# Patient Record
Sex: MALE | Race: BLACK OR AFRICAN AMERICAN | NOT HISPANIC OR LATINO | Employment: STUDENT | URBAN - METROPOLITAN AREA
[De-identification: names, ages, dates, MRNs, and addresses within clinical notes are randomized per-mention and may not be internally consistent; named-entity substitution may affect disease eponyms.]

---

## 2017-01-18 DIAGNOSIS — K64.9 HEMORRHOIDS, UNSPECIFIED HEMORRHOID TYPE: Primary | ICD-10-CM

## 2017-01-18 RX ORDER — TRIAMCINOLONE ACETONIDE 1 MG/G
CREAM TOPICAL
Qty: 30 G | Refills: 11 | Status: SHIPPED | OUTPATIENT
Start: 2017-01-18 | End: 2017-11-06

## 2017-01-26 ENCOUNTER — OFFICE VISIT (OUTPATIENT)
Dept: FAMILY MEDICINE | Facility: CLINIC | Age: 62
End: 2017-01-26

## 2017-01-26 ENCOUNTER — TELEPHONE (OUTPATIENT)
Dept: FAMILY MEDICINE | Facility: CLINIC | Age: 62
End: 2017-01-26

## 2017-01-26 VITALS
SYSTOLIC BLOOD PRESSURE: 116 MMHG | DIASTOLIC BLOOD PRESSURE: 73 MMHG | HEART RATE: 64 BPM | HEIGHT: 71 IN | WEIGHT: 174.2 LBS | BODY MASS INDEX: 24.39 KG/M2 | TEMPERATURE: 97.8 F

## 2017-01-26 DIAGNOSIS — K64.8 INTERNAL HEMORRHOIDS: ICD-10-CM

## 2017-01-26 DIAGNOSIS — D84.9 NEW ONSET OF HEADACHE IN IMMUNOCOMPROMISED PATIENT (H): Primary | ICD-10-CM

## 2017-01-26 DIAGNOSIS — D50.8 OTHER IRON DEFICIENCY ANEMIA: Primary | ICD-10-CM

## 2017-01-26 DIAGNOSIS — Z00.00 PREVENTATIVE HEALTH CARE: ICD-10-CM

## 2017-01-26 DIAGNOSIS — K64.4 EXTERNAL HEMORRHOIDS: ICD-10-CM

## 2017-01-26 DIAGNOSIS — D50.8 OTHER IRON DEFICIENCY ANEMIA: ICD-10-CM

## 2017-01-26 DIAGNOSIS — R21 RASH: ICD-10-CM

## 2017-01-26 DIAGNOSIS — K59.00 CONSTIPATION, UNSPECIFIED CONSTIPATION TYPE: ICD-10-CM

## 2017-01-26 DIAGNOSIS — R51.9 NEW ONSET OF HEADACHE IN IMMUNOCOMPROMISED PATIENT (H): Primary | ICD-10-CM

## 2017-01-26 RX ORDER — BENZOCAINE/MENTHOL 6 MG-10 MG
LOZENGE MUCOUS MEMBRANE 2 TIMES DAILY
Qty: 30 G | Refills: 1 | Status: SHIPPED | OUTPATIENT
Start: 2017-01-26 | End: 2018-09-17

## 2017-01-26 RX ORDER — CHOLECALCIFEROL (VITAMIN D3) 50 MCG
1 TABLET ORAL DAILY
Qty: 100 TABLET | Refills: 3 | Status: SHIPPED | OUTPATIENT
Start: 2017-01-26 | End: 2017-02-20

## 2017-01-26 RX ORDER — MULTIPLE VITAMINS W/ MINERALS TAB 9MG-400MCG
1 TAB ORAL DAILY
Qty: 90 EACH | Refills: 3 | Status: SHIPPED | OUTPATIENT
Start: 2017-01-26 | End: 2017-03-31

## 2017-01-26 RX ORDER — TRIAMCINOLONE ACETONIDE 1 MG/G
OINTMENT TOPICAL
Qty: 30 G | Refills: 0 | Status: SHIPPED | OUTPATIENT
Start: 2017-01-26 | End: 2018-09-17

## 2017-01-26 RX ORDER — ASPIRIN 81 MG
TABLET, DELAYED RELEASE (ENTERIC COATED) ORAL
Qty: 270 TABLET | Refills: 3 | Status: SHIPPED | OUTPATIENT
Start: 2017-01-26 | End: 2017-02-20

## 2017-01-26 RX ORDER — BISACODYL 5 MG/1
5 TABLET, DELAYED RELEASE ORAL DAILY PRN
Qty: 60 TABLET | Refills: 1 | Status: SHIPPED
Start: 2017-01-26 | End: 2017-09-06

## 2017-01-26 NOTE — PROGRESS NOTES
"       SUBJECTIVE       Antonio Mercado is a 61 year old  male with a PMH significant for:     Patient Active Problem List   Diagnosis     Health Care Home     Human immunodeficiency virus (HIV) disease (H)     Mechanical low back pain     Dysuria     Pain in finger of right hand     Heel pain     Microscopic hematuria     Hypertrophy of prostate with urinary obstruction     S/P colonoscopy     Anemia, iron deficiency     Right lower quadrant abdominal mass     Malignant neoplasm of hepatic flexure (H)     Hemorrhoids, unspecified hemorrhoid type     He presents with a few complaints. First, he is complaining of neck pain. Describes as \"migraine.\"  Started last month. Occurs over the L occiput and superior neck. Describes as \"maybe pounding\" pain, unsure how else to describe it. Rates pain a 5-6/10. Thinks he's had this 3 days out of the past month. States that pain typically lasts 1/2 to 3/4 of the day, then seems to resolve on its own. No diurnal pattern. Does not wake him from sleep. Saw his infectious disease doctor last month. He does not recall the timing or result of his last CD4 count. Otherwise feeling well, no fever, weight change, N/V or diarrhea. Denies change in vision, numbness, tingling.     Also notes some trouble with his chronic hemorrhoids. States that he notices bright red blood on the toilet paper at times, especially after eating a lot of protein like red meat. Hasn't been using hydrocortisone or stool softeners recently. Underwent colonoscopy on January 24 and was told at that time to ask his primary for treatment of internal hemorrhoids.    PMH, Medications and Allergies were reviewed and updated as needed.        REVIEW OF SYSTEMS     CONSTITUTIONAL: NEGATIVE for fever, chills, change in weight  INTEGUMENTARY/SKIN: NEGATIVE for worrisome rashes, moles or lesions  EYES: NEGATIVE for vision changes or irritation  ENT/MOUTH: NEGATIVE for ear, mouth and throat problems  RESP: NEGATIVE for " "significant cough or SOB  BREAST: NEGATIVE for masses, tenderness or discharge  CV: NEGATIVE for chest pain, palpitations or peripheral edema  GI: NEGATIVE for nausea, abdominal pain, heartburn, or change in bowel habits  : Urinary frequency, nocturia  MUSCULOSKELETAL: NEGATIVE for significant arthralgias or myalgia  NEURO: See HPI  ENDOCRINE: NEGATIVE for temperature intolerance, skin/hair changes  HEME/ALLERGY: NEGATIVE for bleeding problems  PSYCHIATRIC: NEGATIVE for changes in mood or affect        OBJECTIVE     Filed Vitals:    01/26/17 1041   BP: 116/73   Pulse: 64   Temp: 97.8  F (36.6  C)   TempSrc: Oral   Height: 5' 10.75\" (179.7 cm)   Weight: 174 lb 3.2 oz (79.017 kg)     Body mass index is 24.47 kg/(m^2).    Gen: Well-appearing, well-nourished adult male. Alert, oriented and appropriate. NAD  HEENT: Normocephalic. PERRL, EOMI, no conjunctival injection or scleral icterus. Nares patent. MMM. No posterior pharyngeal erythema or tonsillar exudate. 2-3 mm brown macule noted on L buccal surface  Neck: Supple  CV: RRR, no rubs, murmurs or extra heart sounds  Pulm: CTAB, no wheezes, rales or rhonchi  Rectal: Normal external sphincter. No external hemorrhoids noted.  Neuro: CN II-XII grossly intact. Strength 5/5 in grasp, upper and lower extremities. Biceps and patellar refleces 1+ and symmetric. Negative pronator drift. Normal finger-nose-finger. Normal gait.    No results found for this or any previous visit (from the past 24 hour(s)).        ASSESSMENT AND PLAN   1. New onset of headache in immunocompromised patient (H)  Asymptomatic at the time of our visit. No red flag features, but this is new onset HA in a elderly, immunocompromised patient so will obtain imaging to rule out infectious etiology or mass lesion. Discussed that he can use tylenol or ibuprofen for pain relief. Would like him to follow-up in 1 month's time, earlier if worsening symptoms. Discussed warning symptoms for which he should be " evaluated in the ED.  - CT HEAD W/O CONTRAST; Future    2. Internal hemorrhoids  Chronic. Seen on 1/24/2017 colonoscopy per patient. Not currently bothersome and no evidence of irritation or thrombosis on exam. Hydrocortisone and stool softener prescribed today. Discussed titrating to 2 soft stools daily.  - hydrocortisone (CORTAID) 1 % cream; Apply topically 2 times daily  Dispense: 30 g; Refill: 1  - docusate sodium (COLACE) 100 MG tablet; Take 1 to 3 tablets daily to keep stools soft.  Dispense: 270 tablet; Refill: 3    3. Rash  Refill  - triamcinolone (KENALOG) 0.1 % ointment; Apply sparingly to affected area three times daily for 14 days.  Dispense: 30 g; Refill: 0    4. Preventative health care  Refill  - multivitamin, therapeutic with minerals (MULTI-VITAMIN) TABS tablet; Take 1 tablet by mouth daily  Dispense: 90 each; Refill: 3  - Cholecalciferol (VITAMIN D) 2000 UNITS tablet; Take 1 tablet by mouth daily  Dispense: 100 tablet; Refill: 3          RTC in 1 month for follow up of HA and hemorrhoids or sooner if develops new or worsening symptoms.    Cristina Meyer    Preceptor attestation:  Patient seen and discussed with the resident. Assessment and plan reviewed with resident and agreed upon.  Supervising physician: Juan Guillory  [facility]

## 2017-01-26 NOTE — MR AVS SNAPSHOT
After Visit Summary   1/26/2017    Antonio Mercado    MRN: [id number]           Patient Information     Date Of Birth          1955        Visit Information        Provider Department      1/26/2017 10:40 AM Cristina Meyer MD [facility]        Today's Diagnoses     Hemorrhoids, unspecified hemorrhoid type    -  1     Rash         Preventative health care         Other iron deficiency anemia         External hemorrhoids         Internal hemorrhoids         New onset of headache in immunocompromised patient (H)           Care Instructions    For headaches:  - I ordered a CT scan today. Please stop at the [facility] desk on your way out to schedule  - You can use tylenol or ibuprofen as needed   - If HA worsens, fever occurs, you should be evaluated in clinic sooner    For hemorrhoids:   - I prescribed topical steroid (hydrocortisone) to use when inflammed  - I also sent stool softener to be used daily. Titrate to 2 soft stools daily    I would like to see you back in 1 month to follow-up        Follow-ups after your visit        Future tests that were ordered for you today     Open Future Orders        Priority Expected Expires Ordered    CT HEAD W/O CONTRAST Routine  1/26/2018 1/26/2017            Who to contact     Please call your clinic at [phone] to:    Ask questions about your health    Make or cancel appointments    Discuss your medicines    Learn about your test results    Speak to your doctor   If you have compliments or concerns about an experience at your clinic, or if you wish to file a complaint, please contact [facility] Patient Relations at [phone] or email us at [email or web address]         Additional Information About Your Visit        MyChart Information     [employer] gives you secure access to your electronic health record. If you see a primary care provider, you can also send messages to your care team and make appointments. If you  "have questions, please call your primary care clinic.  If you do not have a primary care provider, please call [phone] and they will assist you.      [employer] is an electronic gateway that provides easy, online access to your medical records. With [employer], you can request a clinic appointment, read your test results, renew a prescription or communicate with your care team.     To access your existing account, please contact your [facility] or call [phone] for assistance.        Care EveryWhere ID     This is your Care EveryWhere ID. This could be used by other organizations to access your [facility] medical records  [id number]        Your Vitals Were     Pulse Temperature Height BMI (Body Mass Index)          64 97.8  F (36.6  C) (Oral) 5' 10.75\" (179.7 cm) 24.47 kg/m2         Blood Pressure from Last 3 Encounters:   01/26/17 116/73   11/30/16 95/65   06/20/16 101/68    Weight from Last 3 Encounters:   01/26/17 174 lb 3.2 oz (79.017 kg)   06/20/16 160 lb 6.4 oz (72.757 kg)   06/06/16 161 lb 3.2 oz (73.12 kg)                 Today's Medication Changes          These changes are accurate as of: 1/26/17 11:16 AM.  If you have any questions, ask your nurse or doctor.               These medicines have changed or have updated prescriptions.        Dose/Directions    * triamcinolone 0.1 % cream   Commonly known as:  KENALOG   This may have changed:  Another medication with the same name was added. Make sure you understand how and when to take each.   Used for:  Hemorrhoids, unspecified hemorrhoid type   Changed by:  Salas Salgado, DO        Apply sparingly to affected area three times daily for 14 days.   Quantity:  30 g   Refills:  11       * triamcinolone 0.1 % ointment   Commonly known as:  KENALOG   This may have changed:  You were already taking a medication with the same name, and this prescription was added. Make sure you understand how and when to take each.   Used " for:  Rash   Changed by:  Cristina Meyer MD        Apply sparingly to affected area three times daily for 14 days.   Quantity:  30 g   Refills:  0       * Notice:  This list has 2 medication(s) that are the same as other medications prescribed for you. Read the directions carefully, and ask your doctor or other care provider to review them with you.         Where to get your medicines      These medications were sent to [id number] - [place], [place] - [address] [place] [address] AT [facility] [address]  [address], [place] [address]     Phone:  [phone]    - docusate sodium 100 MG tablet  - hydrocortisone 1 % cream  - multivitamin, therapeutic with minerals Tabs tablet  - triamcinolone 0.1 % ointment  - vitamin D 2000 UNITS tablet             Primary Care Provider Office Phone # Fax #    Salas Salgado [phone]       [facility] [address] [address]        Thank you!     Thank you for choosing [facility]  for your care. Our goal is always to provide you with excellent care. Hearing back from our patients is one way we can continue to improve our services. Please take a few minutes to complete the written survey that you may receive in the mail after your visit with us. Thank you!             Your Updated Medication List - Protect others around you: Learn how to safely use, store and throw away your medicines at www.disposemymeds.org.          This list is accurate as of: 1/26/17 11:16 AM.  Always use your most recent med list.                   Brand Name Dispense Instructions for use    abacavir-lamiVUDine-zidovudine 300-150-300 MG per tablet    TRIZIVIR     Take 1 tablet by mouth 2 times daily.       acetaminophen 500 MG tablet    TYLENOL    100 tablet    Take 1 tablet by mouth every 6 hours as needed for pain.       baclofen 10 MG tablet    LIORESAL    30 tablet    Take 1 tablet (10 mg) by mouth 3 times daily as needed for muscle spasms        docusate sodium 100 MG tablet    COLACE    270 tablet    Take 1 to 3 tablets daily to keep stools soft.       eucerin cream     1 g    Apply topically daily as needed for dry skin       ferrous sulfate 325 (65 FE) MG tablet    IRON    30 tablet    Take 1 tablet (325 mg) by mouth 2 times daily       hydrocortisone 1 % cream    CORTAID    30 g    Apply topically 2 times daily       INTELENCE 100 MG tablet   Generic drug:  etravirine      Take 200 mg by mouth 2 times daily (with meals).       multivitamin, therapeutic with minerals Tabs tablet     90 each    Take 1 tablet by mouth daily       simvastatin 20 MG tablet    ZOCOR    90 tablet    Take 1 tablet (20 mg) by mouth At Bedtime       tamsulosin 0.4 MG capsule    FLOMAX    30 capsule    Take 1 capsule (0.4 mg) by mouth daily       * triamcinolone 0.1 % cream    KENALOG    30 g    Apply sparingly to affected area three times daily for 14 days.       * triamcinolone 0.1 % ointment    KENALOG    30 g    Apply sparingly to affected area three times daily for 14 days.       vitamin D 2000 UNITS tablet     100 tablet    Take 1 tablet by mouth daily       * Notice:  This list has 2 medication(s) that are the same as other medications prescribed for you. Read the directions carefully, and ask your doctor or other care provider to review them with you.

## 2017-01-27 DIAGNOSIS — R51.9 NEW ONSET OF HEADACHE IN IMMUNOCOMPROMISED PATIENT (H): ICD-10-CM

## 2017-01-27 DIAGNOSIS — D84.9 NEW ONSET OF HEADACHE IN IMMUNOCOMPROMISED PATIENT (H): ICD-10-CM

## 2017-01-27 NOTE — Clinical Note
February 2, 2017      Antonio Mercado  [address]  [place] [address]        Dear Antonio,    Normal head CT without evidence of infection or tumor, i.e. Nothing seen that could be the source of headaches. If headache pain is ongoing, please return to clinic for a follow-up visit    If you have any questions, please call the clinic to make an appointment.    Sincerely,    Juan Guillory MD

## 2017-01-30 ENCOUNTER — TRANSFERRED RECORDS (OUTPATIENT)
Dept: HEALTH INFORMATION MANAGEMENT | Facility: CLINIC | Age: 62
End: 2017-01-30

## 2017-01-30 ENCOUNTER — AMBULATORY - HEALTHEAST (OUTPATIENT)
Dept: INFECTIOUS DISEASES | Facility: CLINIC | Age: 62
End: 2017-01-30

## 2017-01-30 DIAGNOSIS — Z21 HIV (HUMAN IMMUNODEFICIENCY VIRUS INFECTION) (H): ICD-10-CM

## 2017-02-02 NOTE — PROGRESS NOTES
Quick Note:    Normal head CT without evidence of infection or tumor, i.e. Nothing seen that could be the source of headaches. If headache pain is ongoing, please return to clinic for a follow-up visit  ______

## 2017-02-20 ENCOUNTER — OFFICE VISIT (OUTPATIENT)
Dept: FAMILY MEDICINE | Facility: CLINIC | Age: 62
End: 2017-02-20

## 2017-02-20 VITALS
DIASTOLIC BLOOD PRESSURE: 63 MMHG | WEIGHT: 170.2 LBS | SYSTOLIC BLOOD PRESSURE: 96 MMHG | TEMPERATURE: 98.2 F | HEIGHT: 71 IN | BODY MASS INDEX: 23.83 KG/M2 | HEART RATE: 72 BPM

## 2017-02-20 DIAGNOSIS — K59.00 CONSTIPATION, UNSPECIFIED CONSTIPATION TYPE: ICD-10-CM

## 2017-02-20 DIAGNOSIS — D50.8 OTHER IRON DEFICIENCY ANEMIA: ICD-10-CM

## 2017-02-20 DIAGNOSIS — J06.9 UPPER RESPIRATORY TRACT INFECTION, UNSPECIFIED TYPE: ICD-10-CM

## 2017-02-20 DIAGNOSIS — Z71.84 TRAVEL ADVICE ENCOUNTER: Primary | ICD-10-CM

## 2017-02-20 DIAGNOSIS — B20 HUMAN IMMUNODEFICIENCY VIRUS (HIV) DISEASE (H): ICD-10-CM

## 2017-02-20 DIAGNOSIS — Z00.00 PREVENTATIVE HEALTH CARE: ICD-10-CM

## 2017-02-20 PROBLEM — K59.01 SLOW TRANSIT CONSTIPATION: Status: ACTIVE | Noted: 2017-02-20

## 2017-02-20 RX ORDER — CHOLECALCIFEROL (VITAMIN D3) 50 MCG
1 TABLET ORAL DAILY
Qty: 100 TABLET | Refills: 3 | Status: SHIPPED | OUTPATIENT
Start: 2017-02-20 | End: 2017-03-31

## 2017-02-20 RX ORDER — ASPIRIN 81 MG
TABLET, DELAYED RELEASE (ENTERIC COATED) ORAL
Qty: 270 TABLET | Refills: 3 | Status: CANCELLED | OUTPATIENT
Start: 2017-02-20

## 2017-02-20 RX ORDER — ETRAVIRINE 200 MG/1
200 TABLET ORAL 2 TIMES DAILY WITH MEALS
Qty: 180 TABLET | Refills: 3 | Status: SHIPPED | OUTPATIENT
Start: 2017-02-20 | End: 2019-08-22

## 2017-02-20 RX ORDER — CODEINE PHOSPHATE AND GUAIFENESIN 10; 100 MG/5ML; MG/5ML
1-2 SOLUTION ORAL EVERY 4 HOURS PRN
Qty: 120 ML | Refills: 0 | Status: SHIPPED | OUTPATIENT
Start: 2017-02-20 | End: 2017-02-23

## 2017-02-20 RX ORDER — ASPIRIN 81 MG
TABLET, DELAYED RELEASE (ENTERIC COATED) ORAL
Qty: 270 TABLET | Refills: 3 | Status: SHIPPED | OUTPATIENT
Start: 2017-02-20 | End: 2018-05-24

## 2017-02-20 RX ORDER — ABACAVIR , LAMIVUDINE AND ZIDOVUDINE 150; 300; 300 MG/1; MG/1; MG/1
1 TABLET ORAL 2 TIMES DAILY
Qty: 180 TABLET | Refills: 3 | Status: SHIPPED | OUTPATIENT
Start: 2017-02-20 | End: 2019-08-22

## 2017-02-20 NOTE — PATIENT INSTRUCTIONS
Today, we have given 2nd shots for Hepatitis A and B.  We have given one pneumonia shot.  We have given typhoid fever vaccine and influenza shot.  We have checked for evidence of TB and your CD4 count.      Please return next week to follow up on your CD4 count and whether that effects Yellow Fever vaccination.

## 2017-02-20 NOTE — NURSING NOTE
Antonio Mercado      1.  Has the patient received the information for the influenza vaccine? YES    2.  Does the patient have any of the following contraindications?     Allergy to eggs? No     Allergic reaction to previous influenza vaccines? No     Any other problems to previous influenza vaccines? No     Paralyzed by Guillain-[place] syndrome? No     Currently pregnant? NO     Current moderate or severe illness? No    Vaccination given by Anita Ashby CMA.  Recorded by Anita Ashby

## 2017-02-20 NOTE — PROGRESS NOTES
[facility] Travel Visit         Antonio Mercado is a 61 year old male who presents for a pre-travel assessment visit.  He will be traveling to:    Destination(s):  Destination [place]  [place]  Date of arrival 4/8/17  Date of departure 4/11/17 and   Destination 2  [facility]  Date of arrival 4/11/17  Date of departure 5/7/17    Reason for travel: Visit Friends and Family    Antonio Mercado has completed the travel questionnaire and it is reviewed: YES  Are there special circumstances which place this traveler at higher risk (List if 'yes')?: YES - he is HIV positive.  He also had colon cancer resected in 2016.    Past Medical History   Diagnosis Date     Heel pain      History of blood transfusion      HIV (human immunodeficiency virus infection) (H)      LBP (low back pain)      Malignant neoplasm of hepatic flexure (H) 2/4/2016     Trigger finger        Current Outpatient Prescriptions on File Prior to Visit:  acetaminophen (TYLENOL) 500 MG tablet Take 1 tablet by mouth every 6 hours as needed for pain.   triamcinolone (KENALOG) 0.1 % ointment Apply sparingly to affected area three times daily for 14 days.   multivitamin, therapeutic with minerals (MULTI-VITAMIN) TABS tablet Take 1 tablet by mouth daily   hydrocortisone (CORTAID) 1 % cream Apply topically 2 times daily   bisacodyl (BISACODYL LAXATIVE) 5 MG EC tablet Take 1 tablet (5 mg) by mouth daily as needed for constipation   triamcinolone (KENALOG) 0.1 % cream Apply sparingly to affected area three times daily for 14 days.   tamsulosin (FLOMAX) 0.4 MG capsule Take 1 capsule (0.4 mg) by mouth daily   baclofen (LIORESAL) 10 MG tablet Take 1 tablet (10 mg) by mouth 3 times daily as needed for muscle spasms   simvastatin (ZOCOR) 20 MG tablet Take 1 tablet (20 mg) by mouth At Bedtime   ferrous sulfate 325 (65 FE) MG tablet Take 1 tablet (325 mg) by mouth 2 times daily   Skin Protectants, Misc. (EUCERIN) cream Apply topically daily as needed for dry skin    [DISCONTINUED] etravirine (INTELENCE) 100 MG tablet Take 200 mg by mouth 2 times daily (with meals).   [DISCONTINUED] abacavir-lamiVUDine-zidovudine (TRIZIVIR) 300-150-300 MG per tablet Take 1 tablet by mouth 2 times daily.     No current facility-administered medications on file prior to visit.         Allergies   Allergen Reactions     Stribild [Tpybijk-Hghvnpa-Mtavlwuu-Tenof] Itching     Ibuprofen Itching       Immunizations, travel specific:  -- Yellow fever: Required and not previously vaccinated  -- Hep A: Immunity status unknown  -- Hep B: Immunity status unknown  -- Typhoid (IM: booster every 2 years; PO: booster every 5 years): Known to be not immune, not immunized  -- Rabies  (Data on the need for and timing of additional booster doses are not available): Known to be not immune, not immunized  -- Meningococcal meningitis Known to be not immune, not immunized   -- [place] Islander encephalitis (Data on the need for and timing of additional booster doses are not available):Known to be not immune, not immunized    Immunizations, routine adult:  -- Influenza Known to be not immune, not immunized - thought he had been immunized at this clinic but no record since 2015  -- Polio: Immunity status unknown  -- Diphtheria, Tetanus & Pertussis (DTaP): Immunity status unknown - did have #2 shot on 3/8/16  -- Measles/ Mumps/ Rubella: Not needed for persons born before 1958  -- Varicella: Not needed for persons born in the [place] before 1978  -- Pneumococcal (PPSV23 (Pneumovax)): UTD with immunization   -- Pneumococcal (PCV13 (Prevnar)): Known to be not immune, not immunized    Malaria prophylaxis:  Recommended (refer to [facility] for destination-specific recommendation) YES       Review of Systems:     C: NEGATIVE for fever, chills, change in weight.  Overall feels well, eats well, active, good energy  E/M: NEGATIVE for ear, mouth and throat problems  RESP: POSITIVE for cough-non productive and nasal congestion x 1  "week  CV: NEGATIVE for chest pain, palpitations or peripheral edema          Objective:     BP 96/63  Pulse 72  Temp 98.2  F (36.8  C) (Oral)  Ht 5' 11\" (180.3 cm)  Wt 170 lb 3.2 oz (77.2 kg)  BMI 23.74 kg/m2  Body mass index is 23.74 kg/(m^2).    GENERAL: healthy, alert, well nourished, well hydrated, no distress  HENT: ear canals- normal; TMs- normal; Nose- normal; Mouth- no ulcers, no lesions  NECK: no tenderness, no adenopathy, no asymmetry, no masses, no stiffness; thyroid- normal to palpation  RESP: decreased AE throughout but lungs clear to auscultation - no rales, no rhonchi, no wheezes  CV: regular rates and rhythm, normal S1 S2, no S3 or S4 and no murmur, no click or rub -  MS: extremities- no gross deformities noted, no edema         Assessment and Plan     Patient is HIV+ and taking HAART.  He attends [facility] but we have NO medical records in chart.  I called ID office and one of his partners informs me that at last visit in Dec 2016, his CD4 count was 236 with negative viral load.  I reviewed records which are available via care everywhere, [facility].  He suggests repeating CD4 count and HIV viral load today.      I reviewed [facility] records.  Most recent exam on 2/7/17 concluded:  PLAN:   1. Every 3 to 4 months, CEA, liver function tests, hemogram.  2. In 6 months: CT chest, abdomen and pelvis CEA, liver function tests, hemogram, ferritin, and follow up with me 1 week after the above tests.   3. High-fiber, low-fat diet, continued antiretroviral therapy and iron supplementation.   4. January 2019: Surveillance Colonoscopy    On review of recommendations, immunization recommendations and anti-malaria prophylaxis are condition on degree of immunosuppresssion.  I will involve PharmD in developing recommendations.  Will ask patient to return to finalize recommendations once labs are back.     Based on patient's past history, review of immunization and immunity status, planned itinerary and " current recommendations, the following are recommended:    Yellow fever vaccine - determine at next visit based on labs  Hep A vaccine #2 today  Hep B vaccine #3 today  Typhoid vaccine today  Influenza vaccine today  Pneumococcal - PCV13 today, PPSV received March 2016    For next visit:  It is recommended to check TB status - will draw lab today.  Patient will be visiting [facility] - can give Menactra next visit if he wants coverage (lower risk area)  YF vaccine can be given to an asymptomatic HIV-infected person with no evidence of immunosuppression based on CD4 counts.  Malaria:  - determine treatment recommendations at next visit - all meds have some risk interaction with HAART - PharmD to advise  Traveler's diarrhea treatment at next visit - cipro 500 BID x 3 days; loperamide for diarrhea    2. Cough secondary to URI  Prescribe codeine containing cough suppressant, expectant full recovery - consider CXR if remains symptomatic    I spent 20 min in counselling and coordination of care on appropriate shots and medications; food and water precautions DEET and mosquito netting    Patient is given a copy of the [place] traveller report as well as destination-specific recommendations on sun protection, avoiding insect bites and travel safety.      Total of 40 minutes was spent in face to face contact with patient with > 50% in counseling and coordination of care.  Options for treatment and/or follow-up care were reviewed with the patient. Antonio Mercado was engaged and actively involved in the decision making process. He verbalized understanding of the options discussed and was satisfied with the final plan.      Harris Mary MD

## 2017-02-20 NOTE — MR AVS SNAPSHOT
After Visit Summary   2/20/2017    Antonio Mercado    MRN: [id number]           Patient Information     Date Of Birth          1955        Visit Information        Provider Department      2/20/2017 9:40 AM Harris Mary MD [facility]        Today's Diagnoses     Travel advice encounter    -  1    Human immunodeficiency virus (HIV) disease (H)        Other iron deficiency anemia        Preventative health care        Constipation, unspecified constipation type        Upper respiratory tract infection, unspecified type          Care Instructions    Today, we have given 2nd shots for Hepatitis A and B.  We have given one pneumonia shot.  We have given typhoid fever vaccine and influenza shot.  We have checked for evidence of TB and your CD4 count.      Please return next week to follow up on your CD4 count and whether that effects Yellow Fever vaccination.          Follow-ups after your visit        Follow-up notes from your care team     Return in about 1 week (around 2/27/2017).      Your next 10 appointments already scheduled     Feb 21, 2017  8:50 AM CST   LAB VISIT with [facility]   [facility] ([facility])    [address] [place] [address]   [phone]           If you are coming in for fasting labs, you will need to fast for 10-12 hours prior to your appt. Fasting labs include lipids, cholesterol, glucose, complete metabolic panel, basic metabolic panel, and triglycerides. Do not drink coffee or any other fluids. Water with medications are okay. Do not chew gum as well. If you have any further questions, please contact your health care team.              Feb 27, 2017 10:00 AM CST   Return Visit with Harris Mary MD   [facility] ([facility])    [address] [place] [address]   [phone]              Future tests that were ordered for you today     Open Future Orders        Priority Expected Expires Ordered    HIV-1 RNA Vincent PCR ([facility]) Routine  "2/21/2017 2/20/2018 2/20/2017            Who to contact     Please call your clinic at [phone] to:    Ask questions about your health    Make or cancel appointments    Discuss your medicines    Learn about your test results    Speak to your doctor   If you have compliments or concerns about an experience at your clinic, or if you wish to file a complaint, please contact [facility] Patient Relations at [phone] or email us at [email or web address]         Additional Information About Your Visit        [employer] Information     [employer] gives you secure access to your electronic health record. If you see a primary care provider, you can also send messages to your care team and make appointments. If you have questions, please call your primary care clinic.  If you do not have a primary care provider, please call [phone] and they will assist you.      [employer] is an electronic gateway that provides easy, online access to your medical records. With [employer], you can request a clinic appointment, read your test results, renew a prescription or communicate with your care team.     To access your existing account, please contact your [facility] or call [phone] for assistance.        Care EveryWhere ID     This is your Care EveryWhere ID. This could be used by other organizations to access your [place] medical records  [id number]        Your Vitals Were     Pulse Temperature Height BMI (Body Mass Index)          72 98.2  F (36.8  C) (Oral) 5' 11\" (180.3 cm) 23.74 kg/m2         Blood Pressure from Last 3 Encounters:   02/20/17 96/63   01/26/17 116/73   11/30/16 95/65    Weight from Last 3 Encounters:   02/20/17 170 lb 3.2 oz (77.2 kg)   01/26/17 174 lb 3.2 oz (79 kg)   06/20/16 160 lb 6.4 oz (72.8 kg)              We Performed the Following     ADMIN VACCINE, EACH ADDITIONAL     ADMIN VACCINE, INITIAL     Flu vaccine, quad, with preservative, " 0.5 ml     [place] Suppressor Panel ([facility])     HEPATITIS A VACCINE ADULT IM     HEPATITIS B VACCINE,ADULT,IM     Myc Tuberc Qtferon ([facility])     PNEUMOCOCCAL CONJ VACCINE 13 VALENT IM (PCV13)     TYPHOID VACCINE, IM          Today's Medication Changes          These changes are accurate as of: 2/20/17  4:13 PM.  If you have any questions, ask your nurse or doctor.               Start taking these medicines.        Dose/Directions    guaiFENesin-codeine 100-10 MG/5ML Soln solution   Commonly known as:  ROBITUSSIN AC   Used for:  Upper respiratory tract infection, unspecified type   Started by:  Harris Mary MD        Dose:  1-2 tsp.   Take 5-10 mLs by mouth every 4 hours as needed for cough   Quantity:  120 mL   Refills:  0         These medicines have changed or have updated prescriptions.        Dose/Directions    etravirine 200 MG tablet   Commonly known as:  INTELENCE   This may have changed:  medication strength   Used for:  Human immunodeficiency virus (HIV) disease (H)   Changed by:  Harris Mary MD        Dose:  200 mg   Take 1 tablet (200 mg) by mouth 2 times daily (with meals)   Quantity:  180 tablet   Refills:  3            Where to get your medicines      These medications were sent to [id number] - [place], [place] - [address] [place] [address] AT [facility]  [address], [place] [address]     Phone:  [phone]     abacavir-lamiVUDine-zidovudine 300-150-300 MG per tablet    docusate sodium 100 MG tablet    etravirine 200 MG tablet    vitamin D 2000 UNITS tablet         Some of these will need a paper prescription and others can be bought over the counter.  Ask your nurse if you have questions.     Bring a paper prescription for each of these medications     guaiFENesin-codeine 100-10 MG/5ML Soln solution                Primary Care Provider Office Phone # Fax #    Salas Salgado [phone]       [address]  [address]        Thank you!     Thank you for choosing [facility]  for your care. Our goal is always to provide you with excellent care. Hearing back from our patients is one way we can continue to improve our services. Please take a few minutes to complete the written survey that you may receive in the mail after your visit with us. Thank you!             Your Updated Medication List - Protect others around you: Learn how to safely use, store and throw away your medicines at www.disposemymeds.org.          This list is accurate as of: 2/20/17  4:13 PM.  Always use your most recent med list.                   Brand Name Dispense Instructions for use    abacavir-lamiVUDine-zidovudine 300-150-300 MG per tablet    TRIZIVIR    180 tablet    Take 1 tablet by mouth 2 times daily       acetaminophen 500 MG tablet    TYLENOL    100 tablet    Take 1 tablet by mouth every 6 hours as needed for pain.       baclofen 10 MG tablet    LIORESAL    30 tablet    Take 1 tablet (10 mg) by mouth 3 times daily as needed for muscle spasms       bisacodyl 5 MG EC tablet    BISACODYL LAXATIVE    60 tablet    Take 1 tablet (5 mg) by mouth daily as needed for constipation       docusate sodium 100 MG tablet    COLACE    270 tablet    Take 1 to 3 tablets daily to keep stools soft.       etravirine 200 MG tablet    INTELENCE    180 tablet    Take 1 tablet (200 mg) by mouth 2 times daily (with meals)       eucerin cream     1 g    Apply topically daily as needed for dry skin       ferrous sulfate 325 (65 FE) MG tablet    IRON    30 tablet    Take 1 tablet (325 mg) by mouth 2 times daily       guaiFENesin-codeine 100-10 MG/5ML Soln solution    ROBITUSSIN AC    120 mL    Take 5-10 mLs by mouth every 4 hours as needed for cough       hydrocortisone 1 % cream    CORTAID    30 g    Apply topically 2 times daily       multivitamin, therapeutic with minerals Tabs tablet     90 each    Take 1 tablet by mouth daily       simvastatin 20 MG tablet     ZOCOR    90 tablet    Take 1 tablet (20 mg) by mouth At Bedtime       tamsulosin 0.4 MG capsule    FLOMAX    30 capsule    Take 1 capsule (0.4 mg) by mouth daily       * triamcinolone 0.1 % cream    KENALOG    30 g    Apply sparingly to affected area three times daily for 14 days.       * triamcinolone 0.1 % ointment    KENALOG    30 g    Apply sparingly to affected area three times daily for 14 days.       vitamin D 2000 UNITS tablet     100 tablet    Take 1 tablet by mouth daily       * Notice:  This list has 2 medication(s) that are the same as other medications prescribed for you. Read the directions carefully, and ask your doctor or other care provider to review them with you.

## 2017-02-21 LAB
CD3 ABSOLUTE COUNT: 1059 /UL (ref 471–2787)
CD3: 73 % (ref 53–86)
CD4 ABSOLUTE COUNT: 239 /UL (ref 269–1625)
CD4-CD8 RATIO: 0.31 (ref 0.8–4.5)
CD4: 17 % (ref 33–58)
CD8 ABSOLUTE COUNT: 781 /UL (ref 117–923)
CD8: 54 % (ref 13–44)
HIV-1 RNA DETECT/QUANT, P: 73 COPIES/ML
LYMPHOCYTES # BLD AUTO: 1.4 THOU/UL (ref 0.8–4.4)

## 2017-02-22 LAB
QTF INTERPRETATION: NORMAL
QTF MITOGEN - NIL: >10 IU/ML
QTF NIL: 0.16 IU/ML
QTF RESULT: NEGATIVE
QTF TB ANTIGEN - NIL: 0.04 IU/ML

## 2017-02-23 ENCOUNTER — COMMUNICATION - HEALTHEAST (OUTPATIENT)
Dept: INFECTIOUS DISEASES | Facility: CLINIC | Age: 62
End: 2017-02-23

## 2017-02-23 ENCOUNTER — TELEPHONE (OUTPATIENT)
Dept: FAMILY MEDICINE | Facility: CLINIC | Age: 62
End: 2017-02-23

## 2017-02-23 DIAGNOSIS — J06.9 UPPER RESPIRATORY TRACT INFECTION, UNSPECIFIED TYPE: ICD-10-CM

## 2017-02-23 NOTE — TELEPHONE ENCOUNTER
I called for Antonio Gonzalez's ID doctor.  He is out of town this week.  His partner reviewed Antonio's lab results and based on CD4 count and viral load on balance recommends giving YF vaccine.  He also agrees with using doxy for malaria prophylaxis and OK to give meningitis vaccine if patient desires although he is not going to a high risk location.

## 2017-02-24 RX ORDER — CODEINE PHOSPHATE AND GUAIFENESIN 10; 100 MG/5ML; MG/5ML
1-2 SOLUTION ORAL EVERY 4 HOURS PRN
Qty: 120 ML | Refills: 0 | Status: SHIPPED | OUTPATIENT
Start: 2017-02-24 | End: 2017-02-27

## 2017-02-27 ENCOUNTER — OFFICE VISIT (OUTPATIENT)
Dept: FAMILY MEDICINE | Facility: CLINIC | Age: 62
End: 2017-02-27

## 2017-02-27 ENCOUNTER — RECORDS - HEALTHEAST (OUTPATIENT)
Dept: ADMINISTRATIVE | Facility: OTHER | Age: 62
End: 2017-02-27

## 2017-02-27 VITALS
OXYGEN SATURATION: 100 % | HEART RATE: 74 BPM | SYSTOLIC BLOOD PRESSURE: 115 MMHG | BODY MASS INDEX: 23.63 KG/M2 | WEIGHT: 169.4 LBS | DIASTOLIC BLOOD PRESSURE: 75 MMHG | TEMPERATURE: 98.1 F

## 2017-02-27 DIAGNOSIS — J06.9 UPPER RESPIRATORY TRACT INFECTION, UNSPECIFIED TYPE: ICD-10-CM

## 2017-02-27 DIAGNOSIS — Z23 NEED FOR VACCINATION: ICD-10-CM

## 2017-02-27 DIAGNOSIS — N32.0 BLADDER OUTLET OBSTRUCTION: Primary | ICD-10-CM

## 2017-02-27 DIAGNOSIS — Z71.84 TRAVEL ADVICE ENCOUNTER: ICD-10-CM

## 2017-02-27 LAB — PSA SERPL-MCNC: 8.7 NG/ML (ref 0–4.5)

## 2017-02-27 RX ORDER — AZITHROMYCIN 250 MG/1
TABLET, FILM COATED ORAL
Qty: 6 TABLET | Refills: 0 | Status: SHIPPED | OUTPATIENT
Start: 2017-02-27 | End: 2017-03-27

## 2017-02-27 RX ORDER — CODEINE PHOSPHATE AND GUAIFENESIN 10; 100 MG/5ML; MG/5ML
1-2 SOLUTION ORAL EVERY 4 HOURS PRN
Qty: 236 ML | Refills: 0 | Status: SHIPPED | OUTPATIENT
Start: 2017-02-27 | End: 2017-03-27

## 2017-02-27 RX ORDER — OXYBUTYNIN CHLORIDE 5 MG/1
TABLET, EXTENDED RELEASE ORAL
Qty: 31 TABLET | Refills: 3 | Status: SHIPPED | OUTPATIENT
Start: 2017-02-27 | End: 2018-09-17

## 2017-02-27 RX ORDER — CIPROFLOXACIN 500 MG/1
500 TABLET, FILM COATED ORAL 2 TIMES DAILY
Qty: 6 TABLET | Refills: 0 | Status: SHIPPED | OUTPATIENT
Start: 2017-02-27 | End: 2017-08-18

## 2017-02-27 RX ORDER — DOXYCYCLINE 100 MG/1
CAPSULE ORAL
Qty: 60 CAPSULE | Refills: 0 | Status: SHIPPED | OUTPATIENT
Start: 2017-02-27 | End: 2018-09-17

## 2017-02-27 RX ORDER — LOPERAMIDE HYDROCHLORIDE 2 MG/1
TABLET ORAL
Qty: 30 TABLET | Refills: 0 | Status: SHIPPED | OUTPATIENT
Start: 2017-02-27 | End: 2018-09-17

## 2017-02-27 NOTE — PROGRESS NOTES
SUBJECTIVE:  This is a 61-year-old male who follows up on a visit one week ago.  He is planning to visit [facility] where he is from.  He hasn't been there for 6 years.  The travel visit scheduled one week ago was complicated by the fact that he is HIV-positive and also had colon cancer, and none of the documentation from specialty visits was available at that time.  Since the visit, I've accessed Care Everywhere to review specialty notes with his permission, including [facility], [facility] and [facility].  I obtained a CD4 count and an HIV viral, load both of which place the patient in the intermediate immunosuppression range.  His TB status is negative.      I consulted with his usual ID doctor's partner, who advised that on balance yellow fever vaccine would be recommended.  In addition, they agreed that doxycycline would be the safest anti-malaria medication.  Today, the patient returns to follow up on his lab results and discuss recommendations.     In addition, he reports that he continues to have a cough.  This became productive of green sputum about 2 days ago and he had the cough for about 2 weeks.  He doesn't feel sick otherwise.  He doesn't have a fever.     He also complains of a headache from tamsulosin.  He self-discontinued this medication about 2 weeks ago and reports that the headache resolved.  He tried restarting the medication, and he therefore concluded that the medication gives him side effects.  He does have troubling bladder outlet symptoms, however.  He had a mildly elevated PSA in 2015 and was referred to Urology.  I can't find the notes from the Urology visit, but he suggests that he had a cystoscopy.  He is willing to follow up with Urology, given that he continues to have symptoms, but he requests   an alternative bladder outlet medication.     OBJECTIVE:   Vital signs are noted and are satisfactory.  He is in no acute distress.  His lungs were auscultated and no  added sounds are appreciated.  His pulse is regular.  He has no lower extremity edema.  We reviewed his labs and he had several questions which I answered.     ASSESSMENT:   1.  HIV-positive.   2.  Plan to travel to [facility].   3.  Bladder outlet obstruction symptoms, with previously elevated PSA.   4.  Complicated URI.     PLAN:  I spent several minutes discussing with him the risks and benefits of yellow fever vaccination.  Given that he is intermediately immunosuppressed, he might experience acute yellow fever symptoms with the live vaccine; however, he also doesn't want to contract yellow fever infection on his travels.  After discussion, he agreed to proceed with the yellow fever vaccination today.      In addition, I've refilled all his meds with sufficient quantity for his travels.  I also gave him loperamide and Cipro antibiotics in the case that he develops traveler's diarrhea.      We reviewed malaria prophylaxis and agreed to prescribe doxycycline as suggested by ID.  Concerning meningococcal risk we agreed not to immunize, given that he will be at low risk in the [place] and [place].  I refilled the codeine-containing cough suppressant he previously used, but also added in a prescription for azithromycin, given the duration of his symptoms.  I've ordered a PSA to evaluate his bladder outlet symptoms.  If it is elevated, we'll recommend that he return to see Urology.  In the interim, I've prescribed oxybutynin at its lowest dose.   Total visit time today was 25 minutes all of this was face-to-face MD time.  Over 50% was spent in counseling and coordination of care.

## 2017-02-27 NOTE — LETTER
2/27/2017      RE: Antonio Mercado  [address]   [place] [place] [facility] [address]     Dr Andersen, KARENI -     SUBJECTIVE:  This is a 61-year-old male who follows up on a visit one week ago.  He is planning to visit [facility] where he is from.  He hasn't been there for 6 years.  The travel visit scheduled one week ago was complicated by the fact that he is HIV-positive and also had colon cancer, and none of the documentation from specialty visits was available at that time.  Since the visit, I've accessed Care Everywhere to review specialty notes with his permission, including [facility] and [facility].  I obtained a CD4 count and an HIV viral, load both of which place the patient in the intermediate range.  His TB status is negative.  I consulted with his usual ID doctor's partner, who advised an unbalanced yellow fever vaccine would be recommended.  In addition, they agreed that doxycycline would be the safest anti-malaria medication.  Today, the patient returns to follow up on his lab results and discuss recommendations.   In addition, he reports that he continues to have a cough.  This became productive of green sputum about 2 days ago and he had the cough for about 2 weeks.  He doesn't feel sick otherwise.  He doesn't have a fever.   He also complains of a headache from tamsulosin.  He self-discontinued this medication about 2 weeks ago and reports that the headache resolved.  He tried restarting the medication, and he therefore concluded that the medication gives him side effects.  He does have troubling bladder outlet symptoms, however.  He had a mildly elevated PSA in 2015 and was referred to Urology.  I can't find the notes from the Urology visit, but he suggests that he had a cystoscopy.  He is willing to follow up with Urology, given that he continues to have symptoms, but he requests   an alternative bladder outlet medication.   OBJECTIVE:   Vital signs are noted and are  satisfactory.  He is in no acute distress.  His lungs were auscultated and no added sounds are appreciated.  His pulse is regular.  He has no lower extremity edema.  We reviewed his labs and he had several questions which I answered.   ASSESSMENT:   1.  HIV-positive.   2.  Plan to travel to [facility].   3.  Bladder outlet obstruction symptoms, with previously elevated PSA.   4.  Complicated URI.   PLAN:  I spent several minutes discussing with him the risks and benefits of yellow fever vaccination.  Given that he is intermediately immunosuppressed, he might experience acute yellow fever symptoms with the live vaccine; however, he also doesn't want to contract yellow fever infection on his travels.  After discussion, he agreed to proceed with the fever vaccination today.  In addition, I've refilled ?? for his travels.  I also gave him loperamide and Cipro antibiotics in the case that he develops traveler's diarrhea.  We reviewed malaria prophylaxis and agreed not to immunize, given that he will be at low risk in the [place].  I refilled the codeine-containing cough suppressant he previously used, but also added in a prescription for azithromycin, given the duration of his symptoms.  I've ordered a PSA to evaluate his bladder outlet symptoms.  If it is elevated, we'll recommend that he return to see Urology.  In the interim, I've prescribed oxybutynin at its lowest dose.   Total visit time today was 25 minutes all of this was face-to-face MD time.  Over 50% was spent in counseling and coordination of care.         Harris Mary MD

## 2017-02-27 NOTE — MR AVS SNAPSHOT
After Visit Summary   2/27/2017    Antonio Mercado    MRN: [id number]           Patient Information     Date Of Birth          1955        Visit Information        Provider Department      2/27/2017 10:00 AM Harris Mary MD [facility]        Today's Diagnoses     Bladder outlet obstruction    -  1    Travel advice encounter        Upper respiratory tract infection, unspecified type        Need for vaccination           Follow-ups after your visit        Who to contact     Please call your clinic at [phone] to:    Ask questions about your health    Make or cancel appointments    Discuss your medicines    Learn about your test results    Speak to your doctor   If you have compliments or concerns about an experience at your clinic, or if you wish to file a complaint, please contact [facility] Patient Relations at [phone] or email us at [email or web address]         Additional Information About Your Visit        MyChart Information     [employer] gives you secure access to your electronic health record. If you see a primary care provider, you can also send messages to your care team and make appointments. If you have questions, please call your primary care clinic.  If you do not have a primary care provider, please call [phone] and they will assist you.      [employer] is an electronic gateway that provides easy, online access to your medical records. With [employer], you can request a clinic appointment, read your test results, renew a prescription or communicate with your care team.     To access your existing account, please contact your [facility] or call [phone] for assistance.        Care EveryWhere ID     This is your Care EveryWhere ID. This could be used by other organizations to access your [place] medical records  [id number]        Your Vitals Were     Pulse Temperature Pulse Oximetry BMI (Body Mass  Index)          74 98.1  F (36.7  C) (Oral) 100% 23.63 kg/m2         Blood Pressure from Last 3 Encounters:   02/27/17 115/75   02/20/17 96/63   01/26/17 116/73    Weight from Last 3 Encounters:   02/27/17 169 lb 6.4 oz (76.8 kg)   02/20/17 170 lb 3.2 oz (77.2 kg)   01/26/17 174 lb 3.2 oz (79 kg)              We Performed the Following     ADMIN VACCINE, INITIAL     PSA Diagnostic ([facility])     YELLOW FEVER IMMUNIZATN,LIVE,SUBCUT          Today's Medication Changes          These changes are accurate as of: 2/27/17 11:28 AM.  If you have any questions, ask your nurse or doctor.               Start taking these medicines.        Dose/Directions    azithromycin 250 MG tablet   Commonly known as:  ZITHROMAX   Used for:  Upper respiratory tract infection, unspecified type   Started by:  Harris Mary MD        Two tablets first day, then one tablet daily for four days.   Quantity:  6 tablet   Refills:  0       ciprofloxacin 500 MG tablet   Commonly known as:  CIPRO   Used for:  Travel advice encounter   Started by:  Harris Mary MD        Dose:  500 mg   Take 1 tablet (500 mg) by mouth 2 times daily   Quantity:  6 tablet   Refills:  0       doxycycline Monohydrate 100 MG Caps   Used for:  Travel advice encounter   Started by:  Harris Mary MD        Start 2 days prior to travel to malaria area, daily while there and for 4 weeks after departure   Quantity:  60 capsule   Refills:  0       loperamide 2 MG tablet   Commonly known as:  IMODIUM A-D   Used for:  Travel advice encounter   Started by:  Harris Mary MD        Take 2 tabs (4 mg) after first loose stool, and then take one tab (2 mg) after each diarrheal stool.  Max of 8 tabs (16 mg) per day.   Quantity:  30 tablet   Refills:  0       oxybutynin 5 MG 24 hr tablet   Commonly known as:  DITROPAN-XL   Used for:  Bladder outlet obstruction   Started by:  Harris Mary MD        Take 1 tablet by mouth daily   Quantity:  31 tablet   Refills:  3         Stop taking  these medicines if you haven't already. Please contact your care team if you have questions.     ferrous sulfate 325 (65 FE) MG tablet   Commonly known as:  IRON   Stopped by:  Harris Mary MD           tamsulosin 0.4 MG capsule   Commonly known as:  FLOMAX   Stopped by:  Harris Mary MD                Where to get your medicines      These medications were sent to [id number] - [place], [place] - [address] [place] [address] AT [facility] [address]  [address], [place] [address]     Phone:  [phone]     azithromycin 250 MG tablet    ciprofloxacin 500 MG tablet    doxycycline Monohydrate 100 MG Caps    loperamide 2 MG tablet    oxybutynin 5 MG 24 hr tablet         Some of these will need a paper prescription and others can be bought over the counter.  Ask your nurse if you have questions.     Bring a paper prescription for each of these medications     guaiFENesin-codeine 100-10 MG/5ML Soln solution                Primary Care Provider Office Phone # Fax #    Salas SalgadoDO [phone]       [address] [address]        Thank you!     Thank you for choosing [facility]  for your care. Our goal is always to provide you with excellent care. Hearing back from our patients is one way we can continue to improve our services. Please take a few minutes to complete the written survey that you may receive in the mail after your visit with us. Thank you!             Your Updated Medication List - Protect others around you: Learn how to safely use, store and throw away your medicines at www.disposemymeds.org.          This list is accurate as of: 2/27/17 11:28 AM.  Always use your most recent med list.                   Brand Name Dispense Instructions for use    abacavir-lamiVUDine-zidovudine 300-150-300 MG per tablet    TRIZIVIR    180 tablet    Take 1 tablet by mouth 2 times daily       acetaminophen 500 MG tablet    TYLENOL    100 tablet     Take 1 tablet by mouth every 6 hours as needed for pain.       azithromycin 250 MG tablet    ZITHROMAX    6 tablet    Two tablets first day, then one tablet daily for four days.       baclofen 10 MG tablet    LIORESAL    30 tablet    Take 1 tablet (10 mg) by mouth 3 times daily as needed for muscle spasms       bisacodyl 5 MG EC tablet    BISACODYL LAXATIVE    60 tablet    Take 1 tablet (5 mg) by mouth daily as needed for constipation       ciprofloxacin 500 MG tablet    CIPRO    6 tablet    Take 1 tablet (500 mg) by mouth 2 times daily       docusate sodium 100 MG tablet    COLACE    270 tablet    Take 1 to 3 tablets daily to keep stools soft.       doxycycline Monohydrate 100 MG Caps     60 capsule    Start 2 days prior to travel to malaria area, daily while there and for 4 weeks after departure       etravirine 200 MG tablet    INTELENCE    180 tablet    Take 1 tablet (200 mg) by mouth 2 times daily (with meals)       eucerin cream     1 g    Apply topically daily as needed for dry skin       guaiFENesin-codeine 100-10 MG/5ML Soln solution    ROBITUSSIN AC    236 mL    Take 5-10 mLs by mouth every 4 hours as needed for cough       hydrocortisone 1 % cream    CORTAID    30 g    Apply topically 2 times daily       loperamide 2 MG tablet    IMODIUM A-D    30 tablet    Take 2 tabs (4 mg) after first loose stool, and then take one tab (2 mg) after each diarrheal stool.  Max of 8 tabs (16 mg) per day.       multivitamin, therapeutic with minerals Tabs tablet     90 each    Take 1 tablet by mouth daily       oxybutynin 5 MG 24 hr tablet    DITROPAN-XL    31 tablet    Take 1 tablet by mouth daily       simvastatin 20 MG tablet    ZOCOR    90 tablet    Take 1 tablet (20 mg) by mouth At Bedtime       * triamcinolone 0.1 % cream    KENALOG    30 g    Apply sparingly to affected area three times daily for 14 days.       * triamcinolone 0.1 % ointment    KENALOG    30 g    Apply sparingly to affected area three times daily  for 14 days.       vitamin D 2000 UNITS tablet     100 tablet    Take 1 tablet by mouth daily       * Notice:  This list has 2 medication(s) that are the same as other medications prescribed for you. Read the directions carefully, and ask your doctor or other care provider to review them with you.

## 2017-02-28 NOTE — PATIENT INSTRUCTIONS
Your referral has been scheduled:    Urology Clinic and [place] for Prostate and Urologic Cancers  [facility]  [address]  [address] [place] [address]  Appointments: [phone]  Date: Friday March 31 2017  Time: 10:45 AM Dr Pierre     If you have any questions or need to reschedule please call the number listed above.  Any other concerns please call our referral coordinator at [phone]    Le  Care Coordinator     MAILED TO PATIENT

## 2017-03-03 ENCOUNTER — PRE VISIT (OUTPATIENT)
Dept: UROLOGY | Facility: CLINIC | Age: 62
End: 2017-03-03

## 2017-03-22 ENCOUNTER — TELEPHONE (OUTPATIENT)
Dept: FAMILY MEDICINE | Facility: CLINIC | Age: 62
End: 2017-03-22

## 2017-03-22 DIAGNOSIS — J34.89 STUFFY AND RUNNY NOSE: Primary | ICD-10-CM

## 2017-03-27 ENCOUNTER — OFFICE VISIT (OUTPATIENT)
Dept: FAMILY MEDICINE | Facility: CLINIC | Age: 62
End: 2017-03-27

## 2017-03-27 VITALS
DIASTOLIC BLOOD PRESSURE: 87 MMHG | HEART RATE: 60 BPM | OXYGEN SATURATION: 99 % | TEMPERATURE: 98 F | HEIGHT: 71 IN | SYSTOLIC BLOOD PRESSURE: 145 MMHG | BODY MASS INDEX: 24.36 KG/M2 | WEIGHT: 174 LBS

## 2017-03-27 DIAGNOSIS — R30.0 DYSURIA: ICD-10-CM

## 2017-03-27 DIAGNOSIS — G62.9 NEUROPATHY: ICD-10-CM

## 2017-03-27 DIAGNOSIS — G44.209 TENSION HEADACHE: Primary | ICD-10-CM

## 2017-03-27 LAB
BACTERIA URINE: ABNORMAL HPF
BILIRUB UR QL STRIP: NEGATIVE
CLARITY, URINE: CLEAR
COLOR UR AUTO: ABNORMAL
GLUCOSE UR QL STRIP: NEGATIVE
HBA1C MFR BLD: 5.3 % (ref 4.1–5.7)
HGB UR QL STRIP: ABNORMAL
KETONES UR QL STRIP: NEGATIVE
LEUKOCYTE ESTERASE UR QL STRIP: NEGATIVE
MUCOUS THREADS: ABNORMAL LPF
NITRITE UR QL STRIP: NEGATIVE
PH UR STRIP: 6.5 [PH] (ref 4.5–8)
PROT UR QL STRIP: NEGATIVE MG/DL
RBC, UR MICRO: ABNORMAL HPF
SP GR UR STRIP: 1.02 (ref 1–1.03)
SQUAMOUS EPITHELIAL, UR.: ABNORMAL LPF
UROBILINOGEN UR QL STRIP: ABNORMAL
WBC, URINE MICROSCOPIC: ABNORMAL HPF

## 2017-03-27 NOTE — MR AVS SNAPSHOT
After Visit Summary   3/27/2017    Antonio Mercado    MRN: [id number]           Patient Information     Date Of Birth          1955        Visit Information        Provider Department      3/27/2017 4:30 PM Sylwia Reyes MD [facility]        Today's Diagnoses     Tension headache    -  1    Neuropathy (H)        Dysuria           Follow-ups after your visit        Follow-up notes from your care team     Return in about 1 week (around 4/3/2017) for lab results.      Your next 10 appointments already scheduled     May 24, 2017 10:15 AM CDT   (Arrive by 10:00 AM)   Return Visit with Yolette Pierre MD   [facility] Urologic C[facility] ([facility])    [address] [facility]  [address] [facility] [address] [phone]              Who to contact     Please call your clinic at [phone] to:    Ask questions about your health    Make or cancel appointments    Discuss your medicines    Learn about your test results    Speak to your doctor   If you have compliments or concerns about an experience at your clinic, or if you wish to file a complaint, please contact [facility] Patient Relations at [phone] or email us at [email or web address]         Additional Information About Your Visit        MyChart Information     [employer] gives you secure access to your electronic health record. If you see a primary care provider, you can also send messages to your care team and make appointments. If you have questions, please call your primary care clinic.  If you do not have a primary care provider, please call [phone] and they will assist you.      [employer] is an electronic gateway that provides easy, online access to your medical records. With [employer], you can request a clinic appointment, read your test results, renew a prescription or communicate with your care team.     To access your  "existing account, please contact your [facility] or call [phone] for assistance.        Care EveryWhere ID     This is your Care EveryWhere ID. This could be used by other organizations to access your [place] medical records  [id number]        Your Vitals Were     Pulse Temperature Height Pulse Oximetry BMI (Body Mass Index)       60 98  F (36.7  C) (Oral) 5' 11.26\" (181 cm) 99% 24.09 kg/m2        Blood Pressure from Last 3 Encounters:   03/31/17 137/85   03/27/17 145/87   02/27/17 115/75    Weight from Last 3 Encounters:   03/31/17 176 lb 4.8 oz (80 kg)   03/27/17 174 lb (78.9 kg)   02/27/17 169 lb 6.4 oz (76.8 kg)              We Performed the Following     Chlamydia/Gono Amplified ([facility])     Hemoglobin A1c ([facility])     Urinalysis ([facility])     Urine Culture ([facility])          Today's Medication Changes          These changes are accurate as of: 3/27/17 11:59 PM.  If you have any questions, ask your nurse or doctor.               Start taking these medicines.        Dose/Directions    naproxen 375 MG tablet   Commonly known as:  NAPROSYN   Used for:  Tension headache   Started by:  Sylwia Reyes MD        Dose:  375 mg   Take 1 tablet (375 mg) by mouth 2 times daily as needed for moderate pain (take with food)   Quantity:  30 tablet   Refills:  0            Where to get your medicines      These medications were sent to [id number] - [place], [place] - [address] [place] [address] AT [facility] [address]  [address], [place] [address]     Phone:  [phone]     naproxen 375 MG tablet                Primary Care Provider Office Phone # Fax #    Salas Jose Salgado [phone]       [facility] [address] [facility] [address]        Thank you!     Thank you for choosing [facility]  for your care. Our goal is always to provide you with excellent care. Hearing back from our patients is one way we can continue to " improve our services. Please take a few minutes to complete the written survey that you may receive in the mail after your visit with us. Thank you!             Your Updated Medication List - Protect others around you: Learn how to safely use, store and throw away your medicines at www.disposemymeds.org.          This list is accurate as of: 3/27/17 11:59 PM.  Always use your most recent med list.                   Brand Name Dispense Instructions for use    abacavir-lamiVUDine-zidovudine 300-150-300 MG per tablet    TRIZIVIR    180 tablet    Take 1 tablet by mouth 2 times daily       baclofen 10 MG tablet    LIORESAL    30 tablet    Take 1 tablet (10 mg) by mouth 3 times daily as needed for muscle spasms       bisacodyl 5 MG EC tablet    BISACODYL LAXATIVE    60 tablet    Take 1 tablet (5 mg) by mouth daily as needed for constipation       ciprofloxacin 500 MG tablet    CIPRO    6 tablet    Take 1 tablet (500 mg) by mouth 2 times daily       docusate sodium 100 MG tablet    COLACE    270 tablet    Take 1 to 3 tablets daily to keep stools soft.       doxycycline Monohydrate 100 MG Caps     60 capsule    Start 2 days prior to travel to malaria area, daily while there and for 4 weeks after departure       etravirine 200 MG tablet    INTELENCE    180 tablet    Take 1 tablet (200 mg) by mouth 2 times daily (with meals)       hydrocortisone 1 % cream    CORTAID    30 g    Apply topically 2 times daily       loperamide 2 MG tablet    IMODIUM A-D    30 tablet    Take 2 tabs (4 mg) after first loose stool, and then take one tab (2 mg) after each diarrheal stool.  Max of 8 tabs (16 mg) per day.       naproxen 375 MG tablet    NAPROSYN    30 tablet    Take 1 tablet (375 mg) by mouth 2 times daily as needed for moderate pain (take with food)       oxybutynin 5 MG 24 hr tablet    DITROPAN-XL    31 tablet    Take 1 tablet by mouth daily       sodium chloride 0.65 % nasal spray    OCEAN    1 Bottle    Spray 1 spray into both  nostrils daily as needed for congestion       * triamcinolone 0.1 % cream    KENALOG    30 g    Apply sparingly to affected area three times daily for 14 days.       * triamcinolone 0.1 % ointment    KENALOG    30 g    Apply sparingly to affected area three times daily for 14 days.       * Notice:  This list has 2 medication(s) that are the same as other medications prescribed for you. Read the directions carefully, and ask your doctor or other care provider to review them with you.

## 2017-03-27 NOTE — PROGRESS NOTES
"       SUBJECTIVE       Antonio Mercado is a 61 year old  male with a PMH significant for:     Patient Active Problem List   Diagnosis     Health Care Home     Human immunodeficiency virus (HIV) disease (H)     Mechanical low back pain     Microscopic hematuria     Hypertrophy of prostate with urinary obstruction     S/P colonoscopy     Anemia, iron deficiency     Right lower quadrant abdominal mass     Malignant neoplasm of hepatic flexure (H)     Hemorrhoids, unspecified hemorrhoid type     Slow transit constipation     He presents with:    Headache: Present since this morning. He took 2 Tylenol this morning which improved the pain initially. The headache started again around 2pm. It is located over the occipital area bilaterally. No vision trouble. No nausea or vomiting. No photophobia but notes phonophobia. He does not get headaches often. No weakness, numbness, tingling in the extremities. No head trauma. No fevers, coughing, rhinorrhea, or sore throat. No new stressors.  Ibuprofen is listed as an allergy due to itching, however he tells me he has tolerated Aleve in the past without itching or other symptoms.     Burning in feet: Has had burning over the bottoms of his feet for the past week. No numbness. No rashes. Not using any creams. No known history of DM. No polydipsia or polyphagia. He has noted a long history of urinary frequency and burning with urination, which he states has been going on \" a long time \" which he estimates to be months. No fevers.      PMH, Medications and Allergies were reviewed and updated as needed.        REVIEW OF SYSTEMS     Pertinent positives and negatives noted in HPI.         OBJECTIVE     Vitals:    03/27/17 1622 03/27/17 1626   BP: (!) 142/91 145/87   BP Location: Left arm    Patient Position: Chair    Cuff Size: Adult Regular    Pulse: 60    Temp: 98  F (36.7  C)    TempSrc: Oral    SpO2: 99%    Weight: 174 lb (78.9 kg)    Height: 5' 11.26\" (181 cm)      Body mass index " is 24.09 kg/(m^2).    General: Alert, NAD  HEENT: MMM, no tonsillomegaly or exudates  Neck: No cervical adenopathy, no spinous process tenderness, normal neck ROM, no nuchal rigidity  CV: RRR w/o m/r/g, normal DP pulses.   Pulm: CTAB without wheezes or crackles, no increased WOB  Extremities: No BRITTA. Normal appearing skin over feet. Sensation intact with Monofilament testing. No ulcers.   Neuro: Alert and oriented. CNs intact. Normal strength and sensation bilaterally. Normal finger to nose and heel to shin testing. Normal gait.       No results found for this or any previous visit (from the past 24 hour(s)).        ASSESSMENT AND PLAN       Tension headache: Occipital headache noted for the past day. Given the location, is likely a tension headache. BP mildly elevated, although is actually acceptable for age. No focal neurologic deficits on exam or hx of head trauma, so do not feel head imaging is indicated at this time.  Will try some naproxen for likely tension headache. Does have allergy listed to ibuprofen but he tells me he has tolerated Aleve in the past, so will try naproxen for the short term (but did discuss signs of allergy). Advised to take with food. Discussed that he should return if symptoms are not improving or if worsening.   -     naproxen (NAPROSYN) 375 MG tablet; Take 1 tablet (375 mg) by mouth 2 times daily as needed for moderate pain (take with food)    Neuropathy (H): Foot symptoms are suspicious for neuropathy. Will start by testing for DM and vitamin B12 deficiency. If persists, could consider some gabapentin or amitriptyline for neuropathic pain. Additionally, pt does have HIV history and is on retrovirals including etravirine, which can cause neuropathy in 2-4% of pts, and should be considered as a potential cause if symptoms are persisting.   -     Hemoglobin A1c ([facility])  -     Vitamin B12 ([facility])    Dysuria: Patient noting a long history of burning with urination and urinary  frequency that he mentions at the end of the visit. Afebrile.  Will add on a UA/UC and a GC/Chlamydia. Advised to follow-up in 1 week to discuss results and can discuss more at that time. Does have prostate issues listed on problem list and should explore this further next time.   -     Urine Culture ([facility])  -     Chlamydia/Gono Amplified ([facility])  -     Urinalysis ([facility])        RTC in 1 week to f/u on headache and urination or sooner if develops new or worsening symptoms.      Patient's plan of care was discussed with Dr. Salgado.    Sylwia Reyes MD PGY-3  Pager #: [phone]

## 2017-03-28 ENCOUNTER — TELEPHONE (OUTPATIENT)
Dept: LAB | Facility: CLINIC | Age: 62
End: 2017-03-28

## 2017-03-28 DIAGNOSIS — G62.9 NEUROPATHY: ICD-10-CM

## 2017-03-28 LAB
C TRACH RRNA SPEC QL NAA+PROBE: NEGATIVE
CULTURE: NORMAL
N GONORRHOEA RRNA SPEC QL NAA+PROBE: NEGATIVE

## 2017-03-28 NOTE — TELEPHONE ENCOUNTER
----- Message from Sylwia Reyes MD sent at 3/28/2017  4:01 PM CDT -----  Regarding: RE: Incompleted test  Yes please call patient and ask him to return for a redraw. ThanksSylwia       ----- Message -----     From: Zoey Khanna CMA     Sent: 3/28/2017  10:33 AM       To: Sylwia Reyes MD  Subject: Incompleted test                                 VitB12 is incompleted because blood was hemolyzed    Let me know if you want me to have pt return for redraw    ThanksZoey

## 2017-03-29 LAB — VIT B12 SERPL-MCNC: 566 PG/ML (ref 213–816)

## 2017-03-30 NOTE — PROGRESS NOTES
Called patient to discuss his lab results from 3/27/17. Normal A1C, normal vitamin B12, negative GC/Chlamydia, negative urine culture. Discussed how UA with micro showed 5-10 rbcs (which is greater than the normal range), and I recommended he see urology to rule out bladder or prostate problem or malignancy, especially given his chronic urinary symptoms and his recently elevated PSA level of 8.7. He actually has a urology appt at [facility] tomorrow 3/31/17 and he plans to attend which was encouraged. He has no further questions.     Sylwia Reyes MD

## 2017-03-31 ENCOUNTER — OFFICE VISIT (OUTPATIENT)
Dept: UROLOGY | Facility: CLINIC | Age: 62
End: 2017-03-31

## 2017-03-31 VITALS
BODY MASS INDEX: 24.68 KG/M2 | DIASTOLIC BLOOD PRESSURE: 85 MMHG | SYSTOLIC BLOOD PRESSURE: 137 MMHG | HEIGHT: 71 IN | WEIGHT: 176.3 LBS | HEART RATE: 65 BPM

## 2017-03-31 DIAGNOSIS — R35.0 URINARY FREQUENCY: ICD-10-CM

## 2017-03-31 DIAGNOSIS — R35.0 URINARY FREQUENCY: Primary | ICD-10-CM

## 2017-03-31 DIAGNOSIS — N32.0 BLADDER OUTLET OBSTRUCTION: ICD-10-CM

## 2017-03-31 LAB — PSA SERPL-MCNC: 8.8 UG/L (ref 0–4)

## 2017-03-31 RX ORDER — ALFUZOSIN HYDROCHLORIDE 10 MG/1
10 TABLET, EXTENDED RELEASE ORAL DAILY
Qty: 30 TABLET | Refills: 1 | Status: SHIPPED | OUTPATIENT
Start: 2017-03-31 | End: 2017-06-17

## 2017-03-31 ASSESSMENT — PAIN SCALES - GENERAL: PAINLEVEL: MILD PAIN (3)

## 2017-03-31 ASSESSMENT — ENCOUNTER SYMPTOMS
DYSURIA: 1
HEMATURIA: 0
DIFFICULTY URINATING: 0

## 2017-03-31 NOTE — PROGRESS NOTES
It was my pleasure to see Antonio Mercado a 61 year old year old male today. Patient was seen in consultation for dysuria, lower urinary tract symptoms, and microhematuria.    HPI:   Patient presents with complaints of dysuria at the start of urination and lower urinary tract symptoms.     Patient describes frequency, nocturia 3-4x  Denies urgency  Patient notes burning/dysuria at the start of urination   Noted to have microhematuria       He was given rx for alfuzosin but has not started taking it yet.   He will be traveling to the [place] for a month, leaving within the next week.     Past Medical History:   Diagnosis Date     Heel pain      History of blood transfusion      HIV (human immunodeficiency virus infection) (H)      LBP (low back pain)      Malignant neoplasm of hepatic flexure (H) 2/4/2016     Trigger finger        Past Surgical History:   Procedure Laterality Date     GI SURGERY         Family History   Problem Relation Age of Onset     C.A.D. No family hx of      DIABETES No family hx of      Coronary Artery Disease No family hx of      Hypertension No family hx of      Hyperlipidemia No family hx of      Breast Cancer No family hx of      Cancer - colorectal No family hx of      Ovarian Cancer No family hx of      Prostate Cancer No family hx of      Depression/Anxiety No family hx of      CEREBROVASCULAR DISEASE No family hx of      Anesthesia Reaction No family hx of      Thyroid Disease No family hx of      Asthma No family hx of      OSTEOPOROSIS No family hx of      Chemical Addiction No family hx of      Known Genetic Syndrome No family hx of        Current Outpatient Prescriptions   Medication Sig Dispense Refill     VITAMIN D, CHOLECALCIFEROL, PO Take by mouth daily       naproxen (NAPROSYN) 375 MG tablet Take 1 tablet (375 mg) by mouth 2 times daily as needed for moderate pain (take with food) 30 tablet 0     sodium chloride (OCEAN) 0.65 % nasal spray Spray 1 spray into both nostrils  daily as needed for congestion 1 Bottle 3     loperamide (IMODIUM A-D) 2 MG tablet Take 2 tabs (4 mg) after first loose stool, and then take one tab (2 mg) after each diarrheal stool.  Max of 8 tabs (16 mg) per day. 30 tablet 0     abacavir-lamiVUDine-zidovudine (TRIZIVIR) 300-150-300 MG per tablet Take 1 tablet by mouth 2 times daily 180 tablet 3     etravirine (INTELENCE) 200 MG tablet Take 1 tablet (200 mg) by mouth 2 times daily (with meals) 180 tablet 3     docusate sodium (COLACE) 100 MG tablet Take 1 to 3 tablets daily to keep stools soft. 270 tablet 3     triamcinolone (KENALOG) 0.1 % ointment Apply sparingly to affected area three times daily for 14 days. 30 g 0     hydrocortisone (CORTAID) 1 % cream Apply topically 2 times daily 30 g 1     bisacodyl (BISACODYL LAXATIVE) 5 MG EC tablet Take 1 tablet (5 mg) by mouth daily as needed for constipation 60 tablet 1     triamcinolone (KENALOG) 0.1 % cream Apply sparingly to affected area three times daily for 14 days. 30 g 11     baclofen (LIORESAL) 10 MG tablet Take 1 tablet (10 mg) by mouth 3 times daily as needed for muscle spasms 30 tablet 0     doxycycline Monohydrate 100 MG CAPS Start 2 days prior to travel to malaria area, daily while there and for 4 weeks after departure (Patient not taking: Reported on 3/31/2017) 60 capsule 0     ciprofloxacin (CIPRO) 500 MG tablet Take 1 tablet (500 mg) by mouth 2 times daily (Patient not taking: Reported on 3/31/2017) 6 tablet 0     oxybutynin (DITROPAN-XL) 5 MG 24 hr tablet Take 1 tablet by mouth daily (Patient not taking: Reported on 3/31/2017) 31 tablet 3       ALLERGIES: Stribild [[id number]-tenof]; Ibuprofen; Raltegravir; and Tamsulosin      REVIEW OF SYSTEMS:  Skin: negative  Eyes: negative  Ears/Nose/Throat: negative  Respiratory: No shortness of breath, dyspnea on exertion, cough, or hemoptysis  Cardiovascular: negative  Gastrointestinal: negative  Genitourinary: as above  Musculoskeletal:  "negative  Neurologic: negative  Psychiatric: negative  Hematologic/Lymphatic/Immunologic: negative  Endocrine: negative      GENERAL PHYSICAL EXAM:   Vitals: /85  Pulse 65  Ht 1.803 m (5' 11\")  Wt 80 kg (176 lb 4.8 oz)  BMI 24.59 kg/m2  Body mass index is 24.59 kg/(m^2).  Constitutional: healthy, alert and no distress  Head: Normocephalic  Neck: Neck supple  Cardiovascular: negative  Respiratory: negative  Gastrointestinal: Abdomen soft, non-tender  Musculoskeletal: extremities normal-   Skin: no suspicious lesions or rashes  Neurologic: Gait normal  Psychiatric: affect normal/bright and mentation appears normal.       EXAM:   Left Flank: negative  Right Flank: negative  Inguinal Area: normal  Phallus is meatus normal and no plaques palpated.   Left testis descended, size is normal , consistency is normal. Intra-testicular masses Absent.  Right testis descended, size is normal, consistency is normal. Intra-testicular masses Absent.      RECTAL EXAM:   Size: 1-2+  Sphincter tone: normal  Tenderness: Absent  Rectal Mass: Absent  Prostate Nodule: Absent      Uroflow  VV: 137ml  Qmax: 11.1 ml/s    PVR: 0ml        RADIOLOGY: The following tests were reviewed: Need to complete the following Radiology exams prior to the office appointment:  LABS: The last test results for Needs to complete the necessary tests prior to appointment: were reviewed.     ASSESSMENT: 60 y/o M with dysuria, lower urinary tract symptoms, and microhematuria     PLAN:    Start trial of alfuzosin   Will send u/a, urine culture, ureaplasma, mycoplasma  If confirmed microhematuria, will start work up for same   Will send PSA today as well   Follow up in 6-8 weeks to assess symptoms and to review above       I spent over 30 minutes with the patient.  Over half this time was spent on counseling for dysuria, frequency, nocturia.        Answers for HPI/ROS submitted by the patient on 3/31/2017   General Symptoms: No  Skin Symptoms: No  HENT " Symptoms: No  EYE SYMPTOMS: No  HEART SYMPTOMS: No  LUNG SYMPTOMS: No  INTESTINAL SYMPTOMS: No  URINARY SYMPTOMS: Yes  REPRODUCTIVE SYMPTOMS: No  SKELETAL SYMPTOMS: No  BLOOD SYMPTOMS: No  NERVOUS SYSTEM SYMPTOMS: No  MENTAL HEALTH SYMPTOMS: No  Trouble holding urine or incontinence: Yes  Pain or burning: Yes  Trouble starting or stopping: No  Increased frequency of urination: Yes  Blood in urine: No  Frequent nighttime urination: Yes  Difficulty emptying bladder: No

## 2017-03-31 NOTE — NURSING NOTE
"Chief Complaint   Patient presents with     Consult     Pain and frequent urination       Blood pressure 137/85, pulse 65, height 1.803 m (5' 11\"), weight 80 kg (176 lb 4.8 oz). Body mass index is 24.59 kg/(m^2).    Patient Active Problem List   Diagnosis     Health Care Home     Human immunodeficiency virus (HIV) disease (H)     Mechanical low back pain     Microscopic hematuria     Hypertrophy of prostate with urinary obstruction     S/P colonoscopy     Anemia, iron deficiency     Right lower quadrant abdominal mass     Malignant neoplasm of hepatic flexure (H)     Hemorrhoids, unspecified hemorrhoid type     Slow transit constipation       Allergies   Allergen Reactions     Stribild [Depvhgv-Wxlvnfi-Qudscuru-Tenof] Itching     Ibuprofen Itching     Raltegravir Unknown     Tamsulosin Other (See Comments)     Headache       Current Outpatient Prescriptions   Medication Sig Dispense Refill     VITAMIN D, CHOLECALCIFEROL, PO Take by mouth daily       naproxen (NAPROSYN) 375 MG tablet Take 1 tablet (375 mg) by mouth 2 times daily as needed for moderate pain (take with food) 30 tablet 0     sodium chloride (OCEAN) 0.65 % nasal spray Spray 1 spray into both nostrils daily as needed for congestion 1 Bottle 3     loperamide (IMODIUM A-D) 2 MG tablet Take 2 tabs (4 mg) after first loose stool, and then take one tab (2 mg) after each diarrheal stool.  Max of 8 tabs (16 mg) per day. 30 tablet 0     abacavir-lamiVUDine-zidovudine (TRIZIVIR) 300-150-300 MG per tablet Take 1 tablet by mouth 2 times daily 180 tablet 3     etravirine (INTELENCE) 200 MG tablet Take 1 tablet (200 mg) by mouth 2 times daily (with meals) 180 tablet 3     docusate sodium (COLACE) 100 MG tablet Take 1 to 3 tablets daily to keep stools soft. 270 tablet 3     triamcinolone (KENALOG) 0.1 % ointment Apply sparingly to affected area three times daily for 14 days. 30 g 0     hydrocortisone (CORTAID) 1 % cream Apply topically 2 times daily 30 g 1     " bisacodyl (BISACODYL LAXATIVE) 5 MG EC tablet Take 1 tablet (5 mg) by mouth daily as needed for constipation 60 tablet 1     triamcinolone (KENALOG) 0.1 % cream Apply sparingly to affected area three times daily for 14 days. 30 g 11     baclofen (LIORESAL) 10 MG tablet Take 1 tablet (10 mg) by mouth 3 times daily as needed for muscle spasms 30 tablet 0     doxycycline Monohydrate 100 MG CAPS Start 2 days prior to travel to malaria area, daily while there and for 4 weeks after departure (Patient not taking: Reported on 3/31/2017) 60 capsule 0     ciprofloxacin (CIPRO) 500 MG tablet Take 1 tablet (500 mg) by mouth 2 times daily (Patient not taking: Reported on 3/31/2017) 6 tablet 0     oxybutynin (DITROPAN-XL) 5 MG 24 hr tablet Take 1 tablet by mouth daily (Patient not taking: Reported on 3/31/2017) 31 tablet 3       Social History   Substance Use Topics     Smoking status: Never Smoker     Smokeless tobacco: Never Used     Alcohol use No       MANE Correa  3/31/2017  10:44 AM

## 2017-03-31 NOTE — MR AVS SNAPSHOT
After Visit Summary   3/31/2017    Antonio Mercado    MRN: [id number]           Patient Information     Date Of Birth          1955        Visit Information        Provider Department      3/31/2017 10:45 AM Yolette Pierre MD [facility] and [facility]        Today's Diagnoses     Urinary frequency    -  1    Bladder outlet obstruction          Care Instructions    PSA and labs today. Return in 6 - 8 weeks with a full bladder for follow up.    It was a pleasure meeting with you today.  Thank you for allowing me and my team the privilege of caring for you today.  YOU are the reason we are here, and I truly hope we provided you with the excellent service you deserve.  Please let us know if there is anything else we can do for you so that we can be sure you are leaving completely satisfied with your care experience.                Follow-ups after your visit        Your next 10 appointments already scheduled     Mar 31, 2017 12:15 PM CDT   LAB with [facility] ([facility])    [address] [place] [id number] [phone]           Patient must bring picture ID.  Patient should be prepared to give a urine specimen  Please do not eat 10-12 hours before your appointment if you are coming in fasting for labs on lipids, cholesterol, or glucose (sugar).  Pregnant women should follow their Care Team instructions. Water with medications is okay. Do not drink coffee or other fluids.   If you have concerns about taking  your medications, please ask at office or if scheduling via [email or web address], send a message by clicking on Secure Messaging, Message Your Care Team.            Apr 03, 2017 10:00 AM CDT   Return Visit with Harris Mary MD   [facility] ([facility])    [address] [place] [address]   [phone]            May 24, 2017 10:15 AM CDT   (Arrive by 10:00 AM)   Return Visit with Yolette  Alicia Pierre MD   [facility] and [facility] Urologic C[facility] ([facility])    [address] [facility] [address] [facility]  [address] [facility] [id number] [phone]              Future tests that were ordered for you today     Open Future Orders        Priority Expected Expires Ordered    Ureaplasma culture Routine 3/31/2017 3/31/2018 3/31/2017    Mycoplasma large colony culture Routine  3/31/2018 3/31/2017    PSA tumor marker Routine  3/31/2018 3/31/2017            Who to contact     Please call your clinic at [phone] to:    Ask questions about your health    Make or cancel appointments    Discuss your medicines    Learn about your test results    Speak to your doctor   If you have compliments or concerns about an experience at your clinic, or if you wish to file a complaint, please contact [facility] Patient Relations at [phone] or email us at [email or web address]         Additional Information About Your Visit        [employer] Information     Care Team Connect gives you secure access to your electronic health record. If you see a primary care provider, you can also send messages to your care team and make appointments. If you have questions, please call your primary care clinic.  If you do not have a primary care provider, please call [phone] and they will assist you.      Care Team Connect is an electronic gateway that provides easy, online access to your medical records. With Care Team Connect, you can request a clinic appointment, read your test results, renew a prescription or communicate with your care team.     To access your existing account, please contact your [facility] or call [phone] for assistance.        Care EveryWhere ID     This is your Care EveryWhere ID. This could be used by other organizations to access your [place] medical records  [id number]        Your Vitals Were     Pulse Height BMI (Body Mass  "Index)             65 1.803 m (5' 11\") 24.59 kg/m2          Blood Pressure from Last 3 Encounters:   03/31/17 137/85   03/27/17 145/87   02/27/17 115/75    Weight from Last 3 Encounters:   03/31/17 80 kg (176 lb 4.8 oz)   03/27/17 78.9 kg (174 lb)   02/27/17 76.8 kg (169 lb 6.4 oz)              We Performed the Following     COMPLEX UROFLOWMETRY     POST-VOID RESIDUAL BLADDER SCAN     UROLOGY ADULT REFERRAL          Today's Medication Changes          These changes are accurate as of: 3/31/17 11:51 AM.  If you have any questions, ask your nurse or doctor.               Start taking these medicines.        Dose/Directions    alfuzosin 10 MG 24 hr tablet   Commonly known as:  UROXATRAL   Used for:  Urinary frequency   Started by:  Yolette Pierre MD        Dose:  10 mg   Take 1 tablet (10 mg) by mouth daily   Quantity:  30 tablet   Refills:  1            Where to get your medicines      These medications were sent to [id number] - [place], [place] - [address] AT [facility] & [address]  [address], [place] [address]     Phone:  [phone]     alfuzosin 10 MG 24 hr tablet                Primary Care Provider Office Phone # Fax #    Salas Salgado  [phone]       [facility] [address] [facility] [address]        Thank you!     Thank you for choosing [facility] UROLOGY AND [facility] FOR PROSTATE AND UROLOGIC CANCERS  for your care. Our goal is always to provide you with excellent care. Hearing back from our patients is one way we can continue to improve our services. Please take a few minutes to complete the written survey that you may receive in the mail after your visit with us. Thank you!             Your Updated Medication List - Protect others around you: Learn how to safely use, store and throw away your medicines at www.disposemymeds.org.          This list is accurate as of: 3/31/17 11:51 AM.  Always use your most recent med list.             "       Brand Name Dispense Instructions for use    abacavir-lamiVUDine-zidovudine 300-150-300 MG per tablet    TRIZIVIR    180 tablet    Take 1 tablet by mouth 2 times daily       alfuzosin 10 MG 24 hr tablet    UROXATRAL    30 tablet    Take 1 tablet (10 mg) by mouth daily       baclofen 10 MG tablet    LIORESAL    30 tablet    Take 1 tablet (10 mg) by mouth 3 times daily as needed for muscle spasms       bisacodyl 5 MG EC tablet    BISACODYL LAXATIVE    60 tablet    Take 1 tablet (5 mg) by mouth daily as needed for constipation       ciprofloxacin 500 MG tablet    CIPRO    6 tablet    Take 1 tablet (500 mg) by mouth 2 times daily       docusate sodium 100 MG tablet    COLACE    270 tablet    Take 1 to 3 tablets daily to keep stools soft.       doxycycline Monohydrate 100 MG Caps     60 capsule    Start 2 days prior to travel to malaria area, daily while there and for 4 weeks after departure       etravirine 200 MG tablet    INTELENCE    180 tablet    Take 1 tablet (200 mg) by mouth 2 times daily (with meals)       hydrocortisone 1 % cream    CORTAID    30 g    Apply topically 2 times daily       loperamide 2 MG tablet    IMODIUM A-D    30 tablet    Take 2 tabs (4 mg) after first loose stool, and then take one tab (2 mg) after each diarrheal stool.  Max of 8 tabs (16 mg) per day.       naproxen 375 MG tablet    NAPROSYN    30 tablet    Take 1 tablet (375 mg) by mouth 2 times daily as needed for moderate pain (take with food)       oxybutynin 5 MG 24 hr tablet    DITROPAN-XL    31 tablet    Take 1 tablet by mouth daily       sodium chloride 0.65 % nasal spray    OCEAN    1 Bottle    Spray 1 spray into both nostrils daily as needed for congestion       * triamcinolone 0.1 % cream    KENALOG    30 g    Apply sparingly to affected area three times daily for 14 days.       * triamcinolone 0.1 % ointment    KENALOG    30 g    Apply sparingly to affected area three times daily for 14 days.       VITAMIN D (CHOLECALCIFEROL)  PO      Take by mouth daily       * Notice:  This list has 2 medication(s) that are the same as other medications prescribed for you. Read the directions carefully, and ask your doctor or other care provider to review them with you.

## 2017-03-31 NOTE — LETTER
3/31/2017       RE: Antonio Mercado  [address]   [place] [address]     Dear Colleague,    Thank you for referring your patient, Antonio Mercado, to the [facility] UROLOGY AND INST FOR PROSTATE AND UROLOGIC CANCERS at [facility]. Please see a copy of my visit note below.    It was my pleasure to see Antonio Mercado a 61 year old year old male today. Patient was seen in consultation for dysuria, lower urinary tract symptoms, and microhematuria.    HPI:   Patient presents with complaints of dysuria at the start of urination and lower urinary tract symptoms.     Patient describes frequency, nocturia 3-4x  Denies urgency  Patient notes burning/dysuria at the start of urination   Noted to have microhematuria       He was given rx for alfuzosin but has not started taking it yet.   He will be traveling to the [place] for a month, leaving within the next week.     Past Medical History:   Diagnosis Date     Heel pain      History of blood transfusion      HIV (human immunodeficiency virus infection) (H)      LBP (low back pain)      Malignant neoplasm of hepatic flexure (H) 2/4/2016     Trigger finger        Past Surgical History:   Procedure Laterality Date     GI SURGERY         Family History   Problem Relation Age of Onset     C.A.D. No family hx of      DIABETES No family hx of      Coronary Artery Disease No family hx of      Hypertension No family hx of      Hyperlipidemia No family hx of      Breast Cancer No family hx of      Cancer - colorectal No family hx of      Ovarian Cancer No family hx of      Prostate Cancer No family hx of      Depression/Anxiety No family hx of      CEREBROVASCULAR DISEASE No family hx of      Anesthesia Reaction No family hx of      Thyroid Disease No family hx of      Asthma No family hx of      OSTEOPOROSIS No family hx of      Chemical Addiction No family hx of      Known Genetic Syndrome No family hx of        Current Outpatient  Prescriptions   Medication Sig Dispense Refill     VITAMIN D, CHOLECALCIFEROL, PO Take by mouth daily       naproxen (NAPROSYN) 375 MG tablet Take 1 tablet (375 mg) by mouth 2 times daily as needed for moderate pain (take with food) 30 tablet 0     sodium chloride (OCEAN) 0.65 % nasal spray Spray 1 spray into both nostrils daily as needed for congestion 1 Bottle 3     loperamide (IMODIUM A-D) 2 MG tablet Take 2 tabs (4 mg) after first loose stool, and then take one tab (2 mg) after each diarrheal stool.  Max of 8 tabs (16 mg) per day. 30 tablet 0     abacavir-lamiVUDine-zidovudine (TRIZIVIR) 300-150-300 MG per tablet Take 1 tablet by mouth 2 times daily 180 tablet 3     etravirine (INTELENCE) 200 MG tablet Take 1 tablet (200 mg) by mouth 2 times daily (with meals) 180 tablet 3     docusate sodium (COLACE) 100 MG tablet Take 1 to 3 tablets daily to keep stools soft. 270 tablet 3     triamcinolone (KENALOG) 0.1 % ointment Apply sparingly to affected area three times daily for 14 days. 30 g 0     hydrocortisone (CORTAID) 1 % cream Apply topically 2 times daily 30 g 1     bisacodyl (BISACODYL LAXATIVE) 5 MG EC tablet Take 1 tablet (5 mg) by mouth daily as needed for constipation 60 tablet 1     triamcinolone (KENALOG) 0.1 % cream Apply sparingly to affected area three times daily for 14 days. 30 g 11     baclofen (LIORESAL) 10 MG tablet Take 1 tablet (10 mg) by mouth 3 times daily as needed for muscle spasms 30 tablet 0     doxycycline Monohydrate 100 MG CAPS Start 2 days prior to travel to malaria area, daily while there and for 4 weeks after departure (Patient not taking: Reported on 3/31/2017) 60 capsule 0     ciprofloxacin (CIPRO) 500 MG tablet Take 1 tablet (500 mg) by mouth 2 times daily (Patient not taking: Reported on 3/31/2017) 6 tablet 0     oxybutynin (DITROPAN-XL) 5 MG 24 hr tablet Take 1 tablet by mouth daily (Patient not taking: Reported on 3/31/2017) 31 tablet 3       ALLERGIES: Stribild  "[qsvjyjn-ahpkioa-xjkqgraz-tenof]; Ibuprofen; Raltegravir; and Tamsulosin      REVIEW OF SYSTEMS:  Skin: negative  Eyes: negative  Ears/Nose/Throat: negative  Respiratory: No shortness of breath, dyspnea on exertion, cough, or hemoptysis  Cardiovascular: negative  Gastrointestinal: negative  Genitourinary: as above  Musculoskeletal: negative  Neurologic: negative  Psychiatric: negative  Hematologic/Lymphatic/Immunologic: negative  Endocrine: negative      GENERAL PHYSICAL EXAM:   Vitals: /85  Pulse 65  Ht 1.803 m (5' 11\")  Wt 80 kg (176 lb 4.8 oz)  BMI 24.59 kg/m2  Body mass index is 24.59 kg/(m^2).  Constitutional: healthy, alert and no distress  Head: Normocephalic  Neck: Neck supple  Cardiovascular: negative  Respiratory: negative  Gastrointestinal: Abdomen soft, non-tender  Musculoskeletal: extremities normal-   Skin: no suspicious lesions or rashes  Neurologic: Gait normal  Psychiatric: affect normal/bright and mentation appears normal.       EXAM:   Left Flank: negative  Right Flank: negative  Inguinal Area: normal  Phallus is meatus normal and no plaques palpated.   Left testis descended, size is normal , consistency is normal. Intra-testicular masses Absent.  Right testis descended, size is normal, consistency is normal. Intra-testicular masses Absent.      RECTAL EXAM:   Size: 1-2+  Sphincter tone: normal  Tenderness: Absent  Rectal Mass: Absent  Prostate Nodule: Absent      Uroflow  VV: 137ml  Qmax: 11.1 ml/s    PVR: 0ml        RADIOLOGY: The following tests were reviewed: Need to complete the following Radiology exams prior to the office appointment:  LABS: The last test results for Needs to complete the necessary tests prior to appointment: were reviewed.     ASSESSMENT: 62 y/o M with dysuria, lower urinary tract symptoms, and microhematuria     PLAN:    Start trial of alfuzosin   Will send u/a, urine culture, ureaplasma, mycoplasma  If confirmed microhematuria, will start work up for same "   Will send PSA today as well   Follow up in 6-8 weeks to assess symptoms and to review above       I spent over 30 minutes with the patient.  Over half this time was spent on counseling for dysuria, frequency, nocturia.    Again, thank you for allowing me to participate in the care of your patient.      Sincerely,    Yolette Pierre MD

## 2017-04-03 NOTE — PROGRESS NOTES
Preceptor attestation:  Patient seen and discussed with the resident. Assessment and plan reviewed with resident and agreed upon.  Supervising physician: Benitez Salgado  [facility]

## 2017-04-07 LAB
BACTERIA SPEC CULT: NORMAL
BACTERIA SPEC CULT: NORMAL
MICRO REPORT STATUS: NORMAL
MICRO REPORT STATUS: NORMAL
SPECIMEN SOURCE: NORMAL
SPECIMEN SOURCE: NORMAL

## 2017-05-08 ENCOUNTER — TELEPHONE (OUTPATIENT)
Dept: FAMILY MEDICINE | Facility: CLINIC | Age: 62
End: 2017-05-08

## 2017-05-08 NOTE — TELEPHONE ENCOUNTER
Pt's pharmacy is requesting a refill of Artificial Tears Lubricant 15 mL, however it is no longer on the pt's med list.  Please advise.

## 2017-05-09 ENCOUNTER — OFFICE VISIT (OUTPATIENT)
Dept: FAMILY MEDICINE | Facility: CLINIC | Age: 62
End: 2017-05-09

## 2017-05-09 ENCOUNTER — AMBULATORY - HEALTHEAST (OUTPATIENT)
Dept: FAMILY MEDICINE | Facility: CLINIC | Age: 62
End: 2017-05-09

## 2017-05-09 ENCOUNTER — COMMUNICATION - HEALTHEAST (OUTPATIENT)
Dept: INFECTIOUS DISEASES | Facility: CLINIC | Age: 62
End: 2017-05-09

## 2017-05-09 ENCOUNTER — AMBULATORY - HEALTHEAST (OUTPATIENT)
Dept: LAB | Facility: CLINIC | Age: 62
End: 2017-05-09

## 2017-05-09 ENCOUNTER — TRANSFERRED RECORDS (OUTPATIENT)
Dept: HEALTH INFORMATION MANAGEMENT | Facility: CLINIC | Age: 62
End: 2017-05-09

## 2017-05-09 VITALS — SYSTOLIC BLOOD PRESSURE: 103 MMHG | TEMPERATURE: 98 F | DIASTOLIC BLOOD PRESSURE: 69 MMHG | HEART RATE: 91 BPM

## 2017-05-09 DIAGNOSIS — R50.9 FEVER, UNSPECIFIED: Primary | ICD-10-CM

## 2017-05-09 DIAGNOSIS — B54 MALARIA: ICD-10-CM

## 2017-05-09 DIAGNOSIS — B54 MALARIA: Primary | ICD-10-CM

## 2017-05-09 LAB
ALBUMIN SERPL-MCNC: 4.1 MG/DL (ref 3.3–4.9)
ALP SERPL-CCNC: 55.6 U/L (ref 40–150)
ALT SERPL-CCNC: 33.6 U/L (ref 0–45)
AST SERPL-CCNC: 36.1 U/L (ref 0–55)
BACTERIA: NORMAL
BILIRUB SERPL-MCNC: 2 MG/DL (ref 0.2–1.3)
BILIRUBIN DIRECT: 0.5 MG/DL (ref 0–0.2)
BILIRUBIN UR: ABNORMAL
BLOOD UR: ABNORMAL
BUN SERPL-MCNC: 29.4 MG/DL (ref 7–21)
CALCIUM SERPL-MCNC: 9 MG/DL (ref 8.5–10.1)
CASTS: NORMAL /LPF
CHLORIDE SERPLBLD-SCNC: 102.6 MMOL/L (ref 98–110)
CO2 SERPL-SCNC: 25.5 MMOL/L (ref 20–32)
CREAT SERPL-MCNC: 1.4 MG/DL (ref 0.7–1.3)
CRYSTAL URINE: NORMAL /LPF
EPITHELIAL CELLS UR: NORMAL /LPF (ref 0–2)
FLUAV AG UPPER RESP QL IA.RAPID: NEGATIVE
FLUBV AG UPPER RESP QL IA.RAPID: NEGATIVE
GFR SERPL CREATININE-BSD FRML MDRD: 54.4 ML/MIN/1.7 M2
GLUCOSE SERPL-MCNC: 126.9 MG'DL (ref 70–99)
GLUCOSE URINE: NEGATIVE
KETONES UR QL: NEGATIVE
LEUKOCYTE ESTERASE UR: NEGATIVE
MUCOUS URINE: NORMAL LPF
NITRITE UR QL STRIP: NEGATIVE
PH UR STRIP: 5.5 [PH] (ref 5–7)
POTASSIUM SERPL-SCNC: 3.7 MMOL/DL (ref 3.2–4.6)
PROT SERPL-MCNC: 7.2 G/DL (ref 6.8–8.8)
PROTEIN UR: ABNORMAL
RBC URINE: <2 /HPF
S PYO AG THROAT QL IA.RAPID: NEGATIVE
SODIUM SERPL-SCNC: 137.2 MMOL/L (ref 132–142)
SP GR UR STRIP: 1.02
UROBILINOGEN UR STRIP-ACNC: ABNORMAL
WBC URINE: NORMAL /HPF

## 2017-05-09 RX ORDER — ATOVAQUONE AND PROGUANIL HYDROCHLORIDE 250; 100 MG/1; MG/1
4 TABLET, FILM COATED ORAL DAILY
Qty: 12 TABLET | Refills: 0 | Status: SHIPPED | OUTPATIENT
Start: 2017-05-09 | End: 2017-05-12

## 2017-05-09 NOTE — MR AVS SNAPSHOT
After Visit Summary   5/9/2017    Antonio Mercado    MRN: [id number]           Patient Information     Date Of Birth          1955        Visit Information        Provider Department      5/9/2017 8:40 AM Vida Black MD [facility]        Today's Diagnoses     Fever, unspecified    -  1       Follow-ups after your visit        Your next 10 appointments already scheduled     May 24, 2017 10:15 AM CDT   (Arrive by 10:00 AM)   Return Visit with Yolette Pierre MD   [facility] and [facility]rolog[facility] ([facility])    [address] [place] [id number] [phone]              Who to contact     Please call your clinic at [phone] to:    Ask questions about your health    Make or cancel appointments    Discuss your medicines    Learn about your test results    Speak to your doctor   If you have compliments or concerns about an experience at your clinic, or if you wish to file a complaint, please contact [facility] Patient Relations at [phone] or email us at [email or web address]         Additional Information About Your Visit        MyChart Information     [email or web address] gives you secure access to your electronic health record. If you see a primary care provider, you can also send messages to your care team and make appointments. If you have questions, please call your primary care clinic.  If you do not have a primary care provider, please call [phone] and they will assist you.      [email or web address] is an electronic gateway that provides easy, online access to your medical records. With [employer], you can request a clinic appointment, read your test results, renew a prescription or communicate with your care team.     To access your existing account, please contact your [facility] or call [phone] for assistance.        Care EveryWhere ID     This  is your Care EveryWhere ID. This could be used by other organizations to access your [place] medical records  [id number]        Your Vitals Were     Pulse Temperature                91 98  F (36.7  C) (Oral)           Blood Pressure from Last 3 Encounters:   05/09/17 103/69   03/31/17 137/85   03/27/17 145/87    Weight from Last 3 Encounters:   03/31/17 176 lb 4.8 oz (80 kg)   03/27/17 174 lb (78.9 kg)   02/27/17 169 lb 6.4 oz (76.8 kg)              We Performed the Following     Basic Metabolic Panel ([facility])  - Results < 1 hr     CBC w/ Diff and Plt ([facility])     CBC with Diff Plt ([facility])     Group A Strep Throat ([facility])     Influenza A/B Antigen ([facility])     Morphology Periph. Blood ([facility]) (Must order CBC with all morphology labs)     Rapid Strep Screen (Group) ([facility])     Urinalysis, Micro If ([facility])     Urine Culture ([facility])        Primary Care Provider Office Phone # Fax #    Salas Garcia DO Damian [phone]       [place] [address]        Thank you!     Thank you for choosing [facility]  for your care. Our goal is always to provide you with excellent care. Hearing back from our patients is one way we can continue to improve our services. Please take a few minutes to complete the written survey that you may receive in the mail after your visit with us. Thank you!             Your Updated Medication List - Protect others around you: Learn how to safely use, store and throw away your medicines at www.disposemymeds.org.          This list is accurate as of: 5/9/17  9:37 AM.  Always use your most recent med list.                   Brand Name Dispense Instructions for use    abacavir-lamiVUDine-zidovudine 300-150-300 MG per tablet    TRIZIVIR    180 tablet    Take 1 tablet by mouth 2 times daily       alfuzosin 10 MG 24 hr tablet    UROXATRAL    30 tablet    Take 1 tablet (10 mg) by mouth daily       baclofen 10 MG tablet     LIORESAL    30 tablet    Take 1 tablet (10 mg) by mouth 3 times daily as needed for muscle spasms       bisacodyl 5 MG EC tablet    BISACODYL LAXATIVE    60 tablet    Take 1 tablet (5 mg) by mouth daily as needed for constipation       ciprofloxacin 500 MG tablet    CIPRO    6 tablet    Take 1 tablet (500 mg) by mouth 2 times daily       docusate sodium 100 MG tablet    COLACE    270 tablet    Take 1 to 3 tablets daily to keep stools soft.       doxycycline Monohydrate 100 MG Caps     60 capsule    Start 2 days prior to travel to malaria area, daily while there and for 4 weeks after departure       etravirine 200 MG tablet    INTELENCE    180 tablet    Take 1 tablet (200 mg) by mouth 2 times daily (with meals)       hydrocortisone 1 % cream    CORTAID    30 g    Apply topically 2 times daily       loperamide 2 MG tablet    IMODIUM A-D    30 tablet    Take 2 tabs (4 mg) after first loose stool, and then take one tab (2 mg) after each diarrheal stool.  Max of 8 tabs (16 mg) per day.       naproxen 375 MG tablet    NAPROSYN    30 tablet    Take 1 tablet (375 mg) by mouth 2 times daily as needed for moderate pain (take with food)       oxybutynin 5 MG 24 hr tablet    DITROPAN-XL    31 tablet    Take 1 tablet by mouth daily       sodium chloride 0.65 % nasal spray    OCEAN    1 Bottle    Spray 1 spray into both nostrils daily as needed for congestion       * triamcinolone 0.1 % cream    KENALOG    30 g    Apply sparingly to affected area three times daily for 14 days.       * triamcinolone 0.1 % ointment    KENALOG    30 g    Apply sparingly to affected area three times daily for 14 days.       VITAMIN D (CHOLECALCIFEROL) PO      Take by mouth daily       * Notice:  This list has 2 medication(s) that are the same as other medications prescribed for you. Read the directions carefully, and ask your doctor or other care provider to review them with you.

## 2017-05-09 NOTE — PROGRESS NOTES
SUBJECTIVE       Antonio Mercado is a 61 year old  male with a PMH significant for:     Patient Active Problem List   Diagnosis     Health Care Home     Human immunodeficiency virus (HIV) disease (H)     Mechanical low back pain     Microscopic hematuria     Hypertrophy of prostate with urinary obstruction     S/P colonoscopy     Anemia, iron deficiency     Right lower quadrant abdominal mass     Malignant neoplasm of hepatic flexure (H)     Hemorrhoids, unspecified hemorrhoid type     Slow transit constipation     He presents with concerns of fevers.  He just recently returned from the [facility] on Thursday. Since Friday he notes that he has had a fever and was in bed all weekend.  He also complains of a sore throat and a headache.  He has had poor appetite because his throat hurts. He has felt tired all over. He feels off balance and has not been able to walk straight.  He has been lightheaded. No rashes. He has peeling over his hands where he worked with these chemicals. No nausea, vomiting or diarrhea.      When he got back from [place], his bathroom was dirty and he used chemicals to clean his bathroom.  He was using [employer].  He was not coughing or wheezing but thinks this was associated.  No shortness of breath or chest pain.       Patient has a history of elevated PSA to 8.8 and some bladder outlet obstructive sx.  He has a history of HIV and is on HAART.  His most recent CD4 was 239 in 2/17.  He was given a yellow fever vaccine and they did not do malaria prophylaxis for his travels.     PMH, Medications and Allergies were reviewed and updated as needed.        REVIEW OF SYSTEMS     Pertinent review, per HPI.        OBJECTIVE     Vitals:    05/09/17 0853   BP: 103/69   BP Location: Left arm   Patient Position: Chair   Cuff Size: Adult Large   Pulse: 91   Temp: 98  F (36.7  C)   TempSrc: Oral     There is no height or weight on file to calculate BMI.    Constitutional: Well appearing, no acute  distress.  HEENT: No pharyngitis, no scleral icterus. No thrush.  Cervical lymph nodes present.   Cardio: Regular rate and rhythm, no murmurs  Respiratory: No respiratory distress, lungs clear to posterior auscultation bilaterally.   Abd: no tenderness to palpation.   MSK: No meningeal signs on exam  Neuro: Patient is slow moving and slightly off balance today with his gait.  CN 2-12 intact.     Results for orders placed or performed in visit on 05/09/17 (from the past 24 hour(s))   Rapid Strep Screen (Group) ([facility])   Result Value Ref Range    Rapid Strep A Screen NEGATIVE Negative   Influenza A/B Antigen ([facility])   Result Value Ref Range    Influenza A NEGATIVE Negative    Influenza B NEGATIVE Negative           ASSESSMENT AND PLAN     Fever, sore throat, headache:  Patient is immunocompromised with HIV + and low CD4 at 238.  He has new concerns for headache, sore throat, body aches, no meningeal signs on exam.  Will do rapid strep, influenza testing with sx.  High fevers and travel to area of malaria without prophylaxis, so will obtain a peripheral smear.  No fevers today but concern with fevers and unsteady gait that I will have him see his doc at [facility] with hx of immunosuppression. Hx of obstructive uropathy, so will send UA/UC. Spoke with Dr. Mcneil, the on call [facility] doc who recommends considering malaria and treating if positive - look online for malarial treatment if positive. Chikungunya and Dengue are also possibilities and should be considered if he has further objective fevers. Yellow fever vaccinated against - not consistent with Ebola. Please take temp at home and follow up if fevers.  Call Dr. Mcneil [phone].  - Strep, influenza negative  - CBC with diff, peripheral smear, UA/UC, BMP to assess for infection.  - Follow up with [facility] within 24-48 hours  - Dr. Andersen or one of his partners, ER for worsening symptoms.     Vida Black MD  PGY-3 [facility]

## 2017-05-09 NOTE — PROGRESS NOTES
Preceptor attestation:  Patient seen and discussed with the resident. Assessment and plan reviewed with resident and agreed upon.  Supervising physician: Moose Chau MD  [facility]

## 2017-05-10 ENCOUNTER — TELEPHONE (OUTPATIENT)
Dept: FAMILY MEDICINE | Facility: CLINIC | Age: 62
End: 2017-05-10

## 2017-05-10 DIAGNOSIS — H04.123 DRY EYES: Primary | ICD-10-CM

## 2017-05-10 LAB
BASOPHILS # BLD AUTO: 0 THOU/UL (ref 0–0.2)
BASOPHILS NFR BLD AUTO: 1 % (ref 0–2)
CULTURE: NORMAL
EOSINOPHIL # BLD AUTO: 0.1 THOU/UL (ref 0–0.4)
EOSINOPHIL NFR BLD AUTO: 2 % (ref 0–6)
ERYTHROCYTE [DISTWIDTH] IN BLOOD BY AUTOMATED COUNT: 12.2 % (ref 11–14.5)
GROUP A STREP,THROAT: NORMAL
HCT VFR BLD AUTO: 41.6 % (ref 40–54)
HGB BLD-MCNC: 14.5 G/DL (ref 14–18)
LYMPHOCYTES # BLD AUTO: 1.2 THOU/UL (ref 0.8–4.4)
LYMPHOCYTES NFR BLD AUTO: 28 % (ref 20–40)
MCH RBC QN AUTO: 37.2 PG (ref 27–34)
MCHC RBC AUTO-ENTMCNC: 34.9 G/DL (ref 32–36)
MCV RBC AUTO: 107 FL (ref 80–100)
MONOCYTES # BLD AUTO: 0.4 THOU/UL (ref 0–0.9)
MONOCYTES NFR BLD AUTO: 10 % (ref 2–10)
MORPHOLOGY, PERIPHERAL BLOOD: ABNORMAL
NEUTROPHILS # BLD AUTO: 2.6 THOU/UL (ref 2–7.7)
NEUTROPHILS NFR BLD AUTO: 60 % (ref 50–70)
PLATELET # BLD AUTO: 69 THOU/UL (ref 140–440)
PMV BLD AUTO: 10.7 FL (ref 8.5–12.5)
RBC # BLD AUTO: 3.9 MILL/UL (ref 4.4–6.2)
WBC # BLD AUTO: 4.4 THOU/UL (ref 4–11)

## 2017-05-10 NOTE — TELEPHONE ENCOUNTER
Pt's pharmacy is requesting a refill of Artificial Tears Lubricant 15mL, however it is no longer on the pt's med list.  Please advise.

## 2017-05-10 NOTE — PROGRESS NOTES
Received a call from [facility] with preliminary abnormal results.  Patient was seen in clinic this morning for fevers and headaches.  Morphology was ordered and incidentally  visualized plasmodium species.  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  Patient does have a history of HIV and is followed by infectious disease.      Dr. Black had spoken with Dr. Mcneil from infectious disease regarding his case.  CBC was not resulted and BMP showed a creatinine elevated to 1.4 with a decreased GFR.  Discussed outpatient treatment with atovaquone-proguanil versus inpatient treatment.  Dr. Ewa chan following up CBC and making decision based on results.    At that time, spoke with patient and he states that he is still feeling very weak but is not having any fevers.  He has not eaten any food and was planning on coming to the hospital.  Discussed the positive results and treatment and patient preferred to go to pharmacy.  I discussed with him that I would look up the rest of his labs and if they were abnormal he should come to the hospital.    Was able to speak with lab and a preliminary result for the CBC showed a white count of 4.4, hemoglobin 14.5 and platelets of 69.  The last CBC that was available was in December 2016 and he had platelets of 194.  Because of these changes, it was decided that he would be better served to start his treatment here in the hospital and get IV fluids to help with his MONTRELL.     Updated Dr. Mcneil with the results.  He was in agreement with inpatient treatment for at least the next 24 hours so patient can be monitored.  Called patient back and instructed him to come to [facility] which he will do by private car.  Arranged for direct admission and patient will be arriving on unit 3500 this evening.        Yenny Lassiter MD - PGY-3  [facility]

## 2017-05-10 NOTE — PROGRESS NOTES
Patient is admitted the the hospital with Malaria - labs reviewed by inpatient team.  Blood in urine is ongoing concern and patient is followed by urology.  No follow up needed.

## 2017-05-11 ENCOUNTER — TRANSFERRED RECORDS (OUTPATIENT)
Dept: HEALTH INFORMATION MANAGEMENT | Facility: CLINIC | Age: 62
End: 2017-05-11

## 2017-05-11 NOTE — TELEPHONE ENCOUNTER
Rx for articial tears refilled and sent to patient's listed [facility] pharmacy. Chart reviewed. Please call patient to let him know this and also if he has worsening discharge,vision, swelling, or pain, he should be seen in clinic. Thanks!    Proxy for Dr. Salgado week of 5/11/17

## 2017-05-12 ENCOUNTER — TELEPHONE (OUTPATIENT)
Dept: FAMILY MEDICINE | Facility: CLINIC | Age: 62
End: 2017-05-12

## 2017-05-12 LAB
ERYTHROCYTES.PLASMODIUM SP INFECTED/1000 ERYTHROCYTES IN BLOOD: 2.5 %
MALARIA SMEAR: ABNORMAL

## 2017-05-13 NOTE — TELEPHONE ENCOUNTER
Answering Service call @[phone] on 17  [place] [phone]  [id number]  1955  Patient is asking for other alternative as the Primaquine which he is to start on Monday 5/15/17 will cost him $54  I have asked the pharmacy to go ahead and order the Primaquine as it takes 2-3 days to receive to their site and explained the situation is complex as it was undeterminable if this is vivax vs ovale (despite the region [facility] being 85% falciparum) . CDC recommendations in this circumstance recommends a combination that includes Primaquine which I.D. specialist has recommended . We will explore other alternatives in the meantime and discuss with I.D. tomorrow in the daytime. Pharmacy has verified that patient did receive his doses of Malarone thru .     I asked Pharmacist to reemphasize the importance of patient following up in PCP office on Monday.  Will pass along information to Teaching service day team to update, but at this time, I see no other alternative than to recommend the Primaquine be ordered to the pharmacy.   Dave Valladares MD PGY2  [facility]  [phone]

## 2017-05-15 ENCOUNTER — PRE VISIT (OUTPATIENT)
Dept: UROLOGY | Facility: CLINIC | Age: 62
End: 2017-05-15

## 2017-05-15 ENCOUNTER — RECORDS - HEALTHEAST (OUTPATIENT)
Dept: ADMINISTRATIVE | Facility: OTHER | Age: 62
End: 2017-05-15

## 2017-05-15 ENCOUNTER — TELEPHONE (OUTPATIENT)
Dept: FAMILY MEDICINE | Facility: CLINIC | Age: 62
End: 2017-05-15

## 2017-05-15 LAB
LAB AP CASE REPORT: NORMAL
LAB AP CHARGES: NORMAL
LAB AP CLINICAL INFORMATION: NORMAL
LAB AP DIAGNOSIS COMMENT: NORMAL
LAB AP FINAL DIAGNOSIS: NORMAL
LAP AP PERIPHERAL SMEAR: NORMAL
Lab: NORMAL

## 2017-05-15 NOTE — TELEPHONE ENCOUNTER
Date of discharge: 05/12/2017  Facility of discharge: [facility]  Patient concerns about condition: No concerns at this time.  Patient concerns about medications: No concerns at this time.  Full med reconciliation will be completed at clinic visit.  Patient concerns about transitioning: No concerns at this time.  Clinic office visit appointment date: 05/17/2017  Patient reminded to bring all medications (prescription and over-the-counter) to clinic appointment: Yes    Using the date of discharge as day 1, the 30th day post discharge is 06/10/2017.

## 2017-05-19 ENCOUNTER — OFFICE VISIT (OUTPATIENT)
Dept: FAMILY MEDICINE | Facility: CLINIC | Age: 62
End: 2017-05-19

## 2017-05-19 VITALS
HEART RATE: 66 BPM | TEMPERATURE: 98.1 F | WEIGHT: 174.2 LBS | BODY MASS INDEX: 24.39 KG/M2 | HEIGHT: 71 IN | OXYGEN SATURATION: 99 % | DIASTOLIC BLOOD PRESSURE: 82 MMHG | SYSTOLIC BLOOD PRESSURE: 136 MMHG

## 2017-05-19 DIAGNOSIS — K59.00 CONSTIPATION, UNSPECIFIED CONSTIPATION TYPE: Primary | ICD-10-CM

## 2017-05-19 DIAGNOSIS — B50.9 MALARIA BY PLASMODIUM FALCIPARUM: ICD-10-CM

## 2017-05-19 LAB
% GRANULOCYTES: 42.4 %G (ref 40–75)
GRANULOCYTES #: 1.6 K/UL (ref 1.6–8.3)
HCT VFR BLD AUTO: 33.7 % (ref 40–53)
HEMOGLOBIN: 11.3 G/DL (ref 13.3–17.7)
LYMPHOCYTES # BLD AUTO: 1.7 K/UL (ref 0.8–5.3)
LYMPHOCYTES NFR BLD AUTO: 45.8 %L (ref 20–48)
MCH RBC QN AUTO: 37 PG (ref 26.5–35)
MCHC RBC AUTO-ENTMCNC: 33.5 G/DL (ref 32–36)
MCV RBC AUTO: 110.4 FL (ref 78–100)
MID #: 0.4 K/UL (ref 0–2.2)
MID %: 11.8 %M (ref 0–20)
PLATELET # BLD AUTO: 302 K/UL (ref 150–450)
RBC # BLD AUTO: 3.1 M/UL (ref 4.4–5.9)
WBC # BLD AUTO: 3.7 K/UL (ref 4–11)

## 2017-05-19 RX ORDER — POLYETHYLENE GLYCOL 3350 17 G/17G
1 POWDER, FOR SOLUTION ORAL 2 TIMES DAILY PRN
Qty: 510 G | Refills: 1 | Status: SHIPPED | OUTPATIENT
Start: 2017-05-19 | End: 2017-11-17

## 2017-05-19 NOTE — MR AVS SNAPSHOT
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          Care Instructions    Malaria: stop primaquine, check blood tests to make sure infection is cleared  Constipation: cont colace, can do one or two capfuls of miralax daily with water/sports drink, increase hydration, increase fiber (whole wheat bread/berries.nuts)        Follow-ups after your visit        Your next 10 appointments already scheduled     May 24, 2017 10:15 AM CDT   (Arrive by 10:00 AM)   Return Visit with Yolette Pierre MD   [facility] and [facility] Urologic C[facility] ([facility])    [address] [place] [id number] [phone]              Who to contact     Please call your clinic at [phone] to:    Ask questions about your health    Make or cancel appointments    Discuss your medicines    Learn about your test results    Speak to your doctor   If you have compliments or concerns about an experience at your clinic, or if you wish to file a complaint, please contact [facility] Patient Relations at [phone] or email us at [email or web address]         Additional Information About Your Visit        MyChart Information     [employer] gives you secure access to your electronic health record. If you see a primary care provider, you can also send messages to your care team and make appointments. If you have questions, please call your primary care clinic.  If you do not have a primary care provider, please call [phone] and they will assist you.      [employer] is an electronic gateway that provides  "easy, online access to your medical records. With Dive[employer], you can request a clinic appointment, read your test results, renew a prescription or communicate with your care team.     To access your existing account, please contact your [facility] or call [phone] for assistance.        Care EveryWhere ID     This is your Care EveryWhere ID. This could be used by other organizations to access your [place] medical records  [id number]        Your Vitals Were     Pulse Temperature Height Pulse Oximetry BMI (Body Mass Index)       66 98.1  F (36.7  C) (Oral) 5' 10.75\" (179.7 cm) 99% 24.47 kg/m2        Blood Pressure from Last 3 Encounters:   05/19/17 136/82   05/09/17 103/69   03/31/17 137/85    Weight from Last 3 Encounters:   05/19/17 174 lb 3.2 oz (79 kg)   03/31/17 176 lb 4.8 oz (80 kg)   03/27/17 174 lb (78.9 kg)              We Performed the Following     CBC with Diff Plt ([facility])     Malarial Smears Blood ([facility])          Today's Medication Changes          These changes are accurate as of: 5/19/17  9:47 AM.  If you have any questions, ask your nurse or doctor.               Start taking these medicines.        Dose/Directions    polyethylene glycol powder   Commonly known as:  MIRALAX   Used for:  Constipation, unspecified constipation type   Started by:  Salas Salgado DO        Dose:  1 capful   Take 17 g (1 capful) by mouth 2 times daily as needed for constipation   Quantity:  510 g   Refills:  1            Where to get your medicines      These medications were sent to [id number] - [place], [place] - [address] AT [facility] [address]  [address], [place] [address]     Phone:  [phone]     polyethylene glycol powder                Primary Care Provider Office Phone # Fax #    Salas Salgado [phone]       [facility] [address] [address]        Thank you!     Thank you for " choosing [facility]  for your care. Our goal is always to provide you with excellent care. Hearing back from our patients is one way we can continue to improve our services. Please take a few minutes to complete the written survey that you may receive in the mail after your visit with us. Thank you!             Your Updated Medication List - Protect others around you: Learn how to safely use, store and throw away your medicines at www.disposemymeds.org.          This list is accurate as of: 5/19/17  9:47 AM.  Always use your most recent med list.                   Brand Name Dispense Instructions for use    abacavir-lamiVUDine-zidovudine 300-150-300 MG per tablet    TRIZIVIR    180 tablet    Take 1 tablet by mouth 2 times daily       alfuzosin 10 MG 24 hr tablet    UROXATRAL    30 tablet    Take 1 tablet (10 mg) by mouth daily       baclofen 10 MG tablet    LIORESAL    30 tablet    Take 1 tablet (10 mg) by mouth 3 times daily as needed for muscle spasms       bisacodyl 5 MG EC tablet    BISACODYL LAXATIVE    60 tablet    Take 1 tablet (5 mg) by mouth daily as needed for constipation       ciprofloxacin 500 MG tablet    CIPRO    6 tablet    Take 1 tablet (500 mg) by mouth 2 times daily       docusate sodium 100 MG tablet    COLACE    270 tablet    Take 1 to 3 tablets daily to keep stools soft.       doxycycline Monohydrate 100 MG Caps     60 capsule    Start 2 days prior to travel to malaria area, daily while there and for 4 weeks after departure       etravirine 200 MG tablet    INTELENCE    180 tablet    Take 1 tablet (200 mg) by mouth 2 times daily (with meals)       hydrocortisone 1 % cream    CORTAID    30 g    Apply topically 2 times daily       loperamide 2 MG tablet    IMODIUM A-D    30 tablet    Take 2 tabs (4 mg) after first loose stool, and then take one tab (2 mg) after each diarrheal stool.  Max of 8 tabs (16 mg) per day.       naproxen 375 MG tablet    NAPROSYN    30 tablet    Take 1 tablet (375  mg) by mouth 2 times daily as needed for moderate pain (take with food)       oxybutynin 5 MG 24 hr tablet    DITROPAN-XL    31 tablet    Take 1 tablet by mouth daily       polyethylene glycol powder    MIRALAX    510 g    Take 17 g (1 capful) by mouth 2 times daily as needed for constipation       Propylene Glycol-Glycerin 1-0.3 % Soln    [employer] ARTIFICIAL TEARS    30 mL    Apply 1 drop to eye 2 times daily as needed       sodium chloride 0.65 % nasal spray    OCEAN    1 Bottle    Spray 1 spray into both nostrils daily as needed for congestion       * triamcinolone 0.1 % cream    KENALOG    30 g    Apply sparingly to affected area three times daily for 14 days.       * triamcinolone 0.1 % ointment    KENALOG    30 g    Apply sparingly to affected area three times daily for 14 days.       VITAMIN D (CHOLECALCIFEROL) PO      Take by mouth daily       * Notice:  This list has 2 medication(s) that are the same as other medications prescribed for you. Read the directions carefully, and ask your doctor or other care provider to review them with you.

## 2017-05-19 NOTE — NURSING NOTE
Don't review the medication today because patient has more than ten med and he doesn't recall the name.

## 2017-05-19 NOTE — PROGRESS NOTES
"Nursing Notes:   Arlene Ibarra CMA  5/19/2017  9:08 AM  Signed  Don't review the medication today because patient has more than ten med and he doesn't recall the name.   Chief Complaint   Patient presents with     RECHECK     come here today to recheck the malaris per pt.      Constipation     have constipation for one week now per pt.      Blood pressure 136/82, pulse 66, temperature 98.1  F (36.7  C), temperature source Oral, height 5' 10.75\" (179.7 cm), weight 174 lb 3.2 oz (79 kg), SpO2 99 %.    Assessment and Plan   Malaria infection: Given it was P. faciporum that was found on the smears the patient does not need to continue primaquine.  We will discontinue that today.  We will check a CBC to ensure that the platelets and hemoglobin are improving.  Additionally we will check a peripheral blood smear with a parasitemia level if necessary.      Constipation: Encouraged hydration, increased fiber intake and will add MiraLAX 1-2 capfuls a day.    Follow-up next week if constipation not improving    Options for treatment and follow-up care were reviewed with the patient and/or guardian. Antonio Mercado and/or guardian engaged in the decision making process and verbalized understanding of the options discussed and agreed with the final plan.  Patient discussed with Dr. Burnett.   DO JOSE Mane       Antonio Mercado is a 61 year old  Male Past medical history of HIV, colon cancer status post right hemicolectomy malaria presents today for follow-up to his hospitalization secondary to malaria infection.    Patient was admitted just over a week ago due to pancytopenia noted on the CBC secondary to active malaria infection.  He was managed in the hospital with Malarone and improved clinically.  His parasite level of the day of discharge was estimated to be 0.5% and trending down.  His platelets trended up from 48-58 on discharge.  At baseline he has a slight leukopenia white blood cell count was at " baseline at discharge.  Hemoglobin was slightly lower than it had been recently at 11 on discharge.    Since discharge the patient has been taking primaquine twice a day and was instructed to do so for 14 days because initially it was thought that he had the diagnosis of either P vivax or P ovale.  However formal reading of multiple blood smears shown to the patient has actually had p falciporum.    Since discharge the patient has not had any fevers chills or night sweats.  No fatigue body aches nausea vomiting diarrhea.    She actually reports constipation today.  He did have one bowel movement earlier this morning but it was hard.  He has been dealing with this for a long time. He is currently taking Colace a few times a day.  Of note he did have a colonoscopy in January of this year that was essentially unremarkable aside from a few internal hemorrhoids and it was recommended that he have a follow-up colonoscopy in 2 years secondary to his history of malignant neoplasm in January 2016         Patient Active Problem List   Diagnosis     Health Care Home     Human immunodeficiency virus (HIV) disease (H)     Mechanical low back pain     Microscopic hematuria     Hypertrophy of prostate with urinary obstruction     S/P colonoscopy     Anemia, iron deficiency     Right lower quadrant abdominal mass     Malignant neoplasm of hepatic flexure (H)     Hemorrhoids, unspecified hemorrhoid type     Slow transit constipation     Current Outpatient Prescriptions   Medication Sig Dispense Refill     polyethylene glycol (MIRALAX) powder Take 17 g (1 capful) by mouth 2 times daily as needed for constipation 510 g 1     Propylene Glycol-Glycerin ([employer] ARTIFICIAL TEARS) 1-0.3 % SOLN Apply 1 drop to eye 2 times daily as needed 30 mL 0     VITAMIN D, CHOLECALCIFEROL, PO Take by mouth daily       alfuzosin (UROXATRAL) 10 MG 24 hr tablet Take 1 tablet (10 mg) by mouth daily 30 tablet 1     naproxen (NAPROSYN) 375 MG tablet Take 1  tablet (375 mg) by mouth 2 times daily as needed for moderate pain (take with food) 30 tablet 0     sodium chloride (OCEAN) 0.65 % nasal spray Spray 1 spray into both nostrils daily as needed for congestion 1 Bottle 3     doxycycline Monohydrate 100 MG CAPS Start 2 days prior to travel to malaria area, daily while there and for 4 weeks after departure 60 capsule 0     ciprofloxacin (CIPRO) 500 MG tablet Take 1 tablet (500 mg) by mouth 2 times daily 6 tablet 0     loperamide (IMODIUM A-D) 2 MG tablet Take 2 tabs (4 mg) after first loose stool, and then take one tab (2 mg) after each diarrheal stool.  Max of 8 tabs (16 mg) per day. 30 tablet 0     oxybutynin (DITROPAN-XL) 5 MG 24 hr tablet Take 1 tablet by mouth daily 31 tablet 3     abacavir-lamiVUDine-zidovudine (TRIZIVIR) 300-150-300 MG per tablet Take 1 tablet by mouth 2 times daily 180 tablet 3     etravirine (INTELENCE) 200 MG tablet Take 1 tablet (200 mg) by mouth 2 times daily (with meals) 180 tablet 3     docusate sodium (COLACE) 100 MG tablet Take 1 to 3 tablets daily to keep stools soft. 270 tablet 3     triamcinolone (KENALOG) 0.1 % ointment Apply sparingly to affected area three times daily for 14 days. 30 g 0     hydrocortisone (CORTAID) 1 % cream Apply topically 2 times daily 30 g 1     bisacodyl (BISACODYL LAXATIVE) 5 MG EC tablet Take 1 tablet (5 mg) by mouth daily as needed for constipation 60 tablet 1     triamcinolone (KENALOG) 0.1 % cream Apply sparingly to affected area three times daily for 14 days. 30 g 11     baclofen (LIORESAL) 10 MG tablet Take 1 tablet (10 mg) by mouth 3 times daily as needed for muscle spasms 30 tablet 0     Allergies   Allergen Reactions     Stribild [[id number]] Itching     Ibuprofen Itching     Raltegravir Unknown     Tamsulosin Other (See Comments)     Headache     Family History   Problem Relation Age of Onset     C.A.D. No family hx of      DIABETES No family hx of      Coronary Artery Disease No  "family hx of      Hypertension No family hx of      Hyperlipidemia No family hx of      Breast Cancer No family hx of      Cancer - colorectal No family hx of      Ovarian Cancer No family hx of      Prostate Cancer No family hx of      Depression/Anxiety No family hx of      CEREBROVASCULAR DISEASE No family hx of      Anesthesia Reaction No family hx of      Thyroid Disease No family hx of      Asthma No family hx of      OSTEOPOROSIS No family hx of      Chemical Addiction No family hx of      Known Genetic Syndrome No family hx of      Results for orders placed or performed in visit on 05/19/17 (from the past 24 hour(s))   CBC with Diff Plt ([facility])   Result Value Ref Range    WBC 3.7 (L) 4.0 - 11.0 K/uL    Lymphocytes # 1.7 0.8 - 5.3 K/uL    % Lymphocytes 45.8 20.0 - 48.0 %L    Mid # 0.4 0.0 - 2.2 K/uL    Mid % 11.8 0.0 - 20.0 %M    GRANULOCYTES # 1.6 1.6 - 8.3 K/uL    % Granulocytes 42.4 40.0 - 75.0 %G    RBC 3.1 (L) 4.4 - 5.9 M/uL    Hemoglobin 11.3 (L) 13.3 - 17.7 g/dL    Hematocrit 33.7 (L) 40.0 - 53.0 %    .4 (H) 78.0 - 100.0 fL    MCH 37.0 (H) 26.5 - 35.0    MCHC 33.5 32.0 - 36.0 g/dL    Platelets 302.0 150.0 - 450.0 K/uL            Review of Systems:   As Above             Physical Exam:     Vitals:    05/19/17 0906   BP: 136/82   Pulse: 66   Temp: 98.1  F (36.7  C)   TempSrc: Oral   SpO2: 99%   Weight: 174 lb 3.2 oz (79 kg)   Height: 5' 10.75\" (179.7 cm)     Body mass index is 24.47 kg/(m^2).    General: Alert and oriented ×3  Heart: Regular rate and rhythm without murmurs  Lungs:  clear to auscultation throughout  Abdomen: Slightly distended, bowel sounds active, no tenderness to palpation throughout, no masses palpated    Orders Only on 05/09/2017   Component Date Value Ref Range Status     Malaria Smear 05/09/2017 Plasmodium falciparum* No Malarial parasites seen Final    Comment: This is a corrected result. Previous result was Prelim Parasite Seen,   confirmation pending on 5/9/2017 at 2004 " CDT       % infected RBC's 05/09/2017 2.5  % Final

## 2017-05-19 NOTE — LETTER
May 24, 2017      Antonio Mercado  [address]   [place] [address]      Please see below for your test results.    Resulted Orders   CBC with Diff Plt ([facility])   Result Value Ref Range    WBC 3.7 (L) 4.0 - 11.0 K/uL    Lymphocytes # 1.7 0.8 - 5.3 K/uL    % Lymphocytes 45.8 20.0 - 48.0 %L    Mid # 0.4 0.0 - 2.2 K/uL    Mid % 11.8 0.0 - 20.0 %M    GRANULOCYTES # 1.6 1.6 - 8.3 K/uL    % Granulocytes 42.4 40.0 - 75.0 %G    RBC 3.1 (L) 4.4 - 5.9 M/uL    Hemoglobin 11.3 (L) 13.3 - 17.7 g/dL    Hematocrit 33.7 (L) 40.0 - 53.0 %    .4 (H) 78.0 - 100.0 fL    MCH 37.0 (H) 26.5 - 35.0    MCHC 33.5 32.0 - 36.0 g/dL    Platelets 302.0 150.0 - 450.0 K/uL   Malarial Smears Blood ([facility])   Result Value Ref Range    Malaria Smear No Malarial parasites seen No Malarial parasites seen    Narrative    Test performed by:  [facility]  [address] [place] [address]       Hi Mr. Mercado,    Three is no evidence of malaria in your blood so that is good news and tells us the medication treated the infection. Your blood counts looked similar to how they have in the past. It may be worth repeating a hemoglobin (usually a measure of iron) in a month to make sure it completely returns to normal. Hope you are well.    Dr. Salgado

## 2017-05-19 NOTE — PROGRESS NOTES
Preceptor attestation:  Patient seen and discussed with the resident. Assessment and plan reviewed with resident and agreed upon.  Supervising physician: Salbador Burnett  [facility]

## 2017-05-19 NOTE — PATIENT INSTRUCTIONS
Malaria: stop primaquine, check blood tests to make sure infection is cleared  Constipation: cont colace, can do one or two capfuls of miralax daily with water/sports drink, increase hydration, increase fiber (whole wheat bread/berries.nuts)

## 2017-05-20 LAB — MALARIA SMEAR: NORMAL

## 2017-05-24 ENCOUNTER — RECORDS - HEALTHEAST (OUTPATIENT)
Dept: ADMINISTRATIVE | Facility: OTHER | Age: 62
End: 2017-05-24

## 2017-05-24 ENCOUNTER — OFFICE VISIT (OUTPATIENT)
Dept: UROLOGY | Facility: CLINIC | Age: 62
End: 2017-05-24

## 2017-05-24 VITALS
HEART RATE: 77 BPM | BODY MASS INDEX: 23.8 KG/M2 | WEIGHT: 170 LBS | SYSTOLIC BLOOD PRESSURE: 126 MMHG | DIASTOLIC BLOOD PRESSURE: 80 MMHG | HEIGHT: 71 IN

## 2017-05-24 DIAGNOSIS — N13.8 HYPERTROPHY OF PROSTATE WITH URINARY OBSTRUCTION: Primary | ICD-10-CM

## 2017-05-24 DIAGNOSIS — N40.1 HYPERTROPHY OF PROSTATE WITH URINARY OBSTRUCTION: Primary | ICD-10-CM

## 2017-05-24 ASSESSMENT — PAIN SCALES - GENERAL: PAINLEVEL: NO PAIN (0)

## 2017-05-24 NOTE — PATIENT INSTRUCTIONS
Schedule appointment for prostate MRI.    Schedule appointment with Dr. Pierre for cystoscopy.    It was a pleasure meeting with you today.  Thank you for allowing me and my team the privilege of caring for you today.  YOU are the reason we are here, and I truly hope we provided you with the excellent service you deserve.  Please let us know if there is anything else we can do for you so that we can be sure you are leaving completely satisfied with your care experience.      MANE Bass

## 2017-05-24 NOTE — LETTER
5/24/2017       RE: Antnoio Mercado  [address]   [place] [address]     Dear Colleague,    Thank you for referring your patient, Antonio Mercado, to the [facility] UROLOGY AND INST FOR PROSTATE AND UROLOGIC CANCERS at [facility]. Please see a copy of my visit note below.    UROLOGIC DIAGNOSES:     CURRENT INTERVENTIONS:     HISTORY:   Patient presents with complaints of dysuria at the start of urination and lower urinary tract symptoms.      Patient describes frequency, nocturia 3-4x  Denies urgency  Patient notes burning/dysuria at the start of urination   Noted to have microhematuria      He was given rx for alfuzosin and started taking it (though he had some questions about its use).   Patient notes nocturia x3 and continue frequency (though it has improved).   Patient's repeat PSA was 8.8.     We discussed medical use, improvement with rx, MRI prostate for elevated PSA, cystoscopy to evaluate prostate and and bladder.           PAST MEDICAL HISTORY:   Past Medical History:   Diagnosis Date     Heel pain      History of blood transfusion      HIV (human immunodeficiency virus infection) (H)      LBP (low back pain)      Malignant neoplasm of hepatic flexure (H) 2/4/2016     Trigger finger        PAST SURGICAL HISTORY:   Past Surgical History:   Procedure Laterality Date     GI SURGERY         FAMILY HISTORY:   Family History   Problem Relation Age of Onset     C.A.D. No family hx of      DIABETES No family hx of      Coronary Artery Disease No family hx of      Hypertension No family hx of      Hyperlipidemia No family hx of      Breast Cancer No family hx of      Cancer - colorectal No family hx of      Ovarian Cancer No family hx of      Prostate Cancer No family hx of      Depression/Anxiety No family hx of      CEREBROVASCULAR DISEASE No family hx of      Anesthesia Reaction No family hx of      Thyroid Disease No family hx of      Asthma No family hx of       "OSTEOPOROSIS No family hx of      Chemical Addiction No family hx of      Known Genetic Syndrome No family hx of        SOCIAL HISTORY:   Social History   Substance Use Topics     Smoking status: Never Smoker     Smokeless tobacco: Never Used     Alcohol use No       Current Outpatient Prescriptions   Medication     primaquine 26.3 MG tablet     polyethylene glycol (MIRALAX) powder     Propylene Glycol-Glycerin ([employer] ARTIFICIAL TEARS) 1-0.3 % SOLN     VITAMIN D, CHOLECALCIFEROL, PO     alfuzosin (UROXATRAL) 10 MG 24 hr tablet     naproxen (NAPROSYN) 375 MG tablet     sodium chloride (OCEAN) 0.65 % nasal spray     doxycycline Monohydrate 100 MG CAPS     ciprofloxacin (CIPRO) 500 MG tablet     loperamide (IMODIUM A-D) 2 MG tablet     oxybutynin (DITROPAN-XL) 5 MG 24 hr tablet     abacavir-lamiVUDine-zidovudine (TRIZIVIR) 300-150-300 MG per tablet     etravirine (INTELENCE) 200 MG tablet     docusate sodium (COLACE) 100 MG tablet     triamcinolone (KENALOG) 0.1 % ointment     hydrocortisone (CORTAID) 1 % cream     bisacodyl (BISACODYL LAXATIVE) 5 MG EC tablet     triamcinolone (KENALOG) 0.1 % cream     baclofen (LIORESAL) 10 MG tablet     No current facility-administered medications for this visit.      PHYSICAL EXAM:    /80  Pulse 77  Ht 1.797 m (5' 10.75\")  Wt 77.1 kg (170 lb)  BMI 23.88 kg/m2    HEENT: Normocephalic and atraumatic   Cardiac: Not done  Back/Flank: Not done  CNS/PNS: Not done  Respiratory: Normal non-labored breathing  Abdomen: Soft nontender and nondistended  Peripheral Vascular: Not done  Mental Status: Not done    Penis: Not done  Scrotal Skin: Not done  Testicles: Not done  Epididymis: Not done  Digital Rectal Exam:   Cystoscopy: Not done    Imaging: None    Urinalysis: UA RESULTS:  Recent Labs   Lab Test  05/09/17   1058  05/09/17   1032  03/27/17   1711   COLOR   --    --   Straw   URINEGLC   --   Negative  Negative   URINEKETONE   --   Negative   --    SG   --   1.025   --    URINEPH   " --   5.5  6.5   NITRITE   --   Negative   --    RBCU  <2   --    --    WBCU  2-5   --    --        PSA: 8.8    Post Void Residual:  40ml   Qmax: 17.6 ml/d  VV: 171ml   Other labs: None today    IMPRESSION:  60 y/o M with lower urinary tract symptoms improved somewhat with alfuzosin, elevated PSA     PLAN:  MRI prostate  Schedule for cystoscopy     Total Time: 15 minutes                                       Total in Consultation: greater than 50%     Again, thank you for allowing me to participate in the care of your patient.      Sincerely,  Yolette Pierre MD

## 2017-05-24 NOTE — PROGRESS NOTES
UROLOGIC DIAGNOSES:       CURRENT INTERVENTIONS:       HISTORY:   Patient presents with complaints of dysuria at the start of urination and lower urinary tract symptoms.      Patient describes frequency, nocturia 3-4x  Denies urgency  Patient notes burning/dysuria at the start of urination   Noted to have microhematuria         He was given rx for alfuzosin and started taking it (though he had some questions about its use).   Patient notes nocturia x3 and continue frequency (though it has improved).   Patient's repeat PSA was 8.8.     We discussed medical use, improvement with rx, MRI prostate for elevated PSA, cystoscopy to evaluate prostate and and bladder.           PAST MEDICAL HISTORY:   Past Medical History:   Diagnosis Date     Heel pain      History of blood transfusion      HIV (human immunodeficiency virus infection) (H)      LBP (low back pain)      Malignant neoplasm of hepatic flexure (H) 2/4/2016     Trigger finger        PAST SURGICAL HISTORY:   Past Surgical History:   Procedure Laterality Date     GI SURGERY         FAMILY HISTORY:   Family History   Problem Relation Age of Onset     C.A.D. No family hx of      DIABETES No family hx of      Coronary Artery Disease No family hx of      Hypertension No family hx of      Hyperlipidemia No family hx of      Breast Cancer No family hx of      Cancer - colorectal No family hx of      Ovarian Cancer No family hx of      Prostate Cancer No family hx of      Depression/Anxiety No family hx of      CEREBROVASCULAR DISEASE No family hx of      Anesthesia Reaction No family hx of      Thyroid Disease No family hx of      Asthma No family hx of      OSTEOPOROSIS No family hx of      Chemical Addiction No family hx of      Known Genetic Syndrome No family hx of        SOCIAL HISTORY:   Social History   Substance Use Topics     Smoking status: Never Smoker     Smokeless tobacco: Never Used     Alcohol use No       Current Outpatient Prescriptions   Medication      "primaquine 26.3 MG tablet     polyethylene glycol (MIRALAX) powder     Propylene Glycol-Glycerin ([employer] ARTIFICIAL TEARS) 1-0.3 % SOLN     VITAMIN D, CHOLECALCIFEROL, PO     alfuzosin (UROXATRAL) 10 MG 24 hr tablet     naproxen (NAPROSYN) 375 MG tablet     sodium chloride (OCEAN) 0.65 % nasal spray     doxycycline Monohydrate 100 MG CAPS     ciprofloxacin (CIPRO) 500 MG tablet     loperamide (IMODIUM A-D) 2 MG tablet     oxybutynin (DITROPAN-XL) 5 MG 24 hr tablet     abacavir-lamiVUDine-zidovudine (TRIZIVIR) 300-150-300 MG per tablet     etravirine (INTELENCE) 200 MG tablet     docusate sodium (COLACE) 100 MG tablet     triamcinolone (KENALOG) 0.1 % ointment     hydrocortisone (CORTAID) 1 % cream     bisacodyl (BISACODYL LAXATIVE) 5 MG EC tablet     triamcinolone (KENALOG) 0.1 % cream     baclofen (LIORESAL) 10 MG tablet     No current facility-administered medications for this visit.          PHYSICAL EXAM:    /80  Pulse 77  Ht 1.797 m (5' 10.75\")  Wt 77.1 kg (170 lb)  BMI 23.88 kg/m2    HEENT: Normocephalic and atraumatic   Cardiac: Not done  Back/Flank: Not done  CNS/PNS: Not done  Respiratory: Normal non-labored breathing  Abdomen: Soft nontender and nondistended  Peripheral Vascular: Not done  Mental Status: Not done    Penis: Not done  Scrotal Skin: Not done  Testicles: Not done  Epididymis: Not done  Digital Rectal Exam:     Cystoscopy: Not done    Imaging: None    Urinalysis: UA RESULTS:  Recent Labs   Lab Test  05/09/17   1058  05/09/17   1032  03/27/17   1711   COLOR   --    --   Straw   URINEGLC   --   Negative  Negative   URINEKETONE   --   Negative   --    SG   --   1.025   --    URINEPH   --   5.5  6.5   NITRITE   --   Negative   --    RBCU  <2   --    --    WBCU  2-5   --    --        PSA: 8.8    Post Void Residual:  40ml   Qmax: 17.6 ml/d  VV: 171ml   Other labs: None today      IMPRESSION:  62 y/o M with lower urinary tract symptoms improved somewhat with alfuzosin, elevated PSA "     PLAN:  MRI prostate  Schedule for cystoscopy     Total Time: 15 minutes                                       Total in Consultation: greater than 50%

## 2017-05-24 NOTE — NURSING NOTE
"Chief Complaint   Patient presents with     RECHECK     Follow up- urinary frequency       Initial Wt 77.1 kg (170 lb)  BMI 23.88 kg/m2 Estimated body mass index is 23.88 kg/(m^2) as calculated from the following:    Height as of 5/19/17: 1.797 m (5' 10.75\").    Weight as of this encounter: 77.1 kg (170 lb).  Medication Reconciliation: complete     MANE Bass    "

## 2017-05-24 NOTE — MR AVS SNAPSHOT
After Visit Summary   5/24/2017    Antonio Mercado    MRN: [id number]           Patient Information     Date Of Birth          1955        Visit Information        Provider Department      5/24/2017 10:15 AM Yolette Pierre MD [facility] Urology and [facility] for Prostate and Urologic Cancers        Today's Diagnoses     Hypertrophy of prostate with urinary obstruction    -  1      Care Instructions    Schedule appointment for prostate MRI.    Schedule appointment with Dr. Pierre for cystoscopy.    It was a pleasure meeting with you today.  Thank you for allowing me and my team the privilege of caring for you today.  YOU are the reason we are here, and I truly hope we provided you with the excellent service you deserve.  Please let us know if there is anything else we can do for you so that we can be sure you are leaving completely satisfied with your care experience.      Michelle Gonzalez BLAIR          Follow-ups after your visit        Your next 10 appointments already scheduled     May 31, 2017  7:45 AM CDT   (Arrive by 7:30 AM)   MR PROSTATE with [id number]   [facility] MRI ([facility])    [address] [place], [place] [id number] [phone]           Take your medicines as usual, unless your doctor tells you not to. Bring a list of your current medicines to your exam (including vitamins, minerals and over-the-counter drugs).  You will be given intravenous contrast for this exam. To prepare:   The day before your exam, drink extra fluids at least six 8-ounce glasses (unless your doctor tells you to restrict your fluids).   Have a blood test (creatinine test) within 30 days of your exam. Go to your clinic or Diagnostic Imaging Department for this test.  The MRI machine uses a strong magnet. Please wear clothes without metal (snaps, zippers). A sweatsuit works well, or we may give you a hospital gown.  Please remove any body piercings  and hair extensions before you arrive. You will also remove watches, jewelry, hairpins, wallets, dentures, partial dental plates and hearing aids. You may wear contact lenses, and you may be able to wear your rings. We have a safe place to keep your personal items, but it is safer to leave them at home.   **IMPORTANT** THE INSTRUCTIONS BELOW ARE ONLY FOR THOSE PATIENTS WHO HAVE BEEN TOLD THEY WILL RECEIVE SEDATION OR GENERAL ANESTHESIA DURING THEIR MRI PROCEDURE:  IF YOU WILL RECEIVE SEDATION (take medicine to help you relax during your exam):   You must get the medicine from your doctor before you arrive. Bring the medicine to the exam. Do not take it at home.   Arrive one hour early. Bring someone who can take you home after the test. Your medicine will make you sleepy. After the exam, you may not drive, take a bus or take a taxi by yourself.   No eating 8 hours before your exam. You may have clear liquids up until 4 hours before your exam. (Clear liquids include water, clear tea, black coffee and fruit juice without pulp.)  IF YOU WILL RECEIVE ANESTHESIA (be asleep for your exam):   Arrive 1 1/2 hours early. Bring someone who can take you home after the test. You may not drive, take a bus or take a taxi by yourself.   No eating 8 hours before your exam. You may have clear liquids up until 4 hours before your exam. (Clear liquids include water, clear tea, black coffee and fruit juice without pulp.)  Please call the Imaging Department at your exam site with any questions.            Jun 30, 2017 11:30 AM CDT   (Arrive by 11:15 AM)   Cystoscopy with Yolette Pierre MD   [facility] Urology and [facility] for Prostate and Urologic Cancers ([facility])    [address] [address] [phone]              Future tests that were ordered for you today     Open Future Orders        Priority Expected Expires Ordered    MRI Prostate Routine  5/24/2018 5/24/2017  "           Who to contact     Please call your clinic at [phone] to:    Ask questions about your health    Make or cancel appointments    Discuss your medicines    Learn about your test results    Speak to your doctor   If you have compliments or concerns about an experience at your clinic, or if you wish to file a complaint, please contact [facility] Patient Relations at [phone] or email us at [email or web address]         Additional Information About Your Visit        [facility] Information     [email or web address] gives you secure access to your electronic health record. If you see a primary care provider, you can also send messages to your care team and make appointments. If you have questions, please call your primary care clinic.  If you do not have a primary care provider, please call [phone] and they will assist you.      [employer] is an electronic gateway that provides easy, online access to your medical records. With [employer], you can request a clinic appointment, read your test results, renew a prescription or communicate with your care team.     To access your existing account, please contact your [facility] or call [phone] for assistance.        Care EveryWhere ID     This is your Care EveryWhere ID. This could be used by other organizations to access your [facility] medical records  [id number]        Your Vitals Were     Pulse Height BMI (Body Mass Index)             77 1.797 m (5' 10.75\") 23.88 kg/m2          Blood Pressure from Last 3 Encounters:   05/24/17 126/80   05/19/17 136/82   05/09/17 103/69    Weight from Last 3 Encounters:   05/24/17 77.1 kg (170 lb)   05/19/17 79 kg (174 lb 3.2 oz)   03/31/17 80 kg (176 lb 4.8 oz)              We Performed the Following     COMPLEX UROFLOWMETRY     POST-VOID RESIDUAL BLADDER SCAN        Primary Care Provider Office Phone # Fax #    Salas Salgado [phone]       " [address] [address]        Thank you!     Thank you for choosing [facility] UROLOGY AND [facility] FOR PROSTATE AND UROLOGIC CANCERS  for your care. Our goal is always to provide you with excellent care. Hearing back from our patients is one way we can continue to improve our services. Please take a few minutes to complete the written survey that you may receive in the mail after your visit with us. Thank you!             Your Updated Medication List - Protect others around you: Learn how to safely use, store and throw away your medicines at www.disposemymeds.org.          This list is accurate as of: 5/24/17 10:55 AM.  Always use your most recent med list.                   Brand Name Dispense Instructions for use    abacavir-lamiVUDine-zidovudine 300-150-300 MG per tablet    TRIZIVIR    180 tablet    Take 1 tablet by mouth 2 times daily       alfuzosin 10 MG 24 hr tablet    UROXATRAL    30 tablet    Take 1 tablet (10 mg) by mouth daily       baclofen 10 MG tablet    LIORESAL    30 tablet    Take 1 tablet (10 mg) by mouth 3 times daily as needed for muscle spasms       bisacodyl 5 MG EC tablet    BISACODYL LAXATIVE    60 tablet    Take 1 tablet (5 mg) by mouth daily as needed for constipation       ciprofloxacin 500 MG tablet    CIPRO    6 tablet    Take 1 tablet (500 mg) by mouth 2 times daily       docusate sodium 100 MG tablet    COLACE    270 tablet    Take 1 to 3 tablets daily to keep stools soft.       doxycycline Monohydrate 100 MG Caps     60 capsule    Start 2 days prior to travel to malaria area, daily while there and for 4 weeks after departure       etravirine 200 MG tablet    INTELENCE    180 tablet    Take 1 tablet (200 mg) by mouth 2 times daily (with meals)       hydrocortisone 1 % cream    CORTAID    30 g    Apply topically 2 times daily       loperamide 2 MG tablet    IMODIUM A-D    30 tablet    Take 2 tabs (4 mg) after first loose stool, and then take one tab (2 mg)  after each diarrheal stool.  Max of 8 tabs (16 mg) per day.       naproxen 375 MG tablet    NAPROSYN    30 tablet    Take 1 tablet (375 mg) by mouth 2 times daily as needed for moderate pain (take with food)       oxybutynin 5 MG 24 hr tablet    DITROPAN-XL    31 tablet    Take 1 tablet by mouth daily       polyethylene glycol powder    MIRALAX    510 g    Take 17 g (1 capful) by mouth 2 times daily as needed for constipation       primaquine 26.3 MG tablet      Take 30 mg Base (TWO TABLETS) daily for 14 days.DO NOT begin this medication until you have finished your Malarone. START this medication on 5/15/17.       Propylene Glycol-Glycerin 1-0.3 % Soln    [employer] ARTIFICIAL TEARS    30 mL    Apply 1 drop to eye 2 times daily as needed       sodium chloride 0.65 % nasal spray    OCEAN    1 Bottle    Spray 1 spray into both nostrils daily as needed for congestion       * triamcinolone 0.1 % cream    KENALOG    30 g    Apply sparingly to affected area three times daily for 14 days.       * triamcinolone 0.1 % ointment    KENALOG    30 g    Apply sparingly to affected area three times daily for 14 days.       VITAMIN D (CHOLECALCIFEROL) PO      Take by mouth daily       * Notice:  This list has 2 medication(s) that are the same as other medications prescribed for you. Read the directions carefully, and ask your doctor or other care provider to review them with you.

## 2017-06-13 ENCOUNTER — AMBULATORY - HEALTHEAST (OUTPATIENT)
Dept: LAB | Facility: HOSPITAL | Age: 62
End: 2017-06-13

## 2017-06-13 ENCOUNTER — PRE VISIT (OUTPATIENT)
Dept: UROLOGY | Facility: CLINIC | Age: 62
End: 2017-06-13

## 2017-06-13 ENCOUNTER — TRANSFERRED RECORDS (OUTPATIENT)
Dept: HEALTH INFORMATION MANAGEMENT | Facility: CLINIC | Age: 62
End: 2017-06-13

## 2017-06-13 ENCOUNTER — OFFICE VISIT - HEALTHEAST (OUTPATIENT)
Dept: INFECTIOUS DISEASES | Facility: CLINIC | Age: 62
End: 2017-06-13

## 2017-06-13 DIAGNOSIS — Z21 HIV INFECTION (H): ICD-10-CM

## 2017-06-13 DIAGNOSIS — Z21 HIV (HUMAN IMMUNODEFICIENCY VIRUS INFECTION) (H): ICD-10-CM

## 2017-06-13 DIAGNOSIS — B50.9 MALARIA BY PLASMODIUM FALCIPARUM: ICD-10-CM

## 2017-06-13 NOTE — TELEPHONE ENCOUNTER
Patient is coming in to see  for a cystoscopy LUST with a MRI before. MRI is done and in [facility] no need for a call.

## 2017-06-14 LAB
CD3 CELLS # BLD: 1061 /UL (ref 471–2787)
CD3 CELLS NFR BLD: 78 % (ref 53–86)
CD3+CD4+ CELLS # BLD: 265 /UL (ref 269–1625)
CD3+CD4+ CELLS NFR BLD: 19 % (ref 33–58)
CD3+CD4+ CELLS/CD3+CD8+ CLL BLD: 0.35 % (ref 0.8–4.5)
CD3+CD8+ CELLS # BLD: 755 /UL (ref 117–923)
CD3+CD8+ CELLS NFR BLD: 55 % (ref 13–44)
LYMPHOCYTES # BLD AUTO: 1.4 THOU/UL (ref 0.8–4.4)

## 2017-06-16 LAB
MISCELLANEOUS TEST DEPT. - HE HISTORICAL: NORMAL
PERFORMING LAB: NORMAL
SPECIMEN STATUS: NORMAL
TEST NAME: NORMAL

## 2017-06-17 DIAGNOSIS — R35.0 URINARY FREQUENCY: ICD-10-CM

## 2017-06-19 RX ORDER — ALFUZOSIN HYDROCHLORIDE 10 MG/1
TABLET, EXTENDED RELEASE ORAL
Qty: 30 TABLET | Refills: 0 | Status: SHIPPED | OUTPATIENT
Start: 2017-06-19 | End: 2017-08-01

## 2017-06-20 ENCOUNTER — COMMUNICATION - HEALTHEAST (OUTPATIENT)
Dept: INFECTIOUS DISEASES | Facility: CLINIC | Age: 62
End: 2017-06-20

## 2017-06-30 ENCOUNTER — RECORDS - HEALTHEAST (OUTPATIENT)
Dept: ADMINISTRATIVE | Facility: OTHER | Age: 62
End: 2017-06-30

## 2017-06-30 ENCOUNTER — OFFICE VISIT (OUTPATIENT)
Dept: UROLOGY | Facility: CLINIC | Age: 62
End: 2017-06-30

## 2017-06-30 VITALS
HEIGHT: 71 IN | SYSTOLIC BLOOD PRESSURE: 132 MMHG | DIASTOLIC BLOOD PRESSURE: 87 MMHG | WEIGHT: 170 LBS | BODY MASS INDEX: 23.8 KG/M2 | HEART RATE: 79 BPM

## 2017-06-30 DIAGNOSIS — R35.1 NOCTURIA: Primary | ICD-10-CM

## 2017-06-30 ASSESSMENT — PAIN SCALES - GENERAL
PAINLEVEL: NO PAIN (0)
PAINLEVEL: NO PAIN (0)

## 2017-06-30 NOTE — PROGRESS NOTES
PRE-PROCEDURE DIAGNOSIS: nocturia, frequency   POST-PROCEDURE DIAGNOSIS: same   PROCEDURE: Cystoscopy  HISTORY: Antonio Mercado is a 61 year old male with lower urinary tract symptoms   REVIEW OF OFFICE STUDIES (e.g., uroflow or PVR):   DESCRIPTION OF PROCEDURE: After informed consent was obtained, the patient was brought to the procedure room where he was placed in the supine position with all pressure points well padded.  The penis and scrotum were prepped and draped in a sterile fashion. A flexible cystoscope was introduced through a well-lubricated urethra. Anterior urethra strictures were absent.   The urinary sphincter was intact.  The prostate demonstrated mild bilobar enlargement but seen to have a median lobe component that was intravesical.  Bladder neck was open.   Bladder signififcant for presence of the following:      Diverticuli: absent      Cellules: absent      Trabeculation: absent      Tumors: absent      Stones: absent  The flexible cystoscope was removed and the findings were described to the patient.   ASSESSMENT AND PLAN: 61 year old male with lower urinary tract symptoms and elevated PSA. Found to have a PIRADS 4 lesion on MRI prostate. Will schedule for MRI guided biopsy next available. Would consider Rezum vs TURP after prostate biopsy.

## 2017-06-30 NOTE — LETTER
6/30/2017       RE: Antonio Mercado  [address]   [place] [address]     Dear Colleague,    Thank you for referring your patient, Antonio Mercado, to the [facility] UROLOGY AND INST FOR PROSTATE AND UROLOGIC CANCERS at [facility]. Please see a copy of my visit note below.      PRE-PROCEDURE DIAGNOSIS: nocturia, frequency   POST-PROCEDURE DIAGNOSIS: same   PROCEDURE: Cystoscopy  HISTORY: Antonio Mercado is a 61 year old male with lower urinary tract symptoms   REVIEW OF OFFICE STUDIES (e.g., uroflow or PVR):   DESCRIPTION OF PROCEDURE: After informed consent was obtained, the patient was brought to the procedure room where he was placed in the supine position with all pressure points well padded.  The penis and scrotum were prepped and draped in a sterile fashion. A flexible cystoscope was introduced through a well-lubricated urethra. Anterior urethra strictures were absent.   The urinary sphincter was intact.  The prostate demonstrated mild bilobar enlargement but seen to have a median lobe component that was intravesical.  Bladder neck was open.   Bladder signififcant for presence of the following:      Diverticuli: absent      Cellules: absent      Trabeculation: absent      Tumors: absent      Stones: absent  The flexible cystoscope was removed and the findings were described to the patient.   ASSESSMENT AND PLAN: 61 year old male with lower urinary tract symptoms and elevated PSA. Found to have a PIRADS 4 lesion on MRI prostate. Will schedule for MRI guided biopsy next available. Would consider Rezum vs TURP after prostate biopsy.     Again, thank you for allowing me to participate in the care of your patient.      Sincerely,    Yolette Pierre MD

## 2017-06-30 NOTE — MR AVS SNAPSHOT
After Visit Summary   6/30/2017    Antonio Mercado    MRN: [id number]           Patient Information     Date Of Birth          1955        Visit Information        Provider Department      6/30/2017 11:30 AM Yolette Pierre MD [facility] Urology and [facility] for Prostate and Urologic Cancers        Care Instructions      [place] for Prostate and Urologic Cancers  MRI Fusion Prostate Bx Patient Instructions  What is MRI Fusion Prostate Bx?  The MRI fusion biopsy is used to target a specific area for sampling. It requires a needle guide to be inserted into the rectum next to the prostate. Next, MR images are viewed, the abnormal area is identified, and a needle is inserted through the guide to the targeted area for tissue samples.  How do I prepare for the exam?    Take Cipro 500mg one tablet in the morning and one in the evening starting the day prior to procedure x 3 days    You will be receiving a Gentamicin intramuscular injection in the Urology clinic 30 min prior to your procedure. Please plan to arrive 30 min earlier.      Do Fleets Enema 2 hrs prior to procedure (eat a light meal)    Stop NSAIDS/Aspirin 7 days prior to procedure. If you are taking any other anticoagulation medications such as Coumadin, Heparin, or Lovenox, please consult with your physician prior to scheduling the procedure    How long will procedure take?  MRI fusion biopsy will take about 1 hr. Please allow 2 hrs for the whole procedure (including pre and post)  When will I know my results?  We will schedule a 2 week follow up appointment for you to review your pathology results with your physician.   *Please arrive 30 min prior to your scheduled procedure time*  Who do I contact if I have questions?  Please call Urology and [facility] at [phone] Opt # 3 to speak to a nurse.           Follow-ups after your visit        Your next 10 appointments already scheduled     Aug 04, 2017  8:30 AM CDT   (Arrive by 8:15 AM)  "  Sonography/Biopsy with Yolette Pierre MD   [facility] Urology and [facility] for Prostate and Urologic Cancers ([facility])    [address] [place] [address] [phone]              Who to contact     Please call your clinic at [phone] to:    Ask questions about your health    Make or cancel appointments    Discuss your medicines    Learn about your test results    Speak to your doctor   If you have compliments or concerns about an experience at your clinic, or if you wish to file a complaint, please contact [facility] Patient Relations at [phone] or email us at [email or web address]         Additional Information About Your Visit        [employer] Information     [employer] gives you secure access to your electronic health record. If you see a primary care provider, you can also send messages to your care team and make appointments. If you have questions, please call your primary care clinic.  If you do not have a primary care provider, please call [phone] and they will assist you.      [employer] is an electronic gateway that provides easy, online access to your medical records. With [employer], you can request a clinic appointment, read your test results, renew a prescription or communicate with your care team.     To access your existing account, please contact your [facility] or call [phone] for assistance.        Care EveryWhere ID     This is your Care EveryWhere ID. This could be used by other organizations to access your [place] medical records  [id number]        Your Vitals Were     Pulse Height BMI (Body Mass Index)             79 1.797 m (5' 10.75\") 23.88 kg/m2          Blood Pressure from Last 3 Encounters:   06/30/17 132/87   05/24/17 126/80   05/19/17 136/82    Weight from Last 3 Encounters:   06/30/17 77.1 kg (170 lb)   05/24/17 77.1 kg (170 lb)   05/19/17 79 kg " (174 lb 3.2 oz)              Today, you had the following     No orders found for display       Primary Care Provider Office Phone # Fax #    Salas Salgado [phone]       [address] [address]        Equal Access to Services     RYAN CASTRO : Hadii yonny ku hadasho Soomaali, waaxda luqadaha, qaybta kaalmada adeegyada, waxay brenda hayastonn annamaria sotogutierrezelvia field. So [facility] [phone].    ATENCIÓN: Si habla español, tiene a day disposición servicios gratuitos de asistencia lingüística. Llame al [phone].    We comply with applicable federal civil rights laws and [place] laws. We do not discriminate on the basis of race, color, national origin, age, disability sex, sexual orientation or gender identity.            Thank you!     Thank you for choosing [facility] UROLOGY AND [facility] FOR PROSTATE AND UROLOGIC CANCERS  for your care. Our goal is always to provide you with excellent care. Hearing back from our patients is one way we can continue to improve our services. Please take a few minutes to complete the written survey that you may receive in the mail after your visit with us. Thank you!             Your Updated Medication List - Protect others around you: Learn how to safely use, store and throw away your medicines at www.disposemymeds.org.          This list is accurate as of: 6/30/17 12:19 PM.  Always use your most recent med list.                   Brand Name Dispense Instructions for use Diagnosis    abacavir-lamiVUDine-zidovudine 300-150-300 MG per tablet    TRIZIVIR    180 tablet    Take 1 tablet by mouth 2 times daily    Human immunodeficiency virus (HIV) disease (H)       alfuzosin 10 MG 24 hr tablet    UROXATRAL    30 tablet    TAKE 1 TABLET BY MOUTH EVERY DAY    Urinary frequency       baclofen 10 MG tablet    LIORESAL    30 tablet    Take 1 tablet (10 mg) by mouth 3 times daily as needed for muscle spasms    Back pain, unspecified back  location, unspecified back pain laterality, unspecified chronicity       bisacodyl 5 MG EC tablet    BISACODYL LAXATIVE    60 tablet    Take 1 tablet (5 mg) by mouth daily as needed for constipation    Constipation, unspecified constipation type       ciprofloxacin 500 MG tablet    CIPRO    6 tablet    Take 1 tablet (500 mg) by mouth 2 times daily    Travel advice encounter       docusate sodium 100 MG tablet    COLACE    270 tablet    Take 1 to 3 tablets daily to keep stools soft.    Constipation, unspecified constipation type       doxycycline Monohydrate 100 MG Caps     60 capsule    Start 2 days prior to travel to malaria area, daily while there and for 4 weeks after departure    Travel advice encounter       etravirine 200 MG tablet    INTELENCE    180 tablet    Take 1 tablet (200 mg) by mouth 2 times daily (with meals)    Human immunodeficiency virus (HIV) disease (H)       hydrocortisone 1 % cream    CORTAID    30 g    Apply topically 2 times daily    Internal hemorrhoids       loperamide 2 MG tablet    IMODIUM A-D    30 tablet    Take 2 tabs (4 mg) after first loose stool, and then take one tab (2 mg) after each diarrheal stool.  Max of 8 tabs (16 mg) per day.    Travel advice encounter       naproxen 375 MG tablet    NAPROSYN    30 tablet    Take 1 tablet (375 mg) by mouth 2 times daily as needed for moderate pain (take with food)    Tension headache       oxybutynin 5 MG 24 hr tablet    DITROPAN-XL    31 tablet    Take 1 tablet by mouth daily    Bladder outlet obstruction       polyethylene glycol powder    MIRALAX    510 g    Take 17 g (1 capful) by mouth 2 times daily as needed for constipation    Constipation, unspecified constipation type       primaquine 26.3 MG tablet      Take 30 mg Base (TWO TABLETS) daily for 14 days.DO NOT begin this medication until you have finished your Malarone. START this medication on 5/15/17.        Propylene Glycol-Glycerin 1-0.3 % Soln    [employer]S ARTIFICIAL TEARS    30 mL     Apply 1 drop to eye 2 times daily as needed    Dry eyes       sodium chloride 0.65 % nasal spray    OCEAN    1 Bottle    Spray 1 spray into both nostrils daily as needed for congestion    Stuffy and runny nose       * triamcinolone 0.1 % cream    KENALOG    30 g    Apply sparingly to affected area three times daily for 14 days.    Hemorrhoids, unspecified hemorrhoid type       * triamcinolone 0.1 % ointment    KENALOG    30 g    Apply sparingly to affected area three times daily for 14 days.    Rash       VITAMIN D (CHOLECALCIFEROL) PO      Take by mouth daily        * Notice:  This list has 2 medication(s) that are the same as other medications prescribed for you. Read the directions carefully, and ask your doctor or other care provider to review them with you.

## 2017-06-30 NOTE — NURSING NOTE
"Chief Complaint   Patient presents with     Cystoscopy     LUTS       Blood pressure 132/87, pulse 79, height 1.797 m (5' 10.75\"), weight 77.1 kg (170 lb). Body mass index is 23.88 kg/(m^2).    Patient Active Problem List   Diagnosis     Health Care Home     Human immunodeficiency virus (HIV) disease (H)     Mechanical low back pain     Microscopic hematuria     Hypertrophy of prostate with urinary obstruction     S/P colonoscopy     Anemia, iron deficiency     Right lower quadrant abdominal mass     Malignant neoplasm of hepatic flexure (H)     Hemorrhoids, unspecified hemorrhoid type     Slow transit constipation       Allergies   Allergen Reactions     Stribild [Xzolcqt-Icnnvyi-Uklquqhy-Tenof] Itching     Ibuprofen Itching     Raltegravir Unknown     Tamsulosin Other (See Comments)     Headache       Current Outpatient Prescriptions   Medication Sig Dispense Refill     alfuzosin (UROXATRAL) 10 MG 24 hr tablet TAKE 1 TABLET BY MOUTH EVERY DAY 30 tablet 0     primaquine 26.3 MG tablet Take 30 mg Base (TWO TABLETS) daily for 14 days.DO NOT begin this medication until you have finished your Malarone. START this medication on 5/15/17.       polyethylene glycol (MIRALAX) powder Take 17 g (1 capful) by mouth 2 times daily as needed for constipation 510 g 1     Propylene Glycol-Glycerin ([employer] ARTIFICIAL TEARS) 1-0.3 % SOLN Apply 1 drop to eye 2 times daily as needed 30 mL 0     VITAMIN D, CHOLECALCIFEROL, PO Take by mouth daily       naproxen (NAPROSYN) 375 MG tablet Take 1 tablet (375 mg) by mouth 2 times daily as needed for moderate pain (take with food) 30 tablet 0     sodium chloride (OCEAN) 0.65 % nasal spray Spray 1 spray into both nostrils daily as needed for congestion 1 Bottle 3     doxycycline Monohydrate 100 MG CAPS Start 2 days prior to travel to malaria area, daily while there and for 4 weeks after departure 60 capsule 0     ciprofloxacin (CIPRO) 500 MG tablet Take 1 tablet (500 mg) by mouth 2 times daily 6 " tablet 0     loperamide (IMODIUM A-D) 2 MG tablet Take 2 tabs (4 mg) after first loose stool, and then take one tab (2 mg) after each diarrheal stool.  Max of 8 tabs (16 mg) per day. 30 tablet 0     oxybutynin (DITROPAN-XL) 5 MG 24 hr tablet Take 1 tablet by mouth daily 31 tablet 3     abacavir-lamiVUDine-zidovudine (TRIZIVIR) 300-150-300 MG per tablet Take 1 tablet by mouth 2 times daily 180 tablet 3     etravirine (INTELENCE) 200 MG tablet Take 1 tablet (200 mg) by mouth 2 times daily (with meals) 180 tablet 3     docusate sodium (COLACE) 100 MG tablet Take 1 to 3 tablets daily to keep stools soft. 270 tablet 3     triamcinolone (KENALOG) 0.1 % ointment Apply sparingly to affected area three times daily for 14 days. 30 g 0     hydrocortisone (CORTAID) 1 % cream Apply topically 2 times daily 30 g 1     bisacodyl (BISACODYL LAXATIVE) 5 MG EC tablet Take 1 tablet (5 mg) by mouth daily as needed for constipation 60 tablet 1     triamcinolone (KENALOG) 0.1 % cream Apply sparingly to affected area three times daily for 14 days. 30 g 11     baclofen (LIORESAL) 10 MG tablet Take 1 tablet (10 mg) by mouth 3 times daily as needed for muscle spasms 30 tablet 0       Social History   Substance Use Topics     Smoking status: Never Smoker     Smokeless tobacco: Never Used     Alcohol use No     Invasive Procedure Safety Checklist:    Procedure: cystoscopy    Action: Complete sections and checkboxes as appropriate.    Pre-procedure:  1. Patient ID Verified with 2 identifiers (Elisa and  or MRN) : YES    2. Procedure and site verified with patient/designee (when able) : YES    3. Accurate consent documentation in medical record : YES    4. H&P (or appropriate assessment) documented in medical record : NO  H&P must be up to 30 days prior to procedure an updated within 24 hours of                 Procedure as applicable.     5. Relevant diagnostic and radiology test results appropriately labeled and displayed as applicable :  NO    6. Blood products, implants, devices, and/or special equipment available for the procedure as applicable : NO    7. Procedure site(s) marked with provider initials [Exclusions: none] : NO    8. Marking not required. Reason : Yes  Procedure does not require site marking    Time Out:     Time-Out performed immediately prior to starting procedure, including verbal and active participation of all team members addressing: YES    1. Correct patient identity.  2. Confirmed that the correct side and site are marked.  3. An accurate procedure to be done.  4. Agreement on the procedure to be done.  5. Correct patient position.  6. Relevant images and results are properly labeled and appropriately displayed.  7. The need to administer antibiotics or fluids for irrigation purposes during the procedure as applicable.  8. Safety precautions based on patient history or medication use.    During Procedure: Verification of correct person, site, and procedure occurs any time the responsibility for care of the patient is transferred to another member of the care team.    Eloina Knowles LPN  6/30/2017  11:38 AM

## 2017-06-30 NOTE — PATIENT INSTRUCTIONS
[facility]  MRI Fusion Prostate Bx Patient Instructions  What is MRI Fusion Prostate Bx?  The MRI fusion biopsy is used to target a specific area for sampling. It requires a needle guide to be inserted into the rectum next to the prostate. Next, MR images are viewed, the abnormal area is identified, and a needle is inserted through the guide to the targeted area for tissue samples.  How do I prepare for the exam?    Take Cipro 500mg one tablet in the morning and one in the evening starting the day prior to procedure x 3 days    You will be receiving a Gentamicin intramuscular injection in the Urology clinic 30 min prior to your procedure. Please plan to arrive 30 min earlier.      Do Fleets Enema 2 hrs prior to procedure (eat a light meal)    Stop NSAIDS/Aspirin 7 days prior to procedure. If you are taking any other anticoagulation medications such as Coumadin, Heparin, or Lovenox, please consult with your physician prior to scheduling the procedure    How long will procedure take?  MRI fusion biopsy will take about 1 hr. Please allow 2 hrs for the whole procedure (including pre and post)  When will I know my results?  We will schedule a 2 week follow up appointment for you to review your pathology results with your physician.   *Please arrive 30 min prior to your scheduled procedure time*  Who do I contact if I have questions?  Please call [facility] at [phone] Opt # 3 to speak to a nurse.

## 2017-07-03 ENCOUNTER — AMBULATORY - HEALTHEAST (OUTPATIENT)
Dept: INFECTIOUS DISEASES | Facility: CLINIC | Age: 62
End: 2017-07-03

## 2017-07-24 ENCOUNTER — PRE VISIT (OUTPATIENT)
Dept: UROLOGY | Facility: CLINIC | Age: 62
End: 2017-07-24

## 2017-07-24 ENCOUNTER — AMBULATORY - HEALTHEAST (OUTPATIENT)
Dept: INFECTIOUS DISEASES | Facility: CLINIC | Age: 62
End: 2017-07-24

## 2017-07-24 NOTE — TELEPHONE ENCOUNTER
Patient coming in for prostate biopsy. Patient contacted regarding prep needed to be done prior to biopsy. This includes stopping all blood thinners for 1 week prior to biopsy, obtaining and administering fleets enema 2 hours prior to biopsy. Patient will also receive Cipro 500 mg antibiotic. Patient stated understanding and had no further questions. Also sent to [employer]

## 2017-07-26 DIAGNOSIS — C61 PROSTATE CANCER (H): Primary | ICD-10-CM

## 2017-07-26 RX ORDER — CIPROFLOXACIN 500 MG/1
500 TABLET, FILM COATED ORAL 2 TIMES DAILY
Qty: 6 TABLET | Refills: 0 | Status: SHIPPED | OUTPATIENT
Start: 2017-07-26 | End: 2017-08-18

## 2017-07-31 ENCOUNTER — TELEPHONE (OUTPATIENT)
Dept: UROLOGY | Facility: CLINIC | Age: 62
End: 2017-07-31

## 2017-07-31 DIAGNOSIS — N13.8 HYPERTROPHY OF PROSTATE WITH URINARY OBSTRUCTION: Primary | ICD-10-CM

## 2017-07-31 DIAGNOSIS — N40.1 HYPERTROPHY OF PROSTATE WITH URINARY OBSTRUCTION: Primary | ICD-10-CM

## 2017-07-31 RX ORDER — SULFAMETHOXAZOLE/TRIMETHOPRIM 800-160 MG
1 TABLET ORAL 2 TIMES DAILY
Qty: 6 TABLET | Refills: 0 | Status: SHIPPED | OUTPATIENT
Start: 2017-08-03 | End: 2017-08-06

## 2017-07-31 NOTE — TELEPHONE ENCOUNTER
Called patient, left VM stating new RX for bactrim sent to his pharmacy [facility] [address]

## 2017-08-01 DIAGNOSIS — R35.0 URINARY FREQUENCY: ICD-10-CM

## 2017-08-01 RX ORDER — ALFUZOSIN HYDROCHLORIDE 10 MG/1
TABLET, EXTENDED RELEASE ORAL
Qty: 30 TABLET | Refills: 3 | Status: SHIPPED | OUTPATIENT
Start: 2017-08-01 | End: 2018-09-17

## 2017-08-04 ENCOUNTER — OFFICE VISIT (OUTPATIENT)
Dept: UROLOGY | Facility: CLINIC | Age: 62
End: 2017-08-04

## 2017-08-04 ENCOUNTER — RECORDS - HEALTHEAST (OUTPATIENT)
Dept: ADMINISTRATIVE | Facility: OTHER | Age: 62
End: 2017-08-04

## 2017-08-04 ENCOUNTER — TRANSFERRED RECORDS (OUTPATIENT)
Dept: HEALTH INFORMATION MANAGEMENT | Facility: CLINIC | Age: 62
End: 2017-08-04

## 2017-08-04 VITALS
SYSTOLIC BLOOD PRESSURE: 124 MMHG | WEIGHT: 174 LBS | DIASTOLIC BLOOD PRESSURE: 80 MMHG | BODY MASS INDEX: 24.36 KG/M2 | HEART RATE: 65 BPM | HEIGHT: 71 IN

## 2017-08-04 DIAGNOSIS — R97.20 ELEVATED PROSTATE SPECIFIC ANTIGEN (PSA): Primary | ICD-10-CM

## 2017-08-04 ASSESSMENT — PAIN SCALES - GENERAL
PAINLEVEL: NO PAIN (0)
PAINLEVEL: NO PAIN (0)

## 2017-08-04 NOTE — PROGRESS NOTES
Indication:elevated PSa     We previously obtained an MRI of the prostate and identified all PIRADS 3-5 lesions for targeting    Target Lesion #1 left anterior apex   Target Lesion #2 left apex  Target Lesion #3 right base         PSA   Date Value Ref Range Status   03/31/2017 8.80 (H) 0 - 4 ug/L Final     Comment:     Assay Method:  Chemiluminescence using Siemens Vista analyzer   02/27/2017 8.7 (H) 0.0 - 4.5 ng/mL Final   11/16/2015 4.6 (H) 0.0 - 4.5 ng/mL Final       An enema was completed and 500 mg of Cipro was given prior to the biopsy.  After obtaining informed consent patient was placed in lateral decubitus position.  The ultrasound probe was placed in the rectum.  The prostate was numbed using ultrasound guidance with 1% lidocaine 5 mls along each nerve bundle.  The volume was measured and estimated to be 30 grams.  US images were obtained and then fused with the MRI images.  The fused images were then used to guide the biopsy of the targeted lesions with at least 2 cores taken of each lesion.  We then performed random biopsies.  12 cores were taken with 6 on each side, base, mid and apex.  The patient tolerated the procedure well.      Follow up in two weeks for prostate biopsy results

## 2017-08-04 NOTE — PATIENT INSTRUCTIONS
[facility]  Precautions Following a Prostate Biopsy    There are four conditions that you should watch for after a prostate biopsy:    1. Excessive pain  2. Bleeding irregularities (passing numerous  dime sized  clots or if your urine looks like cranberry juice)  3. Fever of 100 degrees or more  4. If you are unable to urinate        If any of these occur, call the Urology Clinic during normal business hours (M-F, 8:00-4:30) at [phone].  If you experience a problem after normal business hours, call our 24-hour phone number at [phone] and ask for the Urology Resident on call to be paged.      If you experience any discomfort following the biopsy, you may take Tylenol.  DO NOT TAKE ASPIRIN unless specified by your physician.   If the discomfort becomes severe or uncontrolled by medication, contact the Urology Clinic or Urology Resident (after normal business hours).      Do not be alarmed if you have some blood in your stool, in your urine, or ejaculate (semen).  This occurrence is normal and may last up to three (3) or four (4) days, usually intermittently.  Blood in the ejaculate (semen) may last several weeks, up to about a dozen ejaculations.  The blood in your ejaculate may appear as brown streaks, blood tinged, and immediately following a biopsy, it may appear bright red.      If you run a fever above 100 degrees, call the Urology Clinic or Urology Resident (after normal business hours) immediately.  If you are unable to reach your physician or the Resident on call, go to the nearest emergency room.  Explain that you have had a transrectal biopsy of your prostate and what problems you are experiencing.        You should attempt to urinate following your biopsy before you leave the clinic.  If you are unable to urinate four (4) to six (6) hours after you leave the clinic, you will need to contact the Urology Clinic or the Resident on call.  If you are unable to reach  your physician or the Resident on call, go to the nearest emergency room.            If you have any questions or concerns after your biopsy, feel free to contact the Urology Clinic at [phone] during M-F, 8:00-5pm business hours.  If you need to speak with someone after normal business hours, call [phone] and ask for the Resident on call to be paged.

## 2017-08-04 NOTE — NURSING NOTE
"Chief Complaint   Patient presents with     Prostate Biopsy     Elevated PSA and an abnormal MRI     Verified with pt that he has been off aspirin products and blood thinners, started Cipro 500 mg BID x 3 days 8/3/17, and did a fleets enema this morning.    The following medication was given:     MEDICATION: Gentamicin  ROUTE: IM  SITE: RUQ - Gluteus  DOSE: 80 mg  LOT #: [id number]  :  [employer]  EXPIRATION DATE:  08/18  NDC#: 63581-498-9 [id number]     Pt tolerated injection well.        Blood pressure 124/80, pulse 65, height 1.797 m (5' 10.75\"), weight 78.9 kg (174 lb). Body mass index is 24.44 kg/(m^2).    Patient Active Problem List   Diagnosis     Health Care Home     Human immunodeficiency virus (HIV) disease (H)     Mechanical low back pain     Microscopic hematuria     Hypertrophy of prostate with urinary obstruction     S/P colonoscopy     Anemia, iron deficiency     Right lower quadrant abdominal mass     Malignant neoplasm of hepatic flexure (H)     Hemorrhoids, unspecified hemorrhoid type     Slow transit constipation       Allergies   Allergen Reactions     Stribild [Xvpnjhr-Uhgmhoo-Ecytronb-Tenof] Itching     Ibuprofen Itching     Raltegravir Unknown     Tamsulosin Other (See Comments)     Headache       Current Outpatient Prescriptions   Medication Sig Dispense Refill     alfuzosin (UROXATRAL) 10 MG 24 hr tablet TAKE 1 TABLET BY MOUTH EVERY DAY 30 tablet 3     sulfamethoxazole-trimethoprim (BACTRIM DS/SEPTRA DS) 800-160 MG per tablet Take 1 tablet by mouth 2 times daily for 3 days To be taken the day before, the day of, and the day after your biopsy. 6 tablet 0     ciprofloxacin (CIPRO) 500 MG tablet Take 1 tablet (500 mg) by mouth 2 times daily 6 tablet 0     primaquine 26.3 MG tablet Take 30 mg Base (TWO TABLETS) daily for 14 days.DO NOT begin this medication until you have finished your Malarone. START this medication on 5/15/17.       polyethylene glycol (MIRALAX) powder Take 17 g (1 capful) " by mouth 2 times daily as needed for constipation 510 g 1     Propylene Glycol-Glycerin ([employer] ARTIFICIAL TEARS) 1-0.3 % SOLN Apply 1 drop to eye 2 times daily as needed 30 mL 0     VITAMIN D, CHOLECALCIFEROL, PO Take by mouth daily       naproxen (NAPROSYN) 375 MG tablet Take 1 tablet (375 mg) by mouth 2 times daily as needed for moderate pain (take with food) 30 tablet 0     sodium chloride (OCEAN) 0.65 % nasal spray Spray 1 spray into both nostrils daily as needed for congestion 1 Bottle 3     doxycycline Monohydrate 100 MG CAPS Start 2 days prior to travel to malaria area, daily while there and for 4 weeks after departure 60 capsule 0     ciprofloxacin (CIPRO) 500 MG tablet Take 1 tablet (500 mg) by mouth 2 times daily 6 tablet 0     loperamide (IMODIUM A-D) 2 MG tablet Take 2 tabs (4 mg) after first loose stool, and then take one tab (2 mg) after each diarrheal stool.  Max of 8 tabs (16 mg) per day. 30 tablet 0     oxybutynin (DITROPAN-XL) 5 MG 24 hr tablet Take 1 tablet by mouth daily 31 tablet 3     abacavir-lamiVUDine-zidovudine (TRIZIVIR) 300-150-300 MG per tablet Take 1 tablet by mouth 2 times daily 180 tablet 3     etravirine (INTELENCE) 200 MG tablet Take 1 tablet (200 mg) by mouth 2 times daily (with meals) 180 tablet 3     docusate sodium (COLACE) 100 MG tablet Take 1 to 3 tablets daily to keep stools soft. 270 tablet 3     triamcinolone (KENALOG) 0.1 % ointment Apply sparingly to affected area three times daily for 14 days. 30 g 0     hydrocortisone (CORTAID) 1 % cream Apply topically 2 times daily 30 g 1     bisacodyl (BISACODYL LAXATIVE) 5 MG EC tablet Take 1 tablet (5 mg) by mouth daily as needed for constipation 60 tablet 1     triamcinolone (KENALOG) 0.1 % cream Apply sparingly to affected area three times daily for 14 days. 30 g 11     baclofen (LIORESAL) 10 MG tablet Take 1 tablet (10 mg) by mouth 3 times daily as needed for muscle spasms 30 tablet 0       Social History   Substance Use Topics      Smoking status: Never Smoker     Smokeless tobacco: Never Used     Alcohol use No       MANE Correa  8/4/2017  8:44 AM

## 2017-08-04 NOTE — LETTER
8/4/2017       RE: Antonio Mercado  [address]   [place] [address]     Dear Colleague,    Thank you for referring your patient, Antonio Mercado, to the [facility] at [facility]. Please see a copy of my visit note below.    Indication:elevated PSa     We previously obtained an MRI of the prostate and identified all PIRADS 3-5 lesions for targeting    Target Lesion #1 left anterior apex   Target Lesion #2 left apex  Target Lesion #3 right base   PSA   Date Value Ref Range Status   03/31/2017 8.80 (H) 0 - 4 ug/L Final     Comment:     Assay Method:  Chemiluminescence using Siemens Vista analyzer   02/27/2017 8.7 (H) 0.0 - 4.5 ng/mL Final   11/16/2015 4.6 (H) 0.0 - 4.5 ng/mL Final     An enema was completed and 500 mg of Cipro was given prior to the biopsy.  After obtaining informed consent patient was placed in lateral decubitus position.  The ultrasound probe was placed in the rectum.  The prostate was numbed using ultrasound guidance with 1% lidocaine 5 mls along each nerve bundle.  The volume was measured and estimated to be 30 grams.  US images were obtained and then fused with the MRI images.  The fused images were then used to guide the biopsy of the targeted lesions with at least 2 cores taken of each lesion.  We then performed random biopsies.  12 cores were taken with 6 on each side, base, mid and apex.  The patient tolerated the procedure well.      Follow up in two weeks for prostate biopsy results   Again, thank you for allowing me to participate in the care of your patient.      Sincerely,  Yolette Pierre MD

## 2017-08-04 NOTE — NURSING NOTE
Lidocaine 1%  Drug amount given = 10 mL  Drug amount wasted = 10 mL  NDC: 7963-9570-97  MANE Correa  8/4/2017  3:56 PM

## 2017-08-04 NOTE — NURSING NOTE
Invasive Procedure Safety Checklist:    Procedure:     Action: Complete sections and checkboxes as appropriate.    Pre-procedure:  1. Patient ID Verified with 2 identifiers (Elisa and  or MRN) : YES    2. Procedure and site verified with patient/designee (when able) : YES    3. Accurate consent documentation in medical record : YES    4. H&P (or appropriate assessment) documented in medical record : YES  H&P must be up to 30 days prior to procedure an updated within 24 hours of                 Procedure as applicable.     5. Relevant diagnostic and radiology test results appropriately labeled and displayed as applicable : YES    6. Blood products, implants, devices, and/or special equipment available for the procedure as applicable : YES    7. Procedure site(s) marked with provider initials [Exclusions: None] : NO    8. Marking not required. Reason : Yes  Procedure does not require site marking    Time Out:     Time-Out performed immediately prior to starting procedure, including verbal and active participation of all team members addressing: YES    1. Correct patient identity.  2. Confirmed that the correct side and site are marked.  3. An accurate procedure to be done.  4. Agreement on the procedure to be done.  5. Correct patient position.  6. Relevant images and results are properly labeled and appropriately displayed.  7. The need to administer antibiotics or fluids for irrigation purposes during the procedure as applicable.  8. Safety precautions based on patient history or medication use.    During Procedure: Verification of correct person, site, and procedure occurs any time the responsibility for care of the patient is transferred to another member of the care team.

## 2017-08-04 NOTE — MR AVS SNAPSHOT
After Visit Summary   8/4/2017    Antonio Mercado    MRN: [id number]           Patient Information     Date Of Birth          1955        Visit Information        Provider Department      8/4/2017 8:30 AM Yolette Pierre MD [facility] and [facility] Prostate and Urologic C[facility]        Today's Diagnoses     Elevated prostate specific antigen (PSA)    -  1      Care Instructions        [facility] Urologic C[facility]  Precautions Following a Prostate Biopsy    There are four conditions that you should watch for after a prostate biopsy:    1. Excessive pain  2. Bleeding irregularities (passing numerous  dime sized  clots or if your urine looks like cranberry juice)  3. Fever of 100 degrees or more  4. If you are unable to urinate        If any of these occur, call the Urology Clinic during normal business hours (M-F, 8:00-4:30) at [phone].  If you experience a problem after normal business hours, call our 24-hour phone number at [phone] and ask for the Urology Resident on call to be paged.      If you experience any discomfort following the biopsy, you may take Tylenol.  DO NOT TAKE ASPIRIN unless specified by your physician.   If the discomfort becomes severe or uncontrolled by medication, contact the Urology Clinic or Urology Resident (after normal business hours).      Do not be alarmed if you have some blood in your stool, in your urine, or ejaculate (semen).  This occurrence is normal and may last up to three (3) or four (4) days, usually intermittently.  Blood in the ejaculate (semen) may last several weeks, up to about a dozen ejaculations.  The blood in your ejaculate may appear as brown streaks, blood tinged, and immediately following a biopsy, it may appear bright red.      If you run a fever above 100 degrees, call the Urology Clinic or Urology Resident (after normal business hours) immediately.  If you are unable to reach your physician or the Resident  on call, go to the nearest emergency room.  Explain that you have had a transrectal biopsy of your prostate and what problems you are experiencing.        You should attempt to urinate following your biopsy before you leave the clinic.  If you are unable to urinate four (4) to six (6) hours after you leave the clinic, you will need to contact the Urology Clinic or the Resident on call.  If you are unable to reach your physician or the Resident on call, go to the nearest emergency room.            If you have any questions or concerns after your biopsy, feel free to contact the Urology Clinic at [phone] during M-F, 8:00-5pm business hours.  If you need to speak with someone after normal business hours, call [phone] and ask for the Resident on call to be paged.            Follow-ups after your visit        Your next 10 appointments already scheduled     Aug 18, 2017 10:45 AM CDT   (Arrive by 10:30 AM)   Return Visit with Yolette Pierre MD   [facility] Urology and [facility] for Prostate and Urologic Cancers ([facility] [place])    [address] [place] [address] [phone]              Who to contact     Please call your clinic at [phone] to:    Ask questions about your health    Make or cancel appointments    Discuss your medicines    Learn about your test results    Speak to your doctor   If you have compliments or concerns about an experience at your clinic, or if you wish to file a complaint, please contact [facility] Patient Relations at [phone] or email us at [email or web address]         Additional Information About Your Visit        MyChart Information     [employer] gives you secure access to your electronic health record. If you see a primary care provider, you can also send messages to your care team and make appointments. If you have questions, please call your primary care clinic.  If you do not have a  "primary care provider, please call [phone] and they will assist you.      Amuso is an electronic gateway that provides easy, online access to your medical records. With [employer]uso, you can request a clinic appointment, read your test results, renew a prescription or communicate with your care team.     To access your existing account, please contact your [facility] or call [phone] for assistance.        Care EveryWhere ID     This is your Care EveryWhere ID. This could be used by other organizations to access your [facility] medical records  [id number]        Your Vitals Were     Pulse Height BMI (Body Mass Index)             65 1.797 m (5' 10.75\") 24.44 kg/m2          Blood Pressure from Last 3 Encounters:   08/04/17 124/80   06/30/17 132/87   05/24/17 126/80    Weight from Last 3 Encounters:   08/04/17 78.9 kg (174 lb)   06/30/17 77.1 kg (170 lb)   05/24/17 77.1 kg (170 lb)              We Performed the Following     Surgical pathology exam        Primary Care Provider Office Phone # Fax #    Salas Salgado  [phone]       [place] [address]        Equal Access to Services     RYAN CASTRO AH: Hadii yonny ku hadasho Soomaali, waaxda luqadaha, qaybta kaalmada adeegyada, aileen field. So [facility] [phone].    ATENCIÓN: Si habla español, tiene a day disposición servicios gratuitos de asistencia lingüística. Llame al [phone].    We comply with applicable federal civil rights laws and [place] laws. We do not discriminate on the basis of race, color, national origin, age, disability sex, sexual orientation or gender identity.            Thank you!     Thank you for choosing [facility] UROLOGY AND [facility] FOR PROSTATE AND UROLOGIC CANCERS  for your care. Our goal is always to provide you with excellent care. Hearing back from our patients is one way we can continue to improve our services. " Please take a few minutes to complete the written survey that you may receive in the mail after your visit with us. Thank you!             Your Updated Medication List - Protect others around you: Learn how to safely use, store and throw away your medicines at www.disposemymeds.org.          This list is accurate as of: 8/4/17 10:16 AM.  Always use your most recent med list.                   Brand Name Dispense Instructions for use Diagnosis    abacavir-lamiVUDine-zidovudine 300-150-300 MG per tablet    TRIZIVIR    180 tablet    Take 1 tablet by mouth 2 times daily    Human immunodeficiency virus (HIV) disease (H)       alfuzosin 10 MG 24 hr tablet    UROXATRAL    30 tablet    TAKE 1 TABLET BY MOUTH EVERY DAY    Urinary frequency       baclofen 10 MG tablet    LIORESAL    30 tablet    Take 1 tablet (10 mg) by mouth 3 times daily as needed for muscle spasms    Back pain, unspecified back location, unspecified back pain laterality, unspecified chronicity       bisacodyl 5 MG EC tablet    BISACODYL LAXATIVE    60 tablet    Take 1 tablet (5 mg) by mouth daily as needed for constipation    Constipation, unspecified constipation type       * ciprofloxacin 500 MG tablet    CIPRO    6 tablet    Take 1 tablet (500 mg) by mouth 2 times daily    Travel advice encounter       * ciprofloxacin 500 MG tablet    CIPRO    6 tablet    Take 1 tablet (500 mg) by mouth 2 times daily    Prostate cancer (H)       docusate sodium 100 MG tablet    COLACE    270 tablet    Take 1 to 3 tablets daily to keep stools soft.    Constipation, unspecified constipation type       doxycycline Monohydrate 100 MG Caps     60 capsule    Start 2 days prior to travel to malaria area, daily while there and for 4 weeks after departure    Travel advice encounter       etravirine 200 MG tablet    INTELENCE    180 tablet    Take 1 tablet (200 mg) by mouth 2 times daily (with meals)    Human immunodeficiency virus (HIV) disease (H)       hydrocortisone 1 % cream     CORTAID    30 g    Apply topically 2 times daily    Internal hemorrhoids       loperamide 2 MG tablet    IMODIUM A-D    30 tablet    Take 2 tabs (4 mg) after first loose stool, and then take one tab (2 mg) after each diarrheal stool.  Max of 8 tabs (16 mg) per day.    Travel advice encounter       naproxen 375 MG tablet    NAPROSYN    30 tablet    Take 1 tablet (375 mg) by mouth 2 times daily as needed for moderate pain (take with food)    Tension headache       oxybutynin 5 MG 24 hr tablet    DITROPAN-XL    31 tablet    Take 1 tablet by mouth daily    Bladder outlet obstruction       polyethylene glycol powder    MIRALAX    510 g    Take 17 g (1 capful) by mouth 2 times daily as needed for constipation    Constipation, unspecified constipation type       primaquine 26.3 MG tablet      Take 30 mg Base (TWO TABLETS) daily for 14 days.DO NOT begin this medication until you have finished your Malarone. START this medication on 5/15/17.        Propylene Glycol-Glycerin 1-0.3 % Soln    [employer] ARTIFICIAL TEARS    30 mL    Apply 1 drop to eye 2 times daily as needed    Dry eyes       sodium chloride 0.65 % nasal spray    OCEAN    1 Bottle    Spray 1 spray into both nostrils daily as needed for congestion    Stuffy and runny nose       sulfamethoxazole-trimethoprim 800-160 MG per tablet    BACTRIM DS/SEPTRA DS    6 tablet    Take 1 tablet by mouth 2 times daily for 3 days To be taken the day before, the day of, and the day after your biopsy.    Hypertrophy of prostate with urinary obstruction       * triamcinolone 0.1 % cream    KENALOG    30 g    Apply sparingly to affected area three times daily for 14 days.    Hemorrhoids, unspecified hemorrhoid type       * triamcinolone 0.1 % ointment    KENALOG    30 g    Apply sparingly to affected area three times daily for 14 days.    Rash       VITAMIN D (CHOLECALCIFEROL) PO      Take by mouth daily        * Notice:  This list has 4 medication(s) that are the same as other  medications prescribed for you. Read the directions carefully, and ask your doctor or other care provider to review them with you.

## 2017-08-07 ENCOUNTER — PRE VISIT (OUTPATIENT)
Dept: UROLOGY | Facility: CLINIC | Age: 62
End: 2017-08-07

## 2017-08-08 LAB — COPATH REPORT: NORMAL

## 2017-08-10 ENCOUNTER — OFFICE VISIT (OUTPATIENT)
Dept: FAMILY MEDICINE | Facility: CLINIC | Age: 62
End: 2017-08-10

## 2017-08-10 VITALS
TEMPERATURE: 98.2 F | SYSTOLIC BLOOD PRESSURE: 114 MMHG | BODY MASS INDEX: 24.6 KG/M2 | DIASTOLIC BLOOD PRESSURE: 74 MMHG | HEIGHT: 70 IN | HEART RATE: 70 BPM | WEIGHT: 171.8 LBS

## 2017-08-10 DIAGNOSIS — K13.79 ACUTE PAIN OF MOUTH: Primary | ICD-10-CM

## 2017-08-10 NOTE — MR AVS SNAPSHOT
After Visit Summary   8/10/2017    Antonio Mercado    MRN: [id number]           Patient Information     Date Of Birth          1955        Visit Information        Provider Department      8/10/2017 1:50 PM Cristina Meyer MD [facility]        Today's Diagnoses     Acute pain of mouth    -  1      Care Instructions    To do list:   - Use magic mouthwash as needed every 4 hours. Swish and spit  - If lesions or ulcers appear and/or if symptoms are not improved within 1 week, please return to clinic          Follow-ups after your visit        Your next 10 appointments already scheduled     Aug 18, 2017 10:45 AM CDT   (Arrive by 10:30 AM)   Return Visit with Yolette Pierre MD   [facility] Urology and [facility] for Prostate and Urologic Cancers ([facility])    [address] [place] [address] [phone]              Who to contact     Please call your clinic at [phone] to:    Ask questions about your health    Make or cancel appointments    Discuss your medicines    Learn about your test results    Speak to your doctor   If you have compliments or concerns about an experience at your clinic, or if you wish to file a complaint, please contact [facility] Patient Relations at [phone] or email us at [email or web address]         Additional Information About Your Visit        MyChart Information     [employer] gives you secure access to your electronic health record. If you see a primary care provider, you can also send messages to your care team and make appointments. If you have questions, please call your primary care clinic.  If you do not have a primary care provider, please call [phone] and they will assist you.      [employer] is an electronic gateway that provides easy, online access to your medical records. With [employer], you can request a clinic appointment, read your test results, renew  "a prescription or communicate with your care team.     To access your existing account, please contact your [facility] or call [phone] for assistance.        Care EveryWhere ID     This is your Care EveryWhere ID. This could be used by other organizations to access your [facility] medical records  [id number]        Your Vitals Were     Pulse Temperature Height BMI (Body Mass Index)          70 98.2  F (36.8  C) (Oral) 5' 10.47\" (179 cm) 24.32 kg/m2         Blood Pressure from Last 3 Encounters:   08/10/17 114/74   08/04/17 124/80   06/30/17 132/87    Weight from Last 3 Encounters:   08/10/17 171 lb 12.8 oz (77.9 kg)   08/04/17 174 lb (78.9 kg)   06/30/17 170 lb (77.1 kg)              Today, you had the following     No orders found for display         Today's Medication Changes          These changes are accurate as of: 8/10/17  1:52 PM.  If you have any questions, ask your nurse or doctor.               Start taking these medicines.        Dose/Directions    MAGIC MOUTHWASH (ENTER INGREDIENTS IN COMMENTS)   Used for:  Acute pain of mouth   Started by:  Cristina Meyer MD        Dose:  5 mL   Take 5 mLs by mouth every 4 hours as needed   Quantity:  300 mL   Refills:  1            Where to get your medicines      Some of these will need a paper prescription and others can be bought over the counter.  Ask your nurse if you have questions.     Bring a paper prescription for each of these medications     MAGIC MOUTHWASH (ENTER INGREDIENTS IN COMMENTS)                Primary Care Provider Office Phone # Fax #    Salas Jose Salgado [phone]       [address] [address]        Equal Access to Services     RYAN CASTRO : Angela Valladares, kinsey juan, qaybaileen baker. So [facility] [phone].    ATENCIÓN: Si habla español, tiene a day disposición servicios " kelby de asistencia lingüística. Marleen humphrey [phone].    We comply with applicable federal civil rights laws and [place] laws. We do not discriminate on the basis of race, color, national origin, age, disability sex, sexual orientation or gender identity.            Thank you!     Thank you for choosing [facility]  for your care. Our goal is always to provide you with excellent care. Hearing back from our patients is one way we can continue to improve our services. Please take a few minutes to complete the written survey that you may receive in the mail after your visit with us. Thank you!             Your Updated Medication List - Protect others around you: Learn how to safely use, store and throw away your medicines at www.disposemymeds.org.          This list is accurate as of: 8/10/17  1:52 PM.  Always use your most recent med list.                   Brand Name Dispense Instructions for use Diagnosis    abacavir-lamiVUDine-zidovudine 300-150-300 MG per tablet    TRIZIVIR    180 tablet    Take 1 tablet by mouth 2 times daily    Human immunodeficiency virus (HIV) disease (H)       alfuzosin 10 MG 24 hr tablet    UROXATRAL    30 tablet    TAKE 1 TABLET BY MOUTH EVERY DAY    Urinary frequency       baclofen 10 MG tablet    LIORESAL    30 tablet    Take 1 tablet (10 mg) by mouth 3 times daily as needed for muscle spasms    Back pain, unspecified back location, unspecified back pain laterality, unspecified chronicity       bisacodyl 5 MG EC tablet    BISACODYL LAXATIVE    60 tablet    Take 1 tablet (5 mg) by mouth daily as needed for constipation    Constipation, unspecified constipation type       * ciprofloxacin 500 MG tablet    CIPRO    6 tablet    Take 1 tablet (500 mg) by mouth 2 times daily    Travel advice encounter       * ciprofloxacin 500 MG tablet    CIPRO    6 tablet    Take 1 tablet (500 mg) by mouth 2 times daily    Prostate cancer (H)       docusate sodium 100 MG tablet    COLACE    270  tablet    Take 1 to 3 tablets daily to keep stools soft.    Constipation, unspecified constipation type       doxycycline Monohydrate 100 MG Caps     60 capsule    Start 2 days prior to travel to malaria area, daily while there and for 4 weeks after departure    Travel advice encounter       etravirine 200 MG tablet    INTELENCE    180 tablet    Take 1 tablet (200 mg) by mouth 2 times daily (with meals)    Human immunodeficiency virus (HIV) disease (H)       hydrocortisone 1 % cream    CORTAID    30 g    Apply topically 2 times daily    Internal hemorrhoids       loperamide 2 MG tablet    IMODIUM A-D    30 tablet    Take 2 tabs (4 mg) after first loose stool, and then take one tab (2 mg) after each diarrheal stool.  Max of 8 tabs (16 mg) per day.    Travel advice encounter       MAGIC MOUTHWASH (ENTER INGREDIENTS IN COMMENTS)     300 mL    Take 5 mLs by mouth every 4 hours as needed    Acute pain of mouth       naproxen 375 MG tablet    NAPROSYN    30 tablet    Take 1 tablet (375 mg) by mouth 2 times daily as needed for moderate pain (take with food)    Tension headache       oxybutynin 5 MG 24 hr tablet    DITROPAN-XL    31 tablet    Take 1 tablet by mouth daily    Bladder outlet obstruction       polyethylene glycol powder    MIRALAX    510 g    Take 17 g (1 capful) by mouth 2 times daily as needed for constipation    Constipation, unspecified constipation type       primaquine 26.3 MG tablet      Take 30 mg Base (TWO TABLETS) daily for 14 days.DO NOT begin this medication until you have finished your Malarone. START this medication on 5/15/17.        Propylene Glycol-Glycerin 1-0.3 % Soln    [employer] ARTIFICIAL TEARS    30 mL    Apply 1 drop to eye 2 times daily as needed    Dry eyes       sodium chloride 0.65 % nasal spray    OCEAN    1 Bottle    Spray 1 spray into both nostrils daily as needed for congestion    Stuffy and runny nose       * triamcinolone 0.1 % cream    KENALOG    30 g    Apply sparingly to affected  area three times daily for 14 days.    Hemorrhoids, unspecified hemorrhoid type       * triamcinolone 0.1 % ointment    KENALOG    30 g    Apply sparingly to affected area three times daily for 14 days.    Rash       VITAMIN D (CHOLECALCIFEROL) PO      Take by mouth daily        * Notice:  This list has 4 medication(s) that are the same as other medications prescribed for you. Read the directions carefully, and ask your doctor or other care provider to review them with you.

## 2017-08-10 NOTE — PROGRESS NOTES
Preceptor attestation:  Patient seen and discussed with the resident. Assessment and plan reviewed with resident and agreed upon.  Supervising physician: Roger Roman  [facility]

## 2017-08-10 NOTE — PROGRESS NOTES
"       HPI:       Antonio Mercado is a 61 year old  male with a significant past medical history of HIV on ARVT who presents for the new concern of mouth pain for 1 week. Patient has had a burning pain on his tongue and the inside of his lower lip . It is severe enough that he is having trouble sleeping. It is worse when he eats or drink and he has been unable to drink very cold or hot beverages. He recalls possibly biting the inside of his left cheek at some point recently, but does not recall any other injury to the area. Denies any fever, chills, headache, sore throat, trouble with swallowing, vision changes or pain, or a rash anywhere else.     Patients states he has never had anything like this before. He has had cold sores and this pain does not feel like them and is much worse. He has tried OTC orajel, which made the sensation worse. He has not used any tylenol or ibuprofen. He states he saw his HIV doctor about 1 month ago and all of his counts were \"normal\". He has been taking all of his medications as prescribed and has not noted any other side effects.          PMHX:     Patient Active Problem List   Diagnosis     Health Care Home     Human immunodeficiency virus (HIV) disease (H)     Mechanical low back pain     Microscopic hematuria     Hypertrophy of prostate with urinary obstruction     S/P colonoscopy     Anemia, iron deficiency     Right lower quadrant abdominal mass     Malignant neoplasm of hepatic flexure (H)     Hemorrhoids, unspecified hemorrhoid type     Slow transit constipation       Current Outpatient Prescriptions   Medication Sig Dispense Refill     MAGIC MOUTHWASH, ENTER INGREDIENTS IN COMMENTS, Take 5 mLs by mouth every 4 hours as needed 300 mL 1     alfuzosin (UROXATRAL) 10 MG 24 hr tablet TAKE 1 TABLET BY MOUTH EVERY DAY 30 tablet 3     ciprofloxacin (CIPRO) 500 MG tablet Take 1 tablet (500 mg) by mouth 2 times daily 6 tablet 0     primaquine 26.3 MG tablet Take 30 mg Base (TWO " TABLETS) daily for 14 days.DO NOT begin this medication until you have finished your Malarone. START this medication on 5/15/17.       polyethylene glycol (MIRALAX) powder Take 17 g (1 capful) by mouth 2 times daily as needed for constipation 510 g 1     Propylene Glycol-Glycerin (CVS ARTIFICIAL TEARS) 1-0.3 % SOLN Apply 1 drop to eye 2 times daily as needed 30 mL 0     VITAMIN D, CHOLECALCIFEROL, PO Take by mouth daily       naproxen (NAPROSYN) 375 MG tablet Take 1 tablet (375 mg) by mouth 2 times daily as needed for moderate pain (take with food) 30 tablet 0     sodium chloride (OCEAN) 0.65 % nasal spray Spray 1 spray into both nostrils daily as needed for congestion 1 Bottle 3     doxycycline Monohydrate 100 MG CAPS Start 2 days prior to travel to malaria area, daily while there and for 4 weeks after departure 60 capsule 0     ciprofloxacin (CIPRO) 500 MG tablet Take 1 tablet (500 mg) by mouth 2 times daily 6 tablet 0     loperamide (IMODIUM A-D) 2 MG tablet Take 2 tabs (4 mg) after first loose stool, and then take one tab (2 mg) after each diarrheal stool.  Max of 8 tabs (16 mg) per day. 30 tablet 0     oxybutynin (DITROPAN-XL) 5 MG 24 hr tablet Take 1 tablet by mouth daily 31 tablet 3     abacavir-lamiVUDine-zidovudine (TRIZIVIR) 300-150-300 MG per tablet Take 1 tablet by mouth 2 times daily 180 tablet 3     etravirine (INTELENCE) 200 MG tablet Take 1 tablet (200 mg) by mouth 2 times daily (with meals) 180 tablet 3     docusate sodium (COLACE) 100 MG tablet Take 1 to 3 tablets daily to keep stools soft. 270 tablet 3     triamcinolone (KENALOG) 0.1 % ointment Apply sparingly to affected area three times daily for 14 days. 30 g 0     hydrocortisone (CORTAID) 1 % cream Apply topically 2 times daily 30 g 1     bisacodyl (BISACODYL LAXATIVE) 5 MG EC tablet Take 1 tablet (5 mg) by mouth daily as needed for constipation 60 tablet 1     triamcinolone (KENALOG) 0.1 % cream Apply sparingly to affected area three times  "daily for 14 days. 30 g 11     baclofen (LIORESAL) 10 MG tablet Take 1 tablet (10 mg) by mouth 3 times daily as needed for muscle spasms 30 tablet 0       Allergies   Allergen Reactions     Stribild [Ohjqkcv-Poejhdg-Bqpcrmip-Tenof] Itching     Raltegravir Unknown     Tamsulosin Other (See Comments)     Headache       No results found for this or any previous visit (from the past 24 hour(s)).         Review of Systems:   C: negative for fever, chills, change in weight  INTEGUMENTARY/SKIN: negative for rashes or lesions anywhere on the body  EYE: negative for vision changes or irritation  ENT/MOUTH: negatvie for bleeding gums, nasal congestion, postnasal drainage, sinus pressure, sore throat, and tooth pain. Patient has intact sensation to tongue as well as taste and has been able to eat and drink sufficiently  R: negative for significant cough or SOB  CV: negative for chest pain and palpitations  GI: negative for nausea, abdominal pain, and heartburn          Physical Exam:     Vitals:    08/10/17 1313   BP: 114/74   BP Location: Left arm   Patient Position: Chair   Pulse: 70   Temp: 98.2  F (36.8  C)   TempSrc: Oral   Weight: 171 lb 12.8 oz (77.9 kg)   Height: 5' 10.47\" (179 cm)     Body mass index is 24.32 kg/(m^2).    GENERAL APPEARANCE: well-appearing, alert and showing some discomfort  HENT: nose and mouth without ulcers or lesions, oropharynx clear and oral mucous membranes moist. No erythema or white plaques present. No irritation of the gums or bleeding evident. No evidence of injury of the oral cavity or edema of the tissue.   NECK: no adenopathy, no asymmetry, masses, or scars  SKIN: no suspicious lesions or rashes    Assessment and Plan     (K13.79) Acute pain of mouth  (primary encounter diagnosis)  Comment: Patients mouth pain for one week could potentially be oral thrush, though this is not overtly evident on physical exam. Since his pain is all over on both the tongue and inner lower lip it is unlikely " to be aphthous stomatitis or herpes simplex cold sores. Patient states his HIV is controlled and that his counts are normal, do not suspect immunodeficiency-related illness. He also started two new medications within the last month, ciprofloxacin and alfuzosin. Neither has well-known side effects of mouth pain, so an adverse reactions to these is a possibility but less likely.     Plan: MAGIC MOUTHWASH, ENTER INGREDIENTS IN COMMENTS,        - Prescription for magic mouthwash was called in to patients pharmacy. Magic mouthwash formulation of 300 mL included Benadryl, Nystatin, and Lidocaine.        - Patient to return if symptoms worse or if he develops and visual lesions or bleeding. Otherwise patient should return in a week if symptoms have not resolved for further workup. Could consider discontinuing alfuzosin also because of patients adverse reaction to other alpha-1-blockers.    Options for treatment and follow-up care were reviewed with the patient. Antonio Rogelio engaged in the decision making process and verbalized understanding of the options discussed and agreed with the final plan.      Scribe Disclosure:   This not is scribed by Linda Parra, MS3 on behalf of Dr. Cristina Meyre    The medical student acted as a scribe and the encounter documented above was performed completely by me and the documentation accurately reflects the work I have performed today, Dr. Cristina Meyer MD

## 2017-08-10 NOTE — PATIENT INSTRUCTIONS
To do list:   - Use magic mouthwash as needed every 4 hours. Swish and spit  - If lesions or ulcers appear and/or if symptoms are not improved within 1 week, please return to clinic

## 2017-08-18 ENCOUNTER — OFFICE VISIT (OUTPATIENT)
Dept: UROLOGY | Facility: CLINIC | Age: 62
End: 2017-08-18

## 2017-08-18 ENCOUNTER — RECORDS - HEALTHEAST (OUTPATIENT)
Dept: ADMINISTRATIVE | Facility: OTHER | Age: 62
End: 2017-08-18

## 2017-08-18 VITALS
HEIGHT: 70 IN | WEIGHT: 173.1 LBS | DIASTOLIC BLOOD PRESSURE: 80 MMHG | HEART RATE: 73 BPM | BODY MASS INDEX: 24.78 KG/M2 | SYSTOLIC BLOOD PRESSURE: 117 MMHG

## 2017-08-18 DIAGNOSIS — C61 MALIGNANT NEOPLASM OF PROSTATE (H): Primary | ICD-10-CM

## 2017-08-18 ASSESSMENT — PAIN SCALES - GENERAL: PAINLEVEL: NO PAIN (0)

## 2017-08-18 NOTE — PROGRESS NOTES
UROLOGIC DIAGNOSES:       CURRENT INTERVENTIONS:       HISTORY:   Patient presents with complaints of dysuria at the start of urination and lower urinary tract symptoms.      Patient describes frequency, nocturia 3-4x  Denies urgency  Patient notes burning/dysuria at the start of urination   Noted to have microhematuria         He was given rx for alfuzosin and started taking it (though he had some questions about its use).   Patient notes nocturia x3 and continue frequency (though it has improved).   Patient's repeat PSA was 8.8.     He is currently s/p prostate biopsy, that showed six cores of small volume Sandra 3+3 (of 16 cores).   We discussed prostate cancer, in particular sandra 3+3. Discussed ASDI vs RALP vs EBRT. Also discussed genomic testing and repeat bx for ASDI.             PAST MEDICAL HISTORY:   Past Medical History:   Diagnosis Date     Heel pain      History of blood transfusion      HIV (human immunodeficiency virus infection) (H)      LBP (low back pain)      Malignant neoplasm of hepatic flexure (H) 2/4/2016     Trigger finger        PAST SURGICAL HISTORY:   Past Surgical History:   Procedure Laterality Date     GI SURGERY         FAMILY HISTORY:   Family History   Problem Relation Age of Onset     C.A.D. No family hx of      DIABETES No family hx of      Coronary Artery Disease No family hx of      Hypertension No family hx of      Hyperlipidemia No family hx of      Breast Cancer No family hx of      Cancer - colorectal No family hx of      Ovarian Cancer No family hx of      Prostate Cancer No family hx of      Depression/Anxiety No family hx of      CEREBROVASCULAR DISEASE No family hx of      Anesthesia Reaction No family hx of      Thyroid Disease No family hx of      Asthma No family hx of      OSTEOPOROSIS No family hx of      Chemical Addiction No family hx of      Known Genetic Syndrome No family hx of        SOCIAL HISTORY:   Social History   Substance Use Topics     Smoking status:  "Never Smoker     Smokeless tobacco: Never Used     Alcohol use No       Current Outpatient Prescriptions   Medication     MAGIC MOUTHWASH, ENTER INGREDIENTS IN COMMENTS,     alfuzosin (UROXATRAL) 10 MG 24 hr tablet     primaquine 26.3 MG tablet     polyethylene glycol (MIRALAX) powder     Propylene Glycol-Glycerin (CVS ARTIFICIAL TEARS) 1-0.3 % SOLN     VITAMIN D, CHOLECALCIFEROL, PO     naproxen (NAPROSYN) 375 MG tablet     sodium chloride (OCEAN) 0.65 % nasal spray     doxycycline Monohydrate 100 MG CAPS     loperamide (IMODIUM A-D) 2 MG tablet     oxybutynin (DITROPAN-XL) 5 MG 24 hr tablet     abacavir-lamiVUDine-zidovudine (TRIZIVIR) 300-150-300 MG per tablet     etravirine (INTELENCE) 200 MG tablet     docusate sodium (COLACE) 100 MG tablet     triamcinolone (KENALOG) 0.1 % ointment     hydrocortisone (CORTAID) 1 % cream     bisacodyl (BISACODYL LAXATIVE) 5 MG EC tablet     triamcinolone (KENALOG) 0.1 % cream     baclofen (LIORESAL) 10 MG tablet     No current facility-administered medications for this visit.          PHYSICAL EXAM:    /80  Pulse 73  Ht 1.79 m (5' 10.47\")  Wt 78.5 kg (173 lb 1.6 oz)  BMI 24.51 kg/m2    HEENT: Normocephalic and atraumatic   Cardiac: Not done  Back/Flank: Not done  CNS/PNS: Not done  Respiratory: Normal non-labored breathing  Abdomen: Soft nontender and nondistended  Peripheral Vascular: Not done  Mental Status: Not done    Penis: Not done  Scrotal Skin: Not done  Testicles: Not done  Epididymis: Not done  Digital Rectal Exam:     Cystoscopy: Not done    Imaging: None    Urinalysis: UA RESULTS:  Recent Labs   Lab Test  05/09/17   1058  05/09/17   1032  03/27/17   1711   COLOR   --    --   Straw   URINEGLC   --   Negative  Negative   URINEKETONE   --   Negative   --    SG   --   1.025   --    URINEPH   --   5.5  6.5   NITRITE   --   Negative   --    RBCU  <2   --    --    WBCU  2-5   --    --        PSA: 8.8    Post Void Residual:     Other labs: None " today      IMPRESSION:  60 y/o M with lower urinary tract symptoms improved somewhat with alfuzosin, elevated PSA     PLAN:  Will send pathology specimen for Decipher testing   Repeat bx in four months   Will consider ASDI pending above results as well as pending serial PSA (current PSA is 8.8)         Total Time: 15 minutes                                       Total in Consultation: greater than 50%

## 2017-08-18 NOTE — MR AVS SNAPSHOT
After Visit Summary   8/18/2017    Antonio Mercado    MRN: [id number]           Patient Information     Date Of Birth          1955        Visit Information        Provider Department      8/18/2017 10:45 AM Yolette Pierre MD [facility] Urologic C[facility]        Care Instructions    Please follow up with  in 4 months with Biopsy.        [facility]rologic C[facility]  Preparation for Prostate Ultrasound and Biopsy    You have been scheduled for a prostate ultrasound with biopsies. A lubricated probe will be inserted into your rectum by your doctor. This equipment uses sound waves to produce an image or picture of the inside of the prostate gland. They will be able to measure the size of your prostate, look for any abnormalities in anatomy, and target any areas that may look suspicious for cancer before taking the samples (biopsies).  During this procedure the prostate will be injected with a numbing medication. This medication will not make you sleepy nor affect your ability to drive.    How to Prepare for the Procedure      On the day of the procedure eat and drink normally. Please do not fast or skip meals on the day of your procedure.      Please bring a list of your current medications to your appointment and take all regular medications as normal.      Do not take any of the following medications 7 days before your procedure:  - Anacin  - Bufferin  - Excedrin  - Ibuprofen  - Ecotrin   - Any other aspirin based products.   - Motrin  - Naprosyn  - Feldene  - Plavix  - Any other anti-inflammatory   medications      If you are taking any anticoagulation medications such as Coumadin, Jantoven, Warfarin, special instructions will need to be discussed.      You will be given an antibiotic the day of the procedure in the clinic.  *If patient had ? 2 prostate biopsies in the last 2 years, Gentamicin 80mg IM and Cipro 500 mg will be  administered in the clinic prior to procedure*      You will need to purchase one Fleets enema (or equivalent).  This can be purchased at any pharmacy or grocery store and will be found in the laxative section. We ask that you give the enema to yourself approximately 2 hours before your appointment time.       The procedure takes about 30-45 minutes and will be done in the office. After the procedure you will be sent home with instructions.    Please call Urology/[place] [facility] with any questions or concerns at [phone], press option # 3 to speak with a nurse.      As recommended by the American U[employer] Association [employer] article of 2007.          Follow-ups after your visit        Your next 10 appointments already scheduled     Dec 13, 2017  8:30 AM CST   (Arrive by 8:15 AM)   Sonography/Biopsy with Yolette Pierre MD   [facility] and [facility] for Prostate and Urologic Cancers ([facility])    [address] [place] [id number] [phone]            Dec 22, 2017  9:00 AM CST   (Arrive by 8:45 AM)   Return Visit with Yolette Pierre MD   [facility] and [facility]state and Urologic C[facility] ([facility])    [address] [place] [id number] [phone]              Who to contact     Please call your clinic at [phone] to:    Ask questions about your health    Make or cancel appointments    Discuss your medicines    Learn about your test results    Speak to your doctor   If you have compliments or concerns about an experience at your clinic, or if you wish to file a complaint, please contact [facility] Patient Relations at [phone] or email us at [email or web address]         Additional Information About Your Visit        [facility] Information     [employer] gives you secure access to your electronic health  "record. If you see a primary care provider, you can also send messages to your care team and make appointments. If you have questions, please call your primary care clinic.  If you do not have a primary care provider, please call [phone] and they will assist you.      [employer] is an electronic gateway that provides easy, online access to your medical records. With [employer], you can request a clinic appointment, read your test results, renew a prescription or communicate with your care team.     To access your existing account, please contact your [facility] or call [phone] for assistance.        Care EveryWhere ID     This is your Care EveryWhere ID. This could be used by other organizations to access your [place] medical records  [id number]        Your Vitals Were     Pulse Height BMI (Body Mass Index)             73 1.79 m (5' 10.47\") 24.51 kg/m2          Blood Pressure from Last 3 Encounters:   08/18/17 117/80   08/10/17 114/74   08/04/17 124/80    Weight from Last 3 Encounters:   08/18/17 78.5 kg (173 lb 1.6 oz)   08/10/17 77.9 kg (171 lb 12.8 oz)   08/04/17 78.9 kg (174 lb)              Today, you had the following     No orders found for display       Primary Care Provider Office Phone # Fax #    Salas Salgado [phone]       [address] [facility] [address]        Equal Access to Services     RAYN CASTRO AH: Hadii aad ku hadasho Soboubacarali, waaxda luqadaha, qaybta kaalmada adeegyada, aileen field. So [facility] [phone].    ATENCIÓN: Si habla español, tiene a day disposición servicios gratuitos de asistencia lingüística. Llame al [phone].    We comply with applicable federal civil rights laws and [place] laws. We do not discriminate on the basis of race, color, national origin, age, disability sex, sexual orientation or gender identity.            Thank you!     Thank you for choosing [facility] UROLOGY AND " [facility] FOR PROST[facility] UROLOGIC CANCERS  for your care. Our goal is always to provide you with excellent care. Hearing back from our patients is one way we can continue to improve our services. Please take a few minutes to complete the written survey that you may receive in the mail after your visit with us. Thank you!             Your Updated Medication List - Protect others around you: Learn how to safely use, store and throw away your medicines at www.disposemymeds.org.          This list is accurate as of: 8/18/17 11:42 AM.  Always use your most recent med list.                   Brand Name Dispense Instructions for use Diagnosis    abacavir-lamiVUDine-zidovudine 300-150-300 MG per tablet    TRIZIVIR    180 tablet    Take 1 tablet by mouth 2 times daily    Human immunodeficiency virus (HIV) disease (H)       alfuzosin 10 MG 24 hr tablet    UROXATRAL    30 tablet    TAKE 1 TABLET BY MOUTH EVERY DAY    Urinary frequency       baclofen 10 MG tablet    LIORESAL    30 tablet    Take 1 tablet (10 mg) by mouth 3 times daily as needed for muscle spasms    Back pain, unspecified back location, unspecified back pain laterality, unspecified chronicity       bisacodyl 5 MG EC tablet    BISACODYL LAXATIVE    60 tablet    Take 1 tablet (5 mg) by mouth daily as needed for constipation    Constipation, unspecified constipation type       docusate sodium 100 MG tablet    COLACE    270 tablet    Take 1 to 3 tablets daily to keep stools soft.    Constipation, unspecified constipation type       doxycycline Monohydrate 100 MG Caps     60 capsule    Start 2 days prior to travel to malaria area, daily while there and for 4 weeks after departure    Travel advice encounter       etravirine 200 MG tablet    INTELENCE    180 tablet    Take 1 tablet (200 mg) by mouth 2 times daily (with meals)    Human immunodeficiency virus (HIV) disease (H)       hydrocortisone 1 % cream    CORTAID    30 g    Apply topically 2 times daily    Internal  hemorrhoids       loperamide 2 MG tablet    IMODIUM A-D    30 tablet    Take 2 tabs (4 mg) after first loose stool, and then take one tab (2 mg) after each diarrheal stool.  Max of 8 tabs (16 mg) per day.    Travel advice encounter       MAGIC MOUTHWASH (ENTER INGREDIENTS IN COMMENTS)     300 mL    Take 5 mLs by mouth every 4 hours as needed    Acute pain of mouth       naproxen 375 MG tablet    NAPROSYN    30 tablet    Take 1 tablet (375 mg) by mouth 2 times daily as needed for moderate pain (take with food)    Tension headache       oxybutynin 5 MG 24 hr tablet    DITROPAN-XL    31 tablet    Take 1 tablet by mouth daily    Bladder outlet obstruction       polyethylene glycol powder    MIRALAX    510 g    Take 17 g (1 capful) by mouth 2 times daily as needed for constipation    Constipation, unspecified constipation type       primaquine 26.3 MG tablet      Take 30 mg Base (TWO TABLETS) daily for 14 days.DO NOT begin this medication until you have finished your Malarone. START this medication on 5/15/17.        Propylene Glycol-Glycerin 1-0.3 % Soln    [employer] ARTIFICIAL TEARS    30 mL    Apply 1 drop to eye 2 times daily as needed    Dry eyes       sodium chloride 0.65 % nasal spray    OCEAN    1 Bottle    Spray 1 spray into both nostrils daily as needed for congestion    Stuffy and runny nose       * triamcinolone 0.1 % cream    KENALOG    30 g    Apply sparingly to affected area three times daily for 14 days.    Hemorrhoids, unspecified hemorrhoid type       * triamcinolone 0.1 % ointment    KENALOG    30 g    Apply sparingly to affected area three times daily for 14 days.    Rash       VITAMIN D (CHOLECALCIFEROL) PO      Take by mouth daily        * Notice:  This list has 2 medication(s) that are the same as other medications prescribed for you. Read the directions carefully, and ask your doctor or other care provider to review them with you.

## 2017-08-18 NOTE — PATIENT INSTRUCTIONS
Please follow up with  in 4 months with Biopsy.        [facility]rologic C[facility]  Preparation for Prostate Ultrasound and Biopsy    You have been scheduled for a prostate ultrasound with biopsies. A lubricated probe will be inserted into your rectum by your doctor. This equipment uses sound waves to produce an image or picture of the inside of the prostate gland. They will be able to measure the size of your prostate, look for any abnormalities in anatomy, and target any areas that may look suspicious for cancer before taking the samples (biopsies).  During this procedure the prostate will be injected with a numbing medication. This medication will not make you sleepy nor affect your ability to drive.    How to Prepare for the Procedure      On the day of the procedure eat and drink normally. Please do not fast or skip meals on the day of your procedure.      Please bring a list of your current medications to your appointment and take all regular medications as normal.      Do not take any of the following medications 7 days before your procedure:  - Anacin  - Bufferin  - Excedrin  - Ibuprofen  - Ecotrin   - Any other aspirin based products.   - Motrin  - Naprosyn  - Feldene  - Plavix  - Any other anti-inflammatory   medications      If you are taking any anticoagulation medications such as Coumadin, Jantoven, Warfarin, special instructions will need to be discussed.      You will be given an antibiotic the day of the procedure in the clinic.  *If patient had ? 2 prostate biopsies in the last 2 years, Gentamicin 80mg IM and Cipro 500 mg will be administered in the clinic prior to procedure*      You will need to purchase one Fleets enema (or equivalent).  This can be purchased at any pharmacy or grocery store and will be found in the laxative section. We ask that you give the enema to yourself approximately 2 hours before your appointment time.       The procedure takes about 30-45  minutes and will be done in the office. After the procedure you will be sent home with instructions.    Please call [facility] with any questions or concerns at [phone], press option # 3 to speak with a nurse.      As recommended by the American U[employer] Association [employer] article of 2007.

## 2017-08-18 NOTE — LETTER
8/18/2017       RE: Antonio Mercado  [address]   [place] [address]     Dear Colleague,    Thank you for referring your patient, Antonio Mercado, to the [facility] FOR PROSTATE AND UROLOGIC CANCERS at [facility]. Please see a copy of my visit note below.    UROLOGIC DIAGNOSES:       CURRENT INTERVENTIONS:     HISTORY:   Patient presents with complaints of dysuria at the start of urination and lower urinary tract symptoms.      Patient describes frequency, nocturia 3-4x  Denies urgency  Patient notes burning/dysuria at the start of urination   Noted to have microhematuria      He was given rx for alfuzosin and started taking it (though he had some questions about its use).   Patient notes nocturia x3 and continue frequency (though it has improved).   Patient's repeat PSA was 8.8.     He is currently s/p prostate biopsy, that showed six cores of small volume [place] 3+3 (of 16 cores).   We discussed prostate cancer, in particular sandra 3+3. Discussed ASDI vs RALP vs EBRT. Also discussed genomic testing and repeat bx for ASDI.     PAST MEDICAL HISTORY:   Past Medical History:   Diagnosis Date     Heel pain      History of blood transfusion      HIV (human immunodeficiency virus infection) (H)      LBP (low back pain)      Malignant neoplasm of hepatic flexure (H) 2/4/2016     Trigger finger        PAST SURGICAL HISTORY:   Past Surgical History:   Procedure Laterality Date     GI SURGERY         FAMILY HISTORY:   Family History   Problem Relation Age of Onset     C.A.D. No family hx of      DIABETES No family hx of      Coronary Artery Disease No family hx of      Hypertension No family hx of      Hyperlipidemia No family hx of      Breast Cancer No family hx of      Cancer - colorectal No family hx of      Ovarian Cancer No family hx of      Prostate Cancer No family hx of      Depression/Anxiety No family hx of      CEREBROVASCULAR DISEASE No family hx  "of      Anesthesia Reaction No family hx of      Thyroid Disease No family hx of      Asthma No family hx of      OSTEOPOROSIS No family hx of      Chemical Addiction No family hx of      Known Genetic Syndrome No family hx of        SOCIAL HISTORY:   Social History   Substance Use Topics     Smoking status: Never Smoker     Smokeless tobacco: Never Used     Alcohol use No       Current Outpatient Prescriptions   Medication     MAGIC MOUTHWASH, ENTER INGREDIENTS IN COMMENTS,     alfuzosin (UROXATRAL) 10 MG 24 hr tablet     primaquine 26.3 MG tablet     polyethylene glycol (MIRALAX) powder     Propylene Glycol-Glycerin (CVS ARTIFICIAL TEARS) 1-0.3 % SOLN     VITAMIN D, CHOLECALCIFEROL, PO     naproxen (NAPROSYN) 375 MG tablet     sodium chloride (OCEAN) 0.65 % nasal spray     doxycycline Monohydrate 100 MG CAPS     loperamide (IMODIUM A-D) 2 MG tablet     oxybutynin (DITROPAN-XL) 5 MG 24 hr tablet     abacavir-lamiVUDine-zidovudine (TRIZIVIR) 300-150-300 MG per tablet     etravirine (INTELENCE) 200 MG tablet     docusate sodium (COLACE) 100 MG tablet     triamcinolone (KENALOG) 0.1 % ointment     hydrocortisone (CORTAID) 1 % cream     bisacodyl (BISACODYL LAXATIVE) 5 MG EC tablet     triamcinolone (KENALOG) 0.1 % cream     baclofen (LIORESAL) 10 MG tablet     No current facility-administered medications for this visit.      PHYSICAL EXAM:  /80  Pulse 73  Ht 1.79 m (5' 10.47\")  Wt 78.5 kg (173 lb 1.6 oz)  BMI 24.51 kg/m2    HEENT: Normocephalic and atraumatic   Cardiac: Not done  Back/Flank: Not done  CNS/PNS: Not done  Respiratory: Normal non-labored breathing  Abdomen: Soft nontender and nondistended  Peripheral Vascular: Not done  Mental Status: Not done    Penis: Not done  Scrotal Skin: Not done  Testicles: Not done  Epididymis: Not done  Digital Rectal Exam:     Cystoscopy: Not done    Imaging: None    Urinalysis: UA RESULTS:  Recent Labs   Lab Test  05/09/17   1058  05/09/17   1032  03/27/17   1711 "   COLOR   --    --   Straw   URINEGLC   --   Negative  Negative   URINEKETONE   --   Negative   --    SG   --   1.025   --    URINEPH   --   5.5  6.5   NITRITE   --   Negative   --    RBCU  <2   --    --    WBCU  2-5   --    --        PSA: 8.8    Post Void Residual:     Other labs: None today      IMPRESSION:  60 y/o M with lower urinary tract symptoms improved somewhat with alfuzosin, elevated PSA     PLAN:  Will send pathology specimen for Decipher testing   Repeat bx in four months   Will consider ASDI pending above results as well as pending serial PSA (current PSA is 8.8)     Total Time: 15 minutes                                       Total in Consultation: greater than 50%     Again, thank you for allowing me to participate in the care of your patient.      Sincerely,  Yolette Pierre MD

## 2017-08-18 NOTE — NURSING NOTE
"Chief Complaint   Patient presents with     RECHECK     Prostate biopsy results       Blood pressure 117/80, pulse 73, height 1.79 m (5' 10.47\"), weight 78.5 kg (173 lb 1.6 oz). Body mass index is 24.51 kg/(m^2).    Patient Active Problem List   Diagnosis     Health Care Home     Human immunodeficiency virus (HIV) disease (H)     Mechanical low back pain     Microscopic hematuria     Hypertrophy of prostate with urinary obstruction     S/P colonoscopy     Anemia, iron deficiency     Right lower quadrant abdominal mass     Malignant neoplasm of hepatic flexure (H)     Hemorrhoids, unspecified hemorrhoid type     Slow transit constipation       Allergies   Allergen Reactions     Stribild [Yborqnf-Mmrrqmy-Vazjiyyn-Tenof] Itching     Raltegravir Unknown     Tamsulosin Other (See Comments)     Headache       Current Outpatient Prescriptions   Medication Sig Dispense Refill     MAGIC MOUTHWASH, ENTER INGREDIENTS IN COMMENTS, Take 5 mLs by mouth every 4 hours as needed 300 mL 1     alfuzosin (UROXATRAL) 10 MG 24 hr tablet TAKE 1 TABLET BY MOUTH EVERY DAY 30 tablet 3     primaquine 26.3 MG tablet Take 30 mg Base (TWO TABLETS) daily for 14 days.DO NOT begin this medication until you have finished your Malarone. START this medication on 5/15/17.       polyethylene glycol (MIRALAX) powder Take 17 g (1 capful) by mouth 2 times daily as needed for constipation 510 g 1     Propylene Glycol-Glycerin (CVS ARTIFICIAL TEARS) 1-0.3 % SOLN Apply 1 drop to eye 2 times daily as needed 30 mL 0     VITAMIN D, CHOLECALCIFEROL, PO Take by mouth daily       naproxen (NAPROSYN) 375 MG tablet Take 1 tablet (375 mg) by mouth 2 times daily as needed for moderate pain (take with food) 30 tablet 0     sodium chloride (OCEAN) 0.65 % nasal spray Spray 1 spray into both nostrils daily as needed for congestion 1 Bottle 3     doxycycline Monohydrate 100 MG CAPS Start 2 days prior to travel to malaria area, daily while there and for 4 weeks after " departure 60 capsule 0     loperamide (IMODIUM A-D) 2 MG tablet Take 2 tabs (4 mg) after first loose stool, and then take one tab (2 mg) after each diarrheal stool.  Max of 8 tabs (16 mg) per day. 30 tablet 0     oxybutynin (DITROPAN-XL) 5 MG 24 hr tablet Take 1 tablet by mouth daily 31 tablet 3     abacavir-lamiVUDine-zidovudine (TRIZIVIR) 300-150-300 MG per tablet Take 1 tablet by mouth 2 times daily 180 tablet 3     etravirine (INTELENCE) 200 MG tablet Take 1 tablet (200 mg) by mouth 2 times daily (with meals) 180 tablet 3     docusate sodium (COLACE) 100 MG tablet Take 1 to 3 tablets daily to keep stools soft. 270 tablet 3     triamcinolone (KENALOG) 0.1 % ointment Apply sparingly to affected area three times daily for 14 days. 30 g 0     hydrocortisone (CORTAID) 1 % cream Apply topically 2 times daily 30 g 1     bisacodyl (BISACODYL LAXATIVE) 5 MG EC tablet Take 1 tablet (5 mg) by mouth daily as needed for constipation 60 tablet 1     triamcinolone (KENALOG) 0.1 % cream Apply sparingly to affected area three times daily for 14 days. 30 g 11     baclofen (LIORESAL) 10 MG tablet Take 1 tablet (10 mg) by mouth 3 times daily as needed for muscle spasms 30 tablet 0       Social History   Substance Use Topics     Smoking status: Never Smoker     Smokeless tobacco: Never Used     Alcohol use No       MANE Correa  8/18/2017  10:40 AM

## 2017-09-05 ENCOUNTER — TELEPHONE (OUTPATIENT)
Dept: UROLOGY | Facility: CLINIC | Age: 62
End: 2017-09-05

## 2017-09-05 ENCOUNTER — DOCUMENTATION ONLY (OUTPATIENT)
Dept: UROLOGY | Facility: CLINIC | Age: 62
End: 2017-09-05

## 2017-09-06 DIAGNOSIS — K59.00 CONSTIPATION, UNSPECIFIED CONSTIPATION TYPE: ICD-10-CM

## 2017-09-06 RX ORDER — BISACODYL 5 MG/1
5 TABLET, DELAYED RELEASE ORAL DAILY PRN
Qty: 60 TABLET | Refills: 1 | Status: SHIPPED | OUTPATIENT
Start: 2017-09-06 | End: 2018-07-13

## 2017-09-06 NOTE — TELEPHONE ENCOUNTER
[facility] Prior Authorization Team   Phone: [phone]  Fax: [phone]    PA Initiation    Medication: alfuzosin 10 mg  Insurance Company: [employer] - Phone [phone] Fax [phone]  Pharmacy Filling the Rx: [id number] - [place], [place] - [address] [place] [address] AT [facility] OF [address]  Filling Pharmacy Phone: [phone]  Filling Pharmacy Fax: [phone]  Start Date: 9/6/2017

## 2017-09-11 ENCOUNTER — TELEPHONE (OUTPATIENT)
Dept: UROLOGY | Facility: CLINIC | Age: 62
End: 2017-09-11

## 2017-09-11 NOTE — TELEPHONE ENCOUNTER
PA NOT NEEDED.  PHARMACY PROCESSED THROUGH INSURANCE FOR  A ZERO DOLLAR COPAY.  PATIENT NOTIFIED.

## 2017-09-20 LAB — LAB SCANNED RESULT: NORMAL

## 2017-09-22 ENCOUNTER — RECORDS - HEALTHEAST (OUTPATIENT)
Dept: ADMINISTRATIVE | Facility: OTHER | Age: 62
End: 2017-09-22

## 2017-09-25 ENCOUNTER — COMMUNICATION - HEALTHEAST (OUTPATIENT)
Dept: INFECTIOUS DISEASES | Facility: CLINIC | Age: 62
End: 2017-09-25

## 2017-10-09 ENCOUNTER — TELEPHONE (OUTPATIENT)
Dept: UROLOGY | Facility: CLINIC | Age: 62
End: 2017-10-09

## 2017-11-06 DIAGNOSIS — K64.9 HEMORRHOIDS, UNSPECIFIED HEMORRHOID TYPE: ICD-10-CM

## 2017-11-06 RX ORDER — TRIAMCINOLONE ACETONIDE 1 MG/G
CREAM TOPICAL
Qty: 30 G | Refills: 11 | Status: SHIPPED | OUTPATIENT
Start: 2017-11-06 | End: 2018-09-17

## 2017-11-17 DIAGNOSIS — K59.00 CONSTIPATION, UNSPECIFIED CONSTIPATION TYPE: ICD-10-CM

## 2017-11-17 RX ORDER — POLYETHYLENE GLYCOL 3350 17 G/17G
1 POWDER, FOR SOLUTION ORAL 2 TIMES DAILY PRN
Qty: 510 G | Refills: 1 | Status: SHIPPED | OUTPATIENT
Start: 2017-11-17 | End: 2018-05-24

## 2017-11-28 ENCOUNTER — COMMUNICATION - HEALTHEAST (OUTPATIENT)
Dept: ADMINISTRATIVE | Facility: CLINIC | Age: 62
End: 2017-11-28

## 2017-11-29 ENCOUNTER — OFFICE VISIT (OUTPATIENT)
Dept: FAMILY MEDICINE | Facility: CLINIC | Age: 62
End: 2017-11-29

## 2017-11-29 ENCOUNTER — PRE VISIT (OUTPATIENT)
Dept: UROLOGY | Facility: CLINIC | Age: 62
End: 2017-11-29

## 2017-11-29 VITALS
BODY MASS INDEX: 23.42 KG/M2 | TEMPERATURE: 98.1 F | SYSTOLIC BLOOD PRESSURE: 91 MMHG | HEART RATE: 83 BPM | WEIGHT: 165.4 LBS | DIASTOLIC BLOOD PRESSURE: 66 MMHG

## 2017-11-29 DIAGNOSIS — R31.9 HEMATURIA, UNSPECIFIED TYPE: Primary | ICD-10-CM

## 2017-11-29 DIAGNOSIS — K64.4 EXTERNAL HEMORRHOIDS: Primary | ICD-10-CM

## 2017-11-29 NOTE — PROGRESS NOTES
S: Antonio Mercado is a 62 year old male who returns for follow up of fresh red blood while defecating and wiping. Same in the past when He was treated for hemorrhoids   No dizziness and bleeding is resolving   He sates that has have colonoscopy within 6 months for colon CA-surgery  Patients states that main concern today is blood per rectum    PMHX/PSHX/MEDS/ALLERGIES/SHX/FHX reviewed and updated in Epic.      ROS:  General: No fevers, chills  Head: No headache  Ears: No acute change in hearing.    CV: No chest pain or palpitations.  Resp: No shortness of breath.  No cough. No hemoptysis.  GI: No nausea, vomiting, constipation, diarrhea  : No urinary pains    O: BP 91/66  Pulse 83  Temp 98.1  F (36.7  C) (Oral)  Wt 165 lb 6.4 oz (75 kg)  BMI 23.42 kg/m2   Gen:  Well nourished and in NAD    CV:  RRR  - no murmurs, rubs, or gallups,   Pulm:  CTAB, no wheezes/rales/rhonchi, good air entry   ABD: soft, nontender, no masses, no rebound, BS intact throughout  Extrem: no cyanosis, edema or clubbing  Psych: Euthymic    Declines rectal exam  Rectal  bleeding/ probably  hemorrhoids  Anusol    FU with GI- monitoring fr colon CA          RTC in 1 to 2  weeks, for follow up of Rectal bleedor sooner if develops new or worsening symptoms.    Chu Caceres

## 2017-11-29 NOTE — PATIENT INSTRUCTIONS
you had bright rectal bleed/probably these are hemorrhoids   Put cream/steroid as instructed   If bleed continue Return to clinic right away   otherwise fu in one ti two weeks

## 2017-11-29 NOTE — MR AVS SNAPSHOT
After Visit Summary   11/29/2017    Antonio Mercado    MRN: [id number]           Patient Information     Date Of Birth          1955        Visit Information        Provider Department      11/29/2017 10:20 AM Chu Caceres MD [facility]        Today's Diagnoses     External hemorrhoids    -  1      Care Instructions    you had bright rectal bleed/probably these are hemorrhoids   Put cream/steroid as instructed   If bleed continue Return to clinic right away   otherwise fu in one ti two weeks          Follow-ups after your visit        Your next 10 appointments already scheduled     Dec 13, 2017  8:30 AM CST   (Arrive by 8:15 AM)   Sonography/Biopsy with Yolette Pierre MD   [facility] for Prostate and Urologic Cancers ([facility])    [address] [place] [id number] [phone]            Dec 22, 2017  9:00 AM CST   (Arrive by 8:45 AM)   Return Visit with Yolette Pierre MD   [facility] for Prostate and Urologic Cancers ([facility])    [address] [place] [address] [phone]              Who to contact     Please call your clinic at [phone] to:    Ask questions about your health    Make or cancel appointments    Discuss your medicines    Learn about your test results    Speak to your doctor   If you have compliments or concerns about an experience at your clinic, or if you wish to file a complaint, please contact [facility] Patient Relations at [phone] or email us at [email or web address]         Additional Information About Your Visit        MyChart Information     "[employer]t gives you secure access to your electronic health record. If you see a primary care provider, you can also send messages to your care team and make appointments. If you have questions, please call your primary care  clinic.  If you do not have a primary care provider, please call [phone] and they will assist you.      [employer] is an electronic gateway that provides easy, online access to your medical records. With [employer], you can request a clinic appointment, read your test results, renew a prescription or communicate with your care team.     To access your existing account, please contact your [facility] or call [phone] for assistance.        Care EveryWhere ID     This is your Care EveryWhere ID. This could be used by other organizations to access your [facility] medical records  [id number]        Your Vitals Were     Pulse Temperature BMI (Body Mass Index)             83 98.1  F (36.7  C) (Oral) 23.42 kg/m2          Blood Pressure from Last 3 Encounters:   11/29/17 91/66   08/18/17 117/80   08/10/17 114/74    Weight from Last 3 Encounters:   11/29/17 165 lb 6.4 oz (75 kg)   08/18/17 173 lb 1.6 oz (78.5 kg)   08/10/17 171 lb 12.8 oz (77.9 kg)              Today, you had the following     No orders found for display         Today's Medication Changes          These changes are accurate as of: 11/29/17 11:22 AM.  If you have any questions, ask your nurse or doctor.               Start taking these medicines.        Dose/Directions    hydrocortisone 2.5 % cream   Commonly known as:  ANUSOL-HC   Used for:  External hemorrhoids   Started by:  Chu Caceres MD        Place rectally 2 times daily   Quantity:  30 g   Refills:  1            Where to get your medicines      These medications were sent to [id number] - [place], [place] - [address] AT [facility] OF [address]  [address], [place] [address]     Phone:  [phone]     hydrocortisone 2.5 % cream                Primary Care Provider Office Phone # Fax #    [facility] [phone]       [address] [facility] [address]        Equal Access to Services     RYAN FORBES: Angela  yonny Valladares, wayonda luqadaha, qaybta kaalmarie clay, aileen narain hayaalavinia drewdarrell brittanymatthew lajersonlavinia rashida. So [facility] [phone].    ATENCIÓN: Si habla amadeo, tiene a day disposición servicios gratuitos de asistencia lingüística. Marleen al [phone].    We comply with applicable federal civil rights laws and [place] laws. We do not discriminate on the basis of race, color, national origin, age, disability, sex, sexual orientation, or gender identity.            Thank you!     Thank you for choosing [facility]  for your care. Our goal is always to provide you with excellent care. Hearing back from our patients is one way we can continue to improve our services. Please take a few minutes to complete the written survey that you may receive in the mail after your visit with us. Thank you!             Your Updated Medication List - Protect others around you: Learn how to safely use, store and throw away your medicines at www.disposemymeds.org.          This list is accurate as of: 11/29/17 11:22 AM.  Always use your most recent med list.                   Brand Name Dispense Instructions for use Diagnosis    abacavir-lamiVUDine-zidovudine 300-150-300 MG per tablet    TRIZIVIR    180 tablet    Take 1 tablet by mouth 2 times daily    Human immunodeficiency virus (HIV) disease       alfuzosin 10 MG 24 hr tablet    UROXATRAL    30 tablet    TAKE 1 TABLET BY MOUTH EVERY DAY    Urinary frequency       baclofen 10 MG tablet    LIORESAL    30 tablet    Take 1 tablet (10 mg) by mouth 3 times daily as needed for muscle spasms    Back pain, unspecified back location, unspecified back pain laterality, unspecified chronicity       bisacodyl 5 MG EC tablet    BISACODYL LAXATIVE    60 tablet    Take 1 tablet (5 mg) by mouth daily as needed for constipation    Constipation, unspecified constipation type       docusate sodium 100 MG tablet    COLACE    270 tablet    Take 1 to 3 tablets daily to keep stools soft.     Constipation, unspecified constipation type       doxycycline monohydrate 100 MG capsule     60 capsule    Start 2 days prior to travel to malaria area, daily while there and for 4 weeks after departure    Travel advice encounter       etravirine 200 MG tablet    INTELENCE    180 tablet    Take 1 tablet (200 mg) by mouth 2 times daily (with meals)    Human immunodeficiency virus (HIV) disease       hydrocortisone 1 % cream    CORTAID    30 g    Apply topically 2 times daily    Internal hemorrhoids       hydrocortisone 2.5 % cream    ANUSOL-HC    30 g    Place rectally 2 times daily    External hemorrhoids       loperamide 2 MG tablet    IMODIUM A-D    30 tablet    Take 2 tabs (4 mg) after first loose stool, and then take one tab (2 mg) after each diarrheal stool.  Max of 8 tabs (16 mg) per day.    Travel advice encounter       MAGIC MOUTHWASH (ENTER INGREDIENTS IN COMMENTS)     300 mL    Take 5 mLs by mouth every 4 hours as needed    Acute pain of mouth       naproxen 375 MG tablet    NAPROSYN    30 tablet    Take 1 tablet (375 mg) by mouth 2 times daily as needed for moderate pain (take with food)    Tension headache       oxybutynin 5 MG 24 hr tablet    DITROPAN-XL    31 tablet    Take 1 tablet by mouth daily    Bladder outlet obstruction       polyethylene glycol powder    MIRALAX    510 g    Take 17 g (1 capful) by mouth 2 times daily as needed for constipation    Constipation, unspecified constipation type       primaquine 26.3 MG tablet      Take 30 mg Base (TWO TABLETS) daily for 14 days.DO NOT begin this medication until you have finished your Malarone. START this medication on 5/15/17.        Propylene Glycol-Glycerin 1-0.3 % Soln    CVS ARTIFICIAL TEARS    30 mL    Apply 1 drop to eye 2 times daily as needed    Dry eyes       sodium chloride 0.65 % nasal spray    OCEAN    1 Bottle    Spray 1 spray into both nostrils daily as needed for congestion    Stuffy and runny nose       * triamcinolone 0.1 %  ointment    KENALOG    30 g    Apply sparingly to affected area three times daily for 14 days.    Rash       * triamcinolone 0.1 % cream    KENALOG    30 g    Apply sparingly to affected area three times daily for 14 days.    Hemorrhoids, unspecified hemorrhoid type       VITAMIN D (CHOLECALCIFEROL) PO      Take by mouth daily        * Notice:  This list has 2 medication(s) that are the same as other medications prescribed for you. Read the directions carefully, and ask your doctor or other care provider to review them with you.

## 2017-12-05 ENCOUNTER — AMBULATORY - HEALTHEAST (OUTPATIENT)
Dept: LAB | Facility: CLINIC | Age: 62
End: 2017-12-05

## 2017-12-05 ENCOUNTER — TRANSFERRED RECORDS (OUTPATIENT)
Dept: HEALTH INFORMATION MANAGEMENT | Facility: CLINIC | Age: 62
End: 2017-12-05

## 2017-12-05 ENCOUNTER — OFFICE VISIT - HEALTHEAST (OUTPATIENT)
Dept: INFECTIOUS DISEASES | Facility: CLINIC | Age: 62
End: 2017-12-05

## 2017-12-05 DIAGNOSIS — Z21 HIV (HUMAN IMMUNODEFICIENCY VIRUS INFECTION) (H): ICD-10-CM

## 2017-12-06 ENCOUNTER — PRE VISIT (OUTPATIENT)
Dept: UROLOGY | Facility: CLINIC | Age: 62
End: 2017-12-06

## 2017-12-07 LAB — HIV-1 RNA DETECT/QUANT, P: 78 COPIES/ML

## 2018-01-16 ENCOUNTER — OFFICE VISIT (OUTPATIENT)
Dept: FAMILY MEDICINE | Facility: CLINIC | Age: 63
End: 2018-01-16
Payer: COMMERCIAL

## 2018-01-16 VITALS
WEIGHT: 162.4 LBS | DIASTOLIC BLOOD PRESSURE: 70 MMHG | TEMPERATURE: 98.1 F | SYSTOLIC BLOOD PRESSURE: 100 MMHG | HEART RATE: 83 BPM | BODY MASS INDEX: 22.99 KG/M2

## 2018-01-16 DIAGNOSIS — Z21 HIV POSITIVE (H): Primary | ICD-10-CM

## 2018-01-16 DIAGNOSIS — D75.89 MACROCYTOSIS: ICD-10-CM

## 2018-01-16 DIAGNOSIS — C18.3 MALIGNANT NEOPLASM OF HEPATIC FLEXURE (H): ICD-10-CM

## 2018-01-16 DIAGNOSIS — R20.9 DISTURBANCE OF SKIN SENSATION: ICD-10-CM

## 2018-01-16 LAB
ALBUMIN SERPL-MCNC: 5 MG/DL (ref 3.3–4.9)
ALP SERPL-CCNC: 63.7 U/L (ref 40–150)
ALT SERPL-CCNC: 16.2 U/L (ref 0–45)
AST SERPL-CCNC: 15.5 U/L (ref 0–55)
BILIRUB SERPL-MCNC: 0.5 MG/DL (ref 0.2–1.3)
BUN SERPL-MCNC: 19 MG/DL (ref 7–21)
CALCIUM SERPL-MCNC: 9.8 MG/DL (ref 8.5–10.1)
CHLORIDE SERPLBLD-SCNC: 105.3 MMOL/L (ref 98–110)
CHOLEST SERPL-MCNC: 203.7 MG/DL (ref 0–200)
CHOLEST/HDLC SERPL: 3.6 {RATIO} (ref 0–5)
CO2 SERPL-SCNC: 25.9 MMOL/L (ref 20–32)
CREAT SERPL-MCNC: 1.3 MG/DL (ref 0.7–1.3)
FOLATE SERPL-MCNC: 8.5 NG/ML
GFR SERPL CREATININE-BSD FRML MDRD: 59.1 ML/MIN/1.7 M2
GLUCOSE SERPL-MCNC: 109.4 MG'DL (ref 70–99)
HDLC SERPL-MCNC: 57.2 MG/DL
LDLC SERPL CALC-MCNC: 128 MG/DL (ref 0–129)
POTASSIUM SERPL-SCNC: 4.8 MMOL/DL (ref 3.2–4.6)
PROT SERPL-MCNC: 7.8 G/DL (ref 6.8–8.8)
SODIUM SERPL-SCNC: 141.6 MMOL/L (ref 132–142)
TRIGL SERPL-MCNC: 90.3 MG/DL (ref 0–150)
TSH SERPL DL<=0.05 MIU/L-ACNC: 0.76 UIU/ML (ref 0.3–5)
VIT B12 SERPL-MCNC: 537 PG/ML (ref 213–816)
VLDL CHOLESTEROL: 18.1 MG/DL (ref 7–32)

## 2018-01-16 NOTE — MR AVS SNAPSHOT
After Visit Summary   1/16/2018    Antonio Mercado    MRN: [id number]           Patient Information     Date Of Birth          1955        Visit Information        Provider Department      1/16/2018 11:20 AM Salas Salagdo,  [facility]        Today's Diagnoses     HIV positive (H)    -  1    Disturbance of skin sensation        Macrocytosis          Care Instructions    - check cholesterol and hep C test for preventive health  -referral to new urologist  - check thyroid and vitamin B12 for foot burning          Follow-ups after your visit        Who to contact     Please call your clinic at [phone] to:    Ask questions about your health    Make or cancel appointments    Discuss your medicines    Learn about your test results    Speak to your doctor   If you have compliments or concerns about an experience at your clinic, or if you wish to file a complaint, please contact [facility] Patient Relations at [phone] or email us at [email or web address]         Additional Information About Your Visit        [employer] Information     [employer] gives you secure access to your electronic health record. If you see a primary care provider, you can also send messages to your care team and make appointments. If you have questions, please call your primary care clinic.  If you do not have a primary care provider, please call [phone] and they will assist you.      [employer] is an electronic gateway that provides easy, online access to your medical records. With [employer], you can request a clinic appointment, read your test results, renew a prescription or communicate with your care team.     To access your existing account, please contact your [facility] or call [phone] for assistance.        Care EveryWhere ID     This is your Care EveryWhere ID. This could be used by other organizations to access your [facility]  records  [id number]        Your Vitals Were     Pulse Temperature BMI (Body Mass Index)             83 98.1  F (36.7  C) (Oral) 22.99 kg/m2          Blood Pressure from Last 3 Encounters:   01/16/18 100/70   11/29/17 91/66   08/18/17 117/80    Weight from Last 3 Encounters:   01/16/18 162 lb 6.4 oz (73.7 kg)   11/29/17 165 lb 6.4 oz (75 kg)   08/18/17 173 lb 1.6 oz (78.5 kg)              We Performed the Following     Comprehensive Metabolic Panel ([facility])     Folate  Serum ([facility])     Hepatitis C Antibody ([facility])     Lipid Panel ([place])     TSH  Sensitive ([facility])     Vitamin B12 ([facility])        Primary Care Provider Office Phone # Fax #    Salas Salgado [phone]       [address] [facility] [address]        Equal Access to Services     RYAN CASTRO : Hadii yonny schaeffero [place], waaxda luqadaha, qaybta kaalmada adedarrellyafannie, aileen greenberg . So [facility] [phone].    ATENCIÓN: Si manishla español, tiene a day disposición servicios gratuitos de asistencia lingüística. Llame al [phone].    We comply with applicable federal civil rights laws and [place] laws. We do not discriminate on the basis of race, color, national origin, age, disability, sex, sexual orientation, or gender identity.            Thank you!     Thank you for choosing [facility]  for your care. Our goal is always to provide you with excellent care. Hearing back from our patients is one way we can continue to improve our services. Please take a few minutes to complete the written survey that you may receive in the mail after your visit with us. Thank you!             Your Updated Medication List - Protect others around you: Learn how to safely use, store and throw away your medicines at www.disposemymeds.org.          This list is accurate as of: 1/16/18 12:14 PM.  Always use your most recent med list.                   Brand Name Dispense Instructions for use  Diagnosis    abacavir-lamiVUDine-zidovudine 300-150-300 MG per tablet    TRIZIVIR    180 tablet    Take 1 tablet by mouth 2 times daily    Human immunodeficiency virus (HIV) disease       alfuzosin 10 MG 24 hr tablet    UROXATRAL    30 tablet    TAKE 1 TABLET BY MOUTH EVERY DAY    Urinary frequency       baclofen 10 MG tablet    LIORESAL    30 tablet    Take 1 tablet (10 mg) by mouth 3 times daily as needed for muscle spasms    Back pain, unspecified back location, unspecified back pain laterality, unspecified chronicity       bisacodyl 5 MG EC tablet    BISACODYL LAXATIVE    60 tablet    Take 1 tablet (5 mg) by mouth daily as needed for constipation    Constipation, unspecified constipation type       docusate sodium 100 MG tablet    COLACE    270 tablet    Take 1 to 3 tablets daily to keep stools soft.    Constipation, unspecified constipation type       doxycycline monohydrate 100 MG capsule     60 capsule    Start 2 days prior to travel to malaria area, daily while there and for 4 weeks after departure    Travel advice encounter       etravirine 200 MG tablet    INTELENCE    180 tablet    Take 1 tablet (200 mg) by mouth 2 times daily (with meals)    Human immunodeficiency virus (HIV) disease       hydrocortisone 1 % cream    CORTAID    30 g    Apply topically 2 times daily    Internal hemorrhoids       hydrocortisone 2.5 % cream    ANUSOL-HC    30 g    Place rectally 2 times daily    External hemorrhoids       loperamide 2 MG tablet    IMODIUM A-D    30 tablet    Take 2 tabs (4 mg) after first loose stool, and then take one tab (2 mg) after each diarrheal stool.  Max of 8 tabs (16 mg) per day.    Travel advice encounter       MAGIC MOUTHWASH (ENTER INGREDIENTS IN COMMENTS)     300 mL    Take 5 mLs by mouth every 4 hours as needed    Acute pain of mouth       naproxen 375 MG tablet    NAPROSYN    30 tablet    Take 1 tablet (375 mg) by mouth 2 times daily as needed for moderate pain (take with food)    Tension  headache       oxybutynin 5 MG 24 hr tablet    DITROPAN-XL    31 tablet    Take 1 tablet by mouth daily    Bladder outlet obstruction       polyethylene glycol powder    MIRALAX    510 g    Take 17 g (1 capful) by mouth 2 times daily as needed for constipation    Constipation, unspecified constipation type       primaquine 26.3 MG tablet      Take 30 mg Base (TWO TABLETS) daily for 14 days.DO NOT begin this medication until you have finished your Malarone. START this medication on 5/15/17.        Propylene Glycol-Glycerin 1-0.3 % Soln    [employer] ARTIFICIAL TEARS    30 mL    Apply 1 drop to eye 2 times daily as needed    Dry eyes       sodium chloride 0.65 % nasal spray    OCEAN    1 Bottle    Spray 1 spray into both nostrils daily as needed for congestion    Stuffy and runny nose       * triamcinolone 0.1 % ointment    KENALOG    30 g    Apply sparingly to affected area three times daily for 14 days.    Rash       * triamcinolone 0.1 % cream    KENALOG    30 g    Apply sparingly to affected area three times daily for 14 days.    Hemorrhoids, unspecified hemorrhoid type       VITAMIN D (CHOLECALCIFEROL) PO      Take by mouth daily        * Notice:  This list has 2 medication(s) that are the same as other medications prescribed for you. Read the directions carefully, and ask your doctor or other care provider to review them with you.

## 2018-01-16 NOTE — PROGRESS NOTES
Preceptor attestation:  Patient seen and discussed with the resident. Assessment and plan reviewed with resident and agreed upon.  Supervising physician: Juan Guillory  [facility]

## 2018-01-16 NOTE — PATIENT INSTRUCTIONS
- check cholesterol and hep C test for preventive health  -referral to new urologist  - check thyroid and vitamin B12 for foot burning    [facility]  [address]  [place] [address]  Clinic phone: [phone]  Clinic fax: [phone]    Appointment  Date:1/26/18  Time: 12:45pm arrival    Please contact the above clinic if you need to cancel or reschedule. Feel free to contact me with any questions. Thanks!    Vida  Care Coordinator  [phone]      Spoke with patient and he asked me to mail it home.

## 2018-01-16 NOTE — LETTER
January 24, 2018      Antonio Mercado  [address]   [facility] [address]      Please see below for your test results.    Resulted Orders   Hepatitis C Antibody ([facility])   Result Value Ref Range    Hepatitis C Antibody Screen Negative Negative    Narrative    Test performed by:  [facility]  [address] [place] [address]   Comprehensive Metabolic Panel ([facility])   Result Value Ref Range    Albumin 5.0 (H) 3.3 - 4.9 mg/dL    Alkaline Phosphatase 63.7 40.0 - 150.0 U/L    ALT 16.2 0.0 - 45.0 U/L    AST 15.5 0.0 - 55.0 U/L    Bilirubin Total 0.5 0.2 - 1.3 mg/dL    Urea Nitrogen 19.0 7.0 - 21.0 mg/dL    Calcium 9.8 8.5 - 10.1 mg/dL    Chloride 105.3 98.0 - 110.0 mmol/L    Carbon Dioxide 25.9 20.0 - 32.0 mmol/L    Creatinine 1.3 0.7 - 1.3 mg/dL    Glucose 109.4 (H) 70.0 - 99.0 mg'dL    Potassium 4.8 (H) 3.2 - 4.6 mmol/dL    Sodium 141.6 132.0 - 142.0 mmol/L    Protein Total 7.8 6.8 - 8.8 g/dL    GFR Estimate 59.1 (L) >60.0 mL/min/1.7 m2    GFR Estimate If Black 71.5 >60.0 mL/min/1.7 m2   Folate  Serum ([facility])   Result Value Ref Range    Folate 8.5 >=3.5 ng/mL    Narrative    Test performed by:  [facility]  [address] [place] [address]   Vitamin B12 ([facility])   Result Value Ref Range    Vitamin B12 537 213 - 816 pg/mL    Narrative    Test performed by:  [facility] [address] [place] [address]   Lipid Panel ([place])   Result Value Ref Range    Cholesterol 203.7 (H) 0.0 - 200.0 mg/dL    Cholesterol/HDL Ratio 3.6 0.0 - 5.0    HDL Cholesterol 57.2 >40.0 mg/dL    LDL Cholesterol Calculated 128 0 - 129 mg/dL    Triglycerides 90.3 0.0 - 150.0 mg/dL    VLDL Cholesterol 18.1 7.0 - 32.0 mg/dL   TSH  Sensitive ([facility])   Result Value Ref Range    TSH 0.76 0.30 - 5.00 uIU/mL    Narrative    Test performed by:  [facility] [address] [place] [address]   HIV-1 RNA Quantitation ([facility])   Result Value Ref Range    HIV-1  RNA QT HIV-1 RNA Not Detected HIVND [Copies]/mL      Comment:      The ENOC AmpliPrep/ENOC TaqMan HIV-1 test is an FDA-approved in vitro   nucleic  acid amplification test for the quantitation of HIV-1 RNA in human plasma   (EDTA  plasma) using the ENOC AmpliPrep instrument for automated viral nucleic acid  extraction and the ENOC TaqMan Analyzer or ENOC TaqMan for automated Real  Time PCR amplification and detection of the viral nucleic acid target.  Titer results are reported in copies/ml. This assay is intended for use in  conjunction with clinical presentation and other laboratory markers of disease  prognosis and for use as an aid in assessing viral response to antiretroviral  treatment as measured by changes in plasma HIV-1 RNA levels. This test should  not be used as a donor screening test to confirm the presence of HIV-1  infection.      Log of HIV RNA QT Not Calculated <1.3 [Log_copies]/mL      Comment:      PERFORMED AT  [facility]  [address] [place] [address]      Narrative    Test performed by:  [facility] [address], [place] [address] Hi Mr. Mercado,    Your bad cholesterol (LDL) was not significant elevated. Your hepatitis C antibody was negative so you do not have hepatitis C. Also your thyroid, folate and vitamin B12 levels are normal. Your HIV blood level was undetectable so that is good as well. Your did have a slightly elevated albumin (protein) so that is something we may want to recheck at a future visit.    Dr. Salgado

## 2018-01-17 LAB — HCV AB SER QL: NEGATIVE

## 2018-01-17 NOTE — PROGRESS NOTES
There are no exam notes on file for this visit.  Chief Complaint   Patient presents with     RECHECK     recheck blood work for the year, sometimes he feels like his feet are burning and are hot, been going on for 2 weeks      Blood pressure 100/70, pulse 83, temperature 98.1  F (36.7  C), temperature source Oral, weight 162 lb 6.4 oz (73.7 kg).    Assessment and Plan   Macrocytosis: Noted on prior CBC.  Obtain vitamin B12, folate, TSH.      Prostate cancer: Will refer to new urology group for further management    Disturbance skin sensation: Burning at the bottom of both feet.  Unclear etiology.  Obtain TSH, vitamin B12, liver panel.    Preventative health.  Obtain hepatitis C antibody.  Recommended flu shot patient declined      Options for treatment and follow-up care were reviewed with the patient and/or guardian. Antonio Mercado and/or guardian engaged in the decision making process and verbalized understanding of the options discussed and agreed with the final plan.  Patient discussed with Dr. Guillory.   Salas Salgado, DO         HPI       Antonio Mercado is a 62 year old  Male past medical history of HIV,  deficiency anemia    Presents today to discuss preventative health and his prostate.  He is up-to-date on his vaccines aside from the flu shot which he declines today.  He had a colonoscopy a few weeks ago showed pandiverticulosis and hemorrhoids as well as a 6 mm polyp and a patent end to end ileo-colonic anastomosis characterized by ulceration .  Does not recall the last time he had his cholesterol checked.    He was diagnosed with prostate cancer at [facility] urology.  There are last visit note states that they wanted to see him again in December 2017 and do repeat biopsies.  He did not attend that appointment would actually prefer to see another urology group.  Currently he says that dysuria he has had in the past has resolved.    He also endorses 2 weeks of burning on the bottom of his feet  bilaterally.  He denies any new socks or shoes.  He denies numbness or tingling elsewhere.  He has never had this before.  He has spoken with his ID physician and they feel that this is not related to a side effect of the HIV medications.    Patient Active Problem List   Diagnosis     Health Care Home     Human immunodeficiency virus (HIV) disease     Mechanical low back pain     Microscopic hematuria     Hypertrophy of prostate with urinary obstruction     S/P colonoscopy     Anemia, iron deficiency     Right lower quadrant abdominal mass     Malignant neoplasm of hepatic flexure (H)     Hemorrhoids, unspecified hemorrhoid type     Slow transit constipation     Current Outpatient Prescriptions   Medication Sig Dispense Refill     hydrocortisone (ANUSOL-HC) 2.5 % cream Place rectally 2 times daily 30 g 1     polyethylene glycol (MIRALAX) powder Take 17 g (1 capful) by mouth 2 times daily as needed for constipation 510 g 1     triamcinolone (KENALOG) 0.1 % cream Apply sparingly to affected area three times daily for 14 days. 30 g 11     bisacodyl (BISACODYL LAXATIVE) 5 MG EC tablet Take 1 tablet (5 mg) by mouth daily as needed for constipation 60 tablet 1     MAGIC MOUTHWASH, ENTER INGREDIENTS IN COMMENTS, Take 5 mLs by mouth every 4 hours as needed 300 mL 1     alfuzosin (UROXATRAL) 10 MG 24 hr tablet TAKE 1 TABLET BY MOUTH EVERY DAY 30 tablet 3     primaquine 26.3 MG tablet Take 30 mg Base (TWO TABLETS) daily for 14 days.DO NOT begin this medication until you have finished your Malarone. START this medication on 5/15/17.       Propylene Glycol-Glycerin ([employer]S ARTIFICIAL TEARS) 1-0.3 % SOLN Apply 1 drop to eye 2 times daily as needed 30 mL 0     VITAMIN D, CHOLECALCIFEROL, PO Take by mouth daily       naproxen (NAPROSYN) 375 MG tablet Take 1 tablet (375 mg) by mouth 2 times daily as needed for moderate pain (take with food) 30 tablet 0     sodium chloride (OCEAN) 0.65 % nasal spray Spray 1 spray into both nostrils  daily as needed for congestion 1 Bottle 3     doxycycline Monohydrate 100 MG CAPS Start 2 days prior to travel to malaria area, daily while there and for 4 weeks after departure 60 capsule 0     loperamide (IMODIUM A-D) 2 MG tablet Take 2 tabs (4 mg) after first loose stool, and then take one tab (2 mg) after each diarrheal stool.  Max of 8 tabs (16 mg) per day. 30 tablet 0     oxybutynin (DITROPAN-XL) 5 MG 24 hr tablet Take 1 tablet by mouth daily 31 tablet 3     abacavir-lamiVUDine-zidovudine (TRIZIVIR) 300-150-300 MG per tablet Take 1 tablet by mouth 2 times daily 180 tablet 3     etravirine (INTELENCE) 200 MG tablet Take 1 tablet (200 mg) by mouth 2 times daily (with meals) 180 tablet 3     docusate sodium (COLACE) 100 MG tablet Take 1 to 3 tablets daily to keep stools soft. 270 tablet 3     triamcinolone (KENALOG) 0.1 % ointment Apply sparingly to affected area three times daily for 14 days. 30 g 0     hydrocortisone (CORTAID) 1 % cream Apply topically 2 times daily 30 g 1     baclofen (LIORESAL) 10 MG tablet Take 1 tablet (10 mg) by mouth 3 times daily as needed for muscle spasms 30 tablet 0     Allergies   Allergen Reactions     Stribild [Lhvczsb-Rqvtapl-Hwbmcwct-Tenof] Itching     Raltegravir Unknown     Tamsulosin Other (See Comments)     Headache     Family History   Problem Relation Age of Onset     C.A.D. No family hx of      DIABETES No family hx of      Coronary Artery Disease No family hx of      Hypertension No family hx of      Hyperlipidemia No family hx of      Breast Cancer No family hx of      Cancer - colorectal No family hx of      Ovarian Cancer No family hx of      Prostate Cancer No family hx of      Depression/Anxiety No family hx of      CEREBROVASCULAR DISEASE No family hx of      Anesthesia Reaction No family hx of      Thyroid Disease No family hx of      Asthma No family hx of      OSTEOPOROSIS No family hx of      Chemical Addiction No family hx of      Known Genetic Syndrome No  family hx of      Results for orders placed or performed in visit on 01/16/18 (from the past 24 hour(s))   Folate  Serum ([facility])   Result Value Ref Range    Folate 8.5 >=3.5 ng/mL    Narrative    Test performed by:  [facility] [address] [place] [address]   Vitamin B12 ([facility])   Result Value Ref Range    Vitamin B12 537 213 - 816 pg/mL    Narrative    Test performed by:  [facility] [address] [place] [address]   TSH  Sensitive ([facility])   Result Value Ref Range    TSH 0.76 0.30 - 5.00 uIU/mL    Narrative    Test performed by:  [facility] [address] [place] [address]   Comprehensive Metabolic Panel ([facility])   Result Value Ref Range    Albumin 5.0 (H) 3.3 - 4.9 mg/dL    Alkaline Phosphatase 63.7 40.0 - 150.0 U/L    ALT 16.2 0.0 - 45.0 U/L    AST 15.5 0.0 - 55.0 U/L    Bilirubin Total 0.5 0.2 - 1.3 mg/dL    Urea Nitrogen 19.0 7.0 - 21.0 mg/dL    Calcium 9.8 8.5 - 10.1 mg/dL    Chloride 105.3 98.0 - 110.0 mmol/L    Carbon Dioxide 25.9 20.0 - 32.0 mmol/L    Creatinine 1.3 0.7 - 1.3 mg/dL    Glucose 109.4 (H) 70.0 - 99.0 mg'dL    Potassium 4.8 (H) 3.2 - 4.6 mmol/dL    Sodium 141.6 132.0 - 142.0 mmol/L    Protein Total 7.8 6.8 - 8.8 g/dL    GFR Estimate 59.1 (L) >60.0 mL/min/1.7 m2    GFR Estimate If Black 71.5 >60.0 mL/min/1.7 m2   Lipid Panel ([place])   Result Value Ref Range    Cholesterol 203.7 (H) 0.0 - 200.0 mg/dL    Cholesterol/HDL Ratio 3.6 0.0 - 5.0    HDL Cholesterol 57.2 >40.0 mg/dL    LDL Cholesterol Calculated 128 0 - 129 mg/dL    Triglycerides 90.3 0.0 - 150.0 mg/dL    VLDL Cholesterol 18.1 7.0 - 32.0 mg/dL            Review of Systems:   As Above             Physical Exam:     Vitals:    01/16/18 1106   BP: 100/70   Pulse: 83   Temp: 98.1  F (36.7  C)   TempSrc: Oral   Weight: 162 lb 6.4 oz (73.7 kg)     Body mass index is 22.99 kg/(m^2).    GENERAL: healthy, alert, well nourished, well hydrated, no distress  RESP: lungs  clear to auscultation - no rales, no rhonchi, no wheezes  CV: regular rates and rhythm, normal S1 S2, no S3 or S4 and no murmur, no click or rub -  ABDOMEN: vertical scar present above umbilicus, soft, no tenderness, no  hepatosplenomegaly, no masses, normal bowel sounds    Office Visit on 08/18/2017   Component Date Value Ref Range Status     Lab Scanned Result 08/04/2017 DECIPHER PROSTATE-Scanned   Final

## 2018-01-19 ENCOUNTER — TELEPHONE (OUTPATIENT)
Dept: FAMILY MEDICINE | Facility: CLINIC | Age: 63
End: 2018-01-19

## 2018-01-19 LAB
HIV1 RNA # SERPL NAA+PROBE: NORMAL {COPIES}/ML
Lab: NORMAL {LOG_COPIES}/ML

## 2018-01-19 NOTE — TELEPHONE ENCOUNTER
Pt would like all labs from 1/16 printed and mailed to his home address when they are all resulted. Thank you.    Routed to Dr. Salgado and Chey RODRIGUEZ. /CONCHITA Degroot

## 2018-01-19 NOTE — TELEPHONE ENCOUNTER
[facility] phone call message- patient requesting results:    Test: Lab    Date of test: ?    Additional Comments: He needs a call back with his results.    OK to leave a message on voice mail? Yes    Primary language: English      needed? No    Call taken on January 19, 2018 at 12:52 PM by Wanda Randhawa

## 2018-01-22 ENCOUNTER — PRE VISIT (OUTPATIENT)
Dept: UROLOGY | Facility: CLINIC | Age: 63
End: 2018-01-22

## 2018-01-22 DIAGNOSIS — C61 MALIGNANT NEOPLASM OF PROSTATE (H): Primary | ICD-10-CM

## 2018-01-22 NOTE — TELEPHONE ENCOUNTER
Pt with prostate cancer coming in for a consult. Pathology and Decipher results available. Pt called and asked to come in for a PSA check.

## 2018-01-26 ENCOUNTER — OFFICE VISIT (OUTPATIENT)
Dept: UROLOGY | Facility: CLINIC | Age: 63
End: 2018-01-26
Payer: COMMERCIAL

## 2018-01-26 VITALS
HEIGHT: 71 IN | SYSTOLIC BLOOD PRESSURE: 116 MMHG | DIASTOLIC BLOOD PRESSURE: 79 MMHG | BODY MASS INDEX: 22.4 KG/M2 | HEART RATE: 70 BPM | WEIGHT: 160 LBS

## 2018-01-26 DIAGNOSIS — C18.3 MALIGNANT NEOPLASM OF HEPATIC FLEXURE (H): ICD-10-CM

## 2018-01-26 DIAGNOSIS — C61 MALIGNANT NEOPLASM OF PROSTATE (H): ICD-10-CM

## 2018-01-26 LAB — PSA SERPL-MCNC: 10.5 UG/L (ref 0–4)

## 2018-01-26 RX ORDER — BENZONATATE 100 MG/1
100 CAPSULE ORAL
COMMUNITY
Start: 2017-12-05 | End: 2018-09-17

## 2018-01-26 ASSESSMENT — PAIN SCALES - GENERAL: PAINLEVEL: NO PAIN (0)

## 2018-01-26 NOTE — PATIENT INSTRUCTIONS
PSA today.    Follow up with Dr. Cho in 4 months with PSA prior to appointment.    It was a pleasure meeting with you today.  Thank you for allowing me and my team the privilege of caring for you today.  YOU are the reason we are here, and I truly hope we provided you with the excellent service you deserve.  Please let us know if there is anything else we can do for you so that we can be sure you are leaving completely satisfied with your care experience.      Michelle GILLIAM

## 2018-01-26 NOTE — MR AVS SNAPSHOT
After Visit Summary   1/26/2018    Antonio Mercado    MRN: [id number]           Patient Information     Date Of Birth          1955        Visit Information        Provider Department      1/26/2018 1:00 PM Rajani Cho MD [facility] Urology and [facility] for Prostate and Urologic Cancers        Today's Diagnoses     Malignant neoplasm of hepatic flexure (H)          Care Instructions    PSA today.    Follow up with Dr. Cho in 4 months with PSA prior to appointment.    It was a pleasure meeting with you today.  Thank you for allowing me and my team the privilege of caring for you today.  YOU are the reason we are here, and I truly hope we provided you with the excellent service you deserve.  Please let us know if there is anything else we can do for you so that we can be sure you are leaving completely satisfied with your care experience.      Michelle GILLIAM          Follow-ups after your visit        Follow-up notes from your care team     Return in about 4 months (around 5/26/2018).      Your next 10 appointments already scheduled     Jun 06, 2018 11:15 AM CDT   LAB with  LAB   [facility] Lab [facility])    [address] [place] [id number] [phone]           Please do not eat 10-12 hours before your appointment if you are coming in fasting for labs on lipids, cholesterol, or glucose (sugar). This does not apply to pregnant women. Water, hot tea and black coffee (with nothing added) are okay. Do not drink other fluids, diet soda or chew gum.            Jun 06, 2018 11:40 AM CDT   (Arrive by 11:25 AM)   Return Prostate Cancer with Rajani Cho MD   [facility] Urology and [facility] for Prostate and Urologic Cancers ([facility])    [address] [place] [address]  [facility] [address] [phone]              Future tests that were ordered for you today     Open Future Orders        Priority Expected  "Expires Ordered    PSA tumor marker Routine 5/26/2018 1/26/2019 1/26/2018            Who to contact     Please call your clinic at [phone] to:    Ask questions about your health    Make or cancel appointments    Discuss your medicines    Learn about your test results    Speak to your doctor   If you have compliments or concerns about an experience at your clinic, or if you wish to file a complaint, please contact [facility] Patient Relations at [phone] or email us at [email or web address]         Additional Information About Your Visit        [facility] [employer] Information     [email or web address] gives you secure access to your electronic health record. If you see a primary care provider, you can also send messages to your care team and make appointments. If you have questions, please call your primary care clinic.  If you do not have a primary care provider, please call [phone] and they will assist you.      [email or web address] is an electronic gateway that provides easy, online access to your medical records. With [email or web address], you can request a clinic appointment, read your test results, renew a prescription or communicate with your care team.     To access your existing account, please contact your [facility] Clinic or call [phone] for assistance.        Care EveryWhere ID     This is your Care EveryWhere ID. This could be used by other organizations to access your [facility] medical records  [id number]        Your Vitals Were     Pulse Height BMI (Body Mass Index)             70 1.803 m (5' 11\") 22.32 kg/m2          Blood Pressure from Last 3 Encounters:   01/26/18 116/79   01/16/18 100/70   11/29/17 91/66    Weight from Last 3 Encounters:   01/26/18 72.6 kg (160 lb)   01/16/18 73.7 kg (162 lb 6.4 oz)   11/29/17 75 kg (165 lb 6.4 oz)              We Performed the Following     UROLOGY ADULT REFERRAL        Primary Care Provider Office Phone # Fax #    Salas Garcia " DO Damian [phone]       [address] [facility] [address]        Equal Access to Services     RYAN CASTRO : Hadii aad ku hadananya Valladares, alexysda caitlinnikki, seventa karenada obed, aileen mosherlavinia field. So [facility] [phone].    ATENCIÓN: Si habla español, tiene a day disposición servicios gratuitos de asistencia lingüística. Llame al [phone].    We comply with applicable federal civil rights laws and [place] laws. We do not discriminate on the basis of race, color, national origin, age, disability, sex, sexual orientation, or gender identity.            Thank you!     Thank you for choosing [facility] UROLOGY AND [facility] FOR PROSTATE AND UROLOGIC CANCERS  for your care. Our goal is always to provide you with excellent care. Hearing back from our patients is one way we can continue to improve our services. Please take a few minutes to complete the written survey that you may receive in the mail after your visit with us. Thank you!             Your Updated Medication List - Protect others around you: Learn how to safely use, store and throw away your medicines at www.disposemymeds.org.          This list is accurate as of 1/26/18  2:29 PM.  Always use your most recent med list.                   Brand Name Dispense Instructions for use Diagnosis    abacavir-lamiVUDine-zidovudine 300-150-300 MG per tablet    TRIZIVIR    180 tablet    Take 1 tablet by mouth 2 times daily    Human immunodeficiency virus (HIV) disease       alfuzosin 10 MG 24 hr tablet    UROXATRAL    30 tablet    TAKE 1 TABLET BY MOUTH EVERY DAY    Urinary frequency       baclofen 10 MG tablet    LIORESAL    30 tablet    Take 1 tablet (10 mg) by mouth 3 times daily as needed for muscle spasms    Back pain, unspecified back location, unspecified back pain laterality, unspecified chronicity       benzonatate 100 MG capsule    TESSALON     Take 100 mg by mouth        bisacodyl 5 MG EC tablet    BISACODYL LAXATIVE     60 tablet    Take 1 tablet (5 mg) by mouth daily as needed for constipation    Constipation, unspecified constipation type       docusate sodium 100 MG tablet    COLACE    270 tablet    Take 1 to 3 tablets daily to keep stools soft.    Constipation, unspecified constipation type       doxycycline monohydrate 100 MG capsule     60 capsule    Start 2 days prior to travel to malaria area, daily while there and for 4 weeks after departure    Travel advice encounter       etravirine 200 MG tablet    INTELENCE    180 tablet    Take 1 tablet (200 mg) by mouth 2 times daily (with meals)    Human immunodeficiency virus (HIV) disease       hydrocortisone 1 % cream    CORTAID    30 g    Apply topically 2 times daily    Internal hemorrhoids       hydrocortisone 2.5 % cream    ANUSOL-HC    30 g    Place rectally 2 times daily    External hemorrhoids       loperamide 2 MG tablet    IMODIUM A-D    30 tablet    Take 2 tabs (4 mg) after first loose stool, and then take one tab (2 mg) after each diarrheal stool.  Max of 8 tabs (16 mg) per day.    Travel advice encounter       magic mouthwash suspension    ENTER INGREDIENTS IN COMMENTS    300 mL    Take 5 mLs by mouth every 4 hours as needed    Acute pain of mouth       naproxen 375 MG tablet    NAPROSYN    30 tablet    Take 1 tablet (375 mg) by mouth 2 times daily as needed for moderate pain (take with food)    Tension headache       oxybutynin 5 MG 24 hr tablet    DITROPAN-XL    31 tablet    Take 1 tablet by mouth daily    Bladder outlet obstruction       polyethylene glycol powder    MIRALAX    510 g    Take 17 g (1 capful) by mouth 2 times daily as needed for constipation    Constipation, unspecified constipation type       primaquine 26.3 MG tablet      Take 30 mg Base (TWO TABLETS) daily for 14 days.DO NOT begin this medication until you have finished your Malarone. START this medication on 5/15/17.        Propylene Glycol-Glycerin 1-0.3 % Soln    [employer] ARTIFICIAL TEARS    30  mL    Apply 1 drop to eye 2 times daily as needed    Dry eyes       sodium chloride 0.65 % nasal spray    OCEAN    1 Bottle    Spray 1 spray into both nostrils daily as needed for congestion    Stuffy and runny nose       * triamcinolone 0.1 % ointment    KENALOG    30 g    Apply sparingly to affected area three times daily for 14 days.    Rash       * triamcinolone 0.1 % cream    KENALOG    30 g    Apply sparingly to affected area three times daily for 14 days.    Hemorrhoids, unspecified hemorrhoid type       VITAMIN D (CHOLECALCIFEROL) PO      Take by mouth daily        * Notice:  This list has 2 medication(s) that are the same as other medications prescribed for you. Read the directions carefully, and ask your doctor or other care provider to review them with you.

## 2018-01-26 NOTE — LETTER
1/26/2018       RE: Antonio Mercado  [address]   [place] [address]     Dear Colleague,    Thank you for referring your patient, Antonio Mercado, to the [facility] UROLOGY AND INST FOR PROSTATE AND UROLOGIC CANCERS at [facility]. Please see a copy of my visit note below.    We are pleased to see Mr. Antonio Mercado in consultation at the request of Dr. Salgado for the evaluation of chief complaint listed below    Chief Complaint:    New diagnosis of CAP          History of Present Illness:   62M with PMH HIV (with no viral load on most recent check), and colorectal tumor s/p right hemicolectomy in 2016, urologic h/o microhematuria (s/p neg workup in 2013 by [facility]), mixed LUTS on alfuzosin and oxybutynin, and recently diagnosed CAP on fusion TRUSBx done in Aug 2017 for an elevated PSA of 8.8 (March 2017), showing Gl 3+3 disease in 4/15 cores, two of which were the target cores noted on MRI from May 2017:    H. Prostate, right lateral mid, core biopsy:   - Prostatic adenocarcinoma   - Liberty score 6 (3+3)   - Grade Group 1   - Extent: Involves 1 of 1 core (1 mm, 10%)     I. Prostate, right lateral apex, core biopsy:   - Prostatic adenocarcinoma   - Liberty score 6 (3+3)   - Grade Group 1   - Extent: Involves 1 of 1 core (2 mm, 25%)     M. Prostate, target 1 - left anterior apex, core biopsy:   - Prostatic adenocarcinoma   - Liberty score 6 (3+3)   - Grade Group 1   - Extent: Involves 2 of 2 cores (1 mm, 10%; 0.5 mm, 5%)     O. Prostate, target 3 - right base, core biopsy:   - Prostatic adenocarcinoma   - Shawn score 6 (3+3)   - Grade Group 1   - Extent: Involves 2 of 2 cores (1 mm, 5%; 4 mm, 30%)     MRI 5/31/2017 was read as PIRADS-4, with 3 suspicious lesions, 2 located in the left posterior lateral peripheral zone (at the apex and mid gland respectively) and one in the right anterior transitional zone.    Decipher score was 0.32, corresponding to low risk  "disease, with only 2.6% risk for 10-year CAP-specific mortality, 1.7% risk for 5-year mets, and 15.7% risk for HG disease.     He was following with Dr. Pierre prior and was referred to our clinic because he \"wants to start over\" with workup because he believes that the dietary and health changes that he had done over the past 6m should change his diagnosis. He also stopped his BPH medications and denies any LUTS - no dysuria, urgency, hematuria, weak stream or urinary retention          Past Medical History:   As above, along with history of colon tumor s/p right hemicolectomy 2015         Past Surgical History:   Right hemicolectomy with Dr. 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         Allergies:     Allergies   Allergen Reactions     Stribild [Fszpdcs-Jhcnchi-Ssigkpgq-Tenof] Itching     Raltegravir Unknown     Tamsulosin Other (See Comments)     Headache            Medications:   Noted for  HIV meds         REVIEW OF SYSTEMS:    See HPI for pertinent details.  Remainder of 10-point ROS negative.         PHYSICAL EXAM   /79  Pulse 70  Ht 1.803 m (5' 11\")  Wt 72.6 kg (160 lb)  BMI 22.32 kg/m2  GENERAL: No acute distress. Well nourished.   HEENT:  Sclerae anicteric.  Conjunctivae pink.  Moist mucous membranes.  NECK:  Supple.  No lymphadenopathy.  CARDIAC:  Regular rate and rhythm.  LUNGS:  Non-labored breathing  BACK:  No costovertebral tenderness.  ABDOMEN: Soft, non-tender, no surgical scars, no organomegaly, non-tender.  :  Phallus circumcised, meatus adequate, no plaques palpated. Testes descended bilaterally, no intratesticular masses.  Epididymes non-tender.  No varicoceles or inguinal hernias.  RECTAL:  Good tone.  Prostate smooth, symmetric, non-tender, no nodules.  moderate size.            LABS AND IMAGING:   As " above          ASSESSMENT:   Giovanni Disla is a 62 year old male rologic h/o microhematuria (s/p neg workup in 2013 by [facility]), mixed LUTS on alfuzosin and oxybutynin, and recently diagnosed CAP on fusion TRUSBx done in Aug 2017 for an elevated PSA of 8.8 (March 2017), showing Gl 3+3 disease in 4/15 cores, two of which were the target cores noted on MRI from May 2017. Decipher score was low risk.   He was seen by Dr. Pierre before and is referred to our clinic for further discussion of management options for his CAP             PLAN:   - PSA check pending today  - recheck in 4 months  - MRI in the fall and then decide on another biopsy    Shanthi Martinez MD MS  Urology Resident    Patient was seen and examined with Dr. Cho  Patient seen and examined with the resident.  Visit time 15 minutes and >50% spent in counseling.  I agree with the resident's note and plan of care.       Rajani Cho MD  Urology Staff        CC  Patient Care Team:  June Salgado DO as PCP - General (Student in organized health care education/training program)  Chance Andersen MD (Infectious Diseases)  Harris Mary MD as MD (Family Practice)  Yolette Pierre MD as MD (Urology)  Linda Pompa, RN as Registered Nurse  Ely Phelan as   JUNE SALGADO    Copy to patient  GIOVANNI DISLA  [address] [address]

## 2018-01-26 NOTE — NURSING NOTE
Chief Complaint   Patient presents with     RECHECK     prostate cancer follow up    Colten Srivastava LPN

## 2018-01-26 NOTE — PROGRESS NOTES
"We are pleased to see Mr. Antonio Mercado in consultation at the request of Dr. Salgado for the evaluation of chief complaint listed below    Chief Complaint:    New diagnosis of CAP          History of Present Illness:   62M with PMH HIV (with no viral load on most recent check), and colorectal tumor s/p right hemicolectomy in 2016, urologic h/o microhematuria (s/p neg workup in 2013 by [facility]), mixed LUTS on alfuzosin and oxybutynin, and recently diagnosed CAP on fusion TRUSBx done in Aug 2017 for an elevated PSA of 8.8 (March 2017), showing Gl 3+3 disease in 4/15 cores, two of which were the target cores noted on MRI from May 2017:    H. Prostate, right lateral mid, core biopsy:   - Prostatic adenocarcinoma   - Shawn score 6 (3+3)   - Grade Group 1   - Extent: Involves 1 of 1 core (1 mm, 10%)     I. Prostate, right lateral apex, core biopsy:   - Prostatic adenocarcinoma   - Hardy score 6 (3+3)   - Grade Group 1   - Extent: Involves 1 of 1 core (2 mm, 25%)     M. Prostate, target 1 - left anterior apex, core biopsy:   - Prostatic adenocarcinoma   - Shawn score 6 (3+3)   - Grade Group 1   - Extent: Involves 2 of 2 cores (1 mm, 10%; 0.5 mm, 5%)     O. Prostate, target 3 - right base, core biopsy:   - Prostatic adenocarcinoma   - Shawn score 6 (3+3)   - Grade Group 1   - Extent: Involves 2 of 2 cores (1 mm, 5%; 4 mm, 30%)     MRI 5/31/2017 was read as PIRADS-4, with 3 suspicious lesions, 2 located in the left posterior lateral peripheral zone (at the apex and mid gland respectively) and one in the right anterior transitional zone.    Decipher score was 0.32, corresponding to low risk disease, with only 2.6% risk for 10-year CAP-specific mortality, 1.7% risk for 5-year mets, and 15.7% risk for HG disease.     He was following with Dr. Pierre prior and was referred to our clinic because he \"wants to start over\" with workup because he believes that the dietary and health changes that he had done over the " "past 6m should change his diagnosis. He also stopped his BPH medications and denies any LUTS - no dysuria, urgency, hematuria, weak stream or urinary retention          Past Medical History:   As above, along with history of colon tumor s/p right hemicolectomy 2015         Past Surgical History:   Right hemicolectomy with Dr. 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         Allergies:     Allergies   Allergen Reactions     Stribild [Ycmssnr-Rctliqz-Bzhujqhc-Tenof] Itching     Raltegravir Unknown     Tamsulosin Other (See Comments)     Headache            Medications:   Noted for  HIV meds         REVIEW OF SYSTEMS:    See HPI for pertinent details.  Remainder of 10-point ROS negative.         PHYSICAL EXAM   /79  Pulse 70  Ht 1.803 m (5' 11\")  Wt 72.6 kg (160 lb)  BMI 22.32 kg/m2  GENERAL: No acute distress. Well nourished.   HEENT:  Sclerae anicteric.  Conjunctivae pink.  Moist mucous membranes.  NECK:  Supple.  No lymphadenopathy.  CARDIAC:  Regular rate and rhythm.  LUNGS:  Non-labored breathing  BACK:  No costovertebral tenderness.  ABDOMEN: Soft, non-tender, no surgical scars, no organomegaly, non-tender.  :  Phallus circumcised, meatus adequate, no plaques palpated. Testes descended bilaterally, no intratesticular masses.  Epididymes non-tender.  No varicoceles or inguinal hernias.  RECTAL:  Good tone.  Prostate smooth, symmetric, non-tender, no nodules.  moderate size.            LABS AND IMAGING:   As above          ASSESSMENT:   Antonio Mercado is a 62 year old male rologic h/o microhematuria (s/p neg workup in 2013 by [facility]), mixed LUTS on alfuzosin and oxybutynin, and recently diagnosed CAP on fusion TRUSBx done in Aug 2017 for an elevated PSA of 8.8 (March 2017), showing Gl 3+3 disease in 4/15 cores, two of " which were the target cores noted on MRI from May 2017. Decipher score was low risk.   He was seen by Dr. Pierre before and is referred to our clinic for further discussion of management options for his CAP             PLAN:   - PSA check pending today  - recheck in 4 months  - MRI in the fall and then decide on another biopsy    Shanthi Martinez MD MS  Urology Resident    Patient was seen and examined with Dr. Cho  Patient seen and examined with the resident.  Visit time 15 minutes and >50% spent in counseling.  I agree with the resident's note and plan of care.       Rajani Cho MD  Urology Staff        CC  Patient Care Team:  June Salgado DO as PCP - General (Student in organized health care education/training program)  Chance Andersen MD (Infectious Diseases)  Harris Mary MD as MD (Family Practice)  Yolette Pierre MD as MD (Urology)  Linda Pompa, RN as Registered Nurse  Ely Phelan as   JUNE SALGADO    Copy to patient  GIOVANNI DISLA  [address]  [place] [address]

## 2018-03-12 DIAGNOSIS — J06.9 UPPER RESPIRATORY TRACT INFECTION, UNSPECIFIED TYPE: ICD-10-CM

## 2018-03-14 ENCOUNTER — COMMUNICATION - HEALTHEAST (OUTPATIENT)
Dept: INFECTIOUS DISEASES | Facility: CLINIC | Age: 63
End: 2018-03-14

## 2018-03-14 ENCOUNTER — AMBULATORY - HEALTHEAST (OUTPATIENT)
Dept: INFECTIOUS DISEASES | Facility: CLINIC | Age: 63
End: 2018-03-14

## 2018-03-14 DIAGNOSIS — Z21 HIV (HUMAN IMMUNODEFICIENCY VIRUS INFECTION) (H): ICD-10-CM

## 2018-03-14 DIAGNOSIS — B20 AIDS (H): ICD-10-CM

## 2018-03-14 RX ORDER — ABACAVIR , LAMIVUDINE AND ZIDOVUDINE 150; 300; 300 MG/1; MG/1; MG/1
1 TABLET ORAL 2 TIMES DAILY
Qty: 180 TABLET | Refills: 3 | OUTPATIENT
Start: 2018-03-14

## 2018-03-14 RX ORDER — CODEINE PHOSPHATE AND GUAIFENESIN 10; 100 MG/5ML; MG/5ML
1-2 SOLUTION ORAL EVERY 4 HOURS PRN
Qty: 236 ML | Refills: 0 | OUTPATIENT
Start: 2018-03-14

## 2018-03-14 RX ORDER — GUAIFENESIN/DEXTROMETHORPHAN 100-10MG/5
5 SYRUP ORAL 3 TIMES DAILY PRN
Qty: 560 ML | Refills: 0 | Status: CANCELLED | OUTPATIENT
Start: 2018-03-14

## 2018-03-14 RX ORDER — ETRAVIRINE 200 MG/1
200 TABLET ORAL 2 TIMES DAILY WITH MEALS
Qty: 180 TABLET | Refills: 3 | OUTPATIENT
Start: 2018-03-14

## 2018-04-03 ENCOUNTER — OFFICE VISIT - HEALTHEAST (OUTPATIENT)
Dept: INFECTIOUS DISEASES | Facility: CLINIC | Age: 63
End: 2018-04-03

## 2018-04-03 DIAGNOSIS — Z29.89 NEED FOR MALARIA PROPHYLAXIS: ICD-10-CM

## 2018-04-03 DIAGNOSIS — B20 AIDS (H): ICD-10-CM

## 2018-04-03 DIAGNOSIS — Z21 HIV (HUMAN IMMUNODEFICIENCY VIRUS INFECTION) (H): ICD-10-CM

## 2018-04-03 LAB
BASOPHILS # BLD AUTO: 0 THOU/UL (ref 0–0.2)
BASOPHILS NFR BLD AUTO: 1 % (ref 0–2)
EOSINOPHIL # BLD AUTO: 0.1 THOU/UL (ref 0–0.4)
EOSINOPHIL NFR BLD AUTO: 2 % (ref 0–6)
ERYTHROCYTE [DISTWIDTH] IN BLOOD BY AUTOMATED COUNT: 15.5 % (ref 11–14.5)
HCT VFR BLD AUTO: 41.5 % (ref 40–54)
HGB BLD-MCNC: 14 G/DL (ref 14–18)
LYMPHOCYTES # BLD AUTO: 1.8 THOU/UL (ref 0.8–4.4)
LYMPHOCYTES NFR BLD AUTO: 50 % (ref 20–40)
MCH RBC QN AUTO: 34.4 PG (ref 27–34)
MCHC RBC AUTO-ENTMCNC: 33.7 G/DL (ref 32–36)
MCV RBC AUTO: 102 FL (ref 80–100)
MONOCYTES # BLD AUTO: 0.3 THOU/UL (ref 0–0.9)
MONOCYTES NFR BLD AUTO: 9 % (ref 2–10)
NEUTROPHILS # BLD AUTO: 1.4 THOU/UL (ref 2–7.7)
NEUTROPHILS NFR BLD AUTO: 39 % (ref 50–70)
PLATELET # BLD AUTO: 191 THOU/UL (ref 140–440)
PMV BLD AUTO: 9.1 FL (ref 8.5–12.5)
RBC # BLD AUTO: 4.07 MILL/UL (ref 4.4–6.2)
WBC: 3.6 THOU/UL (ref 4–11)

## 2018-04-03 ASSESSMENT — MIFFLIN-ST. JEOR: SCORE: 1559.17

## 2018-04-06 LAB
HIV1 RNA # PLAS NAA DL=20: NORMAL {COPIES}/ML
HIV1 RNA SERPL NAA+PROBE-LOG#: NORMAL {LOG_COPIES}/ML

## 2018-04-16 ENCOUNTER — COMMUNICATION - HEALTHEAST (OUTPATIENT)
Dept: INFECTIOUS DISEASES | Facility: CLINIC | Age: 63
End: 2018-04-16

## 2018-05-22 ENCOUNTER — COMMUNICATION - HEALTHEAST (OUTPATIENT)
Dept: FAMILY MEDICINE | Facility: CLINIC | Age: 63
End: 2018-05-22

## 2018-05-22 DIAGNOSIS — K59.00 CONSTIPATION: ICD-10-CM

## 2018-05-24 ENCOUNTER — OFFICE VISIT (OUTPATIENT)
Dept: FAMILY MEDICINE | Facility: CLINIC | Age: 63
End: 2018-05-24
Payer: COMMERCIAL

## 2018-05-24 VITALS
TEMPERATURE: 98.5 F | BODY MASS INDEX: 22.51 KG/M2 | SYSTOLIC BLOOD PRESSURE: 97 MMHG | DIASTOLIC BLOOD PRESSURE: 62 MMHG | RESPIRATION RATE: 16 BRPM | OXYGEN SATURATION: 98 % | HEART RATE: 93 BPM | WEIGHT: 161.4 LBS

## 2018-05-24 DIAGNOSIS — M79.642 PAIN OF LEFT HAND: ICD-10-CM

## 2018-05-24 DIAGNOSIS — K62.5 RECTAL BLEEDING: Primary | ICD-10-CM

## 2018-05-24 DIAGNOSIS — K59.00 CONSTIPATION, UNSPECIFIED CONSTIPATION TYPE: ICD-10-CM

## 2018-05-24 LAB
% GRANULOCYTES: 45.8 %G (ref 40–75)
GRANULOCYTES #: 1.3 K/UL (ref 1.6–8.3)
HCT VFR BLD AUTO: 37.2 % (ref 40–53)
HEMOGLOBIN: 11.5 G/DL (ref 13.3–17.7)
LYMPHOCYTES # BLD AUTO: 1.4 K/UL (ref 0.8–5.3)
LYMPHOCYTES NFR BLD AUTO: 47 %L (ref 20–48)
MCH RBC QN AUTO: 33.3 PG (ref 26.5–35)
MCHC RBC AUTO-ENTMCNC: 30.9 G/DL (ref 32–36)
MCV RBC AUTO: 107.9 FL (ref 78–100)
MID #: 0.2 K/UL (ref 0–2.2)
MID %: 7.2 %M (ref 0–20)
PLATELET # BLD AUTO: 205 K/UL (ref 150–450)
RBC # BLD AUTO: 3.5 M/UL (ref 4.4–5.9)
WBC # BLD AUTO: 2.9 K/UL (ref 4–11)

## 2018-05-24 RX ORDER — POLYETHYLENE GLYCOL 3350 17 G/17G
1 POWDER, FOR SOLUTION ORAL 2 TIMES DAILY PRN
Qty: 510 G | Refills: 1 | Status: SHIPPED | OUTPATIENT
Start: 2018-05-24 | End: 2018-09-17

## 2018-05-24 RX ORDER — ACETAMINOPHEN 500 MG
1000 TABLET ORAL 3 TIMES DAILY
Qty: 100 TABLET | Refills: 0 | Status: SHIPPED | OUTPATIENT
Start: 2018-05-24 | End: 2020-02-26

## 2018-05-24 RX ORDER — ASPIRIN 81 MG
TABLET, DELAYED RELEASE (ENTERIC COATED) ORAL
Qty: 270 TABLET | Refills: 3 | Status: SHIPPED | OUTPATIENT
Start: 2018-05-24 | End: 2019-07-16

## 2018-05-24 NOTE — LETTER
May 29, 2018      Antonio Darlingleonel  [address]   [place] [address]        Dear Antonio,    Please see below for your test results.  Your hemoglobin was 11.5 similar to what it was one year ago when it was 11.3. I know we spoke on the phone but we will send you this letter for your records.     Resulted Orders   CBC with Diff Plt ([facility])   Result Value Ref Range    WBC 2.9 (L) 4.0 - 11.0 K/uL    Lymphocytes # 1.4 0.8 - 5.3 K/uL    % Lymphocytes 47.0 20.0 - 48.0 %L    Mid # 0.2 0.0 - 2.2 K/uL    Mid % 7.2 0.0 - 20.0 %M    GRANULOCYTES # 1.3 (L) 1.6 - 8.3 K/uL    % Granulocytes 45.8 40.0 - 75.0 %G    RBC 3.5 (L) 4.4 - 5.9 M/uL    Hemoglobin 11.5 (L) 13.3 - 17.7 g/dL    Hematocrit 37.2 (L) 40.0 - 53.0 %    .9 (H) 78.0 - 100.0 fL    MCH 33.3 26.5 - 35.0    MCHC 30.9 (L) 32.0 - 36.0 g/dL    Platelets 205.0 150.0 - 450.0 K/uL       If you have any questions, please call the clinic to make an appointment.    Sincerely,    Salas Salgado, DO

## 2018-05-24 NOTE — PROGRESS NOTES
Preceptor Attestation:   Patient seen, evaluated and discussed with the resident. I have verified the content of the note, which accurately reflects my assessment of the patient and the plan of care.   Supervising Physician:  Felice Reagan MD

## 2018-05-24 NOTE — PROGRESS NOTES
Subjective: 63-year-old male with past medical history of HIV, colon cancer status post ileocolonic anastomosis presents today with rectal bleeding and wrist pain.    Patient notes rectal bleeding starting on Sunday.  He had a significant amount of blood in that bowel movement.  He thinks it was bright red blood in nature.  It stops very soon after he has a bowel movement.  He said roughly 1 bowel movement each day since then although has not had a bowel movement yet today and with each bowel movement there is bright red blood present.  The amount of blood present has decreased significantly since Sunday.  Says there is probably closer to 20 cc in the last few bowel movements.  Denies abdominal pain.  Denies fever.  He does note that he is still constipated on a daily basis.  Most recent colonoscopy in December 2017 showed diverticulosis the entire colon as well as internal hemorrhoids.  His end-to-end ileocolonic anastomosis was noted to be patent but also characterized by ulceration.    Patient also notes the onset of left wrist pain or more hand pain in the top of his hand after picking up something heavy 3 or 4 days ago.  The pain is improved since that time.  He heard no snaps tears or pops.  There is been no swelling or bruising that he is noted.  He has been applying heat intermittently.  No numbness or tingling in the hand.  No significant pain in the wrist.    Objective    BP 97/62  Pulse 93  Temp 98.5  F (36.9  C) (Oral)  Resp 16  Wt 161 lb 6.4 oz (73.2 kg)  SpO2 98%  BMI 22.51 kg/m2    Heart: Regular rate and rhythm without murmurs  Lungs: Clear to auscultation throughout  MSK: No significant swelling or ecchymosis noted over the dorsal surface of the left hand.  Full range of motion in flexion and extension of the wrist.  Full strength in flexion and extension of the wrist 5 out of 5.  Some discomfort noted with wrist extension.  Tenderness to palpation over the extensor tendons.  Abdomen: Bowel  sounds active, no tenderness to deep palpation throughout.    Assessment and plan    Rectal bleeding: Likely lower GI bleed.  Internal hemorrhoids could be contributing although he seems to have a fair amount of blood so there could be another contributor.  No significant pain in the abdomen so unlikely diverticulitis.  Will place urgent referral to colorectal surgery.  Will check hemoglobin.  Patient's bleeding increases or he develops lightheaded or dizziness he should present to the emergency room.  Otherwise he should keep taking fiber supplementation, MiraLAX twice daily and increase his Colace 2100 mg 3 times daily.  Continue a high-fiber diet and drinking lots of water.    Left hand pain: Likely sprain of the extensor tendons.  No noted trauma.  No swelling or ecchymosis.  Full range of motion and strength.  Treat conservatively with Tylenol thousand milligrams 3 times daily and ice.  If not continuing to improve in the next week would recommend he  come back to have a repeat evaluation.    Patient verbalized understanding of plan and all questions were answered.    Discussed with Dr. Reagan.    Jessee Salgado DO  PGY 3

## 2018-05-24 NOTE — PATIENT INSTRUCTIONS
- take tylenol 1,000 mg three times a day for a week and if not better return for the wrist & ice/heat  - bleeding: check hemoglobin (avoid aspirin, ibuprofen), refr to colorectal surgeon  Increase fiber (wheat bread, vegetables, berries) in diet and stay hydrated  - add an extra dose of colace (red pill) take 3 times per day and continue taking miralax  - refer to colorectal surgery urgently  - if still bleeding more than 20-30  CC in a few days or get lightheaded, dizzy go to [facility]  1st floor  [phone]  F:[phone]      Appointment   Date:5/25/18  Time: 2:00pm   Arrive about 15 minutes early.     Please contact the above clinic if you need to cancel or reschedule. Feel free to contact me with any questions. Thanks!    Vida  Care Coordinator  [phone]

## 2018-05-24 NOTE — MR AVS SNAPSHOT
After Visit Summary   5/24/2018    Antonio Mercado    MRN: [id number]           Patient Information     Date Of Birth          1955        Visit Information        Provider Department      5/24/2018 10:20 AM Salas Salgado DO [facility]        Today's Diagnoses     Rectal bleeding    -  1    Constipation, unspecified constipation type        Pain of left hand          Care Instructions    - take tylenol 1,000 mg three times a day for a week and if not better return for the wrist & ice/heat  - bleeding: check hemoglobin (avoid aspirin, ibuprofen), refr to colorectal surgeon  Increase fiber (wheat bread, vegetables, berries) in diet and stay hydrated  - add an extra dose of colace (red pill) take 3 times per day and continue taking miralax  - refer to colorectal surgery urgently  - if still bleeding more than 20-30  CC in a few days or get lightheaded, dizzy go to ER          Follow-ups after your visit        Additional Services     COLORECTAL SURGERY REFERRAL       Patient prefers to be called URGENT REFERRAL    Reason for Referral: rectal bleeding     needed: No  Language: English    May leave message on voicemail: No    ([place] Only) Referral should be tracked (Yes/No)?                  Your next 10 appointments already scheduled     Jun 06, 2018 11:15 AM CDT   LAB with  LAB   [facility] ([facility])    [address] [place] [address] [phone]           Please do not eat 10-12 hours before your appointment if you are coming in fasting for labs on lipids, cholesterol, or glucose (sugar). This does not apply to pregnant women. Water, hot tea and black coffee (with nothing added) are okay. Do not drink other fluids, diet soda or chew gum.            Jun 06, 2018 11:40 AM CDT   (Arrive by 11:25 AM)   Return Prostate Cancer with Rajani Cho MD   [facility] ([facility]  [facility])    [address]  [address]th [facility] [address] [phone]              Future tests that were ordered for you today     Open Future Orders        Priority Expected Expires Ordered    COLORECTAL SURGERY REFERRAL Routine  5/24/2019 5/24/2018            Who to contact     Please call your clinic at [phone] to:    Ask questions about your health    Make or cancel appointments    Discuss your medicines    Learn about your test results    Speak to your doctor            Additional Information About Your Visit        [employer] Information     [email or web address] gives you secure access to your electronic health record. If you see a primary care provider, you can also send messages to your care team and make appointments. If you have questions, please call your primary care clinic.  If you do not have a primary care provider, please call [phone] and they will assist you.      [employer] is an electronic gateway that provides easy, online access to your medical records. With [employer], you can request a clinic appointment, read your test results, renew a prescription or communicate with your care team.     To access your existing account, please contact your [facility] or call [phone] for assistance.        Care EveryWhere ID     This is your Care EveryWhere ID. This could be used by other organizations to access your [place] medical records  [id number]        Your Vitals Were     Pulse Temperature Respirations Pulse Oximetry BMI (Body Mass Index)       93 98.5  F (36.9  C) (Oral) 16 98% 22.51 kg/m2        Blood Pressure from Last 3 Encounters:   05/24/18 97/62   01/26/18 116/79   01/16/18 100/70    Weight from Last 3 Encounters:   05/24/18 161 lb 6.4 oz (73.2 kg)   01/26/18 160 lb (72.6 kg)   01/16/18 162 lb 6.4 oz (73.7 kg)                 Today's Medication Changes          These changes are accurate as of 5/24/18 11:10 AM.  If you have  any questions, ask your nurse or doctor.               Start taking these medicines.        Dose/Directions    acetaminophen 500 MG tablet   Commonly known as:  TYLENOL   Used for:  Pain of left hand   Started by:  Salas Salgado DO        Dose:  1000 mg   Take 2 tablets (1,000 mg) by mouth 3 times daily   Quantity:  100 tablet   Refills:  0            Where to get your medicines      These medications were sent to [id number] - [place], [place] - [address] [place] [address] AT [facility] [address]  [address], [place] [address]     Phone:  [phone]     acetaminophen 500 MG tablet    docusate sodium 100 MG tablet    polyethylene glycol powder                Primary Care Provider Office Phone # Fax #    Salas Jose Salgado [phone] [address] [facility] [address]        Equal Access to Services     RYAN CASTRO : Hadii aad ku hadasho [place], waaxda luqadaha, qaybta kaalmada adedarrellyafannie, aileen greenberg . So [place] [phone].    ATENCIÓN: Si habla español, tiene a day disposición servicios gratuitos de asistencia lingüística. [facility] [phone].    We comply with applicable federal civil rights laws and [place] laws. We do not discriminate on the basis of race, color, national origin, age, disability, sex, sexual orientation, or gender identity.            Thank you!     Thank you for choosing [facility]  for your care. Our goal is always to provide you with excellent care. Hearing back from our patients is one way we can continue to improve our services. Please take a few minutes to complete the written survey that you may receive in the mail after your visit with us. Thank you!             Your Updated Medication List - Protect others around you: Learn how to safely use, store and throw away your medicines at www.disposemymeds.org.          This list is accurate as of 5/24/18 11:10 AM.  Always use your most recent med list.                    Brand Name Dispense Instructions for use Diagnosis    abacavir-lamiVUDine-zidovudine 300-150-300 MG per tablet    TRIZIVIR    180 tablet    Take 1 tablet by mouth 2 times daily    Human immunodeficiency virus (HIV) disease       acetaminophen 500 MG tablet    TYLENOL    100 tablet    Take 2 tablets (1,000 mg) by mouth 3 times daily    Pain of left hand       alfuzosin 10 MG 24 hr tablet    UROXATRAL    30 tablet    TAKE 1 TABLET BY MOUTH EVERY DAY    Urinary frequency       baclofen 10 MG tablet    LIORESAL    30 tablet    Take 1 tablet (10 mg) by mouth 3 times daily as needed for muscle spasms    Back pain, unspecified back location, unspecified back pain laterality, unspecified chronicity       benzonatate 100 MG capsule    TESSALON     Take 100 mg by mouth        bisacodyl 5 MG EC tablet    BISACODYL LAXATIVE    60 tablet    Take 1 tablet (5 mg) by mouth daily as needed for constipation    Constipation, unspecified constipation type       docusate sodium 100 MG tablet    COLACE    270 tablet    Take 1 to 3 tablets daily to keep stools soft.    Constipation, unspecified constipation type       doxycycline monohydrate 100 MG capsule     60 capsule    Start 2 days prior to travel to malaria area, daily while there and for 4 weeks after departure    Travel advice encounter       etravirine 200 MG tablet    INTELENCE    180 tablet    Take 1 tablet (200 mg) by mouth 2 times daily (with meals)    Human immunodeficiency virus (HIV) disease       hydrocortisone 1 % cream    CORTAID    30 g    Apply topically 2 times daily    Internal hemorrhoids       hydrocortisone 2.5 % cream    ANUSOL-HC    30 g    Place rectally 2 times daily    External hemorrhoids       loperamide 2 MG tablet    IMODIUM A-D    30 tablet    Take 2 tabs (4 mg) after first loose stool, and then take one tab (2 mg) after each diarrheal stool.  Max of 8 tabs (16 mg) per day.    Travel advice encounter       magic mouthwash suspension     ENTER INGREDIENTS IN COMMENTS    300 mL    Take 5 mLs by mouth every 4 hours as needed    Acute pain of mouth       naproxen 375 MG tablet    NAPROSYN    30 tablet    Take 1 tablet (375 mg) by mouth 2 times daily as needed for moderate pain (take with food)    Tension headache       oxybutynin 5 MG 24 hr tablet    DITROPAN-XL    31 tablet    Take 1 tablet by mouth daily    Bladder outlet obstruction       polyethylene glycol powder    MIRALAX    510 g    Take 17 g (1 capful) by mouth 2 times daily as needed for constipation    Constipation, unspecified constipation type       primaquine 26.3 MG tablet      Take 30 mg Base (TWO TABLETS) daily for 14 days.DO NOT begin this medication until you have finished your Malarone. START this medication on 5/15/17.        Propylene Glycol-Glycerin 1-0.3 % Soln    [employer] ARTIFICIAL TEARS    30 mL    Apply 1 drop to eye 2 times daily as needed    Dry eyes       sodium chloride 0.65 % nasal spray    OCEAN    1 Bottle    Spray 1 spray into both nostrils daily as needed for congestion    Stuffy and runny nose       * triamcinolone 0.1 % ointment    KENALOG    30 g    Apply sparingly to affected area three times daily for 14 days.    Rash       * triamcinolone 0.1 % cream    KENALOG    30 g    Apply sparingly to affected area three times daily for 14 days.    Hemorrhoids, unspecified hemorrhoid type       VITAMIN D (CHOLECALCIFEROL) PO      Take by mouth daily        * Notice:  This list has 2 medication(s) that are the same as other medications prescribed for you. Read the directions carefully, and ask your doctor or other care provider to review them with you.

## 2018-05-31 ENCOUNTER — PRE VISIT (OUTPATIENT)
Dept: UROLOGY | Facility: CLINIC | Age: 63
End: 2018-05-31

## 2018-05-31 NOTE — TELEPHONE ENCOUNTER
Reason for visit: prostate cancer follow up     Relevant information: Plan is for patient to have an MRI in the fall and then decide on another biopsy at that pointm    Records/imaging/labs: PSA is scheduled    Pt called: No need for a call    Rooming: regular

## 2018-06-08 ENCOUNTER — OFFICE VISIT (OUTPATIENT)
Dept: FAMILY MEDICINE | Facility: CLINIC | Age: 63
End: 2018-06-08
Payer: COMMERCIAL

## 2018-06-08 VITALS
WEIGHT: 163 LBS | OXYGEN SATURATION: 100 % | DIASTOLIC BLOOD PRESSURE: 74 MMHG | HEART RATE: 66 BPM | RESPIRATION RATE: 16 BRPM | SYSTOLIC BLOOD PRESSURE: 112 MMHG | TEMPERATURE: 97.9 F | BODY MASS INDEX: 22.73 KG/M2

## 2018-06-08 DIAGNOSIS — M54.59 MECHANICAL LOW BACK PAIN: Primary | ICD-10-CM

## 2018-06-08 RX ORDER — CYCLOBENZAPRINE HCL 10 MG
10 TABLET ORAL
Qty: 14 TABLET | Refills: 1 | Status: SHIPPED | OUTPATIENT
Start: 2018-06-08 | End: 2018-06-29

## 2018-06-08 NOTE — PROGRESS NOTES
{  Family History   Problem Relation Age of Onset     C.A.D. No family hx of      DIABETES No family hx of      Coronary Artery Disease No family hx of      Hypertension No family hx of      Hyperlipidemia No family hx of      Breast Cancer No family hx of      Cancer - colorectal No family hx of      Ovarian Cancer No family hx of      Prostate Cancer No family hx of      Depression/Anxiety No family hx of      CEREBROVASCULAR DISEASE No family hx of      Anesthesia Reaction No family hx of      Thyroid Disease No family hx of      Asthma No family hx of      OSTEOPOROSIS No family hx of      Chemical Addiction No family hx of      Known Genetic Syndrome No family hx of      Social History     Social History     Marital status: Single     Spouse name: N/A     Number of children: N/A     Years of education: N/A     Social History Main Topics     Smoking status: Never Smoker     Smokeless tobacco: Never Used     Alcohol use No     Drug use: No     Sexual activity: Not Currently     Partners: Female      Comment: No intercourse for >1 year.     Other Topics Concern     None     Social History Narrative    From [facility]       There are no exam notes on file for this visit.  Chief Complaint   Patient presents with     Spasms     was dancing at [employer] on Sunday, has had pain in his muscles on the right side of his back and back of right thigh     Blood pressure 112/74, pulse 66, temperature 97.9  F (36.6  C), resp. rate 16, weight 163 lb (73.9 kg), SpO2 100 %.      S:  Patient is here because of back pain on right side. He reports that he was dancing in [employer] with lots of movement. Next morning, pain on R lower side with some tingling on L side. Pain is described as strech kind of feeling. Has tried tylenol without relief.  Pt denies trauma, fall at this time.. 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 He had similar symptoms at that time. No numbness or paralysis of lower leg. No  incontinence of bowel or bladder. No bleeding. Sensation is intact in groin area. Pain is more with moving forward    O:  /74  Pulse 66  Temp 97.9  F (36.6  C)  Resp 16  Wt 163 lb (73.9 kg)  SpO2 100%  BMI 22.73 kg/m2    General Appearance: healthy ,alert, active, no distress  Head/Neck: Normocephalic. No masses, lesions, tenderness or abnormalities  Eyes: conjunctiva clear, PERRL,EOM intact  Heart: regular rate and rhythm with normal S1, S2; no murmur, rub or gallops  Lungs: clear to auscultation, with normal respiratory effort  Extremities: no peripheral edema, peripheral pulses normal  Back:normal Gait, Full ROM. Pain with flexion, no point tenderness, negative straight leg, Anikta. Sensation intact intact, Strength 5/5.  Unable to obtain reflexes as patient was very tense and would not relax his legs  Mental Status: cooperative, normal affect, no gross thought process defects during casual conversation.    A/P:  1. Mechanical low back pain  Likely musculoskeletal pain with tight paraspinous especially iliopsoas muscles.. no worrisome symptoms. Discussed when to call us or go to ED including increased pain, numbness, tingling, rash. Unlikely to be meningitis, fracture, spondy although on differential. Rx NSaids (last creatinin borderline at 1.22) flexaril, PT in future. F/u in 2 weeks or sooner if worsening pain.  - naproxen (NAPROSYN) 375 MG tablet; Take 1 tablet (375 mg) by mouth 2 times daily as needed for moderate pain (take with food)  Dispense: 30 tablet; Refill: 0  - cyclobenzaprine (FLEXERIL) 10 MG tablet; Take 1 tablet (10 mg) by mouth nightly as needed for muscle spasms  Dispense: 14 tablet; Refill: 1      King Rees  Family Medicine Resident PGY3

## 2018-06-08 NOTE — PROGRESS NOTES
Preceptor Attestation:   Patient seen, evaluated and discussed with the resident. I have verified the content of the note, which accurately reflects my assessment of the patient and the plan of care.   Supervising Physician:  Roger Roman MD

## 2018-06-29 ENCOUNTER — OFFICE VISIT (OUTPATIENT)
Dept: FAMILY MEDICINE | Facility: CLINIC | Age: 63
End: 2018-06-29
Payer: COMMERCIAL

## 2018-06-29 VITALS
DIASTOLIC BLOOD PRESSURE: 73 MMHG | TEMPERATURE: 98.3 F | OXYGEN SATURATION: 98 % | HEIGHT: 70 IN | BODY MASS INDEX: 22.82 KG/M2 | SYSTOLIC BLOOD PRESSURE: 108 MMHG | WEIGHT: 159.4 LBS | HEART RATE: 72 BPM

## 2018-06-29 DIAGNOSIS — M54.59 MECHANICAL LOW BACK PAIN: ICD-10-CM

## 2018-06-29 DIAGNOSIS — M25.532 LEFT WRIST PAIN: Primary | ICD-10-CM

## 2018-06-29 NOTE — PROGRESS NOTES
Preceptor Attestation:   Patient seen, evaluated and discussed with the resident. I personally reviewed the imaging and agree with the interpretation documented by the resident. I have verified the content of the note, which accurately reflects my assessment of the patient and the plan of care.   Supervising Physician:  Jessica Morton MD

## 2018-06-29 NOTE — MR AVS SNAPSHOT
"              After Visit Summary   6/29/2018    Antonio Mercado    MRN: [id number]           Patient Information     Date Of Birth          1955        Visit Information        Provider Department      6/29/2018 10:20 AM Salas Salgado,  [facility]        Today's Diagnoses     Left wrist pain    -  1    Mechanical low back pain          Care Instructions    - if gets worse or has fever, needs to return for blood work  - can apply ice as needed  - see hand doctor for ligament injury in the radius/ulna  - take naproxen 375 mg twice daily with food for one week          Follow-ups after your visit        Who to contact     Please call your clinic at [phone] to:    Ask questions about your health    Make or cancel appointments    Discuss your medicines    Learn about your test results    Speak to your doctor            Additional Information About Your Visit        [employer] Information     "[employer] gives you secure access to your electronic health record. If you see a primary care provider, you can also send messages to your care team and make appointments. If you have questions, please call your primary care clinic.  If you do not have a primary care provider, please call [phone] and they will assist you.      "[employer] is an electronic gateway that provides easy, online access to your medical records. With "[employer] you can request a clinic appointment, read your test results, renew a prescription or communicate with your care team.     To access your existing account, please contact your [facility] or call [phone] for assistance.        Care EveryWhere ID     This is your Care EveryWhere ID. This could be used by other organizations to access your [place] medical records  [id number]        Your Vitals Were     Pulse Temperature Height Pulse Oximetry BMI (Body Mass Index)       72 98.3  F (36.8  C) (Oral) 5' 10.25\" (178.4 cm) 98% 22.71 kg/m2        Blood " Pressure from Last 3 Encounters:   06/29/18 108/73   06/08/18 112/74   05/24/18 97/62    Weight from Last 3 Encounters:   06/29/18 159 lb 6.4 oz (72.3 kg)   06/08/18 163 lb (73.9 kg)   05/24/18 161 lb 6.4 oz (73.2 kg)              We Performed the Following     XR Wrist Left G/E 3 Views          Today's Medication Changes          These changes are accurate as of 6/29/18 11:57 AM.  If you have any questions, ask your nurse or doctor.               Stop taking these medicines if you haven't already. Please contact your care team if you have questions.     baclofen 10 MG tablet   Commonly known as:  LIORESAL   Stopped by:  Salas Salgado,            cyclobenzaprine 10 MG tablet   Commonly known as:  FLEXERIL   Stopped by:  Salas Salgado,                 Where to get your medicines      These medications were sent to [id number] - [place], [place] - [address] AT [facility] [address]  [address], [place] [address]     Phone:  [phone]     naproxen 375 MG tablet                Primary Care Provider Office Phone #    Lanette Ruiz [phone]       [facility] [address] [place] [address]        Equal Access to Services     RYAN CASTRO AH: Angela cherry hadasho [place], waaxda luqadaha, qaybta kaalmada adeegyada, aileen field. So [facility] [phone].    ATENCIÓN: Si habla español, tiene a day disposición servicios gratuitos de asistencia lingüística. Llame al [phone].    We comply with applicable federal civil rights laws and [place] laws. We do not discriminate on the basis of race, color, national origin, age, disability, sex, sexual orientation, or gender identity.            Thank you!     Thank you for choosing [facility]  for your care. Our goal is always to provide you with excellent care. Hearing back from our patients is one way we can continue to improve our services. Please take a few minutes to  complete the written survey that you may receive in the mail after your visit with us. Thank you!             Your Updated Medication List - Protect others around you: Learn how to safely use, store and throw away your medicines at www.disposemymeds.org.          This list is accurate as of 6/29/18 11:57 AM.  Always use your most recent med list.                   Brand Name Dispense Instructions for use Diagnosis    abacavir-lamiVUDine-zidovudine 300-150-300 MG per tablet    TRIZIVIR    180 tablet    Take 1 tablet by mouth 2 times daily    Human immunodeficiency virus (HIV) disease       acetaminophen 500 MG tablet    TYLENOL    100 tablet    Take 2 tablets (1,000 mg) by mouth 3 times daily    Pain of left hand       alfuzosin 10 MG 24 hr tablet    UROXATRAL    30 tablet    TAKE 1 TABLET BY MOUTH EVERY DAY    Urinary frequency       benzonatate 100 MG capsule    TESSALON     Take 100 mg by mouth        bisacodyl 5 MG EC tablet    BISACODYL LAXATIVE    60 tablet    Take 1 tablet (5 mg) by mouth daily as needed for constipation    Constipation, unspecified constipation type       docusate sodium 100 MG tablet    COLACE    270 tablet    Take 1 to 3 tablets daily to keep stools soft.    Constipation, unspecified constipation type       doxycycline monohydrate 100 MG capsule     60 capsule    Start 2 days prior to travel to malaria area, daily while there and for 4 weeks after departure    Travel advice encounter       etravirine 200 MG tablet    INTELENCE    180 tablet    Take 1 tablet (200 mg) by mouth 2 times daily (with meals)    Human immunodeficiency virus (HIV) disease       hydrocortisone 1 % cream    CORTAID    30 g    Apply topically 2 times daily    Internal hemorrhoids       hydrocortisone 2.5 % cream    ANUSOL-HC    30 g    Place rectally 2 times daily    External hemorrhoids       loperamide 2 MG tablet    IMODIUM A-D    30 tablet    Take 2 tabs (4 mg) after first loose stool, and then take one tab (2 mg)  after each diarrheal stool.  Max of 8 tabs (16 mg) per day.    Travel advice encounter       magic mouthwash suspension    ENTER INGREDIENTS IN COMMENTS    300 mL    Take 5 mLs by mouth every 4 hours as needed    Acute pain of mouth       naproxen 375 MG tablet    NAPROSYN    30 tablet    Take 1 tablet (375 mg) by mouth 2 times daily as needed for moderate pain (take with food)    Mechanical low back pain       oxybutynin 5 MG 24 hr tablet    DITROPAN-XL    31 tablet    Take 1 tablet by mouth daily    Bladder outlet obstruction       polyethylene glycol powder    MIRALAX    510 g    Take 17 g (1 capful) by mouth 2 times daily as needed for constipation    Constipation, unspecified constipation type       primaquine 26.3 MG tablet      Take 30 mg Base (TWO TABLETS) daily for 14 days.DO NOT begin this medication until you have finished your Malarone. START this medication on 5/15/17.        Propylene Glycol-Glycerin 1-0.3 % Soln    [employer] ARTIFICIAL TEARS    30 mL    Apply 1 drop to eye 2 times daily as needed    Dry eyes       sodium chloride 0.65 % nasal spray    OCEAN    1 Bottle    Spray 1 spray into both nostrils daily as needed for congestion    Stuffy and runny nose       * triamcinolone 0.1 % ointment    KENALOG    30 g    Apply sparingly to affected area three times daily for 14 days.    Rash       * triamcinolone 0.1 % cream    KENALOG    30 g    Apply sparingly to affected area three times daily for 14 days.    Hemorrhoids, unspecified hemorrhoid type       VITAMIN D (CHOLECALCIFEROL) PO      Take by mouth daily        * Notice:  This list has 2 medication(s) that are the same as other medications prescribed for you. Read the directions carefully, and ask your doctor or other care provider to review them with you.

## 2018-06-29 NOTE — LETTER
July 2, 2018      Antonio Mercado  [address]  [place] [address]        Dear Antonio,    You are scheduled for the following at:    ORTHOPEDICS APPOINTMENT:  [facility]  Doctor s Professional [address], [facility] [address]  [place] [address]  PH:   [phone]       Date:  Monday July 16, 2018  Time:  10:00 AM with Dr. Morales    Please contact [facility] at the number above if you need to cancel or reschedule.  Any other questions please contact me at the number below.      Sincerely,  Anita Barry  [phone]

## 2018-06-29 NOTE — PROGRESS NOTES
"Subjective: 62-year-old male with a past medical history of HIV who presents today with left hand pain.  Patient states he has had progressively worsening pain over the last 2 days on the top of his left wrist more specifically.  He denies any trauma.  He feels this is similar to what took place about a month ago and that pain improved after a few days with treatment (wrapped it with an Ace bandage and took Tylenol).  The patient has not tried anything thus far for treatment.  Patient does not have a history of gout.  He does not have joint pain elsewhere.  He denies fever, night sweats, chills.     Objective    /73  Pulse 72  Temp 98.3  F (36.8  C) (Oral)  Ht 5' 10.25\" (178.4 cm)  Wt 159 lb 6.4 oz (72.3 kg)  SpO2 98%  BMI 22.71 kg/m2    MSK:There is a  chronic deformity of the CMC joint. Mild swelling and warmth noted over the dorsum of the wrist bones.  Mild to moderate tenderness to palpation over this area.  No significant tenderness over the rest of the wrist or hand.  Patient is able to extend his wrist about 80% of what is normal flex his wrist about 40% of what is normal.  He is able to move all his fingers without difficulty.  Sensation to light touch is intact over all 5 digits.    Left wrist x-ray: No bone shift is noted by the scaphoid bone although per radiology this may be from a bone abdomen is scaphoid.  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  Furthermore the radius is displaced dorsally suggesting injury to the radial ulnar ligaments.    Assessment and plan    Left wrist pain: Fever that this is likely secondary to a possible radial ulnar ligament injury or  tendinitis in the extensor tendons ago into the hand.  Will treat patient with naproxen 375 mg twice daily with food and also provide him with wrist splints for comfort.  It is unclear if the radial ulnar ligament injury is new the patient does endorse a chronic CMC dislocation as this could be contributing.  " Will refer to hand surgeon to determine if patient needs further imaging and possible treatment for this ligament injury.  Swelling is very unlikely to be related to gout as he does not have a history of this surgery and I joint for first presentation.  Initially low risk for septic arthritis given the patient does have reasonable range of motion and does not have fever chills or night sweats.  If the swelling gets worse or he is to develop fever, night sweats that he should return immediately for lab evaluation.    Discussed with Dr. Morton.    Jessee Salgado DO  PGY 3

## 2018-06-29 NOTE — PATIENT INSTRUCTIONS
- if gets worse or has fever, needs to return for blood work  - can apply ice as needed  - see hand doctor for ligament injury in the radius/ulna  - take naproxen 375 mg twice daily with food for one week    ORTHOPEDICS APPOINTMENT:  [facility]  [address] [place] [address], [place] [address]  PH:   [phone]     Date:  Monday July 16, 2018  Time:  10:00 AM with Dr. Morales  Appointment information mailed to patient.  Anita Barry  7/2/18

## 2018-07-13 DIAGNOSIS — K59.00 CONSTIPATION, UNSPECIFIED CONSTIPATION TYPE: ICD-10-CM

## 2018-07-13 RX ORDER — BISACODYL 5 MG/1
5 TABLET, DELAYED RELEASE ORAL DAILY PRN
Qty: 60 TABLET | Refills: 1 | Status: SHIPPED | OUTPATIENT
Start: 2018-07-13 | End: 2019-08-22

## 2018-07-13 RX ORDER — BISACODYL 5 MG/1
5 TABLET, DELAYED RELEASE ORAL DAILY PRN
Qty: 60 TABLET | Refills: 1 | Status: SHIPPED | OUTPATIENT
Start: 2018-07-13 | End: 2020-03-13

## 2018-08-07 ENCOUNTER — OFFICE VISIT - HEALTHEAST (OUTPATIENT)
Dept: INFECTIOUS DISEASES | Facility: CLINIC | Age: 63
End: 2018-08-07

## 2018-08-07 DIAGNOSIS — B20 HUMAN IMMUNODEFICIENCY VIRUS (HIV) DISEASE (H): ICD-10-CM

## 2018-08-07 LAB
ALBUMIN SERPL-MCNC: 3.9 G/DL (ref 3.5–5)
ALP SERPL-CCNC: 72 U/L (ref 45–120)
ALT SERPL W P-5'-P-CCNC: 29 U/L (ref 0–45)
AST SERPL W P-5'-P-CCNC: 14 U/L (ref 0–40)
BASOPHILS # BLD AUTO: 0 THOU/UL (ref 0–0.2)
BASOPHILS NFR BLD AUTO: 0 % (ref 0–2)
BILIRUB DIRECT SERPL-MCNC: 0.1 MG/DL
BILIRUB SERPL-MCNC: 0.3 MG/DL (ref 0–1)
CREAT SERPL-MCNC: 1.32 MG/DL (ref 0.7–1.3)
EOSINOPHIL # BLD AUTO: 0.1 THOU/UL (ref 0–0.4)
EOSINOPHIL NFR BLD AUTO: 2 % (ref 0–6)
ERYTHROCYTE [DISTWIDTH] IN BLOOD BY AUTOMATED COUNT: 12.5 % (ref 11–14.5)
GFR SERPL CREATININE-BSD FRML MDRD: 55 ML/MIN/1.73M2
HCT VFR BLD AUTO: 32.5 % (ref 40–54)
HGB BLD-MCNC: 10.9 G/DL (ref 14–18)
LYMPHOCYTES # BLD AUTO: 1.1 THOU/UL (ref 0.8–4.4)
LYMPHOCYTES NFR BLD AUTO: 42 % (ref 20–40)
MCH RBC QN AUTO: 35.7 PG (ref 27–34)
MCHC RBC AUTO-ENTMCNC: 33.6 G/DL (ref 32–36)
MCV RBC AUTO: 106 FL (ref 80–100)
MONOCYTES # BLD AUTO: 0.2 THOU/UL (ref 0–0.9)
MONOCYTES NFR BLD AUTO: 7 % (ref 2–10)
NEUTROPHILS # BLD AUTO: 1.2 THOU/UL (ref 2–7.7)
NEUTROPHILS NFR BLD AUTO: 49 % (ref 50–70)
PLATELET # BLD AUTO: 223 THOU/UL (ref 140–440)
PMV BLD AUTO: 6.7 FL (ref 7–10)
PROT SERPL-MCNC: 7 G/DL (ref 6–8)
RBC # BLD AUTO: 3.06 MILL/UL (ref 4.4–6.2)
WBC: 2.5 THOU/UL (ref 4–11)

## 2018-08-10 LAB
HIV1 RNA # PLAS NAA DL=20: <20 {COPIES}/ML
HIV1 RNA SERPL NAA+PROBE-LOG#: <1.3 {LOG_COPIES}/ML

## 2018-08-16 ENCOUNTER — TELEPHONE (OUTPATIENT)
Dept: FAMILY MEDICINE | Facility: CLINIC | Age: 63
End: 2018-08-16

## 2018-08-16 DIAGNOSIS — M54.59 MECHANICAL LOW BACK PAIN: ICD-10-CM

## 2018-08-16 NOTE — TELEPHONE ENCOUNTER
[facility] phone call message- patient requesting a refill:    Full Medication Name: Naproxen     Dose: 375 mg tab     Pharmacy confirmed as    [id number] - [place], [place] - [address] [place] [address] AT [facility] [address]  [address] [place] [address]  Phone: [phone] Fax: [phone]  : Yes    Additional Comments: .     OK to leave a message on voice mail? Yes    Primary language: English      needed? No    Call taken on August 16, 2018 at 2:03 PM by Danii Milligan

## 2018-09-10 ENCOUNTER — OFFICE VISIT (OUTPATIENT)
Dept: FAMILY MEDICINE | Facility: CLINIC | Age: 63
End: 2018-09-10
Payer: COMMERCIAL

## 2018-09-10 VITALS
BODY MASS INDEX: 22.88 KG/M2 | WEIGHT: 160.6 LBS | HEART RATE: 53 BPM | OXYGEN SATURATION: 100 % | RESPIRATION RATE: 24 BRPM | DIASTOLIC BLOOD PRESSURE: 71 MMHG | SYSTOLIC BLOOD PRESSURE: 113 MMHG | TEMPERATURE: 97.8 F

## 2018-09-10 DIAGNOSIS — R30.0 DYSURIA: Primary | ICD-10-CM

## 2018-09-10 DIAGNOSIS — N34.2 URETHRITIS: ICD-10-CM

## 2018-09-10 LAB
BACTERIA: NORMAL
BILIRUBIN UR: NEGATIVE
BLOOD UR: ABNORMAL
CASTS: NORMAL /LPF
CRYSTAL URINE: NORMAL /LPF
EPITHELIAL CELLS UR: NORMAL /LPF (ref 0–2)
GLUCOSE URINE: NEGATIVE
KETONES UR QL: NEGATIVE
LEUKOCYTE ESTERASE UR: NEGATIVE
MUCOUS URINE: NORMAL LPF
NITRITE UR QL STRIP: NEGATIVE
PH UR STRIP: 5.5 [PH] (ref 5–7)
PROTEIN UR: NEGATIVE
RBC URINE: NORMAL /HPF
SP GR UR STRIP: 1.01
UROBILINOGEN UR STRIP-ACNC: ABNORMAL
WBC URINE: NORMAL /HPF

## 2018-09-10 RX ORDER — DOXYCYCLINE 100 MG/1
100 CAPSULE ORAL 2 TIMES DAILY
Qty: 14 CAPSULE | Refills: 0 | Status: SHIPPED | OUTPATIENT
Start: 2018-09-10 | End: 2018-09-17

## 2018-09-10 RX ORDER — SULFAMETHOXAZOLE/TRIMETHOPRIM 800-160 MG
1 TABLET ORAL 2 TIMES DAILY
Qty: 6 TABLET | Refills: 0 | Status: CANCELLED | OUTPATIENT
Start: 2018-09-10 | End: 2018-09-13

## 2018-09-10 NOTE — PROGRESS NOTES
[facility]         SUBJECTIVE       Antonio Mercado is a 63 year old male with a PMH of hypertrophy of prostate presenting to clinic today with a chief complaint of dysuria.  Patient reports that he urination over the last week.  He has had some difficulty initiating urination over the last 24 hours.  He denies any blood in his urine.  He has no lower abdominal pain or back pain.  Patient has not been sexually active over the last 6 months.      PMH, Medications and Allergies were reviewed and updated as needed.    ROS:  General: No fevers, chills  Head: No headache  Ears: No acute change in hearing.    Resp: No shortness of breath.   GI: No nausea, vomiting.   : Positive for dysuria. NO     Patient Active Problem List   Diagnosis     Health Care Home     Human immunodeficiency virus (HIV) disease     Mechanical low back pain     Microscopic hematuria     Hypertrophy of prostate with urinary obstruction     S/P colonoscopy     Anemia, iron deficiency     Right lower quadrant abdominal mass     Malignant neoplasm of hepatic flexure (H)     Hemorrhoids, unspecified hemorrhoid type     Slow transit constipation       Current Outpatient Prescriptions   Medication Sig Dispense Refill     abacavir-lamiVUDine-zidovudine (TRIZIVIR) 300-150-300 MG per tablet Take 1 tablet by mouth 2 times daily 180 tablet 3     etravirine (INTELENCE) 200 MG tablet Take 1 tablet (200 mg) by mouth 2 times daily (with meals) 180 tablet 3     acetaminophen (TYLENOL) 500 MG tablet Take 2 tablets (1,000 mg) by mouth 3 times daily (Patient not taking: Reported on 6/29/2018) 100 tablet 0     alfuzosin (UROXATRAL) 10 MG 24 hr tablet TAKE 1 TABLET BY MOUTH EVERY DAY (Patient not taking: Reported on 6/29/2018) 30 tablet 3     benzonatate (TESSALON) 100 MG capsule Take 100 mg by mouth       bisacodyl (BISACODYL LAXATIVE) 5 MG EC tablet Take 1 tablet (5 mg) by mouth daily as needed for constipation 60 tablet 1     bisacodyl  (BISACODYL LAXATIVE) 5 MG EC tablet Take 1 tablet (5 mg) by mouth daily as needed for constipation 60 tablet 1     docusate sodium (COLACE) 100 MG tablet Take 1 to 3 tablets daily to keep stools soft. (Patient not taking: Reported on 6/29/2018) 270 tablet 3     doxycycline Monohydrate 100 MG CAPS Start 2 days prior to travel to malaria area, daily while there and for 4 weeks after departure (Patient not taking: Reported on 6/29/2018) 60 capsule 0     hydrocortisone (ANUSOL-HC) 2.5 % cream Place rectally 2 times daily (Patient not taking: Reported on 6/29/2018) 30 g 1     hydrocortisone (CORTAID) 1 % cream Apply topically 2 times daily (Patient not taking: Reported on 6/29/2018) 30 g 1     loperamide (IMODIUM A-D) 2 MG tablet Take 2 tabs (4 mg) after first loose stool, and then take one tab (2 mg) after each diarrheal stool.  Max of 8 tabs (16 mg) per day. (Patient not taking: Reported on 6/29/2018) 30 tablet 0     MAGIC MOUTHWASH, ENTER INGREDIENTS IN COMMENTS, Take 5 mLs by mouth every 4 hours as needed (Patient not taking: Reported on 6/29/2018) 300 mL 1     naproxen (NAPROSYN) 375 MG tablet Take 1 tablet (375 mg) by mouth 2 times daily as needed for moderate pain (take with food) 30 tablet 3     oxybutynin (DITROPAN-XL) 5 MG 24 hr tablet Take 1 tablet by mouth daily (Patient not taking: Reported on 6/29/2018) 31 tablet 3     polyethylene glycol (MIRALAX) powder Take 17 g (1 capful) by mouth 2 times daily as needed for constipation (Patient not taking: Reported on 6/29/2018) 510 g 1     primaquine 26.3 MG tablet Take 30 mg Base (TWO TABLETS) daily for 14 days.DO NOT begin this medication until you have finished your Malarone. START this medication on 5/15/17.       Propylene Glycol-Glycerin ([employer] ARTIFICIAL TEARS) 1-0.3 % SOLN Apply 1 drop to eye 2 times daily as needed (Patient not taking: Reported on 6/29/2018) 30 mL 0     sodium chloride (OCEAN) 0.65 % nasal spray Spray 1 spray into both nostrils daily as needed  for congestion (Patient not taking: Reported on 6/29/2018) 1 Bottle 3     triamcinolone (KENALOG) 0.1 % cream Apply sparingly to affected area three times daily for 14 days. (Patient not taking: Reported on 6/29/2018) 30 g 11     triamcinolone (KENALOG) 0.1 % ointment Apply sparingly to affected area three times daily for 14 days. (Patient not taking: Reported on 6/29/2018) 30 g 0     VITAMIN D, CHOLECALCIFEROL, PO Take by mouth daily              OBJECTIVE:       Vitals:   Vitals:    09/10/18 0927   BP: 113/71   Pulse: 53   Resp: 24   Temp: 97.8  F (36.6  C)   TempSrc: Oral   SpO2: 100%   Weight: 160 lb 9.6 oz (72.8 kg)     BMI: Body mass index is 22.88 kg/(m^2).    Gen:  Well nourished and in no acute distress  HEENT: PERRL; TMs normal color and landmarks; nasopharynx pink and moist; oropharynx pink and moist  Neck: supple without lymphadenopathy  CV:  RRR  - no murmurs noted   Pulm:  CTAB, no wheezes or crackles noted, good air entry   Back: No CVA tenderness.  ABD: No suprapubic tenderness. Soft, nontender, no masses, no rebound, BS intact throughout, no hepatosplenomegaly  Extrem: no cyanosis, edema or clubbing  Psych: Euthymic           ASSESSMENT and PLAN:     1. Dysuria  - Urinalysis, Micro If ([facility])  - Urine Microscopic ([facility])  - Urine Culture ([facility])    2. Urethritis: Patient's urinalysis did not look like urine infection at this time.  We will send off a urine culture.  Patient has not been sexually active for the last 6 months.  Patient's symptoms are consistent with urethritis.  We will start the patient on doxycycline twice daily for 7 days to cover for the appropriate bacteria that cause urethritis.  The patient should develop any new or worsening symptoms he should return to clinic sooner.  Patient expressed understanding.   - doxycycline (VIBRAMYCIN) 100 MG capsule; Take 1 capsule (100 mg) by mouth 2 times daily  Dispense: 14 capsule; Refill: 0      Return to clinic in 1 week for follow up  of symptoms if they do not improve. Return sooner if develops new or worsening symptoms.    Options for treatment and/or follow-up care were reviewed with the patient was actively involved in the decision making process. Patient verbalized understanding and was in agreement with the plan.    The patient was seen by and discussed with MD Cadence Renae,  PGY 2  [facility]  [phone]

## 2018-09-10 NOTE — MR AVS SNAPSHOT
After Visit Summary   9/10/2018    Antonio Mercado    MRN: [id number]           Patient Information     Date Of Birth          1955        Visit Information        Provider Department      9/10/2018 9:20 AM Cadence Tidwell, DO [facility]        Today's Diagnoses     Dysuria    -  1    Urethritis          Care Instructions    Antonio,     Please return in 1 week if symptoms are not improving.     Thank you,     Dr. Cadence Tidwell  Urethritis in Men     An inflamed urethra can cause pain during urination.   The urethra is the tube that runs from the bladder through the penis. When the urethra is inflamed, it is called urethritis. The urethra becomes swollen and causes burning pain when you urinate. Other symptoms of urethritis may include itching or tingling of the penis or pus discharge from the penis. You may also have pain with sex and masturbation. Some men may not have symptoms.  What causes urethritis?  Urethritis can be caused by a bacterial or viral infection. Such an infection can lead to conditions such as a urinary tract infection (UTI) or sexually transmitted diseases (STD). Urethritis can also be caused by injury or sensitivity or allergy to chemicals in lotions and other products.  How is urethritis diagnosed?  Your healthcare provider will examine you and ask about your symptoms and health history. You may also have one or more of the following tests:    Urine test to take samples of urine and have them checked for problems.    Blood test to take a sample of blood and have it checked for problems.    Urethral discharge to take a sample of fluid from inside the urethra. A cotton swab is inserted into the opening of the penis and into the urethra.    Cystoscopy to allow the healthcare provider to look for problems in the urinary tract. The test uses a thin, flexible telescope called a cystoscope with a light and camera attached. The scope is inserted into the urethra.  How is  urethritis treated?  Treatment depends on the cause of urethritis. If it s due to a bacterial infection, antibiotics (medicines that fight infection) will be given. Your healthcare provider can tell you more about your treatment options. In the meantime, your symptoms can be treated. To relieve pain and swelling, anti-inflammatory medications, such as ibuprofen, may be given. Untreated, symptoms may get worse. Also, scar tissue can form in the urethra, causing it to narrow.  When to call your healthcare provider   Call your healthcare provider right away if you have any of the following:    Fever of 100.4 F (38.0 C) or higher     Blood in the urine or semen    Burning pain with urination    Increased urge to urinate    Discharge from the penis    Itching, tenderness, or swelling in the penis or groin    Pain during sex or masturbation   Preventing STDs  When it comes to sex, it s important to take care and be safe. Any sexual contact with the penis, vagina, anus, or mouth can spread an STD. The only sure way to prevent STDs is not having sex. But there are ways to make sex safer. Use a latex condom each time you have sex. And talk to your partner about STDs before you have sex.  Date Last Reviewed: 1/1/2017 2000-2017 [employer]. [address] [place] [address], [place] [address]. All rights reserved. This information is not intended as a substitute for professional medical care. Always follow your healthcare professional's instructions.                Follow-ups after your visit        Who to contact     Please call your clinic at [phone] to:    Ask questions about your health    Make or cancel appointments    Discuss your medicines    Learn about your test results    Speak to your doctor            Additional Information About Your Visit        [employer] Information     [employer] gives you secure access to your electronic health record. If you see a primary care provider, you can also send messages to  your care team and make appointments. If you have questions, please call your primary care clinic.  If you do not have a primary care provider, please call [phone] and they will assist you.      [employer] is an electronic gateway that provides easy, online access to your medical records. With [employer], you can request a clinic appointment, read your test results, renew a prescription or communicate with your care team.     To access your existing account, please contact your [facility] or call [phone] for assistance.        Care EveryWhere ID     This is your Care EveryWhere ID. This could be used by other organizations to access your [place] medical records  [id number]        Your Vitals Were     Pulse Temperature Respirations Pulse Oximetry BMI (Body Mass Index)       53 97.8  F (36.6  C) (Oral) 24 100% 22.88 kg/m2        Blood Pressure from Last 3 Encounters:   09/10/18 113/71   06/29/18 108/73   06/08/18 112/74    Weight from Last 3 Encounters:   09/10/18 160 lb 9.6 oz (72.8 kg)   06/29/18 159 lb 6.4 oz (72.3 kg)   06/08/18 163 lb (73.9 kg)              We Performed the Following     Urinalysis, Micro If ([facility])     Urine Culture ([facility])     Urine Microscopic ([facility])          Today's Medication Changes          These changes are accurate as of 9/10/18 10:04 AM.  If you have any questions, ask your nurse or doctor.               Start taking these medicines.        Dose/Directions    doxycycline 100 MG capsule   Commonly known as:  VIBRAMYCIN   Used for:  Urethritis   Started by:  Cadence Tidwell, DO        Dose:  100 mg   Take 1 capsule (100 mg) by mouth 2 times daily   Quantity:  14 capsule   Refills:  0            Where to get your medicines      These medications were sent to [id number] - [place], [place] - [address] AT [facility] [address]  [address], [place] [address]     Phone:  [phone]     doxycycline 100 MG capsule                 Primary Care Provider Office Phone #    Lanette Ruiz [phone]       [facility] [address] [place] [address]        Equal Access to Services     RYAN CASTRO : Angela Valladares, kinsey juan, kauraida persaudmaaileen newmanin hayvanessa drewdarrell brittanygutierrezelvia field. So [facility] [phone].    ATENCIÓN: Si habla español, tiene a day disposición servicios gratuitos de asistencia lingüística. Llame al [phone].    We comply with applicable federal civil rights laws and [place] laws. We do not discriminate on the basis of race, color, national origin, age, disability, sex, sexual orientation, or gender identity.            Thank you!     Thank you for choosing [facility]  for your care. Our goal is always to provide you with excellent care. Hearing back from our patients is one way we can continue to improve our services. Please take a few minutes to complete the written survey that you may receive in the mail after your visit with us. Thank you!             Your Updated Medication List - Protect others around you: Learn how to safely use, store and throw away your medicines at www.disposemymeds.org.          This list is accurate as of 9/10/18 10:04 AM.  Always use your most recent med list.                   Brand Name Dispense Instructions for use Diagnosis    abacavir-lamiVUDine-zidovudine 300-150-300 MG per tablet    TRIZIVIR    180 tablet    Take 1 tablet by mouth 2 times daily    Human immunodeficiency virus (HIV) disease       acetaminophen 500 MG tablet    TYLENOL    100 tablet    Take 2 tablets (1,000 mg) by mouth 3 times daily    Pain of left hand       alfuzosin 10 MG 24 hr tablet    UROXATRAL    30 tablet    TAKE 1 TABLET BY MOUTH EVERY DAY    Urinary frequency       benzonatate 100 MG capsule    TESSALON     Take 100 mg by mouth        * bisacodyl 5 MG EC tablet    BISACODYL LAXATIVE    60 tablet    Take 1 tablet (5 mg) by mouth daily as needed  for constipation    Constipation, unspecified constipation type       * bisacodyl 5 MG EC tablet    BISACODYL LAXATIVE    60 tablet    Take 1 tablet (5 mg) by mouth daily as needed for constipation    Constipation, unspecified constipation type       docusate sodium 100 MG tablet    COLACE    270 tablet    Take 1 to 3 tablets daily to keep stools soft.    Constipation, unspecified constipation type       doxycycline 100 MG capsule    VIBRAMYCIN    14 capsule    Take 1 capsule (100 mg) by mouth 2 times daily    Urethritis       doxycycline monohydrate 100 MG capsule     60 capsule    Start 2 days prior to travel to malaria area, daily while there and for 4 weeks after departure    Travel advice encounter       etravirine 200 MG tablet    INTELENCE    180 tablet    Take 1 tablet (200 mg) by mouth 2 times daily (with meals)    Human immunodeficiency virus (HIV) disease       hydrocortisone 1 % cream    CORTAID    30 g    Apply topically 2 times daily    Internal hemorrhoids       hydrocortisone 2.5 % cream    ANUSOL-HC    30 g    Place rectally 2 times daily    External hemorrhoids       loperamide 2 MG tablet    IMODIUM A-D    30 tablet    Take 2 tabs (4 mg) after first loose stool, and then take one tab (2 mg) after each diarrheal stool.  Max of 8 tabs (16 mg) per day.    Travel advice encounter       magic mouthwash suspension    ENTER INGREDIENTS IN COMMENTS    300 mL    Take 5 mLs by mouth every 4 hours as needed    Acute pain of mouth       naproxen 375 MG tablet    NAPROSYN    30 tablet    Take 1 tablet (375 mg) by mouth 2 times daily as needed for moderate pain (take with food)    Mechanical low back pain       oxybutynin 5 MG 24 hr tablet    DITROPAN-XL    31 tablet    Take 1 tablet by mouth daily    Bladder outlet obstruction       polyethylene glycol powder    MIRALAX    510 g    Take 17 g (1 capful) by mouth 2 times daily as needed for constipation    Constipation, unspecified constipation type        primaquine 26.3 MG tablet      Take 30 mg Base (TWO TABLETS) daily for 14 days.DO NOT begin this medication until you have finished your Malarone. START this medication on 5/15/17.        Propylene Glycol-Glycerin 1-0.3 % Soln    [employer] ARTIFICIAL TEARS    30 mL    Apply 1 drop to eye 2 times daily as needed    Dry eyes       sodium chloride 0.65 % nasal spray    OCEAN    1 Bottle    Spray 1 spray into both nostrils daily as needed for congestion    Stuffy and runny nose       * triamcinolone 0.1 % ointment    KENALOG    30 g    Apply sparingly to affected area three times daily for 14 days.    Rash       * triamcinolone 0.1 % cream    KENALOG    30 g    Apply sparingly to affected area three times daily for 14 days.    Hemorrhoids, unspecified hemorrhoid type       VITAMIN D (CHOLECALCIFEROL) PO      Take by mouth daily        * Notice:  This list has 4 medication(s) that are the same as other medications prescribed for you. Read the directions carefully, and ask your doctor or other care provider to review them with you.

## 2018-09-10 NOTE — PATIENT INSTRUCTIONS
Antonio,     Please return in 1 week if symptoms are not improving.     Thank you,     Dr. Cadence iTdwell  Urethritis in Men     An inflamed urethra can cause pain during urination.   The urethra is the tube that runs from the bladder through the penis. When the urethra is inflamed, it is called urethritis. The urethra becomes swollen and causes burning pain when you urinate. Other symptoms of urethritis may include itching or tingling of the penis or pus discharge from the penis. You may also have pain with sex and masturbation. Some men may not have symptoms.  What causes urethritis?  Urethritis can be caused by a bacterial or viral infection. Such an infection can lead to conditions such as a urinary tract infection (UTI) or sexually transmitted diseases (STD). Urethritis can also be caused by injury or sensitivity or allergy to chemicals in lotions and other products.  How is urethritis diagnosed?  Your healthcare provider will examine you and ask about your symptoms and health history. You may also have one or more of the following tests:    Urine test to take samples of urine and have them checked for problems.    Blood test to take a sample of blood and have it checked for problems.    Urethral discharge to take a sample of fluid from inside the urethra. A cotton swab is inserted into the opening of the penis and into the urethra.    Cystoscopy to allow the healthcare provider to look for problems in the urinary tract. The test uses a thin, flexible telescope called a cystoscope with a light and camera attached. The scope is inserted into the urethra.  How is urethritis treated?  Treatment depends on the cause of urethritis. If it s due to a bacterial infection, antibiotics (medicines that fight infection) will be given. Your healthcare provider can tell you more about your treatment options. In the meantime, your symptoms can be treated. To relieve pain and swelling, anti-inflammatory medications, such as  ibuprofen, may be given. Untreated, symptoms may get worse. Also, scar tissue can form in the urethra, causing it to narrow.  When to call your healthcare provider   Call your healthcare provider right away if you have any of the following:    Fever of 100.4 F (38.0 C) or higher     Blood in the urine or semen    Burning pain with urination    Increased urge to urinate    Discharge from the penis    Itching, tenderness, or swelling in the penis or groin    Pain during sex or masturbation   Preventing STDs  When it comes to sex, it s important to take care and be safe. Any sexual contact with the penis, vagina, anus, or mouth can spread an STD. The only sure way to prevent STDs is not having sex. But there are ways to make sex safer. Use a latex condom each time you have sex. And talk to your partner about STDs before you have sex.  Date Last Reviewed: 1/1/2017 2000-2017 [employer]. [address] [place] [address], [place] [address]. All rights reserved. This information is not intended as a substitute for professional medical care. Always follow your healthcare professional's instructions.

## 2018-09-10 NOTE — PROGRESS NOTES
Preceptor Attestation:   Patient seen, evaluated and discussed with the resident. I have verified the content of the note, which accurately reflects my assessment of the patient and the plan of care.   Supervising Physician:  Juan Guillory MD     Results for orders placed or performed in visit on 09/10/18   Urinalysis, Micro If ([facility])   Result Value Ref Range    Specific Gravity Urine 1.015 1.005 - 1.030    pH Urine 5.5 4.5 - 8.0    Leukocyte Esterase UR Negative NEGATIVE    Nitrite Urine Negative NEGATIVE    Protein UR Negative NEGATIVE    Glucose Urine Negative NEGATIVE    Ketones Urine Negative NEGATIVE    Urobilinogen mg/dL 0.2 E.U./dL 0.2 E.U./dL    Bilirubin UR Negative NEGATIVE    Blood UR 1+ (A) NEGATIVE   Urine Microscopic ([facility])   Result Value Ref Range    WBC Urine 2-5 <5 /hpf    RBC Urine None <5 /hpf    Epithelial Cells UR None 0 - 2 /lpf    Mucous Urine None NONE lpf    Casts Urine None NONE /lpf    Crystal Urine None NONE /lpf    Bacteria Wet Prep Few None

## 2018-09-11 LAB — CULTURE: NORMAL

## 2018-09-17 ENCOUNTER — OFFICE VISIT (OUTPATIENT)
Dept: FAMILY MEDICINE | Facility: CLINIC | Age: 63
End: 2018-09-17
Payer: COMMERCIAL

## 2018-09-17 VITALS
BODY MASS INDEX: 22.65 KG/M2 | SYSTOLIC BLOOD PRESSURE: 106 MMHG | RESPIRATION RATE: 16 BRPM | WEIGHT: 159 LBS | DIASTOLIC BLOOD PRESSURE: 70 MMHG | OXYGEN SATURATION: 100 % | TEMPERATURE: 97.5 F | HEART RATE: 65 BPM

## 2018-09-17 DIAGNOSIS — R20.9 DISTURBANCE OF SKIN SENSATION: Primary | ICD-10-CM

## 2018-09-17 DIAGNOSIS — T50.905A ADVERSE EFFECT OF DRUG, INITIAL ENCOUNTER: ICD-10-CM

## 2018-09-17 DIAGNOSIS — L85.3 DRY SKIN: ICD-10-CM

## 2018-09-17 RX ORDER — AMMONIUM LACTATE 12 G/100G
LOTION TOPICAL 2 TIMES DAILY
Qty: 225 G | Refills: 11 | Status: SHIPPED | OUTPATIENT
Start: 2018-09-17 | End: 2021-01-01

## 2018-09-17 NOTE — PROGRESS NOTES
This is a 63-year-old male who attends today concerned that he may have experienced side effects from doxycycline.  He reports that he started taking the medication for a presumptive prostatitis.  He had been complaining of dysuria and urinary frequency.  He is not sexually active since his wife lives in [facility].  He was started on doxycycline and noticed that from about below his left nipple along the left side of his body down to above the left knee was an altered sensation.  He demonstrates that he can rub his hand over that portion of his body and it feels different to the equivalent area on the right side.  He has not seen any change in the skin he denies any cough shortness of breath or pleuritic chest pain.  He reports that the urinary frequency and dysuria are resolved.  He has no change in bowel habits.  He does use daily stool softeners to avoid constipation.    In addition he may spend up to 10 hours seated in a car driving each day as an otherwise he attends [facility] .  He notes that when he gets out of the car seat he may have some pain in the inguinal area on the left side.  He has a history of bilateral hernia repairs several years ago and wants to double check that this is okay.    Otherwise, he attends [facility] oncology and is currently considered free of cancer having had a hemicolectomy for colon cancer 2 years ago.  Review of the records suggest that he is under close surveillance with rechecks every few months.    Objective:  /70 (BP Location: Left arm, Patient Position: Sitting, Cuff Size: Adult Regular)  Pulse 65  Temp 97.5  F (36.4  C) (Oral)  Resp 16  Wt 159 lb (72.1 kg)  SpO2 100%  BMI 22.65 kg/m2  His vitals are excellent, he is in no acute distress.  Exam of the left side of his chest abdomen and thigh reveals no dermatitis nor eruption.  Rubbing my hand over this area he reports is mildly uncomfortable and feels different to the  equivalent area on the right side.  Genital exam reveals no hernia in the left inguinal region.  He is describing tenderness apparently at the insertion of the rectus femoris into the pubis.    Antonio was seen today for allergies and medication reconciliation.    Diagnoses and all orders for this visit:    Disturbance of skin sensation  -     diclofenac (VOLTAREN) 1 % GEL topical gel; Apply 2 grams to area of discomfort up to four times daily using enclosed dosing card.    Dry skin  -     ammonium lactate (LAC-HYDRIN) 12 % lotion; Apply topically 2 times daily    Adverse effect of drug, initial encounter      He has already stopped taking doxycycline.  He has no urinary symptoms.  Review of labs show that the urine culture did not grow anything last week.  Therefore it seems appropriate not to restart a different antibiotic at today's visit.  We discussed this and he is agreeable.    I reviewed the adverse effects profile for TRIZIVIR and Intelence and indeed these may be the culprit for his current symptoms more so than doxycycline.  In in particular the latter has a 4% risk of peripheral neuropathy.  We did not discuss this possibility in detail today.  Instead we agreed to wait and see if his symptoms resolved and if not for him to return.    He is particularly interested in something that he can rub on his skin given that the sensation is mildly unpleasant.  It is uncertain what will help but I did give a prescription for diclofenac and he is agreeable to trying this.  He also has some dry skin in his feet and I prescribed Lac-Hydrin.    Total visit time was 25 mins, all of which was face to face MD time, and over 50% of this time was spent in counseling and coordination of care.

## 2018-09-17 NOTE — MR AVS SNAPSHOT
After Visit Summary   9/17/2018    Antonio Mercado    MRN: [id number]           Patient Information     Date Of Birth          1955        Visit Information        Provider Department      9/17/2018 10:40 AM Harris Mary MD [facility]        Today's Diagnoses     Disturbance of skin sensation    -  1    Dry skin          Care Instructions    Please follow up in 2 weeks if not doing better          Follow-ups after your visit        Follow-up notes from your care team     Return in about 2 weeks (around 10/1/2018), or if symptoms worsen or fail to improve.      Who to contact     Please call your clinic at [phone] to:    Ask questions about your health    Make or cancel appointments    Discuss your medicines    Learn about your test results    Speak to your doctor            Additional Information About Your Visit        [employer] Gu[employer] Information     [employer] gives you secure access to your electronic health record. If you see a primary care provider, you can also send messages to your care team and make appointments. If you have questions, please call your primary care clinic.  If you do not have a primary care provider, please call [phone] and they will assist you.      [employer] is an electronic gateway that provides easy, online access to your medical records. With [employer], you can request a clinic appointment, read your test results, renew a prescription or communicate with your care team.     To access your existing account, please contact your [facility] or call [phone] for assistance.        Care EveryWhere ID     This is your Care EveryWhere ID. This could be used by other organizations to access your [place] medical records  [id number]        Your Vitals Were     Pulse Temperature Respirations Pulse Oximetry BMI (Body Mass Index)       65 97.5  F (36.4  C) (Oral) 16 100% 22.65 kg/m2        Blood Pressure from Last 3 Encounters:   09/17/18  106/70   09/10/18 113/71   06/29/18 108/73    Weight from Last 3 Encounters:   09/17/18 159 lb (72.1 kg)   09/10/18 160 lb 9.6 oz (72.8 kg)   06/29/18 159 lb 6.4 oz (72.3 kg)              Today, you had the following     No orders found for display         Today's Medication Changes          These changes are accurate as of 9/17/18 11:39 AM.  If you have any questions, ask your nurse or doctor.               Start taking these medicines.        Dose/Directions    ammonium lactate 12 % lotion   Commonly known as:  LAC-HYDRIN   Used for:  Dry skin   Started by:  Harris Mary MD        Apply topically 2 times daily   Quantity:  225 g   Refills:  11       diclofenac 1 % Gel topical gel   Commonly known as:  VOLTAREN   Used for:  Disturbance of skin sensation   Started by:  Harris Mary MD        Apply 2 grams to area of discomfort up to four times daily using enclosed dosing card.   Quantity:  100 g   Refills:  1         Stop taking these medicines if you haven't already. Please contact your care team if you have questions.     alfuzosin 10 MG 24 hr tablet   Commonly known as:  UROXATRAL   Stopped by:  Harris Mary MD           benzonatate 100 MG capsule   Commonly known as:  TESSALON   Stopped by:  Harris Mary MD           doxycycline 100 MG capsule   Commonly known as:  VIBRAMYCIN   Stopped by:  Harris Mary MD           doxycycline monohydrate 100 MG capsule   Stopped by:  Harris Mary MD           hydrocortisone 1 % cream   Commonly known as:  CORTAID   Stopped by:  Harris Mary MD           hydrocortisone 2.5 % cream   Commonly known as:  ANUSOL-HC   Stopped by:  Harris Mary MD           loperamide 2 MG tablet   Commonly known as:  IMODIUM A-D   Stopped by:  Harris Mary MD           magic mouthwash suspension   Commonly known as:  ENTER INGREDIENTS IN COMMENTS   Stopped by:  Harris Mary MD           oxybutynin 5 MG 24 hr tablet   Commonly known as:  DITROPAN-XL   Stopped by:  Harris Mary MD            polyethylene glycol powder   Commonly known as:  MIRALAX   Stopped by:  Harris Mary MD           primaquine 26.3 MG tablet   Stopped by:  Harris Mary MD           Propylene Glycol-Glycerin 1-0.3 % Soln   Commonly known as:  [employer] ARTIFICIAL TEARS   Stopped by:  Harris Mary MD           sodium chloride 0.65 % nasal spray   Commonly known as:  OCEAN   Stopped by:  Harris Mary MD           triamcinolone 0.1 % cream   Commonly known as:  KENALOG   Stopped by:  Harris Mary MD           triamcinolone 0.1 % ointment   Commonly known as:  KENALOG   Stopped by:  Harris Mary MD           VITAMIN D (CHOLECALCIFEROL) PO   Stopped by:  Harris Mary MD                Where to get your medicines      These medications were sent to [id number] - [place], [place] - [address] AT [facility] & [address]  [address], [place] [address]     Phone:  [phone]     ammonium lactate 12 % lotion    diclofenac 1 % Gel topical gel                Primary Care Provider Office Phone #    Lanette Ruiz [phone]       [facility] [address] [place] [address]        Equal Access to Services     [facility] : Hadii aad ku hadasho Soomaali, waaxda luqadaha, qaybta kaalmada adedarrellyafannie, aileen greenberg . So [facility] [phone].    ATENCIÓN: Si habla español, tiene a day disposición servicios gratuitos de asistencia lingüística. Marleen al [phone].    We comply with applicable federal civil rights laws and [place] laws. We do not discriminate on the basis of race, color, national origin, age, disability, sex, sexual orientation, or gender identity.            Thank you!     Thank you for choosing [facility]  for your care. Our goal is always to provide you with excellent care. Hearing back from our patients is one way we can continue to improve our services. Please take a few minutes to complete the written survey that you may receive in the mail after  your visit with us. Thank you!             Your Updated Medication List - Protect others around you: Learn how to safely use, store and throw away your medicines at www.disposemymeds.org.          This list is accurate as of 9/17/18 11:39 AM.  Always use your most recent med list.                   Brand Name Dispense Instructions for use Diagnosis    abacavir-lamiVUDine-zidovudine 300-150-300 MG per tablet    TRIZIVIR    180 tablet    Take 1 tablet by mouth 2 times daily    Human immunodeficiency virus (HIV) disease       acetaminophen 500 MG tablet    TYLENOL    100 tablet    Take 2 tablets (1,000 mg) by mouth 3 times daily    Pain of left hand       ammonium lactate 12 % lotion    LAC-HYDRIN    225 g    Apply topically 2 times daily    Dry skin       * bisacodyl 5 MG EC tablet    BISACODYL LAXATIVE    60 tablet    Take 1 tablet (5 mg) by mouth daily as needed for constipation    Constipation, unspecified constipation type       * bisacodyl 5 MG EC tablet    BISACODYL LAXATIVE    60 tablet    Take 1 tablet (5 mg) by mouth daily as needed for constipation    Constipation, unspecified constipation type       diclofenac 1 % Gel topical gel    VOLTAREN    100 g    Apply 2 grams to area of discomfort up to four times daily using enclosed dosing card.    Disturbance of skin sensation       docusate sodium 100 MG tablet    COLACE    270 tablet    Take 1 to 3 tablets daily to keep stools soft.    Constipation, unspecified constipation type       etravirine 200 MG tablet    INTELENCE    180 tablet    Take 1 tablet (200 mg) by mouth 2 times daily (with meals)    Human immunodeficiency virus (HIV) disease       naproxen 375 MG tablet    NAPROSYN    30 tablet    Take 1 tablet (375 mg) by mouth 2 times daily as needed for moderate pain (take with food)    Mechanical low back pain       * Notice:  This list has 2 medication(s) that are the same as other medications prescribed for you. Read the directions carefully, and ask your  doctor or other care provider to review them with you.

## 2018-11-12 DIAGNOSIS — M54.59 MECHANICAL LOW BACK PAIN: ICD-10-CM

## 2018-12-19 ENCOUNTER — OFFICE VISIT (OUTPATIENT)
Dept: FAMILY MEDICINE | Facility: CLINIC | Age: 63
End: 2018-12-19
Payer: COMMERCIAL

## 2018-12-19 VITALS
DIASTOLIC BLOOD PRESSURE: 66 MMHG | TEMPERATURE: 97.7 F | HEART RATE: 80 BPM | WEIGHT: 164.8 LBS | BODY MASS INDEX: 23.48 KG/M2 | SYSTOLIC BLOOD PRESSURE: 99 MMHG | RESPIRATION RATE: 16 BRPM | OXYGEN SATURATION: 97 %

## 2018-12-19 DIAGNOSIS — Z13.220 LIPID SCREENING: ICD-10-CM

## 2018-12-19 DIAGNOSIS — N13.8 HYPERTROPHY OF PROSTATE WITH URINARY OBSTRUCTION: Primary | ICD-10-CM

## 2018-12-19 DIAGNOSIS — M54.59 MECHANICAL LOW BACK PAIN: ICD-10-CM

## 2018-12-19 DIAGNOSIS — R23.8 PAPULE: ICD-10-CM

## 2018-12-19 DIAGNOSIS — D50.8 OTHER IRON DEFICIENCY ANEMIA: ICD-10-CM

## 2018-12-19 DIAGNOSIS — N40.1 HYPERTROPHY OF PROSTATE WITH URINARY OBSTRUCTION: Primary | ICD-10-CM

## 2018-12-19 DIAGNOSIS — Z13.6 SCREENING FOR AAA (ABDOMINAL AORTIC ANEURYSM): ICD-10-CM

## 2018-12-19 DIAGNOSIS — K59.00 CONSTIPATION, UNSPECIFIED CONSTIPATION TYPE: ICD-10-CM

## 2018-12-19 LAB
CHOLEST SERPL-MCNC: 157 MG/DL
FASTING?: ABNORMAL
HCT VFR BLD AUTO: 42.7 % (ref 40–53)
HDLC SERPL-MCNC: 38 MG/DL
HEMOGLOBIN: 13.7 G/DL (ref 13.3–17.7)
LDLC SERPL CALC-MCNC: 103 MG/DL
MCH RBC QN AUTO: 34.2 PG (ref 26.5–35)
MCHC RBC AUTO-ENTMCNC: 32.1 G/DL (ref 32–36)
MCV RBC AUTO: 106.6 FL (ref 78–100)
PLATELET # BLD AUTO: 157 K/UL (ref 150–450)
PSA SERPL-MCNC: 9.4 NG/ML (ref 0–4.5)
RBC # BLD AUTO: 4 M/UL (ref 4.4–5.9)
TRIGL SERPL-MCNC: 78 MG/DL
WBC # BLD AUTO: 2.8 K/UL (ref 4–11)

## 2018-12-19 RX ORDER — POLYETHYLENE GLYCOL 3350 17 G/17G
1 POWDER, FOR SOLUTION ORAL DAILY PRN
Qty: 255 G | Refills: 3 | Status: SHIPPED | OUTPATIENT
Start: 2018-12-19 | End: 2019-12-19

## 2018-12-19 RX ORDER — NAPROXEN 500 MG/1
500 TABLET ORAL 2 TIMES DAILY PRN
Qty: 60 TABLET | Refills: 11 | Status: SHIPPED | OUTPATIENT
Start: 2018-12-19 | End: 2019-12-30

## 2018-12-19 NOTE — LETTER
December 21, 2018      Antonio Darlingleonel  [address]   [place] [address]        Dear Antonio,    Your complete blood count is stable and your bad cholesterol LDL number is good.  Based on that and your other healthy behaviors, you do not need to take a medication to lower your cholesterol.     Your prostate test PSA is still elevated - a little lower than the 10.5 last year.  At these numbers, we would usually want you to see the urologist to decide if any further treatments are needed.  Take care.    The 10-year ASCVD risk score (Sanibel RAEANN Jr., et al., 2013) is: 6.4%     Values used to calculate the score:       Age: 63 years       Sex: Male       Is Non- : Yes       Diabetic: No       Tobacco smoker: No       Systolic Blood Pressure: 99 mmHg       Is BP treated: No       HDL Cholesterol: 38 mg/dL       Total Cholesterol: 157 mg/dL   Please see below for your test results.    Resulted Orders   CBC with Plt ([facility])   Result Value Ref Range    WBC 2.8 (L) 4.0 - 11.0 K/uL    RBC 4.0 (L) 4.4 - 5.9 M/uL    Hemoglobin 13.7 13.3 - 17.7 g/dL    Hematocrit 42.7 40.0 - 53.0 %    .6 (H) 78.0 - 100.0 fL    MCH 34.2 26.5 - 35.0    MCHC 32.1 32.0 - 36.0 g/dL    Platelets 157.0 150.0 - 450.0 K/uL   PSA Diagnostic ([facility])   Result Value Ref Range    PSA 9.4 (H) 0.0 - 4.5 ng/mL    Narrative    Test performed by[facility]  [facility]  [address] [place] [address]  Method is Abbott Prostate-Specific Antigen (PSA)  Standard-WHO 1st International (90:10)   Lipid Mulga ([facility]) - Results > 1 hr   Result Value Ref Range    Cholesterol 157 <=199 mg/dL    Triglycerides 78 <=149 mg/dL    HDL Cholesterol 38 (L) >=40 mg/dL    LDL Cholesterol Calculated 103 <=129 mg/dL    Fasting? Unknown     Narrative    Test performed by:  [facility]  [address] [place] [address]       If you have any questions, please call the clinic to make an  appointment.    Sincerely,    Harris Mary MD

## 2018-12-19 NOTE — PATIENT INSTRUCTIONS
Your repeat colonoscopy with extended bowel prep is scheduled on 1/11/19 at 11:00 a.m    You can schedule for an EXCISION OF SEBACEOUS CYST on your back (40 mins appt) with me whenever you want.

## 2018-12-19 NOTE — LETTER
12/19/2018      RE: Antonio Mercado  [address]  [place] [address]       Male Physical Note      Concerns today: This is a 63-year-old male who attends today for a number of reasons.  He primarily is interested in seeing if his HIV levels and CD4 count are improved since he has been engaging in a deliberately healthy diet.  He tells me that he fasts for much of the day and that when he does eat he is using vegetable smoothies and other healthy organic products.  He has successfully lost weight and feels very well.  He is otherwise interested in reviewing preventive services.  He has been receiving colonoscopies through the [employer] system and reports that he has one scheduled for a few weeks time as surveillance in follow-up to previously treated colon cancer.  We have not been getting routine communications about him from GI.    An  was NOT used for  this visit and a portion of the history was obtained through Bengali with this provider.     ROS:                      CONSTITUTIONAL: no fatigue, no unexpected change in weight  SKIN: He does have a small spot on his anterior abdomen that bothers him and needed that he like examined.  He is also got a small bump on his back he would like reviewed.  EYES: no acute vision problems or changes  ENT: no ear problems, no mouth problems, no throat problems  RESP: no significant cough, no shortness of breath  CV: no chest pain, no palpitations, no new or worsening peripheral edema  GI: no nausea, no vomiting, no constipation, no diarrhea   male : He does have some nocturia.  He has been seen by urology in the past and is somewhat dismissive of those visits and reluctant to return to see them.  He is aware that he has had elevated PSAs presumably caused by BPH.  MS: no claudication, no myalgias, no joint aches  NEURO: no weakness, no dizziness, no syncope, no headaches  ENDOCRINE: no temperature intolerance, no skin/hair changes  HEME: no  easy bruising, no bleeding problems  PSYCHIATRIC: NEGATIVE for changes in mood or affect  See PHQ-2  PHQ-2 Score:     PHQ-2 ( 1999 Pfizer) 9/17/2018 9/10/2018   Q1: Little interest or pleasure in doing things 0 0   Q2: Feeling down, depressed or hopeless 0 0   PHQ-2 Score 0 0         Past Medical History:   Diagnosis Date     Heel pain      History of blood transfusion      HIV (human immunodeficiency virus infection) (H)      LBP (low back pain)      Malignant neoplasm of hepatic flexure (H) 2/4/2016     Trigger finger         Family History   Problem Relation Age of Onset     C.A.D. No family hx of      Diabetes No family hx of      Coronary Artery Disease No family hx of      Hypertension No family hx of      Hyperlipidemia No family hx of      Breast Cancer No family hx of      Cancer - colorectal No family hx of      Ovarian Cancer No family hx of      Prostate Cancer No family hx of      Depression/Anxiety No family hx of      Cerebrovascular Disease No family hx of      Anesthesia Reaction No family hx of      Thyroid Disease No family hx of      Asthma No family hx of      Osteoporosis No family hx of      Chemical Addiction No family hx of      Known Genetic Syndrome No family hx of    Reviewed no other significant FH           Family History and past Medical History reviewed and unchanged/updated.    Social History     Tobacco Use     Smoking status: Never Smoker     Smokeless tobacco: Never Used   Substance Use Topics     Alcohol use: No     Single, he works as a   Children ?  Not discussed    Has anyone hurt you physically, for example by pushing, hitting, slapping or kicking you or forcing you to have sex? Denies  Do you feel threatened or controlled by a partner, ex-partner or anyone in your life? Denies    RISK BEHAVIORS AND HEALTHY HABITS:  Tobacco Use/Smoking: None  Illicit Drug Use: None  ETOH: None  Do you use alcohol? No  Sexually Active: No  Diet (5-7 servings of fruits/veg daily): Yes    Exercise (30 min accumulated most days):Yes  Dental Care: Yes   Calcium 1500 mg/d:  No  Seat Belt Use: Yes     CV Risk based on Pooled Cohort Risk:   The 10-year ASCVD risk score (Jen CARY Jr., et al., 2013) is: 6.4%    Values used to calculate the score:      Age: 63 years      Sex: Male      Is Non- : Yes      Diabetic: No      Tobacco smoker: No      Systolic Blood Pressure: 99 mmHg      Is BP treated: No      HDL Cholesterol: 38 mg/dL      Total Cholesterol: 157 mg/dL      Immunization History   Administered Date(s) Administered     HEPA 03/08/2016     HepA-Adult 02/20/2017     HepB 03/08/2016, 06/06/2016, 02/20/2017     Influenza (IIV3) PF 09/25/2012, 12/23/2013     Influenza Vaccine, 3 YRS +, IM (QUADRIVALENT W/PRESERVATIVES) 11/24/2014, 11/16/2015, 02/20/2017     Mantoux Tuberculin Skin Test 03/08/2016     Pneumo Conj 13-V (2010&after) 11/24/2014, 02/20/2017     Pneumococcal 23 valent 03/08/2016     TD (ADULT, 7+) 01/28/2009     TDAP Vaccine (Boostrix) 03/08/2016     Typhoid IM 02/20/2017     Yellow Fever 02/27/2017    *** Reviewed Immunization Record Today    EXAMINATION:  BP 99/66 (BP Location: Left arm, Patient Position: Sitting, Cuff Size: Adult Regular)   Pulse 80   Temp 97.7  F (36.5  C) (Oral)   Resp 16   Wt 74.8 kg (164 lb 12.8 oz)   SpO2 97%   BMI 23.48 kg/m      BP is low and BMI is excellent  GENERAL: healthy, alert and no distress  EYES: Eyes grossly normal to inspection, extraocular movements - intact, and PERRL  HENT: ear canals- normal; TMs- normal; Nose- normal; Mouth- no ulcers, no lesions  NECK: no tenderness, no adenopathy, no asymmetry, no masses, no stiffness; thyroid- normal to palpation  RESP: lungs clear to auscultation - no rales, no rhonchi, no wheezes  BREAST: no masses, no tenderness, no nipple discharge, no palpable axillary masses or adenopathy  CV: regular rates and rhythm, normal S1 S2, no S3 or S4 and no murmur, no click or rub -  ABDOMEN: soft,  no tenderness, no  hepatosplenomegaly, no masses, normal bowel sounds  MS: extremities- no gross deformities noted, no edema  SKIN: no suspicious lesions, no rashes, he does have a nonspecific papule on his abdomen which irritates him and may be a burn to pustule.  I offered him cryotherapy and he accepted.  The spot on his back is a small sebaceous cyst which he is interested in having excised.  NEURO: strength and tone- normal, sensory exam- grossly normal, mentation- intact, speech- normal, reflexes- symmetric  BACK: no CVA tenderness, no paralumbar tenderness  - male: testicles- normal, no atrophy, no masses;  no inguinal hernias  RECTAL- male: no masses, no hemorhoids, Prostate- symmetric, no  nodularity, no masses, no hypertrophy  PSYCH: Alert and oriented times 3; speech- coherent , normal rate and volume; able to articulate logical thoughts, able to abstract reason, no tangential thoughts, no hallucinations or delusions, affect- normal  LYMPHATICS: ant. cervical- normal, post. cervical- normal, axillary- normal, supraclavicular- normal, inguinal- normal    Procedure:  With his consent I applied cryotherapy to the benign papule on his abdomen x3.    Results for orders placed or performed in visit on 12/19/18   CBC with Plt ([facility])   Result Value Ref Range    WBC 2.8 (L) 4.0 - 11.0 K/uL    RBC 4.0 (L) 4.4 - 5.9 M/uL    Hemoglobin 13.7 13.3 - 17.7 g/dL    Hematocrit 42.7 40.0 - 53.0 %    .6 (H) 78.0 - 100.0 fL    MCH 34.2 26.5 - 35.0    MCHC 32.1 32.0 - 36.0 g/dL    Platelets 157.0 150.0 - 450.0 K/uL   PSA Diagnostic ([facility])   Result Value Ref Range    PSA 9.4 (H) 0.0 - 4.5 ng/mL    Narrative    Test performed by:  [facility]  [address] [place] [address]  Method is Abbott Prostate-Specific Antigen (PSA)  Standard-WHO 1st International (90:10)   Lipid [place] ([facility]) - Results > 1 hr   Result Value Ref Range    Cholesterol 157 <=199 mg/dL    Triglycerides 78 <=149 mg/dL     HDL Cholesterol 38 (L) >=40 mg/dL    LDL Cholesterol Calculated 103 <=129 mg/dL    Fasting? Unknown     Narrative    Test performed by:  [facility]  [address] [place] [address]         Antonio was seen today for physical, labs only and medication reconciliation.    Diagnoses and all orders for this visit:    Hypertrophy of prostate with urinary obstruction  -     PSA Diagnostic ([facility])    Constipation, unspecified constipation type  -     psyllium (METAMUCIL/KONSYL) 58.6 % powder; Add one teaspoonful to liquid daily  -     polyethylene glycol (MIRALAX/GLYCOLAX) powder; Take 17 g (1 capful) by mouth daily as needed for constipation    Mechanical low back pain  -     naproxen (NAPROSYN) 500 MG tablet; Take 1 tablet (500 mg) by mouth 2 times daily as needed for moderate pain (take with food)    Other iron deficiency anemia  -     CBC with Plt ([facility])    Lipid screening  -     Cancel: Lipid Panel ([facility])  -     Lipid [place] ([facility]) - Results > 1 hr    Screening for AAA (abdominal aortic aneurysm)  -     Abdominal Aortic Aneurysm Screening/Tracking    Papule  -     DESTRUCT BENIGN LESION, UP TO 14    Lipid panel ordered.   Colonoscopy ordered.  Immunizations Ordered: None    The patient attends ID for management of his HIV disease and has an appointment in 2 months.  I therefore indicated to him that it is a more cost effective plan for him to wait to check his HIV and CD4 count at that time.  He accepts this.  He has never smoked and therefore does not need to be screened for AAA.  I interrogated his [facility] chart and advised him of the date of his future repeat colonoscopy for which he will need additional prep.    I am happy to remove his sebaceous cyst at a future time.    If his PSA is elevated I will recommend return referral to urology for their advice.        Harris Mary MD

## 2018-12-20 NOTE — RESULT ENCOUNTER NOTE
Alyssia Alvarez, your complete blood count is stable and your bad cholesterol LDL number is good.  Based on that and your other healthy behaviors, you do not need to take a medication to lower your cholesterol.     Your prostate test PSA is still elevated - a little lower than the 10.5 last year.  At these numbers, we would usually want you to see the urologist to decide if any further treatments are needed.  Take care, Dr Harris Mary    The 10-year ASCVD risk score (Jen CARY Jr., et al., 2013) is: 6.4%    Values used to calculate the score:      Age: 63 years      Sex: Male      Is Non- : Yes      Diabetic: No      Tobacco smoker: No      Systolic Blood Pressure: 99 mmHg      Is BP treated: No      HDL Cholesterol: 38 mg/dL      Total Cholesterol: 157 mg/dL

## 2018-12-22 NOTE — PROGRESS NOTES
Male Physical Note      Concerns today: This is a 63-year-old male who attends today for a number of reasons.  He primarily is interested in seeing if his HIV levels and CD4 count are improved since he has been engaging in a deliberately healthy diet.  He tells me that he fasts for much of the day and that when he does eat he is using vegetable smoothies and other healthy organic products.  He has successfully lost weight and feels very well.  He is otherwise interested in reviewing preventive services.  He has been receiving colonoscopies through the [employer] system and reports that he has one scheduled for a few weeks time as surveillance in follow-up to previously treated colon cancer.  We have not been getting routine communications about him from GI.    An  was NOT used for  this visit and a portion of the history was obtained through [place] with this provider.     ROS:                      CONSTITUTIONAL: no fatigue, no unexpected change in weight  SKIN: He does have a small spot on his anterior abdomen that bothers him and needed that he like examined.  He is also got a small bump on his back he would like reviewed.  EYES: no acute vision problems or changes  ENT: no ear problems, no mouth problems, no throat problems  RESP: no significant cough, no shortness of breath  CV: no chest pain, no palpitations, no new or worsening peripheral edema  GI: no nausea, no vomiting, no constipation, no diarrhea   male : He does have some nocturia.  He has been seen by urology in the past and is somewhat dismissive of those visits and reluctant to return to see them.  He is aware that he has had elevated PSAs presumably caused by BPH.  MS: no claudication, no myalgias, no joint aches  NEURO: no weakness, no dizziness, no syncope, no headaches  ENDOCRINE: no temperature intolerance, no skin/hair changes  HEME: no easy bruising, no bleeding problems  PSYCHIATRIC: NEGATIVE for changes in mood or  affect  See PHQ-2  PHQ-2 Score:     PHQ-2 ( 1999 Pfizer) 9/17/2018 9/10/2018   Q1: Little interest or pleasure in doing things 0 0   Q2: Feeling down, depressed or hopeless 0 0   PHQ-2 Score 0 0         Past Medical History:   Diagnosis Date     Heel pain      History of blood transfusion      HIV (human immunodeficiency virus infection) (H)      LBP (low back pain)      Malignant neoplasm of hepatic flexure (H) 2/4/2016     Trigger finger         Family History   Problem Relation Age of Onset     C.A.D. No family hx of      Diabetes No family hx of      Coronary Artery Disease No family hx of      Hypertension No family hx of      Hyperlipidemia No family hx of      Breast Cancer No family hx of      Cancer - colorectal No family hx of      Ovarian Cancer No family hx of      Prostate Cancer No family hx of      Depression/Anxiety No family hx of      Cerebrovascular Disease No family hx of      Anesthesia Reaction No family hx of      Thyroid Disease No family hx of      Asthma No family hx of      Osteoporosis No family hx of      Chemical Addiction No family hx of      Known Genetic Syndrome No family hx of    Reviewed no other significant FH           Family History and past Medical History reviewed and unchanged/updated.    Social History     Tobacco Use     Smoking status: Never Smoker     Smokeless tobacco: Never Used   Substance Use Topics     Alcohol use: No     Single, he works as a   Children ?  Not discussed    Has anyone hurt you physically, for example by pushing, hitting, slapping or kicking you or forcing you to have sex? Denies  Do you feel threatened or controlled by a partner, ex-partner or anyone in your life? Denies    RISK BEHAVIORS AND HEALTHY HABITS:  Tobacco Use/Smoking: None  Illicit Drug Use: None  ETOH: None  Do you use alcohol? No  Sexually Active: No  Diet (5-7 servings of fruits/veg daily): Yes   Exercise (30 min accumulated most days):Yes  Dental Care: Yes   Calcium 1500 mg/d:   No  Seat Belt Use: Yes     CV Risk based on Pooled Cohort Risk:   The 10-year ASCVD risk score (Jen CARY Jr., et al., 2013) is: 6.4%    Values used to calculate the score:      Age: 63 years      Sex: Male      Is Non- : Yes      Diabetic: No      Tobacco smoker: No      Systolic Blood Pressure: 99 mmHg      Is BP treated: No      HDL Cholesterol: 38 mg/dL      Total Cholesterol: 157 mg/dL      Immunization History   Administered Date(s) Administered     HEPA 03/08/2016     HepA-Adult 02/20/2017     HepB 03/08/2016, 06/06/2016, 02/20/2017     Influenza (IIV3) PF 09/25/2012, 12/23/2013     Influenza Vaccine, 3 YRS +, IM (QUADRIVALENT W/PRESERVATIVES) 11/24/2014, 11/16/2015, 02/20/2017     Mantoux Tuberculin Skin Test 03/08/2016     Pneumo Conj 13-V (2010&after) 11/24/2014, 02/20/2017     Pneumococcal 23 valent 03/08/2016     TD (ADULT, 7+) 01/28/2009     TDAP Vaccine (Boostrix) 03/08/2016     Typhoid IM 02/20/2017     Yellow Fever 02/27/2017    Reviewed Immunization Record Today    EXAMINATION:  BP 99/66 (BP Location: Left arm, Patient Position: Sitting, Cuff Size: Adult Regular)   Pulse 80   Temp 97.7  F (36.5  C) (Oral)   Resp 16   Wt 74.8 kg (164 lb 12.8 oz)   SpO2 97%   BMI 23.48 kg/m     BP is low and BMI is excellent  GENERAL: healthy, alert and no distress  EYES: Eyes grossly normal to inspection, extraocular movements - intact, and PERRL  HENT: ear canals- normal; TMs- normal; Nose- normal; Mouth- no ulcers, no lesions  NECK: no tenderness, no adenopathy, no asymmetry, no masses, no stiffness; thyroid- normal to palpation  RESP: lungs clear to auscultation - no rales, no rhonchi, no wheezes  BREAST: no masses, no tenderness, no nipple discharge, no palpable axillary masses or adenopathy  CV: regular rates and rhythm, normal S1 S2, no S3 or S4 and no murmur, no click or rub -  ABDOMEN: soft, no tenderness, no  hepatosplenomegaly, no masses, normal bowel sounds  MS: extremities- no  gross deformities noted, no edema  SKIN: no suspicious lesions, no rashes, he does have a nonspecific papule on his abdomen which irritates him and may be a burn to pustule.  I offered him cryotherapy and he accepted.  The spot on his back is a small sebaceous cyst which he is interested in having excised.  NEURO: strength and tone- normal, sensory exam- grossly normal, mentation- intact, speech- normal, reflexes- symmetric  BACK: no CVA tenderness, no paralumbar tenderness  - male: testicles- normal, no atrophy, no masses;  no inguinal hernias  RECTAL- male: no masses, no hemorhoids, Prostate- symmetric, no  nodularity, no masses, no hypertrophy  PSYCH: Alert and oriented times 3; speech- coherent , normal rate and volume; able to articulate logical thoughts, able to abstract reason, no tangential thoughts, no hallucinations or delusions, affect- normal  LYMPHATICS: ant. cervical- normal, post. cervical- normal, axillary- normal, supraclavicular- normal, inguinal- normal    Procedure:  With his consent I applied cryotherapy to the benign papule on his abdomen x3.    Results for orders placed or performed in visit on 12/19/18   CBC with Plt ([facility])   Result Value Ref Range    WBC 2.8 (L) 4.0 - 11.0 K/uL    RBC 4.0 (L) 4.4 - 5.9 M/uL    Hemoglobin 13.7 13.3 - 17.7 g/dL    Hematocrit 42.7 40.0 - 53.0 %    .6 (H) 78.0 - 100.0 fL    MCH 34.2 26.5 - 35.0    MCHC 32.1 32.0 - 36.0 g/dL    Platelets 157.0 150.0 - 450.0 K/uL   PSA Diagnostic ([facility])   Result Value Ref Range    PSA 9.4 (H) 0.0 - 4.5 ng/mL    Narrative    Test performed by:  [facility] [address] [place] [address]  Method is Abbott Prostate-Specific Antigen (PSA)  Standard-WHO 1st International (90:10)   Lipid [place] ([facility]) - Results > 1 hr   Result Value Ref Range    Cholesterol 157 <=199 mg/dL    Triglycerides 78 <=149 mg/dL    HDL Cholesterol 38 (L) >=40 mg/dL    LDL Cholesterol Calculated 103 <=129 mg/dL     Fasting? Unknown     Narrative    Test performed by[facility]  [facility]  [address] [place] [address]         Antonio was seen today for physical, labs only and medication reconciliation.    Diagnoses and all orders for this visit:    Hypertrophy of prostate with urinary obstruction  -     PSA Diagnostic ([facility])    Constipation, unspecified constipation type  -     psyllium (METAMUCIL/KONSYL) 58.6 % powder; Add one teaspoonful to liquid daily  -     polyethylene glycol (MIRALAX/GLYCOLAX) powder; Take 17 g (1 capful) by mouth daily as needed for constipation    Mechanical low back pain  -     naproxen (NAPROSYN) 500 MG tablet; Take 1 tablet (500 mg) by mouth 2 times daily as needed for moderate pain (take with food)    Other iron deficiency anemia  -     CBC with Plt ([facility])    Lipid screening  -     Cancel: Lipid Panel ([facility])  -     Lipid [place] ([facility]) - Results > 1 hr    Screening for AAA (abdominal aortic aneurysm)  -     Abdominal Aortic Aneurysm Screening/Tracking    Papule  -     DESTRUCT BENIGN LESION, UP TO 14    Lipid panel ordered.   Colonoscopy ordered.  Immunizations Ordered: None    The patient attends ID for management of his HIV disease and has an appointment in 2 months.  I therefore indicated to him that it is a more cost effective plan for him to wait to check his HIV and CD4 count at that time.  He accepts this.  He has never smoked and therefore does not need to be screened for AAA.  I interrogated his [facility] chart and advised him of the date of his future repeat colonoscopy for which he will need additional prep.    I am happy to remove his sebaceous cyst at a future time.    If his PSA is elevated I will recommend return referral to urology for their advice.    Total visit time: 50 minutes, 15 minutes addressing chronic disease management beyond healthcare maintenance recommendations.

## 2018-12-28 ENCOUNTER — OFFICE VISIT (OUTPATIENT)
Dept: FAMILY MEDICINE | Facility: CLINIC | Age: 63
End: 2018-12-28
Payer: COMMERCIAL

## 2018-12-28 VITALS
BODY MASS INDEX: 22.68 KG/M2 | HEART RATE: 78 BPM | OXYGEN SATURATION: 98 % | RESPIRATION RATE: 12 BRPM | WEIGHT: 162 LBS | SYSTOLIC BLOOD PRESSURE: 100 MMHG | TEMPERATURE: 98.6 F | HEIGHT: 71 IN | DIASTOLIC BLOOD PRESSURE: 66 MMHG

## 2018-12-28 DIAGNOSIS — L72.3 SEBACEOUS CYST: Primary | ICD-10-CM

## 2018-12-28 ASSESSMENT — MIFFLIN-ST. JEOR: SCORE: 1537.99

## 2018-12-28 NOTE — PATIENT INSTRUCTIONS
Leave alone and don't shower x 2 days, then OK to remove dressing and shower.  Have someone put a  bandaid over it to protect your clothes.  Return for removal of sutures in about10 days.

## 2018-12-28 NOTE — LETTER
"January 2, 2019      Antonio Mercado  [address]   [place] [address]        Dear Antonio,    As expected the pathology has confirmed this was a benign cyst that we removed - see you in a few days to get the stitches out - take care.  Please see below for your test results.    Resulted Orders   Pathology  ([facility])   Result Value Ref Range    Lab AP Case Report SEE RESULTS BELOW       Comment:      [facility] Surgical Pathology                            Case: [id number]                                      Authorizing Provider:  Harris Mary MD         Collected:             12/28/2018 0925              Pathologist:           Benitez Casiano MD    Received:              12/28/2018 1254              Specimen:    Back                                                                                         Lab AP Final Diagnosis SEE RESULTS BELOW       Comment:      SKIN BIOPSY, BACK:     -  EPIDERMAL INCLUSION CYST  Electronically signed by Benitez Casiano MD on 12/31/2018 at  5:03 PM      Lab AP Microscopic Description       Microscopic examination performed, substantiating the above diagnosis.    Lab AP Clinical Information SEE RESULTS BELOW       Comment:      Clinical history: Irritating sebaceous cyst on back, uncomfortable desires   removal  Reason for procedure: Irritation, slight discomfort      Lab AP Gross Description       The specimen is received in formalin, labeled with the patient's name and designated   \"back,\" and consists of an ovoid, fluctuant, apparently cystic nodule 0.8 cm in diameter,   attached to the undersurface of an ellipse of brown skin measuring 1 x 0.1 cm. On   sectioning, the subcutaneous lesion is found to be a cystic space with a paper-thin wall,   filled with dark brown material. A representative cross-section is submitted. JPL:[email or web address]      Lab AP Charges SEE RESULTS BELOW       Comment:      CPT:  90836    ICD-10:  L72.0      Lab AP Malignant? See Note. Normal      " Comment:         SPECIMEN PROCESSING:     All histology slide preparation and stains; and cytology slide preparation,   staining, and cytotechnologist screening done at [facility] are performed at   [facility], [address] [place] [address], [facility], with final   interpretation, frozen section analysis, and cytology adequacy assessment at   indicated laboratory.         Narrative    Test performed by:  [facility] [address] [place] [address]       If you have any questions, please call the clinic to make an appointment.    Sincerely,    Harris Mary MD

## 2018-12-28 NOTE — PROGRESS NOTES
Procedure note:  Patient has a small sebaceous cyst on his back which bothers him due to becoming irritated at times, gradual increase in size and occasional discomfort.  He desires that it be removed.  Informed consent is obtained.  Local anesthesia is applied.  Under sterile technique a 1 x 1 cm sebaceous cyst is excised without complication and sent for pathology.  2 Ethicon 4-0 sutures are inserted.  Hemostasis is obtained.    Return in about 10 days for removal of sutures and to confirm benign pathology.    Addendum: In addition we discussed his recent elevated PSA and I recommended he follow-up with urology to discuss this.  And he agrees.    Antonio was seen today for derm problem and medication reconciliation.    Diagnoses and all orders for this visit:    Sebaceous cyst  -     Pathology  ([facility])  -     EXC BENIGN SKIN LESION TRUNK/ARM/LEG 0.6-1.0 CM

## 2018-12-31 ENCOUNTER — RECORDS - HEALTHEAST (OUTPATIENT)
Dept: ADMINISTRATIVE | Facility: OTHER | Age: 63
End: 2018-12-31

## 2018-12-31 LAB
LAB AP CASE REPORT: NORMAL
LAB AP CHARGES: NORMAL
LAB AP CLINICAL INFORMATION: NORMAL
LAB AP FINAL DIAGNOSIS: NORMAL
LAB AP GROSS DESCRIPTION: NORMAL
LAB AP MALIGNANT?: NORMAL
LAB AP MICROSCOPIC DESCRIPTION: NORMAL

## 2019-01-02 NOTE — RESULT ENCOUNTER NOTE
Alyssia Alvarez, as expected the pathology has confirmed this was a benign cyst that we removed - see you in a few days to get the stitches out - take care, Dr Harris Mary

## 2019-01-07 ENCOUNTER — OFFICE VISIT (OUTPATIENT)
Dept: FAMILY MEDICINE | Facility: CLINIC | Age: 64
End: 2019-01-07
Payer: MEDICAID

## 2019-01-07 VITALS
SYSTOLIC BLOOD PRESSURE: 97 MMHG | HEIGHT: 71 IN | TEMPERATURE: 98.1 F | RESPIRATION RATE: 20 BRPM | WEIGHT: 159.6 LBS | HEART RATE: 88 BPM | DIASTOLIC BLOOD PRESSURE: 65 MMHG | BODY MASS INDEX: 22.34 KG/M2 | OXYGEN SATURATION: 96 %

## 2019-01-07 DIAGNOSIS — K12.30 STOMATITIS AND MUCOSITIS: Primary | ICD-10-CM

## 2019-01-07 DIAGNOSIS — L72.3 SEBACEOUS CYST: ICD-10-CM

## 2019-01-07 DIAGNOSIS — Z53.1: ICD-10-CM

## 2019-01-07 DIAGNOSIS — C18.3 MALIGNANT NEOPLASM OF HEPATIC FLEXURE (H): ICD-10-CM

## 2019-01-07 DIAGNOSIS — K12.1 STOMATITIS AND MUCOSITIS: Primary | ICD-10-CM

## 2019-01-07 RX ORDER — NYSTATIN 100000/ML
500000 SUSPENSION, ORAL (FINAL DOSE FORM) ORAL 4 TIMES DAILY
Qty: 100 ML | Refills: 1 | Status: SHIPPED | OUTPATIENT
Start: 2019-01-07 | End: 2019-01-12

## 2019-01-07 ASSESSMENT — MIFFLIN-ST. JEOR: SCORE: 1523.13

## 2019-01-07 NOTE — PROGRESS NOTES
"Antonio is a 63-year-old who attends today primarily for removal of sutures from a sebaceous cyst excision 10 days ago.  He had not received my communication about the pathology results and we briefly discussed this today.    He does describe a one-week history of sore tongue with soreness at the base of his mouth.  He has been using Bonjela teething ointment without success.  The pharmacist recommended that he use Magic mouthwash and he is asking me to prescribe this for him.    Otherwise in follow-up to his elevated PSA and my recommendation that he follow with urology he tells me that he does not want to do this.  He says that they will push chemotherapy which he disagrees with philosophically.  He argues that how can his body he will if his immune system is destroyed by chemo.  Instead he prefers to follow a particularly fruit forward diet with frequent fasting and spiritual therapy.    Objective:  BP 97/65 (BP Location: Left arm, Patient Position: Sitting, Cuff Size: Adult Regular)   Pulse 88   Temp 98.1  F (36.7  C) (Oral)   Resp 20   Ht 1.791 m (5' 10.5\")   Wt 72.4 kg (159 lb 9.6 oz)   SpO2 96%   BMI 22.58 kg/m    His vitals are excellent.  He is in no acute distress.  On exam of his mouth he points to the tip of his tongue and the base of his mouth as areas of discomfort although I cannot appreciate any visible abnormalities.  He does not have any ulceration nor an obvious coating nor dental disease.    The 2 sutures on his back are easily removed without complication.  The wound is healing well without infection.  Steri-Strips were applied.    Antonio was seen today for suture removal and mouth problem.    Diagnoses and all orders for this visit:    Stomatitis and mucositis  -     nystatin (MYCOSTATIN) 790695 UNIT/ML suspension; Take 5 mLs (500,000 Units) by mouth 4 times daily for 5 days  -     magic mouthwash suspension (diphenhydrAMINE, lidocaine, aluminum-magnesium & simethicone); Swish and swallow " 10 mLs in mouth every 6 hours as needed for mouth sores or mild pain    Malignant neoplasm of hepatic flexure (H)    Refusal of treatment for reasons of conscience    Sebaceous cyst  -     REMOVAL OF SUTURES      It is uncertain what is causing his tongue and mouth pain but certainly given that he is HIV positive, candidiasis is a possibility.  I therefore agreed to refill the Magic mouthwash but also gave him nystatin suspension.  I have asked him to follow-up if his symptoms are not improving.    I listened to his conversation about his desire to avoid chemotherapy and will work with him as much as possible within his parameters.  I personally recommended he follow with urology and entertain the option of a prostatectomy for his prostate cancer which he does not want to consider at this time.    We will follow-up otherwise as needed.    Total visit time was 25 mins, all of which was face to face MD time, and over 50% of this time was spent in counseling and coordination of care.

## 2019-01-07 NOTE — LETTER
"  1/7/2019      RE: Antonio Mercado  [address]  [place] [address]       Antonio is a 63-year-old who attends today primarily for removal of sutures from a sebaceous cyst excision 10 days ago.  He had not received my communication about the pathology results and we briefly discussed this today.    He does describe a one-week history of sore tongue with soreness at the base of his mouth.  He has been using Bonjela teething ointment without success.  The pharmacist recommended that he use Magic mouthwash and he is asking me to prescribe this for him.    Otherwise in follow-up to his elevated PSA and my recommendation that he follow with urology he tells me that he does not want to do this.  He says that they will push chemotherapy which he disagrees with philosophically.  He argues that how can his body he will if his immune system is destroyed by chemo.  Instead he prefers to follow a particularly fruit forward diet with frequent fasting and spiritual therapy.    Objective:  BP 97/65 (BP Location: Left arm, Patient Position: Sitting, Cuff Size: Adult Regular)   Pulse 88   Temp 98.1  F (36.7  C) (Oral)   Resp 20   Ht 1.791 m (5' 10.5\")   Wt 72.4 kg (159 lb 9.6 oz)   SpO2 96%   BMI 22.58 kg/m     His vitals are excellent.  He is in no acute distress.  On exam of his mouth he points to the tip of his tongue and the base of his mouth as areas of discomfort although I cannot appreciate any visible abnormalities.  He does not have any ulceration nor an obvious coating nor dental disease.    The 2 sutures on his back are easily removed without complication.  The wound is healing well without infection.  Steri-Strips were applied.    Antonio was seen today for suture removal and mouth problem.    Diagnoses and all orders for this visit:    Stomatitis and mucositis  -     nystatin (MYCOSTATIN) 917678 UNIT/ML suspension; Take 5 mLs (500,000 Units) by mouth 4 times daily for 5 days  -     magic mouthwash " suspension (diphenhydrAMINE, lidocaine, aluminum-magnesium & simethicone); Swish and swallow 10 mLs in mouth every 6 hours as needed for mouth sores or mild pain    Malignant neoplasm of hepatic flexure (H)    Refusal of treatment for reasons of conscience    Sebaceous cyst  -     REMOVAL OF SUTURES      It is uncertain what is causing his tongue and mouth pain but certainly given that he is HIV positive, candidiasis is a possibility.  I therefore agreed to refill the Magic mouthwash but also gave him nystatin suspension.  I have asked him to follow-up if his symptoms are not improving.    I listened to his conversation about his desire to avoid chemotherapy and will work with him as much as possible within his parameters.  I personally recommended he follow with urology and entertain the option of a prostatectomy for his prostate cancer which he does not want to consider at this time.    We will follow-up otherwise as needed.    Harris Mary MD

## 2019-01-09 DIAGNOSIS — K12.1 STOMATITIS AND MUCOSITIS: ICD-10-CM

## 2019-01-09 DIAGNOSIS — K12.30 STOMATITIS AND MUCOSITIS: ICD-10-CM

## 2019-01-24 ENCOUNTER — OFFICE VISIT (OUTPATIENT)
Dept: FAMILY MEDICINE | Facility: CLINIC | Age: 64
End: 2019-01-24
Payer: MEDICAID

## 2019-01-24 ENCOUNTER — AMBULATORY - HEALTHEAST (OUTPATIENT)
Dept: SCHEDULING | Facility: CLINIC | Age: 64
End: 2019-01-24

## 2019-01-24 VITALS
HEIGHT: 72 IN | WEIGHT: 155.6 LBS | OXYGEN SATURATION: 97 % | DIASTOLIC BLOOD PRESSURE: 67 MMHG | HEART RATE: 106 BPM | RESPIRATION RATE: 12 BRPM | TEMPERATURE: 98.7 F | SYSTOLIC BLOOD PRESSURE: 104 MMHG | BODY MASS INDEX: 21.08 KG/M2

## 2019-01-24 DIAGNOSIS — C18.3 MALIGNANT NEOPLASM OF HEPATIC FLEXURE (H): ICD-10-CM

## 2019-01-24 DIAGNOSIS — E43 SEVERE PROTEIN-CALORIE MALNUTRITION (H): Primary | ICD-10-CM

## 2019-01-24 DIAGNOSIS — G62.9 PERIPHERAL POLYNEUROPATHY: ICD-10-CM

## 2019-01-24 DIAGNOSIS — B20 HIV DISEASE (H): ICD-10-CM

## 2019-01-24 DIAGNOSIS — B20 HUMAN IMMUNODEFICIENCY VIRUS (HIV) DISEASE (H): ICD-10-CM

## 2019-01-24 ASSESSMENT — MIFFLIN-ST. JEOR: SCORE: 1520.86

## 2019-01-24 NOTE — PROGRESS NOTES
"S: Antonio Mercado is a 63 year old male with a 2 week history of body aches.     Mr. Mercado began walking 20-45 minutes every morning starting 2 weeks ago. He has experienced body aches since that time. He describes the body aches, especially in his legs, as \"sore, tired, and burning\". He feels unbalanced when walking. The aches seem to get better with rest, but return quickly when walking. He started iron supplements because he thought iron deficiency could be causing the soreness.     Mr. Mrecado has been diagnosed with colon cancer, prostate cancer, and HIV. He chooses to maintain a strict diet in combination with prayer to prevent disease worsening - rather than pursue recommended chemo.  He does take his HIV medications however. His diet consists of green smoothies which are made from a bagged mixed vegetables that he gets from [employer]. It sounds like he is only consuming one cup in the morning and one in the evening and eats NO other foods.  He does not want to eat red meat but is open to eating white meats and fish - although he is not currently eating either.  He started his more severe dietary regimen about 6 months ago.     PMHX/PSHX/MEDS/ALLERGIES/SHX/FHX reviewed and updated in Epic.    ROS:  General: No fevers, chills  Head: No headache.  HEENT: no runny nose, no ST.  CV: No chest pain or palpitations.  Resp: No shortness of breath.  No cough.   GI: No nausea, vomiting, constipation, diarrhea  : No urinary pains    O: /67 (BP Location: Left arm, Patient Position: Sitting, Cuff Size: Adult Regular)   Pulse 106   Temp 98.7  F (37.1  C) (Oral)   Resp 12   Ht 1.816 m (5' 11.5\")   Wt 70.6 kg (155 lb 9.6 oz)   SpO2 97%   BMI 21.40 kg/m     Gen:  Thin, no acute distress - but BMI still in normal range.  Documented 5# weight loss since last visit.  HEENT: oropharynx pink and moist  Neck: supple without lymphadenopathy  CV:  RRR  - no murmurs, rubs, or gallups,   Pulm:  CTAB, no " "wheezes/rales/rhonchi, good air entry   Extrem: no cyanosis, edema or clubbing  Neuro: 5/5 strength in upper and lower extremities. Lower extremity proprioception and light touch sensation intact. Monofilament test normal bilaterally.   Psych: Euthymic      Antonio was seen today for generalized body aches.    Diagnoses and all orders for this visit:    Severe protein-calorie malnutrition (H)  -     Cancel: Vitamin B12 ([facility])  -     Cancel: Vitamin B1 (Thiamine), Whole Blood ([facility])  -     Cancel: Vitamin B6 ([facility])  -     Vitamin B12 ([facility]); Future  -     Vitamin B1 (Thiamine), Whole Blood ([facility]); Future  -     Vitamin B6 ([facility]); Future    Human immunodeficiency virus (HIV) disease (H)  -     NUTRITION REFERRAL; Future    Malignant neoplasm of hepatic flexure (H)  -     NUTRITION REFERRAL; Future  -     Cancel: Comprehensive Metabolic Panel ([place])  -     Comprehensive Metabolic Panel ([place]); Future    Peripheral polyneuropathy  -     Cancel: CBC with Diff Plt ([facility])  -     Cancel: Comprehensive Metabolic Panel ([place])  -     CBC with Diff Plt ([facility]); Future  -     Comprehensive Metabolic Panel ([place]); Future        (E43) Severe protein-calorie malnutrition (H)  (primary encounter diagnosis)  Comment: Mr. Mercado is not currently consuming adequate protein, or calories in general and effectively is malnourished. His diet is very limited. His increased activity levels in addition to the strict diet could explain the tiredness and body aches. He may have micro-deficiencies that are causing the neuropathic \"burning\" and ataxia that he describes (vitamins B1, B6, B12).  Discussed the importance of eating protein and provided education on what foods contain protein. Referred patient to a dietitian for a more thorough nutritional consult --he thought that it would be very helpful to talk to someone about his diet. He agreed to work on adding variety and calories to his " "diet. If no improvement of symptoms, should return for reevaluation.  Given information about healthy eating - especially about non-meat sources of protein.   Plan: Vitamin B12 ([facility]), Vitamin B1         (Thiamine), Whole Blood ([facility]), Vitamin         B6 ([facility])        Dietitian referral    (B20) Human immunodeficiency virus (HIV) disease (H)  Comment: Has an appointment with his HIV doctor within the next month to check viral levels.  Plan: NUTRITION REFERRAL            (C18.3) Malignant neoplasm of hepatic flexure (H)  Comment: Symptoms could certainly be due to malignancy. Patient reported no changes to stools, including no dark stools. Colonoscopy is scheduled in March to evaluate for disease progression.   Plan: NUTRITION REFERRAL, Comprehensive Metabolic         Panel ([place])            (G62.9) Peripheral polyneuropathy  Comment: See \"severe protein-calorie malnutrition\" above.   Plan: CBC with Diff Plt ([facility]), Comprehensive         Metabolic Panel ([place])            Total of 25 minutes was spent in face to face contact with patient with > 50% in counseling and coordination of care.  Options for treatment and/or follow-up care were reviewed with the patient. Antonio Princetalita was engaged and actively involved in the decision making process. He verbalized understanding of the options discussed and was satisfied with the final plan.    RTC in 2-4 weeks or sooner if develops new or worsening symptoms.    Natalie Desai, MS4    Preceptor Attestation:  I was present with the medical student who participated in the service and in the documentation of this note. I have verified the history and personally performed the physical exam and medical decision making. I have verified the content of the note, which accurately reflects my assessment of the patient and the plan of care.   Supervising Physician:  Harris Mary MD    Addendum: one of labs needs to be fasting - patient will return for all " labs in AM.

## 2019-01-24 NOTE — LETTER
"  1/24/2019      RE: Antonio Mercado  [address]  [place] [address]       S: Antonio Mercado is a 63 year old male with a 2 week history of body aches.     Mr. Mercado began walking 20-45 minutes every morning starting 2 weeks ago. He has experienced body aches since that time. He describes the body aches, especially in his legs, as \"sore, tired, and burning\". He feels unbalanced when walking. The aches seem to get better with rest, but return quickly when walking. He started iron supplements because he thought iron deficiency could be causing the soreness.     Mr. Mercado has been diagnosed with colon cancer, prostate cancer, and HIV. He chooses to maintain a strict diet in combination with prayer to prevent disease worsening - rather than pursue recommended chemo.  He does take his HIV medications however. His diet consists of green smoothies which are made from a bagged mixed vegetables that he gets from [employer]. It sounds like he is only consuming one cup in the morning and one in the evening and eats NO other foods.  He does not want to eat red meat but is open to eating white meats and fish - although he is not currently eating either.  He started his more severe dietary regimen about 6 months ago.     PMHX/PSHX/MEDS/ALLERGIES/SHX/FHX reviewed and updated in Epic.    ROS:  General: No fevers, chills  Head: No headache.  HEENT: no runny nose, no ST.  CV: No chest pain or palpitations.  Resp: No shortness of breath.  No cough.   GI: No nausea, vomiting, constipation, diarrhea  : No urinary pains    O: /67 (BP Location: Left arm, Patient Position: Sitting, Cuff Size: Adult Regular)   Pulse 106   Temp 98.7  F (37.1  C) (Oral)   Resp 12   Ht 1.816 m (5' 11.5\")   Wt 70.6 kg (155 lb 9.6 oz)   SpO2 97%   BMI 21.40 kg/m      Gen:  Thin, no acute distress - but BMI still in normal range.  Documented 5# weight loss since last visit.  HEENT: oropharynx pink and moist  Neck: supple without " "lymphadenopathy  CV:  RRR  - no murmurs, rubs, or gallups,   Pulm:  CTAB, no wheezes/rales/rhonchi, good air entry   Extrem: no cyanosis, edema or clubbing  Neuro: 5/5 strength in upper and lower extremities. Lower extremity proprioception and light touch sensation intact. Monofilament test normal bilaterally.   Psych: Euthymic      Antonio was seen today for generalized body aches.    Diagnoses and all orders for this visit:    Severe protein-calorie malnutrition (H)  -     Cancel: Vitamin B12 ([facility])  -     Cancel: Vitamin B1 (Thiamine), Whole Blood ([facility])  -     Cancel: Vitamin B6 ([facility])  -     Vitamin B12 ([facility]); Future  -     Vitamin B1 (Thiamine), Whole Blood ([facility]); Future  -     Vitamin B6 ([facility]); Future    Human immunodeficiency virus (HIV) disease (H)  -     NUTRITION REFERRAL; Future    Malignant neoplasm of hepatic flexure (H)  -     NUTRITION REFERRAL; Future  -     Cancel: Comprehensive Metabolic Panel ([place])  -     Comprehensive Metabolic Panel ([place]); Future    Peripheral polyneuropathy  -     Cancel: CBC with Diff Plt ([facility])  -     Cancel: Comprehensive Metabolic Panel ([place])  -     CBC with Diff Plt ([facility]); Future  -     Comprehensive Metabolic Panel ([place]); Future        (E43) Severe protein-calorie malnutrition (H)  (primary encounter diagnosis)  Comment: Mr. Mercado is not currently consuming adequate protein, or calories in general and effectively is malnourished. His diet is very limited. His increased activity levels in addition to the strict diet could explain the tiredness and body aches. He may have micro-deficiencies that are causing the neuropathic \"burning\" and ataxia that he describes (vitamins B1, B6, B12).  Discussed the importance of eating protein and provided education on what foods contain protein. Referred patient to a dietitian for a more thorough nutritional consult --he thought that it would be very helpful to talk to " "someone about his diet. He agreed to work on adding variety and calories to his diet. If no improvement of symptoms, should return for reevaluation.  Given information about healthy eating - especially about non-meat sources of protein.   Plan: Vitamin B12 ([facility]), Vitamin B1         ([facility]), Whole Blood ([facility]), Vitamin         B6 ([facility])        Dietitian referral    (B20) Human immunodeficiency virus (HIV) disease (H)  Comment: Has an appointment with his HIV doctor within the next month to check viral levels.  Plan: NUTRITION REFERRAL            (C18.3) Malignant neoplasm of hepatic flexure (H)  Comment: Symptoms could certainly be due to malignancy. Patient reported no changes to stools, including no dark stools. Colonoscopy is scheduled in March to evaluate for disease progression.   Plan: NUTRITION REFERRAL, Comprehensive Metabolic         Panel ([facility])            (G62.9) Peripheral polyneuropathy  Comment: See \"severe protein-calorie malnutrition\" above.   Plan: CBC with Diff Plt ([facility]), Comprehensive         Metabolic Panel ([facility])            Total of 25 minutes was spent in face to face contact with patient with > 50% in counseling and coordination of care.  Options for treatment and/or follow-up care were reviewed with the patient. Antonio Mercado was engaged and actively involved in the decision making process. He verbalized understanding of the options discussed and was satisfied with the final plan.    RTC in 2-4 weeks or sooner if develops new or worsening symptoms.    Natalie Desai, MS4    Preceptor Attestation:  I was present with the medical student who participated in the service and in the documentation of this note. I have verified the history and personally performed the physical exam and medical decision making. I have verified the content of the note, which accurately reflects my assessment of the patient and the plan of care.   Supervising Physician:  Harris " Usman HOLMAN    Addendum: one of labs needs to be fasting - patient will return for all labs in AM.                 Harris Mary MD

## 2019-01-24 NOTE — PATIENT INSTRUCTIONS
Patient Education     1. Schedule an appointment with the dietitian.   2. Get labs done. We will call if any labs are abnormal. If iron is normal, you should stop taking iron supplements.   3. Add protein to your diet! See below for more information.       Eating a Vegetarian Diet  A vegetarian diet is based on plant foods. It includes fruits, vegetables, beans, grains, seeds, and nuts. Some vegetarians also eat dairy foods and eggs. There are 3 common vegetarian diets:    Lacto-ovo vegetarians eat eggs, yogurt, cheese, and other milk products, as well as plant foods.    Lacto vegetarians eat dairy and plant foods but not eggs.    Vegans eat only plant foods.  Why eat vegetarian?  People choose to be vegetarians for health, cultural, social, and Holiness reasons. A vegetarian diet is a healthy way to eat. You just have to plan your meals carefully so that you get all the nutrients you need. Most vegetarian diets are high in fiber and low in fat and cholesterol. That means eating vegetarian can:    Lower your risk of heart disease    Lower your blood pressure and cholesterol levels    Help you stay at a healthy weight    Lower your risk of diabetes    Lower your risk of cancer    Decrease digestive problems including:  ? Bowel diseases  ? Gallstones  ? Colon cancer  Vegetarian basics  A vegetarian diet can be a healthy way to eat for people of all ages. But meals and snacks must be planned to include non-meat sources of protein, vitamins, and other nutrients. (See the chart below.) Here are some guidelines for healthy meal planning:    Eat a wide range of foods. This will help you get all the nutrients you need.    Eat a number of plant proteins throughout the day.    Plan for enough calories each day. Also make sure that your calories come from foods that are rich in protein, vitamins, and minerals.    If you eat dairy foods, choose low-fat or fat-free milk, yogurt, or cheese.  Do you need supplements?  A  vegetarian diet can easily supply all the calories, protein, vitamins, and minerals that a person needs. But some people have special needs. They may include children and teens, pregnant and lactating women, women past midlife, the elderly, and vegans. If you are in one of these groups, you may need extra calories, protein, calcium, iron, vitamin B12, or zinc. The lists below can help you choose foods that are good sources of these nutrients. And be sure to ask your healthcare provider about taking vitamin supplements.  Protein *    Dried beans, soybeans, and lentils    Tofu (bean curd) and tempeh (cultured soybeans)    Rice, barley, and other whole grains    Nuts and nut butter    Milk, yogurt, and cheese    Eggs    Casein    Seitan (also called wheat gluten) Vitamin B12 *    Milk, yogurt, and cheese    Eggs    Fortified soy burgers    Fortified soy milk or other nondairy milk    Fortified cereals    Nutritional yeast   Zinc    Milk, yogurt, and cheese    Eggs    Canned or dried beans    Lentils and split peas    Wheat germ    Whole-grain breads and cereals    Nuts and nut butters    Pumpkin and sunflower seeds Calcium    Milk, yogurt, and cheese    Fortified soy milk or other nondairy milk    Tofu processed with calcium sulfate    Leafy, dark-green vegetables    Dried figs    Fortified orange juice and fortified cereals    Sesame seeds    Beans   Iron    Wheat germ    Dried fruits    Nuts and seeds    Whole grain and fortified breads and cereals    Dried beans, lentils, and split peas    Leafy, dark-green vegetables    Eggs     Getting started  Change to a vegetarian diet slowly. Start by eating more grains, beans, vegetables, and fruits. Make fish, poultry, or meat a side dish. Then slowly cut them out of your diet. Here are some other tips:    Eat 3 or more servings of vegetables a day. Eat them raw or lightly steamed.    Eat 2 or more servings of fruit a day. Choose whole fruits with the skin on.    Choose a  wide range of grains and whole-grain breads and cereals. Eat 6 or more servings of these foods each day.    Begin to replace meat by working up to 2 to 3 servings a day of beans, lentils, split peas, tofu, or tempeh.    If you eat dairy foods, have 2 to 3 servings a day. Make low-fat or fat-free choices.  For vegans: Add sources of calcium and vitamin B12, such as fortified nondairy milks and breakfast cereals. Talk with your healthcare provider about vitamin supplements.  To learn more  A registered dietitian (RD) can help you plan a healthy vegetarian diet. For more information and to find an RD who knows about vegetarian diets, search for one through the Vegetarian Nutrition Dietetic Practice Group of the Academy of Nutrition and Dietetics (AND) at their website, www.vegetariannutrition.net. You can also search AND's website, www.eatright.org. Other groups that can help include:    Vegetarian Resource Group (www.vrg.org)    American Cancer Society (www.cancer.org)    American Heart Association (www.heart.org)  Date Last Reviewed: 8/1/2017 2000-2018 [employer]. [address] [place] [address], [place] [address]. All rights reserved. This information is not intended as a substitute for professional medical care. Always follow your healthcare professional's instructions.         NUTRITION REFERRAL  January 24, 2019 at 10:18 am Nutrition Referral faxed to [employer] [facility] at [phone] who will contact patient to schedule. Roxanne Astudillo CMA, Referral Coordinator

## 2019-01-25 DIAGNOSIS — E43 SEVERE PROTEIN-CALORIE MALNUTRITION (H): ICD-10-CM

## 2019-01-25 DIAGNOSIS — G62.9 PERIPHERAL NERVE DISORDER: ICD-10-CM

## 2019-01-25 DIAGNOSIS — C18.3 MALIGNANT NEOPLASM OF HEPATIC FLEXURE (H): ICD-10-CM

## 2019-01-25 DIAGNOSIS — C18.3 MALIGNANT NEOPLASM OF HEPATIC FLEXURE (H): Primary | ICD-10-CM

## 2019-01-25 LAB
ALBUMIN SERPL BCP-MCNC: 3.9 G/DL (ref 3.5–5)
ALP SERPL-CCNC: 64 U/L (ref 45–120)
ALT SERPL W/O P-5'-P-CCNC: 21 U/L (ref 0–45)
ANION GAP SERPL CALCULATED.3IONS-SCNC: 12 MMOL/L (ref 5–18)
AST SERPL-CCNC: 23 U/L (ref 0–40)
BILIRUB SERPL-MCNC: 0.7 MG/DL (ref 0–1)
BUN SERPL-MCNC: 17 MG/DL (ref 8–22)
CALCIUM SERPL-MCNC: 9.4 MG/DL (ref 8.5–10.5)
CHLORIDE SERPL-SCNC: 107 MMOL/L (ref 98–107)
CO2 SERPL-SCNC: 22 MMOL/L (ref 22–31)
CREAT SERPL-MCNC: 1.38 MG/DL (ref 0.7–1.3)
GLUCOSE SERPL-MCNC: 88 MG/DL (ref 70–125)
POTASSIUM SERPL-SCNC: 4.7 MMOL/L (ref 3.5–5)
PROT SERPL-MCNC: 7.6 G/DL (ref 6–8)
SODIUM SERPL-SCNC: 141 MMOL/L (ref 136–145)
VIT B12 SERPL-MCNC: 603 PG/ML (ref 213–816)

## 2019-01-25 NOTE — LETTER
February 4, 2019      Antonio Mercado  [address]  [place] [address]        Dear Antonio,    One of the labs took nearly a week to perform since it was sent to a lab in [place], [place].  It shows that your vitamin B1 level is low.  Vitamin B1 is called Thiamine.  Thiamine is primarily found in foods such as yeast, legumes, pork, brown rice, and cereals made from whole grains. It is also common in alcoholics but I am pretty sure that you do not drink alcohol - so this is not the cause with you.     Antonio, you likely get enough thiamine (vit B1) in a multivitamin however to be sure that your deficiency is corrected I have sent a prescription for a supplement - take one daily for at least 2 weeks - OK?  I will ask the nurse to call to check you got this message and that you are doing OK.  Please come back in if you are STILL feeling weak and having tingling in your fingers - take care.   Please see below for your test results.    Resulted Orders   Vitamin B12 ([employer])   Result Value Ref Range    Vitamin B12 603 213 - 816 pg/mL    Narrative    Test performed by:  [facility]  [address] [place] [address]   Vitamin B1 (Thiamine), Whole Blood ([employer])   Result Value Ref Range    Vitamin B1, Whole Blood 44 (L) 70 - 180 nmol/L      Comment:      INTERPRETIVE INFORMATION: Vitamin B1, Whole Blood     This assay measures the concentration of thiamine diphosphate   (TDP), the primary active form of vitamin B1. Approximately 90   percent of vitamin B1 present in whole blood is TDP. Thiamine and   thiamine monophosphate, which comprise the remaining 10 percent,   are not measured.     Test developed and characteristics determined by [employer]. See Compliance Statement B: [employer]/CS  Performed by [employer],  [address] [place] [address] [phone]  [email or web address], Cristóbal Edwards MD, Lab. Director      Narrative    Test performed by:  [facility]  [address]  [place] [id number]   Vitamin B6 ([facility])   Result Value Ref Range    Vitamin B6 (Pyridoxal 5-Phosphate) 58.5 20.0 - 125.0 nmol/L      Comment:      INTERPRETIVE INFORMATION: Vitamin B6 (Pyridoxal 5-Phosphate)     Pyridoxal 5'-phosphate measured in a specimen collected following   an 8-hour or overnight fast accurately indicates vitamin B6   nutritional status. Non-fasting specimen concentration reflects   recent vitamin intake.     Test developed and characteristics determined by [employer]. See Compliance Statement B: [email or web address]/  Performed by [employer],  [address] [place] [address] [id number] [phone]  [email or web address], Cristóbal Edwards MD, Lab. Director      Narrative    Test performed by:  [facility] [facility]  [address] [place] [phone]   Comprehensive Metabolic ([facility]) - Results > 1 hr   Result Value Ref Range    Sodium 141 136 - 145 mmol/L    Potassium 4.7 3.5 - 5.0 mmol/L    Chloride 107 98 - 107 mmol/L    CO2, Total 22 22 - 31 mmol/L    Anion Gap 12 5 - 18 mmol/L    Glucose 88 70 - 125 mg/dL    Urea Nitrogen 17 8 - 22 mg/dL    Creatinine 1.38 (H) 0.70 - 1.30 mg/dL    GFR Estimate If Black >60 >60 mL/min/1.73m2    GFR Estimate 52 (L) >60 mL/min/1.73m2    Bilirubin Total 0.7 0.0 - 1.0 mg/dL    Calcium 9.4 8.5 - 10.5 mg/dL    Protein Total 7.6 6.0 - 8.0 g/dL    Albumin 3.9 3.5 - 5.0 g/dL    Alkaline Phosphatase 64 45 - 120 U/L    AST (SGOT) 23 0 - 40 U/L    ALT (SGPT) 21 0 - 45 U/L    Narrative    Test performed by:  [facility] [address] [place] [address]  Fasting Glucose reference range is 70-99 mg/dL per  American Diabetes Association (ADA) guidelines.       If you have any questions, please call the clinic to make an appointment.    Sincerely,    [facility]

## 2019-01-29 DIAGNOSIS — G62.9 PERIPHERAL POLYNEUROPATHY: ICD-10-CM

## 2019-01-29 LAB
% GRANULOCYTES: 55 %G (ref 40–75)
GRANULOCYTES #: 1.8 K/UL (ref 1.6–8.3)
HCT VFR BLD AUTO: 40.8 % (ref 40–53)
HEMOGLOBIN: 12.4 G/DL (ref 13.3–17.7)
LYMPHOCYTES # BLD AUTO: 1.1 K/UL (ref 0.8–5.3)
LYMPHOCYTES NFR BLD AUTO: 35 %L (ref 20–48)
MCH RBC QN AUTO: 31.6 PG (ref 26.5–35)
MCHC RBC AUTO-ENTMCNC: 30.4 G/DL (ref 32–36)
MCV RBC AUTO: 104.1 FL (ref 78–100)
MID #: 0.3 K/UL (ref 0–2.2)
MID %: 10 %M (ref 0–20)
PLATELET # BLD AUTO: 203 K/UL (ref 150–450)
RBC # BLD AUTO: 3.9 M/UL (ref 4.4–5.9)
WBC # BLD AUTO: 3.2 K/UL (ref 4–11)

## 2019-01-29 NOTE — RESULT ENCOUNTER NOTE
Alyssia Alvarez, I am sorry that you had to come back for a second time - this was a lab error that this test was not performed initially.    Antonio, your hemoglobin is slightly reduced - so, YES, you could continue to take one Iron pill daily until your diet becomes more balanced - OK?  You can discuss this further when you see the dietician - once you are eating enough iron containing foods, you should be able to stop the supplement.  Your HIV doctor will be checking this lab again when you see him so you can see then if it is back in the normal range.    I am still waiting for the results of the vitamin B levels and will write you once I get them - take care, Dr Harris Mary

## 2019-01-29 NOTE — LETTER
February 4, 2019      Antonio Mercado  [address]   [place] [address]        Dear Antonio,    I am sorry that you had to come back for a second time - this was a lab error that this test was not performed initially.     Antonio, your hemoglobin is slightly reduced - so, YES, you could continue to take one Iron pill daily until your diet becomes more balanced - OK?  You can discuss this further when you see the dietician - once you are eating enough iron containing foods, you should be able to stop the supplement.  Your HIV doctor will be checking this lab again when you see him so you can see then if it is back in the normal range.     I am still waiting for the results of the vitamin B levels and will write you once I get them - take care  Please see below for your test results.    Resulted Orders   CBC with Diff Plt ([facility])   Result Value Ref Range    WBC 3.2 (L) 4.0 - 11.0 K/uL    Lymphocytes # 1.1 0.8 - 5.3 K/uL    % Lymphocytes 35.0 20.0 - 48.0 %L    Mid # 0.3 0.0 - 2.2 K/uL    Mid % 10.0 0.0 - 20.0 %M    GRANULOCYTES # 1.8 1.6 - 8.3 K/uL    % Granulocytes 55.0 40.0 - 75.0 %G    RBC 3.9 (L) 4.4 - 5.9 M/uL    Hemoglobin 12.4 (L) 13.3 - 17.7 g/dL    Hematocrit 40.8 40.0 - 53.0 %    .1 (H) 78.0 - 100.0 fL    MCH 31.6 26.5 - 35.0    MCHC 30.4 (L) 32.0 - 36.0 g/dL    Platelets 203.0 150.0 - 450.0 K/uL       If you have any questions, please call the clinic to make an appointment.    Sincerely,    [facility]

## 2019-01-30 LAB — Lab: 58.5 NMOL/L (ref 20–125)

## 2019-02-02 LAB — VITAMIN B1, WHOLE BLOOD: 44 NMOL/L (ref 70–180)

## 2019-02-04 RX ORDER — METHION/INOS/CHOL BT/B COM/LIV 110MG-86MG
100 CAPSULE ORAL DAILY
Qty: 20 TABLET | Refills: 0 | Status: SHIPPED | OUTPATIENT
Start: 2019-02-04 | End: 2020-03-13

## 2019-02-04 NOTE — RESULT ENCOUNTER NOTE
Alyssia Alvarez - one of the labs took nearly a week to perform since it was sent to a lab in [place], [place].  It shows that your vitamin B1 level is low.  Vitamin B1 is called Thiamine.  Thiamine is primarily found in foods such as yeast, legumes, pork, brown rice, and cereals made from whole grains. It is also common in alcoholics but I am pretty sure that you do not drink alcohol - so this is not the cause with you.    Antonio, you likely get enough thiamine (vit B1) in a multivitamin however to be sure that your deficiency is corrected I have sent a prescription for a supplement - take one daily for at least 2 weeks - OK?  I will ask the nurse to call to check you got this message and that you are doing OK.  Please come back in if you are STILL feeling weak and having tingling in your fingers - take care, Dr Harris Mary   .

## 2019-02-12 ENCOUNTER — TRANSFERRED RECORDS (OUTPATIENT)
Dept: HEALTH INFORMATION MANAGEMENT | Facility: CLINIC | Age: 64
End: 2019-02-12

## 2019-02-12 ENCOUNTER — AMBULATORY - HEALTHEAST (OUTPATIENT)
Dept: LAB | Facility: CLINIC | Age: 64
End: 2019-02-12

## 2019-02-12 ENCOUNTER — OFFICE VISIT - HEALTHEAST (OUTPATIENT)
Dept: INFECTIOUS DISEASES | Facility: CLINIC | Age: 64
End: 2019-02-12

## 2019-02-12 DIAGNOSIS — B20 AIDS (ACQUIRED IMMUNE DEFICIENCY SYNDROME) (H): ICD-10-CM

## 2019-02-12 DIAGNOSIS — R30.0 DYSURIA: ICD-10-CM

## 2019-02-12 LAB
ALBUMIN UR-MCNC: NEGATIVE MG/DL
APPEARANCE UR: CLEAR
BACTERIA #/AREA URNS HPF: ABNORMAL HPF
BILIRUB UR QL STRIP: NEGATIVE
COLOR UR AUTO: YELLOW
GLUCOSE UR STRIP-MCNC: NEGATIVE MG/DL
HGB UR QL STRIP: ABNORMAL
KETONES UR STRIP-MCNC: NEGATIVE MG/DL
LEUKOCYTE ESTERASE UR QL STRIP: NEGATIVE
NITRATE UR QL: NEGATIVE
PH UR STRIP: 7 [PH] (ref 5–8)
RBC #/AREA URNS AUTO: ABNORMAL HPF
SP GR UR STRIP: 1.02 (ref 1–1.03)
SQUAMOUS #/AREA URNS AUTO: ABNORMAL LPF
UROBILINOGEN UR STRIP-ACNC: ABNORMAL
WBC #/AREA URNS AUTO: ABNORMAL HPF

## 2019-02-12 ASSESSMENT — MIFFLIN-ST. JEOR: SCORE: 1500.21

## 2019-02-13 ENCOUNTER — COMMUNICATION - HEALTHEAST (OUTPATIENT)
Dept: INFECTIOUS DISEASES | Facility: CLINIC | Age: 64
End: 2019-02-13

## 2019-02-15 LAB
HIV1 RNA # PLAS NAA DL=20: ABNORMAL {COPIES}/ML
HIV1 RNA SERPL NAA+PROBE-LOG#: 4.8 {LOG_COPIES}/ML

## 2019-02-18 ENCOUNTER — HOSPITAL ENCOUNTER (OUTPATIENT)
Dept: NUTRITION | Facility: CLINIC | Age: 64
Discharge: HOME | End: 2019-02-18
Attending: FAMILY MEDICINE

## 2019-02-18 DIAGNOSIS — Z21 HIV (HUMAN IMMUNODEFICIENCY VIRUS INFECTION) (H): ICD-10-CM

## 2019-02-26 ENCOUNTER — COMMUNICATION - HEALTHEAST (OUTPATIENT)
Dept: LAB | Facility: CLINIC | Age: 64
End: 2019-02-26

## 2019-03-01 ENCOUNTER — AMBULATORY - HEALTHEAST (OUTPATIENT)
Dept: LAB | Facility: CLINIC | Age: 64
End: 2019-03-01

## 2019-03-01 ENCOUNTER — AMBULATORY - HEALTHEAST (OUTPATIENT)
Dept: INFECTIOUS DISEASES | Facility: CLINIC | Age: 64
End: 2019-03-01

## 2019-03-01 DIAGNOSIS — B20 AIDS (ACQUIRED IMMUNE DEFICIENCY SYNDROME) (H): ICD-10-CM

## 2019-03-06 LAB
ARUP MISCELLANEOUS TEST: NORMAL
MISCELLANEOUS TEST DEPT. - HE HISTORICAL: NORMAL
PERFORMING LAB: NORMAL
SPECIMEN STATUS: NORMAL
TEST NAME: NORMAL

## 2019-03-14 ENCOUNTER — OFFICE VISIT (OUTPATIENT)
Dept: FAMILY MEDICINE | Facility: CLINIC | Age: 64
End: 2019-03-14
Payer: COMMERCIAL

## 2019-03-14 ENCOUNTER — RECORDS - HEALTHEAST (OUTPATIENT)
Dept: ADMINISTRATIVE | Facility: OTHER | Age: 64
End: 2019-03-14

## 2019-03-14 ENCOUNTER — COMMUNICATION - HEALTHEAST (OUTPATIENT)
Dept: INFECTIOUS DISEASES | Facility: CLINIC | Age: 64
End: 2019-03-14

## 2019-03-14 VITALS
OXYGEN SATURATION: 95 % | RESPIRATION RATE: 20 BRPM | HEIGHT: 70 IN | SYSTOLIC BLOOD PRESSURE: 96 MMHG | HEART RATE: 88 BPM | TEMPERATURE: 98.6 F | DIASTOLIC BLOOD PRESSURE: 64 MMHG | WEIGHT: 152.2 LBS | BODY MASS INDEX: 21.79 KG/M2

## 2019-03-14 DIAGNOSIS — B20 HUMAN IMMUNODEFICIENCY VIRUS (HIV) DISEASE (H): ICD-10-CM

## 2019-03-14 DIAGNOSIS — E51.9 THIAMINE DEFICIENCY: ICD-10-CM

## 2019-03-14 DIAGNOSIS — N18.31 CKD STAGE G3A/A1, GFR 45-59 AND ALBUMIN CREATININE RATIO <30 MG/G (H): ICD-10-CM

## 2019-03-14 DIAGNOSIS — J02.9 SORE THROAT: ICD-10-CM

## 2019-03-14 DIAGNOSIS — M54.50 ACUTE MIDLINE LOW BACK PAIN WITHOUT SCIATICA: Primary | ICD-10-CM

## 2019-03-14 LAB
% GRANULOCYTES: 57.2 %G (ref 40–75)
BUN SERPL-MCNC: 25.3 MG/DL (ref 7–21)
CALCIUM SERPL-MCNC: 9 MG/DL (ref 8.5–10.1)
CHLORIDE SERPLBLD-SCNC: 103 MMOL/L (ref 98–110)
CO2 SERPL-SCNC: 24.5 MMOL/L (ref 20–32)
CREAT SERPL-MCNC: 1.4 MG/DL (ref 0.7–1.3)
GFR SERPL CREATININE-BSD FRML MDRD: 54.1 ML/MIN/1.7 M2
GLUCOSE SERPL-MCNC: 102.3 MG'DL (ref 70–99)
GRANULOCYTES #: 1.5 K/UL (ref 1.6–8.3)
HCT VFR BLD AUTO: 43.1 % (ref 40–53)
HEMOGLOBIN: 13.8 G/DL (ref 13.3–17.7)
LYMPHOCYTES # BLD AUTO: 0.9 K/UL (ref 0.8–5.3)
LYMPHOCYTES NFR BLD AUTO: 35 %L (ref 20–48)
MCH RBC QN AUTO: 31.5 PG (ref 26.5–35)
MCHC RBC AUTO-ENTMCNC: 32 G/DL (ref 32–36)
MCV RBC AUTO: 98.4 FL (ref 78–100)
MID #: 0.2 K/UL (ref 0–2.2)
MID %: 7.8 %M (ref 0–20)
PLATELET # BLD AUTO: 154 K/UL (ref 150–450)
POTASSIUM SERPL-SCNC: 4.7 MMOL/DL (ref 3.2–4.6)
RBC # BLD AUTO: 4.4 M/UL (ref 4.4–5.9)
S PYO AG THROAT QL IA.RAPID: NEGATIVE
SODIUM SERPL-SCNC: 137.3 MMOL/L (ref 132–142)
WBC # BLD AUTO: 2.7 K/UL (ref 4–11)

## 2019-03-14 ASSESSMENT — MIFFLIN-ST. JEOR: SCORE: 1485.59

## 2019-03-14 NOTE — LETTER
March 19, 2019      Antonio Rogelio  [address]   [place] [address]        Dear Antonio,    As expected with a more balanced diet, your vitamin B1 level (thiamine) has returned to normal - good!  Your kidney function is borderline so we will need to keep monitoring that every 6-12 months.  Your hemoglobin is good and your white count is stable.     I did talk with Dr Andersen and he wants you to come back and see him and recheck labs within a few weeks - if you have not heard from his office, you should go ahead and make an appointment to see him - OK?  Take care.  Please see below for your test results.    Resulted Orders   Thiamin RBC (TDP) ([employer])   Result Value Ref Range    Vitamin B1, Whole Blood 85 70 - 180 nmol/L      Comment:      INTERPRETIVE INFORMATION: Vitamin B1, Whole Blood     This assay measures the concentration of thiamine diphosphate   (TDP), the primary active form of vitamin B1. Approximately 90   percent of vitamin B1 present in whole blood is TDP. Thiamine and   thiamine monophosphate, which comprise the remaining 10 percent,   are not measured.     Test developed and characteristics determined by [employer]. See Compliance Statement B: [employer]/CS  Performed by [employer],  [address] [place] [id number] [phone]  [email or web address], Cristóbal Edwards MD, Lab. Director      Narrative    Test performed by:  [facility]  [address] [place] [phone]   Basic Metabolic Panel ([place])   Result Value Ref Range    Urea Nitrogen 25.3 (H) 7.0 - 21.0 mg/dL    Calcium 9.0 8.5 - 10.1 mg/dL    Chloride 103.0 98.0 - 110.0 mmol/L    Carbon Dioxide 24.5 20.0 - 32.0 mmol/L    Creatinine 1.4 (H) 0.7 - 1.3 mg/dL    Glucose 102.3 (H) 70.0 - 99.0 mg'dL    Potassium 4.7 (H) 3.2 - 4.6 mmol/dL    Sodium 137.3 132.0 - 142.0 mmol/L    GFR Estimate 54.1 (L) >60.0 mL/min/1.7 m2    GFR Estimate If Black 65.4 >60.0 mL/min/1.7 m2   CBC with Diff Plt ([facility])    Result Value Ref Range    WBC 2.7 (L) 4.0 - 11.0 K/uL    Lymphocytes # 0.9 0.8 - 5.3 K/uL    % Lymphocytes 35.0 20.0 - 48.0 %L    Mid # 0.2 0.0 - 2.2 K/uL    Mid % 7.8 0.0 - 20.0 %M    GRANULOCYTES # 1.5 (L) 1.6 - 8.3 K/uL    % Granulocytes 57.2 40.0 - 75.0 %G    RBC 4.4 4.4 - 5.9 M/uL    Hemoglobin 13.8 13.3 - 17.7 g/dL    Hematocrit 43.1 40.0 - 53.0 %    MCV 98.4 78.0 - 100.0 fL    MCH 31.5 26.5 - 35.0    MCHC 32.0 32.0 - 36.0 g/dL    Platelets 154.0 150.0 - 450.0 K/uL   Rapid Strep Screen (Group) ([facility])   Result Value Ref Range    Rapid Strep A Screen NEGATIVE Negative   Group A Strep Throat ([facility])   Result Value Ref Range    Group A Strep,Throat No Group A Strep rRNA detected No Group A Strep rRNA detected    Narrative    Test performed by:  [facility] [address] [place] [address]  Intended Use:  The GEN-PROBE Group A Streptococcus direct test is a DNA probe assay which   uses nucleic acid hybridization for the qualitative detection of Group A   Streptococcal RNA to aid in the diagnosis of Group A Streptococcal pharyngitis   from throat swabs.  Methodology:  The GEN-PROBE DNA probe assay uses a single-stranded DNA probe with a   chemiluminescent label, which is complementary to the ribosomal RNA of the   target organism.  The labeled DNA probe combines with the ribosomal RNA to   form a stable DNA:RNA hybrid.  The labeled DNA:RNA hybrids are measured in   GEN-PROBE luminometer.  A positive result is a luminometer reading greater   than or equal to the cut-off.  A value below this cut-off is a negative   result.       If you have any questions, please call the clinic to make an appointment.    Sincerely,    Harris Mary MD

## 2019-03-14 NOTE — PATIENT INSTRUCTIONS
If the mouth pain continues, consider seeing a dentist    PHYSICAL THERAPY REFERRAL   March 14, 2019 at 12:31 pm Demographics and referral for Physical Therapy faxed to [facility] at [phone] who will contact patient to schedule - patient aware of process.  [facility]  Phone: [phone]  Fax: [phone]  [facility]

## 2019-03-14 NOTE — LETTER
"  3/14/2019      RE: Antonio Mercado  [address]  [place] [address]       This is a 63-year-old male well known to me.  He has a complicated past medical history that includes HIV infection, Colon cancer for which he declined chemotherapy and untreated prostate cancer.  Since last visit he reports that he has changed his diet and is now eating more chicken and fish in addition to other sources of protein such as peanut butter.  The neuropathic symptoms in his upper extremities have resolved but he would like to check that the vitamin B1 level is back to normal.    Today he complains of 2 weeks of Right-sided lower back pain.  He says it feels better when he exercises but is worse when he is sedentary or sits in the  seat for too long.  He has no radiation down his legs.  It is uncomfortable until he falls asleep at nights.    He also generally feels less energetic.  He is putting this down to having slept for too long yesterday.    I have received communications from his ID doctor.  It seems as if he was without insurance for at least a few weeks and during that time his viral load was considerably elevated compared to a previous result.  He had some subsequent labs and is interested to know the results.  Review of his chart does not let me entirely see those results apart from an elevated HIV quant of 58,931 on February 12, 2019.    In addition he is complaining of some pain on the left side of his mouth.  This hurts when he eats anything or when he swallows.  He gets some radiation to his left ear.    Objective:  BP 96/64 (BP Location: Left arm, Patient Position: Sitting, Cuff Size: Adult Regular)   Pulse 88   Temp 98.6  F (37  C) (Oral)   Resp 20   Ht 1.784 m (5' 10.25\")   Wt 69 kg (152 lb 3.2 oz)   SpO2 95%   BMI 21.68 kg/m     His vitals are satisfactory.  Exam of his mouth appears generally unremarkable.  He seems to describe pain in the left gum under his one remaining molar on the " left side.  He may have some mild pharyngitis and will check a strep.  There is no adenopathy palpable in his neck.  There is no candidiasis visible in his mouth.  His left tympanic membrane appears normal.    Exam of his lower back reveals some tenderness at the lumbosacral junction palpation of which reproduces his discomfort.  He has some mild tenderness on palpation of the lumbar spinous processes.  Given his medical history and age I recommended we obtain x-rays.  Personal review of these by me reveals no bony metastases and is generally unremarkable apart from some mild scoliosis and degenerative changes.    Results for orders placed or performed in visit on 03/14/19   CBC with Diff Plt ([facility])   Result Value Ref Range    WBC 2.7 (L) 4.0 - 11.0 K/uL    Lymphocytes # 0.9 0.8 - 5.3 K/uL    % Lymphocytes 35.0 20.0 - 48.0 %L    Mid # 0.2 0.0 - 2.2 K/uL    Mid % 7.8 0.0 - 20.0 %M    GRANULOCYTES # 1.5 (L) 1.6 - 8.3 K/uL    % Granulocytes 57.2 40.0 - 75.0 %G    RBC 4.4 4.4 - 5.9 M/uL    Hemoglobin 13.8 13.3 - 17.7 g/dL    Hematocrit 43.1 40.0 - 53.0 %    MCV 98.4 78.0 - 100.0 fL    MCH 31.5 26.5 - 35.0    MCHC 32.0 32.0 - 36.0 g/dL    Platelets 154.0 150.0 - 450.0 K/uL   Rapid Strep Screen (Group) ([facility])   Result Value Ref Range    Rapid Strep A Screen NEGATIVE Negative     Antonio was seen today for back pain, other and refill request.    Diagnoses and all orders for this visit:    Acute midline low back pain without sciatica  -     XR Lumbar Spine 2-3 Views*  -     PHYSICAL THERAPY REFERRAL; Future    CKD stage G3a/A1, GFR 45-59 and albumin creatinine ratio <30 mg/g (H)  -     Basic Metabolic Panel ([facility])    Thiamine deficiency  -     Thiamin RBC (TDP) ([facility])    Human immunodeficiency virus (HIV) disease (H)  -     CBC with Diff Plt ([facility])    Sore throat  -     Rapid Strep Screen (Group) ([facility])  -     Group A Strep Throat ([facility])  -     magic mouthwash suspension (diphenhydrAMINE, lidocaine,  aluminum-magnesium & simethicone); Swish and swallow 10 mLs in mouth every 6 hours as needed for mild pain or sore throat      It is not clear what is causing his left-sided mouth pain.  Given his immunosuppression opportunistic infections are a possibility but there is no evidence for these.  He also may have a dental cause for the pain.  I gave him some Magic mouthwash for as needed symptomatic use and advised him that if the pain persists he should see a dentist.    I called his infectious disease clinic and left a message for them to contact me to provide an update as to where he is at with his disease and what current management is.  He is anxious to know his test results.  I will follow-up with patient if necessary.    I advised him that there is no signs of concerning abnormalities on his lumbar x-rays.  He does not want to take any pain medications but is interested in physical therapy and I will therefore refer him.  I have encouraged him to follow-up if the symptoms do not resolve.    We will recheck a number of labs and I will notify him of the results by mail.    He does appear both tired and mildly short tempered today and I would have a high index of suspicion given his untreated cancers if these symptoms continue.    Total visit time was 25 mins, all of which was face to face MD time, and over 50% of this time was spent in counseling and coordination of care.      Harris Mary MD

## 2019-03-14 NOTE — PROGRESS NOTES
"This is a 63-year-old male well known to me.  He has a complicated past medical history that includes HIV infection, Colon cancer for which he declined chemotherapy and untreated prostate cancer.  Since last visit he reports that he has changed his diet and is now eating more chicken and fish in addition to other sources of protein such as peanut butter.  The neuropathic symptoms in his upper extremities have resolved but he would like to check that the vitamin B1 level is back to normal.    Today he complains of 2 weeks of Right-sided lower back pain.  He says it feels better when he exercises but is worse when he is sedentary or sits in the  seat for too long.  He has no radiation down his legs.  It is uncomfortable until he falls asleep at nights.    He also generally feels less energetic.  He is putting this down to having slept for too long yesterday.    I have received communications from his ID doctor.  It seems as if he was without insurance for at least a few weeks and during that time his viral load was considerably elevated compared to a previous result.  He had some subsequent labs and is interested to know the results.  Review of his chart does not let me entirely see those results apart from an elevated HIV quant of 58,[id number] on February 12, 2019.    In addition he is complaining of some pain on the left side of his mouth.  This hurts when he eats anything or when he swallows.  He gets some radiation to his left ear.    Objective:  BP 96/64 (BP Location: Left arm, Patient Position: Sitting, Cuff Size: Adult Regular)   Pulse 88   Temp 98.6  F (37  C) (Oral)   Resp 20   Ht 1.784 m (5' 10.25\")   Wt 69 kg (152 lb 3.2 oz)   SpO2 95%   BMI 21.68 kg/m    His vitals are satisfactory.  Exam of his mouth appears generally unremarkable.  He seems to describe pain in the left gum under his one remaining molar on the left side.  He may have some mild pharyngitis and will check a strep.  There is no " adenopathy palpable in his neck.  There is no candidiasis visible in his mouth.  His left tympanic membrane appears normal.    Exam of his lower back reveals some tenderness at the lumbosacral junction palpation of which reproduces his discomfort.  He has some mild tenderness on palpation of the lumbar spinous processes.  Given his medical history and age I recommended we obtain x-rays.  Personal review of these by me reveals no bony metastases and is generally unremarkable apart from some mild scoliosis and degenerative changes.    Results for orders placed or performed in visit on 03/14/19   CBC with Diff Plt ([facility])   Result Value Ref Range    WBC 2.7 (L) 4.0 - 11.0 K/uL    Lymphocytes # 0.9 0.8 - 5.3 K/uL    % Lymphocytes 35.0 20.0 - 48.0 %L    Mid # 0.2 0.0 - 2.2 K/uL    Mid % 7.8 0.0 - 20.0 %M    GRANULOCYTES # 1.5 (L) 1.6 - 8.3 K/uL    % Granulocytes 57.2 40.0 - 75.0 %G    RBC 4.4 4.4 - 5.9 M/uL    Hemoglobin 13.8 13.3 - 17.7 g/dL    Hematocrit 43.1 40.0 - 53.0 %    MCV 98.4 78.0 - 100.0 fL    MCH 31.5 26.5 - 35.0    MCHC 32.0 32.0 - 36.0 g/dL    Platelets 154.0 150.0 - 450.0 K/uL   Rapid Strep Screen (Group) ([facility])   Result Value Ref Range    Rapid Strep A Screen NEGATIVE Negative     Antonio was seen today for back pain, other and refill request.    Diagnoses and all orders for this visit:    Acute midline low back pain without sciatica  -     XR Lumbar Spine 2-3 Views*  -     PHYSICAL THERAPY REFERRAL; Future    CKD stage G3a/A1, GFR 45-59 and albumin creatinine ratio <30 mg/g (H)  -     Basic Metabolic Panel ([place])    Thiamine deficiency  -     Thiamin RBC (TDP) ([facility])    Human immunodeficiency virus (HIV) disease (H)  -     CBC with Diff Plt ([facility])    Sore throat  -     Rapid Strep Screen (Group) ([facility])  -     Group A Strep Throat ([facility])  -     magic mouthwash suspension (diphenhydrAMINE, lidocaine, aluminum-magnesium & simethicone); Swish and swallow 10 mLs in mouth every 6 hours  as needed for mild pain or sore throat      It is not clear what is causing his left-sided mouth pain.  Given his immunosuppression opportunistic infections are a possibility but there is no evidence for these.  He also may have a dental cause for the pain.  I gave him some Magic mouthwash for as needed symptomatic use and advised him that if the pain persists he should see a dentist.    I called his infectious disease clinic and left a message for them to contact me to provide an update as to where he is at with his disease and what current management is.  He is anxious to know his test results.  I will follow-up with patient if necessary.    I advised him that there is no signs of concerning abnormalities on his lumbar x-rays.  He does not want to take any pain medications but is interested in physical therapy and I will therefore refer him.  I have encouraged him to follow-up if the symptoms do not resolve.    We will recheck a number of labs and I will notify him of the results by mail.    He does appear both tired and mildly short tempered today and I would have a high index of suspicion given his untreated cancers if these symptoms continue.    Total visit time was 25 mins, all of which was face to face MD time, and over 50% of this time was spent in counseling and coordination of care.

## 2019-03-14 NOTE — LETTER
March 19, 2019      Antonio Mercado  [address]  [place] [address]        Dear Antonio,    As we discussed there were no concerning findings on the X ray of your lower back. There are some degenerative changes - hopefully physical therapy and time will resolve your pain.    If you have any questions, please call the clinic to make an appointment.    Sincerely,    Harris aMry MD

## 2019-03-15 ENCOUNTER — AMBULATORY - HEALTHEAST (OUTPATIENT)
Dept: ADMINISTRATIVE | Facility: REHABILITATION | Age: 64
End: 2019-03-15

## 2019-03-15 DIAGNOSIS — M54.50 LOW BACK PAIN: ICD-10-CM

## 2019-03-15 LAB — GROUP A STREP,THROAT: NORMAL

## 2019-03-18 LAB — VITAMIN B1, WHOLE BLOOD: 85 NMOL/L (ref 70–180)

## 2019-03-19 ENCOUNTER — OFFICE VISIT (OUTPATIENT)
Dept: FAMILY MEDICINE | Facility: CLINIC | Age: 64
End: 2019-03-19
Payer: COMMERCIAL

## 2019-03-19 VITALS
WEIGHT: 155.2 LBS | BODY MASS INDEX: 21.73 KG/M2 | OXYGEN SATURATION: 99 % | HEART RATE: 60 BPM | SYSTOLIC BLOOD PRESSURE: 115 MMHG | RESPIRATION RATE: 20 BRPM | HEIGHT: 71 IN | TEMPERATURE: 97.9 F | DIASTOLIC BLOOD PRESSURE: 68 MMHG

## 2019-03-19 DIAGNOSIS — K04.7 DENTAL ABSCESS: Primary | ICD-10-CM

## 2019-03-19 PROBLEM — B54 MALARIA: Status: ACTIVE | Noted: 2017-05-09

## 2019-03-19 PROBLEM — D12.6 ADENOMATOUS POLYP OF COLON: Status: ACTIVE | Noted: 2017-12-15

## 2019-03-19 ASSESSMENT — ACTIVITIES OF DAILY LIVING (ADL)
AMBULATION: 0-->INDEPENDENT
TOILETING: 0-->INDEPENDENT
COGNITION: 0 - NO COGNITION ISSUES REPORTED
DRESS: 0-->INDEPENDENT
RETIRED_EATING: 0-->INDEPENDENT
RETIRED_COMMUNICATION: 0-->UNDERSTANDS/COMMUNICATES WITHOUT DIFFICULTY
FALL_HISTORY_WITHIN_LAST_SIX_MONTHS: NO
BATHING: 0-->INDEPENDENT
TRANSFERRING: 0-->INDEPENDENT
SWALLOWING: 0-->SWALLOWS FOODS/LIQUIDS WITHOUT DIFFICULTY

## 2019-03-19 ASSESSMENT — PAIN SCALES - GENERAL: PAINLEVEL: EXTREME PAIN (9)

## 2019-03-19 ASSESSMENT — MIFFLIN-ST. JEOR: SCORE: 1508.98

## 2019-03-19 NOTE — PATIENT INSTRUCTIONS
Here are your results from your last visit:    Results for orders placed or performed in visit on 03/14/19   Thiamin RBC (TDP) ([facility])   Result Value Ref Range    Vitamin B1, Whole Blood 85 70 - 180 nmol/L    Narrative    Test performed by:  [facility]  [address] [place] [id number]   Basic Metabolic Panel ([place])   Result Value Ref Range    Urea Nitrogen 25.3 (H) 7.0 - 21.0 mg/dL    Calcium 9.0 8.5 - 10.1 mg/dL    Chloride 103.0 98.0 - 110.0 mmol/L    Carbon Dioxide 24.5 20.0 - 32.0 mmol/L    Creatinine 1.4 (H) 0.7 - 1.3 mg/dL    Glucose 102.3 (H) 70.0 - 99.0 mg'dL    Potassium 4.7 (H) 3.2 - 4.6 mmol/dL    Sodium 137.3 132.0 - 142.0 mmol/L    GFR Estimate 54.1 (L) >60.0 mL/min/1.7 m2    GFR Estimate If Black 65.4 >60.0 mL/min/1.7 m2   CBC with Diff Plt ([facility])   Result Value Ref Range    WBC 2.7 (L) 4.0 - 11.0 K/uL    Lymphocytes # 0.9 0.8 - 5.3 K/uL    % Lymphocytes 35.0 20.0 - 48.0 %L    Mid # 0.2 0.0 - 2.2 K/uL    Mid % 7.8 0.0 - 20.0 %M    GRANULOCYTES # 1.5 (L) 1.6 - 8.3 K/uL    % Granulocytes 57.2 40.0 - 75.0 %G    RBC 4.4 4.4 - 5.9 M/uL    Hemoglobin 13.8 13.3 - 17.7 g/dL    Hematocrit 43.1 40.0 - 53.0 %    MCV 98.4 78.0 - 100.0 fL    MCH 31.5 26.5 - 35.0    MCHC 32.0 32.0 - 36.0 g/dL    Platelets 154.0 150.0 - 450.0 K/uL   Rapid Strep Screen (Group) ([facility])   Result Value Ref Range    Rapid Strep A Screen NEGATIVE Negative   Group A Strep Throat ([facility])   Result Value Ref Range    Group A Strep,Throat No Group A Strep rRNA detected No Group A Strep rRNA detected    Narrative    Test performed by:  [facility] [address] [place] [address]  Intended Use:  The GEN-PROBE Group A Streptococcus direct test is a DNA probe assay which   uses nucleic acid hybridization for the qualitative detection of Group A   Streptococcal RNA to aid in the diagnosis of Group A Streptococcal pharyngitis   from throat swabs.  Methodology:  The GEN-PROBE DNA probe assay  uses a single-stranded DNA probe with a   chemiluminescent label, which is complementary to the ribosomal RNA of the   target organism.  The labeled DNA probe combines with the ribosomal RNA to   form a stable DNA:RNA hybrid.  The labeled DNA:RNA hybrids are measured in   GEN-PROBE luminometer.  A positive result is a luminometer reading greater   than or equal to the cut-off.  A value below this cut-off is a negative   result.

## 2019-03-19 NOTE — RESULT ENCOUNTER NOTE
Alyssia Alvarez - as expected with a more balanced diet, your vitamin B1 level (thiamine) has returned to normal - good!  Your kidney function is borderline so we will need to keep monitoring that every 6-12 months.  Your hemoglobin is good and your white count is stable.    I did talk with Dr Andersen and he wants you to come back and see him and recheck labs within a few weeks - if you have not heard from his office, you should go ahead and make an appointment to see him - OK?  Take care, Dr Harris Mary

## 2019-03-19 NOTE — RESULT ENCOUNTER NOTE
Alyssia Alvarez, as we discussed there were no concerning findings on the X ray of your lower back.  There are some degenerative changes - hopefully physical therapy and time will resolve your pain - take care, Dr Harris Mary

## 2019-03-19 NOTE — NURSING NOTE
Chief Complaint   Patient presents with     RECHECK     Tongue bite a week ago/ Having pain in tongue (Left sided), throat and left ear.      Cornelius Grayson, CMA

## 2019-03-19 NOTE — PROGRESS NOTES
"Subjective: Antonio Mercado is a 63 year old who presents today for pain in his mouth and.  He is HIV positive and is on appropriate medications for that which he takes as directed.  He does not recall any injuries to the mouth.  He has been trying Magic mouthwash for a few days and this is not been helping.  PMHX/PSHX/MEDS/ALLERGIES/SHX/FHX reviewed and updated in Epic.   ROS:   General: No fevers, chills   Head: No headache   Resp: No shortness of breath. No cough.   Objective: /68   Pulse 60   Temp 97.9  F (36.6  C) (Oral)   Resp 20   Ht 1.8 m (5' 10.87\")   Wt 70.4 kg (155 lb 3.2 oz)   SpO2 99%   BMI 21.73 kg/m     Gen: Well nourished and in NAD   HEENT: TMs normal color and landmarks, nasopharynx is pink and moist: The oropharynx is unremarkable but with bimanual palpation the patient has marked tenderness and swelling on the medial aspect of the gumline left maxillary first molar.    Neck: Supple with a tender solitary lymph node noted in the left anterior axillary chain.  CV: RRR - no murmurs, rubs, or gallups,   Pulm: CTAB, no wheezes/rales/rhonchi, good air entry   ABD: soft, nontender, BS intact  Extrem: no cyanosis, edema or clubbing   Psych: Euthymic    Assessment/ Plan:  1. Dental abscess  It appears this patient has a dental abscess.  We will treat with Augmentin and Anbesol for pain and asked him to be seen by dentist immediately.  He said he will schedule an appointment for today or tomorrow.  Follow-up if not improving.  - amoxicillin-clavulanate (AUGMENTIN) 875-125 MG tablet; Take 1 tablet by mouth 2 times daily for 7 days  Dispense: 14 tablet; Refill: 0  - benzocaine (ORAJEL/ANBESOL) 10 % gel; Take by mouth 4 times daily as needed for moderate pain  Dispense: 9 g; Refill: 1    Total of 15 minutes was spent in face to face contact with patient with > 50% in counseling and coordination of care.  Options for treatment and/or follow-up care were reviewed with the patient. Antonio Mercado " was engaged and actively involved in the decision making process. He verbalized understanding of the options discussed and was satisfied with the final plan.      Salbador Burnett

## 2019-04-23 ENCOUNTER — TRANSFERRED RECORDS (OUTPATIENT)
Dept: HEALTH INFORMATION MANAGEMENT | Facility: CLINIC | Age: 64
End: 2019-04-23

## 2019-04-23 ENCOUNTER — OFFICE VISIT - HEALTHEAST (OUTPATIENT)
Dept: INFECTIOUS DISEASES | Facility: CLINIC | Age: 64
End: 2019-04-23

## 2019-04-23 ENCOUNTER — AMBULATORY - HEALTHEAST (OUTPATIENT)
Dept: LAB | Facility: CLINIC | Age: 64
End: 2019-04-23

## 2019-04-23 DIAGNOSIS — B20 HUMAN IMMUNODEFICIENCY VIRUS (HIV) DISEASE (H): ICD-10-CM

## 2019-04-23 LAB
BASOPHILS # BLD AUTO: 0 THOU/UL (ref 0–0.2)
BASOPHILS NFR BLD AUTO: 0 % (ref 0–2)
CREAT SERPL-MCNC: 1.63 MG/DL (ref 0.7–1.3)
EOSINOPHIL # BLD AUTO: 0.1 THOU/UL (ref 0–0.4)
EOSINOPHIL NFR BLD AUTO: 3 % (ref 0–6)
ERYTHROCYTE [DISTWIDTH] IN BLOOD BY AUTOMATED COUNT: 14 % (ref 11–14.5)
GFR SERPL CREATININE-BSD FRML MDRD: 43 ML/MIN/1.73M2
HCT VFR BLD AUTO: 40.7 % (ref 40–54)
HGB BLD-MCNC: 13.9 G/DL (ref 14–18)
LYMPHOCYTES # BLD AUTO: 1.4 THOU/UL (ref 0.8–4.4)
LYMPHOCYTES NFR BLD AUTO: 52 % (ref 20–40)
MCH RBC QN AUTO: 31.4 PG (ref 27–34)
MCHC RBC AUTO-ENTMCNC: 34.1 G/DL (ref 32–36)
MCV RBC AUTO: 92 FL (ref 80–100)
MONOCYTES # BLD AUTO: 0.2 THOU/UL (ref 0–0.9)
MONOCYTES NFR BLD AUTO: 8 % (ref 2–10)
NEUTROPHILS # BLD AUTO: 1 THOU/UL (ref 2–7.7)
NEUTROPHILS NFR BLD AUTO: 36 % (ref 50–70)
PLATELET # BLD AUTO: 170 THOU/UL (ref 140–440)
PMV BLD AUTO: 7.3 FL (ref 7–10)
RBC # BLD AUTO: 4.42 MILL/UL (ref 4.4–6.2)
WBC: 2.7 THOU/UL (ref 4–11)

## 2019-04-26 LAB
HIV1 RNA # PLAS NAA DL=20: 123 {COPIES}/ML
HIV1 RNA SERPL NAA+PROBE-LOG#: 2.1 {LOG_COPIES}/ML

## 2019-06-03 DIAGNOSIS — K04.7 DENTAL ABSCESS: ICD-10-CM

## 2019-06-05 ENCOUNTER — OFFICE VISIT (OUTPATIENT)
Dept: FAMILY MEDICINE | Facility: CLINIC | Age: 64
End: 2019-06-05
Payer: COMMERCIAL

## 2019-06-05 VITALS
WEIGHT: 149 LBS | TEMPERATURE: 98.2 F | OXYGEN SATURATION: 98 % | SYSTOLIC BLOOD PRESSURE: 101 MMHG | RESPIRATION RATE: 18 BRPM | HEART RATE: 69 BPM | BODY MASS INDEX: 20.86 KG/M2 | DIASTOLIC BLOOD PRESSURE: 67 MMHG

## 2019-06-05 DIAGNOSIS — K14.6 TONGUE SORE: Primary | ICD-10-CM

## 2019-06-05 RX ORDER — LIDOCAINE HYDROCHLORIDE 20 MG/ML
15 SOLUTION OROPHARYNGEAL EVERY 4 HOURS PRN
Qty: 100 ML | Refills: 1 | Status: SHIPPED | OUTPATIENT
Start: 2019-06-05 | End: 2019-08-22

## 2019-06-05 NOTE — PROGRESS NOTES
Preceptor Attestation:   Patient seen, evaluated and discussed with the resident. I have verified the content of the note, which accurately reflects my assessment of the patient and the plan of care.   Supervising Physician:  Larisa Cartwright MD

## 2019-06-05 NOTE — PATIENT INSTRUCTIONS
Plan:  1. It was a pleasure seeing you in clinic today.  2. You can  the prescription of viscous lidocaine from the pharmacy at your convenience.  3. Swish and spit the medication every 4 hours as needed for pain.  4. You can also do saltwater rinses several times daily to help with pain. Mix salt with tap water, swish and spit.  5. If you notice continued bleeding, white-yellow discharge, or worsening symptoms please call the clinic to schedule an appointment.  6. Keep you dental appointment for next month.    Please schedule a clinic appointment if symptoms worsen.     [facility]  Phone: [phone]  Address: [address] [place] [address]

## 2019-06-05 NOTE — PROGRESS NOTES
[facility] Medicine Clinic Note    Patient: Antonio Mercado  : 1955  MRN: [id number]         SUBJECTIVE       Antonio Mercado is a 63 year old male with a PMH significant for:  Patient Active Problem List   Diagnosis     Health Care Home     Human immunodeficiency virus (HIV) disease (H)     Mechanical low back pain     Microscopic hematuria     Anemia, iron deficiency     Right lower quadrant abdominal mass     Malignant neoplasm of hepatic flexure (H)     Hemorrhoids, unspecified hemorrhoid type     Slow transit constipation     Prostate cancer (H)     Refusal of treatment for reasons of conscience     Malaria     Post-nasal drip     Helicobacter pylori infection     Adenomatous polyp of colon     He presents to clinic today with chief complaint of tongue pain.    Patient reports that he bit his tongue approximately 1 week ago.  He was eating peanuts and bit down on the proximal as well as the distal aspect of the tongue.  He continues to experience pain on the left side of the tongue.  The pain is located both proximally and distally on the left side.  He reports that the pain is worsening.    The patient noted scant bleeding this morning from 1 of the areas of pain.  He has not noticed any purulent discharge from either site.  He finds it painful to eat and drink, however, he is able to tolerate regular diet without any difficulty other than pain.  No fevers, chills, nausea, or vomiting.    Patient has tried Listerine mouthwash for the pain, however, this is not been helpful.  He does not have any jaw pain or dental pain.  No headache, neck pain, or jaw pain.  No sore throat, nasal congestion, or sore throat.  No abdominal pain, nausea, or vomiting.    Of note, the patient was treated with Augmentin for a dental abscess noted in 2019.  He has an appointment with a dentist scheduled for next month.    Past Medical History, Past Surgical History, Medications, Allergies, and Family History were  reviewed and updated as needed.        REVIEW OF SYSTEMS     CONSTITUTIONAL: See above.   HEENT: See above.   RESPIRATORY: No cough, wheezes, or shortness of breath.  CARDIOVASCULAR: No chest pain.  GASTROINTESTINAL: See above.   HEMATOLOGIC: No easy bruising or bleeding.        OBJECTIVE     Vitals:    06/05/19 0936   BP: 101/67   BP Location: Left arm   Patient Position: Sitting   Cuff Size: Adult Regular   Pulse: 69   Resp: 18   Temp: 98.2  F (36.8  C)   TempSrc: Oral   SpO2: 98%   Weight: 67.6 kg (149 lb)     Body mass index is 20.86 kg/m .    Physical Exam:  GENERAL: No acute distress. Appears comfortable.  HEENT: Head: Normocephalic and atraumatic; no dysmorphic features. Eyes: Eye lids and lashes normal; pupils equal, round and reactive to light; extra ocular eye movements intact in all directions; no scleral icterus or conjunctival injection. Ears: Pinnae appear normal. Nose: nares patent and without discharge; frontal and maxillary sinuses non-tender to palpation. Oropharynx: mucus membranes pink and moist; tonsils without enlargement, erythema or exudates. Dentition is poor, no evidence of dental abscess observed. The left aspect of the distal portion of the tongue has a 2 mm diameter circular lesion that appears white and smooth, no surrounding erythema and no bleeding nor discharge; this lesion is moderately painful to palpation. The remainder of the tongue appears normal without additional lesion, erythema, laceration or thrush.  NECK: Supple and symmetric. Trachea midline. No anterior or posterior cervical lymphadenopathy, no supraclavicular lymphadenopathy. Thyroid symmetric and without enlargement or tenderness. Skin normal.  NEUROLOGIC: Speech is normal, no dysarthria. Tongue protrudes midline.    LABORATORY:  No results found for this or any previous visit (from the past 24 hour(s)).      ASSESSMENT AND PLAN     1. Tongue sore  Presents with chief complaint of tongue pain after biting his tongue  approximately 1 week ago.  Having pain both on the distal and proximal aspect of the left side of the tongue.  Exam is significant for a small 2 mm diameter circular distal aspect stone on the left side.  There is no associated bleeding, discharge, or erythema.  No other tongue lesion or laceration is observed.  Oral exam also significant for poor dentition, petechiae, exudates, or other concerning lesion.  Suspect the patient's pain is secondary to biting his tongue several days ago and that this is part of the normal healing process.  He is able to eat and drink adequately.  He does have follow-up with a dentist in 1 month's time.  We will try viscous lidocaine and salt water oral rinses to treat his pain.  Signs and symptoms of infection, such as increased redness, purulent discharge, and increased pain were reviewed in detail with the patient who was instructed to seek medical attention should he develop any of these findings.  - lidocaine HCl (XYLOCAINE) 2 % solution; Swish and spit 15 mLs in mouth every 4 hours as needed for moderate pain ; Max 8 doses/24 hour period.  Dispense: 100 mL; Refill: 1      Return to clinic as needed if develops new or worsening symptoms.    Patient was discussed with attending physician, Dr. Cartwright, who agrees with the assessment and plan.    Porfirio Gamboa, PGY-1  [facility]  6/5/2019

## 2019-07-16 DIAGNOSIS — K59.00 CONSTIPATION, UNSPECIFIED CONSTIPATION TYPE: ICD-10-CM

## 2019-07-16 RX ORDER — ASPIRIN 81 MG
TABLET, DELAYED RELEASE (ENTERIC COATED) ORAL
Qty: 270 TABLET | Refills: 1 | Status: SHIPPED | OUTPATIENT
Start: 2019-07-16 | End: 2020-05-06

## 2019-08-07 ENCOUNTER — COMMUNICATION - HEALTHEAST (OUTPATIENT)
Dept: INFECTIOUS DISEASES | Facility: CLINIC | Age: 64
End: 2019-08-07

## 2019-08-22 ENCOUNTER — OFFICE VISIT (OUTPATIENT)
Dept: FAMILY MEDICINE | Facility: CLINIC | Age: 64
End: 2019-08-22
Payer: COMMERCIAL

## 2019-08-22 VITALS
HEART RATE: 72 BPM | DIASTOLIC BLOOD PRESSURE: 72 MMHG | BODY MASS INDEX: 21.9 KG/M2 | TEMPERATURE: 98.5 F | SYSTOLIC BLOOD PRESSURE: 107 MMHG | RESPIRATION RATE: 12 BRPM | WEIGHT: 156.4 LBS | HEIGHT: 71 IN | OXYGEN SATURATION: 97 %

## 2019-08-22 DIAGNOSIS — C18.3 MALIGNANT NEOPLASM OF HEPATIC FLEXURE (H): ICD-10-CM

## 2019-08-22 DIAGNOSIS — N28.1 BILATERAL RENAL CYSTS: ICD-10-CM

## 2019-08-22 DIAGNOSIS — B20 HUMAN IMMUNODEFICIENCY VIRUS (HIV) DISEASE (H): ICD-10-CM

## 2019-08-22 DIAGNOSIS — D50.8 OTHER IRON DEFICIENCY ANEMIA: Primary | ICD-10-CM

## 2019-08-22 DIAGNOSIS — N18.30 CKD (CHRONIC KIDNEY DISEASE) STAGE 3, GFR 30-59 ML/MIN (H): ICD-10-CM

## 2019-08-22 DIAGNOSIS — Z53.1: ICD-10-CM

## 2019-08-22 DIAGNOSIS — C61 PROSTATE CANCER (H): ICD-10-CM

## 2019-08-22 LAB
ALBUMIN SERPL-MCNC: 4.4 MG/DL (ref 3.3–4.9)
ALP SERPL-CCNC: 68 U/L (ref 40–150)
ALT SERPL-CCNC: <15 U/L (ref 0–45)
AST SERPL-CCNC: 16.4 U/L (ref 0–55)
BILIRUB SERPL-MCNC: 0.5 MG/DL (ref 0.2–1.3)
BILIRUBIN DIRECT: 0.2 MG/DL (ref 0–0.2)
BUN SERPL-MCNC: 39.1 MG/DL (ref 7–21)
CALCIUM SERPL-MCNC: 9.3 MG/DL (ref 8.5–10.1)
CHLORIDE SERPLBLD-SCNC: 107.9 MMOL/L (ref 98–110)
CO2 SERPL-SCNC: 26.9 MMOL/L (ref 20–32)
CREAT SERPL-MCNC: 1.4 MG/DL (ref 0.7–1.3)
CREAT UR-MCNC: 75.6 MG/DL
GFR SERPL CREATININE-BSD FRML MDRD: 54.1 ML/MIN/1.7 M2
GLUCOSE SERPL-MCNC: 99.2 MG'DL (ref 70–99)
HBA1C MFR BLD: 5.3 % (ref 4.1–5.7)
HCT VFR BLD AUTO: 43.9 % (ref 40–53)
HEMOGLOBIN: 14.1 G/DL (ref 13.3–17.7)
MCH RBC QN AUTO: 32 PG (ref 26.5–35)
MCHC RBC AUTO-ENTMCNC: 32.1 G/DL (ref 32–36)
MCV RBC AUTO: 99.5 FL (ref 78–100)
MICROALBUMIN UR-MCNC: <0.5 MG/DL (ref 0–1.99)
MICROALBUMIN/CREAT UR: NORMAL MG/G{CREAT}
PLATELET # BLD AUTO: 169 K/UL (ref 150–450)
POTASSIUM SERPL-SCNC: 4.3 MMOL/DL (ref 3.2–4.6)
PROT SERPL-MCNC: 7.7 G/DL (ref 6.8–8.8)
RBC # BLD AUTO: 4.4 M/UL (ref 4.4–5.9)
SODIUM SERPL-SCNC: 141.2 MMOL/L (ref 132–142)
WBC # BLD AUTO: 3.2 K/UL (ref 4–11)

## 2019-08-22 ASSESSMENT — MIFFLIN-ST. JEOR: SCORE: 1512.59

## 2019-08-22 NOTE — LETTER
"August 27, 2019      Antonio Mercado  [address]   [place] [address]        Dear Antonio,    Good results are that you are not diabetic, your liver is working normally and you are not losing any protein into your urine - these are all good things.       Your kidney function remains mildly abnormal.  Part of this may be due to being dehydrated - I recommend you drink 1-2 pints of water every day - OK?  It may be a good idea to check an ultrasound of your kidneys - OK?  I see that you had some small cysts on your kidneys on a CT scan performed in 2018.  Ultrasound often shows better pictures of the kidneys - so I will go ahead and place an order for one and someone will call you to schedule it.  I hope you are in agreement.  Please see below for your test results.    Resulted Orders   CBC with Plt ([facility])   Result Value Ref Range    WBC 3.2 (L) 4.0 - 11.0 K/uL    RBC 4.4 4.4 - 5.9 M/uL    Hemoglobin 14.1 13.3 - 17.7 g/dL    Hematocrit 43.9 40.0 - 53.0 %    MCV 99.5 78.0 - 100.0 fL    MCH 32.0 26.5 - 35.0    MCHC 32.1 32.0 - 36.0 g/dL    Platelets 169.0 150.0 - 450.0 K/uL   Basic Metabolic Panel ([place])   Result Value Ref Range    Urea Nitrogen 39.1 (H) 7.0 - 21.0 mg/dL    Calcium 9.3 8.5 - 10.1 mg/dL    Chloride 107.9 98.0 - 110.0 mmol/L    Carbon Dioxide 26.9 20.0 - 32.0 mmol/L    Creatinine 1.4 (H) 0.7 - 1.3 mg/dL    Glucose 99.2 (H) 70.0 - 99.0 mg'dL    Potassium 4.3 3.2 - 4.6 mmol/dL    Sodium 141.2 132.0 - 142.0 mmol/L    GFR Estimate 54.1 (L) >60.0 mL/min/1.7 m2    GFR Estimate If Black 65.4 >60.0 mL/min/1.7 m2   Microalbumin Creatinine Ratio Random Ur ([facility])   Result Value Ref Range    Microalbumin, Urine <0.50 0.00 - 1.99 mg/dL    Creatinine, Urine 75.6 mg/dL    Albumin Urine mg/g Cr See Note.       Comment:      \"Unable to calculate: Creatinine and/or Microalbumin value below detectable   level\"      Narrative    Test performed by:  [facility]  [address] [place] " [address]  Microalbumin, Random Urine  <2.0 mg/dL . . . . . . . . Normal  3.0-30.0 mg/dL . . . . . . Microalbuminuria  >30.0 mg/dL . . . . . .  . Clinical Proteinuria  Microalbumin/Creatinine Ratio, Random Urine  <20 mg/g . . . . .. . . . Normal   mg/g . . . . . . . Microalbuminuria  >300 mg/g . . . . . . . . Clinical Proteinuria   Hemoglobin A1c ([facility])   Result Value Ref Range    Hemoglobin A1C 5.3 4.1 - 5.7 %   Hepatic Panel ([place])   Result Value Ref Range    Albumin 4.4 3.3 - 4.9 mg/dL    Alkaline Phosphatase 68.0 40.0 - 150.0 U/L    ALT <15 0.0 - 45.0 U/L    AST 16.4 0.0 - 55.0 U/L    Bilirubin Direct 0.2 0.0 - 0.2 mg/dL    Bilirubin Total 0.5 0.2 - 1.3 mg/dL    Protein Total 7.7 6.8 - 8.8 g/dL       If you have any questions, please call the clinic to make an appointment.    Sincerely,    Harris Mary MD

## 2019-08-22 NOTE — LETTER
"  8/22/2019      RE: Antonio Mercado  [address]  [place] [address]       This is a 63-year-old male who is previously known to me.  He attends today specifically because I had a recent dental visit the dentist commented that his blood pressure was low and recommended he follow-up with his PCP.  Antonio does not have a history of hypertension nor does he take any antihypertensive medications.  He feels great!  He says that he has no problems.  This continues into a discussion of his absolute chirag in the healing power of Georges Blair.  He says that he does not need any chemotherapy and he will not follow-up with his oncologist.  He denies any abdominal symptoms such as abdominal pain, diarrhea, constipation.  In addition he has probable prostate cancer which has not been treated and also says that he is symptom-free.    Previously he had been following an extremely limited diet which likely resulted in thiamine deficiency.  We had supplemented with thiamine for a few months and he visited with the dietitian and says he is now eating a much more balanced diet including some meats.  His sensory symptoms have fully resolved.    Objective:  /72 (BP Location: Left arm, Patient Position: Sitting, Cuff Size: Adult Regular)   Pulse 72   Temp 98.5  F (36.9  C) (Oral)   Resp 12   Ht 1.797 m (5' 10.75\")   Wt 70.9 kg (156 lb 6.4 oz)   SpO2 97%   BMI 21.97 kg/m     His vitals indeed are excellent.  He looks well, is in no acute distress and articularly expounds about his beliefs.  Heart sounds are normal, pulse is in sinus rhythm.    I reviewed and note from BUCKY Snyder who had asked patient to follow with me to address an abnormal renal function.  Patient is agreeable to recheck labs today.    Results for orders placed or performed in visit on 08/22/19   CBC with Plt ([facility])   Result Value Ref Range    WBC 3.2 (L) 4.0 - 11.0 K/uL    RBC 4.4 4.4 - 5.9 M/uL    Hemoglobin 14.1 13.3 - 17.7 g/dL    " Hematocrit 43.9 40.0 - 53.0 %    MCV 99.5 78.0 - 100.0 fL    MCH 32.0 26.5 - 35.0    MCHC 32.1 32.0 - 36.0 g/dL    Platelets 169.0 150.0 - 450.0 K/uL   Basic Metabolic Panel ([facility])   Result Value Ref Range    Urea Nitrogen 39.1 (H) 7.0 - 21.0 mg/dL    Calcium 9.3 8.5 - 10.1 mg/dL    Chloride 107.9 98.0 - 110.0 mmol/L    Carbon Dioxide 26.9 20.0 - 32.0 mmol/L    Creatinine 1.4 (H) 0.7 - 1.3 mg/dL    Glucose 99.2 (H) 70.0 - 99.0 mg'dL    Potassium 4.3 3.2 - 4.6 mmol/dL    Sodium 141.2 132.0 - 142.0 mmol/L    GFR Estimate 54.1 (L) >60.0 mL/min/1.7 m2    GFR Estimate If Black 65.4 >60.0 mL/min/1.7 m2   Microalbumin Creatinine Ratio Random Ur ([facility])   Result Value Ref Range    Microalbumin, Urine <0.50 0.00 - 1.99 mg/dL    Creatinine, Urine 75.6 mg/dL    Albumin Urine mg/g Cr See Note.     Narrative    Test performed by:  [facility] [address] [place] [address]  Microalbumin, Random Urine  <2.0 mg/dL . . . . . . . . Normal  3.0-30.0 mg/dL . . . . . . Microalbuminuria  >30.0 mg/dL . . . . . .  . Clinical Proteinuria  Microalbumin/Creatinine Ratio, Random Urine  <20 mg/g . . . . .. . . . Normal   mg/g . . . . . . . Microalbuminuria  >300 mg/g . . . . . . . . Clinical Proteinuria   Hemoglobin A1c ([facility])   Result Value Ref Range    Hemoglobin A1C 5.3 4.1 - 5.7 %   Hepatic Panel ([facility])   Result Value Ref Range    Albumin 4.4 3.3 - 4.9 mg/dL    Alkaline Phosphatase 68.0 40.0 - 150.0 U/L    ALT <15 0.0 - 45.0 U/L    AST 16.4 0.0 - 55.0 U/L    Bilirubin Direct 0.2 0.0 - 0.2 mg/dL    Bilirubin Total 0.5 0.2 - 1.3 mg/dL    Protein Total 7.7 6.8 - 8.8 g/dL     Antonio was seen today for hypertension and refill request.    Diagnoses and all orders for this visit:    Other iron deficiency anemia  -     CBC with Plt ([facility])    CKD (chronic kidney disease) stage 3, GFR 30-59 ml/min (H)  -     Basic Metabolic Panel ([facility])  -     Microalbumin Creatinine Ratio Random Ur ([facility])  -      Hemoglobin A1c ([facility])    Malignant neoplasm of hepatic flexure (H)  -     Hepatic Panel ([place])    Prostate cancer (H)    Human immunodeficiency virus (HIV) disease (H)    Refusal of treatment for reasons of conscience      I advised patient that I cannot agree with his avoidance of chemotherapy and any follow-up to his previously diagnosed colon cancer and probable prostate cancer.  I will notify him of lab results.  I did encourage him to follow-up both with GI and urology which she is not planning to do.  He is only taking a single antiretroviral medication and says he does not need any of his other medications apart from PRN laxatives for occasional constipation.    I reassured him that his blood pressure is very healthy and that in the absence of any symptoms this does not need to be investigated or treated.  He is happy with this advice.  I suggested he follow-up with me about every 6 months or sooner as needed.    Total visit time was 25 mins, all of which was face to face MD time, and over 50% of this time was spent in counseling and coordination of care.          Harris Mary MD

## 2019-08-23 PROBLEM — N28.1 BILATERAL RENAL CYSTS: Status: ACTIVE | Noted: 2019-08-23

## 2019-08-23 PROBLEM — K59.01 SLOW TRANSIT CONSTIPATION: Status: RESOLVED | Noted: 2017-02-20 | Resolved: 2019-08-23

## 2019-08-23 PROBLEM — D12.6 ADENOMATOUS POLYP OF COLON: Status: RESOLVED | Noted: 2017-12-15 | Resolved: 2019-08-23

## 2019-08-23 PROBLEM — B54 MALARIA: Status: RESOLVED | Noted: 2017-05-09 | Resolved: 2019-08-23

## 2019-08-23 NOTE — PROGRESS NOTES
"This is a 63-year-old male who is previously known to me.  He attends today specifically because I had a recent dental visit the dentist commented that his blood pressure was low and recommended he follow-up with his PCP.  Antonio does not have a history of hypertension nor does he take any antihypertensive medications.  He feels great!  He says that he has no problems.  This continues into a discussion of his absolute chirag in the healing power of [employer].  He says that he does not need any chemotherapy and he will not follow-up with his oncologist.  He denies any abdominal symptoms such as abdominal pain, diarrhea, constipation.  In addition he has probable prostate cancer which has not been treated and also says that he is symptom-free.    Previously he had been following an extremely limited diet which likely resulted in thiamine deficiency.  We had supplemented with thiamine for a few months and he visited with the dietitian and says he is now eating a much more balanced diet including some meats.  His sensory symptoms have fully resolved.    Objective:  /72 (BP Location: Left arm, Patient Position: Sitting, Cuff Size: Adult Regular)   Pulse 72   Temp 98.5  F (36.9  C) (Oral)   Resp 12   Ht 1.797 m (5' 10.75\")   Wt 70.9 kg (156 lb 6.4 oz)   SpO2 97%   BMI 21.97 kg/m    His vitals indeed are excellent.  He looks well, is in no acute distress and articularly expounds about his beliefs.  Heart sounds are normal, pulse is in sinus rhythm.    I reviewed and note from BUCKY Snyder who had asked patient to follow with me to address an abnormal renal function.  Patient is agreeable to recheck labs today.    Results for orders placed or performed in visit on 08/22/19   CBC with Plt ([facility] )   Result Value Ref Range    WBC 3.2 (L) 4.0 - 11.0 K/uL    RBC 4.4 4.4 - 5.9 M/uL    Hemoglobin 14.1 13.3 - 17.7 g/dL    Hematocrit 43.9 40.0 - 53.0 %    MCV 99.5 78.0 - 100.0 fL    MCH 32.0 26.5 - 35.0    MCHC " 32.1 32.0 - 36.0 g/dL    Platelets 169.0 150.0 - 450.0 K/uL   Basic Metabolic Panel ([place])   Result Value Ref Range    Urea Nitrogen 39.1 (H) 7.0 - 21.0 mg/dL    Calcium 9.3 8.5 - 10.1 mg/dL    Chloride 107.9 98.0 - 110.0 mmol/L    Carbon Dioxide 26.9 20.0 - 32.0 mmol/L    Creatinine 1.4 (H) 0.7 - 1.3 mg/dL    Glucose 99.2 (H) 70.0 - 99.0 mg'dL    Potassium 4.3 3.2 - 4.6 mmol/dL    Sodium 141.2 132.0 - 142.0 mmol/L    GFR Estimate 54.1 (L) >60.0 mL/min/1.7 m2    GFR Estimate If Black 65.4 >60.0 mL/min/1.7 m2   Microalbumin Creatinine Ratio Random Ur ([facility])   Result Value Ref Range    Microalbumin, Urine <0.50 0.00 - 1.99 mg/dL    Creatinine, Urine 75.6 mg/dL    Albumin Urine mg/g Cr See Note.     Narrative    Test performed by:  [facility]  [address] [place] [address]  Microalbumin, Random Urine  <2.0 mg/dL . . . . . . . . Normal  3.0-30.0 mg/dL . . . . . . Microalbuminuria  >30.0 mg/dL . . . . . .  . Clinical Proteinuria  Microalbumin/Creatinine Ratio, Random Urine  <20 mg/g . . . . .. . . . Normal   mg/g . . . . . . . Microalbuminuria  >300 mg/g . . . . . . . . Clinical Proteinuria   Hemoglobin A1c ([facility])   Result Value Ref Range    Hemoglobin A1C 5.3 4.1 - 5.7 %   Hepatic Panel ([place])   Result Value Ref Range    Albumin 4.4 3.3 - 4.9 mg/dL    Alkaline Phosphatase 68.0 40.0 - 150.0 U/L    ALT <15 0.0 - 45.0 U/L    AST 16.4 0.0 - 55.0 U/L    Bilirubin Direct 0.2 0.0 - 0.2 mg/dL    Bilirubin Total 0.5 0.2 - 1.3 mg/dL    Protein Total 7.7 6.8 - 8.8 g/dL     Antonio was seen today for hypertension and refill request.    Diagnoses and all orders for this visit:    Other iron deficiency anemia  -     CBC with Plt ([facility])    CKD (chronic kidney disease) stage 3, GFR 30-59 ml/min (H)  -     Basic Metabolic Panel ([place])  -     Microalbumin Creatinine Ratio Random Ur ([facility])  -     Hemoglobin A1c ([facility])    Malignant neoplasm of hepatic flexure (H)  -     Hepatic Panel  ([place])    Prostate cancer (H)    Human immunodeficiency virus (HIV) disease (H)    Refusal of treatment for reasons of conscience      I advised patient that I cannot agree with his avoidance of chemotherapy and any follow-up to his previously diagnosed colon cancer and probable prostate cancer.  I will notify him of lab results.  I did encourage him to follow-up both with GI and urology which she is not planning to do.  He is only taking a single antiretroviral medication and says he does not need any of his other medications apart from PRN laxatives for occasional constipation.    I reassured him that his blood pressure is very healthy and that in the absence of any symptoms this does not need to be investigated or treated.  He is happy with this advice.  I suggested he follow-up with me about every 6 months or sooner as needed.    Total visit time was 25 mins, all of which was face to face MD time, and over 50% of this time was spent in counseling and coordination of care.

## 2019-08-26 NOTE — PATIENT INSTRUCTIONS
08/26/19  Ultrasound referral    [facility]  Phone: [phone]  Fax: [phone]    Referral and demographics faxed to [phone], They will contact patient to schedule.     Nohemy Carpenter

## 2019-08-27 ENCOUNTER — OFFICE VISIT - HEALTHEAST (OUTPATIENT)
Dept: INFECTIOUS DISEASES | Facility: CLINIC | Age: 64
End: 2019-08-27

## 2019-08-27 ENCOUNTER — AMBULATORY - HEALTHEAST (OUTPATIENT)
Dept: LAB | Facility: CLINIC | Age: 64
End: 2019-08-27

## 2019-08-27 ENCOUNTER — TRANSFERRED RECORDS (OUTPATIENT)
Dept: HEALTH INFORMATION MANAGEMENT | Facility: CLINIC | Age: 64
End: 2019-08-27

## 2019-08-27 DIAGNOSIS — Z21 ASYMPTOMATIC HUMAN IMMUNODEFICIENCY VIRUS (HIV) INFECTION STATUS (H): ICD-10-CM

## 2019-09-03 ENCOUNTER — COMMUNICATION - HEALTHEAST (OUTPATIENT)
Dept: INFECTIOUS DISEASES | Facility: CLINIC | Age: 64
End: 2019-09-03

## 2019-10-07 DIAGNOSIS — N18.30 CKD (CHRONIC KIDNEY DISEASE) STAGE 3, GFR 30-59 ML/MIN (H): ICD-10-CM

## 2019-10-07 DIAGNOSIS — N28.1 BILATERAL RENAL CYSTS: ICD-10-CM

## 2019-10-07 NOTE — LETTER
October 10, 2019      Antonio Mercado  [address]  [place] [address]        Dear Ruddy Alvarez - the ultrasound of your kidneys show that you do not have any kidney stones - good.  You have some simple cysts on the left kidney - these are of no significance.  Kidneys otherwise look normal.  Your prostate is enlarged - as you would expect - and presses on your bladder.  Do you have any difficulty emptying your bladder?  Let me know if so - take care, Dr Harris Mary     If you have any questions, please call the clinic to make an appointment.    Sincerely,    Harris Mary MD

## 2019-10-08 NOTE — RESULT ENCOUNTER NOTE
Ruddy Alvarez - the ultrasound of your kidneys show that you do not have any kidney stones - good.  You have some simple cysts on the left kidney - these are of no significance.  Kidneys otherwise look normal.  Your prostate is enlarged - as you would expect - and presses on your bladder.  Do you have any difficulty emptying your bladder?  Let me know if so - take care, Dr Harris Mary

## 2019-10-24 ENCOUNTER — TELEPHONE (OUTPATIENT)
Dept: FAMILY MEDICINE | Facility: CLINIC | Age: 64
End: 2019-10-24

## 2019-10-24 ENCOUNTER — RECORDS - HEALTHEAST (OUTPATIENT)
Dept: ADMINISTRATIVE | Facility: OTHER | Age: 64
End: 2019-10-24

## 2019-10-24 ENCOUNTER — OFFICE VISIT (OUTPATIENT)
Dept: FAMILY MEDICINE | Facility: CLINIC | Age: 64
End: 2019-10-24
Payer: COMMERCIAL

## 2019-10-24 VITALS
TEMPERATURE: 98.3 F | WEIGHT: 163.2 LBS | HEART RATE: 77 BPM | HEIGHT: 71 IN | SYSTOLIC BLOOD PRESSURE: 83 MMHG | RESPIRATION RATE: 18 BRPM | DIASTOLIC BLOOD PRESSURE: 52 MMHG | BODY MASS INDEX: 22.85 KG/M2

## 2019-10-24 DIAGNOSIS — C18.3 MALIGNANT NEOPLASM OF HEPATIC FLEXURE (H): ICD-10-CM

## 2019-10-24 DIAGNOSIS — K62.5 BRIGHT RED RECTAL BLEEDING: Primary | ICD-10-CM

## 2019-10-24 DIAGNOSIS — N18.30 CKD (CHRONIC KIDNEY DISEASE) STAGE 3, GFR 30-59 ML/MIN (H): ICD-10-CM

## 2019-10-24 PROBLEM — C61 PROSTATE CANCER (H): Status: ACTIVE | Noted: 2018-12-28

## 2019-10-24 LAB
BUN SERPL-MCNC: 27.4 MG/DL (ref 7–21)
CALCIUM SERPL-MCNC: 8.5 MG/DL (ref 8.5–10.1)
CHLORIDE SERPLBLD-SCNC: 107.2 MMOL/L (ref 98–110)
CO2 SERPL-SCNC: 26.2 MMOL/L (ref 20–32)
CREAT SERPL-MCNC: 1.4 MG/DL (ref 0.7–1.3)
GFR SERPL CREATININE-BSD FRML MDRD: 55.5 ML/MIN/1.7 M2
GLUCOSE SERPL-MCNC: 112.7 MG'DL (ref 70–99)
HCT VFR BLD AUTO: 32.2 % (ref 40–53)
HEMOGLOBIN: 9.8 G/DL (ref 13.3–17.7)
INR PPP: 1.02 (ref 0.9–1.1)
MCH RBC QN AUTO: 30.2 PG (ref 26.5–35)
MCHC RBC AUTO-ENTMCNC: 30.4 G/DL (ref 32–36)
MCV RBC AUTO: 99.2 FL (ref 78–100)
PLATELET # BLD AUTO: 181 K/UL (ref 150–450)
POTASSIUM SERPL-SCNC: 4.4 MMOL/DL (ref 3.2–4.6)
RBC # BLD AUTO: 3.3 M/UL (ref 4.4–5.9)
SODIUM SERPL-SCNC: 138.8 MMOL/L (ref 132–142)
WBC # BLD AUTO: 3.1 K/UL (ref 4–11)

## 2019-10-24 RX ORDER — FERROUS SULFATE 325(65) MG
325 TABLET ORAL
Qty: 30 TABLET | Refills: 1 | Status: SHIPPED | OUTPATIENT
Start: 2019-10-24 | End: 2019-10-24

## 2019-10-24 RX ORDER — FERROUS SULFATE 325(65) MG
325 TABLET ORAL
Qty: 90 TABLET | Refills: 1 | Status: SHIPPED | OUTPATIENT
Start: 2019-10-24 | End: 2020-03-16

## 2019-10-24 ASSESSMENT — MIFFLIN-ST. JEOR: SCORE: 1534.46

## 2019-10-24 NOTE — LETTER
October 28, 2019      Antonio Mercado  [address]   [place] [address]        Dear Antonio,    Our nurse already let you know that you are more anemic than when you where in the ER so we do need to see where you are bleeding from and see if it can be treated.  Okay?  I also recommended taking iron 3 times daily. I would want you to get the colonoscopy within the next 10 days.  If you are experiencing any delays with [facility], there are other places where we can get a colonoscopy too -please let me know.  It would be best to stick with [facility] since they know you.       Otherwise your kidney function is stable and a little better than the last time we checked it.  If you do get a lot more bleeding, please call our clinic   to let us know, thank you.    Please see below for your test results.    Resulted Orders   CBC with Plt ([facility])   Result Value Ref Range    WBC 3.1 (L) 4.0 - 11.0 K/uL    RBC 3.3 (L) 4.4 - 5.9 M/uL    Hemoglobin 9.8 (L) 13.3 - 17.7 g/dL    Hematocrit 32.2 (L) 40.0 - 53.0 %    MCV 99.2 78.0 - 100.0 fL    MCH 30.2 26.5 - 35.0    MCHC 30.4 (L) 32.0 - 36.0 g/dL    Platelets 181.0 150.0 - 450.0 K/uL   Basic Metabolic Panel ([place])   Result Value Ref Range    Urea Nitrogen 27.4 (H) 7.0 - 21.0 mg/dL    Calcium 8.5 (L) 8.5 - 10.1 mg/dL    Chloride 107.2 98.0 - 110.0 mmol/L    Carbon Dioxide 26.2 20.0 - 32.0 mmol/L    Creatinine 1.4 (H) 0.7 - 1.3 mg/dL    Glucose 112.7 (H) 70.0 - 99.0 mg'dL    Potassium 4.4 3.2 - 4.6 mmol/dL    Sodium 138.8 132.0 - 142.0 mmol/L    GFR Estimate 55.5 (L) >60.0 mL/min/1.7 m2    GFR Estimate If Black 67.1 >60.0 mL/min/1.7 m2   INR ([facility])   Result Value Ref Range    INR 1.02 0.90 - 1.10    Narrative    Test performed by:  [facility]  [address] [place] [address]  INR Therapeutic Ranges:  Mech. Valve 2.5-3.5  Post Surg.  2.0-3.0  DVT/PE      2.0-3.0       If you have any questions, please call the clinic to make an  appointment.    Sincerely,    Harris Mary MD

## 2019-10-24 NOTE — PROGRESS NOTES
"This is a 64-year-old male who is attempting to manage prostate cancer and colon cancer with dietary and spiritual means alone.  He attends today in follow-up to an ER visit.  He tells me that he had been experiencing bright red rectal bleeding for about 2 weeks before he went to the ER.  At that visit he was given various laxatives and is been taking these with good effect.  He reports that the rectal bleeding is improved but is not fully resolved and that he had a small amount this morning but did not have any yesterday, for example.  He has no other abdominal symptoms such as pain, nausea, vomiting, diarrhea.  He is not using aspirin nor NSAID's.    He is initially ambivalent about the idea of having a colonoscopy which was already raised with him by the ER physician.  He says that every time he goes his prep is incomplete and he has to re-prep himself and return the next day which is difficult since he has no family members nearby.  He would like to get a more aggressive prep so that he is properly cleared out and just needs one definitive colonoscopy.    He believes that he has hemorrhoids given that the rectal bleeding is bright red in color.  He is interested in knowing what he can do when he has a future recurrence of bleeding.    Otherwise we followed up upon his recent renal ultrasound which did demonstrate prostatic enlargement without significant kidney nor ureteric anomalies.    Objective:  BP (!) 83/52   Pulse 77   Temp 98.3  F (36.8  C)   Resp 18   Ht 1.791 m (5' 10.5\")   Wt 74 kg (163 lb 3.2 oz)   BMI 23.09 kg/m    His blood pressure is low although he is asymptomatic.  I recommended we check an anal exam and he agrees.  There is no evidence of external hemorrhoids no other anal abnormality which could explain bright red rectal bleeding.  We discussed the ultrasound renal system results.    Results for orders placed or performed in visit on 10/24/19   CBC with Plt ([facility])   Result Value Ref " Range    WBC 3.1 (L) 4.0 - 11.0 K/uL    RBC 3.3 (L) 4.4 - 5.9 M/uL    Hemoglobin 9.8 (L) 13.3 - 17.7 g/dL    Hematocrit 32.2 (L) 40.0 - 53.0 %    MCV 99.2 78.0 - 100.0 fL    MCH 30.2 26.5 - 35.0    MCHC 30.4 (L) 32.0 - 36.0 g/dL    Platelets 181.0 150.0 - 450.0 K/uL   Basic Metabolic Panel ([place])   Result Value Ref Range    Urea Nitrogen 27.4 (H) 7.0 - 21.0 mg/dL    Calcium 8.5 (L) 8.5 - 10.1 mg/dL    Chloride 107.2 98.0 - 110.0 mmol/L    Carbon Dioxide 26.2 20.0 - 32.0 mmol/L    Creatinine 1.4 (H) 0.7 - 1.3 mg/dL    Glucose 112.7 (H) 70.0 - 99.0 mg'dL    Potassium 4.4 3.2 - 4.6 mmol/dL    Sodium 138.8 132.0 - 142.0 mmol/L    GFR Estimate 55.5 (L) >60.0 mL/min/1.7 m2    GFR Estimate If Black 67.1 >60.0 mL/min/1.7 m2     Antonio was seen today for rectal problem.    Diagnoses and all orders for this visit:    Bright red rectal bleeding  -     CBC with Plt ([facility])  -     Cancel: INR ([facility])  -     Discontinue: ferrous sulfate (FEROSUL) 325 (65 Fe) MG tablet; Take 1 tablet (325 mg) by mouth daily (with breakfast)  -     Cancel: GASTROENTEROLOGY ADULT REF PROCEDURE ONLY Iron Supplement  -     INR ([facility])  -     ferrous sulfate (FEROSUL) 325 (65 Fe) MG tablet; Take 1 tablet (325 mg) by mouth 3 times daily (with meals)  -     GASTROENTEROLOGY ADULT REF PROCEDURE ONLY Iron Supplement (Needs extended prep based on prior incomplete evacuations)    CKD (chronic kidney disease) stage 3, GFR 30-59 ml/min (H)  -     Basic Metabolic Panel ([place])    Malignant neoplasm of hepatic flexure (H)  -     Cancel: GASTROENTEROLOGY ADULT REF PROCEDURE ONLY Iron Supplement  -     GASTROENTEROLOGY ADULT REF PROCEDURE ONLY Iron Supplement (Needs extended prep based on prior incomplete evacuations)      He has had a drop in his hemoglobin.  I called him after the visit and advised him of this fact and recommended increasing his iron supplementation to 325 mg 3 times daily.  I also emphasized the importance of clarifying the  etiology and source of his bleeding.  He is in agreement.    I will therefore recommend an urgent colonoscopy.  He has been attending [facility] and will attempt to have it performed there.  I personally talked with our triage nurse and with our referral coordinator in an effort to expedite this referral and also to ensure that he is prescribed an extended prep so that a single colonoscopy may definitively diagnose the source of his bleeding.  If he experiences more acute GI bleeding he should attend the ER and may need to be hospitalized.    Total visit time was 25 mins, all of which was face to face MD time, and over 50% of this time was spent in counseling and coordination of care.    I explained that I cannot advise him how to prevent or manage the episodes of rectal bleeding until I know the cause of the bleeding.  He is in agreement with this plan.

## 2019-10-24 NOTE — TELEPHONE ENCOUNTER
"Spoke with patient per Dr. Mary, informed him of hemoglobin drop. Recommended expedited colonoscopy. Per referrals coordinator, [facility] is able to schedule pt as an \"overbook\" in the next week or two, or else not scheduling until January. Pt may have to wait at the clinic for 1-2 hours for this spot.     Pt aware to take iron 3x daily as prescribed.    Instructed pt to immediately call clinic with increase in dizziness, lightheadedness, shortness of breath, or other concerning symptoms.     Pt agreeable to plan, awaiting a call from [facility] for scheduling. ./LR      "

## 2019-10-24 NOTE — PATIENT INSTRUCTIONS
[facility]  Phone: [phone]  Fax: [phone]     Referral, demographics, office visit and medication list faxed to [phone].    Nohemy Carpenter

## 2019-10-24 NOTE — LETTER
"  10/24/2019      RE: Antonio Mercado  [address]  [place] [address]       This is a 64-year-old male who is attempting to manage prostate cancer and colon cancer with dietary and spiritual means alone.  He attends today in follow-up to an ER visit.  He tells me that he had been experiencing bright red rectal bleeding for about 2 weeks before he went to the ER.  At that visit he was given various laxatives and is been taking these with good effect.  He reports that the rectal bleeding is improved but is not fully resolved and that he had a small amount this morning but did not have any yesterday, for example.  He has no other abdominal symptoms such as pain, nausea, vomiting, diarrhea.  He is not using aspirin nor NSAID's.    He is initially ambivalent about the idea of having a colonoscopy which was already raised with him by the ER physician.  He says that every time he goes his prep is incomplete and he has to re-prep himself and return the next day which is difficult since he has no family members nearby.  He would like to get a more aggressive prep so that he is properly cleared out and just needs one definitive colonoscopy.    He believes that he has hemorrhoids given that the rectal bleeding is bright red in color.  He is interested in knowing what he can do when he has a future recurrence of bleeding.    Otherwise we followed up upon his recent renal ultrasound which did demonstrate prostatic enlargement without significant kidney nor ureteric anomalies.    Objective:  BP (!) 83/52   Pulse 77   Temp 98.3  F (36.8  C)   Resp 18   Ht 1.791 m (5' 10.5\")   Wt 74 kg (163 lb 3.2 oz)   BMI 23.09 kg/m     His blood pressure is low although he is asymptomatic.  I recommended we check an anal exam and he agrees.  There is no evidence of external hemorrhoids no other anal abnormality which could explain bright red rectal bleeding.  We discussed the ultrasound renal system results.    Results for " orders placed or performed in visit on 10/24/19   CBC with Plt ([facility])   Result Value Ref Range    WBC 3.1 (L) 4.0 - 11.0 K/uL    RBC 3.3 (L) 4.4 - 5.9 M/uL    Hemoglobin 9.8 (L) 13.3 - 17.7 g/dL    Hematocrit 32.2 (L) 40.0 - 53.0 %    MCV 99.2 78.0 - 100.0 fL    MCH 30.2 26.5 - 35.0    MCHC 30.4 (L) 32.0 - 36.0 g/dL    Platelets 181.0 150.0 - 450.0 K/uL   Basic Metabolic Panel ([place])   Result Value Ref Range    Urea Nitrogen 27.4 (H) 7.0 - 21.0 mg/dL    Calcium 8.5 (L) 8.5 - 10.1 mg/dL    Chloride 107.2 98.0 - 110.0 mmol/L    Carbon Dioxide 26.2 20.0 - 32.0 mmol/L    Creatinine 1.4 (H) 0.7 - 1.3 mg/dL    Glucose 112.7 (H) 70.0 - 99.0 mg'dL    Potassium 4.4 3.2 - 4.6 mmol/dL    Sodium 138.8 132.0 - 142.0 mmol/L    GFR Estimate 55.5 (L) >60.0 mL/min/1.7 m2    GFR Estimate If Black 67.1 >60.0 mL/min/1.7 m2     Antonio was seen today for rectal problem.    Diagnoses and all orders for this visit:    Bright red rectal bleeding  -     CBC with Plt ([facility])  -     Cancel: INR ([facility])  -     Discontinue: ferrous sulfate (FEROSUL) 325 (65 Fe) MG tablet; Take 1 tablet (325 mg) by mouth daily (with breakfast)  -     Cancel: GASTROENTEROLOGY ADULT REF PROCEDURE ONLY Iron Supplement  -     INR ([facility])  -     ferrous sulfate (FEROSUL) 325 (65 Fe) MG tablet; Take 1 tablet (325 mg) by mouth 3 times daily (with meals)  -     GASTROENTEROLOGY ADULT REF PROCEDURE ONLY Iron Supplement (Needs extended prep based on prior incomplete evacuations)    CKD (chronic kidney disease) stage 3, GFR 30-59 ml/min (H)  -     Basic Metabolic Panel ([place])    Malignant neoplasm of hepatic flexure (H)  -     Cancel: GASTROENTEROLOGY ADULT REF PROCEDURE ONLY Iron Supplement  -     GASTROENTEROLOGY ADULT REF PROCEDURE ONLY Iron Supplement (Needs extended prep based on prior incomplete evacuations)      He has had a drop in his hemoglobin.  I called him after the visit and advised him of this fact and recommended increasing his iron  supplementation to 325 mg 3 times daily.  I also emphasized the importance of clarifying the etiology and source of his bleeding.  He is in agreement.    I will therefore recommend an urgent colonoscopy.  He has been attending [facility] and will attempt to have it performed there.  I personally talked with our triage nurse and with our referral coordinator in an effort to expedite this referral and also to ensure that he is prescribed an extended prep so that a single colonoscopy may definitively diagnose the source of his bleeding.  If he experiences more acute GI bleeding he should attend the ER and may need to be hospitalized.    Total visit time was 25 mins, all of which was face to face MD time, and over 50% of this time was spent in counseling and coordination of care.    I explained that I cannot advise him how to prevent or manage the episodes of rectal bleeding until I know the cause of the bleeding.  He is in agreement with this plan.    Harris Mary MD

## 2019-10-25 NOTE — RESULT ENCOUNTER NOTE
Hello, our nurse already let you know that you are more anemic than when you where in the ER so we do need to see where you are bleeding from and see if it can be treated.  Okay?  I also recommended taking iron 3 times daily. I would want you to get the colonoscopy within the next 10 days.  If you are experiencing any delays with [facility], there are other places where we can get a colonoscopy too -please let me know.  It would be best to stick with [facility] since they know you.      Otherwise your kidney function is stable and a little better than the last time we checked it.  If you do get a lot more bleeding, please call our clinic 24/7 to let us know, thank you, Dr. Harris benavidez

## 2019-11-07 ENCOUNTER — HEALTH MAINTENANCE LETTER (OUTPATIENT)
Age: 64
End: 2019-11-07

## 2019-11-09 ENCOUNTER — COMMUNICATION - HEALTHEAST (OUTPATIENT)
Dept: INFECTIOUS DISEASES | Facility: CLINIC | Age: 64
End: 2019-11-09

## 2019-11-09 DIAGNOSIS — Z21 HIV (HUMAN IMMUNODEFICIENCY VIRUS INFECTION) (H): ICD-10-CM

## 2019-11-09 DIAGNOSIS — B20 AIDS (H): ICD-10-CM

## 2019-11-11 ENCOUNTER — COMMUNICATION - HEALTHEAST (OUTPATIENT)
Dept: NURSING | Facility: CLINIC | Age: 64
End: 2019-11-11

## 2019-12-28 DIAGNOSIS — M54.59 MECHANICAL LOW BACK PAIN: ICD-10-CM

## 2019-12-30 RX ORDER — NAPROXEN 500 MG/1
500 TABLET ORAL 2 TIMES DAILY PRN
Qty: 60 TABLET | Refills: 1 | Status: SHIPPED | OUTPATIENT
Start: 2019-12-30 | End: 2020-02-26

## 2020-01-01 DIAGNOSIS — M54.59 MECHANICAL LOW BACK PAIN: ICD-10-CM

## 2020-01-01 RX ORDER — NAPROXEN 500 MG/1
500 TABLET ORAL 2 TIMES DAILY PRN
Qty: 60 TABLET | Refills: 1 | Status: SHIPPED | OUTPATIENT
Start: 2020-01-01 | End: 2021-01-01

## 2020-01-10 ENCOUNTER — TELEPHONE (OUTPATIENT)
Dept: FAMILY MEDICINE | Facility: CLINIC | Age: 65
End: 2020-01-10

## 2020-01-10 NOTE — TELEPHONE ENCOUNTER
Patient called said that he needs to reschedule his appointment from November. Called  digestive clinic thy said they never had him schedule. Will wait to see if Nohemy knows any information on this. MANE Carrizales

## 2020-02-14 ENCOUNTER — COMMUNICATION - HEALTHEAST (OUTPATIENT)
Dept: INFECTIOUS DISEASES | Facility: CLINIC | Age: 65
End: 2020-02-14

## 2020-02-14 DIAGNOSIS — B20 AIDS (ACQUIRED IMMUNE DEFICIENCY SYNDROME) (H): ICD-10-CM

## 2020-02-17 ENCOUNTER — HEALTH MAINTENANCE LETTER (OUTPATIENT)
Age: 65
End: 2020-02-17

## 2020-02-26 ENCOUNTER — OFFICE VISIT (OUTPATIENT)
Dept: FAMILY MEDICINE | Facility: CLINIC | Age: 65
End: 2020-02-26
Payer: COMMERCIAL

## 2020-02-26 VITALS
RESPIRATION RATE: 18 BRPM | WEIGHT: 170 LBS | DIASTOLIC BLOOD PRESSURE: 82 MMHG | BODY MASS INDEX: 24.05 KG/M2 | HEART RATE: 75 BPM | OXYGEN SATURATION: 97 % | SYSTOLIC BLOOD PRESSURE: 121 MMHG | TEMPERATURE: 98.4 F

## 2020-02-26 DIAGNOSIS — M25.532 LEFT WRIST PAIN: Primary | ICD-10-CM

## 2020-02-26 DIAGNOSIS — J34.89 DRY NOSE: ICD-10-CM

## 2020-02-26 RX ORDER — ECHINACEA PURPUREA EXTRACT 125 MG
TABLET ORAL
Qty: 44 ML | Refills: 3 | Status: SHIPPED | OUTPATIENT
Start: 2020-02-26 | End: 2021-01-01

## 2020-02-26 RX ORDER — IBUPROFEN 600 MG/1
600 TABLET, FILM COATED ORAL EVERY 6 HOURS PRN
Qty: 60 TABLET | Refills: 0 | Status: SHIPPED | OUTPATIENT
Start: 2020-02-26 | End: 2020-03-13

## 2020-02-26 RX ORDER — CAPSAICIN 0.025 %
CREAM (GRAM) TOPICAL
Qty: 60 G | Refills: 1 | Status: CANCELLED | OUTPATIENT
Start: 2020-02-26

## 2020-02-26 ASSESSMENT — PAIN SCALES - GENERAL: PAINLEVEL: WORST PAIN (10)

## 2020-02-26 NOTE — PROGRESS NOTES
Preceptor Attestation:   Patient seen, evaluated and discussed with the resident. I have verified the content of the note, which accurately reflects my assessment of the patient and the plan of care.   Supervising Physician:  Beny Garsia MD
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
## 2020-02-28 NOTE — PATIENT INSTRUCTIONS
02/28/20   ORTHOPEDICS ADULT REFERRAL  [facility]  Phone: [phone]  Fax: [phone]    Online referral placed. They will contact patient to schedule.     Nohemy Carpenter

## 2020-03-02 ENCOUNTER — OFFICE VISIT (OUTPATIENT)
Dept: FAMILY MEDICINE | Facility: CLINIC | Age: 65
End: 2020-03-02
Payer: COMMERCIAL

## 2020-03-02 ENCOUNTER — TELEPHONE (OUTPATIENT)
Dept: FAMILY MEDICINE | Facility: CLINIC | Age: 65
End: 2020-03-02

## 2020-03-02 VITALS
TEMPERATURE: 98.3 F | SYSTOLIC BLOOD PRESSURE: 132 MMHG | DIASTOLIC BLOOD PRESSURE: 83 MMHG | HEART RATE: 88 BPM | RESPIRATION RATE: 20 BRPM | BODY MASS INDEX: 23.6 KG/M2 | WEIGHT: 166.8 LBS

## 2020-03-02 DIAGNOSIS — M62.81 GENERALIZED MUSCLE WEAKNESS: Primary | ICD-10-CM

## 2020-03-02 DIAGNOSIS — M25.532 LEFT WRIST PAIN: Primary | ICD-10-CM

## 2020-03-02 LAB
% GRANULOCYTES: 84.8 %G (ref 40–75)
ALBUMIN SERPL-MCNC: 4.7 MG/DL (ref 3.3–4.9)
ALP SERPL-CCNC: 57.5 U/L (ref 40–150)
ALT SERPL-CCNC: 15.4 U/L (ref 0–45)
AST SERPL-CCNC: 14.1 U/L (ref 0–55)
BILIRUB SERPL-MCNC: 0.7 MG/DL (ref 0.2–1.3)
BUN SERPL-MCNC: 24.6 MG/DL (ref 7–21)
CALCIUM SERPL-MCNC: 9.6 MG/DL (ref 8.5–10.1)
CHLORIDE SERPLBLD-SCNC: 101.5 MMOL/L (ref 98–110)
CO2 SERPL-SCNC: 28.8 MMOL/L (ref 20–32)
CREAT SERPL-MCNC: 1.1 MG/DL (ref 0.7–1.3)
GFR SERPL CREATININE-BSD FRML MDRD: 70.3 ML/MIN/1.7 M2
GLUCOSE SERPL-MCNC: 123.8 MG'DL (ref 70–99)
GRANULOCYTES #: 4 K/UL (ref 1.6–8.3)
HCT VFR BLD AUTO: 42.6 % (ref 40–53)
HEMOGLOBIN: 13 G/DL (ref 13.3–17.7)
INR PPP: 1.06 (ref 0.9–1.1)
LYMPHOCYTES # BLD AUTO: 0.6 K/UL (ref 0.8–5.3)
LYMPHOCYTES NFR BLD AUTO: 11.8 %L (ref 20–48)
MCH RBC QN AUTO: 29.1 PG (ref 26.5–35)
MCHC RBC AUTO-ENTMCNC: 30.5 G/DL (ref 32–36)
MCV RBC AUTO: 95.6 FL (ref 78–100)
MID #: 0.2 K/UL (ref 0–2.2)
MID %: 3.4 %M (ref 0–20)
PLATELET # BLD AUTO: 229 K/UL (ref 150–450)
POTASSIUM SERPL-SCNC: 3.6 MMOL/L (ref 3.2–4.6)
PROT SERPL-MCNC: 8.1 G/DL (ref 6.8–8.8)
RBC # BLD AUTO: 4.5 M/UL (ref 4.4–5.9)
SODIUM SERPL-SCNC: 136.3 MMOL/L (ref 132–142)
WBC # BLD AUTO: 4.7 K/UL (ref 4–11)

## 2020-03-02 NOTE — PROGRESS NOTES
SUBJECTIVE       Antonio Mercado is a 64 year old  male with a PMH significant for:     Patient Active Problem List   Diagnosis     Human immunodeficiency virus (HIV) disease (H)     Mechanical low back pain     Anemia, iron deficiency     Malignant neoplasm of hepatic flexure (H)     Hemorrhoids, unspecified hemorrhoid type     Prostate cancer (H)     Refusal of treatment for reasons of conscience     Bilateral renal cysts       He presents with generalized weakness.    The patient states that he had acute onset of severe generalized weakness today around noon. He works as an [employer]  and was getting ready to sign up for his first route of the day, however he had acute onset of weakness. He states that he is experiencing this as a generalized sensation throughout his body. Reports some mild associated nausea as well. He also has had one episode of nonbloody diarrhea today. States that he has not had any food to eat today, only fluids. No associated fever/chills, chest pain, SOB, abdominal pain.    In addition, hd has persistence of some pain in his left wrist that was previously diagnosed as gout 5 days ago. He was prescribed oxycodone and prednisone for this, but self-discontinued them due to both medications making him feel unwell.    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 He has seen oncology for this and had laparoscopic hemicolectomy in 2016. Since this time, he has refused chemotherapy. He is relying on extreme diet changes and prayer to halt disease progression. He does have a colonoscopy scheduled for 1-2 weeks from now.    PMH, Medications and Allergies were reviewed and updated as needed.        REVIEW OF SYSTEMS     CONSTITUTIONAL: Weakness. NEGATIVE for fever, chills  INTEGUMENTARY/SKIN: NEGATIVE for worrisome rashes, moles or lesions  ENT/MOUTH: NEGATIVE for ear, mouth and throat problems  RESP: NEGATIVE for  significant cough or SOB  CV: NEGATIVE for chest pain, palpitations or peripheral edema  GI: Mild nausea. NEGATIVE for abdominal pain, heartburn  NEURO: NEGATIVE for weakness, dizziness or paresthesias  ENDOCRINE: NEGATIVE for temperature intolerance, skin/hair changes  HEME/ALLERGY: NEGATIVE for bleeding problems  PSYCHIATRIC: NEGATIVE for changes in mood or affect        OBJECTIVE     Vitals:    03/02/20 1447   BP: 132/83   BP Location: Left arm   Patient Position: Sitting   Cuff Size: Adult Regular   Pulse: 88   Resp: 20   Temp: 98.3  F (36.8  C)   TempSrc: Oral   Weight: 75.7 kg (166 lb 12.8 oz)     Body mass index is 23.6 kg/m .    Constitutional: Very fatigued appearing. Awake, alert, cooperative, and appears stated age.  Eyes: Lids and lashes normal, pupils equal, round and reactive to light, extra ocular muscles intact, sclera clear, conjunctiva normal.  ENT: Normocephalic, without obvious abnormality, atramatic, sinuses nontender on palpation, external ears without lesions, oral pharynx with moist mucus membranes, tonsils without erythema or exudates, gums normal and good dentition.  Hematologic / Lymphatic: No cervical lymphadenopathy and no supraclavicular lymphadenopathy.  Lungs: No increased work of breathing, good air exchange, clear to auscultation bilaterally, no crackles or wheezing.  Cardiovascular: Regular rate and rhythm, normal S1 and S2, no S3 or S4, and no murmur noted.  Abdomen: soft, non-distended, non-tender  Musculoskeletal: Full range of motion noted.  Motor strength is 4/5 all extremities bilaterally, poor effort noted  Neurologic: Awake, alert, oriented to name, place and time.  Cranial nerves II-XII are intact.   Neuropsychiatric: Normal affect, mood, orientation, memory and insight.  Skin: No rashes, erythema, pallor, petechia or purpura.    Results for orders placed or performed in visit on 03/02/20 (from the past 24 hour(s))   Comprehensive Metabolic Panel ([facility])   Result Value  Ref Range    Albumin 4.7 3.3 - 4.9 mg/dL    Alkaline Phosphatase 57.5 40.0 - 150.0 U/L    ALT 15.4 0.0 - 45.0 U/L    AST 14.1 0.0 - 55.0 U/L    Bilirubin Total 0.7 0.2 - 1.3 mg/dL    Urea Nitrogen 24.6 (H) 7.0 - 21.0 mg/dL    Calcium 9.6 8.5 - 10.1 mg/dL    Chloride 101.5 98.0 - 110.0 mmol/L    Carbon Dioxide 28.8 20.0 - 32.0 mmol/L    Creatinine 1.1 0.7 - 1.3 mg/dL    Glucose 123.8 (H) 70.0 - 99.0 mg'dL    Potassium 3.6 3.2 - 4.6 mmol/L    Sodium 136.3 132.0 - 142.0 mmol/L    Protein Total 8.1 6.8 - 8.8 g/dL    GFR Estimate 70.3 >60.0 mL/min/1.7 m2    GFR Estimate If Black 85.1 >60.0 mL/min/1.7 m2   CBC with Diff Plt ([facility])   Result Value Ref Range    WBC 4.7 4.0 - 11.0 K/uL    Lymphocytes # 0.6 (L) 0.8 - 5.3 K/uL    % Lymphocytes 11.8 (L) 20.0 - 48.0 %L    Mid # 0.2 0.0 - 2.2 K/uL    Mid % 3.4 0.0 - 20.0 %M    GRANULOCYTES # 4.0 1.6 - 8.3 K/uL    % Granulocytes 84.8 (H) 40.0 - 75.0 %G    RBC 4.5 4.4 - 5.9 M/uL    Hemoglobin 13.0 (L) 13.3 - 17.7 g/dL    Hematocrit 42.6 40.0 - 53.0 %    MCV 95.6 78.0 - 100.0 fL    MCH 29.1 26.5 - 35.0    MCHC 30.5 (L) 32.0 - 36.0 g/dL    Platelets 229.0 150.0 - 450.0 K/uL       ASSESSMENT AND PLAN     1. Generalized muscle weakness  Patient reports abrupt onset of generalized weakness associated with mild nausea. No other symptoms at this time. Concern for potential acute anemia given the patient's history of undertreated GI malignancy vs acute viral illness. We ordered basic bloodwork which showed hemoglobin of 13.0. The patient used the restroom and experienced an episode of nonbloody diarrhea during his visit today. Following this, he reported significant improvement in his symptoms. He declined rapid flu testing and stated he felt well enough to go home. He does have a colonoscopy scheduled for next week.  - Comprehensive Metabolic Panel ([place])  - CBC with Diff Plt ([facility])  - Influenza A/B Antigen ([facility])      RTC in 4 weeks for follow up of weakness or sooner if  develops new or worsening symptoms.    Patient staffed with Dr. Nicki Solis MD

## 2020-03-02 NOTE — TELEPHONE ENCOUNTER
Prior Authorization needed on:  diclofenac (VOLTAREN) 1 % topical gel      Pharmacy confirmed as   [id number] - [place], [place] - [address] & [address] [place] [address]  Phone: [phone] Fax: [phone]    [id number] - [place], [place] - [address] AT [facility] [address]  [address] [place] [address]  Phone: [phone] Fax: [phone]  : Yes      Alternatives Suggested:  N/a    Please advise if you would like to send an alternative or start a pa.     Pantera Camacho CMA March 2, 2020 at 3:07 PM

## 2020-03-03 ENCOUNTER — TELEPHONE (OUTPATIENT)
Dept: FAMILY MEDICINE | Facility: CLINIC | Age: 65
End: 2020-03-03

## 2020-03-03 ENCOUNTER — TRANSFERRED RECORDS (OUTPATIENT)
Dept: HEALTH INFORMATION MANAGEMENT | Facility: CLINIC | Age: 65
End: 2020-03-03

## 2020-03-03 RX ORDER — CAPSAICIN 0.025 %
CREAM (GRAM) TOPICAL
Qty: 60 G | Refills: 1 | Status: SHIPPED | OUTPATIENT
Start: 2020-03-03 | End: 2021-01-01

## 2020-03-03 NOTE — TELEPHONE ENCOUNTER
"Patient states he has been having a large amount diarrhea since being seen yesterday in clinic, \"all night and all day.\" This morning he noticed there was bright red blood in his stool. He is now endorsing diffuse abdominal pain as well.    He is wondering if he can get is colonoscopy emergently today instead of two weeks, as his stomach is now \"empty\" due to the amount of diarrhea. Discussed with patient that due to his hx of adenocarcinoma in colon and bright red blood in stool for multiple hours, he should proceed to the ER for immediate assessment.     Patient agreeable to plan. He will call us after to discuss rescheduling the colonoscopy if needed. Routed to Dr. Mary. ./LR  "

## 2020-03-03 NOTE — TELEPHONE ENCOUNTER
[facility] phone call message-patient reporting a symptom:     Symptom:     Pt states he is having stomach pain since his yesterday's visit. He is wanting to talk to Dr. Mary or nurse.     Same Day Visit Offered: No    Additional comments:     None    OK to leave message on voice mail? Yes    Primary language: English      needed? No    Call taken on March 3, 2020 at 10:47 AM by Isabella Hendricks CMA

## 2020-03-03 NOTE — PROGRESS NOTES
Preceptor Attestation:   Patient seen, evaluated and discussed with the resident. I have verified the content of the note, which accurately reflects my assessment of the patient and the plan of care.   Supervising Physician:  Chu Caceres MD.

## 2020-03-10 ENCOUNTER — RECORDS - HEALTHEAST (OUTPATIENT)
Dept: ADMINISTRATIVE | Facility: OTHER | Age: 65
End: 2020-03-10

## 2020-03-10 DIAGNOSIS — D50.0 IRON DEFICIENCY ANEMIA DUE TO CHRONIC BLOOD LOSS: ICD-10-CM

## 2020-03-10 DIAGNOSIS — C18.3 MALIGNANT NEOPLASM OF HEPATIC FLEXURE (H): Primary | ICD-10-CM

## 2020-03-10 DIAGNOSIS — C61 PROSTATE CANCER (H): ICD-10-CM

## 2020-03-12 ENCOUNTER — TELEPHONE (OUTPATIENT)
Dept: FAMILY MEDICINE | Facility: CLINIC | Age: 65
End: 2020-03-12

## 2020-03-12 NOTE — TELEPHONE ENCOUNTER
Prior Authorization Retail Medication Request    Medication/Dose: diclofenac (VOLTAREN) 1 % topical gel  ICD code (if different than what is on RX):  Same  Previously Tried and Failed: naproxen (NAPROSYN) 500 MG tablet and capsaicin (ZOSTRIX) 0.025 % external cream   Rationale:  NA    Side note: Patient have GI bleed and unable to tolerated oral     Insurance Name:  [facility]  Insurance ID:  [id number]       Pharmacy Information (if different than what is on RX)  Name:  Same   Phone:  same

## 2020-03-13 ENCOUNTER — RECORDS - HEALTHEAST (OUTPATIENT)
Dept: ADMINISTRATIVE | Facility: OTHER | Age: 65
End: 2020-03-13

## 2020-03-13 ENCOUNTER — OFFICE VISIT (OUTPATIENT)
Dept: FAMILY MEDICINE | Facility: CLINIC | Age: 65
End: 2020-03-13
Payer: COMMERCIAL

## 2020-03-13 VITALS
RESPIRATION RATE: 12 BRPM | TEMPERATURE: 98 F | SYSTOLIC BLOOD PRESSURE: 110 MMHG | BODY MASS INDEX: 23.1 KG/M2 | HEIGHT: 71 IN | DIASTOLIC BLOOD PRESSURE: 76 MMHG | OXYGEN SATURATION: 100 % | WEIGHT: 165 LBS | HEART RATE: 61 BPM

## 2020-03-13 DIAGNOSIS — C18.3 MALIGNANT NEOPLASM OF HEPATIC FLEXURE (H): ICD-10-CM

## 2020-03-13 DIAGNOSIS — B20 HUMAN IMMUNODEFICIENCY VIRUS (HIV) DISEASE (H): ICD-10-CM

## 2020-03-13 DIAGNOSIS — R19.5 LOOSE STOOLS: ICD-10-CM

## 2020-03-13 DIAGNOSIS — N18.30 CKD (CHRONIC KIDNEY DISEASE) STAGE 3, GFR 30-59 ML/MIN (H): ICD-10-CM

## 2020-03-13 DIAGNOSIS — C61 PROSTATE CANCER (H): ICD-10-CM

## 2020-03-13 DIAGNOSIS — D50.0 IRON DEFICIENCY ANEMIA DUE TO CHRONIC BLOOD LOSS: Primary | ICD-10-CM

## 2020-03-13 DIAGNOSIS — K62.5 BRIGHT RED RECTAL BLEEDING: ICD-10-CM

## 2020-03-13 LAB
ALBUMIN SERPL-MCNC: 4.2 MG/DL (ref 3.3–4.9)
ALP SERPL-CCNC: 51.8 U/L (ref 40–150)
ALT SERPL-CCNC: 22 U/L (ref 0–45)
AST SERPL-CCNC: 15 U/L (ref 0–55)
BILIRUB SERPL-MCNC: 0.3 MG/DL (ref 0.2–1.3)
BUN SERPL-MCNC: 16.4 MG/DL (ref 7–21)
CALCIUM SERPL-MCNC: 9 MG/DL (ref 8.5–10.1)
CHLORIDE SERPLBLD-SCNC: 106.6 MMOL/L (ref 98–110)
CO2 SERPL-SCNC: 31.9 MMOL/L (ref 20–32)
CREAT SERPL-MCNC: 1.1 MG/DL (ref 0.7–1.3)
GFR SERPL CREATININE-BSD FRML MDRD: 71.2 ML/MIN/1.7 M2
GLUCOSE SERPL-MCNC: 100 MG'DL (ref 70–99)
HCT VFR BLD AUTO: 36.5 % (ref 40–53)
HEMOGLOBIN: 11.4 G/DL (ref 13.3–17.7)
MCH RBC QN AUTO: 29.6 PG (ref 26.5–35)
MCHC RBC AUTO-ENTMCNC: 31.2 G/DL (ref 32–36)
MCV RBC AUTO: 94.8 FL (ref 78–100)
PLATELET # BLD AUTO: 225 K/UL (ref 150–450)
POTASSIUM SERPL-SCNC: 3.9 MMOL/L (ref 3.2–4.6)
PROT SERPL-MCNC: 7.1 G/DL (ref 6.8–8.8)
PSA SERPL-MCNC: 14.3 NG/ML (ref 0–4.5)
RBC # BLD AUTO: 3.9 M/UL (ref 4.4–5.9)
SODIUM SERPL-SCNC: 142.4 MMOL/L (ref 132–142)
WBC # BLD AUTO: 3.2 K/UL (ref 4–11)

## 2020-03-13 RX ORDER — CRANBERRY FRUIT EXTRACT 200 MG
30 CAPSULE ORAL DAILY
Qty: 30 CAPSULE | Refills: 3 | Status: SHIPPED | OUTPATIENT
Start: 2020-03-13 | End: 2020-07-09

## 2020-03-13 ASSESSMENT — MIFFLIN-ST. JEOR: SCORE: 1542.63

## 2020-03-13 NOTE — LETTER
March 16, 2020       Antonio Mercado  [address]   [place] [address]        Dear ,    We are writing to inform you of your test results.    Your labs show:   1. Your PSA (prostate test) has increased since 1 year ago - it was 9.4 then, it is now 14.3.  Do let me know if you want to see the urology doctor to address this.  Are you having any urinary symptoms?  Please let me know if you are.   2. You are still a little anemic since your bleeding - take foods rich in Iron, I will send a prescription for one Iron supplement per day to your pharmacy too.   3.  Your other labs are OK and your kidneys work well - take care.    Resulted Orders   PSA Diagnostic ([facility])   Result Value Ref Range    PSA 14.3 (H) 0.0 - 4.5 ng/mL    Narrative    Test performed by:  [facility]  [address] [place] [address]  Method is Abbott Prostate-Specific Antigen (PSA)  Standard-WHO 1st International (90:10)   CBC with Plt ([facility])   Result Value Ref Range    WBC 3.2 (L) 4.0 - 11.0 K/uL    RBC 3.9 (L) 4.4 - 5.9 M/uL    Hemoglobin 11.4 (L) 13.3 - 17.7 g/dL    Hematocrit 36.5 (L) 40.0 - 53.0 %    MCV 94.8 78.0 - 100.0 fL    MCH 29.6 26.5 - 35.0    MCHC 31.2 (L) 32.0 - 36.0 g/dL    Platelets 225.0 150.0 - 450.0 K/uL   Comprehensive Metabolic Panel ([place])   Result Value Ref Range    Albumin 4.2 3.3 - 4.9 mg/dL    Alkaline Phosphatase 51.8 40.0 - 150.0 U/L    ALT 22.0 0.0 - 45.0 U/L    AST 15.0 0.0 - 55.0 U/L    Bilirubin Total 0.3 0.2 - 1.3 mg/dL    Urea Nitrogen 16.4 7.0 - 21.0 mg/dL    Calcium 9.0 8.5 - 10.1 mg/dL    Chloride 106.6 98.0 - 110.0 mmol/L    Carbon Dioxide 31.9 20.0 - 32.0 mmol/L    Creatinine 1.1 0.7 - 1.3 mg/dL    Glucose 100.0 (H) 70.0 - 99.0 mg'dL    Potassium 3.9 3.2 - 4.6 mmol/L    Sodium 142.4 (H) 132.0 - 142.0 mmol/L    Protein Total 7.1 6.8 - 8.8 g/dL    GFR Estimate 71.2 >60.0 mL/min/1.7 m2    GFR Estimate If Black 86.1 >60.0 mL/min/1.7 m2       If you have any questions or  concerns, please call the clinic at the number listed above.       Sincerely,        Harris Mary MD

## 2020-03-13 NOTE — LETTER
3/13/2020      RE: Antonio Mercado  [address]  [place] [address]       This is a 64-year-old who is well-known to me who has HIV disease, a history of colon cancer for which he did not receive chemotherapy and untreated prostate cancer.  He had attended the ER over 1 week ago requesting that he be hospitalized for a colonoscopy.  An outpatient colonoscopy had been scheduled for a few weeks time however due to profuse loose stools with some bleeding he wanted this performed sooner.  Indeed he was admitted and this was performed.    He feels well now and says that his bowel habit has returned to normal.  He does supplement with daily fiber and deliberately eats a high fruit and vegetables diet.  He does not see any need to follow-up with GI since the colonoscopy revealed no evidence of cancer.    He does have an appointment with his infectious diseases doctor in 3 days time.  Hospitalization Follow-up Visit         HPI       Hospital Follow-up Visit:    Hospital:  [facility]   Date of Admission: 3/3/20  Date of Discharge: 3/4/20  Reason(s) for Admission: Diarrhea and Rectal bleeding            Problems taking medications regularly:  None       Post Discharge Medication Reconciliation: discharge medications reconciled, continue medications without change.       Problems adhering to non-medication therapy:  None       Medications reviewed by: by myself. Findings include his pharmacy has been repeatedly asking for refills of ibuprofen and naproxen.  Patient indicates that he is not looking for these refills and is told his pharmacy to stop requesting them.  Apart from the risk with his recent rectal bleeding, he reports that ibuprofen gives him a headache.  He does support applying for a prior authorization for topical ketorolac.    Summary of hospitalization:  CareEverywhere information obtained and reviewed  Diagnostic Tests/Treatments reviewed.  Follow up needed: none  Other Healthcare Providers Involved  "in Patient s Care:         Specialist appointment - Dr Andersen.  He does not want to see any GI or urology MDs  Update since discharge: improved.   Plan of care communicated with patient                   Review of Systems:   CONSTITUTIONAL: no fatigue, no unexpected change in weight  SKIN: no worrisome rashes, no worrisome moles, no worrisome lesions  RESP: no significant cough, no shortness of breath  CV: no chest pain, no palpitations, no new or worsening peripheral edema  GI: no nausea, no vomiting, no constipation, no diarrhea            Physical Exam:     Vitals:    03/13/20 1012   BP: 110/76   BP Location: Left arm   Patient Position: Sitting   Cuff Size: Adult Regular   Pulse: 61   Resp: 12   Temp: 98  F (36.7  C)   TempSrc: Oral   SpO2: 100%   Weight: 74.8 kg (165 lb)   Height: 1.791 m (5' 10.5\")     Body mass index is 23.34 kg/m .  VS are excellent  GENERAL: healthy, alert and no distress  NECK: no tenderness, no adenopathy, no asymmetry, no masses, no stiffness; thyroid- normal to palpation  RESP: lungs clear to auscultation - no rales, no rhonchi, no wheezes  CV: regular rates and rhythm, normal S1 S2, no S3 or S4 and no murmur, no click or rub -  ABDOMEN: soft, no tenderness, no  hepatosplenomegaly, no masses, normal bowel sounds  MS: He does have a disfigured left thumb with a prominent swelling over the first MCP joint.  He reports that he had surgery to this joint which he believes needs revision and will ask for a referral back to orthopedics in the future.         Results:     Results for orders placed or performed in visit on 03/13/20   CBC with Plt ([facility])     Status: Abnormal   Result Value Ref Range    WBC 3.2 (L) 4.0 - 11.0 K/uL    RBC 3.9 (L) 4.4 - 5.9 M/uL    Hemoglobin 11.4 (L) 13.3 - 17.7 g/dL    Hematocrit 36.5 (L) 40.0 - 53.0 %    MCV 94.8 78.0 - 100.0 fL    MCH 29.6 26.5 - 35.0    MCHC 31.2 (L) 32.0 - 36.0 g/dL    Platelets 225.0 150.0 - 450.0 K/uL   Comprehensive Metabolic Panel " ([facility])     Status: Abnormal   Result Value Ref Range    Albumin 4.2 3.3 - 4.9 mg/dL    Alkaline Phosphatase 51.8 40.0 - 150.0 U/L    ALT 22.0 0.0 - 45.0 U/L    AST 15.0 0.0 - 55.0 U/L    Bilirubin Total 0.3 0.2 - 1.3 mg/dL    Urea Nitrogen 16.4 7.0 - 21.0 mg/dL    Calcium 9.0 8.5 - 10.1 mg/dL    Chloride 106.6 98.0 - 110.0 mmol/L    Carbon Dioxide 31.9 20.0 - 32.0 mmol/L    Creatinine 1.1 0.7 - 1.3 mg/dL    Glucose 100.0 (H) 70.0 - 99.0 mg'dL    Potassium 3.9 3.2 - 4.6 mmol/L    Sodium 142.4 (H) 132.0 - 142.0 mmol/L    Protein Total 7.1 6.8 - 8.8 g/dL    GFR Estimate 71.2 >60.0 mL/min/1.7 m2    GFR Estimate If Black 86.1 >60.0 mL/min/1.7 m2         Assessment and Plan      Antonio was seen today for follow up.    Diagnoses and all orders for this visit:    Iron deficiency anemia due to chronic blood loss  -     CBC with Plt ([facility])    Malignant neoplasm of hepatic flexure (H)  -     Comprehensive Metabolic Panel ([facility])    Prostate cancer (H)  -     PSA Diagnostic ([facility])    Loose stools  -     Probiotic Product (ACIDOPHILUS PROBIOTIC BLEND) CAPS; Take 30 capsules by mouth daily    Human immunodeficiency virus (HIV) disease (H)    CKD (chronic kidney disease) stage 3, GFR 30-59 ml/min (H)      Linden is very committed to avoiding medical care as much as possible and instead relying on spiritual cares.  He is currently taking a probiotic and asks if I can see if his insurance will cover this.  I recommended we check labs I will notify him of the results.  I plan to also send these to the attention of Dr. Andersen.  I have asked him to follow-up with me about every 3 to 6 months or sooner if needed.    E&M code to be billed if TCM cannot be: 79115    Type of decision making: High complexity (41948)    Options for treatment and follow-up care were reviewed with the patient  Antonio Mercado   engaged in the decision making process and verbalized understanding of the options discussed and agreed with the final  plan.      MD Harris Butler MD

## 2020-03-13 NOTE — PROGRESS NOTES
This is a 64-year-old who is well-known to me who has HIV disease, a history of colon cancer for which he did not receive chemotherapy and untreated prostate cancer.  He had attended the ER over 1 week ago requesting that he be hospitalized for a colonoscopy.  An outpatient colonoscopy had been scheduled for a few weeks time however due to profuse loose stools with some bleeding he wanted this performed sooner.  Indeed he was admitted and this was performed.    He feels well now and says that his bowel habit has returned to normal.  He does supplement with daily fiber and deliberately eats a high fruit and vegetables diet.  He does not see any need to follow-up with GI since the colonoscopy revealed no evidence of cancer.    He does have an appointment with his infectious diseases doctor in 3 days time.  Hospitalization Follow-up Visit         HPI       Hospital Follow-up Visit:    Hospital:  [facility]   Date of Admission: 3/3/20  Date of Discharge: 3/4/20  Reason(s) for Admission: Diarrhea and Rectal bleeding            Problems taking medications regularly:  None       Post Discharge Medication Reconciliation: discharge medications reconciled, continue medications without change.       Problems adhering to non-medication therapy:  None       Medications reviewed by: by myself. Findings include his pharmacy has been repeatedly asking for refills of ibuprofen and naproxen.  Patient indicates that he is not looking for these refills and is told his pharmacy to stop requesting them.  Apart from the risk with his recent rectal bleeding, he reports that ibuprofen gives him a headache.  He does support applying for a prior authorization for topical ketorolac.    Summary of hospitalization:  CareEverywhere information obtained and reviewed  Diagnostic Tests/Treatments reviewed.  Follow up needed: none  Other Healthcare Providers Involved in Patient s Care:         Specialist appointment - Dr Andersen.  He does not want to  "see any GI or urology MDs  Update since discharge: improved.   Plan of care communicated with patient                   Review of Systems:   CONSTITUTIONAL: no fatigue, no unexpected change in weight  SKIN: no worrisome rashes, no worrisome moles, no worrisome lesions  RESP: no significant cough, no shortness of breath  CV: no chest pain, no palpitations, no new or worsening peripheral edema  GI: no nausea, no vomiting, no constipation, no diarrhea            Physical Exam:     Vitals:    03/13/20 1012   BP: 110/76   BP Location: Left arm   Patient Position: Sitting   Cuff Size: Adult Regular   Pulse: 61   Resp: 12   Temp: 98  F (36.7  C)   TempSrc: Oral   SpO2: 100%   Weight: 74.8 kg (165 lb)   Height: 1.791 m (5' 10.5\")     Body mass index is 23.34 kg/m .  VS are excellent  GENERAL: healthy, alert and no distress  NECK: no tenderness, no adenopathy, no asymmetry, no masses, no stiffness; thyroid- normal to palpation  RESP: lungs clear to auscultation - no rales, no rhonchi, no wheezes  CV: regular rates and rhythm, normal S1 S2, no S3 or S4 and no murmur, no click or rub -  ABDOMEN: soft, no tenderness, no  hepatosplenomegaly, no masses, normal bowel sounds  MS: He does have a disfigured left thumb with a prominent swelling over the first MCP joint.  He reports that he had surgery to this joint which he believes needs revision and will ask for a referral back to orthopedics in the future.         Results:     Results for orders placed or performed in visit on 03/13/20   PSA Diagnostic ([facility])     Status: Abnormal   Result Value Ref Range    PSA 14.3 (H) 0.0 - 4.5 ng/mL    Narrative    Test performed by:  [facility] [address] [place] [address]  Method is Abbott Prostate-Specific Antigen (PSA)  Standard-WHO 1st International (90:10)   CBC with Plt (P )     Status: Abnormal   Result Value Ref Range    WBC 3.2 (L) 4.0 - 11.0 K/uL    RBC 3.9 (L) 4.4 - 5.9 M/uL    Hemoglobin 11.4 (L) 13.3 - " 17.7 g/dL    Hematocrit 36.5 (L) 40.0 - 53.0 %    MCV 94.8 78.0 - 100.0 fL    MCH 29.6 26.5 - 35.0    MCHC 31.2 (L) 32.0 - 36.0 g/dL    Platelets 225.0 150.0 - 450.0 K/uL   Comprehensive Metabolic Panel ([facility])     Status: Abnormal   Result Value Ref Range    Albumin 4.2 3.3 - 4.9 mg/dL    Alkaline Phosphatase 51.8 40.0 - 150.0 U/L    ALT 22.0 0.0 - 45.0 U/L    AST 15.0 0.0 - 55.0 U/L    Bilirubin Total 0.3 0.2 - 1.3 mg/dL    Urea Nitrogen 16.4 7.0 - 21.0 mg/dL    Calcium 9.0 8.5 - 10.1 mg/dL    Chloride 106.6 98.0 - 110.0 mmol/L    Carbon Dioxide 31.9 20.0 - 32.0 mmol/L    Creatinine 1.1 0.7 - 1.3 mg/dL    Glucose 100.0 (H) 70.0 - 99.0 mg'dL    Potassium 3.9 3.2 - 4.6 mmol/L    Sodium 142.4 (H) 132.0 - 142.0 mmol/L    Protein Total 7.1 6.8 - 8.8 g/dL    GFR Estimate 71.2 >60.0 mL/min/1.7 m2    GFR Estimate If Black 86.1 >60.0 mL/min/1.7 m2         Assessment and Kenia Alvarez was seen today for follow up.    Diagnoses and all orders for this visit:    Iron deficiency anemia due to chronic blood loss  -     CBC with Plt ([facility])  -     ferrous sulfate (FEROSUL) 325 (65 Fe) MG tablet; Take 1 tablet (325 mg) by mouth daily (with breakfast)    Malignant neoplasm of hepatic flexure (H)  -     Comprehensive Metabolic Panel ([facility])    Prostate cancer (H)  -     PSA Diagnostic ([facility])    Loose stools  -     Probiotic Product (ACIDOPHILUS PROBIOTIC BLEND) CAPS; Take 30 capsules by mouth daily    Human immunodeficiency virus (HIV) disease (H)    CKD (chronic kidney disease) stage 3, GFR 30-59 ml/min (H)    Bright red rectal bleeding      Linden is very committed to avoiding medical care as much as possible and instead relying on spiritual cares.  He is currently taking a probiotic and asks if I can see if his insurance will cover this.  I recommended we check labs I will notify him of the results.  I plan to also send these to the attention of Dr. Andersen.  I have asked him to follow-up with me about every 3 to  6 months or sooner if needed.    E&M code to be billed if TCM cannot be: 32717    Type of decision making: High complexity (65683)    Options for treatment and follow-up care were reviewed with the patient  Antonio Mercado   engaged in the decision making process and verbalized understanding of the options discussed and agreed with the final plan.      Harris Mary MD

## 2020-03-13 NOTE — TELEPHONE ENCOUNTER
PA Initiation    Medication: diclofenac (VOLTAREN) 1 % gel -   Insurance Company: [employer] - Phone [phone] Fax [phone]  Pharmacy Filling the Rx: [id number] - [place], [place] - [address] AT [facility] [address]  Filling Pharmacy Phone: [phone]  Filling Pharmacy Fax: [phone]  Start Date: 3/13/2020

## 2020-03-13 NOTE — TELEPHONE ENCOUNTER
"PRIOR AUTHORIZATION DENIED    Medication: diclofenac (VOLTAREN) 1 % gel - DENIED    Denial Date: 3/13/2020    Denial Rational:     Patient must have a history of trial & failure to or have a contraindication and/or intolerance 2 of the formulary alternative(s).  Formulary Alternative(s):  celecoxib capsule, meloxicam tablet, naproxen tablet, flurbiprofen, indomethacin capsule, diclofenac tablet, sulindac tablet, nabumetone tablet, ketoprofen tablet, and ketorolac tablet          Appeal Information:     **Please advise if appeal is necessary and place a letter of medical necessity with clinical rationale under the \"letters\" tab in patient's chart and route back to [facility] [[id number]]          "

## 2020-03-16 ENCOUNTER — COMMUNICATION - HEALTHEAST (OUTPATIENT)
Dept: INFECTIOUS DISEASES | Facility: CLINIC | Age: 65
End: 2020-03-16

## 2020-03-16 ENCOUNTER — DOCUMENTATION ONLY (OUTPATIENT)
Dept: FAMILY MEDICINE | Facility: CLINIC | Age: 65
End: 2020-03-16

## 2020-03-16 DIAGNOSIS — B20 AIDS (ACQUIRED IMMUNE DEFICIENCY SYNDROME) (H): ICD-10-CM

## 2020-03-16 RX ORDER — FERROUS SULFATE 325(65) MG
325 TABLET ORAL
Qty: 30 TABLET | Refills: 2 | Status: SHIPPED | OUTPATIENT
Start: 2020-03-16 | End: 2020-07-09

## 2020-03-16 NOTE — PROGRESS NOTES
From:  [address]  [address]  [phone]  Fax [phone]      To: Chance Andersen MD       Attn: Chance Andersen    Fax Number: [phone]    Date: 3/16/2020    RE: Antonoi Mercado 1955 MRN [id number]      RESULTS FOR YOUR REVIEW:    Office Visit on 03/13/2020   Component Date Value Ref Range Status     PSA 03/13/2020 14.3* 0.0 - 4.5 ng/mL Final     WBC 03/13/2020 3.2* 4.0 - 11.0 K/uL Final     RBC 03/13/2020 3.9* 4.4 - 5.9 M/uL Final     Hemoglobin 03/13/2020 11.4* 13.3 - 17.7 g/dL Final     Hematocrit 03/13/2020 36.5* 40.0 - 53.0 % Final     MCV 03/13/2020 94.8  78.0 - 100.0 fL Final     MCH 03/13/2020 29.6  26.5 - 35.0 Final     MCHC 03/13/2020 31.2* 32.0 - 36.0 g/dL Final     Platelets 03/13/2020 225.0  150.0 - 450.0 K/uL Final     Albumin 03/13/2020 4.2  3.3 - 4.9 mg/dL Final     Alkaline Phosphatase 03/13/2020 51.8  40.0 - 150.0 U/L Final     ALT 03/13/2020 22.0  0.0 - 45.0 U/L Final     AST 03/13/2020 15.0  0.0 - 55.0 U/L Final     Bilirubin Total 03/13/2020 0.3  0.2 - 1.3 mg/dL Final     Urea Nitrogen 03/13/2020 16.4  7.0 - 21.0 mg/dL Final     Calcium 03/13/2020 9.0  8.5 - 10.1 mg/dL Final     Chloride 03/13/2020 106.6  98.0 - 110.0 mmol/L Final     Carbon Dioxide 03/13/2020 31.9  20.0 - 32.0 mmol/L Final     Creatinine 03/13/2020 1.1  0.7 - 1.3 mg/dL Final     Glucose 03/13/2020 100.0* 70.0 - 99.0 mg'dL Final     Potassium 03/13/2020 3.9  3.2 - 4.6 mmol/L Final     Sodium 03/13/2020 142.4* 132.0 - 142.0 mmol/L Final     Protein Total 03/13/2020 7.1  6.8 - 8.8 g/dL Final     GFR Estimate 03/13/2020 71.2  >60.0 mL/min/1.7 m2 Final     GFR Estimate If Black 03/13/2020 86.1  >60.0 mL/min/1.7 m2 Final       COMMENTS:         Thanks,  Power, Harris    Results faxed by Reshma Hendricks

## 2020-03-16 NOTE — RESULT ENCOUNTER NOTE
Alyssia Alvarez - your labs show:  1. Your PSA (prostate test) has increased since 1 year ago - it was 9.4 then, it is now 14.3.  Do let me know if you want to see the urology doctor to address this.  Are you having any urinary symptoms?  Please let me know if you are.  2. You are still a little anemic since your bleeding - take foods rich in Iron, I will send a prescription for one Iron supplement per day to your pharmacy too.  3.  Your other labs are OK and your kidneys work well - take care, Dr Harris Mary

## 2020-03-24 ENCOUNTER — OFFICE VISIT - HEALTHEAST (OUTPATIENT)
Dept: INFECTIOUS DISEASES | Facility: CLINIC | Age: 65
End: 2020-03-24

## 2020-03-24 ENCOUNTER — OFFICE VISIT - HEALTHEAST (OUTPATIENT)
Dept: FAMILY MEDICINE | Facility: CLINIC | Age: 65
End: 2020-03-24

## 2020-03-24 ENCOUNTER — TRANSFERRED RECORDS (OUTPATIENT)
Dept: HEALTH INFORMATION MANAGEMENT | Facility: CLINIC | Age: 65
End: 2020-03-24

## 2020-03-24 DIAGNOSIS — D64.9 ANEMIA, UNSPECIFIED TYPE: ICD-10-CM

## 2020-03-24 DIAGNOSIS — Z21 ASYMPTOMATIC HUMAN IMMUNODEFICIENCY VIRUS (HIV) INFECTION STATUS (H): ICD-10-CM

## 2020-03-24 LAB
IRON SATN MFR SERPL: 18 % (ref 20–50)
IRON SERPL-MCNC: 57 UG/DL (ref 42–175)
TIBC SERPL-MCNC: 318 UG/DL (ref 313–563)
TRANSFERRIN SERPL-MCNC: 255 MG/DL (ref 212–360)

## 2020-03-30 ENCOUNTER — COMMUNICATION - HEALTHEAST (OUTPATIENT)
Dept: INFECTIOUS DISEASES | Facility: CLINIC | Age: 65
End: 2020-03-30

## 2020-03-31 ENCOUNTER — COMMUNICATION - HEALTHEAST (OUTPATIENT)
Dept: ADMINISTRATIVE | Facility: CLINIC | Age: 65
End: 2020-03-31

## 2020-04-23 ENCOUNTER — COMMUNICATION - HEALTHEAST (OUTPATIENT)
Dept: INFECTIOUS DISEASES | Facility: CLINIC | Age: 65
End: 2020-04-23

## 2020-04-23 DIAGNOSIS — B20 AIDS (ACQUIRED IMMUNE DEFICIENCY SYNDROME) (H): ICD-10-CM

## 2020-05-06 DIAGNOSIS — K59.00 CONSTIPATION, UNSPECIFIED CONSTIPATION TYPE: ICD-10-CM

## 2020-05-06 RX ORDER — DOCUSATE SODIUM 100 MG/1
CAPSULE, LIQUID FILLED ORAL
Qty: 90 CAPSULE | Refills: 3 | Status: SHIPPED | OUTPATIENT
Start: 2020-05-06 | End: 2021-01-01

## 2020-05-07 ENCOUNTER — TELEPHONE (OUTPATIENT)
Dept: FAMILY MEDICINE | Facility: CLINIC | Age: 65
End: 2020-05-07

## 2020-05-07 DIAGNOSIS — R21 RASH: Primary | ICD-10-CM

## 2020-05-07 RX ORDER — TRIAMCINOLONE ACETONIDE 1 MG/G
CREAM TOPICAL 2 TIMES DAILY PRN
Qty: 30 G | Refills: 0 | Status: SHIPPED | OUTPATIENT
Start: 2020-05-07 | End: 2021-01-01

## 2020-05-07 NOTE — TELEPHONE ENCOUNTER
Pharmacy requesting refill for Triamicinolone Acetonide 1% cream. Please advise, it is not listed on pt's med list.     Nova Holley, CMA

## 2020-06-05 ENCOUNTER — RECORDS - HEALTHEAST (OUTPATIENT)
Dept: ADMINISTRATIVE | Facility: OTHER | Age: 65
End: 2020-06-05

## 2020-06-05 ENCOUNTER — VIRTUAL VISIT (OUTPATIENT)
Dept: FAMILY MEDICINE | Facility: CLINIC | Age: 65
End: 2020-06-05
Payer: COMMERCIAL

## 2020-06-05 VITALS — WEIGHT: 160 LBS | BODY MASS INDEX: 22.63 KG/M2

## 2020-06-05 DIAGNOSIS — D50.0 IRON DEFICIENCY ANEMIA DUE TO CHRONIC BLOOD LOSS: ICD-10-CM

## 2020-06-05 DIAGNOSIS — R07.9 ACUTE CHEST PAIN: Primary | ICD-10-CM

## 2020-06-05 DIAGNOSIS — C61 PROSTATE CANCER (H): ICD-10-CM

## 2020-06-05 DIAGNOSIS — C18.3 MALIGNANT NEOPLASM OF HEPATIC FLEXURE (H): ICD-10-CM

## 2020-06-05 NOTE — PROGRESS NOTES
"Family Medicine Telephone Visit Note         Telephone Visit Consent   Patient was verbally read the following and verbal consent was obtained.    \"Telephone visits are billed at different rates depending on your insurance coverage. During this emergency period, for some insurers they may be billed the same as an in-person visit.  Please reach out to your insurance provider with any questions.  If during the course of the call the physician/provider feels a telephone visit is not appropriate, you will not be charged for this service.\"    Name person giving consent:  Patient   Date verbal consent given:  6/5/2020  Time verbal consent given:  9:53 AM    Chief Complaint   Patient presents with     Recheck Medication            HPI   Patients name: Antonio  Appointment start time:  10.01 AM    This is a 64-year-old male who is well-known to me.  He has scheduled this appointment mainly to check if he needs lab work.  He recalls that when he visited with his infectious disease specialist, his hemoglobin was reduced and wonders if he needs to follow-up on this.  He says that he feels well.  He has not been sick with any COVID-19 suspicious symptoms.    He however does report that 1 day last week when he was exercising at home he developed chest pain.  He says that he was mainly jumping from squats to a standard position repeatedly and that it was physically demanding.  He describes the pain as left-sided and dull like a pressure.  It lasted about 30 minutes and then went away spontaneously.  He has not had this before.  He has no known diagnosis of coronary artery disease.      Current Outpatient Medications   Medication Sig Dispense Refill     ammonium lactate (LAC-HYDRIN) 12 % lotion Apply topically 2 times daily (Patient not taking: Reported on 6/5/2019) 225 g 11     capsaicin (ZOSTRIX) 0.025 % external cream Apply small amount to affected area twice daily as needed. 60 g 1     diclofenac (VOLTAREN) 1 % topical gel " "Place 2 g onto the skin 4 times daily 100 g 0     docusate sodium (DOK) 100 MG capsule TAKE 1-2 CAPSULE BY MOUTH DAILY as needed TO KEEP STOOLS SOFT 90 capsule 3     emtricitabine-rilpivirine-tenofovir (ODEFSEY) 200-25-25 MG TABS per tablet Take 1 tablet by mouth daily (with breakfast)       ferrous sulfate (FEROSUL) 325 (65 Fe) MG tablet Take 1 tablet (325 mg) by mouth daily (with breakfast) 30 tablet 2     Probiotic Product (ACIDOPHILUS PROBIOTIC BLEND) CAPS Take 30 capsules by mouth daily 30 capsule 3     psyllium (METAMUCIL/KONSYL) 58.6 % powder Add one teaspoonful to liquid daily 283 g 11     sodium chloride (OCEAN) 0.65 % nasal spray Two sprays per nostril two times daily as needed. 44 mL 3     triamcinolone (KENALOG) 0.1 % external cream Apply topically 2 times daily as needed for irritation (or rash) 30 g 0     Allergies   Allergen Reactions     Naproxen      Stribild [Tuypwes-Ihhfwrn-Drgbxozz-Tenof] Itching     Raltegravir Unknown     Tamsulosin Other (See Comments)     Headache     Tylenol [Acetaminophen] Rash     Gets an itchy rash on back.               Review of Systems:     ROS:  GI: He reports no further rectal bleeding.  HIV: He is taking his antiretroviral medications.         Physical Exam:     Wt 72.6 kg (160 lb)   BMI 22.63 kg/m    Estimated body mass index is 22.63 kg/m  as calculated from the following:    Height as of 3/13/20: 1.791 m (5' 10.5\").    Weight as of this encounter: 72.6 kg (160 lb).    Exam:  Constitutional: alert and no distress  Psychiatric: mentation appears normal and affect normal/bright        Assessment and Plan   Antonio was seen today for recheck medication.    Diagnoses and all orders for this visit:    Acute chest pain  -     Troponin I ([facility]); Future  -     Lipid Panel ([facility]); Future  -     Echo Stress Echocardiogram; Future    Iron deficiency anemia due to chronic blood loss  -     CBC with Plt ([facility]); Future  -     Vitamin B12 ([facility]); Future  -     " Ferritin ([facility]); Future    Prostate cancer (H)  -     PSA Diagnostic ([facility]); Future  -     Comprehensive Metabolic Panel ([facility]); Future    Malignant neoplasm of hepatic flexure (H)  -     Comprehensive Metabolic Panel ([facility]); Future      He is somewhat of a difficult historian in being limited in response to questions.  He also is moderately heavily accented and is difficult to understand all his words.  Additionally, for example he referred to exercise as sports.    Based on his description, it is possible that there was a coronary ischemic cause for his chest pain last week.  He has never had a stress test.  He is physically able to walk fast and jog and we therefore agreed to proceed with scheduling a stress echo test for within the next week.  I did advise him that if he experiences chest pain again before the stress test occurs he should seek immediate medical attention.    Additionally we agreed to recheck his hemoglobin to confirm that it has improved.  Since he is not treating his prostate cancer and did not undergo complete recommendations for treatment of his colon cancer I agreed to recheck a CMP and other labs.  We will additionally check a troponin and lipid panel because of his chest pain symptoms.  I will notify him of results.    Refilled medications that would be required in the next 3 months.     After Visit Information:  Will print and mail AVS     No follow-ups on file.    Appointment end time: 10.23  This is a telephone visit that took 22 minutes.      Clinician location:  [facility]      Harris Mary MD

## 2020-06-05 NOTE — PATIENT INSTRUCTIONS
06/05/20   [facility]  [address] [place] [address]  Phone: [phone]  Fax: [phone]    Referral, demographics, office note and med list faxed to [phone]. They will contact patient to schedule.    Nohemy Carpenter

## 2020-06-05 NOTE — LETTER
"  6/5/2020      RE: Antonio Mercado  [address]  [place] [address]       Family Medicine Telephone Visit Note         Telephone Visit Consent   Patient was verbally read the following and verbal consent was obtained.    \"Telephone visits are billed at different rates depending on your insurance coverage. During this emergency period, for some insurers they may be billed the same as an in-person visit.  Please reach out to your insurance provider with any questions.  If during the course of the call the physician/provider feels a telephone visit is not appropriate, you will not be charged for this service.\"    Name person giving consent:  Patient   Date verbal consent given:  6/5/2020  Time verbal consent given:  9:53 AM    Chief Complaint   Patient presents with     Recheck Medication            HPI   Patients name: Antonio  Appointment start time:  10.01 AM    This is a 64-year-old male who is well-known to me.  He has scheduled this appointment mainly to check if he needs lab work.  He recalls that when he visited with his infectious disease specialist, his hemoglobin was reduced and wonders if he needs to follow-up on this.  He says that he feels well.  He has not been sick with any COVID-19 suspicious symptoms.    He however does report that 1 day last week when he was exercising at home he developed chest pain.  He says that he was mainly jumping from squats to a standard position repeatedly and that it was physically demanding.  He describes the pain as left-sided and dull like a pressure.  It lasted about 30 minutes and then went away spontaneously.  He has not had this before.  He has no known diagnosis of coronary artery disease.      Current Outpatient Medications   Medication Sig Dispense Refill     ammonium lactate (LAC-HYDRIN) 12 % lotion Apply topically 2 times daily (Patient not taking: Reported on 6/5/2019) 225 g 11     capsaicin (ZOSTRIX) 0.025 % external cream Apply small amount to " "affected area twice daily as needed. 60 g 1     diclofenac (VOLTAREN) 1 % topical gel Place 2 g onto the skin 4 times daily 100 g 0     docusate sodium (DOK) 100 MG capsule TAKE 1-2 CAPSULE BY MOUTH DAILY as needed TO KEEP STOOLS SOFT 90 capsule 3     emtricitabine-rilpivirine-tenofovir (ODEFSEY) 200-25-25 MG TABS per tablet Take 1 tablet by mouth daily (with breakfast)       ferrous sulfate (FEROSUL) 325 (65 Fe) MG tablet Take 1 tablet (325 mg) by mouth daily (with breakfast) 30 tablet 2     Probiotic Product (ACIDOPHILUS PROBIOTIC BLEND) CAPS Take 30 capsules by mouth daily 30 capsule 3     psyllium (METAMUCIL/KONSYL) 58.6 % powder Add one teaspoonful to liquid daily 283 g 11     sodium chloride (OCEAN) 0.65 % nasal spray Two sprays per nostril two times daily as needed. 44 mL 3     triamcinolone (KENALOG) 0.1 % external cream Apply topically 2 times daily as needed for irritation (or rash) 30 g 0     Allergies   Allergen Reactions     Naproxen      Stribild [Dszbodj-Fwreked-Hyixvhix-Tenof] Itching     Raltegravir Unknown     Tamsulosin Other (See Comments)     Headache     Tylenol [Acetaminophen] Rash     Gets an itchy rash on back.               Review of Systems:     ROS:  GI: He reports no further rectal bleeding.  HIV: He is taking his antiretroviral medications.         Physical Exam:     Wt 72.6 kg (160 lb)   BMI 22.63 kg/m    Estimated body mass index is 22.63 kg/m  as calculated from the following:    Height as of 3/13/20: 1.791 m (5' 10.5\").    Weight as of this encounter: 72.6 kg (160 lb).    Exam:  Constitutional: alert and no distress  Psychiatric: mentation appears normal and affect normal/bright        Assessment and Plan   Antonio was seen today for recheck medication.    Diagnoses and all orders for this visit:    Acute chest pain  -     Troponin I ([facility]); Future  -     Lipid Panel ([facility]); Future  -     Echo Stress Echocardiogram; Future    Iron deficiency anemia due to chronic blood " loss  -     CBC with Plt ([facility]); Future  -     Vitamin B12 ([facility]); Future  -     Ferritin ([facility]); Future    Prostate cancer (H)  -     PSA Diagnostic ([facility]); Future  -     Comprehensive Metabolic Panel ([place]); Future    Malignant neoplasm of hepatic flexure (H)  -     Comprehensive Metabolic Panel ([place]); Future      He is somewhat of a difficult historian in being limited in response to questions.  He also is moderately heavily accented and is difficult to understand all his words.  Additionally, for example he referred to exercise as sports.    Based on his description, it is possible that there was a coronary ischemic cause for his chest pain last week.  He has never had a stress test.  He is physically able to walk fast and jog and we therefore agreed to proceed with scheduling a stress echo test for within the next week.  I did advise him that if he experiences chest pain again before the stress test occurs he should seek immediate medical attention.    Additionally we agreed to recheck his hemoglobin to confirm that it has improved.  Since he is not treating his prostate cancer and did not undergo complete recommendations for treatment of his colon cancer I agreed to recheck a CMP and other labs.  We will additionally check a troponin and lipid panel because of his chest pain symptoms.  I will notify him of results.    Refilled medications that would be required in the next 3 months.     After Visit Information:  Will print and mail AVS     No follow-ups on file.    Appointment end time: 10.23  This is a telephone visit that took 22 minutes.      Clinician location:  [facility]      MD Harris Butler MD

## 2020-06-08 DIAGNOSIS — D50.0 IRON DEFICIENCY ANEMIA DUE TO CHRONIC BLOOD LOSS: ICD-10-CM

## 2020-06-08 DIAGNOSIS — R07.9 ACUTE CHEST PAIN: ICD-10-CM

## 2020-06-08 DIAGNOSIS — C61 PROSTATE CANCER (H): ICD-10-CM

## 2020-06-08 DIAGNOSIS — C18.3 MALIGNANT NEOPLASM OF HEPATIC FLEXURE (H): ICD-10-CM

## 2020-06-08 LAB
ALBUMIN SERPL-MCNC: 4.8 MG/DL (ref 3.3–4.9)
ALP SERPL-CCNC: 61.7 U/L (ref 40–150)
ALT SERPL-CCNC: 15.8 U/L (ref 0–45)
AST SERPL-CCNC: 17.3 U/L (ref 0–55)
BILIRUB SERPL-MCNC: 0.5 MG/DL (ref 0.2–1.3)
BUN SERPL-MCNC: 22.5 MG/DL (ref 7–21)
CALCIUM SERPL-MCNC: 9.9 MG/DL (ref 8.5–10.1)
CHLORIDE SERPLBLD-SCNC: 102.7 MMOL/L (ref 98–110)
CHOLEST SERPL-MCNC: 192.8 MG/DL (ref 0–200)
CHOLEST/HDLC SERPL: 3 {RATIO} (ref 0–5)
CO2 SERPL-SCNC: 27.2 MMOL/L (ref 20–32)
CREAT SERPL-MCNC: 1.4 MG/DL (ref 0.7–1.3)
FERRITIN SERPL-MCNC: 34 NG/ML (ref 27–300)
GFR SERPL CREATININE-BSD FRML MDRD: 51.8 ML/MIN/1.7 M2
GLUCOSE SERPL-MCNC: 124.8 MG'DL (ref 70–99)
HCT VFR BLD AUTO: 45.9 % (ref 40–53)
HDLC SERPL-MCNC: 65.3 MG/DL
HEMOGLOBIN: 14.3 G/DL (ref 13.3–17.7)
LDLC SERPL CALC-MCNC: 109 MG/DL (ref 0–129)
MCH RBC QN AUTO: 31 PG (ref 26.5–35)
MCHC RBC AUTO-ENTMCNC: 31.2 G/DL (ref 32–36)
MCV RBC AUTO: 99.3 FL (ref 78–100)
PLATELET # BLD AUTO: 177 K/UL (ref 150–450)
POTASSIUM SERPL-SCNC: 4.4 MMOL/L (ref 3.2–4.6)
PROT SERPL-MCNC: 8 G/DL (ref 6.8–8.8)
PSA SERPL-MCNC: 14.9 NG/ML (ref 0–4.5)
RBC # BLD AUTO: 4.6 M/UL (ref 4.4–5.9)
SODIUM SERPL-SCNC: 135.3 MMOL/L (ref 132–142)
TRIGL SERPL-MCNC: 94.7 MG/DL (ref 0–150)
TROPONIN I ADV: <0.01 NG/ML (ref 0–0.29)
VIT B12 SERPL-MCNC: 903 PG/ML (ref 213–816)
VLDL CHOLESTEROL: 18.9 MG/DL (ref 7–32)
WBC # BLD AUTO: 3 K/UL (ref 4–11)

## 2020-06-08 NOTE — LETTER
June 12, 2020      Antonio Mercado  [address]  [place] [address]        Dear ,    We are writing to inform you of your test results.    As you can see your hemoglobin is back well within the normal range - so you are no longer anemic - good!  Your iron and vitamin B12 levels are normal.  The troponin tests for any sign of a heart attack - and this is also negative.  Your liver is working normally but your kidney function is mildly reduced - be sure to drink 1-2 pints of water daily.  We will check this again in the future.       Your cholesterol levels are good - and because of your overall healthy lifestyle a cholesterol medication is not recommended at this time (calculation is below).     I did recheck a PSA and it has not changed significantly from 2 months ago - it is still elevated and a urologist would want you to see them about it.     I will be watching for the results of the stress test.      The 10-year ASCVD risk score (Jen CARY Jr., et al., 2013) is: 7.2%     Values used to calculate the score:       Age: 64 years       Sex: Male       Is Non- : Yes       Diabetic: No       Tobacco smoker: No       Systolic Blood Pressure: 110 mmHg       Is BP treated: No       HDL Cholesterol: 65.3 mg/dL       Total Cholesterol: 192.8 mg/dL     Resulted Orders   Comprehensive Metabolic Panel ([place])   Result Value Ref Range    Albumin 4.8 3.3 - 4.9 mg/dL    Alkaline Phosphatase 61.7 40.0 - 150.0 U/L    ALT 15.8 0.0 - 45.0 U/L    AST 17.3 0.0 - 55.0 U/L    Bilirubin Total 0.5 0.2 - 1.3 mg/dL    Urea Nitrogen 22.5 (H) 7.0 - 21.0 mg/dL    Calcium 9.9 8.5 - 10.1 mg/dL    Chloride 102.7 98.0 - 110.0 mmol/L    Carbon Dioxide 27.2 20.0 - 32.0 mmol/L    Creatinine 1.4 (H) 0.7 - 1.3 mg/dL    Glucose 124.8 (H) 70.0 - 99.0 mg'dL    Potassium 4.4 3.2 - 4.6 mmol/L    Sodium 135.3 132.0 - 142.0 mmol/L    Protein Total 8.0 6.8 - 8.8 g/dL    GFR Estimate 51.8 (L) >60.0 mL/min/1.7 m2     GFR Estimate If Black 62.7 >60.0 mL/min/1.7 m2   PSA Diagnostic ([facility])   Result Value Ref Range    PSA 14.9 (H) 0.0 - 4.5 ng/mL    Narrative    Test performed by:  [facility]  [address] [place] [address]  Method is Abbott Prostate-Specific Antigen (PSA)  Standard-WHO 1st International (90:10)   Ferritin ([facility])   Result Value Ref Range    Ferritin 34 27 - 300 ng/mL    Narrative    Test performed by:  [facility]  [address] [place] [address]   Vitamin B12 ([facility])   Result Value Ref Range    Vitamin B12 903 (H) 213 - 816 pg/mL    Narrative    Test performed by:  [facility]  [address] [place] [address]   CBC with Plt ([facility])   Result Value Ref Range    WBC 3.0 (L) 4.0 - 11.0 K/uL    RBC 4.6 4.4 - 5.9 M/uL    Hemoglobin 14.3 13.3 - 17.7 g/dL    Hematocrit 45.9 40.0 - 53.0 %    MCV 99.3 78.0 - 100.0 fL    MCH 31.0 26.5 - 35.0    MCHC 31.2 (L) 32.0 - 36.0 g/dL    Platelets 177.0 150.0 - 450.0 K/uL   Lipid Panel ([facility])   Result Value Ref Range    Cholesterol 192.8 0.0 - 200.0 mg/dL    Cholesterol/HDL Ratio 3.0 0.0 - 5.0    HDL Cholesterol 65.3 >40.0 mg/dL    LDL Cholesterol Calculated 109 0 - 129 mg/dL    Triglycerides 94.7 0.0 - 150.0 mg/dL    VLDL Cholesterol 18.9 7.0 - 32.0 mg/dL   Troponin I ([facility])   Result Value Ref Range    Troponin I ADV <0.01 0.00 - 0.29 ng/mL    Narrative    Test performed by:  [facility]  [address] [place] [address]       If you have any questions or concerns, please call the clinic at the number listed above.       Sincerely,        Harris Mary MD

## 2020-06-23 ENCOUNTER — RECORDS - HEALTHEAST (OUTPATIENT)
Dept: ADMINISTRATIVE | Facility: OTHER | Age: 65
End: 2020-06-23

## 2020-07-07 ENCOUNTER — TELEPHONE (OUTPATIENT)
Dept: FAMILY MEDICINE | Facility: CLINIC | Age: 65
End: 2020-07-07

## 2020-07-07 NOTE — TELEPHONE ENCOUNTER
[facility] phone call message- general phone call:    Reason for call: He would like to talk to  re traveling.    Action desired: call back.    Return call needed: Yes    OK to leave a message on voice mail? Yes    Advised patient to response may take up to 2 business days: Yes    Primary language: English      needed? No    Call taken on July 7, 2020 at 11:48 AM by Wanda Randhawa

## 2020-07-07 NOTE — TELEPHONE ENCOUNTER
Called patient to get more information on his concerns regarding travel. He states he is going back to his home, the [facility], in a few weeks. He will be staying in his own home.    He declined to speak with me further about the details of his travel, and asked for Dr. Mary to call him instead. Routed to Dr. Mary. ./MELY

## 2020-07-09 ENCOUNTER — ALLIED HEALTH/NURSE VISIT (OUTPATIENT)
Dept: FAMILY MEDICINE | Facility: CLINIC | Age: 65
End: 2020-07-09
Payer: COMMERCIAL

## 2020-07-09 ENCOUNTER — VIRTUAL VISIT (OUTPATIENT)
Dept: FAMILY MEDICINE | Facility: CLINIC | Age: 65
End: 2020-07-09
Payer: COMMERCIAL

## 2020-07-09 VITALS — BODY MASS INDEX: 23.24 KG/M2 | HEIGHT: 71 IN | WEIGHT: 166 LBS

## 2020-07-09 VITALS — BODY MASS INDEX: 22.58 KG/M2 | WEIGHT: 159.6 LBS

## 2020-07-09 DIAGNOSIS — Z71.84 TRAVEL ADVICE ENCOUNTER: Primary | ICD-10-CM

## 2020-07-09 DIAGNOSIS — D50.0 IRON DEFICIENCY ANEMIA DUE TO CHRONIC BLOOD LOSS: ICD-10-CM

## 2020-07-09 DIAGNOSIS — Z23 NEED FOR VACCINATION: Primary | ICD-10-CM

## 2020-07-09 RX ORDER — FERROUS SULFATE 325(65) MG
TABLET ORAL
Qty: 0.1 TABLET | Refills: 0 | Status: SHIPPED | OUTPATIENT
Start: 2020-07-09 | End: 2020-07-09

## 2020-07-09 RX ORDER — AZITHROMYCIN 250 MG/1
500 TABLET, FILM COATED ORAL DAILY
Qty: 6 TABLET | Refills: 0 | Status: SHIPPED | OUTPATIENT
Start: 2020-07-09 | End: 2020-07-12

## 2020-07-09 RX ORDER — DOXYCYCLINE HYCLATE 100 MG
TABLET ORAL
Qty: 71 TABLET | Refills: 0 | Status: SHIPPED | OUTPATIENT
Start: 2020-07-09 | End: 2021-01-01

## 2020-07-09 RX ORDER — LOPERAMIDE HYDROCHLORIDE 2 MG/1
2 TABLET ORAL 4 TIMES DAILY PRN
Qty: 20 TABLET | Refills: 0 | Status: SHIPPED | OUTPATIENT
Start: 2020-07-09 | End: 2021-01-01

## 2020-07-09 ASSESSMENT — MIFFLIN-ST. JEOR: SCORE: 1547.16

## 2020-07-09 NOTE — LETTER
"  2020      RE: Antonio Mercado  [address]  [place] [address]       Family Medicine Telephone Visit Note         Telephone Visit Consent   Patient was verbally read the following and verbal consent was obtained.    \"Telephone visits are billed at different rates depending on your insurance coverage. During this emergency period, for some insurers they may be billed the same as an in-person visit.  Please reach out to your insurance provider with any questions.  If during the course of the call the physician/provider feels a telephone visit is not appropriate, you will not be charged for this service.\"    Name person giving consent:  Patient   Date verbal consent given:  2020  Time verbal consent given:  8:33 AM    Chief Complaint   Patient presents with     Travel Clinic     Traveling next month till October, update shots     Imm/Inj            HPI   Patients name: Antonio  Appointment start time:  8:35 AM         [facility] Travel Visit     Antonio Mercado is a 64 year old male who presents for a pre-travel assessment visit.  He will be traveling to:    Destination(s):  Destination 1  [facility]  Date of arrival 20  Date of departure 10/1/20    Reason for travel: Visit family and friends - his mom  and he will concelebrate her     Antonio Mercado has completed the travel questionnaire and it is reviewed: NO   Are there special circumstances which place this traveler at higher risk (List if 'yes')?: YES - he is HIV positive on treatment, he has probable untreated prostate cancer and incompletely treated colon cancer (with recent negative colonoscopy)    Past Medical History:   Diagnosis Date     Heel pain      History of blood transfusion      HIV (human immunodeficiency virus infection) (H)      LBP (low back pain)      Malignant neoplasm of hepatic flexure (H) 2016     Trigger finger        ammonium lactate (LAC-HYDRIN) 12 % lotion, Apply topically 2 times daily " (Patient not taking: Reported on 6/5/2019)  capsaicin (ZOSTRIX) 0.025 % external cream, Apply small amount to affected area twice daily as needed.  diclofenac (VOLTAREN) 1 % topical gel, Place 2 g onto the skin 4 times daily  docusate sodium (DOK) 100 MG capsule, TAKE 1-2 CAPSULE BY MOUTH DAILY as needed TO KEEP STOOLS SOFT  emtricitabine-rilpivirine-tenofovir (ODEFSEY) 200-25-25 MG TABS per tablet, Take 1 tablet by mouth daily (with breakfast)  psyllium (METAMUCIL/KONSYL) 58.6 % powder, Add one teaspoonful to liquid daily  sodium chloride (OCEAN) 0.65 % nasal spray, Two sprays per nostril two times daily as needed.  triamcinolone (KENALOG) 0.1 % external cream, Apply topically 2 times daily as needed for irritation (or rash)    No current facility-administered medications on file prior to visit.           Allergies   Allergen Reactions     Naproxen      Stribild [Twznmfd-Byzpvvv-Okwamnmu-Tenof] Itching     Raltegravir Unknown     Tamsulosin Other (See Comments)     Headache     Tylenol [Acetaminophen] Rash     Gets an itchy rash on back.        Immunizations, travel specific:  -- Yellow fever: Recommended and previously vaccinated  -- Hep A: UTD with immunization   -- Hep B: UTD with immunization   -- Typhoid (IM: booster every 2 years; PO: booster every 5 years): Known to be not immune, not immunized  -- Rabies  (Data on the need for and timing of additional booster doses are not available): Immunity status unknown  -- Meningococcal meningitis Immunity status unknown   -- German encephalitis (Data on the need for and timing of additional booster doses are not available):Immunity status unknown    Immunizations, routine adult:  -- Influenza Known to be not immune, not immunized  -- Polio: Immunity status unknown  -- Diphtheria, Tetanus & Pertussis (DTaP): UTD with immunization   -- Measles/ Mumps/ Rubella: UTD with immunization   -- Varicella: Immunity status unknown  -- Pneumococcal (PPSV23 (Pneumovax)): UTD  "with immunization   -- Pneumococcal (PCV13 (Prevnar)): UTD with immunization     Malaria prophylaxis:  Recommended (refer to [facility] for destination-specific recommendation) YES       Review of Systems:     CONSTITUTIONAL: NEGATIVE for fever, chills, change in weight  ENT/MOUTH: NEGATIVE for ear, mouth and throat problems  RESP: NEGATIVE for significant cough or SOB  CV: NEGATIVE for chest pain, palpitations or peripheral edema Did have previous episode of chest pain, has not recurred - but never got to stress test, agrees to go if rescheduled          Objective:     Ht 1.791 m (5' 10.5\")   Wt 75.3 kg (166 lb)   BMI 23.48 kg/m    Body mass index is 23.48 kg/m .    In no acute distress, emotionally well         Assessment and Plan   Antonio was seen today for travel clinic and imm/inj.    Diagnoses and all orders for this visit:    Travel advice encounter  -     doxycycline hyclate (VIBRA-TABS) 100 MG tablet; Take 1 daily starting 2 days before travel and continuing for 4 weeks after return.  Total days = 71  -     loperamide (IMODIUM A-D) 2 MG tablet; Take 1 tablet (2 mg) by mouth 4 times daily as needed for diarrhea  -     azithromycin (ZITHROMAX) 250 MG tablet; Take 2 tablets (500 mg) by mouth daily for 3 days in case of diarrhea    Iron deficiency anemia due to chronic blood loss  -     Discontinue: ferrous sulfate (FEROSUL) 325 (65 Fe) MG tablet; Discontinue due to interaction with doxycycline      Based on patient's past history, review of immunization and immunity status, planned itinerary and current recommendations, the following are recommended:    Typhoid vaccine   Polio vaccine - due to requirement if >4 weeks in country  Could have Zostrix vaccine - not required pre-travel  Malaria: Doxycycline: Begin 1-2 days before travel. Take daily while in the malarious area and for 30 days after returning.  I briefly discussed with ID provider, AWA Andersen who agrees doxy is best choice with no interaction with HIV " medication.  Traveler's diarrhea treatment prescribed.  I spent 10 min in counselling and coordination of care on food and water precautions  Follow up in 2-3 months.    Will re-schedule previously ordered stress test.    Patient is given a copy of the [employer] traveller report as well as destination-specific recommendations on sun protection, avoiding insect bites and travel safety.      Total of 25 minutes was spent in face to face contact with patient with > 50% in counseling and coordination of care.  Options for treatment and/or follow-up care were reviewed with the patient. Antonio Mercado was engaged and actively involved in the decision making process. He verbalized understanding of the options discussed and was satisfied with the final plan.      Harris Mary MD      Current Outpatient Medications   Medication Sig Dispense Refill     azithromycin (ZITHROMAX) 250 MG tablet Take 2 tablets (500 mg) by mouth daily for 3 days in case of diarrhea 6 tablet 0     doxycycline hyclate (VIBRA-TABS) 100 MG tablet Take 1 daily starting 2 days before travel and continuing for 4 weeks after return.  Total days = 71 71 tablet 0     loperamide (IMODIUM A-D) 2 MG tablet Take 1 tablet (2 mg) by mouth 4 times daily as needed for diarrhea 20 tablet 0     ammonium lactate (LAC-HYDRIN) 12 % lotion Apply topically 2 times daily (Patient not taking: Reported on 6/5/2019) 225 g 11     capsaicin (ZOSTRIX) 0.025 % external cream Apply small amount to affected area twice daily as needed. 60 g 1     diclofenac (VOLTAREN) 1 % topical gel Place 2 g onto the skin 4 times daily 100 g 0     docusate sodium (DOK) 100 MG capsule TAKE 1-2 CAPSULE BY MOUTH DAILY as needed TO KEEP STOOLS SOFT 90 capsule 3     emtricitabine-rilpivirine-tenofovir (ODEFSEY) 200-25-25 MG TABS per tablet Take 1 tablet by mouth daily (with breakfast)       psyllium (METAMUCIL/KONSYL) 58.6 % powder Add one teaspoonful to liquid daily 283 g 11     sodium chloride (OCEAN)  0.65 % nasal spray Two sprays per nostril two times daily as needed. 44 mL 3     triamcinolone (KENALOG) 0.1 % external cream Apply topically 2 times daily as needed for irritation (or rash) 30 g 0     Allergies   Allergen Reactions     Naproxen      Stribild [Jtybmwi-Nbjjlmp-Zhfzljnf-Tenof] Itching     Raltegravir Unknown     Tamsulosin Other (See Comments)     Headache     Tylenol [Acetaminophen] Rash     Gets an itchy rash on back.      After Visit Information:  Will print and mail AVS     Return in 3 months (on 10/9/2020), or if symptoms worsen or fail to improve.    Appointment end time: 9:00 AM  This is a telephone visit that took 25 minutes.      Clinician location:  [facility]     MD Harris Butler MD

## 2020-07-09 NOTE — PROGRESS NOTES
"Family Medicine Telephone Visit Note         Telephone Visit Consent   Patient was verbally read the following and verbal consent was obtained.    \"Telephone visits are billed at different rates depending on your insurance coverage. During this emergency period, for some insurers they may be billed the same as an in-person visit.  Please reach out to your insurance provider with any questions.  If during the course of the call the physician/provider feels a telephone visit is not appropriate, you will not be charged for this service.\"    Name person giving consent:  Patient   Date verbal consent given:  2020  Time verbal consent given:  8:33 AM    Chief Complaint   Patient presents with     Travel Clinic     Traveling next month till October, update shots     Imm/Inj            HPI   Patients name: Antonio  Appointment start time:  8:35 AM         [facility] Travel Visit     Antonio Mercado is a 64 year old male who presents for a pre-travel assessment visit.  He will be traveling to:    Destination(s):  Destination 1  [facility]  Date of arrival 20  Date of departure 10/1/20    Reason for travel: Visit family and friends - his mom  and he will concelebrate her     Antonio Mercado has completed the travel questionnaire and it is reviewed: NO   Are there special circumstances which place this traveler at higher risk (List if 'yes')?: YES - he is HIV positive on treatment, he has probable untreated prostate cancer and incompletely treated colon cancer (with recent negative colonoscopy)    Past Medical History:   Diagnosis Date     Heel pain      History of blood transfusion      HIV (human immunodeficiency virus infection) (H)      LBP (low back pain)      Malignant neoplasm of hepatic flexure (H) 2016     Trigger finger        ammonium lactate (LAC-HYDRIN) 12 % lotion, Apply topically 2 times daily (Patient not taking: Reported on 2019)  capsaicin (ZOSTRIX) 0.025 % external cream, " Apply small amount to affected area twice daily as needed.  diclofenac (VOLTAREN) 1 % topical gel, Place 2 g onto the skin 4 times daily  docusate sodium (DOK) 100 MG capsule, TAKE 1-2 CAPSULE BY MOUTH DAILY as needed TO KEEP STOOLS SOFT  emtricitabine-rilpivirine-tenofovir (ODEFSEY) 200-25-25 MG TABS per tablet, Take 1 tablet by mouth daily (with breakfast)  psyllium (METAMUCIL/KONSYL) 58.6 % powder, Add one teaspoonful to liquid daily  sodium chloride (OCEAN) 0.65 % nasal spray, Two sprays per nostril two times daily as needed.  triamcinolone (KENALOG) 0.1 % external cream, Apply topically 2 times daily as needed for irritation (or rash)    No current facility-administered medications on file prior to visit.           Allergies   Allergen Reactions     Naproxen      Stribild [Yevnyqz-Svmbdro-Myrvgspd-Tenof] Itching     Raltegravir Unknown     Tamsulosin Other (See Comments)     Headache     Tylenol [Acetaminophen] Rash     Gets an itchy rash on back.        Immunizations, travel specific:  -- Yellow fever: Recommended and previously vaccinated  -- Hep A: UTD with immunization   -- Hep B: UTD with immunization   -- Typhoid (IM: booster every 2 years; PO: booster every 5 years): Known to be not immune, not immunized  -- Rabies  (Data on the need for and timing of additional booster doses are not available): Immunity status unknown  -- Meningococcal meningitis Immunity status unknown   -- Lao encephalitis (Data on the need for and timing of additional booster doses are not available):Immunity status unknown    Immunizations, routine adult:  -- Influenza Known to be not immune, not immunized  -- Polio: Immunity status unknown  -- Diphtheria, Tetanus & Pertussis (DTaP): UTD with immunization   -- Measles/ Mumps/ Rubella: UTD with immunization   -- Varicella: Immunity status unknown  -- Pneumococcal (PPSV23 (Pneumovax)): UTD with immunization   -- Pneumococcal (PCV13 (Prevnar)): UTD with immunization     Malaria  "prophylaxis:  Recommended (refer to [facility] for destination-specific recommendation) YES       Review of Systems:     CONSTITUTIONAL: NEGATIVE for fever, chills, change in weight  ENT/MOUTH: NEGATIVE for ear, mouth and throat problems  RESP: NEGATIVE for significant cough or SOB  CV: NEGATIVE for chest pain, palpitations or peripheral edema Did have previous episode of chest pain, has not recurred - but never got to stress test, agrees to go if rescheduled          Objective:     Ht 1.791 m (5' 10.5\")   Wt 75.3 kg (166 lb)   BMI 23.48 kg/m    Body mass index is 23.48 kg/m .    In no acute distress, emotionally well         Assessment and Plan   Antonio was seen today for travel clinic and imm/inj.    Diagnoses and all orders for this visit:    Travel advice encounter  -     doxycycline hyclate (VIBRA-TABS) 100 MG tablet; Take 1 daily starting 2 days before travel and continuing for 4 weeks after return.  Total days = 71  -     loperamide (IMODIUM A-D) 2 MG tablet; Take 1 tablet (2 mg) by mouth 4 times daily as needed for diarrhea  -     azithromycin (ZITHROMAX) 250 MG tablet; Take 2 tablets (500 mg) by mouth daily for 3 days in case of diarrhea    Iron deficiency anemia due to chronic blood loss  -     Discontinue: ferrous sulfate (FEROSUL) 325 (65 Fe) MG tablet; Discontinue due to interaction with doxycycline      Based on patient's past history, review of immunization and immunity status, planned itinerary and current recommendations, the following are recommended:    Typhoid vaccine   Polio vaccine - due to requirement if >4 weeks in country  Could have Zostrix vaccine - not required pre-travel  Malaria: Doxycycline: Begin 1-2 days before travel. Take daily while in the malarious area and for 30 days after returning.  I briefly discussed with ID provider, AWA Andersen who agrees doxy is best choice with no interaction with HIV medication.  Traveler's diarrhea treatment prescribed.  I spent 10 min in counselling and " coordination of care on food and water precautions  Follow up in 2-3 months.    Will re-schedule previously ordered stress test.    Patient is given a copy of the [place] traveller report as well as destination-specific recommendations on sun protection, avoiding insect bites and travel safety.      Total of 25 minutes was spent in face to face contact with patient with > 50% in counseling and coordination of care.  Options for treatment and/or follow-up care were reviewed with the patient. Antonio Mercado was engaged and actively involved in the decision making process. He verbalized understanding of the options discussed and was satisfied with the final plan.      Harris Mary MD      Current Outpatient Medications   Medication Sig Dispense Refill     azithromycin (ZITHROMAX) 250 MG tablet Take 2 tablets (500 mg) by mouth daily for 3 days in case of diarrhea 6 tablet 0     doxycycline hyclate (VIBRA-TABS) 100 MG tablet Take 1 daily starting 2 days before travel and continuing for 4 weeks after return.  Total days = 71 71 tablet 0     loperamide (IMODIUM A-D) 2 MG tablet Take 1 tablet (2 mg) by mouth 4 times daily as needed for diarrhea 20 tablet 0     ammonium lactate (LAC-HYDRIN) 12 % lotion Apply topically 2 times daily (Patient not taking: Reported on 6/5/2019) 225 g 11     capsaicin (ZOSTRIX) 0.025 % external cream Apply small amount to affected area twice daily as needed. 60 g 1     diclofenac (VOLTAREN) 1 % topical gel Place 2 g onto the skin 4 times daily 100 g 0     docusate sodium (DOK) 100 MG capsule TAKE 1-2 CAPSULE BY MOUTH DAILY as needed TO KEEP STOOLS SOFT 90 capsule 3     emtricitabine-rilpivirine-tenofovir (ODEFSEY) 200-25-25 MG TABS per tablet Take 1 tablet by mouth daily (with breakfast)       psyllium (METAMUCIL/KONSYL) 58.6 % powder Add one teaspoonful to liquid daily 283 g 11     sodium chloride (OCEAN) 0.65 % nasal spray Two sprays per nostril two times daily as needed. 44 mL 3      triamcinolone (KENALOG) 0.1 % external cream Apply topically 2 times daily as needed for irritation (or rash) 30 g 0     Allergies   Allergen Reactions     Naproxen      Stribild [Kyxelzf-Cymzcqw-Rgufqxhx-Tenof] Itching     Raltegravir Unknown     Tamsulosin Other (See Comments)     Headache     Tylenol [Acetaminophen] Rash     Gets an itchy rash on back.      After Visit Information:  Will print and mail AVS     Return in 3 months (on 10/9/2020), or if symptoms worsen or fail to improve.    Appointment end time: 9:00 AM  This is a telephone visit that took 25 minutes.      Clinician location:  [facility]     Harris Mary MD

## 2020-07-09 NOTE — PATIENT INSTRUCTIONS
Please make sure to take a DOXYCYCLINE EVERY DAY starting 2 days before you leave, continuing every day you are in [place] and continuing for 4 weeks after you return.  71 pills should cover this amount of time.    Don't take IRON pills at the same time - they interact.    You can also get a SHINGRIX vaccine against SHINGLES at your pharmacy - we do not give that vaccine at clinic.    I checked with Dr Andersen who agrees with this plan.    We are giving you a TYPHOID vaccine today which is good for 2 years.  ALSO you will be required to show evidence of POLIO immunization in past year - which you have not had recently - so we will give you one today too.    Safe travels!

## 2020-08-17 ENCOUNTER — TELEPHONE (OUTPATIENT)
Dept: FAMILY MEDICINE | Facility: CLINIC | Age: 65
End: 2020-08-17

## 2020-08-17 VITALS
OXYGEN SATURATION: 99 % | TEMPERATURE: 98.9 F | SYSTOLIC BLOOD PRESSURE: 148 MMHG | RESPIRATION RATE: 12 BRPM | DIASTOLIC BLOOD PRESSURE: 88 MMHG | HEART RATE: 77 BPM

## 2020-08-17 DIAGNOSIS — Z11.59 SCREENING FOR VIRAL DISEASE: Primary | ICD-10-CM

## 2020-08-17 DIAGNOSIS — Z11.59 SCREENING FOR VIRAL DISEASE: ICD-10-CM

## 2020-08-17 LAB
COVID-19 VIRUS PCR TO U OF MN - SOURCE: NORMAL
SARS-COV-2 RNA SPEC QL NAA+PROBE: NOT DETECTED

## 2020-08-17 NOTE — TELEPHONE ENCOUNTER
"Patient is requesting COVID-19 PCR testing. They are currently asymptomatic, and meet the following criteria for testing per the July 14, 2020 [facility] testing guidelines: 7/14 Asymptomatic criteria: is traveling to locations that require a negative COVID-19 PCR for entry    If patient has had close contact (within 6 ft for >/= 15 min), recommend 14 day quarantine per [facility] ([email or web address]):  \"All close contacts should follow a 14-day quarantine period. CDC recommends that close contacts be PCR-tested for COVID-19. Even if the result is negative, these contacts should continue to quarantine for a full 14 days after last exposure and monitor for symptoms; infection could develop at any time during the quarantine period. Repeat testing at the end of the quarantine period may be recommended for staff or residents of congregate settings; refer to setting-specific testing guidance for additional information. Specific guidance is available for health care workers who have a health care exposure and these individuals should follow that guidance; it would apply if they have a community/household contact.\"    Patient transferred to  to schedule 20 min test only (no charge) visit with [facility] provider. Order placed under [facility] preceptor. RN copied on results. Route chart to [facility] provider for FYI, reminder to do no charge visit.         "

## 2020-08-17 NOTE — PROGRESS NOTES
Asymptomatic COVID-19 swab obtained. Patient planning on traveling to [place] on 8/20/20. Will notify with results. Also, discussed that he follow up with Dr. Mary regarding blood pressure. He did note he was a bit nervous for this test.   Vitals:   BP (!) 148/88   Pulse 77   Temp 98.9  F (37.2  C) (Oral)   Resp 12   SpO2 99%     Larisa Varma MD PGY3  [facility]

## 2020-08-17 NOTE — LETTER
August 20, 2020      Antonio Mercado  [address]   [place] [address]        Dear ,    We are writing to inform you of your test results.    COVID-19 swab test is negative - Take care!    Resulted Orders   COVID-19 Virus PCR [facility] ([facility])   Result Value Ref Range    COVID-19 Virus PCR to [facility] - Source Nasopharyngeal     COVID-19 Virus PCR to [facility] - Result Not Detected       Comment:      Collection of multiple specimens from the same patient may be necessary to  detect the virus. The possibility of a false negative should be considered if  the patient's recent exposure or clinical presentation suggests 2019 nCOV  infection and diagnostic tests for other causes of illness are negative.   Repeat  testing may be considered in this setting.  Viral RNA was extracted via a validated method and subsequently underwent  single step reverse transcriptase-real time polymerase chain reaction using  primers to the CDC specified N1,N2 gene targets of CoV2 and human RNP as an  internal control.  A negative result does not rule out the presence of real-time PCR inhibitors   in  the specimen or COVID-19 RNA in concentrations below the limit of detection of  the assay. The possibility of a false negative should be considered if the  patients recent exposure or clinical presentation suggests COVID-19.   Additional  testing or repeat testing requires consultation with the laboratory.  [facility]  opharyngeal specimen is the preferred choice for swab-based SARS CoV2  testing. When collection of a nasopharyngeal swab is not possible the   following  are acceptable alternatives:  an oropharyngeal (OP) specimen collected by a healthcare professional, or a  nasal mid-turbinate (NMT) swab collected by a healthcare professional or by  onsite self-collection (using a flocked tapered swab), or an anterior nares  specimen collected by a healthcare professional or by onsite self-collection  (using a round foam swab).  (Centers for Disease Control)  Testing performed by [facility], Room 1-210, [address]  [address] [place] [address], [place] [address]. This test was developed and its  performance characteristics determined by the [facility]. It has not been cleared or approved by the FDA.  The laboratory is regulated under the Clinical Laboratory Improvement  Amendments of 1988 (CLIA-88) as qualified to perform high-complexity testing.  This test i  s used for clinical purposes. It should not be regarded as  investigational or for research.  Performed and/or entered by:  [place] [id number]       Narrative    Test performed by:  [facility]  [address], [place] [address]       If you have any questions or concerns, please call the clinic at the number listed above.       Sincerely,        Harris Mary MD

## 2020-08-17 NOTE — TELEPHONE ENCOUNTER
[facility] phone call message- general phone call:    Reason for call: Pt is A symptomatic. He needs testing for traveling to [place] on Thursday, August 20th.    Return call needed: Yes    OK to leave a message on voice mail? Yes    Primary language: English      needed? No    Call taken on August 17, 2020 at 9:51 AM by Agnes Allen

## 2020-08-18 ENCOUNTER — TELEPHONE (OUTPATIENT)
Dept: FAMILY MEDICINE | Facility: CLINIC | Age: 65
End: 2020-08-18

## 2020-08-18 NOTE — TELEPHONE ENCOUNTER
[facility] phone call message- patient requesting results:    Test: Lab    Date of test: 08/17/2020    Additional Comments: pt is leaving out of the country on 08[place]20.  He needs his covid results.    OK to leave a message on voice mail? Yes    Primary language: English      needed? No    Call taken on August 18, 2020 at 3:04 PM by Agnes Allen

## 2020-08-18 NOTE — TELEPHONE ENCOUNTER
Patient informed result are not available at this time and to call back tomorrow patient agreeable.    btrn

## 2020-08-19 NOTE — TELEPHONE ENCOUNTER
Left message for patient stating his results were ready, request to call back to discuss how he will pick them up. Let him know they are on mychart as well. ./LR

## 2020-10-28 DIAGNOSIS — M54.59 MECHANICAL LOW BACK PAIN: ICD-10-CM

## 2020-10-28 RX ORDER — NAPROXEN 500 MG/1
TABLET ORAL
Qty: 60 TABLET | Refills: 1 | OUTPATIENT
Start: 2020-10-28

## 2020-10-29 ENCOUNTER — TELEPHONE (OUTPATIENT)
Dept: FAMILY MEDICINE | Facility: CLINIC | Age: 65
End: 2020-10-29

## 2020-10-29 DIAGNOSIS — M54.59 MECHANICAL LOW BACK PAIN: Primary | ICD-10-CM

## 2020-10-29 RX ORDER — NAPROXEN 500 MG/1
500 TABLET ORAL 2 TIMES DAILY PRN
Qty: 60 TABLET | Refills: 1 | Status: SHIPPED | OUTPATIENT
Start: 2020-10-29 | End: 2020-01-01

## 2020-10-29 NOTE — TELEPHONE ENCOUNTER
[facility] phone call message- general phone call:    Reason for call: He would like a call back from .    Action desired: Call back    Return call needed: Yes    OK to leave a message on voice mail? Yes    Advised patient to response may take up to 2 business days: Yes    Primary language: English      needed? No    Call taken on October 29, 2020 at 12:45 PM by Wanda Randhawa

## 2020-10-29 NOTE — TELEPHONE ENCOUNTER
Sure - based on that, will remove Naproxen from allergy list and OK to refill it and for him to cancel encounter - thanks!

## 2020-10-29 NOTE — TELEPHONE ENCOUNTER
"Patient reports his appointment tomorrow is just to get a renewal for 500mg Naproxen. He is hoping Dr. Mary will renew this without the appointment tomorrow as he has no other concerns. Discussed that his chart noes that he has an allergy to this medication (2/26/20 note states \"it caused a racing heart.\"). Patient reports he has been taking Naproxen since this time without any problems. Routed to Dr. Mary. ./LR        "

## 2020-10-30 ENCOUNTER — OFFICE VISIT (OUTPATIENT)
Dept: FAMILY MEDICINE | Facility: CLINIC | Age: 65
End: 2020-10-30
Payer: COMMERCIAL

## 2020-10-30 VITALS
DIASTOLIC BLOOD PRESSURE: 81 MMHG | HEART RATE: 60 BPM | TEMPERATURE: 97.8 F | SYSTOLIC BLOOD PRESSURE: 123 MMHG | RESPIRATION RATE: 16 BRPM | OXYGEN SATURATION: 98 %

## 2020-10-30 DIAGNOSIS — M25.532 LEFT WRIST PAIN: Primary | ICD-10-CM

## 2020-10-30 PROCEDURE — 99214 OFFICE O/P EST MOD 30 MIN: CPT | Mod: GC | Performed by: STUDENT IN AN ORGANIZED HEALTH CARE EDUCATION/TRAINING PROGRAM

## 2020-10-30 RX ORDER — ACETAMINOPHEN 500 MG
500-1000 TABLET ORAL EVERY 6 HOURS PRN
Qty: 60 TABLET | Refills: 0 | Status: SHIPPED | OUTPATIENT
Start: 2020-10-30 | End: 2021-01-01

## 2020-10-30 NOTE — PROGRESS NOTES
SUBJECTIVE       Antonio Mercado is a 65 year old  male with a PMH significant for:     Patient Active Problem List   Diagnosis     Human immunodeficiency virus (HIV) disease (H)     Mechanical low back pain     Anemia, iron deficiency     Malignant neoplasm of hepatic flexure (H)     Hemorrhoids, unspecified hemorrhoid type     Prostate cancer (H)     Refusal of treatment for reasons of conscience     Bilateral renal cysts     CKD (chronic kidney disease) stage 3, GFR 30-59 ml/min     He presents for follow up of left wrist pain and right shoulder pain.    Antonio was prescribed naproxen and he is concerned about the side effects and wished to discuss before taking the medication. It was prescribed for her his left wrist pain.     He was given a splint by his PCP, Dr. Mary. If he doesn't wear the splint he can't drive. He has weakness in flexion and extension of his left wrist. Patient does not specify the type of pain he has or the distribution. He is asking for a cream to help with the pain. As per chart review, patient was prescribed and picked up voltaren gel and capsaicin cream. He denies ever having received either medication and states the pharmacy did not give him any. He states he has never used a cream and did not clarify which cream he was seeking at this time for pain relief.     Patient was referred to orthopedics regarding his left wrist pain with prior CVA and subsequent left wrist surgery. He did not go because of COVID. He does not know the number to call.     When asked if he would like a flu shot, patient declined. He then stated that he believes the media has blown COVID 19 out of proportion and that people dying in the hospital were really dying of underlying causes not related at all to COVID19.     PMH, Medications and Allergies were reviewed and updated as needed.        REVIEW OF SYSTEMS     Pertinent systems discussed in HPI.        OBJECTIVE     Vitals:    10/30/20 0955   BP:  123/81   BP Location: Left arm   Patient Position: Sitting   Cuff Size: Adult Small   Pulse: 60   Resp: 16   Temp: 97.8  F (36.6  C)   TempSrc: Oral   SpO2: 98%     There is no height or weight on file to calculate BMI.    Gen: Alert, oriented, well appearing, no distress  CV: RRR, no rubs/murmurs/gallops, 2+ radial pulses  Lungs: CTAB, normal work of breathing  Abd: NBS, soft, non-tender, non-distended  Ext: Weakness with left hand , flexion, extension. Limited range of motion. Low patient effort when assessing strength and ROM and patient winces with every movement of both right and leftr arms.    No results found for this or any previous visit (from the past 24 hour(s)).      ASSESSMENT AND PLAN     1. Left wrist pain  Patient counseled on risks and benefits of naproxen and patient elected to continue with therapy.  He was counseled to take naproxen with food and avoid other NSAIDS. He was also told to take Tylenol as needed with naproxen. Refilled Voltaren gel.  Patient advised that he needs source control for pain, which will be either surgery or physical therapy. Patient given number to [facility] and encouraged to contact for appointment. Patient is hesitant to go to therapy or surgery due to ongoing pandemic.  - acetaminophen (TYLENOL) 500 MG tablet; Take 1-2 tablets (500-1,000 mg) by mouth every 6 hours as needed for mild pain Max dose of 3000mg (6 tabs) in one day.  Dispense: 60 tablet; Refill: 0  - diclofenac (VOLTAREN) 1 % topical gel; Place 2 g onto the skin 4 times daily  Dispense: 100 g; Refill: 0      RTC in 1-2 months for follow up of arm pain or sooner if develops new or worsening symptoms.    This patient was discussed with Suma Salas MD, who agrees with the above assessment and plan.    Ronni Bonilla, PGY2  [facility]

## 2020-10-30 NOTE — PROGRESS NOTES
Preceptor Attestation:   Patient seen, evaluated and discussed with the resident. I have verified the content of the note, which accurately reflects my assessment of the patient and the plan of care.   Supervising Physician:  Suma Salas MD

## 2020-10-30 NOTE — PATIENT INSTRUCTIONS
Thank you for discussing your health with us today!    We discussed the following during your visit:    1. Pain Control: Please take the Naproxen as prescribed. Take it with food, so with breakfast and dinner. You can take this with Tylenol (Acetaminophen). Do not take the Naproxen with ibuprofen, Advil, Motrin, or Aleve.   2. Please call [facility]: [phone] to make an appointment to discuss surgery.     Please make an appointment in 1-2 months to follow up on pain as needed.    As always, please call the clinic if you have any questions or concerns.

## 2020-11-29 ENCOUNTER — HEALTH MAINTENANCE LETTER (OUTPATIENT)
Age: 65
End: 2020-11-29

## 2020-12-17 ENCOUNTER — OFFICE VISIT (OUTPATIENT)
Dept: FAMILY MEDICINE | Facility: CLINIC | Age: 65
End: 2020-12-17
Payer: COMMERCIAL

## 2020-12-17 VITALS
DIASTOLIC BLOOD PRESSURE: 81 MMHG | WEIGHT: 169.8 LBS | SYSTOLIC BLOOD PRESSURE: 131 MMHG | TEMPERATURE: 98.8 F | BODY MASS INDEX: 24.02 KG/M2 | HEART RATE: 85 BPM | RESPIRATION RATE: 12 BRPM | OXYGEN SATURATION: 94 %

## 2020-12-17 DIAGNOSIS — C61 PROSTATE CANCER (H): ICD-10-CM

## 2020-12-17 DIAGNOSIS — M71.9 DISORDER OF BURSAE AND TENDONS IN SHOULDER REGION: Primary | ICD-10-CM

## 2020-12-17 DIAGNOSIS — M67.919 DISORDER OF BURSAE AND TENDONS IN SHOULDER REGION: Primary | ICD-10-CM

## 2020-12-17 PROCEDURE — 99214 OFFICE O/P EST MOD 30 MIN: CPT | Mod: 25 | Performed by: FAMILY MEDICINE

## 2020-12-17 PROCEDURE — 20610 DRAIN/INJ JOINT/BURSA W/O US: CPT | Mod: RT | Performed by: FAMILY MEDICINE

## 2020-12-17 RX ORDER — TRIAMCINOLONE ACETONIDE 40 MG/ML
40 INJECTION, SUSPENSION INTRA-ARTICULAR; INTRAMUSCULAR ONCE
Status: COMPLETED | OUTPATIENT
Start: 2020-12-17 | End: 2020-12-17

## 2020-12-17 RX ORDER — BUPIVACAINE HYDROCHLORIDE 5 MG/ML
4 INJECTION, SOLUTION PERINEURAL ONCE
Status: COMPLETED | OUTPATIENT
Start: 2020-12-17 | End: 2020-12-17

## 2020-12-17 RX ADMIN — TRIAMCINOLONE ACETONIDE 40 MG: 40 INJECTION, SUSPENSION INTRA-ARTICULAR; INTRAMUSCULAR at 13:31

## 2020-12-17 RX ADMIN — BUPIVACAINE HYDROCHLORIDE 20 MG: 5 INJECTION, SOLUTION PERINEURAL at 13:30

## 2020-12-17 NOTE — PROGRESS NOTES
This is a 65-year-old male who is well-known to me.  He attends today reporting that he has continued to have right shoulder pain now for about 2 months.  He cannot identify any injury that triggered it.  He had spent 2 months in [place] and about 1 week after he returned in October 2020 noticed that he could not lay on his right shoulder due to pain.  He is able to demonstrate to me that various movements are particularly acutely painful.  He has been taking naproxen daily without sufficient relief.  He thinks some imaging is necessary.    I took the opportunity to also discuss with him his progressively increasing PSA.  He tells me that when in [place] he did have urinary frequency symptoms but was given some traditional cocktail containing banana tree liquid which completely resolved his symptoms.  In general he is averse to taking chemotherapy and other Western medications and has previously told me he does not want to take chemo for cancer and instead believes in spiritual healing.    Objective:  /81 (BP Location: Right arm, Patient Position: Sitting, Cuff Size: Adult Regular)   Pulse 85   Temp 98.8  F (37.1  C) (Oral)   Resp 12   Wt 77 kg (169 lb 12.8 oz)   SpO2 94%   BMI 24.02 kg/m    His vitals are satisfactory  Exam of his right shoulder does reveal some tenderness on palpating over the subacromial bursa  Abduction is not painful however internal rotation and the Mckeon test are both quite positive causing him to drop his hand due to the severity of the pain.  This suggests subscapularis tendinopathy with possible supraspinatus involvement.  I recommended a cortisone shot and after discussion and answering his questions, he consented to proceed.    PROCEDURE NOTE:  Informed consent obtained  Antiseptic applied  Local anesthetic spray administered and 1cc kenalog with 4cc bupivicaine injected into right subacromial bursa and surrounding tissues without complication  Hemostasis  achieved  Dressing applied    PSA   Date Value Ref Range Status   06/08/2020 14.9 (H) 0.0 - 4.5 ng/mL Final     Antonio was seen today for follow up.    Diagnoses and all orders for this visit:    Disorder of bursae and tendons in shoulder region  -     Large Joint/Bursa injection and/or drainage (Shoulder, Knee)  -     triamcinolone (KENALOG-40) injection 40 mg  -     Bupivacaine 0.5 % Injection    Prostate cancer (H)      Concerning the shoulder pain I have advised him that he should continue to experience relief over the next 3 weeks.  If he continues to have pain at the 3-week akanksha, I have asked him to notify me in which case I will obtain an MRI of the shoulder.  He is satisfied with this plan.    I reviewed his PSA results since 2015 and there has been a gradual but steady increase over time with known biopsy positive for cancer.  He prefers not to address this at this time but tells me he will return spring 2021 and would like the lab rechecked at that point.    Total visit time was 25 mins, all of which was face to face MD time, and over 50% of this time was spent in counseling and coordination of care.

## 2020-12-17 NOTE — PATIENT INSTRUCTIONS
Please send me a message if you are still having pain in your shoulder in 3 weeks.  I hope this will be fully better by then!    PSA   Date Value Ref Range Status   06/08/2020 14.9 (H) 0.0 - 4.5 ng/mL Final

## 2021-01-01 ENCOUNTER — VIRTUAL VISIT (OUTPATIENT)
Dept: UROLOGY | Facility: CLINIC | Age: 66
End: 2021-01-01
Payer: COMMERCIAL

## 2021-01-01 ENCOUNTER — TELEPHONE (OUTPATIENT)
Dept: RADIATION ONCOLOGY | Facility: HOSPITAL | Age: 66
End: 2021-01-01
Payer: COMMERCIAL

## 2021-01-01 ENCOUNTER — OFFICE VISIT (OUTPATIENT)
Dept: FAMILY MEDICINE | Facility: CLINIC | Age: 66
End: 2021-01-01
Payer: COMMERCIAL

## 2021-01-01 ENCOUNTER — OFFICE VISIT (OUTPATIENT)
Dept: RADIATION ONCOLOGY | Facility: HOSPITAL | Age: 66
End: 2021-01-01
Attending: RADIOLOGY
Payer: COMMERCIAL

## 2021-01-01 ENCOUNTER — INFUSION - HEALTHEAST (OUTPATIENT)
Dept: INFUSION THERAPY | Facility: HOSPITAL | Age: 66
End: 2021-01-01

## 2021-01-01 ENCOUNTER — ANESTHESIA (OUTPATIENT)
Dept: SURGERY | Facility: HOSPITAL | Age: 66
DRG: 640 | End: 2021-01-01
Payer: COMMERCIAL

## 2021-01-01 ENCOUNTER — APPOINTMENT (OUTPATIENT)
Dept: RADIOLOGY | Facility: HOSPITAL | Age: 66
DRG: 180 | End: 2021-01-01
Attending: INTERNAL MEDICINE
Payer: COMMERCIAL

## 2021-01-01 ENCOUNTER — COMMUNICATION - HEALTHEAST (OUTPATIENT)
Dept: SCHEDULING | Facility: CLINIC | Age: 66
End: 2021-01-01

## 2021-01-01 ENCOUNTER — HOSPITAL ENCOUNTER (OUTPATIENT)
Dept: ULTRASOUND IMAGING | Facility: HOSPITAL | Age: 66
Setting detail: RADIATION/ONCOLOGY SERIES
Discharge: STILL ADMITTED OR ADMINISTRATIVE | End: 2021-02-26
Attending: INTERNAL MEDICINE

## 2021-01-01 ENCOUNTER — APPOINTMENT (OUTPATIENT)
Dept: RADIOLOGY | Facility: HOSPITAL | Age: 66
DRG: 180 | End: 2021-01-01
Attending: EMERGENCY MEDICINE
Payer: COMMERCIAL

## 2021-01-01 ENCOUNTER — HOSPITAL ENCOUNTER (OUTPATIENT)
Dept: ULTRASOUND IMAGING | Facility: HOSPITAL | Age: 66
Setting detail: RADIATION/ONCOLOGY SERIES
Discharge: STILL ADMITTED OR ADMINISTRATIVE | End: 2021-02-19
Attending: INTERNAL MEDICINE

## 2021-01-01 ENCOUNTER — TELEPHONE (OUTPATIENT)
Dept: FAMILY MEDICINE | Facility: CLINIC | Age: 66
End: 2021-01-01

## 2021-01-01 ENCOUNTER — AMBULATORY - HEALTHEAST (OUTPATIENT)
Dept: INFUSION THERAPY | Facility: HOSPITAL | Age: 66
End: 2021-01-01

## 2021-01-01 ENCOUNTER — APPOINTMENT (OUTPATIENT)
Dept: PHYSICAL THERAPY | Facility: HOSPITAL | Age: 66
DRG: 180 | End: 2021-01-01
Payer: COMMERCIAL

## 2021-01-01 ENCOUNTER — APPOINTMENT (OUTPATIENT)
Dept: ULTRASOUND IMAGING | Facility: HOSPITAL | Age: 66
DRG: 180 | End: 2021-01-01
Payer: COMMERCIAL

## 2021-01-01 ENCOUNTER — TRANSFERRED RECORDS (OUTPATIENT)
Dept: HEALTH INFORMATION MANAGEMENT | Facility: CLINIC | Age: 66
End: 2021-01-01

## 2021-01-01 ENCOUNTER — ONCOLOGY VISIT (OUTPATIENT)
Dept: ONCOLOGY | Facility: HOSPITAL | Age: 66
End: 2021-01-01
Attending: INTERNAL MEDICINE
Payer: COMMERCIAL

## 2021-01-01 ENCOUNTER — COMMUNICATION - HEALTHEAST (OUTPATIENT)
Dept: ONCOLOGY | Facility: HOSPITAL | Age: 66
End: 2021-01-01

## 2021-01-01 ENCOUNTER — COMMUNICATION - HEALTHEAST (OUTPATIENT)
Dept: INFECTIOUS DISEASES | Facility: CLINIC | Age: 66
End: 2021-01-01

## 2021-01-01 ENCOUNTER — HOSPITAL ENCOUNTER (OUTPATIENT)
Dept: ULTRASOUND IMAGING | Facility: HOSPITAL | Age: 66
Discharge: HOME | End: 2021-01-26
Attending: FAMILY MEDICINE | Admitting: RADIOLOGY

## 2021-01-01 ENCOUNTER — AMBULATORY - HEALTHEAST (OUTPATIENT)
Dept: LAB | Facility: CLINIC | Age: 66
End: 2021-01-01

## 2021-01-01 ENCOUNTER — HOSPITAL ENCOUNTER (OUTPATIENT)
Dept: ULTRASOUND IMAGING | Facility: HOSPITAL | Age: 66
Setting detail: RADIATION/ONCOLOGY SERIES
Discharge: STILL ADMITTED OR ADMINISTRATIVE | End: 2021-01-29
Attending: INTERNAL MEDICINE

## 2021-01-01 ENCOUNTER — APPOINTMENT (OUTPATIENT)
Dept: SPEECH THERAPY | Facility: HOSPITAL | Age: 66
DRG: 180 | End: 2021-01-01
Attending: INTERNAL MEDICINE
Payer: COMMERCIAL

## 2021-01-01 ENCOUNTER — APPOINTMENT (OUTPATIENT)
Dept: PHYSICAL THERAPY | Facility: HOSPITAL | Age: 66
DRG: 640 | End: 2021-01-01
Payer: COMMERCIAL

## 2021-01-01 ENCOUNTER — HOSPITAL ENCOUNTER (OUTPATIENT)
Dept: ULTRASOUND IMAGING | Facility: HOSPITAL | Age: 66
Setting detail: RADIATION/ONCOLOGY SERIES
Discharge: STILL ADMITTED OR ADMINISTRATIVE | End: 2021-02-05
Attending: INTERNAL MEDICINE

## 2021-01-01 ENCOUNTER — DOCUMENTATION ONLY (OUTPATIENT)
Dept: ONCOLOGY | Facility: HOSPITAL | Age: 66
End: 2021-01-01

## 2021-01-01 ENCOUNTER — HOSPITAL ENCOUNTER (OUTPATIENT)
Dept: PET IMAGING | Facility: HOSPITAL | Age: 66
Discharge: HOME | End: 2021-01-27
Attending: INTERNAL MEDICINE

## 2021-01-01 ENCOUNTER — PATIENT OUTREACH (OUTPATIENT)
Dept: CARE COORDINATION | Facility: CLINIC | Age: 66
End: 2021-01-01
Payer: COMMERCIAL

## 2021-01-01 ENCOUNTER — DOCUMENTATION ONLY (OUTPATIENT)
Dept: FAMILY MEDICINE | Facility: CLINIC | Age: 66
End: 2021-01-01
Payer: COMMERCIAL

## 2021-01-01 ENCOUNTER — LAB (OUTPATIENT)
Dept: INFUSION THERAPY | Facility: HOSPITAL | Age: 66
End: 2021-01-01
Attending: INTERNAL MEDICINE
Payer: COMMERCIAL

## 2021-01-01 ENCOUNTER — HOSPITAL ENCOUNTER (OUTPATIENT)
Dept: CARDIOLOGY | Facility: HOSPITAL | Age: 66
Discharge: HOME | End: 2021-05-17
Attending: INTERNAL MEDICINE
Payer: COMMERCIAL

## 2021-01-01 ENCOUNTER — TELEPHONE (OUTPATIENT)
Dept: ONCOLOGY | Facility: HOSPITAL | Age: 66
End: 2021-01-01
Payer: COMMERCIAL

## 2021-01-01 ENCOUNTER — HOSPITAL ENCOUNTER (OUTPATIENT)
Dept: CARDIOLOGY | Facility: HOSPITAL | Age: 66
Discharge: HOME | End: 2021-06-17
Attending: INTERNAL MEDICINE
Payer: COMMERCIAL

## 2021-01-01 ENCOUNTER — HOSPITAL ENCOUNTER (OUTPATIENT)
Dept: ULTRASOUND IMAGING | Facility: HOSPITAL | Age: 66
Discharge: HOME | End: 2021-01-05

## 2021-01-01 ENCOUNTER — AMBULATORY - HEALTHEAST (OUTPATIENT)
Dept: ONCOLOGY | Facility: HOSPITAL | Age: 66
End: 2021-01-01

## 2021-01-01 ENCOUNTER — OFFICE VISIT (OUTPATIENT)
Dept: PHARMACY | Facility: CLINIC | Age: 66
End: 2021-01-01
Payer: COMMERCIAL

## 2021-01-01 ENCOUNTER — HOME CARE/HOSPICE - HEALTHEAST (OUTPATIENT)
Dept: HOME HEALTH SERVICES | Facility: HOME HEALTH | Age: 66
End: 2021-01-01

## 2021-01-01 ENCOUNTER — TELEPHONE (OUTPATIENT)
Dept: FAMILY MEDICINE | Facility: CLINIC | Age: 66
End: 2021-01-01
Payer: COMMERCIAL

## 2021-01-01 ENCOUNTER — HOSPITAL ENCOUNTER (OUTPATIENT)
Dept: PET IMAGING | Facility: HOSPITAL | Age: 66
Setting detail: RADIATION/ONCOLOGY SERIES
Discharge: STILL ADMITTED OR ADMINISTRATIVE | End: 2021-05-12
Attending: INTERNAL MEDICINE
Payer: COMMERCIAL

## 2021-01-01 ENCOUNTER — LAB (OUTPATIENT)
Dept: INFUSION THERAPY | Facility: HOSPITAL | Age: 66
DRG: 640 | End: 2021-01-01
Attending: INTERNAL MEDICINE
Payer: COMMERCIAL

## 2021-01-01 ENCOUNTER — RECORDS - HEALTHEAST (OUTPATIENT)
Dept: ADMINISTRATIVE | Facility: OTHER | Age: 66
End: 2021-01-01

## 2021-01-01 ENCOUNTER — OFFICE VISIT - HEALTHEAST (OUTPATIENT)
Dept: ONCOLOGY | Facility: HOSPITAL | Age: 66
End: 2021-01-01

## 2021-01-01 ENCOUNTER — HEALTH MAINTENANCE LETTER (OUTPATIENT)
Age: 66
End: 2021-01-01

## 2021-01-01 ENCOUNTER — VIRTUAL VISIT (OUTPATIENT)
Dept: FAMILY MEDICINE | Facility: CLINIC | Age: 66
End: 2021-01-01
Payer: COMMERCIAL

## 2021-01-01 ENCOUNTER — HOSPITAL ENCOUNTER (OUTPATIENT)
Dept: NUCLEAR MEDICINE | Facility: HOSPITAL | Age: 66
Setting detail: RADIATION/ONCOLOGY SERIES
Discharge: STILL ADMITTED OR ADMINISTRATIVE | End: 2021-06-18
Attending: INTERNAL MEDICINE
Payer: COMMERCIAL

## 2021-01-01 ENCOUNTER — HOSPITAL ENCOUNTER (OUTPATIENT)
Dept: CARDIOLOGY | Facility: HOSPITAL | Age: 66
End: 2021-08-10
Attending: INTERNAL MEDICINE
Payer: COMMERCIAL

## 2021-01-01 ENCOUNTER — TELEPHONE (OUTPATIENT)
Dept: ONCOLOGY | Facility: HOSPITAL | Age: 66
End: 2021-01-01

## 2021-01-01 ENCOUNTER — OFFICE VISIT - HEALTHEAST (OUTPATIENT)
Dept: INFECTIOUS DISEASES | Facility: CLINIC | Age: 66
End: 2021-01-01

## 2021-01-01 ENCOUNTER — APPOINTMENT (OUTPATIENT)
Dept: CT IMAGING | Facility: HOSPITAL | Age: 66
DRG: 180 | End: 2021-01-01
Attending: INTERNAL MEDICINE
Payer: COMMERCIAL

## 2021-01-01 ENCOUNTER — HOSPITAL ENCOUNTER (OUTPATIENT)
Dept: ULTRASOUND IMAGING | Facility: HOSPITAL | Age: 66
Discharge: HOME | End: 2021-08-11
Attending: NURSE PRACTITIONER | Admitting: NURSE PRACTITIONER
Payer: COMMERCIAL

## 2021-01-01 ENCOUNTER — HOSPITAL ENCOUNTER (OUTPATIENT)
Dept: CARDIOLOGY | Facility: HOSPITAL | Age: 66
End: 2021-07-16
Attending: INTERNAL MEDICINE
Payer: COMMERCIAL

## 2021-01-01 ENCOUNTER — AMBULATORY - HEALTHEAST (OUTPATIENT)
Dept: FAMILY MEDICINE | Facility: CLINIC | Age: 66
End: 2021-01-01

## 2021-01-01 ENCOUNTER — PATIENT OUTREACH (OUTPATIENT)
Dept: CARE COORDINATION | Facility: CLINIC | Age: 66
End: 2021-01-01

## 2021-01-01 ENCOUNTER — HOSPITAL ENCOUNTER (OUTPATIENT)
Dept: CARDIOLOGY | Facility: HOSPITAL | Age: 66
Discharge: HOME | End: 2021-04-16
Attending: INTERNAL MEDICINE

## 2021-01-01 ENCOUNTER — APPOINTMENT (OUTPATIENT)
Dept: CT IMAGING | Facility: HOSPITAL | Age: 66
DRG: 180 | End: 2021-01-01
Attending: STUDENT IN AN ORGANIZED HEALTH CARE EDUCATION/TRAINING PROGRAM
Payer: COMMERCIAL

## 2021-01-01 ENCOUNTER — ALLIED HEALTH/NURSE VISIT (OUTPATIENT)
Dept: RADIATION ONCOLOGY | Facility: HOSPITAL | Age: 66
End: 2021-01-01
Attending: STUDENT IN AN ORGANIZED HEALTH CARE EDUCATION/TRAINING PROGRAM
Payer: COMMERCIAL

## 2021-01-01 ENCOUNTER — HOSPITAL ENCOUNTER (OUTPATIENT)
Dept: CT IMAGING | Facility: HOSPITAL | Age: 66
Discharge: HOME | End: 2021-01-04

## 2021-01-01 ENCOUNTER — HOSPITAL ENCOUNTER (OUTPATIENT)
Dept: CT IMAGING | Facility: HOSPITAL | Age: 66
Discharge: HOME | End: 2021-09-27
Attending: INTERNAL MEDICINE | Admitting: INTERNAL MEDICINE
Payer: COMMERCIAL

## 2021-01-01 ENCOUNTER — TELEPHONE (OUTPATIENT)
Dept: NUTRITION | Facility: HOSPITAL | Age: 66
End: 2021-01-01

## 2021-01-01 ENCOUNTER — HOSPITAL ENCOUNTER (OUTPATIENT)
Dept: ULTRASOUND IMAGING | Facility: HOSPITAL | Age: 66
Discharge: HOME | End: 2021-03-02
Attending: INTERNAL MEDICINE | Admitting: RADIOLOGY

## 2021-01-01 ENCOUNTER — RECORDS - HEALTHEAST (OUTPATIENT)
Dept: ADMINISTRATIVE | Facility: CLINIC | Age: 66
End: 2021-01-01

## 2021-01-01 ENCOUNTER — HOSPITAL ENCOUNTER (OUTPATIENT)
Dept: CT IMAGING | Facility: HOSPITAL | Age: 66
End: 2021-12-07
Attending: NURSE PRACTITIONER
Payer: COMMERCIAL

## 2021-01-01 ENCOUNTER — HOSPITAL ENCOUNTER (OUTPATIENT)
Dept: PET IMAGING | Facility: HOSPITAL | Age: 66
Setting detail: RADIATION/ONCOLOGY SERIES
Discharge: STILL ADMITTED OR ADMINISTRATIVE | End: 2021-03-16
Attending: INTERNAL MEDICINE

## 2021-01-01 ENCOUNTER — ONCOLOGY VISIT (OUTPATIENT)
Dept: ONCOLOGY | Facility: HOSPITAL | Age: 66
DRG: 640 | End: 2021-01-01
Attending: INTERNAL MEDICINE
Payer: COMMERCIAL

## 2021-01-01 ENCOUNTER — HOSPITAL ENCOUNTER (OUTPATIENT)
Dept: ULTRASOUND IMAGING | Facility: HOSPITAL | Age: 66
Setting detail: RADIATION/ONCOLOGY SERIES
Discharge: STILL ADMITTED OR ADMINISTRATIVE | End: 2021-02-02
Attending: INTERNAL MEDICINE

## 2021-01-01 ENCOUNTER — PRE VISIT (OUTPATIENT)
Dept: UROLOGY | Facility: CLINIC | Age: 66
End: 2021-01-01

## 2021-01-01 ENCOUNTER — APPOINTMENT (OUTPATIENT)
Dept: SPEECH THERAPY | Facility: HOSPITAL | Age: 66
DRG: 180 | End: 2021-01-01
Payer: COMMERCIAL

## 2021-01-01 ENCOUNTER — HOSPITAL ENCOUNTER (OUTPATIENT)
Dept: MRI IMAGING | Facility: HOSPITAL | Age: 66
End: 2021-07-16
Attending: FAMILY MEDICINE
Payer: COMMERCIAL

## 2021-01-01 ENCOUNTER — COMMUNICATION - HEALTHEAST (OUTPATIENT)
Dept: CARDIOLOGY | Facility: CLINIC | Age: 66
End: 2021-01-01

## 2021-01-01 ENCOUNTER — SURGERY - HEALTHEAST (OUTPATIENT)
Dept: GASTROENTEROLOGY | Facility: HOSPITAL | Age: 66
End: 2021-01-01

## 2021-01-01 ENCOUNTER — HOSPITAL ENCOUNTER (INPATIENT)
Facility: HOSPITAL | Age: 66
LOS: 11 days | Discharge: HOME HEALTH | DRG: 180 | End: 2021-10-14
Attending: EMERGENCY MEDICINE | Admitting: FAMILY MEDICINE
Payer: COMMERCIAL

## 2021-01-01 ENCOUNTER — APPOINTMENT (OUTPATIENT)
Dept: OCCUPATIONAL THERAPY | Facility: HOSPITAL | Age: 66
DRG: 180 | End: 2021-01-01
Payer: COMMERCIAL

## 2021-01-01 ENCOUNTER — HOSPITAL ENCOUNTER (OUTPATIENT)
Dept: ULTRASOUND IMAGING | Facility: HOSPITAL | Age: 66
Setting detail: RADIATION/ONCOLOGY SERIES
Discharge: STILL ADMITTED OR ADMINISTRATIVE | End: 2021-02-23
Attending: INTERNAL MEDICINE

## 2021-01-01 ENCOUNTER — TRANSCRIBE ORDERS (OUTPATIENT)
Dept: ONCOLOGY | Facility: HOSPITAL | Age: 66
End: 2021-01-01

## 2021-01-01 ENCOUNTER — AMBULATORY - HEALTHEAST (OUTPATIENT)
Dept: ULTRASOUND IMAGING | Facility: HOSPITAL | Age: 66
End: 2021-01-01

## 2021-01-01 ENCOUNTER — APPOINTMENT (OUTPATIENT)
Dept: OCCUPATIONAL THERAPY | Facility: HOSPITAL | Age: 66
DRG: 640 | End: 2021-01-01
Attending: INTERNAL MEDICINE
Payer: COMMERCIAL

## 2021-01-01 ENCOUNTER — HOSPITAL ENCOUNTER (OUTPATIENT)
Dept: CT IMAGING | Facility: HOSPITAL | Age: 66
Discharge: HOME | End: 2021-08-09
Attending: INTERNAL MEDICINE | Admitting: INTERNAL MEDICINE
Payer: COMMERCIAL

## 2021-01-01 ENCOUNTER — HOSPITAL ENCOUNTER (OUTPATIENT)
Dept: ULTRASOUND IMAGING | Facility: HOSPITAL | Age: 66
Setting detail: RADIATION/ONCOLOGY SERIES
Discharge: STILL ADMITTED OR ADMINISTRATIVE | End: 2021-02-12
Attending: INTERNAL MEDICINE

## 2021-01-01 ENCOUNTER — HOSPITAL ENCOUNTER (OUTPATIENT)
Dept: MRI IMAGING | Facility: HOSPITAL | Age: 66
End: 2021-12-07
Attending: INTERNAL MEDICINE
Payer: COMMERCIAL

## 2021-01-01 ENCOUNTER — HOSPITAL ENCOUNTER (OUTPATIENT)
Dept: ULTRASOUND IMAGING | Facility: HOSPITAL | Age: 66
Setting detail: RADIATION/ONCOLOGY SERIES
Discharge: STILL ADMITTED OR ADMINISTRATIVE | End: 2021-02-09
Attending: INTERNAL MEDICINE

## 2021-01-01 ENCOUNTER — HOSPITAL ENCOUNTER (OUTPATIENT)
Dept: PET IMAGING | Facility: HOSPITAL | Age: 66
Discharge: HOME | End: 2021-03-16
Attending: INTERNAL MEDICINE

## 2021-01-01 ENCOUNTER — HOSPITAL ENCOUNTER (OUTPATIENT)
Dept: ULTRASOUND IMAGING | Facility: HOSPITAL | Age: 66
Discharge: HOME | End: 2021-03-09
Attending: INTERNAL MEDICINE | Admitting: RADIOLOGY

## 2021-01-01 ENCOUNTER — APPOINTMENT (OUTPATIENT)
Dept: PHYSICAL THERAPY | Facility: HOSPITAL | Age: 66
DRG: 640 | End: 2021-01-01
Attending: INTERNAL MEDICINE
Payer: COMMERCIAL

## 2021-01-01 ENCOUNTER — DOCUMENTATION ONLY (OUTPATIENT)
Dept: FAMILY MEDICINE | Facility: CLINIC | Age: 66
End: 2021-01-01

## 2021-01-01 ENCOUNTER — APPOINTMENT (OUTPATIENT)
Dept: CARDIOLOGY | Facility: HOSPITAL | Age: 66
DRG: 640 | End: 2021-01-01
Payer: COMMERCIAL

## 2021-01-01 ENCOUNTER — HOSPITAL ENCOUNTER (OUTPATIENT)
Dept: ULTRASOUND IMAGING | Facility: HOSPITAL | Age: 66
Setting detail: RADIATION/ONCOLOGY SERIES
Discharge: STILL ADMITTED OR ADMINISTRATIVE | End: 2021-02-15
Attending: INTERNAL MEDICINE

## 2021-01-01 ENCOUNTER — HOSPITAL ENCOUNTER (INPATIENT)
Facility: HOSPITAL | Age: 66
LOS: 3 days | Discharge: HOME HEALTH | DRG: 640 | End: 2021-10-01
Attending: EMERGENCY MEDICINE | Admitting: FAMILY MEDICINE
Payer: COMMERCIAL

## 2021-01-01 ENCOUNTER — ANESTHESIA EVENT (OUTPATIENT)
Dept: SURGERY | Facility: HOSPITAL | Age: 66
DRG: 640 | End: 2021-01-01
Payer: COMMERCIAL

## 2021-01-01 ENCOUNTER — HOSPITAL ENCOUNTER (OUTPATIENT)
Dept: CARDIOLOGY | Facility: HOSPITAL | Age: 66
End: 2021-08-10
Attending: NURSE PRACTITIONER
Payer: COMMERCIAL

## 2021-01-01 ENCOUNTER — PRE VISIT (OUTPATIENT)
Dept: RADIATION ONCOLOGY | Facility: HOSPITAL | Age: 66
End: 2021-01-01

## 2021-01-01 VITALS
OXYGEN SATURATION: 99 % | SYSTOLIC BLOOD PRESSURE: 108 MMHG | HEART RATE: 77 BPM | RESPIRATION RATE: 16 BRPM | DIASTOLIC BLOOD PRESSURE: 60 MMHG | TEMPERATURE: 98.9 F

## 2021-01-01 VITALS
WEIGHT: 129 LBS | RESPIRATION RATE: 12 BRPM | SYSTOLIC BLOOD PRESSURE: 106 MMHG | OXYGEN SATURATION: 98 % | BODY MASS INDEX: 17.99 KG/M2 | HEART RATE: 118 BPM | TEMPERATURE: 98.5 F | DIASTOLIC BLOOD PRESSURE: 61 MMHG

## 2021-01-01 VITALS
SYSTOLIC BLOOD PRESSURE: 108 MMHG | TEMPERATURE: 98.7 F | OXYGEN SATURATION: 98 % | BODY MASS INDEX: 21.77 KG/M2 | DIASTOLIC BLOOD PRESSURE: 68 MMHG | HEART RATE: 88 BPM | WEIGHT: 153.9 LBS

## 2021-01-01 VITALS
DIASTOLIC BLOOD PRESSURE: 74 MMHG | RESPIRATION RATE: 16 BRPM | SYSTOLIC BLOOD PRESSURE: 115 MMHG | BODY MASS INDEX: 20.92 KG/M2 | WEIGHT: 150 LBS | TEMPERATURE: 98.2 F | OXYGEN SATURATION: 97 % | HEART RATE: 106 BPM

## 2021-01-01 VITALS
OXYGEN SATURATION: 95 % | TEMPERATURE: 98.7 F | HEIGHT: 71 IN | HEART RATE: 87 BPM | BODY MASS INDEX: 22.31 KG/M2 | DIASTOLIC BLOOD PRESSURE: 73 MMHG | WEIGHT: 159.4 LBS | SYSTOLIC BLOOD PRESSURE: 117 MMHG

## 2021-01-01 VITALS
WEIGHT: 123.4 LBS | RESPIRATION RATE: 16 BRPM | BODY MASS INDEX: 17.21 KG/M2 | DIASTOLIC BLOOD PRESSURE: 69 MMHG | TEMPERATURE: 98.2 F | SYSTOLIC BLOOD PRESSURE: 114 MMHG | OXYGEN SATURATION: 99 % | HEART RATE: 97 BPM

## 2021-01-01 VITALS
BODY MASS INDEX: 21.86 KG/M2 | HEART RATE: 68 BPM | SYSTOLIC BLOOD PRESSURE: 106 MMHG | DIASTOLIC BLOOD PRESSURE: 67 MMHG | WEIGHT: 152.7 LBS | OXYGEN SATURATION: 98 % | TEMPERATURE: 98.5 F | RESPIRATION RATE: 18 BRPM | HEIGHT: 70 IN

## 2021-01-01 VITALS
RESPIRATION RATE: 18 BRPM | DIASTOLIC BLOOD PRESSURE: 65 MMHG | SYSTOLIC BLOOD PRESSURE: 110 MMHG | HEART RATE: 110 BPM | TEMPERATURE: 99.2 F | OXYGEN SATURATION: 100 %

## 2021-01-01 VITALS
RESPIRATION RATE: 20 BRPM | OXYGEN SATURATION: 99 % | DIASTOLIC BLOOD PRESSURE: 57 MMHG | HEART RATE: 84 BPM | SYSTOLIC BLOOD PRESSURE: 88 MMHG

## 2021-01-01 VITALS
OXYGEN SATURATION: 97 % | HEART RATE: 67 BPM | RESPIRATION RATE: 20 BRPM | HEIGHT: 71 IN | TEMPERATURE: 97.8 F | SYSTOLIC BLOOD PRESSURE: 127 MMHG | BODY MASS INDEX: 19.59 KG/M2 | WEIGHT: 139.9 LBS | DIASTOLIC BLOOD PRESSURE: 65 MMHG

## 2021-01-01 VITALS
DIASTOLIC BLOOD PRESSURE: 74 MMHG | RESPIRATION RATE: 18 BRPM | OXYGEN SATURATION: 100 % | HEART RATE: 104 BPM | SYSTOLIC BLOOD PRESSURE: 107 MMHG | WEIGHT: 166 LBS | BODY MASS INDEX: 23.48 KG/M2 | TEMPERATURE: 98.1 F

## 2021-01-01 VITALS
WEIGHT: 159.4 LBS | TEMPERATURE: 98 F | BODY MASS INDEX: 17.85 KG/M2 | RESPIRATION RATE: 20 BRPM | HEART RATE: 109 BPM | SYSTOLIC BLOOD PRESSURE: 110 MMHG | DIASTOLIC BLOOD PRESSURE: 66 MMHG | HEART RATE: 92 BPM | TEMPERATURE: 98.2 F | HEIGHT: 71 IN | WEIGHT: 128 LBS | OXYGEN SATURATION: 98 % | OXYGEN SATURATION: 99 % | SYSTOLIC BLOOD PRESSURE: 85 MMHG | DIASTOLIC BLOOD PRESSURE: 60 MMHG | BODY MASS INDEX: 22.31 KG/M2

## 2021-01-01 VITALS
WEIGHT: 160 LBS | RESPIRATION RATE: 16 BRPM | OXYGEN SATURATION: 98 % | DIASTOLIC BLOOD PRESSURE: 69 MMHG | TEMPERATURE: 98.5 F | SYSTOLIC BLOOD PRESSURE: 100 MMHG | BODY MASS INDEX: 22.63 KG/M2 | HEART RATE: 94 BPM

## 2021-01-01 VITALS
DIASTOLIC BLOOD PRESSURE: 63 MMHG | SYSTOLIC BLOOD PRESSURE: 120 MMHG | TEMPERATURE: 98.5 F | HEIGHT: 71 IN | OXYGEN SATURATION: 98 % | HEART RATE: 88 BPM | BODY MASS INDEX: 19.67 KG/M2 | RESPIRATION RATE: 20 BRPM | WEIGHT: 140.5 LBS

## 2021-01-01 VITALS
HEART RATE: 94 BPM | OXYGEN SATURATION: 94 % | DIASTOLIC BLOOD PRESSURE: 60 MMHG | SYSTOLIC BLOOD PRESSURE: 108 MMHG | TEMPERATURE: 98.6 F | RESPIRATION RATE: 16 BRPM

## 2021-01-01 VITALS
DIASTOLIC BLOOD PRESSURE: 77 MMHG | RESPIRATION RATE: 18 BRPM | HEART RATE: 106 BPM | OXYGEN SATURATION: 96 % | HEIGHT: 71 IN | WEIGHT: 140.5 LBS | BODY MASS INDEX: 19.67 KG/M2 | SYSTOLIC BLOOD PRESSURE: 127 MMHG | TEMPERATURE: 98.5 F

## 2021-01-01 VITALS
DIASTOLIC BLOOD PRESSURE: 62 MMHG | HEART RATE: 114 BPM | BODY MASS INDEX: 21.98 KG/M2 | TEMPERATURE: 98.4 F | SYSTOLIC BLOOD PRESSURE: 121 MMHG | WEIGHT: 153.2 LBS | OXYGEN SATURATION: 98 %

## 2021-01-01 VITALS
DIASTOLIC BLOOD PRESSURE: 67 MMHG | SYSTOLIC BLOOD PRESSURE: 119 MMHG | WEIGHT: 148 LBS | HEART RATE: 109 BPM | OXYGEN SATURATION: 96 % | TEMPERATURE: 98.5 F | BODY MASS INDEX: 20.64 KG/M2

## 2021-01-01 VITALS
RESPIRATION RATE: 18 BRPM | DIASTOLIC BLOOD PRESSURE: 76 MMHG | HEART RATE: 74 BPM | BODY MASS INDEX: 21.78 KG/M2 | SYSTOLIC BLOOD PRESSURE: 114 MMHG | TEMPERATURE: 97.8 F | OXYGEN SATURATION: 97 % | WEIGHT: 151.8 LBS

## 2021-01-01 VITALS
TEMPERATURE: 98.2 F | DIASTOLIC BLOOD PRESSURE: 66 MMHG | BODY MASS INDEX: 22.77 KG/M2 | HEART RATE: 72 BPM | SYSTOLIC BLOOD PRESSURE: 110 MMHG | WEIGHT: 161 LBS

## 2021-01-01 VITALS
DIASTOLIC BLOOD PRESSURE: 61 MMHG | HEART RATE: 116 BPM | OXYGEN SATURATION: 97 % | TEMPERATURE: 97.9 F | WEIGHT: 150.7 LBS | BODY MASS INDEX: 21.1 KG/M2 | SYSTOLIC BLOOD PRESSURE: 115 MMHG | HEIGHT: 71 IN

## 2021-01-01 VITALS
SYSTOLIC BLOOD PRESSURE: 105 MMHG | TEMPERATURE: 98.3 F | HEART RATE: 76 BPM | RESPIRATION RATE: 16 BRPM | OXYGEN SATURATION: 95 % | DIASTOLIC BLOOD PRESSURE: 68 MMHG

## 2021-01-01 VITALS
RESPIRATION RATE: 12 BRPM | HEART RATE: 118 BPM | WEIGHT: 129 LBS | OXYGEN SATURATION: 98 % | BODY MASS INDEX: 17.99 KG/M2 | DIASTOLIC BLOOD PRESSURE: 61 MMHG | TEMPERATURE: 98.5 F | SYSTOLIC BLOOD PRESSURE: 106 MMHG

## 2021-01-01 VITALS
HEART RATE: 74 BPM | BODY MASS INDEX: 22.1 KG/M2 | SYSTOLIC BLOOD PRESSURE: 102 MMHG | WEIGHT: 154 LBS | DIASTOLIC BLOOD PRESSURE: 61 MMHG | OXYGEN SATURATION: 100 % | TEMPERATURE: 98.2 F

## 2021-01-01 VITALS
TEMPERATURE: 97.9 F | OXYGEN SATURATION: 98 % | DIASTOLIC BLOOD PRESSURE: 70 MMHG | HEART RATE: 61 BPM | HEIGHT: 71 IN | RESPIRATION RATE: 18 BRPM | WEIGHT: 150 LBS | BODY MASS INDEX: 21 KG/M2 | SYSTOLIC BLOOD PRESSURE: 104 MMHG

## 2021-01-01 VITALS — WEIGHT: 168 LBS | BODY MASS INDEX: 23.52 KG/M2 | HEIGHT: 71 IN

## 2021-01-01 VITALS
DIASTOLIC BLOOD PRESSURE: 75 MMHG | SYSTOLIC BLOOD PRESSURE: 108 MMHG | TEMPERATURE: 98.1 F | OXYGEN SATURATION: 97 % | HEART RATE: 68 BPM | RESPIRATION RATE: 18 BRPM | WEIGHT: 162.6 LBS | BODY MASS INDEX: 23 KG/M2

## 2021-01-01 VITALS — BODY MASS INDEX: 21.33 KG/M2 | WEIGHT: 149 LBS | HEIGHT: 70 IN

## 2021-01-01 VITALS — BODY MASS INDEX: 22.62 KG/M2 | WEIGHT: 158 LBS | HEIGHT: 70 IN

## 2021-01-01 VITALS — WEIGHT: 150 LBS | BODY MASS INDEX: 21.22 KG/M2

## 2021-01-01 VITALS — WEIGHT: 177.7 LBS | BODY MASS INDEX: 24.96 KG/M2

## 2021-01-01 VITALS
WEIGHT: 149.9 LBS | BODY MASS INDEX: 20.98 KG/M2 | HEART RATE: 83 BPM | SYSTOLIC BLOOD PRESSURE: 121 MMHG | HEIGHT: 71 IN | RESPIRATION RATE: 16 BRPM | OXYGEN SATURATION: 98 % | DIASTOLIC BLOOD PRESSURE: 69 MMHG | TEMPERATURE: 98.8 F

## 2021-01-01 VITALS
HEART RATE: 91 BPM | BODY MASS INDEX: 18.61 KG/M2 | WEIGHT: 133.4 LBS | RESPIRATION RATE: 16 BRPM | SYSTOLIC BLOOD PRESSURE: 102 MMHG | DIASTOLIC BLOOD PRESSURE: 69 MMHG | TEMPERATURE: 98.1 F

## 2021-01-01 VITALS — HEIGHT: 71 IN | BODY MASS INDEX: 21.7 KG/M2 | WEIGHT: 155 LBS

## 2021-01-01 VITALS
HEART RATE: 93 BPM | BODY MASS INDEX: 21.95 KG/M2 | SYSTOLIC BLOOD PRESSURE: 108 MMHG | WEIGHT: 155.2 LBS | OXYGEN SATURATION: 98 % | TEMPERATURE: 97.9 F | DIASTOLIC BLOOD PRESSURE: 72 MMHG | RESPIRATION RATE: 16 BRPM

## 2021-01-01 VITALS — WEIGHT: 168 LBS | BODY MASS INDEX: 23.79 KG/M2

## 2021-01-01 VITALS
BODY MASS INDEX: 17.59 KG/M2 | OXYGEN SATURATION: 97 % | DIASTOLIC BLOOD PRESSURE: 61 MMHG | SYSTOLIC BLOOD PRESSURE: 91 MMHG | TEMPERATURE: 98.4 F | RESPIRATION RATE: 24 BRPM | HEART RATE: 120 BPM | WEIGHT: 126.1 LBS

## 2021-01-01 VITALS
HEART RATE: 92 BPM | SYSTOLIC BLOOD PRESSURE: 100 MMHG | DIASTOLIC BLOOD PRESSURE: 64 MMHG | WEIGHT: 147 LBS | BODY MASS INDEX: 20.5 KG/M2 | TEMPERATURE: 98.5 F | OXYGEN SATURATION: 98 %

## 2021-01-01 VITALS
RESPIRATION RATE: 22 BRPM | WEIGHT: 139 LBS | DIASTOLIC BLOOD PRESSURE: 64 MMHG | OXYGEN SATURATION: 93 % | TEMPERATURE: 98.6 F | HEART RATE: 133 BPM | SYSTOLIC BLOOD PRESSURE: 101 MMHG | BODY MASS INDEX: 19.39 KG/M2

## 2021-01-01 VITALS — BODY MASS INDEX: 22.51 KG/M2 | WEIGHT: 158 LBS

## 2021-01-01 VITALS — BODY MASS INDEX: 20.72 KG/M2 | WEIGHT: 148 LBS

## 2021-01-01 VITALS — WEIGHT: 152 LBS | BODY MASS INDEX: 21.35 KG/M2

## 2021-01-01 DIAGNOSIS — C34.91 PRIMARY MALIGNANT NEOPLASM OF RIGHT LUNG METASTATIC TO OTHER SITE (H): ICD-10-CM

## 2021-01-01 DIAGNOSIS — T45.1X5A CHEMOTHERAPY INDUCED NAUSEA AND VOMITING: ICD-10-CM

## 2021-01-01 DIAGNOSIS — J90 PLEURAL EFFUSION: ICD-10-CM

## 2021-01-01 DIAGNOSIS — C34.91 ADENOCARCINOMA OF RIGHT LUNG (H): Primary | ICD-10-CM

## 2021-01-01 DIAGNOSIS — J91.0 MALIGNANT PLEURAL EFFUSION (H): ICD-10-CM

## 2021-01-01 DIAGNOSIS — R11.2 CHEMOTHERAPY INDUCED NAUSEA AND VOMITING: ICD-10-CM

## 2021-01-01 DIAGNOSIS — C34.91 PRIMARY MALIGNANT NEOPLASM OF RIGHT LUNG METASTATIC TO OTHER SITE (H): Primary | ICD-10-CM

## 2021-01-01 DIAGNOSIS — I26.99 OTHER PULMONARY EMBOLISM WITHOUT ACUTE COR PULMONALE, UNSPECIFIED CHRONICITY (H): ICD-10-CM

## 2021-01-01 DIAGNOSIS — M75.101 ROTATOR CUFF SYNDROME, RIGHT: ICD-10-CM

## 2021-01-01 DIAGNOSIS — C34.91 MALIGNANT NEOPLASM OF RIGHT LUNG, UNSPECIFIED PART OF LUNG (H): Primary | ICD-10-CM

## 2021-01-01 DIAGNOSIS — Z11.59 ENCOUNTER FOR SCREENING FOR OTHER VIRAL DISEASES: ICD-10-CM

## 2021-01-01 DIAGNOSIS — L85.3 DRY SKIN: ICD-10-CM

## 2021-01-01 DIAGNOSIS — Z71.89 OTHER SPECIFIED COUNSELING: ICD-10-CM

## 2021-01-01 DIAGNOSIS — C61 PROSTATE CANCER (H): Primary | ICD-10-CM

## 2021-01-01 DIAGNOSIS — R11.0 NAUSEA: Primary | ICD-10-CM

## 2021-01-01 DIAGNOSIS — L08.9 INFECTED SEBACEOUS CYST: Primary | ICD-10-CM

## 2021-01-01 DIAGNOSIS — M54.59 MECHANICAL LOW BACK PAIN: ICD-10-CM

## 2021-01-01 DIAGNOSIS — M25.532 LEFT WRIST PAIN: ICD-10-CM

## 2021-01-01 DIAGNOSIS — M67.919 DISORDER OF BURSAE AND TENDONS IN SHOULDER REGION: ICD-10-CM

## 2021-01-01 DIAGNOSIS — M62.81 GENERALIZED MUSCLE WEAKNESS: ICD-10-CM

## 2021-01-01 DIAGNOSIS — R21 RASH AND NONSPECIFIC SKIN ERUPTION: ICD-10-CM

## 2021-01-01 DIAGNOSIS — I26.93 SINGLE SUBSEGMENTAL PULMONARY EMBOLISM WITHOUT ACUTE COR PULMONALE (H): ICD-10-CM

## 2021-01-01 DIAGNOSIS — C61 PROSTATE CANCER (H): ICD-10-CM

## 2021-01-01 DIAGNOSIS — C18.3 MALIGNANT NEOPLASM OF HEPATIC FLEXURE (H): Primary | ICD-10-CM

## 2021-01-01 DIAGNOSIS — C34.91 ADENOCARCINOMA OF RIGHT LUNG (H): ICD-10-CM

## 2021-01-01 DIAGNOSIS — C34.91 MALIGNANT NEOPLASM OF RIGHT LUNG, UNSPECIFIED PART OF LUNG (H): ICD-10-CM

## 2021-01-01 DIAGNOSIS — Z21 ASYMPTOMATIC HIV INFECTION (H): ICD-10-CM

## 2021-01-01 DIAGNOSIS — Z20.822 ENCOUNTER FOR LABORATORY TESTING FOR COVID-19 VIRUS: ICD-10-CM

## 2021-01-01 DIAGNOSIS — I26.93 SINGLE SUBSEGMENTAL PULMONARY EMBOLISM WITHOUT ACUTE COR PULMONALE (H): Primary | ICD-10-CM

## 2021-01-01 DIAGNOSIS — B20 HUMAN IMMUNODEFICIENCY VIRUS (HIV) DISEASE (H): Primary | ICD-10-CM

## 2021-01-01 DIAGNOSIS — Z71.84 TRAVEL ADVICE ENCOUNTER: Primary | ICD-10-CM

## 2021-01-01 DIAGNOSIS — C34.90 MALIGNANT NEOPLASM OF LUNG, UNSPECIFIED LATERALITY, UNSPECIFIED PART OF LUNG (H): Primary | ICD-10-CM

## 2021-01-01 DIAGNOSIS — K59.00 CONSTIPATION, UNSPECIFIED CONSTIPATION TYPE: ICD-10-CM

## 2021-01-01 DIAGNOSIS — B20 AIDS (H): ICD-10-CM

## 2021-01-01 DIAGNOSIS — T50.905A MEDICATION SIDE EFFECTS, INITIAL ENCOUNTER: ICD-10-CM

## 2021-01-01 DIAGNOSIS — B20 AIDS (ACQUIRED IMMUNE DEFICIENCY SYNDROME) (H): Primary | ICD-10-CM

## 2021-01-01 DIAGNOSIS — L72.3 INFECTED SEBACEOUS CYST: Primary | ICD-10-CM

## 2021-01-01 DIAGNOSIS — I48.0 PAROXYSMAL ATRIAL FIBRILLATION (H): ICD-10-CM

## 2021-01-01 DIAGNOSIS — C79.31 BRAIN METASTASIS: ICD-10-CM

## 2021-01-01 DIAGNOSIS — J96.01 ACUTE HYPOXEMIC RESPIRATORY FAILURE (H): ICD-10-CM

## 2021-01-01 DIAGNOSIS — R63.0 ANOREXIA: ICD-10-CM

## 2021-01-01 DIAGNOSIS — C34.91 ADENOCARCINOMA, LUNG, RIGHT (H): Primary | ICD-10-CM

## 2021-01-01 DIAGNOSIS — M75.101 ROTATOR CUFF SYNDROME, RIGHT: Primary | ICD-10-CM

## 2021-01-01 DIAGNOSIS — N18.31 STAGE 3A CHRONIC KIDNEY DISEASE (H): ICD-10-CM

## 2021-01-01 DIAGNOSIS — B00.9 HSV (HERPES SIMPLEX VIRUS) INFECTION: ICD-10-CM

## 2021-01-01 DIAGNOSIS — C34.90 MALIGNANT NEOPLASM OF LUNG, UNSPECIFIED LATERALITY, UNSPECIFIED PART OF LUNG (H): ICD-10-CM

## 2021-01-01 DIAGNOSIS — J90 PLEURAL EFFUSION, RIGHT: Primary | ICD-10-CM

## 2021-01-01 DIAGNOSIS — R53.83 OTHER FATIGUE: ICD-10-CM

## 2021-01-01 DIAGNOSIS — R05.9 COUGH: ICD-10-CM

## 2021-01-01 DIAGNOSIS — B20 HUMAN IMMUNODEFICIENCY VIRUS (HIV) DISEASE (H): ICD-10-CM

## 2021-01-01 DIAGNOSIS — E86.0 DEHYDRATION: ICD-10-CM

## 2021-01-01 DIAGNOSIS — C78.2 PLEURAL METASTASIS: ICD-10-CM

## 2021-01-01 DIAGNOSIS — M71.9 DISORDER OF BURSAE AND TENDONS IN SHOULDER REGION: ICD-10-CM

## 2021-01-01 DIAGNOSIS — D50.0 IRON DEFICIENCY ANEMIA DUE TO CHRONIC BLOOD LOSS: ICD-10-CM

## 2021-01-01 DIAGNOSIS — C34.91 ADENOCARCINOMA OF LUNG, STAGE 2, RIGHT (H): ICD-10-CM

## 2021-01-01 DIAGNOSIS — M75.121 NONTRAUMATIC COMPLETE TEAR OF RIGHT ROTATOR CUFF: ICD-10-CM

## 2021-01-01 DIAGNOSIS — I48.0 PAROXYSMAL ATRIAL FIBRILLATION (H): Primary | ICD-10-CM

## 2021-01-01 DIAGNOSIS — J90 PLEURAL EFFUSION: Primary | ICD-10-CM

## 2021-01-01 DIAGNOSIS — C79.31 BRAIN METASTASIS: Primary | ICD-10-CM

## 2021-01-01 DIAGNOSIS — R13.19 ESOPHAGEAL DYSPHAGIA: Primary | ICD-10-CM

## 2021-01-01 DIAGNOSIS — J96.01 ACUTE RESPIRATORY FAILURE WITH HYPOXIA (H): ICD-10-CM

## 2021-01-01 DIAGNOSIS — A41.9 ACUTE SEPSIS (H): ICD-10-CM

## 2021-01-01 DIAGNOSIS — D50.9 IRON DEFICIENCY ANEMIA, UNSPECIFIED IRON DEFICIENCY ANEMIA TYPE: ICD-10-CM

## 2021-01-01 DIAGNOSIS — B20 AIDS (ACQUIRED IMMUNE DEFICIENCY SYNDROME) (H): ICD-10-CM

## 2021-01-01 DIAGNOSIS — C18.3 MALIGNANT NEOPLASM OF HEPATIC FLEXURE (H): ICD-10-CM

## 2021-01-01 LAB
ABO/RH(D): NORMAL
ALBUMIN SERPL-MCNC: 2.1 G/DL (ref 3.5–5)
ALBUMIN SERPL-MCNC: 2.2 G/DL (ref 3.5–5)
ALBUMIN SERPL-MCNC: 2.2 G/DL (ref 3.5–5)
ALBUMIN SERPL-MCNC: 2.5 G/DL (ref 3.5–5)
ALBUMIN SERPL-MCNC: 2.6 G/DL (ref 3.5–5)
ALBUMIN SERPL-MCNC: 2.6 G/DL (ref 3.5–5)
ALBUMIN SERPL-MCNC: 2.7 G/DL (ref 3.5–5)
ALBUMIN SERPL-MCNC: 2.7 G/DL (ref 3.5–5)
ALBUMIN SERPL-MCNC: 2.8 G/DL (ref 3.5–5)
ALBUMIN SERPL-MCNC: 2.8 G/DL (ref 3.5–5)
ALBUMIN SERPL-MCNC: 2.9 G/DL (ref 3.5–5)
ALBUMIN SERPL-MCNC: 2.9 G/DL (ref 3.5–5)
ALBUMIN SERPL-MCNC: 3 G/DL (ref 3.5–5)
ALBUMIN SERPL-MCNC: 3.2 G/DL (ref 3.5–5)
ALBUMIN SERPL-MCNC: 3.3 G/DL (ref 3.5–5)
ALBUMIN SERPL-MCNC: 3.3 G/DL (ref 3.5–5)
ALBUMIN SERPL-MCNC: 3.5 G/DL (ref 3.5–5)
ALBUMIN SERPL-MCNC: 3.6 G/DL (ref 3.5–5)
ALBUMIN UR-MCNC: NEGATIVE MG/DL
ALP SERPL-CCNC: 102 U/L (ref 45–120)
ALP SERPL-CCNC: 112 U/L (ref 45–120)
ALP SERPL-CCNC: 55 U/L (ref 45–120)
ALP SERPL-CCNC: 55 U/L (ref 45–120)
ALP SERPL-CCNC: 63 U/L (ref 45–120)
ALP SERPL-CCNC: 64 U/L (ref 45–120)
ALP SERPL-CCNC: 65 U/L (ref 45–120)
ALP SERPL-CCNC: 68 U/L (ref 45–120)
ALP SERPL-CCNC: 68 U/L (ref 45–120)
ALP SERPL-CCNC: 69 U/L (ref 45–120)
ALP SERPL-CCNC: 70 U/L (ref 45–120)
ALP SERPL-CCNC: 71 U/L (ref 45–120)
ALP SERPL-CCNC: 72 U/L (ref 45–120)
ALP SERPL-CCNC: 73 U/L (ref 45–120)
ALP SERPL-CCNC: 79 U/L (ref 45–120)
ALP SERPL-CCNC: 80 U/L (ref 45–120)
ALP SERPL-CCNC: 81 U/L (ref 45–120)
ALP SERPL-CCNC: 83 U/L (ref 45–120)
ALP SERPL-CCNC: 87 U/L (ref 45–120)
ALP SERPL-CCNC: 90 U/L (ref 45–120)
ALP SERPL-CCNC: 91 U/L (ref 45–120)
ALT SERPL W P-5'-P-CCNC: 10 U/L (ref 0–45)
ALT SERPL W P-5'-P-CCNC: 10 U/L (ref 0–45)
ALT SERPL W P-5'-P-CCNC: 11 U/L (ref 0–45)
ALT SERPL W P-5'-P-CCNC: 13 U/L (ref 0–45)
ALT SERPL W P-5'-P-CCNC: 15 U/L (ref 0–45)
ALT SERPL W P-5'-P-CCNC: 22 U/L (ref 0–45)
ALT SERPL W P-5'-P-CCNC: 23 U/L (ref 0–45)
ALT SERPL W P-5'-P-CCNC: 23 U/L (ref 0–45)
ALT SERPL W P-5'-P-CCNC: 26 U/L (ref 0–45)
ALT SERPL W P-5'-P-CCNC: 28 U/L (ref 0–45)
ALT SERPL W P-5'-P-CCNC: 31 U/L (ref 0–45)
ALT SERPL W P-5'-P-CCNC: 31 U/L (ref 0–45)
ALT SERPL W P-5'-P-CCNC: 49 U/L (ref 0–45)
ALT SERPL W P-5'-P-CCNC: 9 U/L (ref 0–45)
ALT SERPL W P-5'-P-CCNC: <9 U/L (ref 0–45)
AMYLASE FLD-CCNC: 1343 U/L
ANION GAP SERPL CALCULATED.3IONS-SCNC: 10 MMOL/L (ref 5–18)
ANION GAP SERPL CALCULATED.3IONS-SCNC: 11 MMOL/L (ref 5–18)
ANION GAP SERPL CALCULATED.3IONS-SCNC: 12 MMOL/L (ref 5–18)
ANION GAP SERPL CALCULATED.3IONS-SCNC: 13 MMOL/L (ref 5–18)
ANION GAP SERPL CALCULATED.3IONS-SCNC: 5 MMOL/L (ref 5–18)
ANION GAP SERPL CALCULATED.3IONS-SCNC: 5 MMOL/L (ref 5–18)
ANION GAP SERPL CALCULATED.3IONS-SCNC: 6 MMOL/L (ref 5–18)
ANION GAP SERPL CALCULATED.3IONS-SCNC: 7 MMOL/L (ref 5–18)
ANION GAP SERPL CALCULATED.3IONS-SCNC: 8 MMOL/L (ref 5–18)
ANION GAP SERPL CALCULATED.3IONS-SCNC: 9 MMOL/L (ref 5–18)
ANTIBODY SCREEN: NEGATIVE
APPEARANCE FLD: ABNORMAL
ARUP MISCELLANEOUS TEST: NORMAL
AST SERPL W P-5'-P-CCNC: 10 U/L (ref 0–40)
AST SERPL W P-5'-P-CCNC: 12 U/L (ref 0–40)
AST SERPL W P-5'-P-CCNC: 12 U/L (ref 0–40)
AST SERPL W P-5'-P-CCNC: 13 U/L (ref 0–40)
AST SERPL W P-5'-P-CCNC: 14 U/L (ref 0–40)
AST SERPL W P-5'-P-CCNC: 14 U/L (ref 0–40)
AST SERPL W P-5'-P-CCNC: 15 U/L (ref 0–40)
AST SERPL W P-5'-P-CCNC: 16 U/L (ref 0–40)
AST SERPL W P-5'-P-CCNC: 17 U/L (ref 0–40)
AST SERPL W P-5'-P-CCNC: 17 U/L (ref 0–40)
AST SERPL W P-5'-P-CCNC: 18 U/L (ref 0–40)
AST SERPL W P-5'-P-CCNC: 21 U/L (ref 0–40)
AST SERPL W P-5'-P-CCNC: 21 U/L (ref 0–40)
AST SERPL W P-5'-P-CCNC: 22 U/L (ref 0–40)
AST SERPL W P-5'-P-CCNC: 22 U/L (ref 0–40)
AST SERPL W P-5'-P-CCNC: 23 U/L (ref 0–40)
AST SERPL W P-5'-P-CCNC: 30 U/L (ref 0–40)
AST SERPL W P-5'-P-CCNC: 33 U/L (ref 0–40)
ATRIAL RATE - MUSE: 330 BPM
ATRIAL RATE - MUSE: 59 BPM
ATRIAL RATE - MUSE: 67 BPM
ATRIAL RATE - MUSE: 68 BPM
ATRIAL RATE - MUSE: 83 BPM
ATRIAL RATE - MUSE: 97 BPM
ATRIAL RATE - MUSE: 98 BPM
BACTERIA BLD CULT: NO GROWTH
BACTERIA BLD CULT: NO GROWTH
BACTERIA SPEC CULT: NO GROWTH
BASE EXCESS BLDA CALC-SCNC: -0.2 MMOL/L
BASE EXCESS BLDA CALC-SCNC: -1.6 MMOL/L
BASE EXCESS BLDV CALC-SCNC: 0.8 MMOL/L
BASOPHILS # BLD AUTO: 0 10E3/UL (ref 0–0.2)
BASOPHILS # BLD AUTO: 0 THOU/UL (ref 0–0.2)
BASOPHILS NFR BLD AUTO: 0 %
BASOPHILS NFR BLD AUTO: 0 % (ref 0–2)
BASOPHILS NFR BLD AUTO: 1 %
BASOPHILS NFR BLD AUTO: 1 % (ref 0–2)
BASOPHILS NFR BLD AUTO: 1 % (ref 0–2)
BILIRUB SERPL-MCNC: 0.2 MG/DL (ref 0–1)
BILIRUB SERPL-MCNC: 0.2 MG/DL (ref 0–1)
BILIRUB SERPL-MCNC: 0.3 MG/DL (ref 0–1)
BILIRUB SERPL-MCNC: 0.4 MG/DL (ref 0–1)
BILIRUB SERPL-MCNC: 0.5 MG/DL (ref 0–1)
BILIRUB SERPL-MCNC: 0.6 MG/DL (ref 0–1)
BILIRUB SERPL-MCNC: 0.7 MG/DL (ref 0–1)
BLD PROD TYP BPU: NORMAL
BLD PROD TYP BPU: NORMAL
BLOOD COMPONENT TYPE: NORMAL
BLOOD COMPONENT TYPE: NORMAL
BNP SERPL-MCNC: 453 PG/ML (ref 0–64)
BUN SERPL-MCNC: 12 MG/DL (ref 8–22)
BUN SERPL-MCNC: 12 MG/DL (ref 8–22)
BUN SERPL-MCNC: 13 MG/DL (ref 8–22)
BUN SERPL-MCNC: 14 MG/DL (ref 8–22)
BUN SERPL-MCNC: 15 MG/DL (ref 8–22)
BUN SERPL-MCNC: 16 MG/DL (ref 8–22)
BUN SERPL-MCNC: 16 MG/DL (ref 8–22)
BUN SERPL-MCNC: 17 MG/DL (ref 8–22)
BUN SERPL-MCNC: 18 MG/DL (ref 8–22)
BUN SERPL-MCNC: 19 MG/DL (ref 8–22)
BUN SERPL-MCNC: 20 MG/DL (ref 8–22)
BUN SERPL-MCNC: 21 MG/DL (ref 8–22)
BUN SERPL-MCNC: 22 MG/DL (ref 8–22)
BUN SERPL-MCNC: 23 MG/DL (ref 8–22)
BUN SERPL-MCNC: 25 MG/DL (ref 8–22)
BUN SERPL-MCNC: 26 MG/DL (ref 8–22)
BUN SERPL-MCNC: 27 MG/DL (ref 8–22)
BUN SERPL-MCNC: 29 MG/DL (ref 8–22)
BUN SERPL-MCNC: 29 MG/DL (ref 8–22)
BUN SERPL-MCNC: 32 MG/DL (ref 8–22)
BUN SERPL-MCNC: 33 MG/DL (ref 8–22)
BUN SERPL-MCNC: 36 MG/DL (ref 8–22)
BUN SERPL-MCNC: 6 MG/DL (ref 8–22)
BUN SERPL-MCNC: 7 MG/DL (ref 8–22)
CALCIUM SERPL-MCNC: 10 MG/DL (ref 8.5–10.5)
CALCIUM SERPL-MCNC: 10.1 MG/DL (ref 8.5–10.5)
CALCIUM SERPL-MCNC: 10.3 MG/DL (ref 8.5–10.5)
CALCIUM SERPL-MCNC: 7.6 MG/DL (ref 8.5–10.5)
CALCIUM SERPL-MCNC: 7.8 MG/DL (ref 8.5–10.5)
CALCIUM SERPL-MCNC: 8.3 MG/DL (ref 8.5–10.5)
CALCIUM SERPL-MCNC: 8.4 MG/DL (ref 8.5–10.5)
CALCIUM SERPL-MCNC: 8.5 MG/DL (ref 8.5–10.5)
CALCIUM SERPL-MCNC: 8.7 MG/DL (ref 8.5–10.5)
CALCIUM SERPL-MCNC: 8.7 MG/DL (ref 8.5–10.5)
CALCIUM SERPL-MCNC: 8.8 MG/DL (ref 8.5–10.5)
CALCIUM SERPL-MCNC: 8.9 MG/DL (ref 8.5–10.5)
CALCIUM SERPL-MCNC: 9 MG/DL (ref 8.5–10.5)
CALCIUM SERPL-MCNC: 9.1 MG/DL (ref 8.5–10.5)
CALCIUM SERPL-MCNC: 9.1 MG/DL (ref 8.5–10.5)
CALCIUM SERPL-MCNC: 9.2 MG/DL (ref 8.5–10.5)
CALCIUM SERPL-MCNC: 9.3 MG/DL (ref 8.5–10.5)
CALCIUM SERPL-MCNC: 9.5 MG/DL (ref 8.5–10.5)
CALCIUM SERPL-MCNC: 9.6 MG/DL (ref 8.5–10.5)
CALCIUM SERPL-MCNC: 9.7 MG/DL (ref 8.5–10.5)
CALCIUM SERPL-MCNC: 9.8 MG/DL (ref 8.5–10.5)
CALCIUM SERPL-MCNC: 9.8 MG/DL (ref 8.5–10.5)
CAP COMMENT: ABNORMAL
CD3 CELLS # BLD: 165 CELLS/UL (ref 603–2990)
CD3 CELLS # BLD: 248 CELLS/UL (ref 603–2990)
CD3 CELLS NFR BLD: 78 % (ref 49–84)
CD3 CELLS NFR BLD: 82 % (ref 49–84)
CD3+CD4+ CELLS # BLD: 40 CELLS/UL (ref 441–2156)
CD3+CD4+ CELLS # BLD: 77 CELLS/UL (ref 441–2156)
CD3+CD4+ CELLS NFR BLD: 20 % (ref 28–63)
CD3+CD4+ CELLS NFR BLD: 24 % (ref 28–63)
CD3+CD4+ CELLS/CD3+CD8+ CLL BLD: 0.33 % (ref 1.4–2.6)
CD3+CD4+ CELLS/CD3+CD8+ CLL BLD: 0.47 % (ref 1.4–2.6)
CD3+CD8+ CELLS # BLD: 123 CELLS/UL (ref 125–1312)
CD3+CD8+ CELLS # BLD: 165 CELLS/UL (ref 125–1312)
CD3+CD8+ CELLS NFR BLD: 52 % (ref 10–40)
CD3+CD8+ CELLS NFR BLD: 61 % (ref 10–40)
CHLORIDE BLD-SCNC: 100 MMOL/L (ref 98–107)
CHLORIDE BLD-SCNC: 102 MMOL/L (ref 98–107)
CHLORIDE BLD-SCNC: 103 MMOL/L (ref 98–107)
CHLORIDE BLD-SCNC: 104 MMOL/L (ref 98–107)
CHLORIDE BLD-SCNC: 105 MMOL/L (ref 98–107)
CHLORIDE BLD-SCNC: 106 MMOL/L (ref 98–107)
CHLORIDE BLD-SCNC: 107 MMOL/L (ref 98–107)
CHLORIDE BLD-SCNC: 107 MMOL/L (ref 98–107)
CHLORIDE BLD-SCNC: 108 MMOL/L (ref 98–107)
CHLORIDE BLD-SCNC: 108 MMOL/L (ref 98–107)
CHLORIDE BLD-SCNC: 109 MMOL/L (ref 98–107)
CHLORIDE BLD-SCNC: 109 MMOL/L (ref 98–107)
CHLORIDE BLD-SCNC: 110 MMOL/L (ref 98–107)
CHLORIDE BLD-SCNC: 111 MMOL/L (ref 98–107)
CHLORIDE BLD-SCNC: 112 MMOL/L (ref 98–107)
CHLORIDE BLD-SCNC: 113 MMOL/L (ref 98–107)
CHLORIDE BLD-SCNC: 115 MMOL/L (ref 98–107)
CHLORIDE BLD-SCNC: 115 MMOL/L (ref 98–107)
CHLORIDE BLD-SCNC: 116 MMOL/L (ref 98–107)
CO2 SERPL-SCNC: 17 MMOL/L (ref 22–31)
CO2 SERPL-SCNC: 19 MMOL/L (ref 22–31)
CO2 SERPL-SCNC: 19 MMOL/L (ref 22–31)
CO2 SERPL-SCNC: 20 MMOL/L (ref 22–31)
CO2 SERPL-SCNC: 21 MMOL/L (ref 22–31)
CO2 SERPL-SCNC: 21 MMOL/L (ref 22–31)
CO2 SERPL-SCNC: 22 MMOL/L (ref 22–31)
CO2 SERPL-SCNC: 23 MMOL/L (ref 22–31)
CO2 SERPL-SCNC: 24 MMOL/L (ref 22–31)
CO2 SERPL-SCNC: 25 MMOL/L (ref 22–31)
CO2 SERPL-SCNC: 26 MMOL/L (ref 22–31)
CO2 SERPL-SCNC: 27 MMOL/L (ref 22–31)
CO2 SERPL-SCNC: 27 MMOL/L (ref 22–31)
CODING SYSTEM: NORMAL
CODING SYSTEM: NORMAL
COHGB MFR BLD: 54.9 % (ref 95–96)
COHGB MFR BLD: 78.2 % (ref 95–96)
COLOR FLD: ABNORMAL
CREAT BLD-MCNC: 1.4 MG/DL (ref 0.7–1.3)
CREAT BLD-MCNC: 1.7 MG/DL (ref 0.7–1.3)
CREAT SERPL-MCNC: 0.76 MG/DL (ref 0.7–1.3)
CREAT SERPL-MCNC: 0.83 MG/DL (ref 0.7–1.3)
CREAT SERPL-MCNC: 0.83 MG/DL (ref 0.7–1.3)
CREAT SERPL-MCNC: 0.84 MG/DL (ref 0.7–1.3)
CREAT SERPL-MCNC: 0.86 MG/DL (ref 0.7–1.3)
CREAT SERPL-MCNC: 0.87 MG/DL (ref 0.7–1.3)
CREAT SERPL-MCNC: 0.92 MG/DL (ref 0.7–1.3)
CREAT SERPL-MCNC: 0.98 MG/DL (ref 0.7–1.3)
CREAT SERPL-MCNC: 0.99 MG/DL (ref 0.7–1.3)
CREAT SERPL-MCNC: 1.02 MG/DL (ref 0.7–1.3)
CREAT SERPL-MCNC: 1.04 MG/DL (ref 0.7–1.3)
CREAT SERPL-MCNC: 1.05 MG/DL (ref 0.7–1.3)
CREAT SERPL-MCNC: 1.09 MG/DL (ref 0.7–1.3)
CREAT SERPL-MCNC: 1.15 MG/DL (ref 0.7–1.3)
CREAT SERPL-MCNC: 1.19 MG/DL (ref 0.7–1.3)
CREAT SERPL-MCNC: 1.2 MG/DL (ref 0.7–1.3)
CREAT SERPL-MCNC: 1.2 MG/DL (ref 0.7–1.3)
CREAT SERPL-MCNC: 1.27 MG/DL (ref 0.7–1.3)
CREAT SERPL-MCNC: 1.29 MG/DL (ref 0.7–1.3)
CREAT SERPL-MCNC: 1.31 MG/DL (ref 0.7–1.3)
CREAT SERPL-MCNC: 1.32 MG/DL (ref 0.7–1.3)
CREAT SERPL-MCNC: 1.36 MG/DL (ref 0.7–1.3)
CREAT SERPL-MCNC: 1.39 MG/DL (ref 0.7–1.3)
CREAT SERPL-MCNC: 1.44 MG/DL (ref 0.7–1.3)
CREAT SERPL-MCNC: 1.44 MG/DL (ref 0.7–1.3)
CREAT SERPL-MCNC: 1.46 MG/DL (ref 0.7–1.3)
CREAT SERPL-MCNC: 1.48 MG/DL (ref 0.7–1.3)
CREAT SERPL-MCNC: 1.54 MG/DL (ref 0.7–1.3)
CREAT SERPL-MCNC: 1.54 MG/DL (ref 0.7–1.3)
CREAT SERPL-MCNC: 1.56 MG/DL (ref 0.7–1.3)
CROSSMATCH: NORMAL
CROSSMATCH: NORMAL
DIASTOLIC BLOOD PRESSURE - MUSE: NORMAL
DIASTOLIC BLOOD PRESSURE - MUSE: NORMAL MMHG
ELLIPTOCYTES BLD QL SMEAR: SLIGHT
EOSINOPHIL # BLD AUTO: 0 10E3/UL (ref 0–0.7)
EOSINOPHIL # BLD AUTO: 0 THOU/UL (ref 0–0.4)
EOSINOPHIL # BLD AUTO: 0.1 10E3/UL (ref 0–0.7)
EOSINOPHIL # BLD AUTO: 0.1 THOU/UL (ref 0–0.4)
EOSINOPHIL # BLD AUTO: 0.1 THOU/UL (ref 0–0.4)
EOSINOPHIL NFR BLD AUTO: 0 %
EOSINOPHIL NFR BLD AUTO: 0 % (ref 0–6)
EOSINOPHIL NFR BLD AUTO: 1 %
EOSINOPHIL NFR BLD AUTO: 1 % (ref 0–6)
EOSINOPHIL NFR BLD AUTO: 2 %
EOSINOPHIL NFR BLD AUTO: 2 % (ref 0–6)
EOSINOPHIL NFR BLD AUTO: 3 %
EOSINOPHIL NFR BLD AUTO: 3 %
EOSINOPHIL NFR BLD AUTO: 3 % (ref 0–6)
EOSINOPHIL NFR FLD MANUAL: 1 %
ERYTHROCYTE [DISTWIDTH] IN BLOOD BY AUTOMATED COUNT: 13.4 % (ref 11–14.5)
ERYTHROCYTE [DISTWIDTH] IN BLOOD BY AUTOMATED COUNT: 13.8 % (ref 11–14.5)
ERYTHROCYTE [DISTWIDTH] IN BLOOD BY AUTOMATED COUNT: 14.6 % (ref 10–15)
ERYTHROCYTE [DISTWIDTH] IN BLOOD BY AUTOMATED COUNT: 14.7 % (ref 10–15)
ERYTHROCYTE [DISTWIDTH] IN BLOOD BY AUTOMATED COUNT: 14.8 % (ref 10–15)
ERYTHROCYTE [DISTWIDTH] IN BLOOD BY AUTOMATED COUNT: 14.9 % (ref 10–15)
ERYTHROCYTE [DISTWIDTH] IN BLOOD BY AUTOMATED COUNT: 14.9 % (ref 10–15)
ERYTHROCYTE [DISTWIDTH] IN BLOOD BY AUTOMATED COUNT: 15.1 % (ref 10–15)
ERYTHROCYTE [DISTWIDTH] IN BLOOD BY AUTOMATED COUNT: 15.2 % (ref 10–15)
ERYTHROCYTE [DISTWIDTH] IN BLOOD BY AUTOMATED COUNT: 15.2 % (ref 10–15)
ERYTHROCYTE [DISTWIDTH] IN BLOOD BY AUTOMATED COUNT: 15.2 % (ref 11–14.5)
ERYTHROCYTE [DISTWIDTH] IN BLOOD BY AUTOMATED COUNT: 15.4 % (ref 10–15)
ERYTHROCYTE [DISTWIDTH] IN BLOOD BY AUTOMATED COUNT: 15.5 % (ref 10–15)
ERYTHROCYTE [DISTWIDTH] IN BLOOD BY AUTOMATED COUNT: 15.6 % (ref 10–15)
ERYTHROCYTE [DISTWIDTH] IN BLOOD BY AUTOMATED COUNT: 15.6 % (ref 11–14.5)
ERYTHROCYTE [DISTWIDTH] IN BLOOD BY AUTOMATED COUNT: 15.7 % (ref 10–15)
ERYTHROCYTE [DISTWIDTH] IN BLOOD BY AUTOMATED COUNT: 15.8 % (ref 10–15)
ERYTHROCYTE [DISTWIDTH] IN BLOOD BY AUTOMATED COUNT: 15.8 % (ref 10–15)
ERYTHROCYTE [DISTWIDTH] IN BLOOD BY AUTOMATED COUNT: 15.9 % (ref 11–14.5)
ERYTHROCYTE [DISTWIDTH] IN BLOOD BY AUTOMATED COUNT: 15.9 % (ref 11–14.5)
ERYTHROCYTE [DISTWIDTH] IN BLOOD BY AUTOMATED COUNT: 16.2 % (ref 10–15)
ERYTHROCYTE [DISTWIDTH] IN BLOOD BY AUTOMATED COUNT: 17.4 % (ref 10–15)
ERYTHROCYTE [DISTWIDTH] IN BLOOD BY AUTOMATED COUNT: 18.2 % (ref 10–15)
ERYTHROCYTE [DISTWIDTH] IN BLOOD BY AUTOMATED COUNT: 18.4 % (ref 10–15)
ERYTHROCYTE [DISTWIDTH] IN BLOOD BY AUTOMATED COUNT: 18.5 % (ref 11–14.5)
ERYTHROCYTE [DISTWIDTH] IN BLOOD BY AUTOMATED COUNT: 19.4 % (ref 11–14.5)
ERYTHROCYTE [DISTWIDTH] IN BLOOD BY AUTOMATED COUNT: 19.7 % (ref 10–15)
ERYTHROCYTE [DISTWIDTH] IN BLOOD BY AUTOMATED COUNT: 20.7 % (ref 10–15)
ERYTHROCYTE [DISTWIDTH] IN BLOOD BY AUTOMATED COUNT: 21.2 % (ref 10–15)
ERYTHROCYTE [DISTWIDTH] IN BLOOD BY AUTOMATED COUNT: 21.8 % (ref 10–15)
ERYTHROCYTE [DISTWIDTH] IN BLOOD BY AUTOMATED COUNT: 22.5 % (ref 10–15)
FLOW: 6 LPM
FRAGMENTS BLD QL SMEAR: SLIGHT
GFR SERPL CREATININE-BSD FRML MDRD: 41 ML/MIN/1.73M2
GFR SERPL CREATININE-BSD FRML MDRD: 45 ML/MIN/1.73M2
GFR SERPL CREATININE-BSD FRML MDRD: 46 ML/MIN/1.73M2
GFR SERPL CREATININE-BSD FRML MDRD: 46 ML/MIN/1.73M2
GFR SERPL CREATININE-BSD FRML MDRD: 48 ML/MIN/1.73M2
GFR SERPL CREATININE-BSD FRML MDRD: 49 ML/MIN/1.73M2
GFR SERPL CREATININE-BSD FRML MDRD: 51 ML/MIN/1.73M2
GFR SERPL CREATININE-BSD FRML MDRD: 52 ML/MIN/1.73M2
GFR SERPL CREATININE-BSD FRML MDRD: 52 ML/MIN/1.73M2
GFR SERPL CREATININE-BSD FRML MDRD: 55 ML/MIN/1.73M2
GFR SERPL CREATININE-BSD FRML MDRD: 55 ML/MIN/1.73M2
GFR SERPL CREATININE-BSD FRML MDRD: 57 ML/MIN/1.73M2
GFR SERPL CREATININE-BSD FRML MDRD: 57 ML/MIN/1.73M2
GFR SERPL CREATININE-BSD FRML MDRD: 62 ML/MIN/1.73M2
GFR SERPL CREATININE-BSD FRML MDRD: 62 ML/MIN/1.73M2
GFR SERPL CREATININE-BSD FRML MDRD: 69 ML/MIN/1.73M2
GFR SERPL CREATININE-BSD FRML MDRD: 73 ML/MIN/1.73M2
GFR SERPL CREATININE-BSD FRML MDRD: 73 ML/MIN/1.73M2
GFR SERPL CREATININE-BSD FRML MDRD: 75 ML/MIN/1.73M2
GFR SERPL CREATININE-BSD FRML MDRD: 79 ML/MIN/1.73M2
GFR SERPL CREATININE-BSD FRML MDRD: 85 ML/MIN/1.73M2
GFR SERPL CREATININE-BSD FRML MDRD: 89 ML/MIN/1.73M2
GFR SERPL CREATININE-BSD FRML MDRD: 89 ML/MIN/1.73M2
GFR SERPL CREATININE-BSD FRML MDRD: 90 ML/MIN/1.73M2
GFR SERPL CREATININE-BSD FRML MDRD: >60 ML/MIN/1.73M2
GFR SERPL CREATININE-BSD FRML MDRD: >90 ML/MIN/1.73M2
GLUCOSE BLD-MCNC: 102 MG/DL (ref 70–125)
GLUCOSE BLD-MCNC: 106 MG/DL (ref 70–125)
GLUCOSE BLD-MCNC: 107 MG/DL (ref 70–125)
GLUCOSE BLD-MCNC: 108 MG/DL (ref 70–125)
GLUCOSE BLD-MCNC: 111 MG/DL (ref 70–125)
GLUCOSE BLD-MCNC: 116 MG/DL (ref 70–125)
GLUCOSE BLD-MCNC: 116 MG/DL (ref 70–125)
GLUCOSE BLD-MCNC: 117 MG/DL (ref 70–125)
GLUCOSE BLD-MCNC: 118 MG/DL (ref 70–125)
GLUCOSE BLD-MCNC: 121 MG/DL (ref 70–125)
GLUCOSE BLD-MCNC: 126 MG/DL (ref 70–125)
GLUCOSE BLD-MCNC: 129 MG/DL (ref 70–125)
GLUCOSE BLD-MCNC: 132 MG/DL (ref 70–125)
GLUCOSE BLD-MCNC: 135 MG/DL (ref 70–125)
GLUCOSE BLD-MCNC: 136 MG/DL (ref 70–125)
GLUCOSE BLD-MCNC: 138 MG/DL (ref 70–125)
GLUCOSE BLD-MCNC: 147 MG/DL (ref 70–125)
GLUCOSE BLD-MCNC: 147 MG/DL (ref 70–125)
GLUCOSE BLD-MCNC: 167 MG/DL (ref 70–125)
GLUCOSE BLD-MCNC: 171 MG/DL (ref 70–125)
GLUCOSE BLD-MCNC: 181 MG/DL (ref 70–125)
GLUCOSE BLD-MCNC: 80 MG/DL (ref 70–125)
GLUCOSE BLD-MCNC: 88 MG/DL (ref 70–125)
GLUCOSE BLD-MCNC: 89 MG/DL (ref 70–125)
GLUCOSE BLD-MCNC: 90 MG/DL (ref 70–125)
GLUCOSE BLD-MCNC: 93 MG/DL (ref 70–125)
GLUCOSE BLD-MCNC: 98 MG/DL (ref 70–125)
GLUCOSE BLD-MCNC: 99 MG/DL (ref 70–125)
GLUCOSE BLDC GLUCOMTR-MCNC: 79 MG/DL (ref 70–139)
GLUCOSE BLDC GLUCOMTR-MCNC: 84 MG/DL (ref 70–139)
GLUCOSE BLDC GLUCOMTR-MCNC: 86 MG/DL (ref 70–139)
GLUCOSE FLD-MCNC: 71 MG/DL
GRAM STAIN RESULT: NORMAL
GRAM STAIN RESULT: NORMAL
HCO3 BLD-SCNC: 23 MMOL/L (ref 23–29)
HCO3 BLD-SCNC: 24 MMOL/L (ref 23–29)
HCO3 BLDV-SCNC: 24 MMOL/L (ref 24–30)
HCT VFR BLD AUTO: 23 % (ref 40–53)
HCT VFR BLD AUTO: 24 % (ref 40–53)
HCT VFR BLD AUTO: 24.8 % (ref 40–53)
HCT VFR BLD AUTO: 25.1 % (ref 40–53)
HCT VFR BLD AUTO: 25.2 % (ref 40–53)
HCT VFR BLD AUTO: 26.1 % (ref 40–53)
HCT VFR BLD AUTO: 27.7 % (ref 40–53)
HCT VFR BLD AUTO: 29.1 % (ref 40–53)
HCT VFR BLD AUTO: 29.2 % (ref 40–54)
HCT VFR BLD AUTO: 30.5 % (ref 40–53)
HCT VFR BLD AUTO: 30.6 % (ref 40–54)
HCT VFR BLD AUTO: 31 % (ref 40–53)
HCT VFR BLD AUTO: 31 % (ref 40–54)
HCT VFR BLD AUTO: 31.2 % (ref 40–53)
HCT VFR BLD AUTO: 31.5 % (ref 40–53)
HCT VFR BLD AUTO: 31.8 % (ref 40–53)
HCT VFR BLD AUTO: 31.8 % (ref 40–54)
HCT VFR BLD AUTO: 32.1 % (ref 40–54)
HCT VFR BLD AUTO: 32.6 % (ref 40–53)
HCT VFR BLD AUTO: 32.8 % (ref 40–53)
HCT VFR BLD AUTO: 33.2 % (ref 40–53)
HCT VFR BLD AUTO: 33.2 % (ref 40–54)
HCT VFR BLD AUTO: 33.9 % (ref 40–54)
HCT VFR BLD AUTO: 34.4 % (ref 40–53)
HCT VFR BLD AUTO: 35 % (ref 40–53)
HCT VFR BLD AUTO: 35.5 % (ref 40–53)
HCT VFR BLD AUTO: 36.6 % (ref 40–53)
HCT VFR BLD AUTO: 36.6 % (ref 40–53)
HCT VFR BLD AUTO: 37.2 % (ref 40–54)
HCT VFR BLD AUTO: 42.8 % (ref 40–53)
HCT VFR BLD AUTO: 43.1 % (ref 40–53)
HGB BLD-MCNC: 10.1 G/DL (ref 14–18)
HGB BLD-MCNC: 10.1 G/DL (ref 14–18)
HGB BLD-MCNC: 10.2 G/DL (ref 13.3–17.7)
HGB BLD-MCNC: 10.2 G/DL (ref 14–18)
HGB BLD-MCNC: 10.4 G/DL (ref 13.3–17.7)
HGB BLD-MCNC: 10.4 G/DL (ref 14–18)
HGB BLD-MCNC: 10.5 G/DL (ref 13.3–17.7)
HGB BLD-MCNC: 10.7 G/DL (ref 13.3–17.7)
HGB BLD-MCNC: 10.7 G/DL (ref 13.3–17.7)
HGB BLD-MCNC: 10.8 G/DL (ref 13.3–17.7)
HGB BLD-MCNC: 11 G/DL (ref 14–18)
HGB BLD-MCNC: 11.1 G/DL (ref 13.3–17.7)
HGB BLD-MCNC: 11.1 G/DL (ref 13.3–17.7)
HGB BLD-MCNC: 11.3 G/DL (ref 13.3–17.7)
HGB BLD-MCNC: 11.5 G/DL (ref 13.3–17.7)
HGB BLD-MCNC: 11.5 G/DL (ref 14–18)
HGB BLD-MCNC: 13.2 G/DL (ref 13.3–17.7)
HGB BLD-MCNC: 13.7 G/DL (ref 13.3–17.7)
HGB BLD-MCNC: 6.9 G/DL (ref 13.3–17.7)
HGB BLD-MCNC: 7.4 G/DL (ref 13.3–17.7)
HGB BLD-MCNC: 7.5 G/DL (ref 13.3–17.7)
HGB BLD-MCNC: 7.7 G/DL (ref 13.3–17.7)
HGB BLD-MCNC: 7.8 G/DL (ref 13.3–17.7)
HGB BLD-MCNC: 7.9 G/DL (ref 13.3–17.7)
HGB BLD-MCNC: 8.2 G/DL (ref 13.3–17.7)
HGB BLD-MCNC: 8.5 G/DL (ref 13.3–17.7)
HGB BLD-MCNC: 8.7 G/DL (ref 13.3–17.7)
HGB BLD-MCNC: 9 G/DL (ref 13.3–17.7)
HGB BLD-MCNC: 9 G/DL (ref 13.3–17.7)
HGB BLD-MCNC: 9.3 G/DL (ref 14–18)
HGB BLD-MCNC: 9.4 G/DL (ref 14–18)
HGB BLD-MCNC: 9.5 G/DL (ref 13.3–17.7)
HGB BLD-MCNC: 9.6 G/DL (ref 13.3–17.7)
HGB BLD-MCNC: 9.7 G/DL (ref 13.3–17.7)
HGB BLD-MCNC: 9.7 G/DL (ref 13.3–17.7)
HIV1 RNA # PLAS NAA DL=20: 41 COPIES/ML
HIV1 RNA SERPL NAA+PROBE-LOG#: 1.6 {LOG_COPIES}/ML
HOLD SPECIMEN: NORMAL
HSV1 DNA SPEC QL NAA+PROBE: NOT DETECTED
HSV2 DNA SPEC QL NAA+PROBE: DETECTED
IMM GRANULOCYTES # BLD: 0 10E3/UL
IMM GRANULOCYTES # BLD: 0 THOU/UL
IMM GRANULOCYTES # BLD: 0.1 10E3/UL
IMM GRANULOCYTES # BLD: 0.1 THOU/UL
IMM GRANULOCYTES # BLD: 0.5 THOU/UL
IMM GRANULOCYTES NFR BLD: 0 %
IMM GRANULOCYTES NFR BLD: 1 %
IMM GRANULOCYTES NFR BLD: 2 %
IMM GRANULOCYTES NFR BLD: 3 %
IMM GRANULOCYTES NFR BLD: 4 %
INR PPP: 1.11 (ref 0.9–1.1)
INR PPP: 1.24 (ref 0.85–1.15)
INR PPP: 1.25 (ref 0.9–1.1)
INTERPRETATION ECG - MUSE: NORMAL
ISSUE DATE AND TIME: NORMAL
ISSUE DATE AND TIME: NORMAL
LAB AP CHARGES (HE HISTORICAL CONVERSION): ABNORMAL
LAB MED GENERAL PATH INTERP (HE HISTORICAL CONVERSION): ABNORMAL
LACTATE SERPL-SCNC: 0.8 MMOL/L (ref 0.7–2)
LACTATE SERPL-SCNC: 0.9 MMOL/L (ref 0.7–2)
LACTATE SERPL-SCNC: 1.1 MMOL/L (ref 0.7–2)
LACTATE SERPL-SCNC: 2.6 MMOL/L (ref 0.7–2)
LDH FLD L TO P-CCNC: 530 U/L
LVEF ECHO: NORMAL
LYMPHOCYTES # BLD AUTO: 0.2 10E3/UL (ref 0.8–5.3)
LYMPHOCYTES # BLD AUTO: 0.2 10E3/UL (ref 0.8–5.3)
LYMPHOCYTES # BLD AUTO: 0.3 10E3/UL (ref 0.8–5.3)
LYMPHOCYTES # BLD AUTO: 0.4 10E3/UL (ref 0.8–5.3)
LYMPHOCYTES # BLD AUTO: 0.5 10E3/UL (ref 0.8–5.3)
LYMPHOCYTES # BLD AUTO: 0.5 10E3/UL (ref 0.8–5.3)
LYMPHOCYTES # BLD AUTO: 0.5 THOU/UL (ref 0.8–4.4)
LYMPHOCYTES # BLD AUTO: 0.6 10E3/UL (ref 0.8–5.3)
LYMPHOCYTES # BLD AUTO: 0.8 10E3/UL (ref 0.8–5.3)
LYMPHOCYTES # BLD AUTO: 1 10E3/UL (ref 0.8–5.3)
LYMPHOCYTES # BLD AUTO: 1.1 10E3/UL (ref 0.8–5.3)
LYMPHOCYTES # BLD AUTO: 1.1 10E3/UL (ref 0.8–5.3)
LYMPHOCYTES # BLD AUTO: 1.1 THOU/UL (ref 0.8–4.4)
LYMPHOCYTES # BLD AUTO: 1.2 10E3/UL (ref 0.8–5.3)
LYMPHOCYTES # BLD AUTO: 1.2 10E3/UL (ref 0.8–5.3)
LYMPHOCYTES # BLD AUTO: 1.2 THOU/UL (ref 0.8–4.4)
LYMPHOCYTES # BLD AUTO: 1.2 THOU/UL (ref 0.8–4.4)
LYMPHOCYTES # BLD AUTO: 1.3 10E3/UL (ref 0.8–5.3)
LYMPHOCYTES # BLD AUTO: 1.3 THOU/UL (ref 0.8–4.4)
LYMPHOCYTES # BLD AUTO: 1.3 THOU/UL (ref 0.8–4.4)
LYMPHOCYTES # BLD AUTO: 1.4 10E3/UL (ref 0.8–5.3)
LYMPHOCYTES NFR BLD AUTO: 12 % (ref 20–40)
LYMPHOCYTES NFR BLD AUTO: 13 %
LYMPHOCYTES NFR BLD AUTO: 19 % (ref 20–40)
LYMPHOCYTES NFR BLD AUTO: 25 %
LYMPHOCYTES NFR BLD AUTO: 25 % (ref 20–40)
LYMPHOCYTES NFR BLD AUTO: 3 %
LYMPHOCYTES NFR BLD AUTO: 3 %
LYMPHOCYTES NFR BLD AUTO: 30 %
LYMPHOCYTES NFR BLD AUTO: 30 % (ref 20–40)
LYMPHOCYTES NFR BLD AUTO: 30 % (ref 20–40)
LYMPHOCYTES NFR BLD AUTO: 31 %
LYMPHOCYTES NFR BLD AUTO: 31 %
LYMPHOCYTES NFR BLD AUTO: 32 %
LYMPHOCYTES NFR BLD AUTO: 32 % (ref 20–40)
LYMPHOCYTES NFR BLD AUTO: 33 %
LYMPHOCYTES NFR BLD AUTO: 33 % (ref 20–40)
LYMPHOCYTES NFR BLD AUTO: 35 %
LYMPHOCYTES NFR BLD AUTO: 42 %
LYMPHOCYTES NFR BLD AUTO: 46 %
LYMPHOCYTES NFR BLD AUTO: 49 % (ref 20–40)
LYMPHOCYTES NFR BLD AUTO: 5 %
LYMPHOCYTES NFR BLD AUTO: 8 %
LYMPHOCYTES NFR BLD AUTO: 9 %
LYMPHOCYTES NFR FLD MANUAL: 10 %
MACROPHAGE % - HISTORICAL: 23 %
MAGNESIUM SERPL-MCNC: 1.5 MG/DL (ref 1.8–2.6)
MAGNESIUM SERPL-MCNC: 1.7 MG/DL (ref 1.8–2.6)
MAGNESIUM SERPL-MCNC: 1.8 MG/DL (ref 1.8–2.6)
MAGNESIUM SERPL-MCNC: 1.9 MG/DL (ref 1.8–2.6)
MAGNESIUM SERPL-MCNC: 1.9 MG/DL (ref 1.8–2.6)
MAGNESIUM SERPL-MCNC: 2 MG/DL (ref 1.8–2.6)
MAGNESIUM SERPL-MCNC: 2 MG/DL (ref 1.8–2.6)
MAGNESIUM SERPL-MCNC: 2.1 MG/DL (ref 1.8–2.6)
MCH RBC QN AUTO: 26.4 PG (ref 26.5–33)
MCH RBC QN AUTO: 26.5 PG (ref 26.5–33)
MCH RBC QN AUTO: 26.6 PG (ref 26.5–33)
MCH RBC QN AUTO: 26.6 PG (ref 26.5–33)
MCH RBC QN AUTO: 26.7 PG (ref 26.5–33)
MCH RBC QN AUTO: 26.9 PG (ref 26.5–33)
MCH RBC QN AUTO: 27 PG (ref 26.5–33)
MCH RBC QN AUTO: 27 PG (ref 26.5–33)
MCH RBC QN AUTO: 27.2 PG (ref 26.5–33)
MCH RBC QN AUTO: 27.4 PG (ref 26.5–33)
MCH RBC QN AUTO: 27.5 PG (ref 26.5–33)
MCH RBC QN AUTO: 27.5 PG (ref 26.5–33)
MCH RBC QN AUTO: 27.6 PG (ref 26.5–33)
MCH RBC QN AUTO: 27.7 PG (ref 27–34)
MCH RBC QN AUTO: 27.9 PG (ref 26.5–33)
MCH RBC QN AUTO: 27.9 PG (ref 26.5–33)
MCH RBC QN AUTO: 28.1 PG (ref 27–34)
MCH RBC QN AUTO: 28.2 PG (ref 27–34)
MCH RBC QN AUTO: 28.4 PG (ref 26.5–33)
MCH RBC QN AUTO: 28.5 PG (ref 27–34)
MCH RBC QN AUTO: 28.6 PG (ref 26.5–33)
MCH RBC QN AUTO: 28.7 PG (ref 27–34)
MCH RBC QN AUTO: 28.9 PG (ref 27–34)
MCH RBC QN AUTO: 29.1 PG (ref 26.5–33)
MCH RBC QN AUTO: 29.1 PG (ref 26.5–33)
MCH RBC QN AUTO: 29.8 PG (ref 27–34)
MCH RBC QN AUTO: 30.8 PG (ref 27–34)
MCHC RBC AUTO-ENTMCNC: 29.8 G/DL (ref 31.5–36.5)
MCHC RBC AUTO-ENTMCNC: 30 G/DL (ref 31.5–36.5)
MCHC RBC AUTO-ENTMCNC: 30.2 G/DL (ref 31.5–36.5)
MCHC RBC AUTO-ENTMCNC: 30.2 G/DL (ref 31.5–36.5)
MCHC RBC AUTO-ENTMCNC: 30.3 G/DL (ref 31.5–36.5)
MCHC RBC AUTO-ENTMCNC: 30.6 G/DL (ref 31.5–36.5)
MCHC RBC AUTO-ENTMCNC: 30.6 G/DL (ref 31.5–36.5)
MCHC RBC AUTO-ENTMCNC: 30.7 G/DL (ref 31.5–36.5)
MCHC RBC AUTO-ENTMCNC: 30.7 G/DL (ref 31.5–36.5)
MCHC RBC AUTO-ENTMCNC: 30.7 G/DL (ref 32–36)
MCHC RBC AUTO-ENTMCNC: 30.8 G/DL (ref 31.5–36.5)
MCHC RBC AUTO-ENTMCNC: 30.9 G/DL (ref 31.5–36.5)
MCHC RBC AUTO-ENTMCNC: 30.9 G/DL (ref 32–36)
MCHC RBC AUTO-ENTMCNC: 31 G/DL (ref 31.5–36.5)
MCHC RBC AUTO-ENTMCNC: 31.1 G/DL (ref 31.5–36.5)
MCHC RBC AUTO-ENTMCNC: 31.3 G/DL (ref 31.5–36.5)
MCHC RBC AUTO-ENTMCNC: 31.3 G/DL (ref 32–36)
MCHC RBC AUTO-ENTMCNC: 31.4 G/DL (ref 31.5–36.5)
MCHC RBC AUTO-ENTMCNC: 31.7 G/DL (ref 31.5–36.5)
MCHC RBC AUTO-ENTMCNC: 31.8 G/DL (ref 31.5–36.5)
MCHC RBC AUTO-ENTMCNC: 31.8 G/DL (ref 31.5–36.5)
MCHC RBC AUTO-ENTMCNC: 31.8 G/DL (ref 32–36)
MCHC RBC AUTO-ENTMCNC: 31.9 G/DL (ref 31.5–36.5)
MCHC RBC AUTO-ENTMCNC: 32 G/DL (ref 31.5–36.5)
MCHC RBC AUTO-ENTMCNC: 32 G/DL (ref 31.5–36.5)
MCHC RBC AUTO-ENTMCNC: 32.2 G/DL (ref 31.5–36.5)
MCHC RBC AUTO-ENTMCNC: 32.4 G/DL (ref 31.5–36.5)
MCHC RBC AUTO-ENTMCNC: 32.4 G/DL (ref 32–36)
MCHC RBC AUTO-ENTMCNC: 32.6 G/DL (ref 32–36)
MCV RBC AUTO: 84 FL (ref 78–100)
MCV RBC AUTO: 85 FL (ref 78–100)
MCV RBC AUTO: 85 FL (ref 78–100)
MCV RBC AUTO: 86 FL (ref 78–100)
MCV RBC AUTO: 87 FL (ref 78–100)
MCV RBC AUTO: 87 FL (ref 80–100)
MCV RBC AUTO: 88 FL (ref 78–100)
MCV RBC AUTO: 88 FL (ref 80–100)
MCV RBC AUTO: 89 FL (ref 78–100)
MCV RBC AUTO: 89 FL (ref 78–100)
MCV RBC AUTO: 89 FL (ref 80–100)
MCV RBC AUTO: 90 FL (ref 78–100)
MCV RBC AUTO: 90 FL (ref 80–100)
MCV RBC AUTO: 91 FL (ref 78–100)
MCV RBC AUTO: 97 FL (ref 80–100)
MCV RBC AUTO: 98 FL (ref 80–100)
MESOTHELIALS, FLUID: 13 %
MISCELLANEOUS TEST DEPT. - HE HISTORICAL: NORMAL
MONOCYTE % - HISTORICAL: ABNORMAL
MONOCYTES # BLD AUTO: 0.1 10E3/UL (ref 0–1.3)
MONOCYTES # BLD AUTO: 0.2 10E3/UL (ref 0–1.3)
MONOCYTES # BLD AUTO: 0.2 THOU/UL (ref 0–0.9)
MONOCYTES # BLD AUTO: 0.3 10E3/UL (ref 0–1.3)
MONOCYTES # BLD AUTO: 0.3 THOU/UL (ref 0–0.9)
MONOCYTES # BLD AUTO: 0.4 10E3/UL (ref 0–1.3)
MONOCYTES # BLD AUTO: 0.4 THOU/UL (ref 0–0.9)
MONOCYTES # BLD AUTO: 0.5 10E3/UL (ref 0–1.3)
MONOCYTES # BLD AUTO: 0.5 10E3/UL (ref 0–1.3)
MONOCYTES # BLD AUTO: 0.6 THOU/UL (ref 0–0.9)
MONOCYTES # BLD AUTO: 0.7 THOU/UL (ref 0–0.9)
MONOCYTES NFR BLD AUTO: 10 %
MONOCYTES NFR BLD AUTO: 10 % (ref 2–10)
MONOCYTES NFR BLD AUTO: 11 %
MONOCYTES NFR BLD AUTO: 12 %
MONOCYTES NFR BLD AUTO: 12 % (ref 2–10)
MONOCYTES NFR BLD AUTO: 14 %
MONOCYTES NFR BLD AUTO: 16 % (ref 2–10)
MONOCYTES NFR BLD AUTO: 2 %
MONOCYTES NFR BLD AUTO: 20 %
MONOCYTES NFR BLD AUTO: 4 %
MONOCYTES NFR BLD AUTO: 5 % (ref 2–10)
MONOCYTES NFR BLD AUTO: 6 %
MONOCYTES NFR BLD AUTO: 7 % (ref 2–10)
MONOCYTES NFR BLD AUTO: 7 % (ref 2–10)
MONOCYTES NFR BLD AUTO: 8 %
MONOCYTES NFR BLD AUTO: 8 % (ref 2–10)
MONOCYTES NFR BLD AUTO: 8 % (ref 2–10)
MUGA LV EJECTION FRACTION: 63.4 %
NEUTROPHILS # BLD AUTO: 0.3 THOU/UL (ref 2–7.7)
NEUTROPHILS # BLD AUTO: 0.8 10E3/UL (ref 1.6–8.3)
NEUTROPHILS # BLD AUTO: 0.9 10E3/UL (ref 1.6–8.3)
NEUTROPHILS # BLD AUTO: 1.2 10E3/UL (ref 1.6–8.3)
NEUTROPHILS # BLD AUTO: 1.7 10E3/UL (ref 1.6–8.3)
NEUTROPHILS # BLD AUTO: 1.9 10E3/UL (ref 1.6–8.3)
NEUTROPHILS # BLD AUTO: 1.9 THOU/UL (ref 2–7.7)
NEUTROPHILS # BLD AUTO: 2 10E3/UL (ref 1.6–8.3)
NEUTROPHILS # BLD AUTO: 2.1 10E3/UL (ref 1.6–8.3)
NEUTROPHILS # BLD AUTO: 2.1 THOU/UL (ref 2–7.7)
NEUTROPHILS # BLD AUTO: 2.2 THOU/UL (ref 2–7.7)
NEUTROPHILS # BLD AUTO: 2.4 10E3/UL (ref 1.6–8.3)
NEUTROPHILS # BLD AUTO: 2.4 THOU/UL (ref 2–7.7)
NEUTROPHILS # BLD AUTO: 2.8 10E3/UL (ref 1.6–8.3)
NEUTROPHILS # BLD AUTO: 3 THOU/UL (ref 2–7.7)
NEUTROPHILS # BLD AUTO: 3.3 10E3/UL (ref 1.6–8.3)
NEUTROPHILS # BLD AUTO: 4.4 10E3/UL (ref 1.6–8.3)
NEUTROPHILS # BLD AUTO: 4.5 10E3/UL (ref 1.6–8.3)
NEUTROPHILS # BLD AUTO: 4.5 10E3/UL (ref 1.6–8.3)
NEUTROPHILS # BLD AUTO: 4.5 THOU/UL (ref 2–7.7)
NEUTROPHILS # BLD AUTO: 5.8 10E3/UL (ref 1.6–8.3)
NEUTROPHILS # BLD AUTO: 6.4 10E3/UL (ref 1.6–8.3)
NEUTROPHILS # BLD AUTO: 8.6 THOU/UL (ref 2–7.7)
NEUTROPHILS NFR BLD AUTO: 31 % (ref 50–70)
NEUTROPHILS NFR BLD AUTO: 33 %
NEUTROPHILS NFR BLD AUTO: 41 %
NEUTROPHILS NFR BLD AUTO: 51 %
NEUTROPHILS NFR BLD AUTO: 54 %
NEUTROPHILS NFR BLD AUTO: 54 %
NEUTROPHILS NFR BLD AUTO: 55 % (ref 50–70)
NEUTROPHILS NFR BLD AUTO: 56 % (ref 50–70)
NEUTROPHILS NFR BLD AUTO: 57 %
NEUTROPHILS NFR BLD AUTO: 59 %
NEUTROPHILS NFR BLD AUTO: 59 %
NEUTROPHILS NFR BLD AUTO: 59 % (ref 50–70)
NEUTROPHILS NFR BLD AUTO: 61 % (ref 50–70)
NEUTROPHILS NFR BLD AUTO: 62 %
NEUTROPHILS NFR BLD AUTO: 62 % (ref 50–70)
NEUTROPHILS NFR BLD AUTO: 75 % (ref 50–70)
NEUTROPHILS NFR BLD AUTO: 77 % (ref 50–70)
NEUTROPHILS NFR BLD AUTO: 79 %
NEUTROPHILS NFR BLD AUTO: 80 %
NEUTROPHILS NFR BLD AUTO: 87 %
NEUTROPHILS NFR BLD AUTO: 90 %
NEUTROPHILS NFR BLD AUTO: 92 %
NEUTROPHILS NFR BLD AUTO: 94 %
NEUTS BAND NFR FLD MANUAL: 53 %
NRBC # BLD AUTO: 0 10E3/UL
NRBC BLD AUTO-RTO: 0 /100
NRBC BLD AUTO-RTO: 1 /100
O2/TOTAL GAS SETTING VFR VENT: 50 %
OTHER CELLS FLD MANUAL: 1 %
OXYHGB MFR BLD: 53.9 % (ref 95–96)
OXYHGB MFR BLD: 76.9 % (ref 95–96)
OXYHGB MFR BLDV: 31.7 % (ref 70–75)
P AXIS - MUSE: -1 DEGREES
P AXIS - MUSE: 38 DEGREES
P AXIS - MUSE: 52 DEGREES
P AXIS - MUSE: 55 DEGREES
P AXIS - MUSE: 57 DEGREES
P AXIS - MUSE: 58 DEGREES
P AXIS - MUSE: NORMAL DEGREES
PATH REPORT.ADDENDUM SPEC: ABNORMAL
PATH REPORT.COMMENTS IMP SPEC: ABNORMAL
PATH REPORT.COMMENTS IMP SPEC: YES
PATH REPORT.FINAL DX SPEC: ABNORMAL
PATH REPORT.FINAL DX SPEC: ABNORMAL
PATH REPORT.GROSS SPEC: ABNORMAL
PATH REPORT.MICROSCOPIC SPEC OTHER STN: ABNORMAL
PATH REPORT.MICROSCOPIC SPEC OTHER STN: ABNORMAL
PATH REPORT.RELEVANT HX SPEC: ABNORMAL
PATH REPORT.RELEVANT HX SPEC: ABNORMAL
PCO2 BLD: 37 MM HG (ref 35–45)
PCO2 BLD: 40 MM HG (ref 35–45)
PCO2 BLDV: 47 MM HG (ref 35–50)
PEEP: 8 CM H2O
PERFORMING LAB: NORMAL
PH BLD: 7.4 [PH] (ref 7.37–7.44)
PH BLD: 7.4 [PH] (ref 7.37–7.44)
PH BLDV: 7.36 [PH] (ref 7.35–7.45)
PH FLD: 7.5 [PH] (ref 0–14)
PLAT MORPH BLD: ABNORMAL
PLATELET # BLD AUTO: 133 10E3/UL (ref 150–450)
PLATELET # BLD AUTO: 170 THOU/UL (ref 140–440)
PLATELET # BLD AUTO: 180 10E3/UL (ref 150–450)
PLATELET # BLD AUTO: 189 10E3/UL (ref 150–450)
PLATELET # BLD AUTO: 191 10E3/UL (ref 150–450)
PLATELET # BLD AUTO: 199 THOU/UL (ref 140–440)
PLATELET # BLD AUTO: 218 THOU/UL (ref 140–440)
PLATELET # BLD AUTO: 223 10E3/UL (ref 150–450)
PLATELET # BLD AUTO: 230 THOU/UL (ref 140–440)
PLATELET # BLD AUTO: 235 THOU/UL (ref 140–440)
PLATELET # BLD AUTO: 237 10E3/UL (ref 150–450)
PLATELET # BLD AUTO: 240 THOU/UL (ref 140–440)
PLATELET # BLD AUTO: 252 10E3/UL (ref 150–450)
PLATELET # BLD AUTO: 260 10E3/UL (ref 150–450)
PLATELET # BLD AUTO: 262 10E3/UL (ref 150–450)
PLATELET # BLD AUTO: 269 10E3/UL (ref 150–450)
PLATELET # BLD AUTO: 272 10E3/UL (ref 150–450)
PLATELET # BLD AUTO: 274 THOU/UL (ref 140–440)
PLATELET # BLD AUTO: 280 10E3/UL (ref 150–450)
PLATELET # BLD AUTO: 287 10E3/UL (ref 150–450)
PLATELET # BLD AUTO: 294 10E3/UL (ref 150–450)
PLATELET # BLD AUTO: 297 10E3/UL (ref 150–450)
PLATELET # BLD AUTO: 301 10E3/UL (ref 150–450)
PLATELET # BLD AUTO: 303 10E3/UL (ref 150–450)
PLATELET # BLD AUTO: 307 10E3/UL (ref 150–450)
PLATELET # BLD AUTO: 308 10E3/UL (ref 150–450)
PLATELET # BLD AUTO: 332 10E3/UL (ref 150–450)
PLATELET # BLD AUTO: 332 THOU/UL (ref 140–440)
PLATELET # BLD AUTO: 334 10E3/UL (ref 150–450)
PLATELET # BLD AUTO: 336 10E3/UL (ref 150–450)
PLATELET # BLD AUTO: 341 10E3/UL (ref 150–450)
PLATELET # BLD AUTO: 356 10E3/UL (ref 150–450)
PLATELET # BLD AUTO: 357 10E3/UL (ref 150–450)
PLATELET # BLD AUTO: 560 THOU/UL (ref 140–440)
PMV BLD AUTO: 10.1 FL (ref 8.5–12.5)
PMV BLD AUTO: 8.3 FL (ref 8.5–12.5)
PMV BLD AUTO: 8.5 FL (ref 7–10)
PMV BLD AUTO: 8.6 FL (ref 8.5–12.5)
PMV BLD AUTO: 8.7 FL (ref 8.5–12.5)
PMV BLD AUTO: 8.8 FL (ref 8.5–12.5)
PMV BLD AUTO: 8.9 FL (ref 8.5–12.5)
PMV BLD AUTO: 9.2 FL (ref 8.5–12.5)
PO2 BLD: 31 MM HG (ref 75–85)
PO2 BLD: 43 MM HG (ref 75–85)
PO2 BLDV: 23 MM HG (ref 25–47)
POTASSIUM BLD-SCNC: 3.8 MMOL/L (ref 3.5–5)
POTASSIUM BLD-SCNC: 4 MMOL/L (ref 3.5–5)
POTASSIUM BLD-SCNC: 4.2 MMOL/L (ref 3.5–5)
POTASSIUM BLD-SCNC: 4.2 MMOL/L (ref 3.5–5)
POTASSIUM BLD-SCNC: 4.3 MMOL/L (ref 3.5–5)
POTASSIUM BLD-SCNC: 4.4 MMOL/L (ref 3.5–5)
POTASSIUM BLD-SCNC: 4.5 MMOL/L (ref 3.5–5)
POTASSIUM BLD-SCNC: 4.6 MMOL/L (ref 3.5–5)
POTASSIUM BLD-SCNC: 4.7 MMOL/L (ref 3.5–5)
POTASSIUM BLD-SCNC: 4.8 MMOL/L (ref 3.5–5)
POTASSIUM BLD-SCNC: 4.9 MMOL/L (ref 3.5–5)
POTASSIUM BLD-SCNC: 5 MMOL/L (ref 3.5–5)
POTASSIUM BLD-SCNC: 5 MMOL/L (ref 3.5–5)
POTASSIUM BLD-SCNC: 5.1 MMOL/L (ref 3.5–5)
POTASSIUM BLD-SCNC: 5.1 MMOL/L (ref 3.5–5)
PR INTERVAL - MUSE: 170 MS
PR INTERVAL - MUSE: 182 MS
PR INTERVAL - MUSE: 184 MS
PR INTERVAL - MUSE: 186 MS
PR INTERVAL - MUSE: 186 MS
PR INTERVAL - MUSE: 206 MS
PR INTERVAL - MUSE: NORMAL MS
PROCALCITONIN SERPL-MCNC: 0.18 NG/ML (ref 0–0.49)
PROT FLD-MCNC: 4.8 G/DL
PROT SERPL-MCNC: 5.4 G/DL (ref 6–8)
PROT SERPL-MCNC: 5.4 G/DL (ref 6–8)
PROT SERPL-MCNC: 5.7 G/DL (ref 6–8)
PROT SERPL-MCNC: 5.9 G/DL (ref 6–8)
PROT SERPL-MCNC: 6 G/DL (ref 6–8)
PROT SERPL-MCNC: 6 G/DL (ref 6–8)
PROT SERPL-MCNC: 6.4 G/DL (ref 6–8)
PROT SERPL-MCNC: 6.4 G/DL (ref 6–8)
PROT SERPL-MCNC: 6.5 G/DL (ref 6–8)
PROT SERPL-MCNC: 6.6 G/DL (ref 6–8)
PROT SERPL-MCNC: 6.6 G/DL (ref 6–8)
PROT SERPL-MCNC: 6.7 G/DL (ref 6–8)
PROT SERPL-MCNC: 6.9 G/DL (ref 6–8)
PROT SERPL-MCNC: 6.9 G/DL (ref 6–8)
PROT SERPL-MCNC: 7 G/DL (ref 6–8)
PROT SERPL-MCNC: 7 G/DL (ref 6–8)
PROT SERPL-MCNC: 7.1 G/DL (ref 6–8)
PROT SERPL-MCNC: 7.3 G/DL (ref 6–8)
PROT SERPL-MCNC: 7.3 G/DL (ref 6–8)
PROT SERPL-MCNC: 7.7 G/DL (ref 6–8)
PROT SERPL-MCNC: 7.8 G/DL (ref 6–8)
PSV (LAB BLOOD GAS): 15 CM H2O
QRS DURATION - MUSE: 86 MS
QRS DURATION - MUSE: 88 MS
QRS DURATION - MUSE: 90 MS
QRS DURATION - MUSE: 90 MS
QRS DURATION - MUSE: 92 MS
QT - MUSE: 286 MS
QT - MUSE: 330 MS
QT - MUSE: 340 MS
QT - MUSE: 348 MS
QT - MUSE: 362 MS
QT - MUSE: 376 MS
QT - MUSE: 394 MS
QTC - MUSE: 384 MS
QTC - MUSE: 390 MS
QTC - MUSE: 397 MS
QTC - MUSE: 399 MS
QTC - MUSE: 419 MS
QTC - MUSE: 433 MS
QTC - MUSE: 444 MS
R AXIS - MUSE: 20 DEGREES
R AXIS - MUSE: 26 DEGREES
R AXIS - MUSE: 32 DEGREES
R AXIS - MUSE: 34 DEGREES
R AXIS - MUSE: 36 DEGREES
R AXIS - MUSE: 38 DEGREES
R AXIS - MUSE: 44 DEGREES
RADIOLOGIST FLAGS: ABNORMAL
RATE: 14 RR/MIN
RBC # BLD AUTO: 2.61 10E6/UL (ref 4.4–5.9)
RBC # BLD AUTO: 2.65 10E6/UL (ref 4.4–5.9)
RBC # BLD AUTO: 2.78 10E6/UL (ref 4.4–5.9)
RBC # BLD AUTO: 2.82 10E6/UL (ref 4.4–5.9)
RBC # BLD AUTO: 2.85 10E6/UL (ref 4.4–5.9)
RBC # BLD AUTO: 2.9 10E6/UL (ref 4.4–5.9)
RBC # BLD AUTO: 2.94 10E6/UL (ref 4.4–5.9)
RBC # BLD AUTO: 2.98 10E6/UL (ref 4.4–5.9)
RBC # BLD AUTO: 3.13 10E6/UL (ref 4.4–5.9)
RBC # BLD AUTO: 3.15 MILL/UL (ref 4.4–6.2)
RBC # BLD AUTO: 3.24 MILL/UL (ref 4.4–6.2)
RBC # BLD AUTO: 3.31 10E6/UL (ref 4.4–5.9)
RBC # BLD AUTO: 3.38 MILL/UL (ref 4.4–6.2)
RBC # BLD AUTO: 3.39 10E6/UL (ref 4.4–5.9)
RBC # BLD AUTO: 3.41 10E6/UL (ref 4.4–5.9)
RBC # BLD AUTO: 3.49 MILL/UL (ref 4.4–6.2)
RBC # BLD AUTO: 3.5 10E6/UL (ref 4.4–5.9)
RBC # BLD AUTO: 3.51 10E6/UL (ref 4.4–5.9)
RBC # BLD AUTO: 3.54 MILL/UL (ref 4.4–6.2)
RBC # BLD AUTO: 3.61 10E6/UL (ref 4.4–5.9)
RBC # BLD AUTO: 3.63 MILL/UL (ref 4.4–6.2)
RBC # BLD AUTO: 3.64 10E6/UL (ref 4.4–5.9)
RBC # BLD AUTO: 3.66 10E6/UL (ref 4.4–5.9)
RBC # BLD AUTO: 3.9 MILL/UL (ref 4.4–6.2)
RBC # BLD AUTO: 3.94 10E6/UL (ref 4.4–5.9)
RBC # BLD AUTO: 3.94 10E6/UL (ref 4.4–5.9)
RBC # BLD AUTO: 4.11 10E6/UL (ref 4.4–5.9)
RBC # BLD AUTO: 4.12 10E6/UL (ref 4.4–5.9)
RBC # BLD AUTO: 4.15 10E6/UL (ref 4.4–5.9)
RBC # BLD AUTO: 4.15 MILL/UL (ref 4.4–6.2)
RBC # BLD AUTO: 4.19 10E6/UL (ref 4.4–5.9)
RBC # BLD AUTO: 4.79 10E6/UL (ref 4.4–5.9)
RBC # BLD AUTO: 5.03 10E6/UL (ref 4.4–5.9)
RBC FLUID - HISTORICAL: ABNORMAL /UL
RBC MORPH BLD: ABNORMAL
RESULT FLAG (HE HISTORICAL CONVERSION): ABNORMAL
SAO2 % BLDV: 32.4 % (ref 70–75)
SARS-COV-2 PCR COMMENT: NORMAL
SARS-COV-2 RNA RESP QL NAA+PROBE: NEGATIVE
SARS-COV-2 RNA SPEC QL NAA+PROBE: NEGATIVE
SARS-COV-2 VIRUS SPECIMEN SOURCE: NORMAL
SODIUM SERPL-SCNC: 136 MMOL/L (ref 136–145)
SODIUM SERPL-SCNC: 136 MMOL/L (ref 136–145)
SODIUM SERPL-SCNC: 137 MMOL/L (ref 136–145)
SODIUM SERPL-SCNC: 138 MMOL/L (ref 136–145)
SODIUM SERPL-SCNC: 139 MMOL/L (ref 136–145)
SODIUM SERPL-SCNC: 140 MMOL/L (ref 136–145)
SODIUM SERPL-SCNC: 141 MMOL/L (ref 136–145)
SODIUM SERPL-SCNC: 142 MMOL/L (ref 136–145)
SODIUM SERPL-SCNC: 144 MMOL/L (ref 136–145)
SPECIMEN DESCRIPTION: ABNORMAL
SPECIMEN EXPIRATION DATE: NORMAL
SPECIMEN STATUS: NORMAL
SYSTOLIC BLOOD PRESSURE - MUSE: NORMAL
SYSTOLIC BLOOD PRESSURE - MUSE: NORMAL MMHG
T AXIS - MUSE: -1 DEGREES
T AXIS - MUSE: 19 DEGREES
T AXIS - MUSE: 24 DEGREES
T AXIS - MUSE: 25 DEGREES
T AXIS - MUSE: 36 DEGREES
T AXIS - MUSE: 40 DEGREES
T AXIS - MUSE: 47 DEGREES
T CELL COMMENT: ABNORMAL
T CELL COMMENT: ABNORMAL
TEMPERATURE: 37 DEGREES C
TEMPERATURE: 37 DEGREES C
TEST NAME: NORMAL
TROPONIN I SERPL-MCNC: 0.11 NG/ML (ref 0–0.29)
TROPONIN I SERPL-MCNC: 0.12 NG/ML (ref 0–0.29)
TROPONIN I SERPL-MCNC: 0.12 NG/ML (ref 0–0.29)
TSH SERPL DL<=0.005 MIU/L-ACNC: 0.86 UIU/ML (ref 0.3–5)
TSH SERPL DL<=0.005 MIU/L-ACNC: 0.97 UIU/ML (ref 0.3–5)
TSH SERPL DL<=0.005 MIU/L-ACNC: 1.03 UIU/ML (ref 0.3–5)
TSH SERPL DL<=0.005 MIU/L-ACNC: 1.17 UIU/ML (ref 0.3–5)
TSH SERPL DL<=0.005 MIU/L-ACNC: 1.27 UIU/ML (ref 0.3–5)
TSH SERPL DL<=0.005 MIU/L-ACNC: 1.33 UIU/ML (ref 0.3–5)
TSH SERPL DL<=0.005 MIU/L-ACNC: 1.33 UIU/ML (ref 0.3–5)
TSH SERPL DL<=0.005 MIU/L-ACNC: 1.63 UIU/ML (ref 0.3–5)
TSH SERPL DL<=0.005 MIU/L-ACNC: 1.84 UIU/ML (ref 0.3–5)
TSH SERPL DL<=0.005 MIU/L-ACNC: 1.91 UIU/ML (ref 0.3–5)
TSH SERPL DL<=0.005 MIU/L-ACNC: 2.94 UIU/ML (ref 0.3–5)
UFH PPP CHRO-ACNC: 0.37 IU/ML
UFH PPP CHRO-ACNC: 0.38 IU/ML
UFH PPP CHRO-ACNC: 0.79 IU/ML
UFH PPP CHRO-ACNC: <0.1 IU/ML
UFH PPP CHRO-ACNC: >1.1 IU/ML
UNIT ABO/RH: NORMAL
UNIT ABO/RH: NORMAL
UNIT NUMBER: NORMAL
UNIT NUMBER: NORMAL
UNIT STATUS: NORMAL
UNIT STATUS: NORMAL
UNIT TYPE ISBT: 5100
UNIT TYPE ISBT: 5100
UPPER GI ENDOSCOPY: NORMAL
VENTILATION MODE: ABNORMAL
VENTRICULAR RATE- MUSE: 138 BPM
VENTRICULAR RATE- MUSE: 59 BPM
VENTRICULAR RATE- MUSE: 67 BPM
VENTRICULAR RATE- MUSE: 68 BPM
VENTRICULAR RATE- MUSE: 83 BPM
VENTRICULAR RATE- MUSE: 97 BPM
VENTRICULAR RATE- MUSE: 98 BPM
VZV DNA SPEC QL NAA+PROBE: NEGATIVE
WBC # BLD AUTO: 1.9 10E3/UL (ref 4–11)
WBC # BLD AUTO: 2 10E3/UL (ref 4–11)
WBC # BLD AUTO: 2.7 10E3/UL (ref 4–11)
WBC # BLD AUTO: 3.2 10E3/UL (ref 4–11)
WBC # BLD AUTO: 3.5 10E3/UL (ref 4–11)
WBC # BLD AUTO: 3.6 10E3/UL (ref 4–11)
WBC # BLD AUTO: 3.7 10E3/UL (ref 4–11)
WBC # BLD AUTO: 3.9 10E3/UL (ref 4–11)
WBC # BLD AUTO: 4 10E3/UL (ref 4–11)
WBC # BLD AUTO: 4 10E3/UL (ref 4–11)
WBC # BLD AUTO: 4.1 10E3/UL (ref 4–11)
WBC # BLD AUTO: 4.3 10E3/UL (ref 4–11)
WBC # BLD AUTO: 4.5 10E3/UL (ref 4–11)
WBC # BLD AUTO: 4.8 10E3/UL (ref 4–11)
WBC # BLD AUTO: 5 10E3/UL (ref 4–11)
WBC # BLD AUTO: 5 10E3/UL (ref 4–11)
WBC # BLD AUTO: 5.4 10E3/UL (ref 4–11)
WBC # BLD AUTO: 5.7 10E3/UL (ref 4–11)
WBC # BLD AUTO: 5.9 10E3/UL (ref 4–11)
WBC # BLD AUTO: 6 10E3/UL (ref 4–11)
WBC # BLD AUTO: 6.1 10E3/UL (ref 4–11)
WBC # BLD AUTO: 6.9 10E3/UL (ref 4–11)
WBC # BLD AUTO: 8 10E3/UL (ref 4–11)
WBC # BLD AUTO: 8.4 10E3/UL (ref 4–11)
WBC # BLD AUTO: 8.4 10E3/UL (ref 4–11)
WBC # FLD AUTO: 3332 /UL (ref 0–99)
WBC: 1 THOU/UL (ref 4–11)
WBC: 11.1 THOU/UL (ref 4–11)
WBC: 3.5 THOU/UL (ref 4–11)
WBC: 3.5 THOU/UL (ref 4–11)
WBC: 3.6 THOU/UL (ref 4–11)
WBC: 4.3 THOU/UL (ref 4–11)
WBC: 4.8 THOU/UL (ref 4–11)
WBC: 6 THOU/UL (ref 4–11)

## 2021-01-01 PROCEDURE — 82040 ASSAY OF SERUM ALBUMIN: CPT | Performed by: INTERNAL MEDICINE

## 2021-01-01 PROCEDURE — 97116 GAIT TRAINING THERAPY: CPT | Mod: GP

## 2021-01-01 PROCEDURE — 80053 COMPREHEN METABOLIC PANEL: CPT | Performed by: STUDENT IN AN ORGANIZED HEALTH CARE EDUCATION/TRAINING PROGRAM

## 2021-01-01 PROCEDURE — 96413 CHEMO IV INFUSION 1 HR: CPT

## 2021-01-01 PROCEDURE — 85025 COMPLETE CBC W/AUTO DIFF WBC: CPT

## 2021-01-01 PROCEDURE — 83880 ASSAY OF NATRIURETIC PEPTIDE: CPT | Performed by: STUDENT IN AN ORGANIZED HEALTH CARE EDUCATION/TRAINING PROGRAM

## 2021-01-01 PROCEDURE — 71045 X-RAY EXAM CHEST 1 VIEW: CPT

## 2021-01-01 PROCEDURE — 99223 1ST HOSP IP/OBS HIGH 75: CPT | Performed by: INTERNAL MEDICINE

## 2021-01-01 PROCEDURE — 250N000011 HC RX IP 250 OP 636: Performed by: INTERNAL MEDICINE

## 2021-01-01 PROCEDURE — 120N000001 HC R&B MED SURG/OB

## 2021-01-01 PROCEDURE — 96375 TX/PRO/DX INJ NEW DRUG ADDON: CPT

## 2021-01-01 PROCEDURE — 36415 COLL VENOUS BLD VENIPUNCTURE: CPT | Performed by: STUDENT IN AN ORGANIZED HEALTH CARE EDUCATION/TRAINING PROGRAM

## 2021-01-01 PROCEDURE — 36415 COLL VENOUS BLD VENIPUNCTURE: CPT | Performed by: FAMILY MEDICINE

## 2021-01-01 PROCEDURE — 250N000011 HC RX IP 250 OP 636: Performed by: NURSE ANESTHETIST, CERTIFIED REGISTERED

## 2021-01-01 PROCEDURE — 85018 HEMOGLOBIN: CPT | Performed by: STUDENT IN AN ORGANIZED HEALTH CARE EDUCATION/TRAINING PROGRAM

## 2021-01-01 PROCEDURE — G0463 HOSPITAL OUTPT CLINIC VISIT: HCPCS | Mod: 25

## 2021-01-01 PROCEDURE — 85025 COMPLETE CBC W/AUTO DIFF WBC: CPT | Performed by: STUDENT IN AN ORGANIZED HEALTH CARE EDUCATION/TRAINING PROGRAM

## 2021-01-01 PROCEDURE — G0463 HOSPITAL OUTPT CLINIC VISIT: HCPCS

## 2021-01-01 PROCEDURE — 36600 WITHDRAWAL OF ARTERIAL BLOOD: CPT

## 2021-01-01 PROCEDURE — 83735 ASSAY OF MAGNESIUM: CPT | Performed by: STUDENT IN AN ORGANIZED HEALTH CARE EDUCATION/TRAINING PROGRAM

## 2021-01-01 PROCEDURE — 97110 THERAPEUTIC EXERCISES: CPT | Mod: GO

## 2021-01-01 PROCEDURE — 86923 COMPATIBILITY TEST ELECTRIC: CPT | Performed by: STUDENT IN AN ORGANIZED HEALTH CARE EDUCATION/TRAINING PROGRAM

## 2021-01-01 PROCEDURE — 999N000157 HC STATISTIC RCP TIME EA 10 MIN

## 2021-01-01 PROCEDURE — 97110 THERAPEUTIC EXERCISES: CPT | Mod: GP

## 2021-01-01 PROCEDURE — 77412 RADIATION TX DELIVERY LVL 3: CPT | Performed by: STUDENT IN AN ORGANIZED HEALTH CARE EDUCATION/TRAINING PROGRAM

## 2021-01-01 PROCEDURE — 99233 SBSQ HOSP IP/OBS HIGH 50: CPT | Performed by: NURSE PRACTITIONER

## 2021-01-01 PROCEDURE — 96376 TX/PRO/DX INJ SAME DRUG ADON: CPT

## 2021-01-01 PROCEDURE — 77387 GUIDANCE FOR RADJ TX DLVR: CPT | Performed by: RADIOLOGY

## 2021-01-01 PROCEDURE — 84132 ASSAY OF SERUM POTASSIUM: CPT | Performed by: STUDENT IN AN ORGANIZED HEALTH CARE EDUCATION/TRAINING PROGRAM

## 2021-01-01 PROCEDURE — 36415 COLL VENOUS BLD VENIPUNCTURE: CPT | Performed by: EMERGENCY MEDICINE

## 2021-01-01 PROCEDURE — 85520 HEPARIN ASSAY: CPT | Performed by: INTERNAL MEDICINE

## 2021-01-01 PROCEDURE — 77412 RADIATION TX DELIVERY LVL 3: CPT | Performed by: RADIOLOGY

## 2021-01-01 PROCEDURE — 36415 COLL VENOUS BLD VENIPUNCTURE: CPT | Performed by: INTERNAL MEDICINE

## 2021-01-01 PROCEDURE — 36415 COLL VENOUS BLD VENIPUNCTURE: CPT | Performed by: NURSE PRACTITIONER

## 2021-01-01 PROCEDURE — 85025 COMPLETE CBC W/AUTO DIFF WBC: CPT | Performed by: NURSE PRACTITIONER

## 2021-01-01 PROCEDURE — 83735 ASSAY OF MAGNESIUM: CPT | Performed by: FAMILY MEDICINE

## 2021-01-01 PROCEDURE — 258N000003 HC RX IP 258 OP 636: Performed by: EMERGENCY MEDICINE

## 2021-01-01 PROCEDURE — 93005 ELECTROCARDIOGRAM TRACING: CPT | Performed by: EMERGENCY MEDICINE

## 2021-01-01 PROCEDURE — 250N000013 HC RX MED GY IP 250 OP 250 PS 637: Performed by: INTERNAL MEDICINE

## 2021-01-01 PROCEDURE — 250N000009 HC RX 250: Performed by: INTERNAL MEDICINE

## 2021-01-01 PROCEDURE — 85027 COMPLETE CBC AUTOMATED: CPT | Performed by: INTERNAL MEDICINE

## 2021-01-01 PROCEDURE — 85027 COMPLETE CBC AUTOMATED: CPT | Performed by: STUDENT IN AN ORGANIZED HEALTH CARE EDUCATION/TRAINING PROGRAM

## 2021-01-01 PROCEDURE — 85025 COMPLETE CBC W/AUTO DIFF WBC: CPT | Performed by: INTERNAL MEDICINE

## 2021-01-01 PROCEDURE — 97530 THERAPEUTIC ACTIVITIES: CPT | Mod: GP

## 2021-01-01 PROCEDURE — 99232 SBSQ HOSP IP/OBS MODERATE 35: CPT | Performed by: INTERNAL MEDICINE

## 2021-01-01 PROCEDURE — 96367 TX/PROPH/DG ADDL SEQ IV INF: CPT

## 2021-01-01 PROCEDURE — 99233 SBSQ HOSP IP/OBS HIGH 50: CPT | Performed by: INTERNAL MEDICINE

## 2021-01-01 PROCEDURE — 99215 OFFICE O/P EST HI 40 MIN: CPT | Mod: 25 | Performed by: RADIOLOGY

## 2021-01-01 PROCEDURE — 250N000013 HC RX MED GY IP 250 OP 250 PS 637: Performed by: EMERGENCY MEDICINE

## 2021-01-01 PROCEDURE — 80048 BASIC METABOLIC PNL TOTAL CA: CPT

## 2021-01-01 PROCEDURE — 99238 HOSP IP/OBS DSCHRG MGMT 30/<: CPT | Mod: GC | Performed by: STUDENT IN AN ORGANIZED HEALTH CARE EDUCATION/TRAINING PROGRAM

## 2021-01-01 PROCEDURE — 250N000013 HC RX MED GY IP 250 OP 250 PS 637: Performed by: NURSE PRACTITIONER

## 2021-01-01 PROCEDURE — 80053 COMPREHEN METABOLIC PANEL: CPT

## 2021-01-01 PROCEDURE — 77300 RADIATION THERAPY DOSE PLAN: CPT | Performed by: RADIOLOGY

## 2021-01-01 PROCEDURE — 99232 SBSQ HOSP IP/OBS MODERATE 35: CPT | Mod: GC | Performed by: STUDENT IN AN ORGANIZED HEALTH CARE EDUCATION/TRAINING PROGRAM

## 2021-01-01 PROCEDURE — 250N000013 HC RX MED GY IP 250 OP 250 PS 637: Performed by: STUDENT IN AN ORGANIZED HEALTH CARE EDUCATION/TRAINING PROGRAM

## 2021-01-01 PROCEDURE — 99215 OFFICE O/P EST HI 40 MIN: CPT | Mod: TEL | Performed by: FAMILY MEDICINE

## 2021-01-01 PROCEDURE — 77334 RADIATION TREATMENT AID(S): CPT | Mod: 26 | Performed by: RADIOLOGY

## 2021-01-01 PROCEDURE — P9016 RBC LEUKOCYTES REDUCED: HCPCS | Performed by: STUDENT IN AN ORGANIZED HEALTH CARE EDUCATION/TRAINING PROGRAM

## 2021-01-01 PROCEDURE — 250N000011 HC RX IP 250 OP 636: Performed by: EMERGENCY MEDICINE

## 2021-01-01 PROCEDURE — 81003 URINALYSIS AUTO W/O SCOPE: CPT | Performed by: INTERNAL MEDICINE

## 2021-01-01 PROCEDURE — 83605 ASSAY OF LACTIC ACID: CPT | Performed by: FAMILY MEDICINE

## 2021-01-01 PROCEDURE — A9585 GADOBUTROL INJECTION: HCPCS | Performed by: INTERNAL MEDICINE

## 2021-01-01 PROCEDURE — 0W993ZZ DRAINAGE OF RIGHT PLEURAL CAVITY, PERCUTANEOUS APPROACH: ICD-10-PCS | Performed by: INTERNAL MEDICINE

## 2021-01-01 PROCEDURE — 99233 SBSQ HOSP IP/OBS HIGH 50: CPT | Mod: GC | Performed by: STUDENT IN AN ORGANIZED HEALTH CARE EDUCATION/TRAINING PROGRAM

## 2021-01-01 PROCEDURE — 99214 OFFICE O/P EST MOD 30 MIN: CPT | Mod: 25 | Performed by: FAMILY MEDICINE

## 2021-01-01 PROCEDURE — 258N000003 HC RX IP 258 OP 636: Performed by: INTERNAL MEDICINE

## 2021-01-01 PROCEDURE — 82040 ASSAY OF SERUM ALBUMIN: CPT | Performed by: NURSE PRACTITIONER

## 2021-01-01 PROCEDURE — 250N000009 HC RX 250: Performed by: NURSE PRACTITIONER

## 2021-01-01 PROCEDURE — 77263 THER RADIOLOGY TX PLNG CPLX: CPT | Performed by: RADIOLOGY

## 2021-01-01 PROCEDURE — 97535 SELF CARE MNGMENT TRAINING: CPT | Mod: GO

## 2021-01-01 PROCEDURE — 258N000003 HC RX IP 258 OP 636: Performed by: NURSE PRACTITIONER

## 2021-01-01 PROCEDURE — 82947 ASSAY GLUCOSE BLOOD QUANT: CPT

## 2021-01-01 PROCEDURE — 99215 OFFICE O/P EST HI 40 MIN: CPT | Performed by: INTERNAL MEDICINE

## 2021-01-01 PROCEDURE — 255N000002 HC RX 255 OP 636: Performed by: INTERNAL MEDICINE

## 2021-01-01 PROCEDURE — 93010 ELECTROCARDIOGRAM REPORT: CPT | Performed by: INTERNAL MEDICINE

## 2021-01-01 PROCEDURE — 84132 ASSAY OF SERUM POTASSIUM: CPT | Performed by: FAMILY MEDICINE

## 2021-01-01 PROCEDURE — 999N000141 HC STATISTIC PRE-PROCEDURE NURSING ASSESSMENT: Performed by: INTERNAL MEDICINE

## 2021-01-01 PROCEDURE — 250N000011 HC RX IP 250 OP 636: Performed by: STUDENT IN AN ORGANIZED HEALTH CARE EDUCATION/TRAINING PROGRAM

## 2021-01-01 PROCEDURE — 80048 BASIC METABOLIC PNL TOTAL CA: CPT | Performed by: STUDENT IN AN ORGANIZED HEALTH CARE EDUCATION/TRAINING PROGRAM

## 2021-01-01 PROCEDURE — 96417 CHEMO IV INFUS EACH ADDL SEQ: CPT

## 2021-01-01 PROCEDURE — 82565 ASSAY OF CREATININE: CPT

## 2021-01-01 PROCEDURE — 85027 COMPLETE CBC AUTOMATED: CPT | Performed by: EMERGENCY MEDICINE

## 2021-01-01 PROCEDURE — 999N000127 HC STATISTIC PERIPHERAL IV START W US GUIDANCE

## 2021-01-01 PROCEDURE — 97165 OT EVAL LOW COMPLEX 30 MIN: CPT | Mod: GO

## 2021-01-01 PROCEDURE — 92526 ORAL FUNCTION THERAPY: CPT | Mod: GN

## 2021-01-01 PROCEDURE — 96372 THER/PROPH/DIAG INJ SC/IM: CPT | Performed by: FAMILY MEDICINE

## 2021-01-01 PROCEDURE — 83605 ASSAY OF LACTIC ACID: CPT | Performed by: STUDENT IN AN ORGANIZED HEALTH CARE EDUCATION/TRAINING PROGRAM

## 2021-01-01 PROCEDURE — 99222 1ST HOSP IP/OBS MODERATE 55: CPT | Performed by: INTERNAL MEDICINE

## 2021-01-01 PROCEDURE — 258N000003 HC RX IP 258 OP 636

## 2021-01-01 PROCEDURE — 77295 3-D RADIOTHERAPY PLAN: CPT | Performed by: RADIOLOGY

## 2021-01-01 PROCEDURE — 84443 ASSAY THYROID STIM HORMONE: CPT | Performed by: FAMILY MEDICINE

## 2021-01-01 PROCEDURE — 210N000001 HC R&B IMCU HEART CARE

## 2021-01-01 PROCEDURE — 71260 CT THORAX DX C+: CPT

## 2021-01-01 PROCEDURE — 99285 EMERGENCY DEPT VISIT HI MDM: CPT | Mod: 25

## 2021-01-01 PROCEDURE — 97162 PT EVAL MOD COMPLEX 30 MIN: CPT | Mod: GP

## 2021-01-01 PROCEDURE — 84443 ASSAY THYROID STIM HORMONE: CPT | Performed by: NURSE PRACTITIONER

## 2021-01-01 PROCEDURE — 93005 ELECTROCARDIOGRAM TRACING: CPT

## 2021-01-01 PROCEDURE — 84443 ASSAY THYROID STIM HORMONE: CPT | Performed by: INTERNAL MEDICINE

## 2021-01-01 PROCEDURE — 32555 ASPIRATE PLEURA W/ IMAGING: CPT

## 2021-01-01 PROCEDURE — 85520 HEPARIN ASSAY: CPT | Performed by: FAMILY MEDICINE

## 2021-01-01 PROCEDURE — 99214 OFFICE O/P EST MOD 30 MIN: CPT | Performed by: NURSE PRACTITIONER

## 2021-01-01 PROCEDURE — 87635 SARS-COV-2 COVID-19 AMP PRB: CPT | Performed by: EMERGENCY MEDICINE

## 2021-01-01 PROCEDURE — 82805 BLOOD GASES W/O2 SATURATION: CPT | Performed by: EMERGENCY MEDICINE

## 2021-01-01 PROCEDURE — 360N000082 HC SURGERY LEVEL 2 W/ FLUORO, PER MIN: Performed by: INTERNAL MEDICINE

## 2021-01-01 PROCEDURE — 250N000013 HC RX MED GY IP 250 OP 250 PS 637

## 2021-01-01 PROCEDURE — 96365 THER/PROPH/DIAG IV INF INIT: CPT | Mod: 59

## 2021-01-01 PROCEDURE — 84132 ASSAY OF SERUM POTASSIUM: CPT | Performed by: INTERNAL MEDICINE

## 2021-01-01 PROCEDURE — 97166 OT EVAL MOD COMPLEX 45 MIN: CPT | Mod: GO

## 2021-01-01 PROCEDURE — 99215 OFFICE O/P EST HI 40 MIN: CPT | Mod: 25 | Performed by: FAMILY MEDICINE

## 2021-01-01 PROCEDURE — 96360 HYDRATION IV INFUSION INIT: CPT

## 2021-01-01 PROCEDURE — 77387 GUIDANCE FOR RADJ TX DLVR: CPT | Performed by: STUDENT IN AN ORGANIZED HEALTH CARE EDUCATION/TRAINING PROGRAM

## 2021-01-01 PROCEDURE — 94660 CPAP INITIATION&MGMT: CPT

## 2021-01-01 PROCEDURE — 88342 IMHCHEM/IMCYTCHM 1ST ANTB: CPT | Mod: 26 | Performed by: PATHOLOGY

## 2021-01-01 PROCEDURE — 87529 HSV DNA AMP PROBE: CPT | Mod: 59

## 2021-01-01 PROCEDURE — 82374 ASSAY BLOOD CARBON DIOXIDE: CPT

## 2021-01-01 PROCEDURE — 73221 MRI JOINT UPR EXTREM W/O DYE: CPT | Mod: RT

## 2021-01-01 PROCEDURE — 99214 OFFICE O/P EST MOD 30 MIN: CPT | Performed by: INTERNAL MEDICINE

## 2021-01-01 PROCEDURE — 99223 1ST HOSP IP/OBS HIGH 75: CPT | Mod: AI

## 2021-01-01 PROCEDURE — 3E03305 INTRODUCTION OF OTHER ANTINEOPLASTIC INTO PERIPHERAL VEIN, PERCUTANEOUS APPROACH: ICD-10-PCS | Performed by: FAMILY MEDICINE

## 2021-01-01 PROCEDURE — 88112 CYTOPATH CELL ENHANCE TECH: CPT | Mod: 26 | Performed by: PATHOLOGY

## 2021-01-01 PROCEDURE — 87635 SARS-COV-2 COVID-19 AMP PRB: CPT | Performed by: NURSE PRACTITIONER

## 2021-01-01 PROCEDURE — 87040 BLOOD CULTURE FOR BACTERIA: CPT | Performed by: EMERGENCY MEDICINE

## 2021-01-01 PROCEDURE — 87798 DETECT AGENT NOS DNA AMP: CPT

## 2021-01-01 PROCEDURE — 84145 PROCALCITONIN (PCT): CPT | Performed by: EMERGENCY MEDICINE

## 2021-01-01 PROCEDURE — 83735 ASSAY OF MAGNESIUM: CPT | Performed by: EMERGENCY MEDICINE

## 2021-01-01 PROCEDURE — 84484 ASSAY OF TROPONIN QUANT: CPT | Performed by: EMERGENCY MEDICINE

## 2021-01-01 PROCEDURE — 250N000011 HC RX IP 250 OP 636: Performed by: NURSE PRACTITIONER

## 2021-01-01 PROCEDURE — 85025 COMPLETE CBC W/AUTO DIFF WBC: CPT | Performed by: FAMILY MEDICINE

## 2021-01-01 PROCEDURE — 82374 ASSAY BLOOD CARBON DIOXIDE: CPT | Performed by: EMERGENCY MEDICINE

## 2021-01-01 PROCEDURE — 86360 T CELL ABSOLUTE COUNT/RATIO: CPT | Performed by: FAMILY MEDICINE

## 2021-01-01 PROCEDURE — 77336 RADIATION PHYSICS CONSULT: CPT | Performed by: RADIOLOGY

## 2021-01-01 PROCEDURE — 86359 T CELLS TOTAL COUNT: CPT | Performed by: INTERNAL MEDICINE

## 2021-01-01 PROCEDURE — 80048 BASIC METABOLIC PNL TOTAL CA: CPT | Performed by: FAMILY MEDICINE

## 2021-01-01 PROCEDURE — 93306 TTE W/DOPPLER COMPLETE: CPT

## 2021-01-01 PROCEDURE — 71045 X-RAY EXAM CHEST 1 VIEW: CPT | Mod: 76

## 2021-01-01 PROCEDURE — 0WJP8ZZ INSPECTION OF GASTROINTESTINAL TRACT, VIA NATURAL OR ARTIFICIAL OPENING ENDOSCOPIC APPROACH: ICD-10-PCS | Performed by: INTERNAL MEDICINE

## 2021-01-01 PROCEDURE — 99213 OFFICE O/P EST LOW 20 MIN: CPT | Mod: 25 | Performed by: RADIOLOGY

## 2021-01-01 PROCEDURE — 99214 OFFICE O/P EST MOD 30 MIN: CPT | Performed by: FAMILY MEDICINE

## 2021-01-01 PROCEDURE — 84484 ASSAY OF TROPONIN QUANT: CPT | Performed by: STUDENT IN AN ORGANIZED HEALTH CARE EDUCATION/TRAINING PROGRAM

## 2021-01-01 PROCEDURE — 99214 OFFICE O/P EST MOD 30 MIN: CPT | Mod: CS | Performed by: FAMILY MEDICINE

## 2021-01-01 PROCEDURE — 83605 ASSAY OF LACTIC ACID: CPT | Performed by: EMERGENCY MEDICINE

## 2021-01-01 PROCEDURE — 99215 OFFICE O/P EST HI 40 MIN: CPT | Performed by: NURSE PRACTITIONER

## 2021-01-01 PROCEDURE — 250N000011 HC RX IP 250 OP 636: Performed by: FAMILY MEDICINE

## 2021-01-01 PROCEDURE — 88305 TISSUE EXAM BY PATHOLOGIST: CPT | Mod: TC | Performed by: NURSE PRACTITIONER

## 2021-01-01 PROCEDURE — 77427 RADIATION TX MANAGEMENT X5: CPT | Performed by: RADIOLOGY

## 2021-01-01 PROCEDURE — 88342 IMHCHEM/IMCYTCHM 1ST ANTB: CPT | Mod: TC | Performed by: NURSE PRACTITIONER

## 2021-01-01 PROCEDURE — 272N000431 CT CHEST TUBE WITH CATH PLACEMENT

## 2021-01-01 PROCEDURE — 0W9930Z DRAINAGE OF RIGHT PLEURAL CAVITY WITH DRAINAGE DEVICE, PERCUTANEOUS APPROACH: ICD-10-PCS | Performed by: RADIOLOGY

## 2021-01-01 PROCEDURE — 93010 ELECTROCARDIOGRAM REPORT: CPT | Performed by: GENERAL ACUTE CARE HOSPITAL

## 2021-01-01 PROCEDURE — 99207 PR CDG-MDM COMPONENT: MEETS MODERATE - UP CODED: CPT | Performed by: INTERNAL MEDICINE

## 2021-01-01 PROCEDURE — 77295 3-D RADIOTHERAPY PLAN: CPT | Mod: 26 | Performed by: RADIOLOGY

## 2021-01-01 PROCEDURE — 92610 EVALUATE SWALLOWING FUNCTION: CPT | Mod: GN

## 2021-01-01 PROCEDURE — 120N000004 HC R&B MS OVERFLOW

## 2021-01-01 PROCEDURE — 71275 CT ANGIOGRAPHY CHEST: CPT

## 2021-01-01 PROCEDURE — 82040 ASSAY OF SERUM ALBUMIN: CPT

## 2021-01-01 PROCEDURE — 85520 HEPARIN ASSAY: CPT | Performed by: EMERGENCY MEDICINE

## 2021-01-01 PROCEDURE — 272N000001 HC OR GENERAL SUPPLY STERILE: Performed by: INTERNAL MEDICINE

## 2021-01-01 PROCEDURE — 99214 OFFICE O/P EST MOD 30 MIN: CPT | Performed by: STUDENT IN AN ORGANIZED HEALTH CARE EDUCATION/TRAINING PROGRAM

## 2021-01-01 PROCEDURE — 82374 ASSAY BLOOD CARBON DIOXIDE: CPT | Performed by: INTERNAL MEDICINE

## 2021-01-01 PROCEDURE — 99213 OFFICE O/P EST LOW 20 MIN: CPT | Mod: TEL | Performed by: FAMILY MEDICINE

## 2021-01-01 PROCEDURE — 99231 SBSQ HOSP IP/OBS SF/LOW 25: CPT | Mod: 25 | Performed by: RADIOLOGY

## 2021-01-01 PROCEDURE — 250N000009 HC RX 250: Performed by: NURSE ANESTHETIST, CERTIFIED REGISTERED

## 2021-01-01 PROCEDURE — 85027 COMPLETE CBC AUTOMATED: CPT

## 2021-01-01 PROCEDURE — 82310 ASSAY OF CALCIUM: CPT | Performed by: EMERGENCY MEDICINE

## 2021-01-01 PROCEDURE — 80053 COMPREHEN METABOLIC PANEL: CPT | Performed by: NURSE PRACTITIONER

## 2021-01-01 PROCEDURE — 99231 SBSQ HOSP IP/OBS SF/LOW 25: CPT | Performed by: NURSE PRACTITIONER

## 2021-01-01 PROCEDURE — 99204 OFFICE O/P NEW MOD 45 MIN: CPT | Mod: GT | Performed by: UROLOGY

## 2021-01-01 PROCEDURE — 250N000009 HC RX 250: Performed by: EMERGENCY MEDICINE

## 2021-01-01 PROCEDURE — 250N000013 HC RX MED GY IP 250 OP 250 PS 637: Performed by: FAMILY MEDICINE

## 2021-01-01 PROCEDURE — 87536 HIV-1 QUANT&REVRSE TRNSCRPJ: CPT | Performed by: INTERNAL MEDICINE

## 2021-01-01 PROCEDURE — 85610 PROTHROMBIN TIME: CPT | Performed by: STUDENT IN AN ORGANIZED HEALTH CARE EDUCATION/TRAINING PROGRAM

## 2021-01-01 PROCEDURE — 36415 COLL VENOUS BLD VENIPUNCTURE: CPT

## 2021-01-01 PROCEDURE — 250N000011 HC RX IP 250 OP 636: Performed by: RADIOLOGY

## 2021-01-01 PROCEDURE — 99207 PR NO CHARGE LOS: CPT | Performed by: PHARMACIST

## 2021-01-01 PROCEDURE — C9803 HOPD COVID-19 SPEC COLLECT: HCPCS

## 2021-01-01 PROCEDURE — 96361 HYDRATE IV INFUSION ADD-ON: CPT

## 2021-01-01 PROCEDURE — 88341 IMHCHEM/IMCYTCHM EA ADD ANTB: CPT | Mod: 26 | Performed by: PATHOLOGY

## 2021-01-01 PROCEDURE — 88305 TISSUE EXAM BY PATHOLOGIST: CPT | Mod: 26 | Performed by: PATHOLOGY

## 2021-01-01 PROCEDURE — 87635 SARS-COV-2 COVID-19 AMP PRB: CPT | Performed by: STUDENT IN AN ORGANIZED HEALTH CARE EDUCATION/TRAINING PROGRAM

## 2021-01-01 PROCEDURE — 85018 HEMOGLOBIN: CPT | Performed by: INTERNAL MEDICINE

## 2021-01-01 PROCEDURE — 85014 HEMATOCRIT: CPT | Performed by: STUDENT IN AN ORGANIZED HEALTH CARE EDUCATION/TRAINING PROGRAM

## 2021-01-01 PROCEDURE — 77300 RADIATION THERAPY DOSE PLAN: CPT | Mod: 26 | Performed by: RADIOLOGY

## 2021-01-01 PROCEDURE — 96374 THER/PROPH/DIAG INJ IV PUSH: CPT

## 2021-01-01 PROCEDURE — U0005 INFEC AGEN DETEC AMPLI PROBE: HCPCS | Performed by: FAMILY MEDICINE

## 2021-01-01 PROCEDURE — 99496 TRANSJ CARE MGMT HIGH F2F 7D: CPT | Performed by: FAMILY MEDICINE

## 2021-01-01 PROCEDURE — 86900 BLOOD TYPING SEROLOGIC ABO: CPT | Performed by: STUDENT IN AN ORGANIZED HEALTH CARE EDUCATION/TRAINING PROGRAM

## 2021-01-01 PROCEDURE — 99207 PR CONSULT E&M CHANGED TO INITIAL LEVEL: CPT | Performed by: INTERNAL MEDICINE

## 2021-01-01 PROCEDURE — 82247 BILIRUBIN TOTAL: CPT | Performed by: INTERNAL MEDICINE

## 2021-01-01 PROCEDURE — 86359 T CELLS TOTAL COUNT: CPT | Performed by: FAMILY MEDICINE

## 2021-01-01 PROCEDURE — 96366 THER/PROPH/DIAG IV INF ADDON: CPT

## 2021-01-01 PROCEDURE — 370N000017 HC ANESTHESIA TECHNICAL FEE, PER MIN: Performed by: INTERNAL MEDICINE

## 2021-01-01 PROCEDURE — 20610 DRAIN/INJ JOINT/BURSA W/O US: CPT | Mod: RT | Performed by: FAMILY MEDICINE

## 2021-01-01 PROCEDURE — 90471 IMMUNIZATION ADMIN: CPT | Performed by: FAMILY MEDICINE

## 2021-01-01 PROCEDURE — 99232 SBSQ HOSP IP/OBS MODERATE 35: CPT | Performed by: NURSE PRACTITIONER

## 2021-01-01 PROCEDURE — 999N000185 HC STATISTIC TRANSPORT TIME EA 15 MIN

## 2021-01-01 PROCEDURE — 90713 POLIOVIRUS IPV SC/IM: CPT | Performed by: FAMILY MEDICINE

## 2021-01-01 PROCEDURE — 77334 RADIATION TREATMENT AID(S): CPT | Performed by: RADIOLOGY

## 2021-01-01 PROCEDURE — U0003 INFECTIOUS AGENT DETECTION BY NUCLEIC ACID (DNA OR RNA); SEVERE ACUTE RESPIRATORY SYNDROME CORONAVIRUS 2 (SARS-COV-2) (CORONAVIRUS DISEASE [COVID-19]), AMPLIFIED PROBE TECHNIQUE, MAKING USE OF HIGH THROUGHPUT TECHNOLOGIES AS DESCRIBED BY CMS-2020-01-R: HCPCS | Performed by: FAMILY MEDICINE

## 2021-01-01 PROCEDURE — 93306 TTE W/DOPPLER COMPLETE: CPT | Mod: 26 | Performed by: INTERNAL MEDICINE

## 2021-01-01 PROCEDURE — 96368 THER/DIAG CONCURRENT INF: CPT

## 2021-01-01 PROCEDURE — 85041 AUTOMATED RBC COUNT: CPT | Performed by: EMERGENCY MEDICINE

## 2021-01-01 PROCEDURE — 85018 HEMOGLOBIN: CPT | Performed by: FAMILY MEDICINE

## 2021-01-01 PROCEDURE — 70553 MRI BRAIN STEM W/O & W/DYE: CPT

## 2021-01-01 PROCEDURE — 80053 COMPREHEN METABOLIC PANEL: CPT | Performed by: INTERNAL MEDICINE

## 2021-01-01 RX ORDER — DIPHENHYDRAMINE HYDROCHLORIDE 50 MG/ML
50 INJECTION INTRAMUSCULAR; INTRAVENOUS
Status: CANCELLED
Start: 2021-01-01

## 2021-01-01 RX ORDER — LORAZEPAM 0.5 MG/1
TABLET ORAL
Qty: 2 TABLET | Refills: 0 | Status: SHIPPED | OUTPATIENT
Start: 2021-01-01

## 2021-01-01 RX ORDER — METOPROLOL TARTRATE 25 MG/1
50 TABLET, FILM COATED ORAL 2 TIMES DAILY
Status: DISCONTINUED | OUTPATIENT
Start: 2021-01-01 | End: 2021-01-01

## 2021-01-01 RX ORDER — NALOXONE HYDROCHLORIDE 0.4 MG/ML
0.4 INJECTION, SOLUTION INTRAMUSCULAR; INTRAVENOUS; SUBCUTANEOUS
Status: DISCONTINUED | OUTPATIENT
Start: 2021-01-01 | End: 2021-01-01

## 2021-01-01 RX ORDER — HEPARIN SODIUM (PORCINE) LOCK FLUSH IV SOLN 100 UNIT/ML 100 UNIT/ML
5 SOLUTION INTRAVENOUS
Status: CANCELLED | OUTPATIENT
Start: 2021-01-01

## 2021-01-01 RX ORDER — ONDANSETRON 4 MG/1
4 TABLET, ORALLY DISINTEGRATING ORAL EVERY 30 MIN PRN
Status: CANCELLED | OUTPATIENT
Start: 2021-01-01

## 2021-01-01 RX ORDER — FOLIC ACID 1 MG/1
1 TABLET ORAL DAILY
Status: DISCONTINUED | OUTPATIENT
Start: 2021-01-01 | End: 2021-01-01 | Stop reason: HOSPADM

## 2021-01-01 RX ORDER — ONDANSETRON 8 MG/1
8 TABLET, FILM COATED ORAL EVERY 6 HOURS PRN
Qty: 30 TABLET | Refills: 2 | Status: SHIPPED | OUTPATIENT
Start: 2021-01-01 | End: 2021-01-01

## 2021-01-01 RX ORDER — ONDANSETRON 8 MG/1
8 TABLET, FILM COATED ORAL EVERY 8 HOURS PRN
Qty: 10 TABLET | Refills: 3 | Status: ON HOLD | OUTPATIENT
Start: 2021-01-01 | End: 2021-01-01

## 2021-01-01 RX ORDER — DEXAMETHASONE 4 MG/1
4 TABLET ORAL 2 TIMES DAILY WITH MEALS
Qty: 30 TABLET | Refills: 1 | Status: SHIPPED | OUTPATIENT
Start: 2021-01-01

## 2021-01-01 RX ORDER — NALOXONE HYDROCHLORIDE 0.4 MG/ML
0.2 INJECTION, SOLUTION INTRAMUSCULAR; INTRAVENOUS; SUBCUTANEOUS
Status: DISCONTINUED | OUTPATIENT
Start: 2021-01-01 | End: 2021-01-01 | Stop reason: HOSPADM

## 2021-01-01 RX ORDER — LORAZEPAM 0.5 MG/1
.5-1 TABLET ORAL EVERY 6 HOURS PRN
Status: DISCONTINUED | OUTPATIENT
Start: 2021-01-01 | End: 2021-01-01 | Stop reason: HOSPADM

## 2021-01-01 RX ORDER — PIPERACILLIN SODIUM, TAZOBACTAM SODIUM 3; .375 G/15ML; G/15ML
3.38 INJECTION, POWDER, LYOPHILIZED, FOR SOLUTION INTRAVENOUS ONCE
Status: COMPLETED | OUTPATIENT
Start: 2021-01-01 | End: 2021-01-01

## 2021-01-01 RX ORDER — PROCHLORPERAZINE 25 MG
12.5 SUPPOSITORY, RECTAL RECTAL EVERY 12 HOURS PRN
Status: DISCONTINUED | OUTPATIENT
Start: 2021-01-01 | End: 2021-01-01 | Stop reason: HOSPADM

## 2021-01-01 RX ORDER — HEPARIN SODIUM,PORCINE 10 UNIT/ML
5 VIAL (ML) INTRAVENOUS
Status: CANCELLED | OUTPATIENT
Start: 2021-01-01

## 2021-01-01 RX ORDER — ALBUTEROL SULFATE 0.83 MG/ML
2.5 SOLUTION RESPIRATORY (INHALATION)
Status: DISCONTINUED | OUTPATIENT
Start: 2021-01-01 | End: 2021-01-01 | Stop reason: HOSPADM

## 2021-01-01 RX ORDER — METHYLPREDNISOLONE SODIUM SUCCINATE 125 MG/2ML
125 INJECTION, POWDER, LYOPHILIZED, FOR SOLUTION INTRAMUSCULAR; INTRAVENOUS
Status: CANCELLED
Start: 2021-01-01

## 2021-01-01 RX ORDER — PROCHLORPERAZINE MALEATE 5 MG
5-10 TABLET ORAL EVERY 6 HOURS PRN
Status: CANCELLED
Start: 2021-01-01

## 2021-01-01 RX ORDER — HYDROMORPHONE HCL IN WATER/PF 6 MG/30 ML
0.2 PATIENT CONTROLLED ANALGESIA SYRINGE INTRAVENOUS EVERY 5 MIN PRN
Status: CANCELLED | OUTPATIENT
Start: 2021-01-01

## 2021-01-01 RX ORDER — GABAPENTIN 100 MG/1
100 CAPSULE ORAL 3 TIMES DAILY
Qty: 90 CAPSULE | Refills: 0 | Status: SHIPPED | OUTPATIENT
Start: 2021-01-01 | End: 2021-01-01

## 2021-01-01 RX ORDER — ALBUTEROL SULFATE 90 UG/1
1-2 AEROSOL, METERED RESPIRATORY (INHALATION)
Status: CANCELLED
Start: 2021-01-01

## 2021-01-01 RX ORDER — HEPARIN SODIUM 10000 [USP'U]/100ML
0-5000 INJECTION, SOLUTION INTRAVENOUS CONTINUOUS
Status: DISCONTINUED | OUTPATIENT
Start: 2021-01-01 | End: 2021-01-01

## 2021-01-01 RX ORDER — EPINEPHRINE 1 MG/ML
0.3 INJECTION, SOLUTION INTRAMUSCULAR; SUBCUTANEOUS EVERY 5 MIN PRN
Status: DISCONTINUED | OUTPATIENT
Start: 2021-01-01 | End: 2021-01-01 | Stop reason: HOSPADM

## 2021-01-01 RX ORDER — DIPHENHYDRAMINE HCL 50 MG
50 CAPSULE ORAL ONCE
Status: CANCELLED
Start: 2021-01-01

## 2021-01-01 RX ORDER — FERROUS SULFATE 325(65) MG
325 TABLET ORAL DAILY
Status: DISCONTINUED | OUTPATIENT
Start: 2021-01-01 | End: 2021-01-01 | Stop reason: HOSPADM

## 2021-01-01 RX ORDER — TRAMADOL HYDROCHLORIDE 50 MG/1
50-100 TABLET ORAL EVERY 6 HOURS PRN
Qty: 20 TABLET | Refills: 0 | Status: SHIPPED | OUTPATIENT
Start: 2021-01-01 | End: 2021-01-01

## 2021-01-01 RX ORDER — CODEINE PHOSPHATE AND GUAIFENESIN 10; 100 MG/5ML; MG/5ML
1-2 SOLUTION ORAL EVERY 4 HOURS PRN
Qty: 473 ML | Refills: 1 | Status: SHIPPED | OUTPATIENT
Start: 2021-01-01

## 2021-01-01 RX ORDER — IOPAMIDOL 755 MG/ML
100 INJECTION, SOLUTION INTRAVASCULAR ONCE
Status: COMPLETED | OUTPATIENT
Start: 2021-01-01 | End: 2021-01-01

## 2021-01-01 RX ORDER — AMOXICILLIN 250 MG
2 CAPSULE ORAL 2 TIMES DAILY PRN
Status: DISCONTINUED | OUTPATIENT
Start: 2021-01-01 | End: 2021-01-01 | Stop reason: HOSPADM

## 2021-01-01 RX ORDER — GADOBUTROL 604.72 MG/ML
7 INJECTION INTRAVENOUS ONCE
Status: COMPLETED | OUTPATIENT
Start: 2021-01-01 | End: 2021-01-01

## 2021-01-01 RX ORDER — TRAMADOL HYDROCHLORIDE 50 MG/1
50 TABLET ORAL EVERY 6 HOURS PRN
Status: DISCONTINUED | OUTPATIENT
Start: 2021-01-01 | End: 2021-01-01 | Stop reason: HOSPADM

## 2021-01-01 RX ORDER — DIPHENHYDRAMINE HYDROCHLORIDE 50 MG/ML
50 INJECTION INTRAMUSCULAR; INTRAVENOUS
Status: DISCONTINUED | OUTPATIENT
Start: 2021-01-01 | End: 2021-01-01 | Stop reason: HOSPADM

## 2021-01-01 RX ORDER — FOLIC ACID 1 MG/1
1 TABLET ORAL DAILY
Qty: 30 TABLET | Refills: 3 | Status: SHIPPED | OUTPATIENT
Start: 2021-01-01 | End: 2021-01-01

## 2021-01-01 RX ORDER — EPINEPHRINE 1 MG/ML
0.3 INJECTION, SOLUTION INTRAMUSCULAR; SUBCUTANEOUS EVERY 5 MIN PRN
Status: CANCELLED | OUTPATIENT
Start: 2021-01-01

## 2021-01-01 RX ORDER — ONDANSETRON 2 MG/ML
4 INJECTION INTRAMUSCULAR; INTRAVENOUS EVERY 6 HOURS PRN
Status: DISCONTINUED | OUTPATIENT
Start: 2021-01-01 | End: 2021-01-01 | Stop reason: HOSPADM

## 2021-01-01 RX ORDER — PROCHLORPERAZINE MALEATE 5 MG
5 TABLET ORAL EVERY 6 HOURS PRN
Status: DISCONTINUED | OUTPATIENT
Start: 2021-01-01 | End: 2021-01-01 | Stop reason: HOSPADM

## 2021-01-01 RX ORDER — VALACYCLOVIR HYDROCHLORIDE 1 G/1
1000 TABLET, FILM COATED ORAL 3 TIMES DAILY
Qty: 21 TABLET | Refills: 0 | Status: SHIPPED | OUTPATIENT
Start: 2021-01-01 | End: 2021-01-01 | Stop reason: ALTCHOICE

## 2021-01-01 RX ORDER — POLYMYXIN B SULFATE, BACITRACIN ZINC AND NEOMYCIN SULFATE 400; 3.5; 5 [USP'U]/G; MG/G; [USP'U]/G
OINTMENT TOPICAL
Qty: 90 TABLET | Refills: 0 | Status: SHIPPED | OUTPATIENT
Start: 2021-01-01

## 2021-01-01 RX ORDER — ONDANSETRON 4 MG/1
4 TABLET, ORALLY DISINTEGRATING ORAL EVERY 6 HOURS PRN
Status: DISCONTINUED | OUTPATIENT
Start: 2021-01-01 | End: 2021-01-01 | Stop reason: HOSPADM

## 2021-01-01 RX ORDER — LORAZEPAM 0.5 MG/1
.5-1 TABLET ORAL EVERY 6 HOURS PRN
Status: CANCELLED
Start: 2021-01-01

## 2021-01-01 RX ORDER — METHYLPREDNISOLONE SODIUM SUCCINATE 125 MG/2ML
125 INJECTION, POWDER, LYOPHILIZED, FOR SOLUTION INTRAMUSCULAR; INTRAVENOUS
Status: DISCONTINUED | OUTPATIENT
Start: 2021-01-01 | End: 2021-01-01 | Stop reason: HOSPADM

## 2021-01-01 RX ORDER — MEPERIDINE HYDROCHLORIDE 25 MG/ML
25 INJECTION INTRAMUSCULAR; INTRAVENOUS; SUBCUTANEOUS EVERY 30 MIN PRN
Status: CANCELLED | OUTPATIENT
Start: 2021-01-01

## 2021-01-01 RX ORDER — DEXTROSE, SODIUM CHLORIDE, SODIUM LACTATE, POTASSIUM CHLORIDE, AND CALCIUM CHLORIDE 5; .6; .31; .03; .02 G/100ML; G/100ML; G/100ML; G/100ML; G/100ML
1000 INJECTION, SOLUTION INTRAVENOUS ONCE
Status: CANCELLED | OUTPATIENT
Start: 2021-01-01 | End: 2021-01-01

## 2021-01-01 RX ORDER — SODIUM CHLORIDE, SODIUM LACTATE, POTASSIUM CHLORIDE, CALCIUM CHLORIDE 600; 310; 30; 20 MG/100ML; MG/100ML; MG/100ML; MG/100ML
INJECTION, SOLUTION INTRAVENOUS CONTINUOUS
Status: CANCELLED | OUTPATIENT
Start: 2021-01-01

## 2021-01-01 RX ORDER — SODIUM CHLORIDE, SODIUM LACTATE, POTASSIUM CHLORIDE, CALCIUM CHLORIDE 600; 310; 30; 20 MG/100ML; MG/100ML; MG/100ML; MG/100ML
INJECTION, SOLUTION INTRAVENOUS CONTINUOUS
Status: DISCONTINUED | OUTPATIENT
Start: 2021-01-01 | End: 2021-01-01

## 2021-01-01 RX ORDER — FENTANYL CITRATE 50 UG/ML
25 INJECTION, SOLUTION INTRAMUSCULAR; INTRAVENOUS EVERY 5 MIN PRN
Status: CANCELLED | OUTPATIENT
Start: 2021-01-01

## 2021-01-01 RX ORDER — SULFAMETHOXAZOLE/TRIMETHOPRIM 800-160 MG
1 TABLET ORAL DAILY
Status: DISCONTINUED | OUTPATIENT
Start: 2021-01-01 | End: 2021-01-01 | Stop reason: HOSPADM

## 2021-01-01 RX ORDER — TRAMADOL HYDROCHLORIDE 50 MG/1
50 TABLET ORAL EVERY 6 HOURS PRN
Qty: 10 TABLET | Refills: 0 | Status: SHIPPED | OUTPATIENT
Start: 2021-01-01 | End: 2021-01-01

## 2021-01-01 RX ORDER — NALOXONE HYDROCHLORIDE 0.4 MG/ML
0.4 INJECTION, SOLUTION INTRAMUSCULAR; INTRAVENOUS; SUBCUTANEOUS
Status: DISCONTINUED | OUTPATIENT
Start: 2021-01-01 | End: 2021-01-01 | Stop reason: HOSPADM

## 2021-01-01 RX ORDER — LORAZEPAM 2 MG/ML
0.5 INJECTION INTRAMUSCULAR EVERY 4 HOURS PRN
Status: DISCONTINUED | OUTPATIENT
Start: 2021-01-01 | End: 2021-01-01 | Stop reason: HOSPADM

## 2021-01-01 RX ORDER — MEGESTROL ACETATE 40 MG/ML
800 SUSPENSION ORAL DAILY
Qty: 480 ML | Refills: 3 | Status: SHIPPED | OUTPATIENT
Start: 2021-01-01

## 2021-01-01 RX ORDER — LORAZEPAM 2 MG/ML
.5-1 INJECTION INTRAMUSCULAR EVERY 6 HOURS PRN
Status: DISCONTINUED | OUTPATIENT
Start: 2021-01-01 | End: 2021-01-01 | Stop reason: HOSPADM

## 2021-01-01 RX ORDER — NALOXONE HYDROCHLORIDE 0.4 MG/ML
0.2 INJECTION, SOLUTION INTRAMUSCULAR; INTRAVENOUS; SUBCUTANEOUS
Status: DISCONTINUED | OUTPATIENT
Start: 2021-01-01 | End: 2021-01-01

## 2021-01-01 RX ORDER — PROCHLORPERAZINE MALEATE 10 MG
10 TABLET ORAL EVERY 6 HOURS PRN
Qty: 30 TABLET | Refills: 1 | Status: SHIPPED | OUTPATIENT
Start: 2021-01-01 | End: 2021-01-01

## 2021-01-01 RX ORDER — ONDANSETRON 8 MG/1
16 TABLET, FILM COATED ORAL ONCE
Status: CANCELLED
Start: 2021-01-01

## 2021-01-01 RX ORDER — ALBUTEROL SULFATE 90 UG/1
1-2 AEROSOL, METERED RESPIRATORY (INHALATION)
Status: DISCONTINUED | OUTPATIENT
Start: 2021-01-01 | End: 2021-01-01 | Stop reason: HOSPADM

## 2021-01-01 RX ORDER — DIPHENHYDRAMINE HCL 50 MG
50 CAPSULE ORAL ONCE
Status: COMPLETED | OUTPATIENT
Start: 2021-01-01 | End: 2021-01-01

## 2021-01-01 RX ORDER — MAGNESIUM SULFATE HEPTAHYDRATE 40 MG/ML
2 INJECTION, SOLUTION INTRAVENOUS ONCE
Status: COMPLETED | OUTPATIENT
Start: 2021-01-01 | End: 2021-01-01

## 2021-01-01 RX ORDER — OXYCODONE HYDROCHLORIDE 5 MG/1
5 TABLET ORAL EVERY 4 HOURS PRN
Status: DISCONTINUED | OUTPATIENT
Start: 2021-01-01 | End: 2021-01-01 | Stop reason: HOSPADM

## 2021-01-01 RX ORDER — DEXAMETHASONE SODIUM PHOSPHATE 4 MG/ML
4 INJECTION, SOLUTION INTRA-ARTICULAR; INTRALESIONAL; INTRAMUSCULAR; INTRAVENOUS; SOFT TISSUE EVERY 8 HOURS
Status: DISCONTINUED | OUTPATIENT
Start: 2021-01-01 | End: 2021-01-01

## 2021-01-01 RX ORDER — ALBUTEROL SULFATE 0.83 MG/ML
2.5 SOLUTION RESPIRATORY (INHALATION)
Status: CANCELLED | OUTPATIENT
Start: 2021-01-01

## 2021-01-01 RX ORDER — DOXYCYCLINE 100 MG/1
CAPSULE ORAL
Qty: 65 CAPSULE | Refills: 0 | Status: SHIPPED | OUTPATIENT
Start: 2021-01-01 | End: 2021-01-01

## 2021-01-01 RX ORDER — LIDOCAINE HYDROCHLORIDE 20 MG/ML
INJECTION, SOLUTION INFILTRATION; PERINEURAL PRN
Status: DISCONTINUED | OUTPATIENT
Start: 2021-01-01 | End: 2021-01-01

## 2021-01-01 RX ORDER — ONDANSETRON 4 MG/1
16 TABLET, FILM COATED ORAL ONCE
Status: COMPLETED | OUTPATIENT
Start: 2021-01-01 | End: 2021-01-01

## 2021-01-01 RX ORDER — SODIUM CHLORIDE 9 MG/ML
1000 INJECTION, SOLUTION INTRAVENOUS CONTINUOUS
Status: DISCONTINUED | OUTPATIENT
Start: 2021-01-01 | End: 2021-01-01

## 2021-01-01 RX ORDER — HEPARIN SODIUM,PORCINE 10 UNIT/ML
5 VIAL (ML) INTRAVENOUS
Status: DISCONTINUED | OUTPATIENT
Start: 2021-01-01 | End: 2021-01-01 | Stop reason: HOSPADM

## 2021-01-01 RX ORDER — LORAZEPAM 2 MG/ML
0.5 INJECTION INTRAMUSCULAR EVERY 4 HOURS PRN
Status: CANCELLED | OUTPATIENT
Start: 2021-01-01

## 2021-01-01 RX ORDER — METOPROLOL TARTRATE 25 MG/1
25 TABLET, FILM COATED ORAL 4 TIMES DAILY
Status: DISCONTINUED | OUTPATIENT
Start: 2021-01-01 | End: 2021-01-01

## 2021-01-01 RX ORDER — BUPIVACAINE HYDROCHLORIDE 5 MG/ML
4 INJECTION, SOLUTION PERINEURAL ONCE
Status: COMPLETED | OUTPATIENT
Start: 2021-01-01 | End: 2021-01-01

## 2021-01-01 RX ORDER — LIDOCAINE 40 MG/G
CREAM TOPICAL
Status: DISCONTINUED | OUTPATIENT
Start: 2021-01-01 | End: 2021-01-01 | Stop reason: HOSPADM

## 2021-01-01 RX ORDER — DEXAMETHASONE SODIUM PHOSPHATE 10 MG/ML
20 INJECTION, SOLUTION INTRAMUSCULAR; INTRAVENOUS ONCE
Status: CANCELLED
Start: 2021-01-01

## 2021-01-01 RX ORDER — TRAMADOL HYDROCHLORIDE 50 MG/1
50 TABLET ORAL ONCE
Status: COMPLETED | OUTPATIENT
Start: 2021-01-01 | End: 2021-01-01

## 2021-01-01 RX ORDER — FENTANYL CITRATE 50 UG/ML
25-50 INJECTION, SOLUTION INTRAMUSCULAR; INTRAVENOUS EVERY 5 MIN PRN
Status: DISCONTINUED | OUTPATIENT
Start: 2021-01-01 | End: 2021-01-01

## 2021-01-01 RX ORDER — IOPAMIDOL 755 MG/ML
75 INJECTION, SOLUTION INTRAVASCULAR ONCE
Status: COMPLETED | OUTPATIENT
Start: 2021-01-01 | End: 2021-01-01

## 2021-01-01 RX ORDER — DEXAMETHASONE 4 MG/1
4 TABLET ORAL 2 TIMES DAILY WITH MEALS
Qty: 24 TABLET | Refills: 3 | Status: ON HOLD | OUTPATIENT
Start: 2021-01-01 | End: 2021-01-01

## 2021-01-01 RX ORDER — PIPERACILLIN SODIUM, TAZOBACTAM SODIUM 3; .375 G/15ML; G/15ML
3.38 INJECTION, POWDER, LYOPHILIZED, FOR SOLUTION INTRAVENOUS EVERY 8 HOURS
Status: DISCONTINUED | OUTPATIENT
Start: 2021-01-01 | End: 2021-01-01

## 2021-01-01 RX ORDER — CEPHALEXIN 500 MG/1
500 CAPSULE ORAL 2 TIMES DAILY
Qty: 14 CAPSULE | Refills: 0 | Status: SHIPPED | OUTPATIENT
Start: 2021-01-01 | End: 2021-01-01

## 2021-01-01 RX ORDER — POLYETHYLENE GLYCOL 3350 17 G/17G
1 POWDER, FOR SOLUTION ORAL DAILY
Qty: 507 G | Refills: 0 | Status: SHIPPED | OUTPATIENT
Start: 2021-01-01 | End: 2021-01-01

## 2021-01-01 RX ORDER — FERROUS SULFATE 325(65) MG
325 TABLET ORAL DAILY
Qty: 90 TABLET | Refills: 1 | Status: SHIPPED | OUTPATIENT
Start: 2021-01-01 | End: 2021-01-01

## 2021-01-01 RX ORDER — LIDOCAINE 40 MG/G
CREAM TOPICAL
Status: DISCONTINUED | OUTPATIENT
Start: 2021-01-01 | End: 2021-01-01

## 2021-01-01 RX ORDER — NALOXONE HYDROCHLORIDE 0.4 MG/ML
0.2 INJECTION, SOLUTION INTRAMUSCULAR; INTRAVENOUS; SUBCUTANEOUS
Status: CANCELLED | OUTPATIENT
Start: 2021-01-01

## 2021-01-01 RX ORDER — PROCHLORPERAZINE MALEATE 5 MG
5-10 TABLET ORAL EVERY 6 HOURS PRN
Status: DISCONTINUED | OUTPATIENT
Start: 2021-01-01 | End: 2021-01-01 | Stop reason: HOSPADM

## 2021-01-01 RX ORDER — SODIUM CHLORIDE 9 MG/ML
INJECTION, SOLUTION INTRAVENOUS CONTINUOUS
Status: DISCONTINUED | OUTPATIENT
Start: 2021-01-01 | End: 2021-01-01

## 2021-01-01 RX ORDER — BENZONATATE 100 MG/1
100 CAPSULE ORAL 3 TIMES DAILY PRN
Qty: 30 CAPSULE | Refills: 0 | Status: SHIPPED | OUTPATIENT
Start: 2021-01-01 | End: 2021-01-01

## 2021-01-01 RX ORDER — SULFAMETHOXAZOLE/TRIMETHOPRIM 800-160 MG
1 TABLET ORAL DAILY
Qty: 30 TABLET | Refills: 0 | Status: SHIPPED | OUTPATIENT
Start: 2021-01-01

## 2021-01-01 RX ORDER — PROCHLORPERAZINE MALEATE 10 MG
10 TABLET ORAL EVERY 6 HOURS PRN
Qty: 30 TABLET | Refills: 3 | Status: ON HOLD | OUTPATIENT
Start: 2021-01-01 | End: 2021-01-01

## 2021-01-01 RX ORDER — HEPARIN SODIUM (PORCINE) LOCK FLUSH IV SOLN 100 UNIT/ML 100 UNIT/ML
5 SOLUTION INTRAVENOUS
Status: DISCONTINUED | OUTPATIENT
Start: 2021-01-01 | End: 2021-01-01 | Stop reason: HOSPADM

## 2021-01-01 RX ORDER — DEXTROSE, SODIUM CHLORIDE, SODIUM LACTATE, POTASSIUM CHLORIDE, AND CALCIUM CHLORIDE 5; .6; .31; .03; .02 G/100ML; G/100ML; G/100ML; G/100ML; G/100ML
1000 INJECTION, SOLUTION INTRAVENOUS ONCE
Status: COMPLETED | OUTPATIENT
Start: 2021-01-01 | End: 2021-01-01

## 2021-01-01 RX ORDER — DEXAMETHASONE 4 MG/1
8 TABLET ORAL 2 TIMES DAILY WITH MEALS
Qty: 8 TABLET | Refills: 0 | Status: SHIPPED | OUTPATIENT
Start: 2021-01-01 | End: 2021-01-01

## 2021-01-01 RX ORDER — PROPOFOL 10 MG/ML
INJECTION, EMULSION INTRAVENOUS PRN
Status: DISCONTINUED | OUTPATIENT
Start: 2021-01-01 | End: 2021-01-01

## 2021-01-01 RX ORDER — LIDOCAINE 40 MG/G
CREAM TOPICAL
Status: CANCELLED | OUTPATIENT
Start: 2021-01-01

## 2021-01-01 RX ORDER — FERROUS SULFATE 325(65) MG
325 TABLET ORAL
Qty: 90 TABLET | Refills: 1 | Status: SHIPPED | OUTPATIENT
Start: 2021-01-01

## 2021-01-01 RX ORDER — AZITHROMYCIN 500 MG/1
500 TABLET, FILM COATED ORAL
Qty: 1 TABLET | Refills: 0 | Status: SHIPPED | OUTPATIENT
Start: 2021-01-01 | End: 2021-01-01

## 2021-01-01 RX ORDER — DEXAMETHASONE SODIUM PHOSPHATE 10 MG/ML
20 INJECTION, SOLUTION INTRAMUSCULAR; INTRAVENOUS ONCE
Status: DISCONTINUED | OUTPATIENT
Start: 2021-01-01 | End: 2021-01-01

## 2021-01-01 RX ORDER — CYANOCOBALAMIN 1000 UG/ML
1000 INJECTION, SOLUTION INTRAMUSCULAR; SUBCUTANEOUS ONCE
Status: DISCONTINUED | OUTPATIENT
Start: 2021-01-01 | End: 2021-01-01 | Stop reason: HOSPADM

## 2021-01-01 RX ORDER — PROCHLORPERAZINE MALEATE 10 MG
1 TABLET ORAL EVERY 6 HOURS PRN
COMMUNITY
Start: 2021-01-01 | End: 2021-01-01

## 2021-01-01 RX ORDER — NAPROXEN 500 MG/1
TABLET ORAL
Qty: 60 TABLET | Refills: 1 | Status: SHIPPED | OUTPATIENT
Start: 2021-01-01 | End: 2021-01-01

## 2021-01-01 RX ORDER — CEFAZOLIN SODIUM 1 G/50ML
1250 SOLUTION INTRAVENOUS ONCE
Status: COMPLETED | OUTPATIENT
Start: 2021-01-01 | End: 2021-01-01

## 2021-01-01 RX ORDER — FERROUS SULFATE 325(65) MG
325 TABLET ORAL DAILY
COMMUNITY
Start: 2021-01-01 | End: 2021-01-01

## 2021-01-01 RX ORDER — RIVAROXABAN 15 MG-20MG
1 KIT ORAL 2 TIMES DAILY
Qty: 51 EACH | Refills: 0 | Status: SHIPPED | OUTPATIENT
Start: 2021-01-01 | End: 2021-01-01

## 2021-01-01 RX ORDER — GABAPENTIN 100 MG/1
100 CAPSULE ORAL 3 TIMES DAILY
Status: DISCONTINUED | OUTPATIENT
Start: 2021-01-01 | End: 2021-01-01 | Stop reason: HOSPADM

## 2021-01-01 RX ORDER — MULTIVITAMIN,THERAPEUTIC
1 TABLET ORAL DAILY
Status: DISCONTINUED | OUTPATIENT
Start: 2021-01-01 | End: 2021-01-01 | Stop reason: HOSPADM

## 2021-01-01 RX ORDER — VALACYCLOVIR HYDROCHLORIDE 1 G/1
1000 TABLET, FILM COATED ORAL 2 TIMES DAILY
Qty: 14 TABLET | Refills: 0 | COMMUNITY
Start: 2021-01-01

## 2021-01-01 RX ORDER — FLUMAZENIL 0.1 MG/ML
0.2 INJECTION, SOLUTION INTRAVENOUS
Status: DISCONTINUED | OUTPATIENT
Start: 2021-01-01 | End: 2021-01-01

## 2021-01-01 RX ORDER — PROPOFOL 10 MG/ML
INJECTION, EMULSION INTRAVENOUS CONTINUOUS PRN
Status: DISCONTINUED | OUTPATIENT
Start: 2021-01-01 | End: 2021-01-01

## 2021-01-01 RX ORDER — CYANOCOBALAMIN 1000 UG/ML
1000 INJECTION, SOLUTION INTRAMUSCULAR; SUBCUTANEOUS
Status: DISCONTINUED | OUTPATIENT
Start: 2021-01-01 | End: 2021-01-01 | Stop reason: CLARIF

## 2021-01-01 RX ORDER — AMOXICILLIN 250 MG
1 CAPSULE ORAL 2 TIMES DAILY PRN
Status: DISCONTINUED | OUTPATIENT
Start: 2021-01-01 | End: 2021-01-01 | Stop reason: HOSPADM

## 2021-01-01 RX ORDER — MEPERIDINE HYDROCHLORIDE 25 MG/ML
25 INJECTION INTRAMUSCULAR; INTRAVENOUS; SUBCUTANEOUS EVERY 30 MIN PRN
Status: DISCONTINUED | OUTPATIENT
Start: 2021-01-01 | End: 2021-01-01 | Stop reason: HOSPADM

## 2021-01-01 RX ORDER — HYDROCODONE BITARTRATE AND ACETAMINOPHEN 5; 325 MG/1; MG/1
1 TABLET ORAL ONCE
Status: COMPLETED | OUTPATIENT
Start: 2021-01-01 | End: 2021-01-01

## 2021-01-01 RX ORDER — BENZONATATE 100 MG/1
100 CAPSULE ORAL 3 TIMES DAILY PRN
Status: DISCONTINUED | OUTPATIENT
Start: 2021-01-01 | End: 2021-01-01 | Stop reason: HOSPADM

## 2021-01-01 RX ORDER — FOLIC ACID 1 MG/1
1000 TABLET ORAL DAILY
Qty: 30 TABLET | Refills: 4 | Status: ON HOLD | OUTPATIENT
Start: 2021-01-01 | End: 2021-01-01

## 2021-01-01 RX ORDER — HEPARIN SODIUM 200 [USP'U]/100ML
1 INJECTION, SOLUTION INTRAVENOUS CONTINUOUS PRN
Status: DISCONTINUED | OUTPATIENT
Start: 2021-01-01 | End: 2021-01-01

## 2021-01-01 RX ORDER — PROCHLORPERAZINE MALEATE 10 MG
10 TABLET ORAL EVERY 6 HOURS PRN
Qty: 30 TABLET | Refills: 1 | Status: SHIPPED | OUTPATIENT
Start: 2021-01-01

## 2021-01-01 RX ORDER — DILTIAZEM HYDROCHLORIDE 5 MG/ML
15 INJECTION INTRAVENOUS ONCE
Status: COMPLETED | OUTPATIENT
Start: 2021-01-01 | End: 2021-01-01

## 2021-01-01 RX ORDER — LORAZEPAM 2 MG/ML
.5-1 INJECTION INTRAMUSCULAR EVERY 6 HOURS PRN
Status: CANCELLED | OUTPATIENT
Start: 2021-01-01

## 2021-01-01 RX ORDER — CYANOCOBALAMIN 1000 UG/ML
1000 INJECTION, SOLUTION INTRAMUSCULAR; SUBCUTANEOUS
Status: DISCONTINUED | OUTPATIENT
Start: 2021-01-01 | End: 2021-01-01 | Stop reason: HOSPADM

## 2021-01-01 RX ORDER — POLYETHYLENE GLYCOL 3350 17 G/17G
POWDER, FOR SOLUTION ORAL
Qty: 510 G | Refills: 3 | Status: SHIPPED | OUTPATIENT
Start: 2021-01-01 | End: 2021-01-01

## 2021-01-01 RX ORDER — METOPROLOL SUCCINATE 50 MG/1
50 TABLET, EXTENDED RELEASE ORAL 2 TIMES DAILY
Qty: 60 TABLET | Refills: 0 | Status: SHIPPED | OUTPATIENT
Start: 2021-01-01

## 2021-01-01 RX ORDER — HYDROCODONE BITARTRATE AND ACETAMINOPHEN 5; 325 MG/1; MG/1
1 TABLET ORAL EVERY 6 HOURS PRN
Qty: 20 TABLET | Refills: 0 | Status: SHIPPED | OUTPATIENT
Start: 2021-01-01 | End: 2021-01-01

## 2021-01-01 RX ORDER — TRIAMCINOLONE ACETONIDE 40 MG/ML
40 INJECTION, SUSPENSION INTRA-ARTICULAR; INTRAMUSCULAR ONCE
Status: COMPLETED | OUTPATIENT
Start: 2021-01-01 | End: 2021-01-01

## 2021-01-01 RX ORDER — SODIUM CHLORIDE 9 MG/ML
INJECTION, SOLUTION INTRAVENOUS CONTINUOUS
Status: CANCELLED | OUTPATIENT
Start: 2021-01-01

## 2021-01-01 RX ORDER — MAGNESIUM SULFATE HEPTAHYDRATE 40 MG/ML
2 INJECTION, SOLUTION INTRAVENOUS ONCE
Status: DISCONTINUED | OUTPATIENT
Start: 2021-01-01 | End: 2021-01-01

## 2021-01-01 RX ORDER — RIVAROXABAN 15 MG/1
TABLET, FILM COATED ORAL
Qty: 42 TABLET | Refills: 0 | Status: SHIPPED | OUTPATIENT
Start: 2021-01-01 | End: 2021-01-01

## 2021-01-01 RX ORDER — LOPERAMIDE HYDROCHLORIDE 2 MG/1
2 TABLET ORAL 4 TIMES DAILY PRN
Qty: 20 TABLET | Refills: 0 | Status: SHIPPED | OUTPATIENT
Start: 2021-01-01 | End: 2021-01-01

## 2021-01-01 RX ORDER — METOPROLOL SUCCINATE 25 MG/1
50 TABLET, EXTENDED RELEASE ORAL 2 TIMES DAILY
Status: DISCONTINUED | OUTPATIENT
Start: 2021-01-01 | End: 2021-01-01 | Stop reason: HOSPADM

## 2021-01-01 RX ORDER — AMMONIUM LACTATE 12 G/100G
LOTION TOPICAL DAILY PRN
Qty: 500 G | Refills: 4 | Status: SHIPPED | OUTPATIENT
Start: 2021-01-01

## 2021-01-01 RX ORDER — ONDANSETRON 2 MG/ML
4 INJECTION INTRAMUSCULAR; INTRAVENOUS EVERY 30 MIN PRN
Status: CANCELLED | OUTPATIENT
Start: 2021-01-01

## 2021-01-01 RX ORDER — LEVOFLOXACIN 750 MG/1
750 TABLET, FILM COATED ORAL DAILY
Qty: 7 TABLET | Refills: 0 | Status: SHIPPED | OUTPATIENT
Start: 2021-01-01 | End: 2021-01-01

## 2021-01-01 RX ADMIN — DIPHENHYDRAMINE HCL 50 MG: 50 CAPSULE ORAL at 13:59

## 2021-01-01 RX ADMIN — DEXAMETHASONE SODIUM PHOSPHATE 4 MG: 4 INJECTION, SOLUTION INTRAMUSCULAR; INTRAVENOUS at 05:20

## 2021-01-01 RX ADMIN — EMTRICITABINE, RILPIVIRINE HYDROCHLORIDE, AND TENOFOVIR ALAFENAMIDE 1 TABLET: 200; 25; 25 TABLET ORAL at 09:12

## 2021-01-01 RX ADMIN — IOPAMIDOL 100 ML: 755 INJECTION, SOLUTION INTRAVENOUS at 17:20

## 2021-01-01 RX ADMIN — PIPERACILLIN SODIUM AND TAZOBACTAM SODIUM 3.38 G: 3; .375 INJECTION, POWDER, LYOPHILIZED, FOR SOLUTION INTRAVENOUS at 09:10

## 2021-01-01 RX ADMIN — METOPROLOL TARTRATE 25 MG: 25 TABLET, FILM COATED ORAL at 20:25

## 2021-01-01 RX ADMIN — TRAMADOL HYDROCHLORIDE 50 MG: 50 TABLET, FILM COATED ORAL at 13:22

## 2021-01-01 RX ADMIN — GABAPENTIN 100 MG: 100 CAPSULE ORAL at 14:46

## 2021-01-01 RX ADMIN — GABAPENTIN 100 MG: 100 CAPSULE ORAL at 20:42

## 2021-01-01 RX ADMIN — DEXAMETHASONE SODIUM PHOSPHATE 4 MG: 4 INJECTION, SOLUTION INTRAMUSCULAR; INTRAVENOUS at 15:34

## 2021-01-01 RX ADMIN — ENOXAPARIN SODIUM 60 MG: 100 INJECTION SUBCUTANEOUS at 09:14

## 2021-01-01 RX ADMIN — IOPAMIDOL 75 ML: 755 INJECTION, SOLUTION INTRAVENOUS at 12:41

## 2021-01-01 RX ADMIN — ENOXAPARIN SODIUM 60 MG: 100 INJECTION SUBCUTANEOUS at 20:43

## 2021-01-01 RX ADMIN — PIPERACILLIN SODIUM AND TAZOBACTAM SODIUM 3.38 G: 3; .375 INJECTION, POWDER, LYOPHILIZED, FOR SOLUTION INTRAVENOUS at 05:19

## 2021-01-01 RX ADMIN — ENOXAPARIN SODIUM 60 MG: 100 INJECTION SUBCUTANEOUS at 05:39

## 2021-01-01 RX ADMIN — TRAMADOL HYDROCHLORIDE 50 MG: 50 TABLET, FILM COATED ORAL at 17:16

## 2021-01-01 RX ADMIN — SODIUM CHLORIDE: 9 INJECTION, SOLUTION INTRAVENOUS at 15:13

## 2021-01-01 RX ADMIN — GABAPENTIN 100 MG: 100 CAPSULE ORAL at 13:28

## 2021-01-01 RX ADMIN — GUAIFENESIN 10 ML: 200 SOLUTION ORAL at 02:04

## 2021-01-01 RX ADMIN — SODIUM CHLORIDE: 9 INJECTION, SOLUTION INTRAVENOUS at 20:59

## 2021-01-01 RX ADMIN — GABAPENTIN 100 MG: 100 CAPSULE ORAL at 21:43

## 2021-01-01 RX ADMIN — GABAPENTIN 100 MG: 100 CAPSULE ORAL at 08:12

## 2021-01-01 RX ADMIN — DILTIAZEM HYDROCHLORIDE 15 MG/HR: 5 INJECTION INTRAVENOUS at 08:09

## 2021-01-01 RX ADMIN — GABAPENTIN 100 MG: 100 CAPSULE ORAL at 13:22

## 2021-01-01 RX ADMIN — GABAPENTIN 100 MG: 100 CAPSULE ORAL at 09:40

## 2021-01-01 RX ADMIN — MIDAZOLAM 1 MG: 1 INJECTION INTRAMUSCULAR; INTRAVENOUS at 10:47

## 2021-01-01 RX ADMIN — SODIUM CHLORIDE 125 ML/HR: 9 INJECTION, SOLUTION INTRAVENOUS at 15:55

## 2021-01-01 RX ADMIN — ONDANSETRON 4 MG: 2 INJECTION INTRAMUSCULAR; INTRAVENOUS at 17:03

## 2021-01-01 RX ADMIN — FERROUS SULFATE TAB 325 MG (65 MG ELEMENTAL FE) 325 MG: 325 (65 FE) TAB at 09:11

## 2021-01-01 RX ADMIN — METOPROLOL SUCCINATE 50 MG: 25 TABLET, EXTENDED RELEASE ORAL at 20:58

## 2021-01-01 RX ADMIN — SODIUM CHLORIDE: 9 INJECTION, SOLUTION INTRAVENOUS at 00:45

## 2021-01-01 RX ADMIN — TRAMADOL HYDROCHLORIDE 50 MG: 50 TABLET, FILM COATED ORAL at 00:30

## 2021-01-01 RX ADMIN — PIPERACILLIN SODIUM AND TAZOBACTAM SODIUM 3.38 G: 3; .375 INJECTION, POWDER, LYOPHILIZED, FOR SOLUTION INTRAVENOUS at 20:40

## 2021-01-01 RX ADMIN — FAMOTIDINE 20 MG: 10 INJECTION, SOLUTION INTRAVENOUS at 10:44

## 2021-01-01 RX ADMIN — GABAPENTIN 100 MG: 100 CAPSULE ORAL at 16:22

## 2021-01-01 RX ADMIN — METOPROLOL SUCCINATE 50 MG: 25 TABLET, EXTENDED RELEASE ORAL at 08:26

## 2021-01-01 RX ADMIN — METOPROLOL SUCCINATE 50 MG: 25 TABLET, EXTENDED RELEASE ORAL at 09:39

## 2021-01-01 RX ADMIN — TRAMADOL HYDROCHLORIDE 50 MG: 50 TABLET, FILM COATED ORAL at 21:54

## 2021-01-01 RX ADMIN — TRAMADOL HYDROCHLORIDE 50 MG: 50 TABLET, FILM COATED ORAL at 12:19

## 2021-01-01 RX ADMIN — DIPHENHYDRAMINE HCL 50 MG: 50 CAPSULE ORAL at 11:07

## 2021-01-01 RX ADMIN — SULFAMETHOXAZOLE AND TRIMETHOPRIM 1 TABLET: 800; 160 TABLET ORAL at 08:43

## 2021-01-01 RX ADMIN — DEXAMETHASONE SODIUM PHOSPHATE 4 MG: 4 INJECTION, SOLUTION INTRAMUSCULAR; INTRAVENOUS at 09:41

## 2021-01-01 RX ADMIN — FAMOTIDINE 20 MG: 10 INJECTION, SOLUTION INTRAVENOUS at 14:03

## 2021-01-01 RX ADMIN — DIPHENHYDRAMINE HCL 50 MG: 50 CAPSULE ORAL at 10:30

## 2021-01-01 RX ADMIN — SODIUM CHLORIDE: 9 INJECTION, SOLUTION INTRAVENOUS at 03:26

## 2021-01-01 RX ADMIN — SULFAMETHOXAZOLE AND TRIMETHOPRIM 1 TABLET: 800; 160 TABLET ORAL at 09:19

## 2021-01-01 RX ADMIN — ENOXAPARIN SODIUM 60 MG: 100 INJECTION SUBCUTANEOUS at 08:43

## 2021-01-01 RX ADMIN — FERROUS SULFATE TAB 325 MG (65 MG ELEMENTAL FE) 325 MG: 325 (65 FE) TAB at 09:40

## 2021-01-01 RX ADMIN — RIVAROXABAN 20 MG: 10 TABLET, FILM COATED ORAL at 17:16

## 2021-01-01 RX ADMIN — GABAPENTIN 100 MG: 100 CAPSULE ORAL at 20:07

## 2021-01-01 RX ADMIN — METOPROLOL SUCCINATE 50 MG: 25 TABLET, EXTENDED RELEASE ORAL at 08:17

## 2021-01-01 RX ADMIN — GABAPENTIN 100 MG: 100 CAPSULE ORAL at 11:01

## 2021-01-01 RX ADMIN — CARBOPLATIN 100 MG: 10 INJECTION, SOLUTION INTRAVENOUS at 15:26

## 2021-01-01 RX ADMIN — ENOXAPARIN SODIUM 60 MG: 100 INJECTION SUBCUTANEOUS at 18:09

## 2021-01-01 RX ADMIN — PACLITAXEL 89 MG: 6 INJECTION, SOLUTION INTRAVENOUS at 11:46

## 2021-01-01 RX ADMIN — TRAMADOL HYDROCHLORIDE 50 MG: 50 TABLET, FILM COATED ORAL at 17:15

## 2021-01-01 RX ADMIN — METOPROLOL SUCCINATE 50 MG: 25 TABLET, EXTENDED RELEASE ORAL at 20:49

## 2021-01-01 RX ADMIN — DIPHENHYDRAMINE HCL 50 MG: 50 CAPSULE ORAL at 11:00

## 2021-01-01 RX ADMIN — ENOXAPARIN SODIUM 60 MG: 100 INJECTION SUBCUTANEOUS at 20:54

## 2021-01-01 RX ADMIN — HEPARIN SODIUM 1250 UNITS/HR: 10000 INJECTION, SOLUTION INTRAVENOUS at 09:59

## 2021-01-01 RX ADMIN — DEXAMETHASONE SODIUM PHOSPHATE: 10 INJECTION, SOLUTION INTRAMUSCULAR; INTRAVENOUS at 11:44

## 2021-01-01 RX ADMIN — CARBOPLATIN 215 MG: 10 INJECTION, SOLUTION INTRAVENOUS at 20:17

## 2021-01-01 RX ADMIN — SULFAMETHOXAZOLE AND TRIMETHOPRIM 1 TABLET: 800; 160 TABLET ORAL at 09:12

## 2021-01-01 RX ADMIN — EMTRICITABINE, RILPIVIRINE HYDROCHLORIDE, AND TENOFOVIR ALAFENAMIDE 1 TABLET: 200; 25; 25 TABLET ORAL at 07:54

## 2021-01-01 RX ADMIN — SULFAMETHOXAZOLE AND TRIMETHOPRIM 1 TABLET: 800; 160 TABLET ORAL at 08:41

## 2021-01-01 RX ADMIN — SODIUM CHLORIDE: 9 INJECTION, SOLUTION INTRAVENOUS at 21:38

## 2021-01-01 RX ADMIN — HEPARIN SODIUM 1150 UNITS/HR: 10000 INJECTION, SOLUTION INTRAVENOUS at 07:26

## 2021-01-01 RX ADMIN — FERROUS SULFATE TAB 325 MG (65 MG ELEMENTAL FE) 325 MG: 325 (65 FE) TAB at 09:19

## 2021-01-01 RX ADMIN — METOPROLOL SUCCINATE 50 MG: 25 TABLET, EXTENDED RELEASE ORAL at 20:42

## 2021-01-01 RX ADMIN — EMTRICITABINE, RILPIVIRINE HYDROCHLORIDE, AND TENOFOVIR ALAFENAMIDE 1 TABLET: 200; 25; 25 TABLET ORAL at 12:12

## 2021-01-01 RX ADMIN — EMTRICITABINE, RILPIVIRINE HYDROCHLORIDE, AND TENOFOVIR ALAFENAMIDE 1 TABLET: 200; 25; 25 TABLET ORAL at 09:47

## 2021-01-01 RX ADMIN — DEXAMETHASONE SODIUM PHOSPHATE 20 MG: 10 INJECTION, SOLUTION INTRAMUSCULAR; INTRAVENOUS at 09:29

## 2021-01-01 RX ADMIN — METOPROLOL SUCCINATE 50 MG: 25 TABLET, EXTENDED RELEASE ORAL at 09:12

## 2021-01-01 RX ADMIN — PACLITAXEL 94 MG: 300 INJECTION, SOLUTION INTRAVENOUS at 10:20

## 2021-01-01 RX ADMIN — ENOXAPARIN SODIUM 60 MG: 100 INJECTION SUBCUTANEOUS at 05:20

## 2021-01-01 RX ADMIN — DIPHENHYDRAMINE HCL 50 MG: 50 CAPSULE ORAL at 11:31

## 2021-01-01 RX ADMIN — TRAMADOL HYDROCHLORIDE 50 MG: 50 TABLET, FILM COATED ORAL at 06:48

## 2021-01-01 RX ADMIN — EMTRICITABINE, RILPIVIRINE HYDROCHLORIDE, AND TENOFOVIR ALAFENAMIDE 1 TABLET: 200; 25; 25 TABLET ORAL at 08:18

## 2021-01-01 RX ADMIN — CARBOPLATIN 110 MG: 10 INJECTION, SOLUTION INTRAVENOUS at 13:12

## 2021-01-01 RX ADMIN — PACLITAXEL 94 MG: 6 INJECTION, SOLUTION INTRAVENOUS at 18:48

## 2021-01-01 RX ADMIN — SODIUM CHLORIDE 500 ML: 9 INJECTION, SOLUTION INTRAVENOUS at 22:22

## 2021-01-01 RX ADMIN — DEXAMETHASONE SODIUM PHOSPHATE 4 MG: 4 INJECTION, SOLUTION INTRAMUSCULAR; INTRAVENOUS at 00:45

## 2021-01-01 RX ADMIN — PACLITAXEL 80 MG: 6 INJECTION, SOLUTION INTRAVENOUS at 11:36

## 2021-01-01 RX ADMIN — SODIUM CHLORIDE: 9 INJECTION, SOLUTION INTRAVENOUS at 14:32

## 2021-01-01 RX ADMIN — SULFAMETHOXAZOLE AND TRIMETHOPRIM 1 TABLET: 800; 160 TABLET ORAL at 09:40

## 2021-01-01 RX ADMIN — TRAMADOL HYDROCHLORIDE 50 MG: 50 TABLET, FILM COATED ORAL at 15:51

## 2021-01-01 RX ADMIN — TRAMADOL HYDROCHLORIDE 50 MG: 50 TABLET, FILM COATED ORAL at 09:42

## 2021-01-01 RX ADMIN — SODIUM CHLORIDE 250 ML: 9 INJECTION, SOLUTION INTRAVENOUS at 11:10

## 2021-01-01 RX ADMIN — GABAPENTIN 100 MG: 100 CAPSULE ORAL at 20:58

## 2021-01-01 RX ADMIN — MAGNESIUM SULFATE HEPTAHYDRATE 2 G: 40 INJECTION, SOLUTION INTRAVENOUS at 02:49

## 2021-01-01 RX ADMIN — FAMOTIDINE 20 MG: 10 INJECTION, SOLUTION INTRAVENOUS at 17:34

## 2021-01-01 RX ADMIN — DEXAMETHASONE SODIUM PHOSPHATE 4 MG: 4 INJECTION, SOLUTION INTRAMUSCULAR; INTRAVENOUS at 22:15

## 2021-01-01 RX ADMIN — SODIUM CHLORIDE 1000 ML: 9 INJECTION, SOLUTION INTRAVENOUS at 19:25

## 2021-01-01 RX ADMIN — CARBOPLATIN 135 MG: 10 INJECTION, SOLUTION INTRAVENOUS at 12:49

## 2021-01-01 RX ADMIN — METOPROLOL TARTRATE 25 MG: 25 TABLET, FILM COATED ORAL at 20:07

## 2021-01-01 RX ADMIN — DIPHENHYDRAMINE HYDROCHLORIDE 50 MG: 50 CAPSULE ORAL at 09:48

## 2021-01-01 RX ADMIN — DEXAMETHASONE SODIUM PHOSPHATE 4 MG: 4 INJECTION, SOLUTION INTRAMUSCULAR; INTRAVENOUS at 14:38

## 2021-01-01 RX ADMIN — ENOXAPARIN SODIUM 60 MG: 100 INJECTION SUBCUTANEOUS at 06:37

## 2021-01-01 RX ADMIN — GABAPENTIN 100 MG: 100 CAPSULE ORAL at 08:18

## 2021-01-01 RX ADMIN — TRAMADOL HYDROCHLORIDE 50 MG: 50 TABLET, FILM COATED ORAL at 19:43

## 2021-01-01 RX ADMIN — GABAPENTIN 100 MG: 100 CAPSULE ORAL at 08:26

## 2021-01-01 RX ADMIN — FOLIC ACID 1 MG: 1 TABLET ORAL at 09:56

## 2021-01-01 RX ADMIN — SODIUM CHLORIDE: 9 INJECTION, SOLUTION INTRAVENOUS at 09:30

## 2021-01-01 RX ADMIN — METOPROLOL TARTRATE 25 MG: 25 TABLET, FILM COATED ORAL at 09:09

## 2021-01-01 RX ADMIN — METOPROLOL SUCCINATE 50 MG: 25 TABLET, EXTENDED RELEASE ORAL at 21:43

## 2021-01-01 RX ADMIN — ENOXAPARIN SODIUM 60 MG: 100 INJECTION SUBCUTANEOUS at 21:39

## 2021-01-01 RX ADMIN — SODIUM CHLORIDE 125 ML/HR: 9 INJECTION, SOLUTION INTRAVENOUS at 06:25

## 2021-01-01 RX ADMIN — TRAMADOL HYDROCHLORIDE 50 MG: 50 TABLET, FILM COATED ORAL at 11:18

## 2021-01-01 RX ADMIN — DEXAMETHASONE SODIUM PHOSPHATE 4 MG: 4 INJECTION, SOLUTION INTRAMUSCULAR; INTRAVENOUS at 01:06

## 2021-01-01 RX ADMIN — DEXAMETHASONE SODIUM PHOSPHATE 4 MG: 4 INJECTION, SOLUTION INTRAMUSCULAR; INTRAVENOUS at 15:52

## 2021-01-01 RX ADMIN — SODIUM CHLORIDE 1 ML: 9 INJECTION, SOLUTION INTRAVENOUS at 05:21

## 2021-01-01 RX ADMIN — SODIUM CHLORIDE: 9 INJECTION, SOLUTION INTRAVENOUS at 10:23

## 2021-01-01 RX ADMIN — ENOXAPARIN SODIUM 60 MG: 100 INJECTION SUBCUTANEOUS at 09:18

## 2021-01-01 RX ADMIN — DEXAMETHASONE SODIUM PHOSPHATE: 10 INJECTION, SOLUTION INTRAMUSCULAR; INTRAVENOUS at 14:06

## 2021-01-01 RX ADMIN — PACLITAXEL 89 MG: 6 INJECTION, SOLUTION INTRAVENOUS at 12:38

## 2021-01-01 RX ADMIN — METOPROLOL SUCCINATE 50 MG: 25 TABLET, EXTENDED RELEASE ORAL at 20:54

## 2021-01-01 RX ADMIN — ENOXAPARIN SODIUM 60 MG: 100 INJECTION SUBCUTANEOUS at 08:18

## 2021-01-01 RX ADMIN — TRAMADOL HYDROCHLORIDE 50 MG: 50 TABLET, FILM COATED ORAL at 06:19

## 2021-01-01 RX ADMIN — METOPROLOL TARTRATE 25 MG: 25 TABLET, FILM COATED ORAL at 12:19

## 2021-01-01 RX ADMIN — DILTIAZEM HYDROCHLORIDE 5 MG/HR: 5 INJECTION INTRAVENOUS at 23:13

## 2021-01-01 RX ADMIN — DEXAMETHASONE SODIUM PHOSPHATE 4 MG: 4 INJECTION, SOLUTION INTRAMUSCULAR; INTRAVENOUS at 00:23

## 2021-01-01 RX ADMIN — CARBOPLATIN 220 MG: 10 INJECTION INTRAVENOUS at 12:32

## 2021-01-01 RX ADMIN — METOPROLOL SUCCINATE 50 MG: 25 TABLET, EXTENDED RELEASE ORAL at 09:19

## 2021-01-01 RX ADMIN — GADOBUTROL 7 ML: 604.72 INJECTION INTRAVENOUS at 18:24

## 2021-01-01 RX ADMIN — GABAPENTIN 100 MG: 100 CAPSULE ORAL at 14:23

## 2021-01-01 RX ADMIN — SODIUM CHLORIDE, SODIUM LACTATE, POTASSIUM CHLORIDE, CALCIUM CHLORIDE AND DEXTROSE MONOHYDRATE 1000 ML: 5; 600; 310; 30; 20 INJECTION, SOLUTION INTRAVENOUS at 15:04

## 2021-01-01 RX ADMIN — DEXAMETHASONE SODIUM PHOSPHATE 4 MG: 4 INJECTION, SOLUTION INTRAMUSCULAR; INTRAVENOUS at 16:11

## 2021-01-01 RX ADMIN — GABAPENTIN 100 MG: 100 CAPSULE ORAL at 09:56

## 2021-01-01 RX ADMIN — SODIUM CHLORIDE: 9 INJECTION, SOLUTION INTRAVENOUS at 05:00

## 2021-01-01 RX ADMIN — TRAMADOL HYDROCHLORIDE 50 MG: 50 TABLET, FILM COATED ORAL at 04:23

## 2021-01-01 RX ADMIN — PROPOFOL 10 MG: 10 INJECTION, EMULSION INTRAVENOUS at 13:42

## 2021-01-01 RX ADMIN — METOPROLOL TARTRATE 25 MG: 25 TABLET, FILM COATED ORAL at 08:12

## 2021-01-01 RX ADMIN — SODIUM CHLORIDE: 9 INJECTION, SOLUTION INTRAVENOUS at 11:02

## 2021-01-01 RX ADMIN — ENOXAPARIN SODIUM 60 MG: 100 INJECTION SUBCUTANEOUS at 08:38

## 2021-01-01 RX ADMIN — METOPROLOL SUCCINATE 50 MG: 25 TABLET, EXTENDED RELEASE ORAL at 21:37

## 2021-01-01 RX ADMIN — ONDANSETRON: 2 SOLUTION INTRAMUSCULAR; INTRAVENOUS at 11:38

## 2021-01-01 RX ADMIN — SODIUM CHLORIDE, SODIUM LACTATE, POTASSIUM CHLORIDE, CALCIUM CHLORIDE AND DEXTROSE MONOHYDRATE 1000 ML: 5; 600; 310; 30; 20 INJECTION, SOLUTION INTRAVENOUS at 10:43

## 2021-01-01 RX ADMIN — EMTRICITABINE, RILPIVIRINE HYDROCHLORIDE, AND TENOFOVIR ALAFENAMIDE 1 TABLET: 200; 25; 25 TABLET ORAL at 08:26

## 2021-01-01 RX ADMIN — ONDANSETRON 4 MG: 4 TABLET, ORALLY DISINTEGRATING ORAL at 06:27

## 2021-01-01 RX ADMIN — SODIUM CHLORIDE: 9 INJECTION, SOLUTION INTRAVENOUS at 20:54

## 2021-01-01 RX ADMIN — METOPROLOL TARTRATE 25 MG: 25 TABLET, FILM COATED ORAL at 08:02

## 2021-01-01 RX ADMIN — DEXAMETHASONE SODIUM PHOSPHATE 4 MG: 4 INJECTION, SOLUTION INTRAMUSCULAR; INTRAVENOUS at 09:14

## 2021-01-01 RX ADMIN — GABAPENTIN 100 MG: 100 CAPSULE ORAL at 21:36

## 2021-01-01 RX ADMIN — ONDANSETRON 4 MG: 2 INJECTION INTRAMUSCULAR; INTRAVENOUS at 08:18

## 2021-01-01 RX ADMIN — PROPOFOL 20 MG: 10 INJECTION, EMULSION INTRAVENOUS at 13:40

## 2021-01-01 RX ADMIN — ENOXAPARIN SODIUM 60 MG: 100 INJECTION SUBCUTANEOUS at 17:59

## 2021-01-01 RX ADMIN — SODIUM CHLORIDE: 9 INJECTION, SOLUTION INTRAVENOUS at 07:52

## 2021-01-01 RX ADMIN — DILTIAZEM HYDROCHLORIDE 15 MG/HR: 5 INJECTION INTRAVENOUS at 00:34

## 2021-01-01 RX ADMIN — DEXAMETHASONE SODIUM PHOSPHATE: 10 INJECTION, SOLUTION INTRAMUSCULAR; INTRAVENOUS at 10:45

## 2021-01-01 RX ADMIN — PIPERACILLIN SODIUM AND TAZOBACTAM SODIUM 3.38 G: 3; .375 INJECTION, POWDER, LYOPHILIZED, FOR SOLUTION INTRAVENOUS at 14:27

## 2021-01-01 RX ADMIN — SODIUM CHLORIDE 200 MG: 9 INJECTION, SOLUTION INTRAVENOUS at 12:06

## 2021-01-01 RX ADMIN — CYANOCOBALAMIN 1000 MCG: 1000 INJECTION, SOLUTION INTRAMUSCULAR; SUBCUTANEOUS at 14:04

## 2021-01-01 RX ADMIN — DEXAMETHASONE SODIUM PHOSPHATE: 10 INJECTION, SOLUTION INTRAMUSCULAR; INTRAVENOUS at 11:21

## 2021-01-01 RX ADMIN — GUAIFENESIN 10 ML: 200 SOLUTION ORAL at 08:25

## 2021-01-01 RX ADMIN — LIDOCAINE HYDROCHLORIDE 60 MG: 20 INJECTION, SOLUTION INFILTRATION; PERINEURAL at 13:40

## 2021-01-01 RX ADMIN — METOPROLOL TARTRATE 25 MG: 25 TABLET, FILM COATED ORAL at 08:29

## 2021-01-01 RX ADMIN — SODIUM CHLORIDE: 9 INJECTION, SOLUTION INTRAVENOUS at 09:42

## 2021-01-01 RX ADMIN — GABAPENTIN 100 MG: 100 CAPSULE ORAL at 15:52

## 2021-01-01 RX ADMIN — DOCUSATE SODIUM 50 MG AND SENNOSIDES 8.6 MG 2 TABLET: 8.6; 5 TABLET, FILM COATED ORAL at 11:00

## 2021-01-01 RX ADMIN — GUAIFENESIN 10 ML: 200 SOLUTION ORAL at 04:23

## 2021-01-01 RX ADMIN — SODIUM CHLORIDE 250 ML: 9 INJECTION, SOLUTION INTRAVENOUS at 10:44

## 2021-01-01 RX ADMIN — DEXAMETHASONE SODIUM PHOSPHATE 4 MG: 4 INJECTION, SOLUTION INTRAMUSCULAR; INTRAVENOUS at 16:19

## 2021-01-01 RX ADMIN — GABAPENTIN 100 MG: 100 CAPSULE ORAL at 08:02

## 2021-01-01 RX ADMIN — FAMOTIDINE 20 MG: 10 INJECTION, SOLUTION INTRAVENOUS at 11:03

## 2021-01-01 RX ADMIN — GABAPENTIN 100 MG: 100 CAPSULE ORAL at 08:43

## 2021-01-01 RX ADMIN — DILTIAZEM HYDROCHLORIDE 10 MG/HR: 5 INJECTION INTRAVENOUS at 23:41

## 2021-01-01 RX ADMIN — DEXAMETHASONE SODIUM PHOSPHATE 4 MG: 4 INJECTION, SOLUTION INTRAMUSCULAR; INTRAVENOUS at 22:30

## 2021-01-01 RX ADMIN — DEXAMETHASONE SODIUM PHOSPHATE 4 MG: 4 INJECTION, SOLUTION INTRAMUSCULAR; INTRAVENOUS at 05:18

## 2021-01-01 RX ADMIN — GABAPENTIN 100 MG: 100 CAPSULE ORAL at 11:48

## 2021-01-01 RX ADMIN — EMTRICITABINE, RILPIVIRINE HYDROCHLORIDE, AND TENOFOVIR ALAFENAMIDE 1 TABLET: 200; 25; 25 TABLET ORAL at 08:29

## 2021-01-01 RX ADMIN — FOSAPREPITANT 150 MG: 150 INJECTION, POWDER, LYOPHILIZED, FOR SOLUTION INTRAVENOUS at 17:50

## 2021-01-01 RX ADMIN — DEXAMETHASONE SODIUM PHOSPHATE 4 MG: 4 INJECTION, SOLUTION INTRAMUSCULAR; INTRAVENOUS at 13:22

## 2021-01-01 RX ADMIN — TRAMADOL HYDROCHLORIDE 50 MG: 50 TABLET, FILM COATED ORAL at 01:00

## 2021-01-01 RX ADMIN — CARBOPLATIN 125 MG: 10 INJECTION, SOLUTION INTRAVENOUS at 13:44

## 2021-01-01 RX ADMIN — DEXAMETHASONE SODIUM PHOSPHATE 4 MG: 4 INJECTION, SOLUTION INTRAMUSCULAR; INTRAVENOUS at 22:04

## 2021-01-01 RX ADMIN — DEXAMETHASONE SODIUM PHOSPHATE 4 MG: 4 INJECTION, SOLUTION INTRAMUSCULAR; INTRAVENOUS at 00:50

## 2021-01-01 RX ADMIN — GABAPENTIN 100 MG: 100 CAPSULE ORAL at 16:11

## 2021-01-01 RX ADMIN — GABAPENTIN 100 MG: 100 CAPSULE ORAL at 14:38

## 2021-01-01 RX ADMIN — PIPERACILLIN SODIUM AND TAZOBACTAM SODIUM 3.38 G: 3; .375 INJECTION, POWDER, LYOPHILIZED, FOR SOLUTION INTRAVENOUS at 22:34

## 2021-01-01 RX ADMIN — HEPARIN SODIUM 1550 UNITS/HR: 10000 INJECTION, SOLUTION INTRAVENOUS at 21:32

## 2021-01-01 RX ADMIN — SODIUM CHLORIDE 125 ML: 9 INJECTION, SOLUTION INTRAVENOUS at 23:26

## 2021-01-01 RX ADMIN — TRAMADOL HYDROCHLORIDE 50 MG: 50 TABLET, FILM COATED ORAL at 02:04

## 2021-01-01 RX ADMIN — METOPROLOL SUCCINATE 50 MG: 25 TABLET, EXTENDED RELEASE ORAL at 20:12

## 2021-01-01 RX ADMIN — GUAIFENESIN 10 ML: 200 SOLUTION ORAL at 17:54

## 2021-01-01 RX ADMIN — METOPROLOL SUCCINATE 50 MG: 25 TABLET, EXTENDED RELEASE ORAL at 08:43

## 2021-01-01 RX ADMIN — FAMOTIDINE 20 MG: 10 INJECTION, SOLUTION INTRAVENOUS at 09:48

## 2021-01-01 RX ADMIN — PIPERACILLIN SODIUM AND TAZOBACTAM SODIUM 3.38 G: 3; .375 INJECTION, POWDER, LYOPHILIZED, FOR SOLUTION INTRAVENOUS at 15:28

## 2021-01-01 RX ADMIN — IOPAMIDOL 100 ML: 755 INJECTION, SOLUTION INTRAVENOUS at 14:49

## 2021-01-01 RX ADMIN — RIVAROXABAN 20 MG: 10 TABLET, FILM COATED ORAL at 19:49

## 2021-01-01 RX ADMIN — EMTRICITABINE, RILPIVIRINE HYDROCHLORIDE, AND TENOFOVIR ALAFENAMIDE 1 TABLET: 200; 25; 25 TABLET ORAL at 08:02

## 2021-01-01 RX ADMIN — VANCOMYCIN HYDROCHLORIDE 1250 MG: 5 INJECTION, POWDER, LYOPHILIZED, FOR SOLUTION INTRAVENOUS at 23:15

## 2021-01-01 RX ADMIN — ENOXAPARIN SODIUM 60 MG: 100 INJECTION SUBCUTANEOUS at 17:00

## 2021-01-01 RX ADMIN — PIPERACILLIN SODIUM AND TAZOBACTAM SODIUM 3.38 G: 3; .375 INJECTION, POWDER, LYOPHILIZED, FOR SOLUTION INTRAVENOUS at 05:18

## 2021-01-01 RX ADMIN — ONDANSETRON HYDROCHLORIDE 16 MG: 4 TABLET, FILM COATED ORAL at 11:35

## 2021-01-01 RX ADMIN — ENOXAPARIN SODIUM 60 MG: 100 INJECTION SUBCUTANEOUS at 09:41

## 2021-01-01 RX ADMIN — METOPROLOL SUCCINATE 50 MG: 25 TABLET, EXTENDED RELEASE ORAL at 11:36

## 2021-01-01 RX ADMIN — FERROUS SULFATE TAB 325 MG (65 MG ELEMENTAL FE) 325 MG: 325 (65 FE) TAB at 08:41

## 2021-01-01 RX ADMIN — DEXAMETHASONE SODIUM PHOSPHATE 4 MG: 4 INJECTION, SOLUTION INTRAMUSCULAR; INTRAVENOUS at 00:30

## 2021-01-01 RX ADMIN — DIPHENHYDRAMINE HCL 50 MG: 50 CAPSULE ORAL at 17:34

## 2021-01-01 RX ADMIN — HYDROCODONE BITARTRATE AND ACETAMINOPHEN 1 TABLET: 5; 325 TABLET ORAL at 21:39

## 2021-01-01 RX ADMIN — EMTRICITABINE, RILPIVIRINE HYDROCHLORIDE, AND TENOFOVIR ALAFENAMIDE 1 TABLET: 200; 25; 25 TABLET ORAL at 14:26

## 2021-01-01 RX ADMIN — SODIUM CHLORIDE: 9 INJECTION, SOLUTION INTRAVENOUS at 21:12

## 2021-01-01 RX ADMIN — GABAPENTIN 100 MG: 100 CAPSULE ORAL at 08:29

## 2021-01-01 RX ADMIN — SODIUM CHLORIDE 200 MG: 9 INJECTION, SOLUTION INTRAVENOUS at 10:54

## 2021-01-01 RX ADMIN — DEXAMETHASONE SODIUM PHOSPHATE 4 MG: 4 INJECTION, SOLUTION INTRAMUSCULAR; INTRAVENOUS at 16:17

## 2021-01-01 RX ADMIN — DEXTROSE AND SODIUM CHLORIDE 1000 ML: 5; 450 INJECTION, SOLUTION INTRAVENOUS at 14:58

## 2021-01-01 RX ADMIN — DOCUSATE SODIUM 50 MG AND SENNOSIDES 8.6 MG 1 TABLET: 8.6; 5 TABLET, FILM COATED ORAL at 11:18

## 2021-01-01 RX ADMIN — METOPROLOL TARTRATE 25 MG: 25 TABLET, FILM COATED ORAL at 16:53

## 2021-01-01 RX ADMIN — SODIUM CHLORIDE 1 ML: 9 INJECTION, SOLUTION INTRAVENOUS at 20:59

## 2021-01-01 RX ADMIN — SODIUM CHLORIDE: 9 INJECTION, SOLUTION INTRAVENOUS at 06:21

## 2021-01-01 RX ADMIN — ENOXAPARIN SODIUM 60 MG: 100 INJECTION SUBCUTANEOUS at 20:48

## 2021-01-01 RX ADMIN — DEXAMETHASONE SODIUM PHOSPHATE 4 MG: 4 INJECTION, SOLUTION INTRAMUSCULAR; INTRAVENOUS at 15:55

## 2021-01-01 RX ADMIN — IOPAMIDOL 75 ML: 755 INJECTION, SOLUTION INTRAVENOUS at 07:59

## 2021-01-01 RX ADMIN — EMTRICITABINE, RILPIVIRINE HYDROCHLORIDE, AND TENOFOVIR ALAFENAMIDE 1 TABLET: 200; 25; 25 TABLET ORAL at 08:07

## 2021-01-01 RX ADMIN — SULFAMETHOXAZOLE AND TRIMETHOPRIM 1 TABLET: 800; 160 TABLET ORAL at 08:02

## 2021-01-01 RX ADMIN — DEXAMETHASONE SODIUM PHOSPHATE 4 MG: 4 INJECTION, SOLUTION INTRAMUSCULAR; INTRAVENOUS at 05:41

## 2021-01-01 RX ADMIN — ENOXAPARIN SODIUM 60 MG: 100 INJECTION SUBCUTANEOUS at 05:42

## 2021-01-01 RX ADMIN — GABAPENTIN 100 MG: 100 CAPSULE ORAL at 09:12

## 2021-01-01 RX ADMIN — SODIUM CHLORIDE 1000 ML: 9 INJECTION, SOLUTION INTRAVENOUS at 11:00

## 2021-01-01 RX ADMIN — TRAMADOL HYDROCHLORIDE 50 MG: 50 TABLET, FILM COATED ORAL at 00:00

## 2021-01-01 RX ADMIN — GABAPENTIN 100 MG: 100 CAPSULE ORAL at 20:25

## 2021-01-01 RX ADMIN — PROPOFOL 200 MCG/KG/MIN: 10 INJECTION, EMULSION INTRAVENOUS at 13:40

## 2021-01-01 RX ADMIN — THERA TABS 1 TABLET: TAB at 14:23

## 2021-01-01 RX ADMIN — EMTRICITABINE, RILPIVIRINE HYDROCHLORIDE, AND TENOFOVIR ALAFENAMIDE 1 TABLET: 200; 25; 25 TABLET ORAL at 08:43

## 2021-01-01 RX ADMIN — ONDANSETRON 4 MG: 2 INJECTION INTRAMUSCULAR; INTRAVENOUS at 17:39

## 2021-01-01 RX ADMIN — FOSAPREPITANT 150 MG: 150 INJECTION, POWDER, LYOPHILIZED, FOR SOLUTION INTRAVENOUS at 09:48

## 2021-01-01 RX ADMIN — EMTRICITABINE, RILPIVIRINE HYDROCHLORIDE, AND TENOFOVIR ALAFENAMIDE 1 TABLET: 200; 25; 25 TABLET ORAL at 09:09

## 2021-01-01 RX ADMIN — GABAPENTIN 100 MG: 100 CAPSULE ORAL at 08:41

## 2021-01-01 RX ADMIN — GUAIFENESIN 10 ML: 200 SOLUTION ORAL at 11:03

## 2021-01-01 RX ADMIN — SODIUM CHLORIDE: 9 INJECTION, SOLUTION INTRAVENOUS at 11:42

## 2021-01-01 RX ADMIN — SULFAMETHOXAZOLE AND TRIMETHOPRIM 1 TABLET: 800; 160 TABLET ORAL at 16:53

## 2021-01-01 RX ADMIN — GABAPENTIN 100 MG: 100 CAPSULE ORAL at 09:19

## 2021-01-01 RX ADMIN — DEXAMETHASONE SODIUM PHOSPHATE 4 MG: 4 INJECTION, SOLUTION INTRAMUSCULAR; INTRAVENOUS at 09:46

## 2021-01-01 RX ADMIN — CARBOPLATIN 110 MG: 10 INJECTION, SOLUTION INTRAVENOUS at 12:25

## 2021-01-01 RX ADMIN — METOPROLOL TARTRATE 25 MG: 25 TABLET, FILM COATED ORAL at 20:41

## 2021-01-01 RX ADMIN — PACLITAXEL 80 MG: 6 INJECTION, SOLUTION INTRAVENOUS at 12:04

## 2021-01-01 RX ADMIN — SODIUM CHLORIDE 250 ML: 9 INJECTION, SOLUTION INTRAVENOUS at 14:03

## 2021-01-01 RX ADMIN — SODIUM CHLORIDE 250 ML: 9 INJECTION, SOLUTION INTRAVENOUS at 11:38

## 2021-01-01 RX ADMIN — METOPROLOL TARTRATE 25 MG: 25 TABLET, FILM COATED ORAL at 11:26

## 2021-01-01 RX ADMIN — HEPARIN SODIUM 1150 UNITS/HR: 10000 INJECTION, SOLUTION INTRAVENOUS at 19:16

## 2021-01-01 RX ADMIN — METOPROLOL TARTRATE 25 MG: 25 TABLET, FILM COATED ORAL at 17:55

## 2021-01-01 RX ADMIN — FAMOTIDINE 20 MG: 10 INJECTION, SOLUTION INTRAVENOUS at 11:11

## 2021-01-01 RX ADMIN — GABAPENTIN 100 MG: 100 CAPSULE ORAL at 20:28

## 2021-01-01 RX ADMIN — DEXAMETHASONE SODIUM PHOSPHATE 4 MG: 4 INJECTION, SOLUTION INTRAMUSCULAR; INTRAVENOUS at 00:57

## 2021-01-01 RX ADMIN — PROCHLORPERAZINE EDISYLATE 5 MG: 5 INJECTION INTRAMUSCULAR; INTRAVENOUS at 11:01

## 2021-01-01 RX ADMIN — ENOXAPARIN SODIUM 60 MG: 100 INJECTION SUBCUTANEOUS at 17:36

## 2021-01-01 RX ADMIN — DEXAMETHASONE SODIUM PHOSPHATE 4 MG: 4 INJECTION, SOLUTION INTRAMUSCULAR; INTRAVENOUS at 08:43

## 2021-01-01 RX ADMIN — EMTRICITABINE, RILPIVIRINE HYDROCHLORIDE, AND TENOFOVIR ALAFENAMIDE 1 TABLET: 200; 25; 25 TABLET ORAL at 09:40

## 2021-01-01 RX ADMIN — EMTRICITABINE, RILPIVIRINE HYDROCHLORIDE, AND TENOFOVIR ALAFENAMIDE 1 TABLET: 200; 25; 25 TABLET ORAL at 09:56

## 2021-01-01 RX ADMIN — GABAPENTIN 100 MG: 100 CAPSULE ORAL at 20:10

## 2021-01-01 RX ADMIN — DILTIAZEM HYDROCHLORIDE 15 MG: 5 INJECTION INTRAVENOUS at 21:49

## 2021-01-01 RX ADMIN — SODIUM CHLORIDE 250 ML: 9 INJECTION, SOLUTION INTRAVENOUS at 11:03

## 2021-01-01 RX ADMIN — GABAPENTIN 100 MG: 100 CAPSULE ORAL at 15:37

## 2021-01-01 RX ADMIN — DEXAMETHASONE SODIUM PHOSPHATE 4 MG: 4 INJECTION, SOLUTION INTRAMUSCULAR; INTRAVENOUS at 06:37

## 2021-01-01 RX ADMIN — PACLITAXEL 67 MG: 6 INJECTION, SOLUTION INTRAVENOUS at 14:27

## 2021-01-01 RX ADMIN — DEXAMETHASONE SODIUM PHOSPHATE 4 MG: 4 INJECTION, SOLUTION INTRAMUSCULAR; INTRAVENOUS at 15:43

## 2021-01-01 RX ADMIN — GABAPENTIN 100 MG: 100 CAPSULE ORAL at 13:50

## 2021-01-01 RX ADMIN — ONDANSETRON 4 MG: 2 INJECTION INTRAMUSCULAR; INTRAVENOUS at 21:12

## 2021-01-01 RX ADMIN — FERROUS SULFATE TAB 325 MG (65 MG ELEMENTAL FE) 325 MG: 325 (65 FE) TAB at 09:54

## 2021-01-01 RX ADMIN — SULFAMETHOXAZOLE AND TRIMETHOPRIM 1 TABLET: 800; 160 TABLET ORAL at 08:18

## 2021-01-01 RX ADMIN — SODIUM CHLORIDE: 9 INJECTION, SOLUTION INTRAVENOUS at 02:01

## 2021-01-01 RX ADMIN — SODIUM CHLORIDE 1 ML: 9 INJECTION, SOLUTION INTRAVENOUS at 00:29

## 2021-01-01 RX ADMIN — ONDANSETRON HYDROCHLORIDE 16 MG: 4 TABLET, FILM COATED ORAL at 19:37

## 2021-01-01 RX ADMIN — DEXAMETHASONE SODIUM PHOSPHATE 20 MG: 10 INJECTION, SOLUTION INTRAMUSCULAR; INTRAVENOUS at 17:24

## 2021-01-01 RX ADMIN — FOLIC ACID 1 MG: 1 TABLET ORAL at 11:00

## 2021-01-01 RX ADMIN — GABAPENTIN 100 MG: 100 CAPSULE ORAL at 20:43

## 2021-01-01 RX ADMIN — PIPERACILLIN SODIUM AND TAZOBACTAM SODIUM 3.38 G: 3; .375 INJECTION, POWDER, LYOPHILIZED, FOR SOLUTION INTRAVENOUS at 22:29

## 2021-01-01 RX ADMIN — FAMOTIDINE 20 MG: 10 INJECTION, SOLUTION INTRAVENOUS at 11:35

## 2021-01-01 RX ADMIN — FERROUS SULFATE TAB 325 MG (65 MG ELEMENTAL FE) 325 MG: 325 (65 FE) TAB at 08:44

## 2021-01-01 RX ADMIN — FERROUS SULFATE TAB 325 MG (65 MG ELEMENTAL FE) 325 MG: 325 (65 FE) TAB at 08:18

## 2021-01-01 RX ADMIN — GABAPENTIN 100 MG: 100 CAPSULE ORAL at 20:54

## 2021-01-01 RX ADMIN — CYANOCOBALAMIN 1000 MCG: 1000 INJECTION, SOLUTION INTRAMUSCULAR; SUBCUTANEOUS at 09:57

## 2021-01-01 RX ADMIN — GABAPENTIN 100 MG: 100 CAPSULE ORAL at 20:48

## 2021-01-01 RX ADMIN — SULFAMETHOXAZOLE AND TRIMETHOPRIM 1 TABLET: 800; 160 TABLET ORAL at 08:26

## 2021-01-01 RX ADMIN — TRIAMCINOLONE ACETONIDE 40 MG: 40 INJECTION, SUSPENSION INTRA-ARTICULAR; INTRAMUSCULAR at 11:34

## 2021-01-01 RX ADMIN — ENOXAPARIN SODIUM 60 MG: 100 INJECTION SUBCUTANEOUS at 20:11

## 2021-01-01 RX ADMIN — SODIUM CHLORIDE: 9 INJECTION, SOLUTION INTRAVENOUS at 19:39

## 2021-01-01 RX ADMIN — METOPROLOL SUCCINATE 50 MG: 25 TABLET, EXTENDED RELEASE ORAL at 09:48

## 2021-01-01 RX ADMIN — DEXAMETHASONE SODIUM PHOSPHATE 4 MG: 4 INJECTION, SOLUTION INTRAMUSCULAR; INTRAVENOUS at 21:00

## 2021-01-01 RX ADMIN — TRAMADOL HYDROCHLORIDE 50 MG: 50 TABLET, FILM COATED ORAL at 10:22

## 2021-01-01 RX ADMIN — BUPIVACAINE HYDROCHLORIDE 20 MG: 5 INJECTION, SOLUTION PERINEURAL at 11:34

## 2021-01-01 ASSESSMENT — PAIN SCALES - GENERAL
PAINLEVEL: NO PAIN (0)
PAINLEVEL: MODERATE PAIN (5)
PAINLEVEL: NO PAIN (0)
PAINLEVEL: WORST PAIN (10)
PAINLEVEL: EXTREME PAIN (8)
PAINLEVEL: WORST PAIN (10)
PAINLEVEL: EXTREME PAIN (8)

## 2021-01-01 ASSESSMENT — ACTIVITIES OF DAILY LIVING (ADL)
DRESSING/BATHING_DIFFICULTY: NO
WALKING_OR_CLIMBING_STAIRS_DIFFICULTY: NO
DOING_ERRANDS_INDEPENDENTLY_DIFFICULTY: NO
PREVIOUS_RESPONSIBILITIES: MEAL PREP;HOUSEKEEPING;LAUNDRY;SHOPPING;MEDICATION MANAGEMENT;FINANCES;DRIVING
TOILETING_ISSUES: NO
DEPENDENT_IADLS:: INDEPENDENT

## 2021-01-01 ASSESSMENT — MIFFLIN-ST. JEOR
SCORE: 1473.89
SCORE: 1500.88
SCORE: 1520.16
SCORE: 1497.93
SCORE: 1429.43
SCORE: 1429.43
SCORE: 1513.35
SCORE: 1458.02
SCORE: 1520.16
SCORE: 1477.53
SCORE: 1480.7
SCORE: 1426.71
SCORE: 1477.07

## 2021-01-01 ASSESSMENT — ENCOUNTER SYMPTOMS
WEAKNESS: 1
FEVER: 0
LIGHT-HEADEDNESS: 0
SHORTNESS OF BREATH: 1
DIZZINESS: 0
FATIGUE: 1
COUGH: 1
ACTIVITY CHANGE: 1

## 2021-01-04 NOTE — Clinical Note
TARA: your patient will be following up with you this Friday 1/8 to discuss results from his CT chest and pleural fluid analysis.     Briefly, he was seen at urgent care on 1/3 and diagnosed with pleural effusion and RLL consolidation concerning for possible atelectasis vs PNA. Was seen by me on 1/4 for follow up and sent for an urgent CT scan the same day and and US thoracentesis on the follow day (1/5). I did get a call from the radiologist, who states that the CT chest was concerning for metastatic disease--still pending his formal report. I have not informed the patient of the CT scan results as he is still pending the thoracentesis and pleural fluid analysis.     I anticipate that he will need a referral to oncology for more thorough work up.     Let me know if you have any questions!    -Jasvir Wilder MD

## 2021-01-04 NOTE — PROGRESS NOTES
Preceptor Attestation:    Patient seen and evaluated in person. I discussed the patient with the resident. I have verified the content of the note, which accurately reflects my assessment of the patient and the plan of care.   Supervising Physician:  Juan Guillory MD.

## 2021-01-04 NOTE — PROGRESS NOTES
There are no exam notes on file for this visit.  Chief Complaint   Patient presents with     Follow Up     pneumonia, SOB, chest pain/discomfort      Blood pressure 107/74, pulse 104, temperature 98.1  F (36.7  C), temperature source Oral, resp. rate 18, weight 75.3 kg (166 lb), SpO2 100 %.           DEBORA Alvarez is a 65 year old  male with a PMH significant for:     Patient Active Problem List   Diagnosis     Human immunodeficiency virus (HIV) disease (H)     Mechanical low back pain     Anemia, iron deficiency     Malignant neoplasm of hepatic flexure (H)     Hemorrhoids, unspecified hemorrhoid type     Prostate cancer (H)     Refusal of treatment for reasons of conscience     Bilateral renal cysts     CKD (chronic kidney disease) stage 3, GFR 30-59 ml/min     He presents for follow-up of last urgent care visit yesterday, where he was diagnosed with right pleural effusion and right lower lobe pneumonia on chest x-ray, prescribed Levaquin.  Since then, patient reports right-sided chest pain with cough, worse with lying down and standing up suddenly.  Denies any fevers, chills, shortness of breath, chest pain at rest, diaphoresis, headache.    Also complaining of constipation for the last 1-1/2-week.  Last bowel movement was last Thursday, described as hard.  Has not tried anything for it.  Denies any nausea, vomiting, abdominal pain, dysuria.    PMH, Medications and Allergies were reviewed and updated as needed.        REVIEW OF SYSTEMS     Constitutional, HEENT, cardiovascular, pulmonary, gi and gu systems are negative, except as otherwise noted.          OBJECTIVE     Vitals:    01/04/21 1110   BP: 107/74   BP Location: Right arm   Patient Position: Sitting   Cuff Size: Adult Regular   Pulse: 104   Resp: 18   Temp: 98.1  F (36.7  C)   TempSrc: Oral   SpO2: 100%   Weight: 75.3 kg (166 lb)     Body mass index is 23.48 kg/m .    Constitutional: Awake, alert, cooperative, no acute distress, and appears  stated age.  Back: Symmetric, no curvature  Lungs: No increased WOB, no breath sounds appreciated on the right middle lobe and right lower lobe.  Cardiovascular: RRR, normal S1 and S2, no S3 or S4, and no murmur appreciated.  Abdomen: Normal BS, soft, non-distended, non-tender, no masses palpated.  Previous midline surgical scar noted.  Neurologic: A&Ox3.  Cranial nerves II-XII are grossly intact.  Sensory is intact and gait is normal.  Neuropsychiatric: Normal affect, mood, orientation, memory and insight.      ASSESSMENT AND PLAN     Antonio was seen today for follow up.    Diagnoses and all orders for this visit:    Pleural effusion, right, currently on  Differential includes empyema, malignancy, fungal related effusions.  Patient does have history of HIV, currently on antiviral therapy with undetectable RNA viral load on 3/24/20. Also with history of colon cancer s/p right hemicolectomy on 1/28/2016 with negative colonoscopy on 3/4/20. Also with history of prostate cancer with last PSA of 14.9. Referral coordinator was able to get patient in for CT chest today at [facility] and US thoracentesis tomorrow 1/5/21. If CT chest concerning for empyema, patient will need hospitalization for further management and evaluation. If CT chest confirms moderate right pleural effusion on CXR yesterday, then plan to proceed with US thoracentesis with pleural fluid analysis. Possible that patient may need chest tube placement after thoracentesis if unable to fully drain pleural fluid safely.      UPDATE: did receive call from radiologist who states that CT chest did confrim right sided pleural effusion along with likley metastatic disease. Still pending formal report, but had MA call patient to have him schedule follow up visit with PCP this Friday to discuss results and next steps.     -     US Thoracentesis; Future  -     CT CHEST W/O & W CONTRAST; Future  -     Fluid Culture Aerobic Bacterial; Future  -     Adenosine  Deaminase Pleural Fluid; Future  -     Amylase ([facility]); Future  -     Lactate dehydrogenase fluid; Future  -     Cell count with differential fluid; Future  -     pH fluid; Future  -     Protein fluid; Future  -     Glucose fluid; Future  -     Gram stain    Constipation, unspecified constipation type  -     polyethylene glycol (MIRALAX) 17 GM/Dose powder; Take 17 g (1 capful) by mouth daily      Jasvir Wilder MD  1/4/2021    Precepted with Dr. Guillory.

## 2021-01-04 NOTE — PATIENT INSTRUCTIONS
Patient Education     Pleural Effusion     Pleural effusion is fluid buildup between the layers of tissue that line the outside of the lungs.   Your healthcare provider has told you that you have pleural effusion. The pleura are 2 layers of thin, smooth tissue around the lungs and lining the chest. Pleural effusion means that you have extra fluid between the pleura. This area is called the pleural space. The pleural space often holds only a small amount of fluid. This fluid lubricates the pleura. But if too much fluid fills the space, it can make it hard or painful to breathe.  There are 2 types of pleural effusion:    Inflammatory. This is caused by a lung disease such as pneumonia or lung cancer, both of which irritates the pleura.    Noninflammatory. This is caused by abnormal fluid pressure inside the lungs. The pressure can be caused by congestive heart failure (CHF). In CHF, extra fluid collects inside the lung tissues because of a weak heart muscle. This extra fluid then leaks into the pleural space. Other causes include kidney disease, liver disease, and malnutrition.  What are the symptoms of pleural effusion?  Many people don't have symptoms. But the most common symptoms of either type of pleural effusion include:    Sharp pains in the chest, especially when taking a breath, coughing, or sneezing    Shortness of breath or trouble breathing    Cough    Fever  What causes pleural effusion?  Causes include:    Pneumonia    Heart failure    Liver disease (cirrhosis)    Tumors    Pulmonary embolism    Chest injury    Lupus    Kidney failure or peritoneal dialysis    Nephrotic syndrome    Pancreatitis    Rheumatoid arthritis    Tuberculosis    Viral lung infection    Cancer    Blood clot in the lung (pulmonary embolism)    Heart surgery    Medicine reactions    Lung cancer  How is pleural effusion diagnosed?  Your healthcare provider will examine you and ask about your health history. Tests include:    Blood  tests    Analysis of fluid in pleural space, chest X-ray, CT angiography, or ultrasound    Thoracoscopy  How is pleural effusion treated?  The extra fluid may be drained from the pleural space. This is done with a procedure called thoracentesis. A thin needle is used to draw out the fluid from the pleural space. In some cases, a tube is placed in the chest to drain the extra fluid. The tube will likely stay in place for a few days.  You may have other treatments, depending on the cause of your pleural effusion. If it s due to a bacterial infection, you will be given antibiotics to fight the infection. If it s caused by a heart condition, you will be given medicines and other treatment for your heart. Your healthcare provider can tell you more about the cause of your pleural effusion and your treatment choices.  What are the long-term concerns?  If untreated, pleural effusion can lead to serious health problems. These include a collapsed lung from fluid filling the pleural space.  Call 911  Call 911 if you have:    Trouble breathing    Chest pain that gets worse  When to call your healthcare provider   Call your healthcare provider right away if you have:    Continued coughing    Fever of 100.4 F (38.0 C) or higher, or as directed by your provider    New symptoms or symptoms that get worse  [email or web address] last reviewed this educational content on 11/1/2019 2000-2020 [employer]. [address] [place] [address]. All rights reserved. This information is not intended as a substitute for professional medical care. Always follow your healthcare professional's instructions.           Patient Education     Percutaneous Diagnosis of Chest, Lung Problems   You ve been told you need a percutaneous procedure to diagnose a problem in your chest or lung. This procedure lets the healthcare provider remove tissue or fluid (biopsy) from the chest or lung. For these procedures, the skin of the chest is numbed.  Then a needle is passed through the skin.      With FNA, a thin needle is used to take a tissue sample from a mass.         Thoracentesis is used to remove fluid from the pleural space.      Fine-needle aspiration biopsy  Fine-needle aspiration biopsy (FNAB) is a procedure used for taking a lung tissue sample (a tissue biopsy). The tissue sample is taken from a nodule, abnormal tissue, tumor, or lung mass. First, a CT scan or ultrasound is done. This helps the healthcare provider find the area. Then a thin needle is passed through the skin of the chest into the nodule, abnormal tissue, tumor, or lung mass. Another CT scan or a chest X-ray is taken to be sure the needle is placed correctly. Once the needle is in place, a small amount of lung tissue is drawn (aspirated) into the needle. The lung tissue sample is then sent for testing.    Thoracentesis  Thoracentesis is used to drain abnormal fluid buildup between the lungs and chest wall (the pleural space). For the procedure, a needle is put through the skin of the chest into the pleural space. Fluid is then drawn into the needle and later tested for cancer and other problems.    Getting ready for the procedure  Before your procedure, do the following:    Follow any directions you are given for not eating and drinking before the procedure.    Tell your healthcare provider about any medicines you take. This includes over-the-counter or prescription medicines, vitamins, herbs, or other supplements. You may need to stop taking certain medicines before the procedure, especially aspirin, warfarin, ibuprofen, or other blood thinners.    Talk with your provider about any allergies, recent illnesses, and health problems you may have.    Tell your pregnant if you are pregnant or think you may be pregnant.  During the procedure  You receive local anesthesia (numbing medicine) to keep you from feeling pain. The area where the needle goes in is numbed. If your doctor uses  ultrasound to guide the needle, you will also have cool gel placed on your skin in the area to help the ultrasound probe move around. Medicine to help you relax (sedation) may also be given through an intravenous (IV) line.   After the procedure  You may have some pain after the procedure. You will be given medicine to help ease any pain. You can go home after you recover from anesthesia, often the same day as the procedure. If you received sedation, an adult family member or friend will need to drive you home. If a tube was placed in your chest to drain fluid and was left in, you will stay in the hospital for at least 1 day or more. Your healthcare provider will tell you more.    Risks and possible complications  All procedures have some risk. Possible risks of this procedure include:     Bleeding or coughing up blood    Collapsed lung (pneumothorax)    Infection    Injury to other structures in the chest    Chest pain    Other complications as explained by your healthcare provider   When to call your healthcare provider  Call your healthcare provider if any of these occur:    Coughing up blood    Shortness of breath    Chest pain    Rapid heart rate or pulse    Fever of  100.4  F( 38 C) or higher, or as directed by your provider  Goldie last reviewed this educational content on 12/1/2019 2000-2020 [employer]. [address] [place] [address]. All rights reserved. This information is not intended as a substitute for professional medical care. Always follow your healthcare professional's instructions.           Patient Education     Constipation (Adult)  Constipation means that you have bowel movements that are less frequent than usual. Stools often become very hard and difficult to pass.  Constipation is very common. At some point in life, it affects almost everyone. Since everyone's bowel habits are different, what is constipation to one person may not be to another. Your healthcare  provider may do tests to diagnose constipation. It depends on what he or she finds when evaluating you.    Symptoms of constipation include:    Abdominal pain    Bloating    Vomiting    Painful bowel movements    Itching, swelling, bleeding, or pain around the anus  Causes  Constipation can have many causes. These include:    Diet low in fiber    Too much dairy    Not drinking enough liquids    Lack of exercise or physical activity (especially true for older adults)    Changes in lifestyle or daily routine, including pregnancy, aging, work, and travel    Frequent use or misuse of laxatives    Ignoring the urge to have a bowel movement or delaying it until later    Medicines, such as certain prescription pain medicines, iron supplements, antacids, certain antidepressants, and calcium supplements    Diseases like irritable bowel syndrome, bowel obstructions, stroke, diabetes, thyroid disease, Parkinson disease, hemorrhoids, and colon cancer  Complications  Potential complications of constipation can include:    Hemorrhoids    Rectal bleeding from hemorrhoids or anal fissures (skin tears)    Hernias    Dependency on laxatives    Chronic constipation    Fecal impaction, a severe form of constipation in which a large amount of hard stool is in your rectum that you can't pass    Bowel obstruction or perforation  Home care  All treatment should be done after talking with your healthcare provider. This is especially true if you have another medical problems, are taking prescription medicines, or are an older adult. Treatment most often involves lifestyle changes. You may also need medicines. Your healthcare provider will tell you which will work best for you. Follow the advice below to help avoid this problem in the future.  Lifestyle changes  These lifestyle changes can help prevent constipation:    Diet. Eat a high-fiber diet, with fresh fruit and vegetables, and reduce dairy intake, meats, and processed foods    Fluids.  It's important to get enough fluids each day. Drink plenty of water when you eat more fiber. If you are on diet that limits the amount of fluid you can have, talk about this with your healthcare provider.    Regular exercise. Check with your healthcare provider first.  Medicines  Take any medicines as directed. Some laxatives are safe to use only every now and then. Others can be taken on a regular basis. While laxatives don't cause bowel dependence, they are treating the symptoms. So your constipation may return if you don't make other changes. Talk with your healthcare provider or pharmacist if you have questions.  Prescription pain medicines can cause constipation. If you are taking this kind of medicine, ask your healthcare provider if you should also take a stool softener.  Medicines you may take to treat constipation include:    Fiber supplements    Stool softeners    Laxatives    Enemas    Rectal suppositories  Follow-up care  Follow up with your healthcare provider if symptoms don't get better in the next few days. You may need to have more tests or see a specialist.  Call 911  Call 911 if any of these occur:    Trouble breathing    Stiff, rigid abdomen that is severely painful to touch    Confusion    Fainting or loss of consciousness    Rapid heart rate    Chest pain  When to seek medical advice  Call your healthcare provider right away if any of these occur:    Fever of 100.4 F (38 C) or higher, or as directed by your healthcare provider    Failure to resume normal bowel movements    Pain in your abdomen or back gets worse    Nausea or vomiting    Swelling in your abdomen    Blood in the stool    Black, tarry stool    Involuntary weight loss    Weakness  [email or web address] last reviewed this educational content on 6/1/2018 2000-2020 [employer]. [address] [place] [address], [place] [address]. All rights reserved. This information is not intended as a substitute for professional medical care. Always  follow your healthcare professional's instructions.           21   CT  [facility]   [phone]  Fax Orders to [phone]    [address] [address]    Appointment:  TODAY   Arrival Time:  2:45pm     No prep       21   US THORACENTESIS  [facility]   [phone]  Fax Orders to [phone]    [address] [address]    Appointment:   Arrival Time:  2:45pm     Nohemy Carpenter

## 2021-01-04 NOTE — Clinical Note
Please call patient to schedule follow up visit with either his PCP, Dr. Mary, or me by the end of this week or early next week to discuss findings on CT scan and thoracentesis.

## 2021-01-05 NOTE — TELEPHONE ENCOUNTER
[facility] phone call message- general phone call:    Reason for call: [facility] is calling wondering if pt needs labs for the ultrasound being done today 01/05/2021, or some clarification from  or nurse.     Return call needed: Yes    OK to leave a message on voice mail? Yes    Primary language: English      needed? No    Call taken on January 5, 2021 at 9:57 AM by Grisel Flores-Cardona

## 2021-01-05 NOTE — TELEPHONE ENCOUNTER
Spoke with Fox at [facility], confirmed there are orders in [facility]. He is unable to see them in [facility]. Faxed per request to [phone]. ./LR

## 2021-01-08 NOTE — PROGRESS NOTES
Antoino was seen today for recheck.    Diagnoses and all orders for this visit:    Pleural effusion  -     Oncology/Hematology Adult Referral; Future  -     levofloxacin (LEVAQUIN) 750 MG tablet; Take 1 tablet (750 mg) by mouth daily  -     HYDROcodone-acetaminophen (NORCO) 5-325 MG tablet; Take 1 tablet by mouth every 6 hours as needed for moderate to severe pain    Prostate cancer (H)  -     Oncology/Hematology Adult Referral; Future    Malignant neoplasm of hepatic flexure (H)  -     Oncology/Hematology Adult Referral; Future    Human immunodeficiency virus (HIV) disease (H)    Stage 3a chronic kidney disease      Assessment & Plan     I have referred Linden to oncology.  I explained that pathology has told us it is likely that the cells aspirated from the pleural effusion are malignant.  At this time it is unclear if this could be a primary malignancy versus metastasis from prostate or colon.  He has asked me for a refill of levofloxacin since he feels that this helped his symptoms.  He also is using naproxen as needed pain which is usually sufficient but he is agreeable to take something stronger if he has more discomfort and I agreed to give him some hydrocodone.    SUBJECTIVE:  This is a 65-year-old who is well-known to me.  He relates that he experienced right-sided chest pain which started about 1 week ago.  It got to the point where he could not get comfortable in any position and therefore had his son bring him to urgent care.  There a chest x-ray revealed fluid on his right lung and he was advised to follow-up at this clinic.  A colleague ordered a CT chest which showed suspicious findings of pleural thickening and pleural effusion.  He was then referred for an ultrasound with aspiration of the fluid.  He is feeling much better since then.  He has been taking levofloxacin and thinks this has helped him.  He has slept quite well the past 2 nights which had been a problem prior to that.    Objective:  BP  108/75 (BP Location: Right arm, Patient Position: Sitting, Cuff Size: Adult Regular)   Pulse 68   Temp 98.1  F (36.7  C) (Oral)   Resp 18   Wt 73.8 kg (162 lb 9.6 oz)   SpO2 97%   BMI 23.00 kg/m    His vitals are satisfactory  I reviewed the CT chest, ultrasound and lab results from the aspiration.  I asked the lab to inquire the results of the pathologist review of cytology and the pathologist called back our lab and reported that it is highly likely cells are malignant and that a written report will be available in 2 days.    When I shared this information with the patient, he assured me that all will would be well.  He explains that he believes his stepbrother in [facility] [place] has cursed him in some way and that his Savior Georges Blair will cure him.  He indicates that he believes he will be free of cancer within the next few weeks but is agreeable to visit with an oncologist.

## 2021-01-13 NOTE — PATIENT INSTRUCTIONS
01/13/21   ONC/HEME ADULT REFERRAL  [facility]  [address] [place] [address]  Phone: [phone]  Fax: [phone]    Referral, demographics, office note(s) and labs faxed to the [facility] at [phone]. Once reviewed by the Nurse Navigator they will reach out to patient to schedule within 24 hours.     Nohemy Carpenter

## 2021-01-15 NOTE — TELEPHONE ENCOUNTER
[facility] phone call message- general phone call:    Reason for call: Pt states that one of his medications has been recalled.    Return call needed: Yes    OK to leave a message on voice mail? Yes    Primary language: English      needed? No    Call taken on January 15, 2021 at 4:12 PM by Agnes Allen

## 2021-01-18 NOTE — TELEPHONE ENCOUNTER
"Antonio clarifies he does not have a medication that was recalled, but was calling regarding feeling like he needs additional fluid taken off.     He reports he had 1.5L removed during a 1/5//21 thoracentesis and was told at this appointment he would have to return to \"get the rest taken off.\" He continues to have dyspnea on exertion and orthopnea, but these symptoms have been unchanged for two weeks. Speaking in full sentences on phone without audibly labored breathing.    He has his first oncology appointment tomorrow at 10:30am at [facility]. He is wondering if a thoracentesis can be arranged after this appointment. Discussed that scheduling these procedures can be complex and sometimes require assessment and imaging by a provider, however I would route this to Dr. Mary for input. ./LR        "

## 2021-01-18 NOTE — TELEPHONE ENCOUNTER
Per Dr. Mary, will defer to oncologist. Communicated this to patient. Also communicated that if symptoms worsen to call clinic or proceed to the ER. ./LR

## 2021-01-22 NOTE — PATIENT INSTRUCTIONS
21   US THORACENTESIS   [facility]   [phone]  Fax Orders to [phone]    REQUIRED COVID TEST:  [address] [place], [place]   Appointment:   Arrival Time: 2:00PM    Drive around to back west side of the building, follow covid-testing signs to enter the building.     US THORACENTESIS   [facility]  [address] [place] [address]    Appointment:   Arrival Time:  3:00pm    Please bring a copy of your insurance card and photo ID    If you cannot make this appointment please call [phone] to reschedule    Order faxed to [facility] at [phone].    UROLOGY REFERRAL  [facility] [place] [facility]rologic [facility]  [facility]  ?[address]  [address] [place] [address]  Appointments: [phone]    VIDEO VISIT  Appointment:   Arrival Time: 2:15pm  Provider: Dr. Dalton Carpenter

## 2021-01-22 NOTE — NURSING NOTE
Clinic Administered Medication Documentation      Injectable Medication Documentation    Patient was given Cyanocobalamin (B-12). Prior to medication administration, verified patients identity using patient s name and date of birth. Please see MAR and medication order for additional information. Patient instructed to remain in clinic for 15 minutes.      Was entire vial of medication used? Yes  Vial/Syringe: Single dose vial  Expiration Date:  08/30/2022  Was this medication supplied by the patient? No    Name of provider who requested the medication administration: Dr. Mary  Name of provider on site (faculty or community preceptor) at the time of performing the medication administration: Dr. Mary    Date of next administration: n/a  Date of next office visit with provider to renew medication plan (must be seen annually): n/a

## 2021-01-22 NOTE — TELEPHONE ENCOUNTER
"Antonio's son calls to report that patient began experiencing \"burning\" on his mid left back. The pain is also on the left chest/abdomen just below his ribs. This happened acutely about 15 minutes prior to calling. Patient received Vitamin B12 shot at clinic this morning and they were worried it was a side effect. Between his appointment and the phone call patient did eat breakfast.     He is not having any new shortness of breath (other than baseline secondary to known likely malignant pleural effusion), dizziness, confusion, or other pain. No rash. Patient's pleural effusion is right sided however it is feasible there is malignant involvement of the left side as he has not completed imaging.    Discussed that this would be an unusual reaction to a B12 shot. Given patient's appointment was just an hour ago office note has not been completed so unable to see what was discussed during visit. Let son know I would discuss with Dr. Mary and get back to him.     Spoke with son approximately 5 minutes while waiting for Dr. Mary to finish with a patient visit. Son reports the pain had completely resolved and that patient felt at baseline. Let him know I would get this information to Dr. Mary, and if the pain returned or any other new symptoms developed he should call the clinic immediately. Routed to Dr. Mary. ./LR  "

## 2021-01-22 NOTE — LETTER
1/22/2021         RE: Antonio Mercado  [address]  [place] [address]      PSA   Date Value Ref Range Status   06/08/2020 14.9 (H) 0.0 - 4.5 ng/mL Final         Antonio was seen today for hospital f/u and referral.    Diagnoses and all orders for this visit:    Adenocarcinoma of right lung (H)  -     US Thoracentesis; Future    Prostate cancer (H)  -     UROLOGY ADULT REFERRAL; Future    Medication side effects, initial encounter  -     folic acid (FOLVITE) 1 MG tablet; Take 1 tablet (1 mg) by mouth daily  -     cyanocobalamin injection 1,000 mcg      After a lengthy discussion and conversation including his son, patient agrees to visit with urology about his dramatic elevation of PSA.  I indicated that it is highly likely prostatectomy will be recommended and briefly discussed common side effects of the surgery.    We also discussed the potential to consider a drain if the pleural effusion continues to reaccumulate at this fast rate.  I did call and discuss with his oncologist.  We discussed that primary treatment for lung adenocarcinoma will be chemotherapy and patient has historically been very ambivalent about the concept of destroying his healthy cells.  He also is highly spiritual and indeed, believes in some way that a family member in [place] hexed him which has caused this cancer.  Patient, his son and family will continue to discuss whether he will start chemo.  They will follow-up with me as needed.    Subjective:  This is a 65-year-old who is well-known to me.  He is HIV positive, has untreated prostate cancer, had a hemicolectomy for colon cancer without chemotherapy and currently has what appears to be a primary lung cancer causing recurrent pleural effusions.  He had been admitted earlier this week and saw oncology at that time.  Today, he is reporting that he feels like he might need more fluid taken off his lung.  This would be the third thoracentesis in less than 2 weeks. He tells me  that acetaminophen is sufficient for pain control and he does not want to take oxycodone.  Today he is accompanied by his son Samira who has flown in from [place] for 1 week.  The son is very interested in reviewing his dad's medical history and asks that we spent some time reviewing test results.    Objective:  /69 (BP Location: Left arm, Patient Position: Sitting, Cuff Size: Adult Regular)   Pulse 94   Temp 98.5  F (36.9  C) (Oral)   Resp 16   Wt 72.6 kg (160 lb)   SpO2 98%   BMI 22.63 kg/m    We spent the entire visit reviewing his recent hospitalization and test results, discussing management and answering patient and his son's questions.  Oncology had recommended he receive a shot of B12 and be taking daily folic acid in advance of commencing chemotherapy and patient does not believe he had the former and is not taking folic acid at this time so I went ahead and ordered the B12 shot be given and sent prescription for folic acid to his pharmacy.    I attempted to schedule an urgent IR procedure but was advised that only elective IR procedures will be scheduled after a patient has had a negative Covid test.  Patient and his son found this frustrating but agree that they will go to the emergency room if he believes he cannot wait 3 days for the scheduled thoracentesis.    We reviewed the imaging results including the initial CT with pleural thickening and pleural effusion.  We reviewed the histology report of adenocarcinoma in the aspirated fluid.  We reviewed the negative MRI brain for metastasis.  We reviewed the fact that his PSA has increased from less than 15 in June 2020 to over 32 a few days ago.  In the course of discussing this, patient asserts that he never had prostate cancer.  I therefore pulled up the pathology results from April 2017 where 3 of the biopsies were positive for adenocarcinoma with a [place] 3+3 score.            Harris Mary MD

## 2021-01-22 NOTE — PROGRESS NOTES
Antonio was seen today for hospital f/u and referral.    Diagnoses and all orders for this visit:    Adenocarcinoma of right lung (H)  -     US Thoracentesis; Future    Prostate cancer (H)  -     UROLOGY ADULT REFERRAL; Future    Medication side effects, initial encounter  -     folic acid (FOLVITE) 1 MG tablet; Take 1 tablet (1 mg) by mouth daily  -     cyanocobalamin injection 1,000 mcg      After a lengthy discussion and conversation including his son, patient agrees to visit with urology about his dramatic elevation of PSA.  I indicated that it is highly likely prostatectomy will be recommended and briefly discussed common side effects of the surgery.    We also discussed the potential to consider a drain if the pleural effusion continues to reaccumulate at this fast rate.  I did call and discuss with his oncologist.  We discussed that primary treatment for lung adenocarcinoma will be chemotherapy and patient has historically been very ambivalent about the concept of destroying his healthy cells.  He also is highly spiritual and indeed, believes in some way that a family member in [place] hexed him which has caused this cancer.  Patient, his son and family will continue to discuss whether he will start chemo.  They will follow-up with me as needed.    Subjective:  This is a 65-year-old who is well-known to me.  He is HIV positive, has untreated prostate cancer, had a hemicolectomy for colon cancer without chemotherapy and currently has what appears to be a primary lung cancer causing recurrent pleural effusions.  He had been admitted earlier this week and saw oncology at that time.  Today, he is reporting that he feels like he might need more fluid taken off his lung.  This would be the third thoracentesis in less than 2 weeks. He tells me that acetaminophen is sufficient for pain control and he does not want to take oxycodone.  Today he is accompanied by his son Samira who has flown in from [place] for 1 week.   The son is very interested in reviewing his dad's medical history and asks that we spent some time reviewing test results.    Objective:  /69 (BP Location: Left arm, Patient Position: Sitting, Cuff Size: Adult Regular)   Pulse 94   Temp 98.5  F (36.9  C) (Oral)   Resp 16   Wt 72.6 kg (160 lb)   SpO2 98%   BMI 22.63 kg/m    We spent the entire visit reviewing his recent hospitalization and test results, discussing management and answering patient and his son's questions.  Oncology had recommended he receive a shot of B12 and be taking daily folic acid in advance of commencing chemotherapy and patient does not believe he had the former and is not taking folic acid at this time so I went ahead and ordered the B12 shot be given and sent prescription for folic acid to his pharmacy.    I attempted to schedule an urgent IR procedure but was advised that only elective IR procedures will be scheduled after a patient has had a negative Covid test.  Patient and his son found this frustrating but agree that they will go to the emergency room if he believes he cannot wait 3 days for the scheduled thoracentesis.    We reviewed the imaging results including the initial CT with pleural thickening and pleural effusion.  We reviewed the histology report of adenocarcinoma in the aspirated fluid.  We reviewed the negative MRI brain for metastasis.  We reviewed the fact that his PSA has increased from less than 15 in June 2020 to over 32 a few days ago.  In the course of discussing this, patient asserts that he never had prostate cancer.  I therefore pulled up the pathology results from April 2017 where 3 of the biopsies were positive for adenocarcinoma with a [place] 3+3 score.    Total visit time was 50 mins, all of which was face to face MD time, and over 50% of this time was spent in counseling and coordination of care.

## 2021-01-25 NOTE — TELEPHONE ENCOUNTER
[facility] phone call message- patient requesting results:    Test: Lab    Date of test: [id number]    Additional Comments: covid results from [facility] or [facility].    OK to leave a message on voice mail? Yes    Primary language: English      needed? No    Call taken on January 25, 2021 at 9:32 AM by Agnes Allen

## 2021-01-25 NOTE — TELEPHONE ENCOUNTER
Communicated COVID-19 results to patient. He is getting his procedure done at [facility]. Let him know they are able to see the results so he does not need his own documentation. ./LR

## 2021-01-25 NOTE — TELEPHONE ENCOUNTER
Visit Type : Prostate cancer     Records/Orders: patient last seen  on 1/26/2018     Pt Contacted: no    At Rooming: normal

## 2021-01-28 NOTE — PROGRESS NOTES
Antonio Mercado is a 65 year old male who is being evaluated via a billable video visit.      How would you like to obtain your AVS? MyChart  If the video visit is dropped, the invitation should be resent by: Send to e-mail at: [email or web address]  Will anyone else be joining your video visit? No    Video Start Time: 2:26 PM    CHIEF COMPLAINT   It was my pleasure to see Antonio Mercado who is a 65 year old male for follow-up of Prostate Cancer.      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 He chose to observe. More recently, has been found to have PSA 14.9 6/8/2020, and more recently up to 32.9. Of note, in context of this, he has now been found to have metastatic lung cancer, and starting treatment in the near future.     TRUS 8/4/2017  H. Prostate, right lateral mid, core biopsy:   - Prostatic adenocarcinoma   - Levels score 6 (3+3)   - Grade Group 1   - Extent: Involves 1 of 1 core (1 mm, 10%)     I. Prostate, right lateral apex, core biopsy:   - Prostatic adenocarcinoma   - Levels score 6 (3+3)   - Grade Group 1   - Extent: Involves 1 of 1 core (2 mm, 25%)      M. Prostate, target 1 - left anterior apex, core biopsy:   - Prostatic adenocarcinoma   - Levels score 6 (3+3)   - Grade Group 1   - Extent: Involves 2 of 2 cores (1 mm, 10%; 0.5 mm, 5%)     O. Prostate, target 3 - right base, core biopsy:   - Prostatic adenocarcinoma   - Levels score 6 (3+3)   - Grade Group 1   - Extent: Involves 2 of 2 cores (1 mm, 5%; 4 mm, 30%)      MRI 5/31/2017 was  read as PIRADS-4, with 3 suspicious lesions, 2 located in the left posterior lateral peripheral zone (at the apex and mid gland respectively) and one in the right anterior transitional zone.         Allergies:   Naproxen, Stribild [[id number]-[id number]-tenof], Ibuprofen, Oxycodone, Prednisone, Raltegravir, Tamsulosin, and Tylenol [acetaminophen]         Review of Systems:  From intake questionnaire     Skin: negative  Eyes: negative  Ears/Nose/Throat: negative  Respiratory: No shortness of breath, dyspnea on exertion, cough, or hemoptysis  Cardiovascular: No chest pain or palpitations  Gastrointestinal: negative; no nausea/vomiting, constipation or diarrhea  Genitourinary: as per HPI  Musculoskeletal: negative  Neurologic: negative  Psychiatric: negative  Hematologic/Lymphatic/Immunologic: negative  Endocrine: negative         Physical Exam:     Vitals:  No vitals were obtained today due to virtual visit.    Physical Exam   GENERAL: Healthy, alert and no distress  EYES: Eyes grossly normal to inspection.  No discharge or erythema, or obvious scleral/conjunctival abnormalities.  RESP: No audible wheeze, cough, or visible cyanosis.  No visible retractions or increased work of breathing.    SKIN: Visible skin clear. No significant rash, abnormal pigmentation or lesions.  NEURO: Cranial nerves grossly intact.  Mentation and speech appropriate for age.  PSYCH: Mentation appears normal, affect normal/bright, judgement and insight intact, normal speech and appearance well-groomed.      Outside and Past Medical records:    Review of external notes as documented above   Review of the result(s) of each unique test - CT, PSA, pathology, MRI  Assessment requiring an independent historian(s) - family - son         Assessment and Plan:     Assessment: 65 year old male with Shawn 3+3 prostate cancer, now with rapidly rising PSA. He has otherwise been recently diagnosed with stage IV lung cancer. Planning to start treatment  in near future. At this point, his prostate is of lesser concern. Will plan to have him return in 3 months with PSA, and will re-assess depending on his response to lung cancer treatment.    Plan:  3 months with PSA      Video-Visit Details    Type of service:  Video Visit    Video End Time:3:27 PM    Originating Location (pt. Location): Home    Distant Location (provider location):  [facility] AND [facility] PROST[facility] AND UROLOGIC CANCERS    Platform used for Video Visit: [employer]    Greater than 20 minutes spent on the date of the encounter including direct interaction with the patient, performing chart review, history and exam, documentation and further activities as noted above.    Benitez Hoffman MD  Urology  [facility]

## 2021-01-28 NOTE — PROGRESS NOTES
Antonio is a 65 year old who is being evaluated via a billable telephone visit.      What phone number would you like to be contacted at? [phone]  How would you like to obtain your AVS? Mail a copy  Antonio was seen today for patient request.    Diagnoses and all orders for this visit:    Adenocarcinoma, lung, right (H)  -     Bath Seat Order for DME - ONLY FOR DME  -     HOME CARE NURSING REFERRAL    Prostate cancer (H)  -     HOME CARE NURSING REFERRAL      I emphasized to patient and his family that attendance at the oncology visit today is critical.  I did not discuss the abnormal focus in the thyroid with them.  I am concerned that he will be vulnerable when family members leave and expect that he will need some home care.  I will ask our  to contact him to discuss his income options.  I indicated my willingness to be available to him and his family as needed.      Subjective     Antonio is a 65 year old who presents to clinic today for the following health issues  accompanied by his sister Daniela (lives [facility]) and son Samira ([facility]):    HPI   This is a 65-year-old man who is well-known to me.  He is HIV positive, has a history of prior hemicolectomy for colon cancer, has diagnosed prostate cancer which has not been treated and in the past month has developed a recurring malignant pleural effusion apparently due to a primary lung adenocarcinoma.  He is accompanied by his son trinidad who will be returning to his home in [place] tomorrow and by Antonio's sister josé loya who lives in [place].  They want to give me will telephone number in case there is a need to contact patient since they have determined Samira will have power of .    He reports that he is doing okay.  He has a visit with his oncologist this morning.  He also has a visit with a urologist this afternoon.  He received a thoracentesis 2 days ago removing over 1 L of bloodstained fluid.  I am being told that this is  now set up as a standing order and that he can go in twice a week to have the effusion tapped.    Family are concerned to try and have support systems in place once they depart.  He ordinarily derives income from being an [employer] .  They inquire whether he is eligible for any disability program.  They are interested in talking with the .    He is able to drive and confirms to me that he can control a vehicle.  However on days when he is feeling short of breath he would prefer not to drive and requests an application for [employer] mobility transportation.  He also requests a chair that he can use in his shower.  We discussed the fact that he could have a home safety evaluation performed by home nursing and the family likes that idea.      Review of Systems   No other concerns voiced at this time      Objective         Vitals:  No vitals were obtained today due to virtual visit.    Physical Exam   healthy, alert and mild distress  PSYCH: Alert and oriented times 3; coherent speech, normal   rate and volume, able to articulate logical thoughts, able   to abstract reason, no tangential thoughts, no hallucinations   or delusions  His affect is pleasant  RESP: No cough, no audible wheezing, able to talk in full sentences  Remainder of exam unable to be completed due to telephone visits    I reviewed the results of a pet scan performed yesterday 1/27/2021 which in addition to the already known cancerous sites also identifies a suspicious focus in his thyroid gland.          Phone call duration: 18 minutes  Total encounter duration 45 minutes including 27 minutes spent completing [employer] mobility application, handicap parking certificate, placing home nursing order and ordering durable medical equipment.

## 2021-01-28 NOTE — PATIENT INSTRUCTIONS
"I have printed out the Bath / Shower Chair order.  Please bring this to the following Medical Supply store:  [employer]  [address], ([address])  [place] [address]   Phone: [phone]    Please complete the [employer] Mobility Application and submit it as instructed and the Handicap Parking certificate.    I have asked our  to call you to discuss disability and income options.      I have placed an order for a home nurse to conduct a \"Safety Assessment\" of your apartment.      Please let me know how else I can help - a bientot!  Harris Mary    01/28/21   HOME CARE REFERRAL  [facility]  Phone: [phone]  Fax: [phone]    Referral, demographics, medication list and last office note faxed to [facility] at [phone] who will contact patient to schedule.    UROLOGY REFERRAL  Video visit already scheduled for 1/28.     Nohemy Carpenter        "

## 2021-01-29 NOTE — TELEPHONE ENCOUNTER
OKed verbal orders. Per Anthony, patient did communicate that the main thing he is hoping for help with is rides to appointments due to his shortness of breath. She let him know they do not do that but can involve a  to see what options are available. ./LR

## 2021-01-29 NOTE — TELEPHONE ENCOUNTER
[facility] phone call message - order or referral request for patient:     Order or referral being requested: delay of care       Additional Comments: ok to delay start of skilled nursing till Sunday due to agency capacity     OK to leave a message on voice mail? Yes    Primary language: English      needed? No    Call taken on January 29, 2021 at 4:06 PM by Harris Berger

## 2021-01-29 NOTE — TELEPHONE ENCOUNTER
Dr. Mary had already completed the disability parking permit application and has it at the  for patient to . NIKKI called Antonio to verify this is what he wanted to do. He states it would be better if the application were mailed to him. NIKKI informed him he would need to fill in his portion of the application and mail it to the DMV for review and approval. He indicates understanding.     NIKKI mailed the completed disability parking permit to patient, on this date.     MISHA Bowman

## 2021-01-29 NOTE — TELEPHONE ENCOUNTER
This SW mailed patient a letter with the following food and financial resources:    -[facility] [place] [facility]    -[facility]- Home Delivered Meals Program    -[facility] [employer] Housing Stability/Rental Assistance      Also working with Dr. Mary to complete a disability parking permit. Will facilitate mailing this to the patient for him to complete his portion/sign and direct him to mail it directly to the [facility].    MISHA Bowman

## 2021-01-29 NOTE — LETTER
January 29, 2021      Antonio Darlingleonel  [address]   [place] [address]      Dear Antonio,    I am sending this letter as a follow up on our recent phone call. It was a pleasure to talk with you and your son, Samira. I am working on getting the disability parking permit completed, I will mail that to you when it's done with further instructions.     For now, I wanted to pass along a few resources for you that might have help with food support and rental/financial assistance. In this envelope, you will find a hand out for information about [employer] [place]. This is a program that offers home delivered meals. They offer a variety of prepared meals that you can eat the same day or store in the freezer for future use. If you are interested in getting meals delivered to your home, please call me and I would be more than willing to complete a referral for you to the program.     I'm also included information about a local food shelf in your area. [facility] on [employer] has a food shelf, that you can access once a month, to get more free food in your home. Here is their contact information, you can reach out to them with further questions or to set up a time to go  some food:    [employer]  [address] (at [address] [place] [address]  [phone]  [facility] Food Shelf Service Hours: 10:00 a.m. - 12:30 p.m. and 2:00 - 4:30 p.m. Monday - Friday  PLEASE NOTE: The food shelves close at 3:00 p.m. on the third Wednesday of every month.    Lastly, I'm including information about an agency called [employer]. They offer a variety of assistance but the one I thought most relevant to your request is the Housing Stability Program. The best way to get in contact with them is by submitting a request for assistance on their website or calling them directly. [facility] contact information is as follows:    The [facility]  House  [address] [place] [address]  Phone: [phone]  [email or web address]    Please feel free to call me, directly, with any questions or concerns or if you have further needs. I'm happy to help in any way that I can.       Kindly,    MISHA Bowman  Social Work Care Coordinator  Direct Phone: [phone]  Clinic Phone: [phone]

## 2021-01-29 NOTE — PROGRESS NOTES
Social Work Note:    Data and Intervention: This SW placed an outreach call as a virtual f/u to Antonio's office visit. SW spoke, somewhat briefly, with Antonio and his son, Samira. The son indicates that they are looking for 1.) Disability parking permit, 2.) Rent relief, 3.) Any source of financial relief that Antonio might qualify for. He is currently receiving unemployment benefits.     Antonio does not have any savings accounts or accounts for short term disability. He does not have benefits through his employer due to having been a contracted worker/[employer] not providing benefits to their employees based on the employment status.     SW indicated they would assist with the parking permit and discuss this with Dr. Mary. SW will also look into rent relief programs that Antonio might be eligible for. SW suggested that Antonio connect with the  at his oncology clinic as they might have more specific resources for Antonio's diagnosis and they would more aware of the variety of resources available to people with new cancer diagnosis. This SW agree's to try to get in contact with a  at the [facility] and request they reach out to Antonio to provide supports.     Assessment and Plan:     1.) Disability parking permit  2.) Rent relief resources  3.) Connection to oncology social work for more specific resources    MISHA Bowman

## 2021-02-10 NOTE — TELEPHONE ENCOUNTER
[facility] phone call message- general phone call:    Reason for call: Pt is calling to speak to nurse regarding a covid test being done in pts home.     Return call needed: Yes    OK to leave a message on voice mail? Yes    Primary language: English      needed? No    Call taken on February 10, 2021 at 9:18 AM by Grisel Flores-Cardona

## 2021-02-10 NOTE — TELEPHONE ENCOUNTER
Per home care, patient declined services and his case was never opened with them so they are unable to assist.    Spoke with patient who clarifies he cannot go the date he is scheduled as he doesn't have a ride. He would like to change the date of the test. Provided him with the correct number to call to do so. ./LR

## 2021-02-10 NOTE — TELEPHONE ENCOUNTER
Patient requesting to gt pre-op COVID-19 testing done in the home. Let him know our clinic cannot but MD will mail a test if someone can drop it off to a UPS drop box. Also offered to check in with home nursing. He prefers home nurse. Called [facility] to see if they could do so, staff will back to let me know. ./LR

## 2021-02-10 NOTE — TELEPHONE ENCOUNTER
02/10/21   HOME CARE REFERRAL  [facility]  Phone: [phone]  Fax: [phone]    Referral, demographics, medication list and last office note faxed to [facility] at [phone] who will contact patient to schedule.    Nohemy Carpenter

## 2021-02-10 NOTE — TELEPHONE ENCOUNTER
Sister Daniela calls with the follow concerns:    - should patient get a COVID-19 vaccine, and if so how and when?    - they are now interested in nursing. Patient declined home nursing a few weeks ago but she notes chemotherapy has been very tough and they would now appreciate someone helping with medication and checking vitals    - help starting the PCA process    - wondering about upcoming appointments and what the COVID-19 test is for.      Recommended that they speak with patient's oncologist regarding COVID-19 vaccine. Discussed that sometimes doctors recommend waiting to be vaccinated depending on the treatment regimen. Let her know that if she says it is OK, we can assist in getting this scheduled once we are able to vaccinate those 65+.    Will route to Dr. Mary to re-send referral for home nursing.    Will routed to NIKKI Castaneda regarding PCA.    Recommended calling nurse navigator at oncology clinic regarding future appointments, as there are a number of office visits, infusions, and radiology visits so I am not clear which one requires the test. Provided her with this number ([phone]).     ./LR

## 2021-02-10 NOTE — TELEPHONE ENCOUNTER
[facility] phone call message- general phone call:    Reason for call: she needs a call back re the covid vaccine would he be out to get it with all his health history.and can they do it as well because they have been taking care of him,also is there any way he can get a health  aid to come help him for a couple of hours durning the day when no one is home to help him,    Action desired: call back.    Return call needed: Yes    OK to leave a message on voice mail? Yes    Advised patient to response may take up to 2 business days: Yes    Primary language: English      needed? No    Call taken on February 10, 2021 at 1:50 PM by Wanda Randhawa

## 2021-02-11 NOTE — TELEPHONE ENCOUNTER
Joanna from [facility] has called with an updated to the Home Care Referral sent to them on 2/10 to let the provider know that they are currently at capacity and their affiliated partners are also. She stated she has communicated with a Care Transition Team that is helping to find care for the patient.     Nohemy Carpenter

## 2021-02-15 NOTE — TELEPHONE ENCOUNTER
Communicated negative COVID-19 result to patient. No other questions. He requested I say wisam Mary and let him know everything is going well. Routed to Dr. Mary. ./MELY

## 2021-02-16 NOTE — TELEPHONE ENCOUNTER
NIKKI reached out to patient's sister, Daniela, at #[phone]. That phone went straight to a . NIKKI did not leave a message.     NIKKI called patient at his primary listed phone #. SW indicated they were calling to discuss how to get PCA services set up. Antonio was not receptive to this. He indicates that since last Friday, he can now do all his household chores again. He indicates he was up washing his dishes, folding his laundry, and taking his medications independently. He indicates that his prayer as that he will continue to do these things on his own and does not anticipate needing further assistance in the future. He indicates that healing is with him.     NIKKI was respectful of the patient's wishes to not proceed further with the conversation. [facility] social workers will be available for further consultation or supports, as needed and agreed upon by patient.     MISHA Bowman

## 2021-03-18 NOTE — TELEPHONE ENCOUNTER
Called patient back, no answer. Left message recommending he discuss timing of COVID-19 vaccine with oncologist as they sometimes recommend delaying the vaccine until a certain amount of time has passed since last chemo session to ensure robust immune response. Instructions to call back with further questions. ./LR

## 2021-03-18 NOTE — TELEPHONE ENCOUNTER
Son Will called back.  Said Dad is doing well - tumor shrunk by 50% and no more effusions.  Oncologist recommended he get COVID vaccine.  Will would also like to get it (lives in [place] - back for 10 days to care for dad).  Also never got handicap parking cert.'    Plan: Call back to schedule vaccine for Antonio.  I will ask if any way Will can get it but believe current priority levels would not include him.  I re-updated Handicap parking cert and will leave at  to be picked up.

## 2021-03-18 NOTE — TELEPHONE ENCOUNTER
[facility] phone call message- general phone call:    Reason for call: Pt would like to speak with  about covid vaccine and chemo that pt had done.     Return call needed: Yes    OK to leave a message on voice mail? Yes    Primary language: English      needed? No    Call taken on March 18, 2021 at 12:17 PM by Grisel Flores-Cardona

## 2021-03-30 NOTE — TELEPHONE ENCOUNTER
[facility] phone call message- general phone call:    Reason for call: She needs a call back re he is taking Doxycyline and he is not sure why,she also would like to talk about weight loss    Action desired: call back.    Return call needed: Yes    OK to leave a message on voice mail? Yes    Advised patient to response may take up to 2 business days: Yes    Primary language: English      needed? No    Call taken on March 30, 2021 at 2:13 PM by Wanda Randhawa

## 2021-03-30 NOTE — TELEPHONE ENCOUNTER
Spoke with Kathy, patient's [facility] . Discussed that Doxycycline is not on our current medication list (there was an rx from July that has ). She noted the prescription in question is dated in March. Recommended calling oncology as they are now mostly managing patient, and they will also be able to assist with weight loss. ./LR

## 2021-03-31 NOTE — TELEPHONE ENCOUNTER
Called patient to ensure his family was able to get COVID-19 vaccines. He reported they were. Let him know if any other members were interested they may call the clinic for scheduling. ./LR

## 2021-04-16 NOTE — TELEPHONE ENCOUNTER
Date of discharge: 4/15/21  Facility of discharge: [facility]  Patient concerns about condition: No concerns at this time.  Patient concerns about medications: No concerns at this time.  Full med reconciliation will be completed at clinic visit.  Patient concerns about transitioning: No concerns at this time.    Was the patient diagnosed with COVID while in the hospital? No    Clinic office visit appointment date: 4/23/21 with Dr. Mary at 10 am  Patient reminded to bring all medications (prescription and over-the-counter) to clinic appointment: Yes

## 2021-04-26 NOTE — TELEPHONE ENCOUNTER
Called patient per request of Dr. Mary due to recent request of Naproxen and risk of GI bleed. Spoke with patient and let him know of this risk and instructed him to take this medication as sparingly as possible. He understood and was agreeable.    Antonio reports he is doing very well. He does not need follow up Dr. Mary at this time but is aware to call if this changes or he has any questions or concerns. ./LR

## 2021-04-26 NOTE — TELEPHONE ENCOUNTER
[facility] phone call message- general phone call:    Reason for call: Would like to touch base with someone on his care team.    Action desired: call back.    Return call needed: Yes    OK to leave a message on voice mail? Yes    Advised patient to response may take up to 2 business days: Yes    Primary language: English      needed? No    Call taken on April 26, 2021 at 1:46 PM by Wanda Randhawa

## 2021-04-26 NOTE — TELEPHONE ENCOUNTER
Patient's care coordinator from  calls to let us know he was recently discharged from the hospital. Let her know we are aware, and patient has already seen oncology since this discharge. Discussed that we had not seen patient in clinic for quite a while as oncology was now managing his care. ./LR

## 2021-05-20 NOTE — PROGRESS NOTES
Antonio was seen today for shoulder pain.    Diagnoses and all orders for this visit:    Rotator cuff syndrome, right  -     traMADol (ULTRAM) 50 MG tablet; Take 1-2 tablets ( mg) by mouth every 6 hours as needed for severe pain  -     Large Joint/Bursa injection and/or drainage (Shoulder, Knee)  -     triamcinolone (KENALOG-40) injection 40 mg  -     Bupivacaine 0.5 % Injection    Left wrist pain  -     CRW BLACK WRIST SPLINT    Disorder of bursae and tendons in shoulder region  -     Large Joint/Bursa injection and/or drainage (Shoulder, Knee)  -     triamcinolone (KENALOG-40) injection 40 mg  -     Bupivacaine 0.5 % Injection      I gave him another wrist splint for his left wrist pain.  I have asked him to notify me if he continues to have shoulder pain in 3 weeks in which point I would obtain an MRI.  The differential includes the possibility of metastasis although x-ray 1 month ago was negative for this.    I have encouraged him to follow-up as needed.  I agreed to give him a limited supply of tramadol for as needed pain.  Oxycodone is listed as a cause of allergy but he cannot recall this happening.  He is willing to take a chance with tramadol and will notify me if he has any side effects.    Total visit time with patient was 25 mins, all of which was face to face MD time, and over 50% of this time was spent in counseling and coordination of care.  Including post-encounter documentation and orders, total encounter time was 32 mins.      Subjective:  This is a 65-year-old who is well-known to me.  He attends today with a 1 month history of right shoulder pain.  He tells me that he complained about this when he had been hospitalized with a GI bleed and that an x-ray was performed which she was told was satisfactory.  It bothers him most at night when he is unable to lay on that side and when the pain keeps him awake.  He cannot recall any particular injury.  He is open to having a cortisone shot if this  would help.    ROS:  Pulmonary: He is currently receiving active treatment for stage IV lung cancer.  Reports from his oncologist indicate that he has been doing well and the recent PET scan was very encouraging.  He himself feels largely symptom-free.  Thyroid: In the course of discussing his cancer I mentioned that he has a spot of uptake noted on PET scan of his thyroid.  He seems not to have heard about this in the past and inquires as to whether this can be biopsied.  I indicated that over several months his PET scans have been stable and there has not been a suggestion of this representing malignancy.  MSK: He continues to complain of left wrist pain.  He is wearing a splint and would like this replaced if possible.    Objective:  /72 (BP Location: Left arm, Patient Position: Sitting, Cuff Size: Adult Regular)   Pulse 93   Temp 97.9  F (36.6  C) (Oral)   Resp 16   Wt 70.4 kg (155 lb 3.2 oz)   SpO2 98%   BMI 21.95 kg/m    His vitals are excellent he has lost some weight.  Impingement signs are markedly positive of the right shoulder indicating high likelihood of rotator cuff syndrome.  I reviewed x-ray of left wrist from 2018 which indicated degenerative changes.  I have given him a new wrist splint.    PROCEDURE NOTE:  Informed consent obtained  Antiseptic applied  Local anesthetic spray administered and 1cc kenalog with 4cc bupivicaine injected into right subacromial bursa and surrounding tissues without complication  Hemostasis achieved  Dressing applied    In addition I was able to contact his oncologist who indicated that he does not think the thyroid nodule needs work-up at this time given that it has remained stable.  I will write the patient and communicate this recommendation to him.

## 2021-05-26 NOTE — TELEPHONE ENCOUNTER
Dr. Mary has received 2 refill requests for folic acid. Unclear if this is still indicated. Left message with oncology nurses requesting clarification. ./LR

## 2021-05-26 NOTE — TELEPHONE ENCOUNTER
[place] back from oncology team. Patient was taking Folic Acid with chemo agent Alimta but is no longer it, so appropriate to discontinue. They will send in new rx if patient restarts Alimta. ./LR

## 2021-05-28 NOTE — PROGRESS NOTES
Infectious Disease Progress Note     ASSESSMENT: 1. HIV/ AIDS x years on Intellence and Trizivir. Allergic to Raltegravir.  Last viral load < 20 8/7/18.  Runs a low WBC (2.5 - 3.2) and a macrocytic anemia with high MCV due to Trizivir-last Hgb - 10-12.4 range.  Ran out of meds/ insurance benefits but just got back on meds in early Feb after a hiatus since 1/1 due to insurance issues.  Changed Rx from Intellence + Trizivir to Odefsy  2/12. VL 83211. Genotype: no resistance to NRTI /NNRTI or PI.  [place] meds thru [employer] [place]  2. Hx of R hemicolectomy for stage II colon cancer 2/16  Had colonoscopy at  12/17: some bleeding at anastomosis and tics seen. Benign polyp removed.  Next colonscopy in March, 2019. Hasn't kept f/o.  3. Hx of elevated PSA to 9.4. Had a bx at the [facility]. Results equivocal. F/U biopsy at 6 mos. Has not gone back for f/u. He is relying on diet to control cancers.       PLAN: 1. Cont Odefsey (rilpivarine (2nd gen NNRTI) + Tenofovir alafenamide + Emtricitabine).  This should be just as good as present 4 drug Rx and w/o the Heme side-effects.  2. HIV viral load, cbc, creat today  3. RTC 3 mos to be sure it is effective.  4. F/u with Dr. Usman haney [facility].     Chart check:  Addedum: 4/25. Hemoglobin is up nicely off AZT.  However: creat up from 1.38 to 1.63.   Will ask Dr. Mary to fu on this.  Thanks,  Chance Andersen MD      S: feels well. Execpt lost 7 lb. Eating wholesome foods.  No new issues. No  or GI sx.  Likes Odefsey - easier to take than prior 3 pill regimen.    Review of systems:  Eyes: no burning, dry eyes, diplopia  ENT: no sinus pain, sore throat  Neck: no stiffness or swelling  Resp: no cough, sputum, hemoptysis  Cv: no chest pain, MIRANDA, PND, edema  GI: no nausea, vomiting, diarrhea, blood in stool  : no dysuria, blood in urine, urine retention.  Musculoskelatal: no joint swelling, bunions, vertebral pain  Neurology: no weakness, numbness, nerve pain.    Social hx, family hx  reviewed, unchanged and noncontributory.  Exam:   Throat: no lesions seen.  Nodes: no cervical, axillary or inguinal adenopathy  Neck: supple, no masses, no thyromegaly.  Respiratory: normal respiratory patter, no rales or rubs.  CV: normal heart tones. No rub, murmur or S3. No JVD.  Abdmomen: soft, normal bowel sounds, non-tender, no masses or   organomegaly  Extremities: normal venous pattern. Good pulses. No edema. No joint effusions.  Neurologic: oriented x 3. Cranial nerves 2-12 intact. No focal weakness or sensory loss.

## 2021-05-28 NOTE — PATIENT INSTRUCTIONS - HE
We will check labs today.    Continue the new medication: Odefsy.    Make a followup appt in 3 mos.    Chance Andersen

## 2021-05-31 NOTE — TELEPHONE ENCOUNTER
I called the pt to inform I am unsure of what the call was in regards to and the MD is not in this afternoon. I informed I will forward the message and the MD should call him tomorrow.

## 2021-05-31 NOTE — TELEPHONE ENCOUNTER
----- Message from Chance Andersen MD sent at 8/30/2019  8:10 AM CDT -----  Pleaes let Antonio know that his viral load is zero. Keep up the good work.    Chance Andersen

## 2021-05-31 NOTE — TELEPHONE ENCOUNTER
Nathaly    Patient states he is returning a call from the provider, but there is no documentation.?    Please call patient back @ [phone].

## 2021-05-31 NOTE — PATIENT INSTRUCTIONS - HE
Your are doing well.    Let's repeat the viral load study today to be sure it is negative.    Check website: www.[employer].Nipendo to find best pricing for Viagra - generic version called Sildenifil.    See you in 6 mos.    Chance Andersen     [FreeTextEntry1] : pt is here for med refills

## 2021-05-31 NOTE — PROGRESS NOTES
Infectious Disease Progress Note     ASSESSMENT: 1. HIV/ AIDS x years on Intellence and Trizivir. Allergic to Raltegravir.    Runs a low WBC (2.5 - 3.2) and a macrocytic anemia with high MCV due to Trizivir-last Hgb - 10-12.4 range.  Ran out of meds/ insurance benefits but just got back on meds in early Feb after a hiatus since 1/1 due to insurance issues.  Changed Rx from Intellence + Trizivir to Odefsy (one pill with 3 megs) in  2/12. VL [id number]. Genotype: no resistance to NRTI NNRTI or PI.  F/U viral load on 4/23/19 down to 123 copies/ml.  [place] [employer] [place].  2. Hx of R hemicolectomy for stage II colon cancer 2/16  Had colonoscopy at  12/17: some bleeding at anastomosis and tics seen. Benign polyp removed.  Next colonscopy in March, 2019. Hasn't kept f/o. Has received refused to get adjuvant chemotherapy.  He is relying on chirag in [place] to halt progression of tumor + diet. Recent cbc and LFTs all WNL.  3. Hx of elevated PSA to 9.4. Had a bx at the [facility]. Results equivocal. F/U biopsy at 6 mos. Has not gone back for f/u. He is relying on diet to control cancers.  4. Creatinine up to 1.4 on Odefsey but stable. Note the tenovir component of Odefsy causes an artificatual rise in creatinine similar to Bactrim.      5. Sexual dysfunction: Viagra [place]. He is not happy about this;    PLAN: 1. Cont Odefsey (rilpivarine (2nd gen NNRTI) + Tenofovir alafenamide + Emtricitabine).  This should be just as good as present 4 drug Rx and w/o the Heme side-effects.  2. HIV viral load today  3. Urged to f/u with Oncology.  4. F/u with Dr. Usman DASH.  5. RTC 6 mos.  6. Told to consult www.[employer] to see about any discount coupons for Viagra.    Chance Andersen      ______________________________________________________________________    Subjective: feels fine. Eating more now. Stopped daily cathartics. Using chirag to fight cancer. Not interested in f/u with Urology and Oncology.  Has gained a  few pounds.  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  Voiding OK.  Like Odefsy much more than prior meds.    Review of systems:  Patient denies fever, chills, cough, sputum, vomiting, diarrhea, dysuria, urgency, flank pain, headache, stiff neck, rash, swollen glands or pain at IV sites.  SH/FH/ Habits/ PMH reviewed and unchanged.  Current Outpatient Medications on File Prior to Visit   Medication Sig Dispense Refill     ARTIFICIAL TEARS,[id number], 0.1-0.3 % Drop INSTILL 1 DROP TO EYE BID PRN  0     baclofen (LIORESAL) 10 MG tablet TK ONE T PO TID PRF MUSCLE SPAMS  0     benzonatate (TESSALON PERLES) 100 MG capsule Take 1 capsule (100 mg total) by mouth 3 (three) times a day as needed for cough. 30 capsule 1     emtricitab-rilpivir-tenofovir (ODEFSEY) 200-25-25 mg Tab tablet Take 1 tablet by mouth daily with breakfast. 30 tablet 11     fluconazole (DIFLUCAN) 200 MG tablet        naproxen (NAPROSYN) 375 MG tablet TK 1 T PO BID PRF MODERATE PAIN. TAKE WITH FOOD  0     ranitidine (ZANTAC) 150 MG tablet        sildenafil (VIAGRA) 50 MG tablet Take 50 mg by mouth as needed for erectile dysfunction.       tamsulosin (FLOMAX) 0.4 mg Cp24        No current facility-administered medications on file prior to visit.        Objective:   Looks good.  Nodes: no cervical, axillary or inguinal adenopathy  ENT: TMs clear, oropharynx without exudate or thrush  Respiratory: normal respiratory patter, no rales or rubs.  CV: normal heart tones. No rub, murmur or S3. No JVD.    Back: No spine or CVA tenderness  Abdmomen: soft, normal bowel sounds, non-tender, no masses or   organomegaly  Extremities: normal venous pattern. Good pulses. No edema. No joint effusions.  Neurologic: oriented x 3. Cranial nerves 2-12 intact. No focal weakness or sensory loss.               Lab Results   Component Value Date    ALT 21 01/25/2019    AST 23 01/25/2019    ALKPHOS 64 01/25/2019    BILITOT 0.7 01/25/2019       Imaging:none new   Microbiology:none new. Last  viral load 123.    Patient Active Problem List   Diagnosis     Post-nasal drip     Malaria     HIV (human immunodeficiency virus infection) (H)

## 2021-06-07 NOTE — PATIENT INSTRUCTIONS - HE
1. Go to the lab for tests today    2. See you in 6 mos.    3. Continue the same meds.    Chance Andersen

## 2021-06-07 NOTE — PROGRESS NOTES
Infectious Disease Progress Note     ASSESSMENT: 1. HIV/ AIDS x years on Intellence and Trizivir. Allergic to Raltegravir.     Runs a low WBC (2.5 - 3.2) and a macrocytic anemia with high MCV due to Trizivir-last Hgb - 10-12.4 range.  Ran out of meds/ insurance benefits but just got back on meds in early Feb after a hiatus since 1/1 due to insurance issues.  Changed Rx from Intellence + Trizivir to Odefsy (one pill with 3 megs) in  2/12. VL [id number]. Genotype: no resistance to NRTI NNRTI or PI.  F/U viral load on 4/23/19 down to 123 copies/ml.  [place] [employer] [place]  2. Hx of R hemicolectomy for stage II colon cancer 2/16.  Recent f/u coloscopy at [facility] (3/20) was negative. Some internal hemorroids non bleeding   Has received refused to get adjuvant chemotherapy.  He is relying on chirag in [facility] to halt progression of tumor + diet. Recent cbc and LFTs all WNL.  3. Hx of elevated PSA to 13. Had a bx at the [facility]. Results equivocal. F/U biopsy at 6 mos. Has not gone back for f/u. He is relying on diet to control cancers.  4. Creatinine wa up to 1.4 on Odefsey but stable. Note the tenovir component of Odefsy causes an artificatual rise in creatinine similar to Bactrim. Note creat was 1.1 on 3/13/20.   5. Sexual dysfunction: Viagra not [place]. He is not happy about this;     PLAN: 1. Cont Odefsey (rilpivarine (2nd gen NNRTI) + Tenofovir alafenamide + Emtricitabine).  This should be just as good as present 4 drug Rx and w/o the Heme side-effects.  2. HIV viral load today  3. check iron studies as Hgb down to 11.4with normal MCV.  4. Filled out papers for subsidized housing.  5. Told to use OTC colace and tussend cough syrup as needed.     Thanks,  Chance Andersen MD    ______________________________________________________________________    Subjective: doing ok. Recent severe constipation and bleeding. Neg colonscopy at [facility] a few weeks ago.  Taking his Odefsy wo issue.  No fu with urology and  oncology.  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    Review of systems:  Patient denies fever, chills, cough, sputum, vomiting, diarrhea, dysuria, urgency, flank pain, headache, stiff neck, rash, swollen glands or pain at IV sites.  Meds reviewed.  SH/FH/ Habits/ PMH reviewed and unchanged.  Objective:   /66 (Patient Site: Right Arm, Patient Position: Sitting, Cuff Size: Adult Regular)   Pulse 72   Temp 98.2  F (36.8  C)   Wt 161 lb (73 kg)   BMI 22.45 kg/m    Looks well.  ENT: TMs clear, oropharynx without exudate or thrush  Nodes: no cervical, axillary or inguinal adenopathy  Respiratory: normal respiratory patter, no rales or rubs.  CV: normal heart tones. No rub, murmur or S3. No JVD.  Abdmomen: soft, normal bowel sounds, non-tender, no masses or   organomegaly  Extremities: normal venous pattern. Good pulses. No edema. No joint effusions.    Back: No spine or CVA tenderness  Neurologic: oriented x 3. Cranial nerves 2-12 intact. No focal weakness or sensory loss.                     Lab Results   Component Value Date    ALT 21 01/25/2019    AST 23 01/25/2019    ALKPHOS 64 01/25/2019    BILITOT 0.7 01/25/2019     Creat 1.01  Imaging:none new  Microbiology:none neow    Patient Active Problem List   Diagnosis     Post-nasal drip     Malaria     HIV (human immunodeficiency virus infection) (H)

## 2021-06-07 NOTE — TELEPHONE ENCOUNTER
I called patient.   Please send him a copy of his most recent labs from recent visit with us including viral load.   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     Chance Martell mailed 3/30/20.

## 2021-06-07 NOTE — TELEPHONE ENCOUNTER
----- Message from Chance Andersen MD sent at 3/30/2020  8:31 AM CDT -----  Haylie,   Please let Antonio know that viral load remains negative.  Good work!    Chance Andersen

## 2021-06-07 NOTE — TELEPHONE ENCOUNTER
Patient is requesting a call back from Dr. Andersen to discuss his lab results and his unemployment.

## 2021-06-11 NOTE — PROGRESS NOTES
Infectious Disease Progress Note    ASSESSMENT: 1. AIDS x years with on/off adherence.  Last CD4 236, VL 60,  2. Allergic to Isentress. Resistant to Sustiva.   On Intellence + Trizivir.  3. Colon CA stage 3 2015, s/p R hemicolectomy at [facility]. Refused adjuvant chemotherapy. F/U CT scan neg x 2.  4. Elevated PSA 2/17. Seen at [facility] urology. Had a scan and bx. Results pending  5. Went to [facility] x 1 mo. Got mild falcip malarian 5/2 after return.  Treated with malarone. Given Primiquine but only took x 3 days. (OK as long as not coinfected with vivax or ovale).      PLAN: 1. Fu HIV studies including reflex to genotype if VL elevated.  2. Fu  CD4  3. F/u Malaria smear.  4. F/u CBC    Thanks,  Chance Andersen MD    ______________________________________________________________________    Subjective: had malaria 5/17 after trip to [facility]. Took doxy while there but not for 28 days after returning.  2.3% parasitemia. CDC found P falcip.    Review of systems:  Patient denies fever, chills, cough, sputum, vomiting, diarrhea, dysuria, urgency, flank pain, headache, stiff neck, rash, swollen glands or pain at IV sites.  SH/FH/ Habits/ PMH reviewed and unchanged.  Objective:   /68  Pulse 80  Temp 98.1  F (36.7  C)  Wt 177 lb 11.2 oz (80.6 kg)  BMI 24.78 kg/m2        Exam:vss.  Looks fine.  ENT: TMs clear, oropharynx without exudate or thrush  Nodes: no cervical, axillary or inguinal adenopathy  Respiratory: normal respiratory patter, no rales or rubs.  CV: normal heart tones. No rub, murmur or S3. No JVD.  Back: No spine or CVA tenderness  Abdmomen: soft, normal bowel sounds, non-tender, no masses or   organomegaly  Extremities: normal venous pattern. Good pulses. No edema. No joint effusions.  Neurologic: oriented x 3. Cranial nerves 2-12 intact. No focal weakness or sensory loss.                 Lab Results   Component Value Date    ALT 27 05/12/2017    AST 30 05/12/2017    ALKPHOS 48 05/12/2017    BILITOT  1.2 (H) 05/12/2017       Imaging:none new  Microbiology:none new since 5/2 smear for malaria.    Patient Active Problem List   Diagnosis     Post-nasal drip     Malaria     HIV (human immunodeficiency virus infection)

## 2021-06-14 NOTE — TELEPHONE ENCOUNTER
Records in Epic Chart Review / Radiology images in Nil.  Care Everywhere: [facility] / Radiology images in Nil.    Epic Notes  3/24/2020 - Progress Notes / Infectious Diseases, Dr. Chance Andersen.    Labs  1/5/2021 - Medical cytology / right pleural fluid.  [facility] Hospital results.  12/28/2018 - Histology ([facility]) / skin biopsy.  5/9/2017 - Peripheral Blood Smear.    Imaging  1/5/2021 - US Thoracentesis.  1/4/2021 - CT Chest.    Media  1/14/2021 - Medication / [facility].  1/8/2021 - Progress Note Ext / Follow up after pleural effusion. Dr. Mary.  1/4/2021 - Progress Note Ext / Follow up, pneumonia, chest pain, Dr. Guillory    Care Everywhere: [facility].  Dr. Harris Mary, PCP referring.  1/8/2021 - Office Visit / Dr. Mary.  1/4/2021 - Office Visit / Dr. Guillory  12/17/2020 - Office Visit / Dr. Mary.  1/26/2018 - Office Visit / Urology Consult. Dr. Rajani Cho.  8/4/2017 - [facility] DI[facility]ST[facility] /Pathology, prostate bx.  5/31/2017 - MRI Prostate. Images in Nil.    Care Everywhere: [facility], 2016/Hemicolectomy & 2017/Prostate.  1/3/2021 - Office Visit / Urgent Care, chest pain.  1/3/2021 - XR Chest, images in Nil.  10/17/2019 - ED / Rectal bleeding.   9/4/2018 - Office Visit / Hematology. Dr. Ladonna Head.  8/22/2018 - CT Chest Abdomen Pelvis, images in Nil.  5/25/2018 - Office Visit / Hematology, malignant neoplasm of hepatic flexure.  8/18/2017 - Office Visit / Urology, prostate results, Dr. Pierre.  3/31/2017 - Office Visit / Urology Consult, Yolette Pierre.  2/6//2017 - CT Chest Abdomen Pelvis, images in Nil.  1/28/2016 - Admission / Discharged 1/31/2016. Surgery & Pathology. [facility]  1/20/2016 - Office Visit / Surgery Consult, Dr. Edis Mosley.  1/20/2016 - Office Visit / Hematology Consult, colon mass. Dr. Ulises Echavarria.  1/14/2016 - CT Chest Abdomen Pelvis, images in Nil.

## 2021-06-14 NOTE — TELEPHONE ENCOUNTER
Authorizing: Kailey Chavez MD in  MEDICAL ONCOLOGY     Referral: [id number] (Pending Review)           Priority: Routine   Diagnosis: Asymptomatic HIV infection (H) [Z21]   Comments   Patient needs to see Dr Andersen for follow-up of HIV disease while on treatment for lung cancer; pls schedule asap     Please advise on scheduling.  In office, Video or telephone visit?

## 2021-06-14 NOTE — PATIENT INSTRUCTIONS - HE
Patient Education     Carboplatin Solution for injection  What is this medicine?  CARBOPLATIN (ISSA rebecca ireen tin) is a chemotherapy drug. It targets fast dividing cells, like cancer cells, and causes these cells to die. This medicine is used to treat ovarian cancer and many other cancers.  This medicine may be used for other purposes; ask your health care provider or pharmacist if you have questions.  What should I tell my health care provider before I take this medicine?  They need to know if you have any of these conditions:    blood disorders    hearing problems    kidney disease    recent or ongoing radiation therapy    an unusual or allergic reaction to carboplatin, cisplatin, other chemotherapy, other medicines, foods, dyes, or preservatives    pregnant or trying to get pregnant    breast-feeding  How should I use this medicine?  This drug is usually given as an infusion into a vein. It is administered in a hospital or clinic by a specially trained health care professional.  Talk to your pediatrician regarding the use of this medicine in children. Special care may be needed.  Overdosage: If you think you have taken too much of this medicine contact a poison control center or emergency room at once.  NOTE: This medicine is only for you. Do not share this medicine with others.  What if I miss a dose?  It is important not to miss a dose. Call your doctor or health care professional if you are unable to keep an appointment.  What may interact with this medicine?    medicines for seizures    medicines to increase blood counts like filgrastim, pegfilgrastim, sargramostim    some antibiotics like amikacin, gentamicin, neomycin, streptomycin, tobramycin    vaccines  Talk to your doctor or health care professional before taking any of these medicines:    acetaminophen    aspirin    ibuprofen    ketoprofen    naproxen  This list may not describe all possible interactions. Give your health care provider a list of all the  medicines, herbs, non-prescription drugs, or dietary supplements you use. Also tell them if you smoke, drink alcohol, or use illegal drugs. Some items may interact with your medicine.  What should I watch for while using this medicine?  Your condition will be monitored carefully while you are receiving this medicine. You will need important blood work done while you are taking this medicine.  This drug may make you feel generally unwell. This is not uncommon, as chemotherapy can affect healthy cells as well as cancer cells. Report any side effects. Continue your course of treatment even though you feel ill unless your doctor tells you to stop.  In some cases, you may be given additional medicines to help with side effects. Follow all directions for their use.  Call your doctor or health care professional for advice if you get a fever, chills or sore throat, or other symptoms of a cold or flu. Do not treat yourself. This drug decreases your body's ability to fight infections. Try to avoid being around people who are sick.  This medicine may increase your risk to bruise or bleed. Call your doctor or health care professional if you notice any unusual bleeding.  Be careful brushing and flossing your teeth or using a toothpick because you may get an infection or bleed more easily. If you have any dental work done, tell your dentist you are receiving this medicine.  Avoid taking products that contain aspirin, acetaminophen, ibuprofen, naproxen, or ketoprofen unless instructed by your doctor. These medicines may hide a fever.  Do not become pregnant while taking this medicine. Women should inform their doctor if they wish to become pregnant or think they might be pregnant. There is a potential for serious side effects to an unborn child. Talk to your health care professional or pharmacist for more information. Do not breast-feed an infant while taking this medicine.  What side effects may I notice from receiving this  medicine?  Side effects that you should report to your doctor or health care professional as soon as possible:    allergic reactions like skin rash, itching or hives, swelling of the face, lips, or tongue    signs of infection - fever or chills, cough, sore throat, pain or difficulty passing urine    signs of decreased platelets or bleeding - bruising, pinpoint red spots on the skin, black, tarry stools, nosebleeds    signs of decreased red blood cells - unusually weak or tired, fainting spells, lightheadedness    breathing problems    changes in hearing    changes in vision    chest pain    high blood pressure    low blood counts - This drug may decrease the number of white blood cells, red blood cells and platelets. You may be at increased risk for infections and bleeding.    nausea and vomiting    pain, swelling, redness or irritation at the injection site    pain, tingling, numbness in the hands or feet    problems with balance, talking, walking    trouble passing urine or change in the amount of urine  Side effects that usually do not require medical attention (report to your doctor or health care professional if they continue or are bothersome):    hair loss    loss of appetite    metallic taste in the mouth or changes in taste  This list may not describe all possible side effects. Call your doctor for medical advice about side effects. You may report side effects to FDA at 4-147-FDA-9789.  Where should I keep my medicine?  This drug is given in a hospital or clinic and will not be stored at home.  NOTE:This sheet is a summary. It may not cover all possible information. If you have questions about this medicine, talk to your doctor, pharmacist, or health care provider. Copyright  2015 [employer]         Patient Education     Pemetrexed Disodium Solution for injection  What is this medicine?  PEMETREXED (PEM e TREX ed) is a chemotherapy drug. This medicine affects cells that are rapidly growing, such as  cancer cells and cells in your mouth and stomach. It is usually used to treat lung cancers like non-small cell lung cancer and mesothelioma. It may also be used to treat other cancers.  This medicine may be used for other purposes; ask your health care provider or pharmacist if you have questions.  What should I tell my health care provider before I take this medicine?  They need to know if you have any of these conditions:    if you frequently drink alcohol containing beverages    infection (especially a virus infection such as chickenpox, cold sores, or herpes)    kidney disease    liver disease    low blood counts, like low platelets, red bloods, or white blood cells    an unusual or allergic reaction to pemetrexed, mannitol, other medicines, foods, dyes, or preservatives    pregnant or trying to get pregnant    breast-feeding  How should I use this medicine?  This drug is given as an infusion into a vein. It is administered in a hospital or clinic by a specially trained health care professional.  Talk to your pediatrician regarding the use of this medicine in children. Special care may be needed.  Overdosage: If you think you have taken too much of this medicine contact a poison control center or emergency room at once.  NOTE: This medicine is only for you. Do not share this medicine with others.  What if I miss a dose?  It is important not to miss your dose. Call your doctor or health care professional if you are unable to keep an appointment.  What may interact with this medicine?    aspirin and aspirin-like medicines    medicines to increase blood counts like filgrastim, pegfilgrastim, sargramostim    methotrexate    NSAIDS, medicines for pain and inflammation, like ibuprofen or naproxen    probenecid    pyrimethamine    vaccines  Talk to your doctor or health care professional before taking any of these medicines:    acetaminophen    aspirin    ibuprofen    ketoprofen    naproxen  This list may not describe  all possible interactions. Give your health care provider a list of all the medicines, herbs, non-prescription drugs, or dietary supplements you use. Also tell them if you smoke, drink alcohol, or use illegal drugs. Some items may interact with your medicine.  What should I watch for while using this medicine?  Visit your doctor for checks on your progress. This drug may make you feel generally unwell. This is not uncommon, as chemotherapy can affect healthy cells as well as cancer cells. Report any side effects. Continue your course of treatment even though you feel ill unless your doctor tells you to stop.  In some cases, you may be given additional medicines to help with side effects. Follow all directions for their use.  Call your doctor or health care professional for advice if you get a fever, chills or sore throat, or other symptoms of a cold or flu. Do not treat yourself. This drug decreases your body's ability to fight infections. Try to avoid being around people who are sick.  This medicine may increase your risk to bruise or bleed. Call your doctor or health care professional if you notice any unusual bleeding.  Be careful brushing and flossing your teeth or using a toothpick because you may get an infection or bleed more easily. If you have any dental work done, tell your dentist you are receiving this medicine.  Avoid taking products that contain aspirin, acetaminophen, ibuprofen, naproxen, or ketoprofen unless instructed by your doctor. These medicines may hide a fever.  Call your doctor or health care professional if you get diarrhea or mouth sores. Do not treat yourself.  To protect your kidneys, drink water or other fluids as directed while you are taking this medicine.  Men and women must use effective birth control while taking this medicine. You may also need to continue using effective birth control for a time after stopping this medicine. Do not become pregnant while taking this medicine. Tell  your doctor right away if you think that you or your partner might be pregnant. There is a potential for serious side effects to an unborn child. Talk to your health care professional or pharmacist for more information. Do not breast-feed an infant while taking this medicine. This medicine may lower sperm counts.  What side effects may I notice from receiving this medicine?  Side effects that you should report to your doctor or health care professional as soon as possible:    allergic reactions like skin rash, itching or hives, swelling of the face, lips, or tongue    low blood counts - this medicine may decrease the number of white blood cells, red blood cells and platelets. You may be at increased risk for infections and bleeding.    signs of infection - fever or chills, cough, sore throat, pain or difficulty passing urine    signs of decreased platelets or bleeding - bruising, pinpoint red spots on the skin, black, tarry stools, blood in the urine    signs of decreased red blood cells - unusually weak or tired, fainting spells, lightheadedness    breathing problems, like a dry cough    changes in emotions or moods    chest pain    confusion    diarrhea    high blood pressure    mouth or throat sores or ulcers    pain, swelling, warmth in the leg    pain on swallowing    swelling of the ankles, feet, hands    trouble passing urine or change in the amount of urine    vomiting    yellowing of the eyes or skin  Side effects that usually do not require medical attention (report to your doctor or health care professional if they continue or are bothersome):    hair loss    loss of appetite    nausea    stomach upset  This list may not describe all possible side effects. Call your doctor for medical advice about side effects. You may report side effects to FDA at 8-280-FDA-8215.  Where should I keep my medicine?  This drug is given in a hospital or clinic and will not be stored at home.  NOTE:This sheet is a summary. It  may not cover all possible information. If you have questions about this medicine, talk to your doctor, pharmacist, or health care provider. Copyright  2015 [employer]         Patient Education     Pembrolizumab Solution for injection  What is this medicine?  PEMBROLIZUMAB (pem broe stanley ue mab) is used to treat certain types of melanoma, a skin cancer. It targets specific cancer cells and stops the cancer cells from growing.  This medicine may be used for other purposes; ask your health care provider or pharmacist if you have questions.  What should I tell my health care provider before I take this medicine?  They need to know if you have any of these conditions:    immune system problems    inflammatory bowel disease    liver disease    lung or breathing disease    lupus    an unusual or allergic reaction to pembrolizumab, other medicines, foods, dyes, or preservatives    pregnant or trying to get pregnant    breast-feeding  How should I use this medicine?  This medicine is for infusion into a vein. It is given by a health care professional in a hospital or clinic setting.  A special MedGuide will be given to you before each treatment. Be sure to read this information carefully each time.  Talk to your pediatrician regarding the use of this medicine in children. Special care may be needed.  Overdosage: If you think you've taken too much of this medicine contact a poison control center or emergency room at once.  NOTE: This medicine is only for you. Do not share this medicine with others.  What if I miss a dose?  It is important not to miss your dose. Call your doctor or health care professional if you are unable to keep an appointment.  What may interact with this medicine?  Interactions have not been studied.  Give your health care provider a list of all the medicines, herbs, non-prescription drugs, or dietary supplements you use. Also tell them if you smoke, drink alcohol, or use illegal drugs. Some items may  interact with your medicine.  What should I watch for while using this medicine?  Your condition will be monitored carefully while you are receiving this medicine.  You may need blood work done while you are taking this medicine.  Do not become pregnant while taking this medicine or for 4 months after stopping it. Women should inform their doctor if they wish to become pregnant or think they might be pregnant. There is a potential for serious side effects to an unborn child. Talk to your health care professional or pharmacist for more information. Do not breast-feed an infant while taking this medicine.  What side effects may I notice from receiving this medicine?  Side effects that you should report to your doctor or health care professional as soon as possible:    allergic reactions like skin rash, itching or hives, swelling of the face, lips, or tongue    bloody or black, tarry stools    change in the amount of urine    changes in vision    chest pain    dark urine    dizziness or feeling faint or lightheaded    fast or irregular heartbeat    hair loss    muscle pain    muscle weakness    persistent headache    shortness of breath    signs and symptoms of liver injury like dark urine, light-colored stools, loss of appetite, nausea, right upper belly pain, yellowing of the eyes or skin    stomach pain    weight loss  Side effects that usually do not require medical attention (Report these to your doctor or health care professional if they continue or are bothersome.):constipation    cough    diarrhea    joint pain    tiredness  This list may not describe all possible side effects. Call your doctor for medical advice about side effects. You may report side effects to FDA at 2-962-FDA-4538.  Where should I keep my medicine?  This drug is given in a hospital or clinic and will not be stored at home.  NOTE: This sheet is a summary. It may not cover all possible information. If you have questions about this medicine,  talk to your doctor, pharmacist, or health care provider.  NOTE:This sheet is a summary. It may not cover all possible information. If you have questions about this medicine, talk to your doctor, pharmacist, or health care provider. Copyright  2015 [employer]

## 2021-06-14 NOTE — PROGRESS NOTES
Patient seen in clinic today for hospital visit follow up.    Susy Grossman RN, Oncology Clinic   [facility]

## 2021-06-14 NOTE — PROGRESS NOTES
[facility] Hematology and Oncology Progress Note    Patient: Antonio Mercado  MRN: [id number]  Date of Service: 01/28/2021        Reason for Visit    Chief Complaint   Patient presents with     Hospital Visit Follow Up       Assessment and Plan    Right lung adenocarcinoma with pleural metastasis and malignant pleural effusion diagnosed January 2021    History of colon Cancer status post hemicolectomy  History of prostate cancer with elevated PSA, declined follow-up  HIV/AIDS  Macrocytic anemia probably related to medication  Thyroid nodule on PET scan    Addendum:  Tumor shows PD-L1 high expression with TPS of 70%.  Other results unavailable.  Will keep plan the same for now.  Can consider Keytruda alone if significant side effects.      Pathology is reviewed with patient and his son and sister and is consistent with lung adenocarcinoma.  PET scan is reviewed and shows right upper lobe medial lung mass with extensive pleural metastases but no other distant metastatic disease.  Brain MRI shows no evidence of metastatic disease.  Tumor markers show normal CEA level and PSA of 36.    Reviewed situation and explained that the patient has stage IV lung cancer.  We are waiting on results of next generation sequencing.  He might need guardant 360 testing or another biopsy if there are no clear results.    Patient's main symptom is recurrent pleural effusion requiring thoracentesis.    Recommend initial treatment with combination of carboplatin, Alimta and Keytruda.    Discussed how this treatment is administered 1 day every 3 weeks for 4-6 cycles followed by maintenance with Alimta and Keytruda.  Reviewed potential side effects including but not limited to alopecia, nausea and vomiting, fatigue, myelosuppression with risk for infection and rare need for transfusions, side effects of immunotherapy also reviewed.    Patient understands and is willing to proceed and did sign a consent.    Reviewed use of dexamethasone, folic  acid and B12.  Reviewed fever precautions and the need to call emergently for temperature greater than 100.5  F.    Discussed that treatment is with palliative intent and that median survival is 1 to 2 years.  Rare patients could have longer term control depending on response to therapy.    Discussed potential of switching to targeted therapy based on results of next generation sequencing as he is a non-smoker.  Discussed possibility of pleurodesis procedure.  For now we will continue with thoracentesis twice weekly as needed.    Explained the importance of continuing his medications for HIV.  Will review any potential interactions with chemotherapy.    He will follow-up with urology regarding his PSA elevation.  At this point treatment of the lung cancer should take precedence.    We will hold off on any evaluation of the thyroid nodule for now.    Plan:  Continue folic acid daily  Dexamethasone for 3 days prior to each cycle of treatment  Fever precautions  Compazine for nausea  Follow-up in 1 week to start first chemotherapy  Continue thoracentesis as needed twice weekly  We will restage after 2 cycles  Follow-up on NGS  Refer to ID    Measurable disease: PET scan    Current therapy: Carboplatin, Alimta and Keytruda to start February 4, 2021        ECOG Performance   ECOG Performance Status: 2    Distress Assessment  Distress Assessment Score: No distress    Pain         Problem List    1. Primary malignant neoplasm of right lung metastatic to other site (H)  Education (Chemo Class)    dexAMETHasone (DECADRON) 4 MG tablet    prochlorperazine (COMPAZINE) 10 MG tablet    folic acid (FOLVITE) 1 MG tablet    CC OFFICE VISIT LONG    Infusion Appointment        CC: Harris Mary MD    ______________________________________________________________________________    History of Present Illness    Mr. Antonio Mercado is here for a first office visit.  Seen in the hospital on the 19th.  He has required for thoracentesis  "in total.  Denies any other symptoms.  No clear family history of cancer.  ECOG status is 1.    Pain Status  Currently in Pain: No/denies    Review of Systems    Constitutional  Constitutional (WDL): Exceptions to WDL  Fatigue: Fatigue relieved by rest  Neurosensory  Neurosensory (WDL): Exceptions to WDL  Ataxia: Asymptomatic, clinical or diagnostic observations only, intervention not indicated(In WC for today's appt)  Cardiovascular  Cardiovascular (WDL): All cardiovascular elements are within defined limits  Pulmonary  Respiratory (WDL): Exceptions to WDL  Dyspnea: Shortness of breath with moderate exertion  Gastrointestinal  Gastrointestinal (WDL): Exceptions to WDL  Constipation: Occasional or intermittent symptoms, occasional use of stool softeners, laxatives, dietary modification, or enema  Genitourinary  Genitourinary (WDL): All genitourinary elements are within defined limits  Integumentary  Integumentary (WDL): All integumentary elements are within defined limits  Patient Coping  Patient Coping: Accepting  Distress Assessment  Distress Assessment Score: No distress  Accompanied by  Accompanied by: Family Member(Son)    Past History  Past Medical History:   Diagnosis Date     Constipation      HIV (human immunodeficiency virus infection) (H)      Hypertrophy of prostate with urinary obstruction      Iron deficiency anemia      Malignant neoplasm of hepatic flexure (H)     s/p right hemicolectomy in 2016         Past Surgical History:   Procedure Laterality Date     COLON SURGERY       HERNIA REPAIR       US THORACENTESIS  1/5/2021     US THORACENTESIS  1/18/2021     US THORACENTESIS  1/26/2021       Physical Exam    Recent Vitals 1/28/2021   Height 5' 11\"   Weight 159 lbs 6 oz   BSA (m2) 1.9 m2   /66   Pulse 92   Temp 98   Temp src 1   SpO2 99   Some recent data might be hidden       GENERAL: Alert and oriented to time place and person. Seated comfortably. In no distress.    HEAD: Atraumatic and " normocephalic.    EYES: KARIME, EOMI.  No pallor.  No icterus.    Oral cavity: no mucosal lesion or tonsillar enlargement.    NECK: supple. JVP normal.  No thyroid enlargement.    LYMPH NODES: No palpable, cervical, axillary or inguinal lymphadenopathy.    CHEST: clear to auscultation bilaterally.  Resonant to percussion throughout bilaterally.  Symmetrical breath movements bilaterally.    CVS: S1 and S2 are heard. Regular rate and rhythm.  No murmur or gallop or rub heard.  No peripheral edema.    ABDOMEN: Soft. Not tender. Not distended.  No palpable hepatomegaly or splenomegaly.  No other mass palpable.  Bowel sounds heard.    EXTREMITIES: Warm.    SKIN: no rash, or bruising or purpura.  Has a full head of hair.      Lab Results    Recent Results (from the past 168 hour(s))   COVID-19 Virus PCR MRF    Specimen: Respiratory   Result Value Ref Range    COVID-19 VIRUS SPECIMEN SOURCE Nasopharyngeal     2019-nCOV Not Detected    POCT Glucose    Specimen: Capillary; Blood   Result Value Ref Range    Glucose 86 70 - 139 mg/dL       Imaging    Xr Chest 2 Views    Result Date: 1/18/2021  EXAM: XR CHEST 2 VIEWS LOCATION: [facility] DATE/TIME: 1/18/2021 6:43 PM INDICATION: Shortness of breath. COMPARISON: 01/03/2021.     Large right pleural effusion has increased in size. Compressive consolidation right lung. Left lung is clear. There is some mass effect with mild right to left mediastinal shift.    Ct Chest With Contrast    Result Date: 1/4/2021  EXAM: CT CHEST W CONTRAST LOCATION: [facility] DATE/TIME: 1/4/2021 2:33 PM INDICATION: Large right pleural effusion. Follow-up. History of colon and prostate cancer. COMPARISON: Radiograph 01/03/2021. Chest CT 08/22/2018. TECHNIQUE: CT chest with IV contrast. Multiplanar reformats were obtained. Dose reduction techniques were used. CONTRAST: Iohexol (Omni) 90mL FINDINGS: LUNGS AND PLEURA: Large right pleural effusion present; some  of the fluid is loculated. Regions of hyperdense / hyperenhancing right pleural thickening, with a heterogeneous ovoid region pronounced posteriorly and basilar measuring 3.2 x 5.7 cm (series 9, image 130). Moderate-severe right lung volume loss, pronounced in the middle and lower lobes. Anterior right perihilar lobulated 2.8 x 3.9 cm density (image 87). Left lung clear. MEDIASTINUM/AXILLAE: Mild cardiomediastinal mass effect from large right pleural effusion. No mediastinal or left hilar adenopathy. Nodular thyroid. UPPER ABDOMEN: Couple stable hepatic cysts. MUSCULOSKELETAL: No focal bony lesion.     1.  Nodular and masslike thickening along the right pleural space, suggestive of metastatic disease. Large right pleural effusion also present (malignant effusion possible). 2.  Lobulated density anterior to the right perihilar region may represent loculated fluid and/or atelectatic lung, though not well evaluated. Recommend attention on follow-up to exclude lesion. 3.  Mildly compromised evaluation of right perihilar adenopathy from adjacent fluid and volume loss. Otherwise, no thoracic adenopathy. 4.  Moderate to severe right lung volume loss. Aerated lung without obvious consolidation. Findings verbally discussed with ordering Dr. Wilder at 3:34 PM on 01/04/2021. NOTE: ABNORMAL REPORT THE DICTATION ABOVE DESCRIBES AN ABNORMALITY FOR WHICH FOLLOW-UP IS NEEDED.     Mr Brain With Without Contrast    Result Date: 1/19/2021  EXAM: MR BRAIN W WO CONTRAST LOCATION: [facility] DATE/TIME: 1/19/2021 1:05 PM INDICATION: Headache, lung cancer COMPARISON: Head CT 01/26/2017 and MRI brain 10/21/2009. CONTRAST: Gadavist 7 mL TECHNIQUE: Routine multiplanar multisequence head MRI without and with intravenous contrast. FINDINGS: INTRACRANIAL CONTENTS: No evidence for acute or subacute infarction based on diffusion-weighted imaging. No mass, acute hemorrhage, or extra-axial fluid collections. Few scattered foci  of T2/FLAIR hyperintense signal in the cerebral white matter are nonspecific, though likely represent sequelae of very mild chronic small vessel ischemic disease, within normal limits for patient age. Normal ventricles and sulci, without hydrocephalus. Normal position of the cerebellar tonsils. No pathologic brain parenchymal or meningeal contrast enhancement. SELLA: No abnormality accounting for technique. OSSEOUS STRUCTURES/SOFT TISSUES: There is unchanged appearance of a mixed T2 signal intensity, mildly enhancing lesion within the right temporal mastoid calvarium measuring 14 x 6 x 14 mm (axial FLAIR series 10, image #10; postcontrast coronal series 18,  image #12). Otherwise normal marrow signal. The major intracranial vascular flow voids are maintained. ORBITS: No abnormality accounting for technique. SINUSES/MASTOIDS: No paranasal sinus mucosal disease. No middle ear or mastoid effusion.     1.  No evidence of intracranial metastatic disease. 2.  Unchanged mixed T2 signal intensity, mildly enhancing lesion in the right temporal mastoid calvarium, which is unchanged any back to October 2009. This is indeterminate and may represent a benign fibro-osseous lesion or intraosseous hemangioma, though stability since 2009 favors benign etiology. 3.  No acute/subacute infarction, intracranial hemorrhage, mass effect, or high.     Nm Pet Ct Skull To Mid Thigh    Result Date: 1/27/2021  EXAM: NM PET CT SKULL TO MID THIGH LOCATION: [facility] DATE/TIME: 1/27/2021 12:43 PM INDICATION: Lung cancer, right upper lobe, staging. Malignant neoplasm of upper lobe, right bronchus or lung. Primary malignant neoplasm of right lung metastatic to other site. Malignant pleural effusion. Initial treatment strategy. COMPARISON: CT chest 01/04/2021, CT chest abdomen pelvis 08/22/2018, MRI brain 01/19/2021 reviewed. TECHNIQUE: Serum glucose level 86 mg/dL. One hour post intravenous administration of 8.5 mCi F-18  FDG, PET imaging was performed from the skull base to the mid thighs utilizing attenuation correction with concurrent axial CT and PET/CT image fusion. Dose  reduction techniques were used. FINDINGS: Heterogeneous lobulated FDG avid pulmonary mass in the inferomedial right upper lobe estimated at 3.3 x 4.4 x 3.5 cm demonstrates marked uptake (SUVmax 15.5), highly suspicious for malignancy, primary lung cancer. Multiple FDG avid nodules and masses throughout the right pleura, largest conglomeration in the posterior inferior right chest including a masslike component measuring 3.5 x 6.1 x 5.0 cm associated with marked uptake (SUVmax 14.8), suspicious for extensive right pleural metastases. Large right pleural effusion with loculated component superiorly. Moderate amount layering hyperdensity is present within the effusion suggesting some blood products. Mild nodularity along the major fissure. FDG avid right perihilar station 12R, 11R, 10R  and mediastinal subcarinal station 7 (SUVmax 6.0) adenopathy suspicious for gerald metastases. No suspicious focal uptake in the liver or skeleton. Round heterogeneous hypodense left thyroid nodule measures 1.6 x 1.7 cm associated with marked uptake (SUVmax 9.9). Minimal mucosal thickening left maxillary sinus. Few small benign hepatic cysts. Partial colectomy with right abdominal anastomosis. Mild colonic diverticulosis. Mild prostate gland enlargement. Mild scattered degenerative changes in the spine.     1. Findings suspicious for right upper lobe lung cancer with extensive right pleural metastases as well as gerald metastases in the right hilum and subcarinal mediastinum. 2. Large complex right pleural effusion suggesting a component of some blood products. 3. FDG avid left thyroid nodule suspicious for primary thyroid neoplasm including thyroid cancer. Thyroid ultrasound and FNA could be considered although this is likely of secondary concern given the right chest  findings.    Us Thoracentesis    Result Date: 1/26/2021  EXAM: 1. RIGHT THORACENTESIS 2. ULTRASOUND GUIDANCE LOCATION: [facility] DATE/TIME: 1/26/2021 3:48 PM INDICATION: Pleural effusion. Right chest mass PROCEDURE: Informed consent obtained. Time out performed. Prior to the procedure, moderate to markedly echogenic right pleural fluid was noted. The chest was prepped and draped in sterile fashion. 10 mL of 1 % lidocaine was infused into the local soft tissues. Under direct ultrasound guidance, a 5 Mosotho catheter system was placed into the pleural effusion. 1.25 liters of dark red hemorrhagic fluid were removed and sent to lab, if requested. Patient tolerated procedure well. Ultrasound imaging was obtained and placed in the patient's permanent medical record.     Status post right ultrasound-guided thoracentesis. Reference CPT Code: 55250    Us Thoracentesis    Result Date: 1/18/2021  EXAM: 1. RIGHT THORACENTESIS 2. ULTRASOUND GUIDANCE LOCATION: [facility] DATE/TIME: 1/18/2021 8:25 PM INDICATION: Malignant pleural effusion. Patient not yet aware. COMPARISON: CT 01/04/2021 PROCEDURE: Ultrasound of the right chest demonstrates a large heterogeneous pleural mass inferiorly. Informed consent obtained. Time out performed. The chest was prepped and draped in sterile fashion. Few mL of 1 % lidocaine was infused into the local soft tissues. Under direct ultrasound guidance, a 5 Mosotho catheter system was placed into the pleural effusion. 1 liter of slightly bloody fluid was removed and sent to lab. Patient tolerated procedure well. Ultrasound imaging was obtained and placed in the patient's permanent medical record.     Status post right ultrasound-guided thoracentesis. Reference CPT Code: 59131    Us Thoracentesis    Result Date: 1/5/2021  EXAM: 1. RIGHT THORACENTESIS 2. ULTRASOUND GUIDANCE LOCATION: [facility] DATE/TIME: 1/5/2021 3:54 PM  INDICATION: Pleural effusion. PROCEDURE: Informed consent obtained. Time out performed. The chest was prepped and draped in sterile fashion. 10 mL of 1 % lidocaine was infused into the local soft tissues. Under direct ultrasound guidance, a 5 Azeri catheter system was placed into the pleural effusion. 1.5 liters of red fluid were removed and sent to lab, if requested. Patient tolerated procedure well. Ultrasound imaging was obtained and placed in the patient's permanent medical record.     Status post right ultrasound-guided thoracentesis. Reference CPT Code: 80539        Signed by: Kailey Chavez MD

## 2021-06-14 NOTE — PROGRESS NOTES
Infectious Disease Progress Note     ASSESSMENT: 1. AIDS x years with on/off adherence.  Last CD4 265, VL < 20 6/17,  2. Allergic to Isentress and Eltegravir. HIV is Resistant to Sustiva.   On Intellence + Trizivir.  3. Colon CA stage 3 2015, s/p R hemicolectomy at [facility]. Refused adjuvant chemotherapy. F/U CT scan neg x 2. Scheduled for colonoscopy tomorrow  4. Elevated PSA 2/17. Seen at [facility] urology. Had a scan and bx. Pt unaware of results  5. Went to [facility] x 1 mo. Got mild falcip malarian 5/2 after return.  Treated with malarone. Given Primiquine but only took x 3 days.  6. hemorrhoids   7. Nocturnal cough at time.       PLAN: 1. Fu  HIV VL and metolic panel and cbc  2. Wants a letter that his viral load is negative when results back.  3. Try tessalon perles for cough  4. RTC 4 mos.     Thanks,  Chance Andersen MD   addedum: HIV VL 78 - stable.  ______________________________________________________________________     Subjective: had malaria 5/17 after trip to [facility]. Took doxy while there but not for 28 days after returning.  2.3% parasitemia. [facility] found P falcip. NO recurrece  Seeing Urology at [facility] re: hi PSA. Cancer?  Getting f/u colonoscopy tomorrow re: colon CA.    Had a rash but now gone.  Has cough at night.     Review of systems:  Patient denies fever, chills, cough, sputum, vomiting, diarrhea, dysuria, urgency, flank pain, headache, stiff neck, rash, swollen glands or pain at IV sites.  SH/FH/ Habits/ PMH reviewed and unchanged.  Objective:   /68  Pulse 80  Temp 98.1  F (36.7  C)  Wt 177 lb 11.2 oz (80.6 kg)  BMI 24.78 kg/m2       Exam:vss.  Looks fine.  ENT: TMs clear, oropharynx without exudate or thrush  Nodes: no cervical, axillary or inguinal adenopathy  Respiratory: normal respiratory patter, no rales or rubs.  CV: normal heart tones. No rub, murmur or S3. No JVD.  Back: No spine or CVA tenderness  Abdmomen: soft, normal bowel sounds, non-tender, no masses or    organomegaly  Extremities: normal venous pattern. Good pulses. No edema. No joint effusions.  Neurologic: oriented x 3. Cranial nerves 2-12 intact. No focal weakness or sensory loss.                        Lab Results   Component Value Date     ALT 27 05/12/2017     AST 30 05/12/2017     ALKPHOS 48 05/12/2017     BILITOT 1.2 (H) 05/12/2017         Imaging:none new  Microbiology:none new since 5/2 smear for malaria.         Patient Active Problem List   Diagnosis     Post-nasal drip     Malaria     HIV (human immunodeficiency virus infection)

## 2021-06-14 NOTE — TELEPHONE ENCOUNTER
New Patient Oncology Nurse Navigator Note     Referring provider: Dr. Mary pcp     Referring Clinic/Organization: [place]     Referred to (specialty): medical oncology    Requested provider (if applicable): na     Date Referral Received: 1/9/21     Evaluation for : suspicion of malignant cells on thoracentesis/pleural effusion 1/5/21     Clinical History (per Nurse review of records provided):  Antonio has a history of colon and prostate cancers.  He had a right hemicolectomy on 1/28/16, with Dr. Mosley at [facility].  He did not wish to have any chemotherapy and has been getting colonoscopies every 6 months at [facility].  Last one was 6-8 months ago per patient.    Prostate cancer: He was diagnosed with biopsy on 1/18/17 with sandra 3+3.  He had a Decipher test and results given on 9/5/17 by Dr. Pierre.     He then switched to Dr. Sanderson for his prostate cancer following.   Per note on 1/26/18, Antonio feels due to dietary and health changes his diagnosis would have changed .    On 5/13/18, per note from Dr. Sanderson, he apparently was planning to have an MRI and possible biopsy in the fall, but I do not see any urology noted thereafter.     Clinical Assessment / Barriers to Care (Per Nurse):        Records Location (Care Everywhere, Media, etc.): Care Everywhere: [place] [facility]      Records Needed: images? MRI prostate May 2017     Additional testing needed prior to consult: na    I called and scheduled Antonio to see Dr. Chavez on 1/19/21.  When reviewing his records with him I discussed his past history of prostate cancer and he indicated that he does not have prostate cancer.  I appears his care may have fallen off in regards to his prostate cancer.  Notes indicated the plan was to have MRI and possibly another prostate biopsy in the fall of 2018, but I do not see any record of this happening.  He did have a diagnostic bx of Sandra 3+3 in August of 2017.    I will mail to him a welcome  letter for his consult.  I reviewed my role and encouraged that he contact me if needed for assistance.

## 2021-06-14 NOTE — TELEPHONE ENCOUNTER
Pt called in states she want to come for covid-19 test.  The call is transfer. No other concern at this time.      Raji Gomez RN, [facility] Triage/Med Refill 1/22/2021 2:43 PM

## 2021-06-15 NOTE — TELEPHONE ENCOUNTER
Called patient to check in to see how he was doing after receiving first chemo dose on 2/4/21. Patient reports he is doing well. Eating and drinking well. Had a little constipation but has now resolved.  Reviewed patient's upcoming appointments for 2/9 and 2/12. Patient verbalized understanding and appreciation of care. Maryana Ordaz, [facility]

## 2021-06-15 NOTE — TELEPHONE ENCOUNTER
Antonio Mercado Key: [id number] - PA Case ID: [id number]    PA Initiation    Medication: Tagrisso 80mg  Insurance Company: [facility]  Pharmacy Filling Rx: PT is restricted to [facility]  Start Date: n/a

## 2021-06-15 NOTE — PROGRESS NOTES
Pt arrived ambulatory to clinic for Cycle # 2 Day # 1 of his chemotherapy regimen.  IV was started using aseptic technique without difficulties with excellent blood return.  Administered premedications and chemotherapy per MD order.  Pt tolerated infusion well, no s/s of infusion reaction.  IV was flushed with NS then D/C'd using 2x2 and Coban.  Pt verbalized understanding of plan of care and return to clinic.

## 2021-06-15 NOTE — PROGRESS NOTES
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  Appears to be having a clinical response with improvement in his shortness of breath and decrease in fluid removed at thoracentesis.    Next generation sequencing does show EGFR mutation and he is therefore a candidate for targeted therapy with Osimertinib.    We will proceed with second cycle of treatment today without immunotherapy.  We will see patient back in 3 weeks with repeat PET scan for restaging.  We need to delay time between immunotherapy and Osimertinib because of known interaction and risk for interstitial lung disease.    Explained that in patients with findings of a mutation, targeted therapy is the best initial treatment.  He can be treated with immunotherapy alone at the time of progression given that he has high PD-L1 expression.    Explained that we can decrease frequency of thoracentesis and do adjust as needed.  He will go in for evaluation tomorrow.    We will dose reduce chemotherapy by 20% because of noted neutropenia.  Again reviewed fever precautions and use of not antinausea medication.    Encouraged good nutrition and the  use of supplements to maintain weight.    Plan: As above      Measurable disease: PET scan    Current therapy: Carbo Alimta alone without Keytruda, 20% dose reduction, cycle 2 on February 25, 2021  Carboplatin, Alimta and Keytruda to start February 4, 2021        ECOG Performance   ECOG Performance Status: 1    Distress Assessment  Distress Assessment Score: No distress    Pain         Problem List    1. Primary malignant neoplasm of right lung metastatic to other site (H)  CC OFFICE VISIT LONG    NM PET CT Skull to Mid Thigh    osimertinib 80 mg Tab    DISCONTINUED: sodium chloride 0.9% 250 mL infusion    DISCONTINUED: palonosetron injection 0.25 mg (ALOXI)    DISCONTINUED: fosaprepitant 150 mg, dexAMETHasone 12 mg in sodium chloride 0.9% 150 mL    DISCONTINUED: PEMEtrexed 750 mg in sodium chloride 0.9% 100 mL (ALIMTA)    DISCONTINUED: CARBOplatin 310 mg in dextrose 5% 250 mL (PARAPLATIN)    DISCONTINUED: sodium chloride flush 20 mL (NS)    DISCONTINUED: heparin lockflush (PF) porcine 300-600 Units    DISCONTINUED: diphenhydrAMINE injection 50 mg (BENADRYL)    DISCONTINUED: famotidine 20 mg/2 mL injection 20 mg (PEPCID)    DISCONTINUED: hydrocortisone sod succ (PF) injection 100 mg    DISCONTINUED: acetaminophen tablet 1,000 mg (TYLENOL)    DISCONTINUED: sodium chloride 0.9% 500 mL        CC: Harris Mary MD    ______________________________________________________________________________    History of Present Illness    Mr. Antonio Mercado is here for a follow-up.  Did have some fatigue and some vomiting with the first cycle of treatment.  Feeling better with improvement in his breathing, able to walk longer distances and decrease fluid removal at the time of thoracentesis.  No fever or mouth sores.  Weight is down.  ECOG status is 1.      Pain Status  Currently in Pain: No/denies    Review of Systems    Constitutional  Constitutional (WDL): All constitutional elements are within defined  limits  Neurosensory  Neurosensory (WDL): All neurosensory elements are within defined limits  Cardiovascular  Cardiovascular (WDL): All cardiovascular elements are within defined limits  Pulmonary  Respiratory (WDL): Within Defined Limits  Gastrointestinal  Gastrointestinal (WDL): All gastrointestinal elements are within defined limits  Genitourinary  Genitourinary (WDL): All genitourinary elements are within defined limits  Integumentary  Integumentary (WDL): All integumentary elements are within defined limits  Patient Coping  Patient Coping: Accepting  Distress Assessment  Distress Assessment Score: No distress  Accompanied by  Accompanied by: Family Member    Past History  Past Medical History:   Diagnosis Date     Constipation      HIV (human immunodeficiency virus infection) (H)      Hypertrophy of prostate with urinary obstruction      Iron deficiency anemia      Malignant neoplasm of hepatic flexure (H)     s/p right hemicolectomy in 2016         Past Surgical History:   Procedure Laterality Date     COLON SURGERY       HERNIA REPAIR       US THORACENTESIS  1/5/2021     US THORACENTESIS  1/18/2021     US THORACENTESIS  1/26/2021     US THORACENTESIS  1/29/2021     US THORACENTESIS  2/2/2021     US THORACENTESIS  2/5/2021     US THORACENTESIS  2/9/2021     US THORACENTESIS  2/12/2021     US THORACENTESIS  2/15/2021     US THORACENTESIS  2/19/2021     US THORACENTESIS  2/23/2021       Physical Exam    Recent Vitals 2/25/2021   Height -   Weight 147 lbs   BSA (m2) 1.83 m2   /64   Pulse 92   Temp 98.5   Temp src 1   SpO2 98   Some recent data might be hidden       GENERAL: Alert and oriented to time place and person. Seated comfortably. In no distress.    HEAD: Atraumatic and normocephalic.    EYES: KARIME, EOMI.  No pallor.  No icterus.    Oral cavity: no mucosal lesion or tonsillar enlargement.    NECK: supple. JVP normal.  No thyroid enlargement.    LYMPH NODES: No palpable, cervical, axillary or  inguinal lymphadenopathy.    CHEST: clear to auscultation bilaterally.  Resonant to percussion throughout bilaterally.  Symmetrical breath movements bilaterally.    CVS: S1 and S2 are heard. Regular rate and rhythm.  No murmur or gallop or rub heard.  No peripheral edema.    ABDOMEN: Soft. Not tender. Not distended.  No palpable hepatomegaly or splenomegaly.  No other mass palpable.  Bowel sounds heard.    EXTREMITIES: Warm.    SKIN: no rash, or bruising or purpura.  Has a full head of hair.      Lab Results    Recent Results (from the past 168 hour(s))   COVID-19 Virus PCR MRF    Specimen: Respiratory   Result Value Ref Range    COVID-19 VIRUS SPECIMEN SOURCE Nasopharyngeal     2019-nCOV       Test received-See reflex to IDDL test SARS CoV2 (COVID-19) Virus RT-PCR   SARS-CoV-2 (COVID-19)-PCR    Specimen: Respiratory   Result Value Ref Range    SARS-CoV-2 Virus Specimen Source Nasopharyngeal     SARS-CoV-2 PCR Result NEGATIVE     SARS-COV-2 PCR COMMENT       Testing was performed using the Aptima SARS-CoV-2 Assay on the [employer]   Comprehensive Metabolic Panel   Result Value Ref Range    Sodium 140 136 - 145 mmol/L    Potassium 4.7 3.5 - 5.0 mmol/L    Chloride 105 98 - 107 mmol/L    CO2 27 22 - 31 mmol/L    Anion Gap, Calculation 8 5 - 18 mmol/L    Glucose 107 70 - 125 mg/dL    BUN 36 (H) 8 - 22 mg/dL    Creatinine 1.31 (H) 0.70 - 1.30 mg/dL    GFR MDRD Af Amer >60 >60 mL/min/1.73m2    GFR MDRD Non Af Amer 55 (L) >60 mL/min/1.73m2    Bilirubin, Total 0.2 0.0 - 1.0 mg/dL    Calcium 8.9 8.5 - 10.5 mg/dL    Protein, Total 6.4 6.0 - 8.0 g/dL    Albumin 2.8 (L) 3.5 - 5.0 g/dL    Alkaline Phosphatase 79 45 - 120 U/L    AST 13 0 - 40 U/L    ALT 22 0 - 45 U/L   HM1 (CBC with Diff)   Result Value Ref Range    WBC 11.1 (H) 4.0 - 11.0 thou/uL    RBC 3.15 (L) 4.40 - 6.20 mill/uL    Hemoglobin 9.4 (L) 14.0 - 18.0 g/dL    Hematocrit 30.6 (L) 40.0 - 54.0 %    MCV 97 80 - 100 fL    MCH 29.8 27.0 - 34.0 pg    MCHC 30.7 (L) 32.0 -  36.0 g/dL    RDW 18.5 (H) 11.0 - 14.5 %    Platelets 560 (H) 140 - 440 thou/uL    MPV 8.3 (L) 8.5 - 12.5 fL    Neutrophils % 77 (H) 50 - 70 %    Lymphocytes % 12 (L) 20 - 40 %    Monocytes % 7 2 - 10 %    Eosinophils % 0 0 - 6 %    Basophils % 0 0 - 2 %    Immature Granulocyte % 4 (H) <=0 %    Neutrophils Absolute 8.6 (H) 2.0 - 7.7 thou/uL    Lymphocytes Absolute 1.3 0.8 - 4.4 thou/uL    Monocytes Absolute 0.7 0.0 - 0.9 thou/uL    Eosinophils Absolute 0.0 0.0 - 0.4 thou/uL    Basophils Absolute 0.0 0.0 - 0.2 thou/uL    Immature Granulocyte Absolute 0.5 (H) <=0.0 thou/uL       Imaging    Nm Pet Ct Skull To Mid Thigh    Result Date: 1/27/2021  EXAM: NM PET CT SKULL TO MID THIGH LOCATION: [facility] DATE/TIME: 1/27/2021 12:43 PM INDICATION: Lung cancer, right upper lobe, staging. Malignant neoplasm of upper lobe, right bronchus or lung. Primary malignant neoplasm of right lung metastatic to other site. Malignant pleural effusion. Initial treatment strategy. COMPARISON: CT chest 01/04/2021, CT chest abdomen pelvis 08/22/2018, MRI brain 01/19/2021 reviewed. TECHNIQUE: Serum glucose level 86 mg/dL. One hour post intravenous administration of 8.5 mCi F-18 FDG, PET imaging was performed from the skull base to the mid thighs utilizing attenuation correction with concurrent axial CT and PET/CT image fusion. Dose  reduction techniques were used. FINDINGS: Heterogeneous lobulated FDG avid pulmonary mass in the inferomedial right upper lobe estimated at 3.3 x 4.4 x 3.5 cm demonstrates marked uptake (SUVmax 15.5), highly suspicious for malignancy, primary lung cancer. Multiple FDG avid nodules and masses throughout the right pleura, largest conglomeration in the posterior inferior right chest including a masslike component measuring 3.5 x 6.1 x 5.0 cm associated with marked uptake (SUVmax 14.8), suspicious for extensive right pleural metastases. Large right pleural effusion with loculated component  superiorly. Moderate amount layering hyperdensity is present within the effusion suggesting some blood products. Mild nodularity along the major fissure. FDG avid right perihilar station 12R, 11R, 10R  and mediastinal subcarinal station 7 (SUVmax 6.0) adenopathy suspicious for gerald metastases. No suspicious focal uptake in the liver or skeleton. Round heterogeneous hypodense left thyroid nodule measures 1.6 x 1.7 cm associated with marked uptake (SUVmax 9.9). Minimal mucosal thickening left maxillary sinus. Few small benign hepatic cysts. Partial colectomy with right abdominal anastomosis. Mild colonic diverticulosis. Mild prostate gland enlargement. Mild scattered degenerative changes in the spine.     1. Findings suspicious for right upper lobe lung cancer with extensive right pleural metastases as well as gerald metastases in the right hilum and subcarinal mediastinum. 2. Large complex right pleural effusion suggesting a component of some blood products. 3. FDG avid left thyroid nodule suspicious for primary thyroid neoplasm including thyroid cancer. Thyroid ultrasound and FNA could be considered although this is likely of secondary concern given the right chest findings.    Us Thoracentesis    Result Date: 2/23/2021  EXAM: 1. RIGHT THORACENTESIS 2. ULTRASOUND GUIDANCE LOCATION: [facility] DATE/TIME: 2/23/2021 3:34 PM INDICATION: Pleural effusion. PROCEDURE: Informed consent obtained. Time out performed. The chest was prepped and draped in sterile fashion. 10 mL of 1 % lidocaine was infused into the local soft tissues. Under direct ultrasound guidance, a 5 Azeri catheter system was placed into the pleural effusion. 0.4 liters of red fluid were removed and sent to lab, if requested. Patient tolerated procedure well. Ultrasound imaging was obtained and placed in the patient's permanent medical record.     Status post right ultrasound-guided thoracentesis. Reference CPT Code: 86657    Us  Thoracentesis    Result Date: 2/19/2021  EXAM: 1. RIGHT THORACENTESIS 2. ULTRASOUND GUIDANCE LOCATION: [facility] DATE/TIME: 2/19/2021 3:40 PM INDICATION: Pleural effusion. PROCEDURE: Informed consent obtained. Time out performed. The chest was prepped and draped in sterile fashion. 8 mL of 1 % lidocaine was infused into the local soft tissues. Under direct ultrasound guidance, a 5 Malian catheter system was placed into the  pleural effusion. 0.65 liters of red fluid were removed and sent to lab, if requested. Patient tolerated procedure well. Ultrasound imaging was obtained and placed in the patient's permanent medical record.     Status post date ultrasound-guided thoracentesis. Reference CPT Code: 40754    Us Thoracentesis    Result Date: 2/15/2021  EXAM: 1. RIGHT THORACENTESIS 2. ULTRASOUND GUIDANCE LOCATION: [facility] DATE/TIME: 2/15/2021 3:44 PM INDICATION: Pleural effusion. PROCEDURE: Informed consent obtained. Time out performed. The chest was prepped and draped in sterile fashion. 10 mL of 1 % lidocaine was infused into the local soft tissues. Under direct ultrasound guidance, a 5 Malian catheter system was placed into the pleural effusion. 1.1 liters of bloody fluid were removed and sent to lab, if requested. Patient tolerated procedure well. Ultrasound imaging was obtained and placed in the patient's permanent medical record.     Status post right ultrasound-guided thoracentesis. Reference CPT Code: 00975    Us Thoracentesis    Result Date: 2/12/2021  EXAM: 1. RIGHT THORACENTESIS 2. ULTRASOUND GUIDANCE LOCATION: [facility] DATE/TIME: 2/12/2021 3:02 PM INDICATION: Pleural effusion. PROCEDURE: Informed consent obtained. Time out performed. The chest was prepped and draped in sterile fashion. 8 mL of 1 % lidocaine was infused into the local soft tissues. Under direct ultrasound guidance, a 5 Malian catheter system was placed into the   pleural effusion. 0.85 liters of sanguinous fluid were removed and sent to lab, if requested. Patient tolerated procedure well. Ultrasound imaging was obtained and placed in the patient's permanent medical record.     Status post right ultrasound-guided thoracentesis. Reference CPT Code: 91043    Us Thoracentesis    Result Date: 2/9/2021  EXAM: 1. RIGHT THORACENTESIS 2. ULTRASOUND GUIDANCE LOCATION: [facility] DATE/TIME: 2/9/2021 3:56 PM INDICATION: Pleural effusion. PROCEDURE: Informed consent obtained. Time out performed. The chest was prepped and draped in sterile fashion. 8 mL of 1 % lidocaine was infused into the local soft tissues. Under direct ultrasound guidance, a 5 Israeli catheter system was placed into the  pleural effusion. 1.0 liters of sanguinous fluid were removed and sent to lab, if requested. Patient tolerated procedure well. Ultrasound imaging was obtained and placed in the patient's permanent medical record.     Status post right ultrasound-guided thoracentesis. Reference CPT Code: 43980    Us Thoracentesis    Result Date: 2/5/2021  EXAM: 1. RIGHT THORACENTESIS 2. ULTRASOUND GUIDANCE LOCATION: [facility] DATE/TIME: 2/5/2021 3:32 PM INDICATION: Pleural effusion. PROCEDURE: Informed consent obtained. Time out performed. The chest was prepped and draped in sterile fashion. Few mL of 1 % lidocaine was infused into the local soft tissues. Under direct ultrasound guidance, a 5 Israeli catheter system was placed into the pleural effusion. One liter of bloody fluid  was removed. Patient tolerated procedure well. Ultrasound imaging was obtained and placed in the patient's permanent medical record.     Status post right ultrasound-guided thoracentesis. Reference CPT Code: 36635    Us Thoracentesis    Result Date: 2/2/2021  EXAM: 1. RIGHT THORACENTESIS 2. ULTRASOUND GUIDANCE LOCATION: [facility] DATE/TIME: 2/2/2021 5:17 PM INDICATION:  Pleural effusion. PROCEDURE: Informed consent obtained. Time out performed. The chest was prepped and draped in sterile fashion. 8 mL of 1 % lidocaine was infused into the local soft tissues. Under direct ultrasound guidance, a 5 Comoran catheter system was placed into the  pleural effusion. 1.8 liters of dark red fluid were removed and sent to lab, if requested. Patient tolerated procedure well. Ultrasound imaging was obtained and placed in the patient's permanent medical record.     Status post right ultrasound-guided thoracentesis. Reference CPT Code: 03313    Us Thoracentesis    Result Date: 1/29/2021  EXAM: 1. RIGHT THORACENTESIS 2. ULTRASOUND GUIDANCE LOCATION: [facility] DATE/TIME: 1/29/2021 3:29 PM INDICATION: Pleural effusion. PROCEDURE: Informed consent obtained. Time out performed. The chest was prepped and draped in sterile fashion. 10 mL of 1 % lidocaine was infused into the local soft tissues. Under direct ultrasound guidance, a 5 [place]oran catheter system was placed into the pleural effusion. 1.75 liters of bloody fluid were removed and sent to lab, if requested. Patient tolerated procedure well. Ultrasound imaging was obtained and placed in the patient's permanent medical record.     Status post right ultrasound-guided thoracentesis. Reference CPT Code: 15950    Us Thoracentesis    Result Date: 1/26/2021  EXAM: 1. RIGHT THORACENTESIS 2. ULTRASOUND GUIDANCE LOCATION: [facility] DATE/TIME: 1/26/2021 3:48 PM INDICATION: Pleural effusion. Right chest mass PROCEDURE: Informed consent obtained. Time out performed. Prior to the procedure, moderate to markedly echogenic right pleural fluid was noted. The chest was prepped and draped in sterile fashion. 10 mL of 1 % lidocaine was infused into the local soft tissues. Under direct ultrasound guidance, a 5 [place]oran catheter system was placed into the pleural effusion. 1.25 liters of dark red hemorrhagic fluid were  removed and sent to lab, if requested. Patient tolerated procedure well. Ultrasound imaging was obtained and placed in the patient's permanent medical record.     Status post right ultrasound-guided thoracentesis. Reference CPT Code: 31840        Signed by: Kailey Chavez MD

## 2021-06-15 NOTE — PROGRESS NOTES
[facility] Hematology and Oncology Progress Note    Patient: Antonio Mercado  MRN: [id number]  Date of Service: 02/04/2021        Reason for Visit    Chief Complaint   Patient presents with     HE Cancer     Primary malignant neoplasm of right lung metastatic to other site       Assessment and Plan  Cancer Staging  No matching staging information was found for the patient.  PDL1 overexpressed at 70%. Other testing still pending.       1. Lung cancer, stage IV with pleural mets and malignant pleural effusion. Dx January 2021. Pt is here today to start pallitive chemo with Carboplatin, Alimta, Ketruda. We signed a consent today. They felt educated. They understands the risks and benefits of treatment.  they understand how to use the post medications for nausea.  they know the side effects include, but are not limited to: alopecia, diarrhea/constipation, nausea, vomiting, fatigue/weakness, myelosuppression, rash, peripheral neuropathy, taste alterations, poor appetite. They understand this is with palliative intent. Risk of immunotherapy include: hepatitis, thyroiditis, colitis, pulmonary toxicity, rash and dry skin, myalgias. He will return in 10 days for toxicity check. He will also likely need Port per IV nurses. The plan will be to give 2 doses and then reimage. If he is doing quite well and responding well, we may do another 2 cycles. If he doesn't tolerate treatment well, we could do immunotherapy alone.     2. Hx of HIV: seeing Dr. Andersen. States he had his labs drawn last week and they remain undetectable.  I do not see those results. Will try to get.     3. Hx of prostate cancer: elevated PSA. Declined follow up.       ECOG Performance   ECOG Performance Status: 1     Distress Assessment  Distress Assessment Score: Unable to rate(first treatment): anxiety related to diagnosis and the unknown side effect of treatment. he declined any further assistance at this time and thinks it will get better with chemo.  Continue to monitor and refer to psychologist if needed.       Pain  Currently in Pain: No/denies      Problem List    1. Primary malignant neoplasm of right lung metastatic to other site (H)  CC OFFICE VISIT LONG    DISCONTINUED: sodium chloride 0.9% 250 mL infusion    DISCONTINUED: palonosetron injection 0.25 mg (ALOXI)    DISCONTINUED: fosaprepitant 150 mg, dexAMETHasone 12 mg in sodium chloride 0.9% 150 mL    DISCONTINUED: pembrolizumab 200 mg in sodium chloride 0.9% 100 mL (KEYTRUDA)    DISCONTINUED: PEMEtrexed 950 mg in sodium chloride 0.9% 100 mL (ALIMTA)    DISCONTINUED: CARBOplatin 440 mg in dextrose 5% 250 mL (PARAPLATIN)    DISCONTINUED: sodium chloride flush 20 mL (NS)    DISCONTINUED: heparin lockflush (PF) porcine 300-600 Units    DISCONTINUED: diphenhydrAMINE injection 50 mg (BENADRYL)    DISCONTINUED: famotidine 20 mg/2 mL injection 20 mg (PEPCID)    DISCONTINUED: hydrocortisone sod succ (PF) injection 100 mg    DISCONTINUED: acetaminophen tablet 1,000 mg (TYLENOL)    DISCONTINUED: sodium chloride 0.9% 500 mL        ______________________________________________________________________________    History of Present Illness    DIAGNOSIS: lung cancer, adenocarcinoma. Stage IV with pleural mets and malignant pleural effusion    TREATMENT: here today to start pallitiave chemo with Carbo, Alimta, Keytruda.     INTERIM HISTORY: Patient is here today to start chemotherapy.  He states he is ready to go and wants to get started.  He feels educated.  He understands the plan.  He continues to have some coughing and shortness of breath.  He hopes that that gets better with treatment.  He has a good supportive family.      Review of Systems    Oncology Nurse Assessment/CMA Intake: Constitutional  Constitutional (WDL): All constitutional elements are within defined limits  Neurosensory  Neurosensory (WDL): All neurosensory elements are within defined limits  Eye   Eye Disorder (WDL): All eye disorder elements are  "within defined limits  Ear  Ear Disorder (WDL): All ear disorder elements are within defined limits  Cardiovascular  Cardiovascular (WDL): All cardiovascular elements are within defined limits  Pulmonary  Respiratory (WDL): Exceptions to WDL  Cough: Concerns  Dyspnea: Concerns(with activity, relieved with rest)  Gastrointestinal  Gastrointestinal (WDL): All gastrointestinal elements are within defined limits  Genitourinary  Genitourinary (WDL): All genitourinary elements are within defined limits  Lymphatic  Lymph (WDL): All lymph disorder elements are within defined limits  Musculoskeletal and Connective Tissue  Musculoskeletal and Connetive Tissue Disorders (WDL): All Musculoskeletal and Connetive Tissue Disorder elements are within defined limits  Integumentary  Integumentary (WDL): All integumentary elements are within defined limits  Patient Coping  Patient Coping: Accepting;Open/discussion  Accompanied by  Accompanied by: Alone  Oral Chemo Adherence         Past History  Past Medical History:   Diagnosis Date     Constipation      HIV (human immunodeficiency virus infection) (H)      Hypertrophy of prostate with urinary obstruction      Iron deficiency anemia      Malignant neoplasm of hepatic flexure (H)     s/p right hemicolectomy in 2016       PHYSICAL EXAM  /73   Pulse 87   Temp 98.7  F (37.1  C) (Oral)   Ht 5' 11\" (1.803 m)   Wt 159 lb 6.4 oz (72.3 kg)   SpO2 95%   BMI 22.23 kg/m      GENERAL: no acute distress. Cooperative in conversation. Here alone due to visitor restrictions. Mask on  RESP: Regular respiratory rate. No expiratory wheezes   MUSCULOSKELETAL: no bilateral leg swelling  NEURO: non focal. Alert and oriented x3.   PSYCH: within normal limits. No depression or anxiety.  SKIN: exposed skin is dry intact.     Lab Results    Recent Results (from the past 168 hour(s))   Comprehensive Metabolic Panel   Result Value Ref Range    Sodium 139 136 - 145 mmol/L    Potassium 4.6 3.5 - 5.0 " mmol/L    Chloride 106 98 - 107 mmol/L    CO2 23 22 - 31 mmol/L    Anion Gap, Calculation 10 5 - 18 mmol/L    Glucose 116 70 - 125 mg/dL    BUN 22 8 - 22 mg/dL    Creatinine 1.15 0.70 - 1.30 mg/dL    GFR MDRD Af Amer >60 >60 mL/min/1.73m2    GFR MDRD Non Af Amer >60 >60 mL/min/1.73m2    Bilirubin, Total 0.4 0.0 - 1.0 mg/dL    Calcium 8.8 8.5 - 10.5 mg/dL    Protein, Total 6.6 6.0 - 8.0 g/dL    Albumin 2.6 (L) 3.5 - 5.0 g/dL    Alkaline Phosphatase 71 45 - 120 U/L    AST 12 0 - 40 U/L    ALT 11 0 - 45 U/L   Thyroid Cascade   Result Value Ref Range    TSH 1.33 0.30 - 5.00 uIU/mL   HM1 (CBC with Diff)   Result Value Ref Range    WBC 6.0 4.0 - 11.0 thou/uL    RBC 3.49 (L) 4.40 - 6.20 mill/uL    Hemoglobin 10.1 (L) 14.0 - 18.0 g/dL    Hematocrit 31.0 (L) 40.0 - 54.0 %    MCV 89 80 - 100 fL    MCH 28.9 27.0 - 34.0 pg    MCHC 32.6 32.0 - 36.0 g/dL    RDW 13.8 11.0 - 14.5 %    Platelets 332 140 - 440 thou/uL    MPV 8.9 8.5 - 12.5 fL    Neutrophils % 75 (H) 50 - 70 %    Lymphocytes % 19 (L) 20 - 40 %    Monocytes % 5 2 - 10 %    Eosinophils % 0 0 - 6 %    Basophils % 0 0 - 2 %    Immature Granulocyte % 1 (H) <=0 %    Neutrophils Absolute 4.5 2.0 - 7.7 thou/uL    Lymphocytes Absolute 1.1 0.8 - 4.4 thou/uL    Monocytes Absolute 0.3 0.0 - 0.9 thou/uL    Eosinophils Absolute 0.0 0.0 - 0.4 thou/uL    Basophils Absolute 0.0 0.0 - 0.2 thou/uL    Immature Granulocyte Absolute 0.0 <=0.0 thou/uL       Imaging    Xr Chest 2 Views    Result Date: 1/18/2021  EXAM: XR CHEST 2 VIEWS LOCATION: [facility] DATE/TIME: 1/18/2021 6:43 PM INDICATION: Shortness of breath. COMPARISON: 01/03/2021.     Large right pleural effusion has increased in size. Compressive consolidation right lung. Left lung is clear. There is some mass effect with mild right to left mediastinal shift.    Mr Brain With Without Contrast    Result Date: 1/19/2021  EXAM: MR BRAIN W WO CONTRAST LOCATION: [facility] DATE/TIME:  1/19/2021 1:05 PM INDICATION: Headache, lung cancer COMPARISON: Head CT 01/26/2017 and MRI brain 10/21/2009. CONTRAST: Gadavist 7 mL TECHNIQUE: Routine multiplanar multisequence head MRI without and with intravenous contrast. FINDINGS: INTRACRANIAL CONTENTS: No evidence for acute or subacute infarction based on diffusion-weighted imaging. No mass, acute hemorrhage, or extra-axial fluid collections. Few scattered foci of T2/FLAIR hyperintense signal in the cerebral white matter are nonspecific, though likely represent sequelae of very mild chronic small vessel ischemic disease, within normal limits for patient age. Normal ventricles and sulci, without hydrocephalus. Normal position of the cerebellar tonsils. No pathologic brain parenchymal or meningeal contrast enhancement. SELLA: No abnormality accounting for technique. OSSEOUS STRUCTURES/SOFT TISSUES: There is unchanged appearance of a mixed T2 signal intensity, mildly enhancing lesion within the right temporal mastoid calvarium measuring 14 x 6 x 14 mm (axial FLAIR series 10, image #10; postcontrast coronal series 18,  image #12). Otherwise normal marrow signal. The major intracranial vascular flow voids are maintained. ORBITS: No abnormality accounting for technique. SINUSES/MASTOIDS: No paranasal sinus mucosal disease. No middle ear or mastoid effusion.     1.  No evidence of intracranial metastatic disease. 2.  Unchanged mixed T2 signal intensity, mildly enhancing lesion in the right temporal mastoid calvarium, which is unchanged any back to October 2009. This is indeterminate and may represent a benign fibro-osseous lesion or intraosseous hemangioma, though stability since 2009 favors benign etiology. 3.  No acute/subacute infarction, intracranial hemorrhage, mass effect, or high.     Nm Pet Ct Skull To Mid Thigh    Result Date: 1/27/2021  EXAM: NM PET CT SKULL TO MID THIGH LOCATION: [facility] DATE/TIME: 1/27/2021 12:43 PM INDICATION:  Lung cancer, right upper lobe, staging. Malignant neoplasm of upper lobe, right bronchus or lung. Primary malignant neoplasm of right lung metastatic to other site. Malignant pleural effusion. Initial treatment strategy. COMPARISON: CT chest 01/04/2021, CT chest abdomen pelvis 08/22/2018, MRI brain 01/19/2021 reviewed. TECHNIQUE: Serum glucose level 86 mg/dL. One hour post intravenous administration of 8.5 mCi F-18 FDG, PET imaging was performed from the skull base to the mid thighs utilizing attenuation correction with concurrent axial CT and PET/CT image fusion. Dose  reduction techniques were used. FINDINGS: Heterogeneous lobulated FDG avid pulmonary mass in the inferomedial right upper lobe estimated at 3.3 x 4.4 x 3.5 cm demonstrates marked uptake (SUVmax 15.5), highly suspicious for malignancy, primary lung cancer. Multiple FDG avid nodules and masses throughout the right pleura, largest conglomeration in the posterior inferior right chest including a masslike component measuring 3.5 x 6.1 x 5.0 cm associated with marked uptake (SUVmax 14.8), suspicious for extensive right pleural metastases. Large right pleural effusion with loculated component superiorly. Moderate amount layering hyperdensity is present within the effusion suggesting some blood products. Mild nodularity along the major fissure. FDG avid right perihilar station 12R, 11R, 10R  and mediastinal subcarinal station 7 (SUVmax 6.0) adenopathy suspicious for gerald metastases. No suspicious focal uptake in the liver or skeleton. Round heterogeneous hypodense left thyroid nodule measures 1.6 x 1.7 cm associated with marked uptake (SUVmax 9.9). Minimal mucosal thickening left maxillary sinus. Few small benign hepatic cysts. Partial colectomy with right abdominal anastomosis. Mild colonic diverticulosis. Mild prostate gland enlargement. Mild scattered degenerative changes in the spine.     1. Findings suspicious for right upper lobe lung cancer with  extensive right pleural metastases as well as gerald metastases in the right hilum and subcarinal mediastinum. 2. Large complex right pleural effusion suggesting a component of some blood products. 3. FDG avid left thyroid nodule suspicious for primary thyroid neoplasm including thyroid cancer. Thyroid ultrasound and FNA could be considered although this is likely of secondary concern given the right chest findings.    Us Thoracentesis    Result Date: 2/2/2021  EXAM: 1. RIGHT THORACENTESIS 2. ULTRASOUND GUIDANCE LOCATION: [facility] DATE/TIME: 2/2/2021 5:17 PM INDICATION: Pleural effusion. PROCEDURE: Informed consent obtained. Time out performed. The chest was prepped and draped in sterile fashion. 8 mL of 1 % lidocaine was infused into the local soft tissues. Under direct ultrasound guidance, a 5 Thai catheter system was placed into the  pleural effusion. 1.8 liters of dark red fluid were removed and sent to lab, if requested. Patient tolerated procedure well. Ultrasound imaging was obtained and placed in the patient's permanent medical record.     Status post right ultrasound-guided thoracentesis. Reference CPT Code: 78900    Us Thoracentesis    Result Date: 1/29/2021  EXAM: 1. RIGHT THORACENTESIS 2. ULTRASOUND GUIDANCE LOCATION: [facility] DATE/TIME: 1/29/2021 3:29 PM INDICATION: Pleural effusion. PROCEDURE: Informed consent obtained. Time out performed. The chest was prepped and draped in sterile fashion. 10 mL of 1 % lidocaine was infused into the local soft tissues. Under direct ultrasound guidance, a 5 Thai catheter system was placed into the pleural effusion. 1.75 liters of bloody fluid were removed and sent to lab, if requested. Patient tolerated procedure well. Ultrasound imaging was obtained and placed in the patient's permanent medical record.     Status post right ultrasound-guided thoracentesis. Reference CPT Code: 87548    Us Thoracentesis    Result  Date: 1/26/2021  EXAM: 1. RIGHT THORACENTESIS 2. ULTRASOUND GUIDANCE LOCATION: [facility] DATE/TIME: 1/26/2021 3:48 PM INDICATION: Pleural effusion. Right chest mass PROCEDURE: Informed consent obtained. Time out performed. Prior to the procedure, moderate to markedly echogenic right pleural fluid was noted. The chest was prepped and draped in sterile fashion. 10 mL of 1 % lidocaine was infused into the local soft tissues. Under direct ultrasound guidance, a 5 Singaporean catheter system was placed into the pleural effusion. 1.25 liters of dark red hemorrhagic fluid were removed and sent to lab, if requested. Patient tolerated procedure well. Ultrasound imaging was obtained and placed in the patient's permanent medical record.     Status post right ultrasound-guided thoracentesis. Reference CPT Code: 97188    Us Thoracentesis    Result Date: 1/18/2021  EXAM: 1. RIGHT THORACENTESIS 2. ULTRASOUND GUIDANCE LOCATION: [facility] DATE/TIME: 1/18/2021 8:25 PM INDICATION: Malignant pleural effusion. Patient not yet aware. COMPARISON: CT 01/04/2021 PROCEDURE: Ultrasound of the right chest demonstrates a large heterogeneous pleural mass inferiorly. Informed consent obtained. Time out performed. The chest was prepped and draped in sterile fashion. Few mL of 1 % lidocaine was infused into the local soft tissues. Under direct ultrasound guidance, a 5 Singaporean catheter system was placed into the pleural effusion. 1 liter of slightly bloody fluid was removed and sent to lab. Patient tolerated procedure well. Ultrasound imaging was obtained and placed in the patient's permanent medical record.     Status post right ultrasound-guided thoracentesis. Reference CPT Code: 18116    Us Thoracentesis    Result Date: 1/5/2021  EXAM: 1. RIGHT THORACENTESIS 2. ULTRASOUND GUIDANCE LOCATION: [facility] DATE/TIME: 1/5/2021 3:54 PM INDICATION: Pleural effusion. PROCEDURE:  Informed consent obtained. Time out performed. The chest was prepped and draped in sterile fashion. 10 mL of 1 % lidocaine was infused into the local soft tissues. Under direct ultrasound guidance, a 5 Chinese catheter system was placed into the pleural effusion. 1.5 liters of red fluid were removed and sent to lab, if requested. Patient tolerated procedure well. Ultrasound imaging was obtained and placed in the patient's permanent medical record.     Status post right ultrasound-guided thoracentesis. Reference CPT Code: 26503    Total time spent with patient in face to face time, chart review and documentation was 40 minutes.        Signed by: Marlys Carlos CNP

## 2021-06-15 NOTE — TELEPHONE ENCOUNTER
"Oral Chemotherapy Monitoring Program    Oral Chemotherapy Monitoring    Date: 3/1/21  Primary Oncologist: Kailey Chavez MD  Primary Oncology Clinic:  [facility]  Cancer Diagnosis: Non-small cell lung cancer (NSCLC) (H)  Regimen: NON-SMALL CELL LUNG CANCER (T790M+)- OSIMERTINIB  Drug Name: Osimertinib  Current Dosage: 80 mg  Current Frequency: Daily  Cycle Details: Continuous  Encounter: New Start   New Start:  Planned Start Date: 3/18/21    until disease progression or unacceptable toxcities   Drug Interaction Assessment: There were no significant drug interactions identified upon review of medication list with chemotherapy agents.   Intervention: Adherence/Side Effects      Who was educated?: Patient       Mid-Cycle Assessment:  Next pharmacist intervention date: 3/25/21             Subjective/Objective:  Antonio Mercado is a 65 y.o. male called by phone for an initial visit for oral chemotherapy education.      Vitals:  BP:   BP Readings from Last 1 Encounters:   02/25/21 100/64     Wt Readings from Last 1 Encounters:   02/25/21 66.7 kg (147 lb)     Estimated body surface area is 1.83 meters squared as calculated from the following:    Height as of 2/12/21: 5' 11\" (1.803 m).    Weight as of 2/25/21: 66.7 kg (147 lb).      Labs:  Lab Results   Component Value Date    WBC 11.1 (H) 02/25/2021    HGB 9.4 (L) 02/25/2021    HCT 30.6 (L) 02/25/2021    MCV 97 02/25/2021     (H) 02/25/2021     Results for orders placed or performed in visit on 02/25/21   Comprehensive Metabolic Panel   Result Value Ref Range    Sodium 140 136 - 145 mmol/L    Potassium 4.7 3.5 - 5.0 mmol/L    Chloride 105 98 - 107 mmol/L    CO2 27 22 - 31 mmol/L    Anion Gap, Calculation 8 5 - 18 mmol/L    Glucose 107 70 - 125 mg/dL    BUN 36 (H) 8 - 22 mg/dL    Creatinine 1.31 (H) 0.70 - 1.30 mg/dL    GFR MDRD Af Amer >60 >60 mL/min/1.73m2    GFR MDRD Non Af Amer 55 (L) >60 mL/min/1.73m2    Bilirubin, Total 0.2 0.0 - 1.0 " mg/dL    Calcium 8.9 8.5 - 10.5 mg/dL    Protein, Total 6.4 6.0 - 8.0 g/dL    Albumin 2.8 (L) 3.5 - 5.0 g/dL    Alkaline Phosphatase 79 45 - 120 U/L    AST 13 0 - 40 U/L    ALT 22 0 - 45 U/L         Assessment:  Patient is appropriate to start therapy.    Plan:  Basic chemotherapy teaching was reviewed with Antonio including indication, start date of therapy, dose, administration, adverse effects, missed doses, food and drug interactions, monitoring, side effect management, office contact information, and safe handling. Written materials were provided and all questions answered.    Antonio will plan to take in the morning with breakfast. He knows he is not to start this medication until he sees Dr. Chavez again on 3/18/21.    Follow-Up:  Will plan to call Antonio about a week after he starts for his first follow up assessment. He said he'd save our phone number in his cell phone for the future.     Hesham Pacheco, PharmD, BCOP  Oral Chemotherapy Pharmacist  [phone]

## 2021-06-15 NOTE — TELEPHONE ENCOUNTER
Antonio Darlingleonle Key: [id number] - [place] Case ID: [id number] help? Call us at [phone]  Outcome  Approvedon February 25  Approved. Approved TAGRISSO at 1 tablet per day. Our records indicate this is a specialty medication and the member's plan may require this be filled at a preferred pharmacy. Preferred Pharmacy(s) include: * [facility] - [phone] (fax [phone]) * [facility] - [phone] This medication is part of [facility] Trial Drug Program. The first six fills of the medication are limited to 15 day supplies and 30 day supplies can be obtained thereafter.

## 2021-06-15 NOTE — PROGRESS NOTES
Patient is here for labs, provider and treatment for Primary malignant neoplasm of right lung metastatic to other site.

## 2021-06-15 NOTE — PROGRESS NOTES
Infectious Disease Progress Note    ASSESSMENT: 1. Longstanding AIDS under great control with Odefsey.  2. Prostate cancer -has refused treatment  3. Now with shortness of breath, wt loss and recently dx'ed with stage 4 adenocarcinoma of R lung and pleural space with mets to lung and mediastinal nodes  S/p cycle one of Keytuda/ Allimta/ carboplatin.    On a positive note: his AIDS is just fine but other issues now loom larger!    PLAN: 1. Cont present Rx  2. RTC 6 mos  3. Follow-up HIV VL prior to visitn.  Thanks,  Chance Andersen MD    ______________________________________________________________________    Subjective: shortness of breath with ambulation  Not working.  Tolerated chemo Rx ok.    Review of systems:  Patient denies fever, chills, cough, sputum, vomiting, diarrhea, dysuria, urgency, flank pain, headache, stiff neck, rash, swollen glands or pain at IV sites.  SH/FH/ Habits/ PMH reviewed and unchanged.  Objective:         Exam:telephone visit so no exam.  Results from last 7 days   Lab Units 02/04/21  0800   LN-WHITE BLOOD CELL COUNT thou/uL 6.0   LN-HEMOGLOBIN g/dL 10.1*   LN-HEMATOCRIT % 31.0*   LN-PLATELET COUNT thou/uL 332     Results from last 7 days   Lab Units 02/04/21  0800   LN-SODIUM mmol/L 139   LN-POTASSIUM mmol/L 4.6   LN-CHLORIDE mmol/L 106   LN-CO2 mmol/L 23   LN-BLOOD UREA NITROGEN mg/dL 22   LN-CREATININE mg/dL 1.15   LN-CALCIUM mg/dL 8.8         Lab Results   Component Value Date    ALT 11 02/04/2021    AST 12 02/04/2021    ALKPHOS 71 02/04/2021    BILITOT 0.4 02/04/2021       Imaging:reviewed CT and PET scan and oncology and radiology notes x 15 min.  Microbiology:none     Patient Active Problem List   Diagnosis     Post-nasal drip     Malaria     HIV infection (H)     Bilateral renal cysts     CKD (chronic kidney disease) stage 3, GFR 30-59 ml/min     H. pylori infection     Hemorrhoids     Mechanical low back pain     Prostate cancer (H)     Refusal of treatment for reasons of  conscience     Pleural effusion     Primary malignant neoplasm of right lung metastatic to other site (H)

## 2021-06-15 NOTE — TELEPHONE ENCOUNTER
----- Message from Maryana Ordaz CMA sent at 2/4/2021  8:27 AM CST -----  Regarding: Post chemo call  Patient received first cycle treatment 2/4

## 2021-06-15 NOTE — TELEPHONE ENCOUNTER
Antonio's son called me today to confirm his thoracentesis appointment for today.  I did not see anything on the schedule so I connected him with Nohemy in triage.  She was able to see a standing order and helped him to connect with central scheduling to set that up.  He did not have any other needs from me at this time.

## 2021-06-15 NOTE — TELEPHONE ENCOUNTER
Oral Chemotherapy Monitoring Program   Left Voicemail    Attempted to contact patient today for follow up regarding oral chemotherapy, osimertinib, for new start education. No answer. Left voicemail for patient to call back to clinic if they have any concerns. No medication name was left.    Hetal Gold, PharmD  Oral Chemotherapy Pharmacist  [phone]

## 2021-06-15 NOTE — PATIENT INSTRUCTIONS - HE
Try new Nausea medicine, ondansetron. Can take every 6 hours as needed. Can also alternate with nausea pill he has at home.     Take Dexamethasone 4mg tablet once a day in the morning for the next 4-5 days to increase appetite and energy.

## 2021-06-15 NOTE — PROGRESS NOTES
"Antonio Mercado is a 65 y.o. male who is being evaluated via a billable telephone visit.      The patient has been notified of following:     \"This telephone visit will be conducted via a call between you and your physician/provider. We have found that certain health care needs can be provided without the need for a physical exam.  This service lets us provide the care you need with a short phone conversation.  If a prescription is necessary we can send it directly to your pharmacy.  If lab work is needed we can place an order for that and you can then stop by our lab to have the test done at a later time.    Telephone visits are billed at different rates depending on your insurance coverage. During this emergency period, for some insurers they may be billed the same as an in-person visit.  Please reach out to your insurance provider with any questions.    If during the course of the call the physician/provider feels a telephone visit is not appropriate, you will not be charged for this service.\"    Patient has given verbal consent to a Telephone visit? Yes    What phone number would you like to be contacted at? [phone]    Patient would like to receive their AVS by AVS Preference: Abimael.    Phone call duration: 10 minutes    Haylie Faria CMA      "

## 2021-06-15 NOTE — PROGRESS NOTES
[facility] Hematology and Oncology Progress Note    Patient: Antonio Mercado  MRN: [id number]  Date of Service: 02/12/2021        Reason for Visit    Chief Complaint   Patient presents with     HE Cancer     Primary malignant neoplasm of right lung metastatic to other site       Assessment and Plan  Cancer Staging  No matching staging information was found for the patient.  PDL1 overexpressed at 70%. Other testing still pending.       1. Lung cancer, stage IV with pleural mets and malignant pleural effusion. Dx January 2021. Pt started palliative chemo with Carboplatin, Alimta, Ketruda. Feels pretty rough. He will keep appointment in 2 weeks. We may be able to reduce dose of chemo if he doesn't improve much, but I think he will start feeling better soon. We did want to try to give triplet therapy due to recurrent effusion that is causing problems.  If he doesn't tolerate treatment well, we could do immunotherapy alone.     2. Hx of HIV: seeing Dr. Andersen. States he had his labs drawn last week and they remain undetectable.  Dr. Andersen is seeing him every 6 months.     3. Hx of prostate cancer: elevated PSA. Declined follow up.     4. Recurrent effusion: getting thoracentesis 2 a week. Removing one liter at a time. The volume has decreased a little. This will hopefully continue to improve and we can stop thoracentesis.     5. Mild nausea/appetite loss, mild vomiting: chemo related. Continue antiemetics. I will also add in zofran. Have him take dexamethasone 4mg daily for 4 day to help his appetite.  Do small, frequent meals with snacking. Aliyah products.     6. Neutropenia: chemo induced. We may have to add in Neulasta with next cycle. Educated pt and family about neutropenic precautions. take temp TID for the next couple of days and call with fever 100.4 or higher or any symptoms of infections. increase handwashing. Stay away from sick people.     7. Anemia: chemo induced and usually cumulative. He also has some  hx of iron deficiency. Refilled iron. Overall asymptomatic except fatigue. Continue to monitor.         ECOG Performance   ECOG Performance Status: 1     Distress Assessment  Distress Assessment Score: No distress      Pain  Currently in Pain: No/denies      Problem List    1. Chemotherapy induced nausea and vomiting  ondansetron (ZOFRAN) 8 MG tablet   2. Primary malignant neoplasm of right lung metastatic to other site (H)  dexAMETHasone (DECADRON) 4 MG tablet    CC OFFICE VISIT LONG    CC OFFICE VISIT LONG    Infusion Appointment   3. Iron deficiency anemia, unspecified iron deficiency anemia type  ferrous sulfate 325 (65 FE) MG tablet        ______________________________________________________________________________    History of Present Illness    DIAGNOSIS: lung cancer, adenocarcinoma. Stage IV with pleural mets and malignant pleural effusion    TREATMENT: started pallitiave chemo with Carbo, Alimta, Keytruda on 2/4/21. Today is cycle 1, day 9.     INTERIM HISTORY: Patient is here today for toxicity check. He is feeling pretty rough. Feels very tired. No appetite and mild nausea/vomiting. Has lost some weight. His mouth feels sensitive.     Review of Systems    Oncology Nurse Assessment/CMA Intake: Constitutional  Weight Loss: Concerns(down 9 pounds since 2/4/21)  Neurosensory  Neurosensory (WDL): Exceptions to WDL  Ataxia: Concerns(w/c for visit)  Eye   Eye Disorder (WDL): All eye disorder elements are within defined limits  Ear  Ear Disorder (WDL): All ear disorder elements are within defined limits  Cardiovascular  Cardiovascular (WDL): Exceptions to WDL  Palpitations: Concerns()  Pulmonary  Respiratory (WDL): Exceptions to WDL  Dyspnea: Concerns(with activity)  Gastrointestinal  Gastrointestinal (WDL): Exceptions to WDL  Anorexia: Concerns  Nausea: Concerns  Vomiting: Concerns  Dehydration: Concerns  Mucositis Oral: Concerns  Genitourinary  Genitourinary (WDL): All genitourinary elements are within  "defined limits  Lymphatic  Lymph (WDL): All lymph disorder elements are within defined limits  Musculoskeletal and Connective Tissue  Musculoskeletal and Connetive Tissue Disorders (WDL): Exceptions to WDL  Arthralgia: Concerns(right elbow)  Muscle Weakness : Concerns  Integumentary  Integumentary (WDL): All integumentary elements are within defined limits  Patient Coping  Patient Coping: Accepting;Open/discussion  Accompanied by  Accompanied by: Family Member  Oral Chemo Adherence         Past History  Past Medical History:   Diagnosis Date     Constipation      HIV (human immunodeficiency virus infection) (H)      Hypertrophy of prostate with urinary obstruction      Iron deficiency anemia      Malignant neoplasm of hepatic flexure (H)     s/p right hemicolectomy in 2016       PHYSICAL EXAM  /61   Pulse (!) 116   Temp 97.9  F (36.6  C) (Oral)   Ht 5' 11\" (1.803 m)   Wt 150 lb 11.2 oz (68.4 kg)   SpO2 97%   BMI 21.02 kg/m      GENERAL: no acute distress. Cooperative in conversation. Here alone due to visitor restrictions. Mask on  HEENT: no mucositis or thrush.   RESP: Regular respiratory rate. No expiratory wheezes   MUSCULOSKELETAL: no bilateral leg swelling  NEURO: non focal. Alert and oriented x3.   PSYCH: within normal limits. No depression or anxiety.  SKIN: exposed skin is dry intact.     Lab Results    Recent Results (from the past 168 hour(s))   Screening INR   Result Value Ref Range    INR 1.11 (H) 0.90 - 1.10   Comprehensive Metabolic Panel   Result Value Ref Range    Sodium 141 136 - 145 mmol/L    Potassium 4.3 3.5 - 5.0 mmol/L    Chloride 103 98 - 107 mmol/L    CO2 27 22 - 31 mmol/L    Anion Gap, Calculation 11 5 - 18 mmol/L    Glucose 132 (H) 70 - 125 mg/dL    BUN 16 8 - 22 mg/dL    Creatinine 1.27 0.70 - 1.30 mg/dL    GFR MDRD Af Amer >60 >60 mL/min/1.73m2    GFR MDRD Non Af Amer 57 (L) >60 mL/min/1.73m2    Bilirubin, Total 0.3 0.0 - 1.0 mg/dL    Calcium 8.5 8.5 - 10.5 mg/dL    Protein, " Total 6.6 6.0 - 8.0 g/dL    Albumin 2.6 (L) 3.5 - 5.0 g/dL    Alkaline Phosphatase 68 45 - 120 U/L    AST 33 0 - 40 U/L    ALT 49 (H) 0 - 45 U/L   HM1 (CBC with Diff)   Result Value Ref Range    WBC 1.0 (LL) 4.0 - 11.0 thou/uL    RBC 3.24 (L) 4.40 - 6.20 mill/uL    Hemoglobin 9.3 (L) 14.0 - 18.0 g/dL    Hematocrit 29.2 (L) 40.0 - 54.0 %    MCV 90 80 - 100 fL    MCH 28.7 27.0 - 34.0 pg    MCHC 31.8 (L) 32.0 - 36.0 g/dL    RDW 13.4 11.0 - 14.5 %    Platelets 230 140 - 440 thou/uL    MPV 8.7 8.5 - 12.5 fL    Neutrophils % 31 (L) 50 - 70 %    Lymphocytes % 49 (H) 20 - 40 %    Monocytes % 16 (H) 2 - 10 %    Eosinophils % 3 0 - 6 %    Basophils % 1 0 - 2 %    Immature Granulocyte % 1 (H) <=0 %    Neutrophils Absolute 0.3 (L) 2.0 - 7.7 thou/uL    Lymphocytes Absolute 0.5 (L) 0.8 - 4.4 thou/uL    Monocytes Absolute 0.2 0.0 - 0.9 thou/uL    Eosinophils Absolute 0.0 0.0 - 0.4 thou/uL    Basophils Absolute 0.0 0.0 - 0.2 thou/uL    Immature Granulocyte Absolute 0.0 <=0.0 thou/uL       Imaging    Xr Chest 2 Views    Result Date: 1/18/2021  EXAM: XR CHEST 2 VIEWS LOCATION: [facility] DATE/TIME: 1/18/2021 6:43 PM INDICATION: Shortness of breath. COMPARISON: 01/03/2021.     Large right pleural effusion has increased in size. Compressive consolidation right lung. Left lung is clear. There is some mass effect with mild right to left mediastinal shift.    Mr Brain With Without Contrast    Result Date: 1/19/2021  EXAM: MR BRAIN W WO CONTRAST LOCATION: [facility] DATE/TIME: 1/19/2021 1:05 PM INDICATION: Headache, lung cancer COMPARISON: Head CT 01/26/2017 and MRI brain 10/21/2009. CONTRAST: Gadavist 7 mL TECHNIQUE: Routine multiplanar multisequence head MRI without and with intravenous contrast. FINDINGS: INTRACRANIAL CONTENTS: No evidence for acute or subacute infarction based on diffusion-weighted imaging. No mass, acute hemorrhage, or extra-axial fluid collections. Few scattered foci  of T2/FLAIR hyperintense signal in the cerebral white matter are nonspecific, though likely represent sequelae of very mild chronic small vessel ischemic disease, within normal limits for patient age. Normal ventricles and sulci, without hydrocephalus. Normal position of the cerebellar tonsils. No pathologic brain parenchymal or meningeal contrast enhancement. SELLA: No abnormality accounting for technique. OSSEOUS STRUCTURES/SOFT TISSUES: There is unchanged appearance of a mixed T2 signal intensity, mildly enhancing lesion within the right temporal mastoid calvarium measuring 14 x 6 x 14 mm (axial FLAIR series 10, image #10; postcontrast coronal series 18,  image #12). Otherwise normal marrow signal. The major intracranial vascular flow voids are maintained. ORBITS: No abnormality accounting for technique. SINUSES/MASTOIDS: No paranasal sinus mucosal disease. No middle ear or mastoid effusion.     1.  No evidence of intracranial metastatic disease. 2.  Unchanged mixed T2 signal intensity, mildly enhancing lesion in the right temporal mastoid calvarium, which is unchanged any back to October 2009. This is indeterminate and may represent a benign fibro-osseous lesion or intraosseous hemangioma, though stability since 2009 favors benign etiology. 3.  No acute/subacute infarction, intracranial hemorrhage, mass effect, or high.     Nm Pet Ct Skull To Mid Thigh    Result Date: 1/27/2021  EXAM: NM PET CT SKULL TO MID THIGH LOCATION: [facility] DATE/TIME: 1/27/2021 12:43 PM INDICATION: Lung cancer, right upper lobe, staging. Malignant neoplasm of upper lobe, right bronchus or lung. Primary malignant neoplasm of right lung metastatic to other site. Malignant pleural effusion. Initial treatment strategy. COMPARISON: CT chest 01/04/2021, CT chest abdomen pelvis 08/22/2018, MRI brain 01/19/2021 reviewed. TECHNIQUE: Serum glucose level 86 mg/dL. One hour post intravenous administration of 8.5 mCi F-18  FDG, PET imaging was performed from the skull base to the mid thighs utilizing attenuation correction with concurrent axial CT and PET/CT image fusion. Dose  reduction techniques were used. FINDINGS: Heterogeneous lobulated FDG avid pulmonary mass in the inferomedial right upper lobe estimated at 3.3 x 4.4 x 3.5 cm demonstrates marked uptake (SUVmax 15.5), highly suspicious for malignancy, primary lung cancer. Multiple FDG avid nodules and masses throughout the right pleura, largest conglomeration in the posterior inferior right chest including a masslike component measuring 3.5 x 6.1 x 5.0 cm associated with marked uptake (SUVmax 14.8), suspicious for extensive right pleural metastases. Large right pleural effusion with loculated component superiorly. Moderate amount layering hyperdensity is present within the effusion suggesting some blood products. Mild nodularity along the major fissure. FDG avid right perihilar station 12R, 11R, 10R  and mediastinal subcarinal station 7 (SUVmax 6.0) adenopathy suspicious for gerald metastases. No suspicious focal uptake in the liver or skeleton. Round heterogeneous hypodense left thyroid nodule measures 1.6 x 1.7 cm associated with marked uptake (SUVmax 9.9). Minimal mucosal thickening left maxillary sinus. Few small benign hepatic cysts. Partial colectomy with right abdominal anastomosis. Mild colonic diverticulosis. Mild prostate gland enlargement. Mild scattered degenerative changes in the spine.     1. Findings suspicious for right upper lobe lung cancer with extensive right pleural metastases as well as gerald metastases in the right hilum and subcarinal mediastinum. 2. Large complex right pleural effusion suggesting a component of some blood products. 3. FDG avid left thyroid nodule suspicious for primary thyroid neoplasm including thyroid cancer. Thyroid ultrasound and FNA could be considered although this is likely of secondary concern given the right chest  findings.    Us Thoracentesis    Result Date: 2/9/2021  EXAM: 1. RIGHT THORACENTESIS 2. ULTRASOUND GUIDANCE LOCATION: [facility] DATE/TIME: 2/9/2021 3:56 PM INDICATION: Pleural effusion. PROCEDURE: Informed consent obtained. Time out performed. The chest was prepped and draped in sterile fashion. 8 mL of 1 % lidocaine was infused into the local soft tissues. Under direct ultrasound guidance, a 5 Monegasque catheter system was placed into the  pleural effusion. 1.0 liters of sanguinous fluid were removed and sent to lab, if requested. Patient tolerated procedure well. Ultrasound imaging was obtained and placed in the patient's permanent medical record.     Status post right ultrasound-guided thoracentesis. Reference CPT Code: 04552    Us Thoracentesis    Result Date: 2/5/2021  EXAM: 1. RIGHT THORACENTESIS 2. ULTRASOUND GUIDANCE LOCATION: [facility] DATE/TIME: 2/5/2021 3:32 PM INDICATION: Pleural effusion. PROCEDURE: Informed consent obtained. Time out performed. The chest was prepped and draped in sterile fashion. Few mL of 1 % lidocaine was infused into the local soft tissues. Under direct ultrasound guidance, a 5 Monegasque catheter system was placed into the pleural effusion. One liter of bloody fluid  was removed. Patient tolerated procedure well. Ultrasound imaging was obtained and placed in the patient's permanent medical record.     Status post right ultrasound-guided thoracentesis. Reference CPT Code: 18558    Us Thoracentesis    Result Date: 2/2/2021  EXAM: 1. RIGHT THORACENTESIS 2. ULTRASOUND GUIDANCE LOCATION: [facility] DATE/TIME: 2/2/2021 5:17 PM INDICATION: Pleural effusion. PROCEDURE: Informed consent obtained. Time out performed. The chest was prepped and draped in sterile fashion. 8 mL of 1 % lidocaine was infused into the local soft tissues. Under direct ultrasound guidance, a 5 Monegasque catheter system was placed into the  pleural  effusion. 1.8 liters of dark red fluid were removed and sent to lab, if requested. Patient tolerated procedure well. Ultrasound imaging was obtained and placed in the patient's permanent medical record.     Status post right ultrasound-guided thoracentesis. Reference CPT Code: 51636    Us Thoracentesis    Result Date: 1/29/2021  EXAM: 1. RIGHT THORACENTESIS 2. ULTRASOUND GUIDANCE LOCATION: [facility] DATE/TIME: 1/29/2021 3:29 PM INDICATION: Pleural effusion. PROCEDURE: Informed consent obtained. Time out performed. The chest was prepped and draped in sterile fashion. 10 mL of 1 % lidocaine was infused into the local soft tissues. Under direct ultrasound guidance, a 5 Rwandan catheter system was placed into the pleural effusion. 1.75 liters of bloody fluid were removed and sent to lab, if requested. Patient tolerated procedure well. Ultrasound imaging was obtained and placed in the patient's permanent medical record.     Status post right ultrasound-guided thoracentesis. Reference CPT Code: 72402    Us Thoracentesis    Result Date: 1/26/2021  EXAM: 1. RIGHT THORACENTESIS 2. ULTRASOUND GUIDANCE LOCATION: [facility] DATE/TIME: 1/26/2021 3:48 PM INDICATION: Pleural effusion. Right chest mass PROCEDURE: Informed consent obtained. Time out performed. Prior to the procedure, moderate to markedly echogenic right pleural fluid was noted. The chest was prepped and draped in sterile fashion. 10 mL of 1 % lidocaine was infused into the local soft tissues. Under direct ultrasound guidance, a 5 Rwandan catheter system was placed into the pleural effusion. 1.25 liters of dark red hemorrhagic fluid were removed and sent to lab, if requested. Patient tolerated procedure well. Ultrasound imaging was obtained and placed in the patient's permanent medical record.     Status post right ultrasound-guided thoracentesis. Reference CPT Code: 16698    Us Thoracentesis    Result Date:  1/18/2021  EXAM: 1. RIGHT THORACENTESIS 2. ULTRASOUND GUIDANCE LOCATION: [facility] DATE/TIME: 1/18/2021 8:25 PM INDICATION: Malignant pleural effusion. Patient not yet aware. COMPARISON: CT 01/04/2021 PROCEDURE: Ultrasound of the right chest demonstrates a large heterogeneous pleural mass inferiorly. Informed consent obtained. Time out performed. The chest was prepped and draped in sterile fashion. Few mL of 1 % lidocaine was infused into the local soft tissues. Under direct ultrasound guidance, a 5 Malawian catheter system was placed into the pleural effusion. 1 liter of slightly bloody fluid was removed and sent to lab. Patient tolerated procedure well. Ultrasound imaging was obtained and placed in the patient's permanent medical record.     Status post right ultrasound-guided thoracentesis. Reference CPT Code: 85497          Signed by: Marlys Carlos CNP

## 2021-06-15 NOTE — PROGRESS NOTES
Patient is here for labs and provider for Primary malignant neoplasm of right lung metastatic to other site.

## 2021-06-16 NOTE — TELEPHONE ENCOUNTER
Oral Chemotherapy Monitoring Program    Oral Chemotherapy Monitoring    Date: 3/25/21  Primary Oncologist: Kailey Chavez MD  Primary Oncology Clinic:  [facility]  Cancer Diagnosis: Non-small cell lung cancer (NSCLC) (H)  Regimen: NON-SMALL CELL LUNG CANCER (T790M+)- OSIMERTINIB  Drug Name: Osimertinib  Current Dosage: 80 mg  Current Frequency: Daily  Cycle Details: Continuous  Encounter: Mid-Cycle Assessment   New Start:   until disease progression or unacceptable toxcities   Intervention: Adherence/Side Effects      Who was educated?: Patient       Mid-Cycle Assessment:  Start Date of Last Cycle: 3/18/21   Unplanned doses missed in last 2 weeks: 0   Adherence assessment : adherent   Non-adherence intervention recommendation: Same time each day   Drug Interaction Assessment: No new medications reported   Adverse Effects reported: 1   Adverse Events and Interventions: Headache: had a slight headache after starting, but not ongoing.     Otherwise denies side effects, such as rash, diarrhea, lightheaded/heart beating irregularly.   Next pharmacist intervention date: 4/15/21   Intervention: R           Subjective/Objective:  Antonio Mercado is a 65 y.o. male called by phone for a follow-up visit for oral chemotherapy.  Antonio said he is doing okay. Started on the 18th and has taken it same time every day since. He hasn't had side effects, potentially a headache, but not ongoing.     No flowsheet data found.      Assessment/Plan:  Antonio is tolerating the medication well so far. He will continue therapy.    Follow-Up:  Will plan to call ~4/8, then review 4/15 appt, and set up next call in between provider visits. Antonio said this worked for him. He has our number in case he needs us before then.    Hetal Gold, PharmD  Oral Chemotherapy Pharmacist  [phone]

## 2021-06-16 NOTE — TELEPHONE ENCOUNTER
Telephone Encounter by Tiffanie Ragland at 11/11/2019 11:44 AM     Author: Tiffanie Ragland Service: -- Author Type: Financial Resource Guide    Filed: 11/11/2019 11:48 AM Encounter Date: 11/9/2019 Status: Signed    : Tiffanie Ragland (Financial Resource Guide)       Medication: Trizivir [phone]  Qty: 60 tabs for 30 days  Insurance: [facility]  Test Claim: $7.00  Pharmacy: [id number]- [place] on [address] of [address]  Additional Information: not a new start so per protocol we do not attempt to convert.

## 2021-06-16 NOTE — TELEPHONE ENCOUNTER
Oral Chemotherapy Monitoring Program    Oral Chemotherapy Monitoring    Date: 4/8/21  Primary Oncologist: Kailey Chavez MD  Primary Oncology Clinic:  [facility]  Cancer Diagnosis: Non-small cell lung cancer (NSCLC) (H)  Regimen: NON-SMALL CELL LUNG CANCER (T790M+)- OSIMERTINIB  Drug Name: Osimertinib  Current Dosage: 80 mg  Current Frequency: Daily  Cycle Details: Continuous  Encounter: Mid-Cycle Assessment   New Start:   until disease progression or unacceptable toxcities   Intervention: Adherence/Side Effects      Who was educated?: Patient       Mid-Cycle Assessment:  Start Date of Last Cycle: 3/18/21   Unplanned doses missed in last 2 weeks: 0   Adherence assessment : adherent   Drug Interaction Assessment: No new medications reported   Adverse Effects reported: 1   Adverse Events and Interventions: Denies side effects related to osimertinib; however, his arm hurts from his elbow to below the shoulder from where he got his first COVID injection a week and a half ago approximately. He said it might feel like cramping but hard to say. There is no redness or swelling or any other side effect. He will monitor and bring this up to Dr. Chavez if it hasn't improved in the next week. I told him if any new side effects come up that we discussed, he should call back to clinic.    Next pharmacist intervention date: 4/15/21   Intervention: R           Subjective/Objective:  Antonio Mercado is a 65 y.o. male called by phone for a follow-up visit for oral chemotherapy.  He denies missing doses of osimertinib or side effects from it. The only complaint is his arm still hurts from where the COVID vaccine was injected. He is due for his second vaccination around April 17 or so.     No flowsheet data found.    Assessment/Plan:  Antonio is tolerating osimertinib so far. He will continue therapy.  Antonio will call in to clinic if arm pain worsens/becomes severe or new symptoms arise (redness, swelling,  warmth to the touch).     Follow-Up:  Will review 4/15 appt with Dr. Chavez then plan to call again in a few weeks. He has our number if needed in the meantime.    Hetal Gold, PharmD  Oral Chemotherapy Pharmacist  [phone]

## 2021-06-16 NOTE — PROGRESS NOTES
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 diagnosed January 2021  EGFR [id number](exon 19); RB1 552; TP53 [id number]  PD-L1, HIGH EXPRESSION    MSI STABLE(CA); TMB LOW  NO FISH ALTERATIONS  MET Exon 14 Deletion: NOT DETECTED; Marroquin TRK, Not expressed  No abnormalities in ALK, BRAF, ERBB2, HER2, MET, RET, ROS1    History of colon Cancer status post hemicolectomy  History of prostate cancer with elevated PSA, declined follow-up  HIV/AIDS  Macrocytic anemia probably related to medication  Thyroid nodule on PET scan  Rash, skin breakdown, left buttock area    PET scan is reviewed and shows excellent response with about 50% reduction in tumor burden.  Reviewed rationale to stop chemotherapy.  We will start Osimertinib.  Reviewed potential side effects including rash, diarrhea, abnormal liver function tests and rare pulmonary toxicity.  We will do follow-up in 4 weeks with labs and EKG.  We will plan restaging scan again in about 8 weeks.    We will stop thoracentesis as his breathing is improved.    Gave him instructions to keep area of skin breakdown covered and to ambulate to prevent any worsening of the skin.  Prescribed 1% hydrocortisone cream for anticipated rash with Osimertinib.    He will continue treatment for HIV.    Again reviewed that lung cancer is most likely to be life-threatening and he does not need specific treatment for prostate cancer or the thyroid nodule at this time.    Plan: As above  Follow-up in 4 weeks with labs and EKG  Restaging scans in 8 weeks      Measurable disease: PET scan    Current therapy: Start Osimertinib 80 mg once daily, March 18, 2021    Treatment history:  Carbo Alimta alone without Keytruda, 20% dose reduction,  cycle 2 on February 25, 2021  Carboplatin, Alimta and Keytruda to start February 4, 2021        ECOG Performance   ECOG Performance Status: 2    Distress Assessment  Distress Assessment Score: No distress    Pain         Problem List    1. Primary malignant neoplasm of right lung metastatic to other site (H)  HM1(CBC and Differential)    Comprehensive Metabolic Panel    ECG 12 lead with MUSE    hydrocortisone with aloe 1 % Crea cream        CC: Harris Mary MD    ______________________________________________________________________________    History of Present Illness    Mr. Antonio Mercado is here for a follow-up.  Cycle for second treatment with omission of immunotherapy.  Rash in the buttock area.  Pain with thoracentesis and breathing has improved.  ECOG status is 0.    Pain Status  Currently in Pain: No/denies    Review of Systems    Constitutional  Constitutional (WDL): Exceptions to WDL  Fatigue: Fatigue relieved by rest  Neurosensory  Neurosensory (WDL): Exceptions to WDL  Ataxia: Moderate symptoms, limiting instrumental ADL  Peripheral Sensory Neuropathy: Asymptomatic, loss of deep tendon reflexes or paresthesia(feet)  Cardiovascular  Cardiovascular (WDL): All cardiovascular elements are within defined limits  Pulmonary  Respiratory (WDL): Within Defined Limits  Gastrointestinal  Gastrointestinal (WDL): All gastrointestinal elements are within defined limits  Genitourinary  Genitourinary (WDL): All genitourinary elements are within defined limits  Integumentary  Integumentary (WDL): Exceptions to WDL(buttocks)  Pruritus: Mild or localized, topical intervention indicated  Urticaria: Urticarial lesions covering <10% BSA, topical intervention indicated  Patient Coping  Patient Coping: Accepting  Distress Assessment  Distress Assessment Score: No distress  Accompanied by  Accompanied by: Family Member    Past History  Past Medical History:   Diagnosis Date     Constipation      HIV (human immunodeficiency  virus infection) (H)      Hypertrophy of prostate with urinary obstruction      Iron deficiency anemia      Malignant neoplasm of hepatic flexure (H)     s/p right hemicolectomy in 2016         Past Surgical History:   Procedure Laterality Date     COLON SURGERY       HERNIA REPAIR       US THORACENTESIS  1/5/2021     US THORACENTESIS  1/18/2021     US THORACENTESIS  1/26/2021     US THORACENTESIS  1/29/2021     US THORACENTESIS  2/2/2021     US THORACENTESIS  2/5/2021     US THORACENTESIS  2/9/2021     US THORACENTESIS  2/12/2021     US THORACENTESIS  2/15/2021     US THORACENTESIS  2/19/2021     US THORACENTESIS  2/23/2021     US THORACENTESIS  2/26/2021     US THORACENTESIS  3/2/2021     US THORACENTESIS  3/9/2021       Physical Exam    Recent Vitals 3/18/2021   Height -   Weight 148 lbs   BSA (m2) 1.83 m2   /67   Pulse 109   Temp 98.5   Temp src 1   SpO2 96   Some recent data might be hidden       GENERAL: Alert and oriented to time place and person. Seated comfortably. In no distress.    HEAD: Atraumatic and normocephalic.    EYES: KARIME, EOMI.  No pallor.  No icterus.    Oral cavity: no mucosal lesion or tonsillar enlargement.    NECK: supple. JVP normal.  No thyroid enlargement.    LYMPH NODES: No palpable, cervical, axillary or inguinal lymphadenopathy.    CHEST: clear to auscultation bilaterally.  Resonant to percussion throughout bilaterally.  Symmetrical breath movements bilaterally.    CVS: S1 and S2 are heard. Regular rate and rhythm.  No murmur or gallop or rub heard.  No peripheral edema.    ABDOMEN: Soft. Not tender. Not distended.  No palpable hepatomegaly or splenomegaly.  No other mass palpable.  Bowel sounds heard.    EXTREMITIES: Warm.    SKIN: no rash, or bruising or purpura.  Grade 1 skin breakdown in the cleft between the buttocks on the right side.  Other areas are healed.    Has a full head of hair.      Lab Results    Recent Results (from the past 168 hour(s))   COVID-19 Virus PCR  MRF    Specimen: Respiratory   Result Value Ref Range    COVID-19 VIRUS SPECIMEN SOURCE Nasopharyngeal     2019-nCOV       Test received-See reflex to IDDL test SARS CoV2 (COVID-19) Virus RT-PCR   SARS-CoV-2 (COVID-19)-PCR    Specimen: Respiratory   Result Value Ref Range    SARS-CoV-2 Virus Specimen Source Nasopharyngeal     SARS-CoV-2 PCR Result NEGATIVE     SARS-COV-2 PCR COMMENT       Testing was performed using the Xpert Xpress SARS-CoV-2 Assay on the Moped   POCT Glucose    Specimen: Capillary; Blood   Result Value Ref Range    Glucose 79 70 - 139 mg/dL   Comprehensive Metabolic Panel   Result Value Ref Range    Sodium 141 136 - 145 mmol/L    Potassium 5.0 3.5 - 5.0 mmol/L    Chloride 106 98 - 107 mmol/L    CO2 26 22 - 31 mmol/L    Anion Gap, Calculation 9 5 - 18 mmol/L    Glucose 147 (H) 70 - 125 mg/dL    BUN 29 (H) 8 - 22 mg/dL    Creatinine 0.99 0.70 - 1.30 mg/dL    GFR MDRD Af Amer >60 >60 mL/min/1.73m2    GFR MDRD Non Af Amer >60 >60 mL/min/1.73m2    Bilirubin, Total 0.3 0.0 - 1.0 mg/dL    Calcium 8.9 8.5 - 10.5 mg/dL    Protein, Total 6.5 6.0 - 8.0 g/dL    Albumin 2.9 (L) 3.5 - 5.0 g/dL    Alkaline Phosphatase 102 45 - 120 U/L    AST 21 0 - 40 U/L    ALT 31 0 - 45 U/L   Thyroid Cascade   Result Value Ref Range    TSH 2.94 0.30 - 5.00 uIU/mL   HM1 (CBC with Diff)   Result Value Ref Range    WBC 4.3 4.0 - 11.0 thou/uL    RBC 3.38 (L) 4.40 - 6.20 mill/uL    Hemoglobin 10.4 (L) 14.0 - 18.0 g/dL    Hematocrit 33.2 (L) 40.0 - 54.0 %    MCV 98 80 - 100 fL    MCH 30.8 27.0 - 34.0 pg    MCHC 31.3 (L) 32.0 - 36.0 g/dL    RDW 19.4 (H) 11.0 - 14.5 %    Platelets 240 140 - 440 thou/uL    MPV 8.8 8.5 - 12.5 fL    Neutrophils % 56 50 - 70 %    Lymphocytes % 30 20 - 40 %    Monocytes % 10 2 - 10 %    Eosinophils % 0 0 - 6 %    Basophils % 1 0 - 2 %    Immature Granulocyte % 3 (H) <=0 %    Neutrophils Absolute 2.4 2.0 - 7.7 thou/uL    Lymphocytes Absolute 1.3 0.8 - 4.4 thou/uL    Monocytes Absolute 0.4 0.0 - 0.9  thou/uL    Eosinophils Absolute 0.0 0.0 - 0.4 thou/uL    Basophils Absolute 0.0 0.0 - 0.2 thou/uL    Immature Granulocyte Absolute 0.1 (H) <=0.0 thou/uL       Imaging    Nm Pet Ct Skull To Mid Thigh    Result Date: 3/16/2021  EXAM: NM PET/CT SKULL TO MID THIGH LOCATION: [facility] DATE/TIME: 3/16/2021 11:31 AM INDICATION: History of colon cancer and prostate cancer. Primary malignant neoplasm of right lung metastatic to other sites. Right lung adenocarcinoma with pleural metastases and malignant pleural effusion. Status post chemotherapy and immunotherapy. Subsequent treatment strategy. COMPARISON: PET scan 1/27/2021. TECHNIQUE: Serum glucose level 79 mg/dL. One hour post intravenous administration of 7.5 mCi F-18 FDG, PET imaging was performed from the skull base to the mid thighs utilizing attenuation correction with concurrent axial CT and PET/CT image fusion. Dose  reduction techniques were used. FINDINGS: Interval decrease in size and FDG activity of right upper lobe lung cancer, 2.2 x 2.0 cm, SUVmax 10.3, previously 3.3 x 4.4 cm, SUVmax 15.5. Interval decrease in FDG activity and extent of pleural nodularity, a conglomerate mass in the posterior-inferior right pleural space, measuring 1.2 x 1.0 cm, SUVmax 5.5, previously 3.5 x 6.1 cm, SUVmax 14.8. Interval decrease in FDG activity associated with mediastinal lymph nodes including Station 7 subcarinal node SUVmax 4.4, previously 6.0. Stable FDG-avid left thyroid nodule. FDG activity throughout the remainder of the body is physiologic. Interval decrease in moderate loculated right pleural fluid collection. Postoperative changes in the colon. Scattered colonic diverticulosis. Hepatic cysts are stable. Degenerative disc disease throughout the spine.     1.  Favorable treatment response.     Us Thoracentesis    Result Date: 3/9/2021  EXAM: 1. RIGHT THORACENTESIS 2. ULTRASOUND GUIDANCE LOCATION: [facility] DATE/TIME:  3/9/2021 3:02 PM INDICATION: Pleural effusion. PROCEDURE: Informed consent obtained. Time out performed. The chest was prepped and draped in sterile fashion. 10 mL of 1 % lidocaine was infused into the local soft tissues. Under direct ultrasound guidance, a 5 Sudanese catheter system was placed into the pleural effusion. 0.5 liters of slightly bloody fluid were removed and sent to lab, if requested. Patient tolerated procedure well. Ultrasound imaging was obtained and placed in the patient's permanent medical record.     Status post right ultrasound-guided thoracentesis. Reference CPT Code: 24643    Us Thoracentesis    Result Date: 3/2/2021  EXAM: 1. RIGHT THORACENTESIS 2. ULTRASOUND GUIDANCE LOCATION: [facility] DATE/TIME: 3/2/2021 3:52 PM INDICATION: Malignant right pleural effusion PROCEDURE: Informed consent obtained. Time out performed. The chest was prepped and draped in sterile fashion. 10 mL of 1 % lidocaine was infused into the local soft tissues. Under direct ultrasound guidance, a 5 Sudanese catheter system was placed into the pleural effusion. 0.6 liters of mildly bloody fluid were removed and sent to lab, if requested. Patient tolerated procedure well. Ultrasound imaging was obtained and placed in the patient's permanent medical record.     Status post right ultrasound-guided thoracentesis. Reference CPT Code: 75851    Us Thoracentesis    Result Date: 2/26/2021  EXAM: 1. THORACENTESIS 2. ULTRASOUND GUIDANCE LOCATION: [facility] DATE/TIME: 2/26/2021 3:25 PM INDICATION: Malignant right pleural effusion. PROCEDURE: Informed consent obtained. Time out performed. The chest was prepped and draped in sterile fashion. 10 mL of 1 % lidocaine was infused into the local soft tissues. Under direct ultrasound guidance, a 5 Sudanese catheter system was placed into the pleural effusion. 0.7 liters of mildly bloody fluid were removed and sent to lab, if requested. Patient  tolerated procedure well. Ultrasound imaging was obtained and placed in the patient's permanent medical record.     Status post right ultrasound-guided thoracentesis. Reference CPT Code: 40989    Us Thoracentesis    Result Date: 2/23/2021  EXAM: 1. RIGHT THORACENTESIS 2. ULTRASOUND GUIDANCE LOCATION: [facility] DATE/TIME: 2/23/2021 3:34 PM INDICATION: Pleural effusion. PROCEDURE: Informed consent obtained. Time out performed. The chest was prepped and draped in sterile fashion. 10 mL of 1 % lidocaine was infused into the local soft tissues. Under direct ultrasound guidance, a 5 Jamaican catheter system was placed into the pleural effusion. 0.4 liters of red fluid were removed and sent to lab, if requested. Patient tolerated procedure well. Ultrasound imaging was obtained and placed in the patient's permanent medical record.     Status post right ultrasound-guided thoracentesis. Reference CPT Code: 89765    Us Thoracentesis    Result Date: 2/19/2021  EXAM: 1. RIGHT THORACENTESIS 2. ULTRASOUND GUIDANCE LOCATION: [facility] DATE/TIME: 2/19/2021 3:40 PM INDICATION: Pleural effusion. PROCEDURE: Informed consent obtained. Time out performed. The chest was prepped and draped in sterile fashion. 8 mL of 1 % lidocaine was infused into the local soft tissues. Under direct ultrasound guidance, a 5 Jamaican catheter system was placed into the  pleural effusion. 0.65 liters of red fluid were removed and sent to lab, if requested. Patient tolerated procedure well. Ultrasound imaging was obtained and placed in the patient's permanent medical record.     Status post date ultrasound-guided thoracentesis. Reference CPT Code: 66506        Signed by: Kailey Chavez MD

## 2021-06-16 NOTE — PROGRESS NOTES
Pt arrived at [facility] to see Dr. Chavez. Pt has Lung Cancer and is here for f/u.

## 2021-06-16 NOTE — PROGRESS NOTES
[facility] Hematology and Oncology Progress Note    Patient: Antonio Mercado  MRN: [id number]  Date of Service: 04/16/2021        Reason for Visit    Chief Complaint   Patient presents with     HE Cancer     Lung Cancer       Assessment and Plan    Right lung adenocarcinoma with pleural metastasis and malignant pleural effusion diagnosed January 2021  EGFR [id number](exon 19); RB1 552; TP53 L194R  PD-L1, HIGH EXPRESSION    MSI STABLE(CA); TMB LOW  NO FISH ALTERATIONS  MET Exon 14 Deletion: NOT DETECTED; Marroquin TRK, Not expressed  No abnormalities in ALK, BRAF, ERBB2, HER2, MET, RET, ROS1    History of colon Cancer status post hemicolectomy  History of prostate cancer with elevated PSA, declined follow-up  HIV/AIDS  Macrocytic anemia probably related to medication  Thyroid nodule on PET scan  Rash, skin breakdown, left buttock area  Hospitalization in April for bright red blood per rectum probably due to hemorrhoids, requiring transfusion    Good tolerance for Osimertinib over the first month.  We will continue with the treatment.  We will get EKG today.  We will to follow-up in a month with PET scan, labs and repeat EKG again.    Clinically he is stable and CT scans done in the hospital earlier this week also showed stable disease.    Right shoulder muscular pain after Covid vaccination.  He will treat this symptomatically.    Continue HIV medication.    Plan: As above    Measurable disease: PET scan    Current therapy: Start Osimertinib 80 mg once daily, March 18, 2021    Treatment history:  Carbo Alimta alone without Keytruda, 20% dose reduction, cycle 2 on February 25, 2021  Carboplatin, Alimta and Keytruda to start February 4, 2021        ECOG Performance   ECOG Performance Status: 2    Distress Assessment  Distress Assessment Score: No distress    Pain         Problem List    1. Primary malignant neoplasm of right lung metastatic to other site (H)  HM1(CBC and Differential)    Comprehensive Metabolic Panel     ECG 12 lead with MUSE    NM PET CT Skull to Mid Thigh        CC: Harris Mary MD    ______________________________________________________________________________    History of Present Illness    Mr. Antonio Mercado is here for a follow-up.  Seen 4 weeks ago.  Hospitalized earlier this week with bright red blood per rectum requiring transfusion and this was felt related to hemorrhoids.  Discharged yesterday.  No further bleeding.  Has continued on Osimertinib.  No rash or diarrhea.  No other side effects.  Complaining of right shoulder muscular pain after Covid vaccination 2 weeks ago.  ECOG status is 0.  No shortness of breath or cough.      Pain Status  Currently in Pain: Yes    Review of Systems    Constitutional  Constitutional (WDL): Exceptions to WDL  Fatigue: Fatigue relieved by rest  Neurosensory  Neurosensory (WDL): Exceptions to WDL  Ataxia: Asymptomatic, clinical or diagnostic observations only, intervention not indicated(uses cane)  Cardiovascular  Cardiovascular (WDL): Exceptions to WDL(tachy)  Pulmonary  Respiratory (WDL): Within Defined Limits  Gastrointestinal  Gastrointestinal (WDL): Exceptions to WDL  Constipation: Occasional or intermittent symptoms, occasional use of stool softeners, laxatives, dietary modification, or enema(Last BM 4/15/21)  Genitourinary     Integumentary  Integumentary (WDL): All integumentary elements are within defined limits  Patient Coping  Patient Coping: Accepting;Open/discussion  Distress Assessment  Distress Assessment Score: No distress  Accompanied by  Accompanied by: Family Member    Past History  Past Medical History:   Diagnosis Date     Constipation      HIV (human immunodeficiency virus infection) (H)      Hypertrophy of prostate with urinary obstruction      Iron deficiency anemia      Malignant neoplasm of hepatic flexure (H)     s/p right hemicolectomy in 2016         Past Surgical History:   Procedure Laterality Date     COLON SURGERY       HERNIA REPAIR        ID ESOPHAGOGASTRODUODENOSCOPY TRANSORAL DIAGNOSTIC N/A 4/13/2021    Procedure: ESOPHAGOGASTRODUODENOSCOPY (EGD);  Surgeon: Harris Mon MD;  Location: [facility];  Service: Gastroenterology     US THORACENTESIS  1/5/2021     US THORACENTESIS  1/18/2021     US THORACENTESIS  1/26/2021     US THORACENTESIS  1/29/2021     US THORACENTESIS  2/2/2021     US THORACENTESIS  2/5/2021     US THORACENTESIS  2/9/2021     US THORACENTESIS  2/12/2021     US THORACENTESIS  2/15/2021     US THORACENTESIS  2/19/2021     US THORACENTESIS  2/23/2021     US THORACENTESIS  2/26/2021     US THORACENTESIS  3/2/2021     US THORACENTESIS  3/9/2021       Physical Exam    Recent Vitals 4/16/2021   Height -   Weight 153 lbs 3 oz   BSA (m2) 1.85 m2   /62   Pulse 114   Temp 98.4   Temp src 1   SpO2 98   Some recent data might be hidden       GENERAL: Alert and oriented to time place and person. Seated comfortably. In no distress.    HEAD: Atraumatic and normocephalic.    EYES: KARIME, EOMI.  No pallor.  No icterus.    Oral cavity: no mucosal lesion or tonsillar enlargement.    NECK: supple. JVP normal.  No thyroid enlargement.    LYMPH NODES: No palpable, cervical, axillary or inguinal lymphadenopathy.    CHEST: clear to auscultation bilaterally.  Resonant to percussion throughout bilaterally.  Symmetrical breath movements bilaterally.    CVS: S1 and S2 are heard. Regular rate and rhythm.  No murmur or gallop or rub heard.  No peripheral edema.    ABDOMEN: Soft. Not tender. Not distended.  No palpable hepatomegaly or splenomegaly.  No other mass palpable.  Bowel sounds heard.    EXTREMITIES: Warm.    SKIN: no rash, or bruising or purpura.  Grade 1 skin breakdown in the cleft between the buttocks on the right side.  Other areas are healed.    Has a full head of hair.      Lab Results    Recent Results (from the past 168 hour(s))   Basic Metabolic Panel   Result Value Ref Range    Sodium 139 136 - 145 mmol/L    Potassium 4.7 3.5  - 5.0 mmol/L    Chloride 104 98 - 107 mmol/L    CO2 22 22 - 31 mmol/L    Anion Gap, Calculation 13 5 - 18 mmol/L    Glucose 170 (H) 70 - 125 mg/dL    Calcium 8.7 8.5 - 10.5 mg/dL    BUN 28 (H) 8 - 22 mg/dL    Creatinine 1.65 (H) 0.70 - 1.30 mg/dL    GFR MDRD Af Amer 51 (L) >60 mL/min/1.73m2    GFR MDRD Non Af Amer 42 (L) >60 mL/min/1.73m2   Lactic Acid   Result Value Ref Range    Lactic Acid 3.6 (H) 0.7 - 2.0 mmol/L   Procalcitonin   Result Value Ref Range    Procalcitonin 0.04 0.00 - 0.49 ng/mL   Hepatic Profile   Result Value Ref Range    Bilirubin, Total 0.2 0.0 - 1.0 mg/dL    Bilirubin, Direct 0.1 <=0.5 mg/dL    Protein, Total 6.7 6.0 - 8.0 g/dL    Albumin 2.6 (L) 3.5 - 5.0 g/dL    Alkaline Phosphatase 70 45 - 120 U/L    AST 12 0 - 40 U/L    ALT <9 0 - 45 U/L   Lipase   Result Value Ref Range    Lipase 24 0 - 52 U/L   INR   Result Value Ref Range    INR 1.19 (H) 0.90 - 1.10   APTT   Result Value Ref Range    PTT 28 24 - 37 seconds   HM1 (CBC with Diff)   Result Value Ref Range    WBC 9.7 4.0 - 11.0 thou/uL    RBC 2.65 (L) 4.40 - 6.20 mill/uL    Hemoglobin 7.6 (L) 14.0 - 18.0 g/dL    Hematocrit 24.9 (L) 40.0 - 54.0 %    MCV 94 80 - 100 fL    MCH 28.7 27.0 - 34.0 pg    MCHC 30.5 (L) 32.0 - 36.0 g/dL    RDW 17.2 (H) 11.0 - 14.5 %    Platelets 314 140 - 440 thou/uL    MPV 9.0 8.5 - 12.5 fL    Neutrophils % 68 50 - 70 %    Lymphocytes % 23 20 - 40 %    Monocytes % 6 2 - 10 %    Eosinophils % 1 0 - 6 %    Basophils % 0 0 - 2 %    Immature Granulocyte % 2 (H) <=0 %    Neutrophils Absolute 6.7 2.0 - 7.7 thou/uL    Lymphocytes Absolute 2.3 0.8 - 4.4 thou/uL    Monocytes Absolute 0.6 0.0 - 0.9 thou/uL    Eosinophils Absolute 0.1 0.0 - 0.4 thou/uL    Basophils Absolute 0.0 0.0 - 0.2 thou/uL    Immature Granulocyte Absolute 0.2 (H) <=0.0 thou/uL   Type and Screen   Result Value Ref Range    ABORh O POS     Antibody Screen Negative Negative   ECG 12 lead nursing unit performed   Result Value Ref Range    SYSTOLIC BLOOD  PRESSURE      DIASTOLIC BLOOD PRESSURE      VENTRICULAR RATE 108 BPM    ATRIAL RATE 108 BPM    P-R INTERVAL 208 ms    QRS DURATION 82 ms    Q-T INTERVAL 346 ms    QTC CALCULATION (BEZET) 463 ms    P Axis      R AXIS 43 degrees    T AXIS 20 degrees    MUSE DIAGNOSIS       Sinus tachycardia  Otherwise normal ECG  When compared with ECG of 18-JAN-2021 18:31,  Nonspecific T wave abnormality no longer evident in Anterior leads  QT has lengthened  Confirmed by SEE ED PROVIDER NOTE FOR, ECG INTERPRETATION ([id number]),  WALI HENRY ([id number]) on 4/13/2021 7:08:27 AM     Asymptomatic SARS-CoV-2 (COVID-19)-PCR    Specimen: Respiratory   Result Value Ref Range    SARS-CoV-2 PCR Result Negative Negative, Invalid   Urinalysis-UC if Indicated   Result Value Ref Range    Color, UA Light Yellow Light Yellow, Yellow    Clarity, UA Clear Clear    Glucose, UA Negative Negative    Protein, UA 10 mg/dL Negative    Bilirubin, UA Negative Negative    Urobilinogen, UA <2.0 mg/dL <2.0 mg/dL    pH, UA 5.5 5.0 - 8.0    Blood, UA Negative Negative    Ketones, UA Negative Negative    Nitrite, UA Negative Negative    Leukocytes, UA Negative Negative    Specific Gravity, UA 1.034 (H) 1.001 - 1.030    RBC, UA 1 <=2 hpf    WBC UA 1 <=5 hpf    Bacteria, UA None Seen None Seen    Squamous Epithel, UA <1 (H) <=5 /HPF    Mucus, UA Present (!) None Seen lpf    Hyaline Casts, UA 1 <=5 lpf   Hemoglobin   Result Value Ref Range    Hemoglobin 7.3 (L) 14.0 - 18.0 g/dL   Lactic Acid   Result Value Ref Range    Lactic Acid 1.9 0.7 - 2.0 mmol/L   Basic metabolic panel   Result Value Ref Range    Sodium 139 136 - 145 mmol/L    Potassium 4.5 3.5 - 5.0 mmol/L    Chloride 110 (H) 98 - 107 mmol/L    CO2 20 (L) 22 - 31 mmol/L    Anion Gap, Calculation 9 5 - 18 mmol/L    Glucose 94 70 - 125 mg/dL    Calcium 8.2 (L) 8.5 - 10.5 mg/dL    BUN 21 8 - 22 mg/dL    Creatinine 1.09 0.70 - 1.30 mg/dL    GFR MDRD Af Amer >60 >60 mL/min/1.73m2    GFR MDRD Non Af Amer >60 >60  mL/min/1.73m2   Hemogram with PLT   Result Value Ref Range    WBC 4.6 4.0 - 11.0 thou/uL    RBC 2.50 (L) 4.40 - 6.20 mill/uL    Hemoglobin 7.1 (L) 14.0 - 18.0 g/dL    Hematocrit 22.9 (L) 40.0 - 54.0 %    MCV 92 80 - 100 fL    MCH 28.4 27.0 - 34.0 pg    MCHC 31.0 (L) 32.0 - 36.0 g/dL    RDW 18.6 (H) 11.0 - 14.5 %    Platelets 249 140 - 440 thou/uL    MPV 9.2 8.5 - 12.5 fL   Crossmatch   Result Value Ref Range    Crossmatch Compatible     Unit Type O Pos     Unit Number [id number]     Status Transfused     Component Red Blood Cells     PRODUCT CODE S7614Q20     Issue Date and Time [id number]     Blood Type 5100     CODING SYSTEM [id number]    Hemoglobin   Result Value Ref Range    Hemoglobin 9.1 (L) 14.0 - 18.0 g/dL   HM2(CBC w/o Differential)   Result Value Ref Range    WBC 4.4 4.0 - 11.0 thou/uL    RBC 3.27 (L) 4.40 - 6.20 mill/uL    Hemoglobin 9.3 (L) 14.0 - 18.0 g/dL    Hematocrit 29.2 (L) 40.0 - 54.0 %    MCV 89 80 - 100 fL    MCH 28.4 27.0 - 34.0 pg    MCHC 31.8 (L) 32.0 - 36.0 g/dL    RDW 16.4 (H) 11.0 - 14.5 %    Platelets 209 140 - 440 thou/uL    MPV 9.0 8.5 - 12.5 fL   Basic Metabolic Panel   Result Value Ref Range    Sodium 139 136 - 145 mmol/L    Potassium 4.1 3.5 - 5.0 mmol/L    Chloride 109 (H) 98 - 107 mmol/L    CO2 23 22 - 31 mmol/L    Anion Gap, Calculation 7 5 - 18 mmol/L    Glucose 77 70 - 125 mg/dL    Calcium 8.3 (L) 8.5 - 10.5 mg/dL    BUN 13 8 - 22 mg/dL    Creatinine 0.95 0.70 - 1.30 mg/dL    GFR MDRD Af Amer >60 >60 mL/min/1.73m2    GFR MDRD Non Af Amer >60 >60 mL/min/1.73m2   Hemoglobin   Result Value Ref Range    Hemoglobin 10.1 (L) 14.0 - 18.0 g/dL   Crossmatch   Result Value Ref Range    Crossmatch Compatible     Unit Type O Pos     Unit Number [id number]     Status Transfused     Component Red Blood Cells     PRODUCT CODE S4249K56     Issue Date and Time [id number]     Blood Type 5100     CODING SYSTEM [id number]    Crossmatch   Result Value Ref Range    Crossmatch Compatible      Unit Type O Pos     Unit Number [id number]     Status Transfused     Component Red Blood Cells     PRODUCT CODE [id number]     Issue Date and Time [id number]     Blood Type 5100     CODING SYSTEM [id number]    HM2(CBC w/o Differential)   Result Value Ref Range    WBC 3.7 (L) 4.0 - 11.0 thou/uL    RBC 3.22 (L) 4.40 - 6.20 mill/uL    Hemoglobin 9.3 (L) 14.0 - 18.0 g/dL    Hematocrit 28.4 (L) 40.0 - 54.0 %    MCV 88 80 - 100 fL    MCH 28.9 27.0 - 34.0 pg    MCHC 32.7 32.0 - 36.0 g/dL    RDW 16.1 (H) 11.0 - 14.5 %    Platelets 217 140 - 440 thou/uL    MPV 9.2 8.5 - 12.5 fL   Basic Metabolic Panel   Result Value Ref Range    Sodium 140 136 - 145 mmol/L    Potassium 4.0 3.5 - 5.0 mmol/L    Chloride 108 (H) 98 - 107 mmol/L    CO2 22 22 - 31 mmol/L    Anion Gap, Calculation 10 5 - 18 mmol/L    Glucose 96 70 - 125 mg/dL    Calcium 8.3 (L) 8.5 - 10.5 mg/dL    BUN 12 8 - 22 mg/dL    Creatinine 0.95 0.70 - 1.30 mg/dL    GFR MDRD Af Amer >60 >60 mL/min/1.73m2    GFR MDRD Non Af Amer >60 >60 mL/min/1.73m2   Crossmatch   Result Value Ref Range    Crossmatch Compatible     Unit Type O Pos     Unit Number [id number]     Status Released     Component Red Blood Cells     PRODUCT CODE I1180I06     Blood Type 5100     CODING SYSTEM [id number]    Comprehensive Metabolic Panel   Result Value Ref Range    Sodium 140 136 - 145 mmol/L    Potassium 4.0 3.5 - 5.0 mmol/L    Chloride 106 98 - 107 mmol/L    CO2 23 22 - 31 mmol/L    Anion Gap, Calculation 11 5 - 18 mmol/L    Glucose 138 (H) 70 - 125 mg/dL    BUN 15 8 - 22 mg/dL    Creatinine 1.19 0.70 - 1.30 mg/dL    GFR MDRD Af Amer >60 >60 mL/min/1.73m2    GFR MDRD Non Af Amer >60 >60 mL/min/1.73m2    Bilirubin, Total 0.3 0.0 - 1.0 mg/dL    Calcium 8.7 8.5 - 10.5 mg/dL    Protein, Total 6.9 6.0 - 8.0 g/dL    Albumin 2.7 (L) 3.5 - 5.0 g/dL    Alkaline Phosphatase 70 45 - 120 U/L    AST 12 0 - 40 U/L    ALT <9 0 - 45 U/L   HM1 (CBC with Diff)   Result Value Ref Range    WBC 4.8 4.0 - 11.0  thou/uL    RBC 3.54 (L) 4.40 - 6.20 mill/uL    Hemoglobin 10.1 (L) 14.0 - 18.0 g/dL    Hematocrit 31.8 (L) 40.0 - 54.0 %    MCV 90 80 - 100 fL    MCH 28.5 27.0 - 34.0 pg    MCHC 31.8 (L) 32.0 - 36.0 g/dL    RDW 15.9 (H) 11.0 - 14.5 %    Platelets 235 140 - 440 thou/uL    MPV 8.6 8.5 - 12.5 fL    Neutrophils % 62 50 - 70 %    Lymphocytes % 25 20 - 40 %    Monocytes % 12 (H) 2 - 10 %    Eosinophils % 1 0 - 6 %    Basophils % 0 0 - 2 %    Immature Granulocyte % 0 <=0 %    Neutrophils Absolute 3.0 2.0 - 7.7 thou/uL    Lymphocytes Absolute 1.2 0.8 - 4.4 thou/uL    Monocytes Absolute 0.6 0.0 - 0.9 thou/uL    Eosinophils Absolute 0.1 0.0 - 0.4 thou/uL    Basophils Absolute 0.0 0.0 - 0.2 thou/uL    Immature Granulocyte Absolute 0.0 <=0.0 thou/uL       Imaging    Ct Head Without Contrast    Result Date: 4/12/2021  EXAM: CT HEAD WO CONTRAST, CT CERVICAL SPINE WO CONTRAST LOCATION: [facility] DATE/TIME: 4/12/2021 6:06 PM INDICATION: Fall and hit forehead on floor. COMPARISON: Brain MRI 01/19/2021. TECHNIQUE: 1) Routine CT Head without IV contrast. Multiplanar reformats. Dose reduction techniques were used. 2) Routine CT Cervical Spine without IV contrast. Multiplanar reformats. Dose reduction techniques were used. FINDINGS: HEAD CT: INTRACRANIAL CONTENTS: No intracranial hemorrhage, extraaxial collection, or mass effect.  No CT evidence of acute infarct. Mild presumed chronic small vessel ischemic changes. Mild generalized volume loss. No hydrocephalus. VISUALIZED ORBITS/SINUSES/MASTOIDS: No intraorbital abnormality. Mild mucosal thickening left maxillary sinus. The paranasal sinuses are otherwise clear. Decreased pneumatization of the mastoid air cells. BONES/SOFT TISSUES: No acute abnormality. CERVICAL SPINE CT: VERTEBRAE: Mild straightening of usual cervical lordosis with otherwise normal alignment. Normal vertebral body heights. Moderate disc space narrowing at C5-C6 and mild disc space narrowing  at other levels. Facet arthropathy preferentially on the left at  C2-C3 and C3-C4 and on the right at C4-C5 and C5-C6. No fracture or posttraumatic subluxation. CANAL/FORAMINA: Mild spinal canal stenosis at C3-C4, C4-C5, C5-C6, and C6-C7. Severe left foraminal stenosis at C2-C3. Severe bilateral foraminal stenoses at C3-C4. Moderate right foraminal stenosis at C4-C5. Severe right foraminal stenosis at C5-C6. PARASPINAL: No extraspinal abnormality. Please see dedicated chest CT regarding abnormal pleural thickening and parenchymal opacity within the right lung apex.     HEAD CT: 1.  No acute intracranial process. CERVICAL SPINE CT: 1.  No CT evidence for acute fracture or post traumatic subluxation. 2.  Spondylosis with spinal canal and foraminal stenoses.    Ct Cervical Spine Without Contrast    Result Date: 4/12/2021  EXAM: CT HEAD WO CONTRAST, CT CERVICAL SPINE WO CONTRAST LOCATION: [facility] DATE/TIME: 4/12/2021 6:06 PM INDICATION: Fall and hit forehead on floor. COMPARISON: Brain MRI 01/19/2021. TECHNIQUE: 1) Routine CT Head without IV contrast. Multiplanar reformats. Dose reduction techniques were used. 2) Routine CT Cervical Spine without IV contrast. Multiplanar reformats. Dose reduction techniques were used. FINDINGS: HEAD CT: INTRACRANIAL CONTENTS: No intracranial hemorrhage, extraaxial collection, or mass effect.  No CT evidence of acute infarct. Mild presumed chronic small vessel ischemic changes. Mild generalized volume loss. No hydrocephalus. VISUALIZED ORBITS/SINUSES/MASTOIDS: No intraorbital abnormality. Mild mucosal thickening left maxillary sinus. The paranasal sinuses are otherwise clear. Decreased pneumatization of the mastoid air cells. BONES/SOFT TISSUES: No acute abnormality. CERVICAL SPINE CT: VERTEBRAE: Mild straightening of usual cervical lordosis with otherwise normal alignment. Normal vertebral body heights. Moderate disc space narrowing at C5-C6 and mild disc  space narrowing at other levels. Facet arthropathy preferentially on the left at  C2-C3 and C3-C4 and on the right at C4-C5 and C5-C6. No fracture or posttraumatic subluxation. CANAL/FORAMINA: Mild spinal canal stenosis at C3-C4, C4-C5, C5-C6, and C6-C7. Severe left foraminal stenosis at C2-C3. Severe bilateral foraminal stenoses at C3-C4. Moderate right foraminal stenosis at C4-C5. Severe right foraminal stenosis at C5-C6. PARASPINAL: No extraspinal abnormality. Please see dedicated chest CT regarding abnormal pleural thickening and parenchymal opacity within the right lung apex.     HEAD CT: 1.  No acute intracranial process. CERVICAL SPINE CT: 1.  No CT evidence for acute fracture or post traumatic subluxation. 2.  Spondylosis with spinal canal and foraminal stenoses.    Xr Shoulder Right 2 Or More Vws    Result Date: 4/14/2021  EXAM: XR SHOULDER RIGHT 2 OR MORE VWS LOCATION: [facility] DATE/TIME: 4/14/2021 4:24 PM INDICATION: shoulder pain COMPARISON: None.     Mild degenerative change at the right glenohumeral joint. No evidence for fracture or dislocation. Hypertrophic change at the right acromioclavicular joint. Small to moderate right pleural effusion, incompletely visualized.     Ct Chest Abdomen Pelvis Without Oral With Iv Contrast    Result Date: 4/12/2021  EXAM: CT CHEST ABDOMEN PELVIS WO ORAL W IV CONTRAST LOCATION: [facility] DATE/TIME: 4/12/2021 6:12 PM INDICATION: Fall, hypotension, bloody stools, chemo patient. COMPARISON: PET/CTs 3/16/2021 and 1/27/2021 and contrast-enhanced CT chest 01/04/2021. TECHNIQUE: CT scan of the chest, abdomen, and pelvis was performed following injection of IV contrast. Multiplanar reformats were obtained. Dose reduction techniques were used. CONTRAST: Isovue 370 = 75 mL. FINDINGS: LUNGS AND PLEURA: Diffuse mildly irregular pleural thickening with large volume of loculated fluid mid and inferior right hemithorax with  associated greater percent compressive atelectasis right middle and lower lobes unchanged. Focal pleural lesion posterior inferior right hemithorax approximately 4.5 x 1.5 cm unchanged (image 146 of series 4). The 1.7 x 1.2 cm mass anterior segment right upper lobe is stable. Left lung clear with no pleural effusion. MEDIASTINUM/AXILLAE: Small stable mediastinal lymph nodes without kadeem mild generalized cardiac enlargement with minimal pericardial fluid and/or thickening stable. Normal caliber thoracic aorta and central pulmonary arteries. CORONARY ARTERY CALCIFICATION: Mild. Several small benign stable hepatic cysts require no follow-up. Liver otherwise normal. No bile duct dilatation or calcified gallstones. HEPATOBILIARY: Liver is normal.  No calcified gallstones or bile duct dilatation. PANCREAS: Normal. SPLEEN: Spleen size normal. ADRENAL GLANDS: Normal. KIDNEY/BLADDER: Kidneys, ureters and bladder are normal. BOWEL: Partial right hemicolectomy. Colonic diverticulosis. Bowel is otherwise normal with no obstruction or inflammatory change. No free fluid or free air. LYMPH NODES: No lymphadenopathy. VASCULATURE: Normal caliber abdominal aorta with mild calcified atheromatous plaque.  PELVIC ORGANS: Mild prostate enlargement. MUSCULOSKELETAL: No bone lesion, fracture or hematoma identified.     1.  No acute findings or significant change. 2.  Right upper lobe mass, focal lesion posterior inferior right pleural space and right mid and inferior hemithoracic pleural fluid and thickening stable. 3.  Partial right hemicolectomy. 4.  Mild prostate enlargement.         Signed by: Kailey Chavez MD

## 2021-06-16 NOTE — TELEPHONE ENCOUNTER
Call returned to Kathy from [facility], , advise per Dr Chavez, our office did not prescribe doxycycline, the patient should check with his primary as to why the pharmacy refilled this.  Secondly Kathy had asked for a nutrition consult, Dr Chavez will be seeing this patient on April 15th and will evaluate for nutrition referral and refer to our dietitian if needed.  Advised to call if further concerns.

## 2021-06-16 NOTE — TELEPHONE ENCOUNTER
Telephone Encounter by Tiffanie Ragland at 11/11/2019 11:54 AM     Author: Tiffanie Ragland Service: -- Author Type: Financial Resource Guide    Filed: 11/11/2019 11:56 AM Encounter Date: 11/11/2019 Status: Signed    : Tiffanie Ragland (Financial Resource Guide)       Medication: Trizivir [phone]  Qty: 60 tabs for 28 days  Insurance: [facility]  Test Claim: $7.00  Pharmacy: [facility] in [place] on the corner of [address]   Additional Information: not a new start so per protocol we do not attempt to convert

## 2021-06-17 NOTE — PROGRESS NOTES
"Infectious Disease Progress Note      ASSESSMENT: 1. AIDS x years with on/off adherence.  Last CD4 265, VL < 20 6/17, VL neg 1/16/18.  2. Allergic to Isentress and Eltegravir. HIV is Resistant to Sustiva.   Thus on Intellence + Trizivir which is working nicely..  3. Colon CA stage 3 2015, s/p R hemicolectomy at [facility]. Refused adjuvant chemotherapy. F/U CT scan neg x 2. Had colonoscopy 12/17 it was negative.  4. Elevated PSA 2/17. Seen at [facility] urology. Had a scan and bx. Results neg for cancer.  5. Went to [facility] x 1 mo. Got mild falcip malarian 5/2 after return.  Treated with malarone. Given Primiquine but only took x 3 days.  6. hemorrhoids   7.Nocturnal cough at times.  8. Going to [facility] tomorrow for 45 days. Needs and extended supply of HIV meds and Malarone until 7 days post return.       PLAN: 1. Fu  HIV VL and cbc (note increase MCV due to NRTIs)  2. Wants me to email VL to [email or web address]  3. Try tessalon perles for cough  4. Ordered 60 tabs of Malarone + 90 days of HIV meds.  5. RTC 4 mos.      Chance Andersen MD    ______________________________________________________________________    Subjective: feels fine. Urinating much better. Neg colonoscopy.  Going to [facility] tomorrow noon! Needs malaria Rx and extended refills of HIV meds.    Review of systems:  Patient denies fever, chills, cough, sputum, vomiting, diarrhea, dysuria, urgency, flank pain, headache, stiff neck, rash, swollen glands or pain at IV sites.  SH/FH/ Habits/ PMH reviewed and unchanged.  Objective:   BP 98/58  Pulse 82  Temp 98  F (36.7  C) (Oral)   Ht 5' 11\" (1.803 m)  Wt 168 lb (76.2 kg)  BMI 23.43 kg/m2    Exam:looks fine.  Nodes: no cervical, axillary or inguinal adenopathy  ENT: TMs clear, oropharynx without exudate or thrush  Respiratory: normal respiratory patter, no rales or rubs.  CV: normal heart tones. No rub, murmur or S3. No JVD.  Neck: neg  Abdmomen: soft, normal bowel sounds, non-tender, no " masses or   organomegaly    Back: No spine or CVA tenderness  Extremities: normal venous pattern. Good pulses. No edema. No joint effusions.  Neurologic: oriented x 3. Cranial nerves 2-12 intact. No focal weakness or sensory loss.                 Lab Results   Component Value Date    ALT 15 12/05/2017    AST 17 12/05/2017    ALKPHOS 74 12/05/2017    BILITOT 0.3 12/05/2017       Imaging:none   Microbiology:none new. Last VL negative.    Patient Active Problem List   Diagnosis     Post-nasal drip     Malaria     HIV (human immunodeficiency virus infection)

## 2021-06-17 NOTE — TELEPHONE ENCOUNTER
Oral Chemotherapy Monitoring Program    Oral Chemotherapy Monitoring    Date: 4/28/21  Primary Oncologist: Kailey Chavez MD  Primary Oncology Clinic:  [facility]  Cancer Diagnosis: Non-small cell lung cancer (NSCLC) (H)  Regimen: NON-SMALL CELL LUNG CANCER (T790M+)- OSIMERTINIB  Drug Name: Osimertinib  Current Dosage: 80 mg  Current Frequency: Daily  Cycle Details: Continuous  Encounter: Mid-Cycle Assessment   New Start:   until disease progression or unacceptable toxcities   Intervention: Adherence/Side Effects      Who was educated?: Patient       Mid-Cycle Assessment:  Start Date of Last Cycle: 3/18/21   Unplanned doses missed in last 2 weeks: 0   Adherence assessment : adherent   Drug Interaction Assessment: No new medications reported   Adverse Effects reported: 0   Adverse Events and Interventions: Denies side effects related to osimertinib; however, his arm hurts still hurts a bit from his second Covid vaccine and he is taking some acetaminophen prn with some relief.    Next pharmacist intervention date: 5/17/21   Intervention: R               Assessment/Plan:  Antonio is tolerating the osimertinib well and denies any AE (rash, diarrhea, N/V). Notes some persistent arm soreness from the second covid vaccine, but mild and tolerable.   I reminded him that the osimertinib can increase his sensitivity to sunlight to especially as it is getting nicer out to make sure he is wearing sun screen, long sleeves, and or a hat to help prevent rashes.    Follow-Up:  Will review appointment with Dr. Chavez on 5/17 and then plan to call again a few weeks later for next assessment.    Hesham Pacheco, PharmD, BCOP  Oral Chemotherapy Pharmacist  [phone]

## 2021-06-17 NOTE — PROGRESS NOTES
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 diagnosed January 2021  EGFR [id number](exon 19); RB1 552; TP53 L194R  PD-L1, HIGH EXPRESSION    MSI STABLE(CA); TMB LOW  NO FISH ALTERATIONS  MET Exon 14 Deletion: NOT DETECTED; Marroquin TRK, Not expressed  No abnormalities in ALK, BRAF, ERBB2, HER2, MET, RET, ROS1    History of colon Cancer status post hemicolectomy  History of prostate cancer with elevated PSA, declined follow-up  HIV/AIDS  Macrocytic anemia probably related to medication  Thyroid nodule on PET scan  Rash, skin breakdown, left buttock area  Hospitalization in April for bright red blood per rectum probably due to hemorrhoids, requiring transfusion    PET scan is reviewed and shows excellent response to treatment.  Patient symptomatically continues to improve.  Minimal side effects from Osimertinib.  Will check EKG today.  We will continue the medication.  We will do follow-up with labs in a month.  Can repeat scans, probably CT scans in 3 months.  Reviewed typical duration of response with this therapy.  If there is progression we will have to consider chemotherapy and/or immunotherapy.    Thyroid nodule is stable of PET scan.  Do not recommend any further evaluation unless there is significant change on imaging.    Rash and skin breakdown in the left buttock area is healed.    Continues to have some right shoulder discomfort and will follow up with primary for this.    Plan: As above  Measurable disease: PET scan    Current therapy: Start Osimertinib 80 mg once daily, March 18, 2021    Treatment history:  Carbo Alimta alone without Keytruda, 20% dose reduction, cycle 2 on February 25,  2021  Carboplatin, Alimta and Keytruda to start February 4, 2021        ECOG Performance   ECOG Performance Status: 1    Distress Assessment  Distress Assessment Score: No distress    Pain         Problem List    1. Primary malignant neoplasm of right lung metastatic to other site (H)  HM1(CBC and Differential)    Comprehensive Metabolic Panel    ECG 12 lead with MUSE        CC: Harris Mary MD    ______________________________________________________________________________    History of Present Illness    Mr. Antonio Mercado is here for a follow-up.  Seen about a month ago.  Has had some peeling of the skin on the palms.  Continues to have some right shoulder discomfort.  No headaches.  Breathing is good.  No thoracentesis needed recently.  Good appetite and stable weight.  No diarrhea.  ECOG status is 0.    Pain Status  Currently in Pain: Yes    Review of Systems    12 point review of systems completed     Patient Coping  Patient Coping: Accepting  Distress Assessment  Distress Assessment Score: No distress  Accompanied by  Accompanied by: Family Member(Son)    Past History  Past Medical History:   Diagnosis Date     Constipation      HIV (human immunodeficiency virus infection) (H)      Hypertrophy of prostate with urinary obstruction      Iron deficiency anemia      Malignant neoplasm of hepatic flexure (H)     s/p right hemicolectomy in 2016         Past Surgical History:   Procedure Laterality Date     COLON SURGERY       HERNIA REPAIR       MN ESOPHAGOGASTRODUODENOSCOPY TRANSORAL DIAGNOSTIC N/A 4/13/2021    Procedure: ESOPHAGOGASTRODUODENOSCOPY (EGD);  Surgeon: Harris Mon MD;  Location: [facility];  Service: Gastroenterology     US THORACENTESIS  1/5/2021     US THORACENTESIS  1/18/2021     US THORACENTESIS  1/26/2021     US THORACENTESIS  1/29/2021     US THORACENTESIS  2/2/2021     US THORACENTESIS  2/5/2021     US THORACENTESIS  2/9/2021     US THORACENTESIS  2/12/2021     US THORACENTESIS   2/15/2021     US THORACENTESIS  2/19/2021     US THORACENTESIS  2/23/2021     US THORACENTESIS  2/26/2021     US THORACENTESIS  3/2/2021     US THORACENTESIS  3/9/2021       Physical Exam    Recent Vitals 5/17/2021   Height -   Weight 153 lbs 14 oz   BSA (m2) 1.86 m2   /68   Pulse 88   Temp 98.7   Temp src 1   SpO2 98   Some recent data might be hidden       GENERAL: Alert and oriented to time place and person. Seated comfortably. In no distress.    HEAD: Atraumatic and normocephalic.    EYES: KARIME, EOMI.  No pallor.  No icterus.    Oral cavity: no mucosal lesion or tonsillar enlargement.    NECK: supple. JVP normal.  No thyroid enlargement.    LYMPH NODES: No palpable, cervical, axillary or inguinal lymphadenopathy.    CHEST: clear to auscultation bilaterally.  Resonant to percussion throughout bilaterally.  Symmetrical breath movements bilaterally.    CVS: S1 and S2 are heard. Regular rate and rhythm.  No murmur or gallop or rub heard.  No peripheral edema.    ABDOMEN: Soft. Not tender. Not distended.  No palpable hepatomegaly or splenomegaly.  No other mass palpable.  Bowel sounds heard.    EXTREMITIES: Warm.    SKIN: no rash, or bruising or purpura.  Grade 1 skin breakdown in the cleft between the buttocks on the right side.  Other areas are healed.    Has a full head of hair.      Lab Results    Recent Results (from the past 168 hour(s))   POCT Glucose    Specimen: Capillary; Blood   Result Value Ref Range    Glucose 84 70 - 139 mg/dL   Comprehensive Metabolic Panel   Result Value Ref Range    Sodium 139 136 - 145 mmol/L    Potassium 4.7 3.5 - 5.0 mmol/L    Chloride 106 98 - 107 mmol/L    CO2 25 22 - 31 mmol/L    Anion Gap, Calculation 8 5 - 18 mmol/L    Glucose 102 70 - 125 mg/dL    BUN 21 8 - 22 mg/dL    Creatinine 1.44 (H) 0.70 - 1.30 mg/dL    GFR MDRD Af Amer 59 (L) >60 mL/min/1.73m2    GFR MDRD Non Af Amer 49 (L) >60 mL/min/1.73m2    Bilirubin, Total 0.3 0.0 - 1.0 mg/dL    Calcium 9.0 8.5 - 10.5  mg/dL    Protein, Total 7.8 6.0 - 8.0 g/dL    Albumin 3.2 (L) 3.5 - 5.0 g/dL    Alkaline Phosphatase 83 45 - 120 U/L    AST 22 0 - 40 U/L    ALT 9 0 - 45 U/L   HM1 (CBC with Diff)   Result Value Ref Range    WBC 3.5 (L) 4.0 - 11.0 thou/uL    RBC 3.63 (L) 4.40 - 6.20 mill/uL    Hemoglobin 10.2 (L) 14.0 - 18.0 g/dL    Hematocrit 32.1 (L) 40.0 - 54.0 %    MCV 88 80 - 100 fL    MCH 28.1 27.0 - 34.0 pg    MCHC 31.8 (L) 32.0 - 36.0 g/dL    RDW 15.2 (H) 11.0 - 14.5 %    Platelets 218 140 - 440 thou/uL    MPV 9.2 8.5 - 12.5 fL    Neutrophils % 55 50 - 70 %    Lymphocytes % 33 20 - 40 %    Monocytes % 8 2 - 10 %    Eosinophils % 2 0 - 6 %    Basophils % 0 0 - 2 %    Immature Granulocyte % 1 (H) <=0 %    Neutrophils Absolute 1.9 (L) 2.0 - 7.7 thou/uL    Lymphocytes Absolute 1.2 0.8 - 4.4 thou/uL    Monocytes Absolute 0.3 0.0 - 0.9 thou/uL    Eosinophils Absolute 0.1 0.0 - 0.4 thou/uL    Basophils Absolute 0.0 0.0 - 0.2 thou/uL    Immature Granulocyte Absolute 0.0 <=0.0 thou/uL       Imaging    Nm Pet Ct Skull To Mid Thigh    Result Date: 5/12/2021  EXAM: NM PET CT SKULL TO MID THIGH LOCATION: [facility] DATE/TIME: 5/12/2021 1:59 PM INDICATION: Subsequent treatment planning and restaging for malignant neoplasm of upper lobe, right bronchus or lung. Right upper lobe lung adenocarcinoma with malignant pleural effusion status post chemotherapy/immunotherapy completed in February 2021, currently receiving immunotherapy. Monitor treatment response. COMPARISON: FDG PET/CT dated 03/16/2021. TECHNIQUE: Serum glucose level 84 mg/dL. One hour post intravenous administration of 8.3 mCi F-18 FDG, PET imaging was performed from the skull base to the mid thighs utilizing attenuation correction with concurrent axial CT and PET/CT image fusion. Dose  reduction techniques were used. FINDINGS: Interval decrease in size and metabolic activity of the right upper lobe adenocarcinoma measuring 1.8 x 1.1 cm (max SUV 4.6,  previously measured 2.2 x 2.0 cm with a max SUV of 10.3) with minimal residual FDG avid right pleural thickening (max SUV 2.9, previously 5.4) and mildly FDG avid right interlobar station 11R (max SUV 4.9, previously 4.4) and subcarinal station 7 (max SUV 4.1, previously 6.3) lymph nodes suggesting continued response to therapy Redemonstrated FDG avid left thyroid nodule (max SUV 11.0, previously 13.9), a third of which represent primary thyroid neoplasms. Mildly FDG avid nonenlarged right axillary lymph nodes (Max SUV 2.9), likely inflammatory in nature and related to recent COVID-19 vaccination. Inflammatory change associated with prior left inguinal hernia mesh plug/repair. Mild senescent intracranial changes. Moderate right pleural effusion with adjacent atelectatic change. Right posterior hepatic lobe cyst. Right hemicolectomy. Pelvic phleboliths. Multilevel degenerative changes of the spine.     1. Decrease size and metabolic activity of the right upper lobe adenocarcinoma with residual FDG avid right pleural thickening and thoracic lymph nodes suggesting continued response to therapy. 2. Redemonstrated FDG avid left thyroid nodule, a third of which represent primary thyroid neoplasms. This is amenable to ultrasound-guided histologic sampling if desired.         Signed by: Kailey Chavez MD

## 2021-06-17 NOTE — PROGRESS NOTES
"Oncology Rooming Note    05/17/21 9:14 AM    Antonio Mercado is a 65 y.o. male who presents for:    Chief Complaint   Patient presents with     HE Cancer     Primary malignant neoplasm of right lung       Initial Vitals: /68   Pulse 88   Temp 98.7  F (37.1  C) (Oral)   Wt 153 lb 14.4 oz (69.8 kg)   SpO2 98%   BMI 22.08 kg/m       Estimated body mass index is 22.08 kg/m  as calculated from the following:    Height as of 4/12/21: 5' 10\" (1.778 m).    Weight as of this encounter: 153 lb 14.4 oz (69.8 kg).     Body surface area is 1.86 meters squared.      Allergies reviewed: Yes  Medications reviewed: Yes    Refills needed: No     Clinical concerns: hands peeling, Rt shoulder discomfort      Susy Grossman RN, Oncology Clinic   [facility]    "

## 2021-06-17 NOTE — TELEPHONE ENCOUNTER
Telephone Encounter by Chen Mcdowell at 2/26/2021  9:51 AM     Author: Chen Mcdowell Service: -- Author Type: Financial Resource Guide    Filed: 2/26/2021  9:51 AM Encounter Date: 2/25/2021 Status: Signed    : Chen Mcdowell (Financial Resource Guide)

## 2021-06-18 NOTE — LETTER
Letter by Chance Andersen MD at      Author: Chance Andersen MD Service: -- Author Type: --    Filed:  Encounter Date: 2/12/2019 Status: (Other)       February 12, 2019     Salas Salgado DO  [address] [place] [id number]    Patient: Antonio Mercado   MR Number: [id number]   YOB: 1955   Date of Visit: 2/12/2019     Dear Dr. Damian DO:    Thank you for referring Antonio Mercado to me for evaluation. Below are the relevant portions of my assessment and plan of care.    If you have questions, please do not hesitate to call me. I look forward to following Antonio along with you.    Sincerely,        Chance Andersen MD        CC  Harris MONAHAN MD    Progress Notes:

## 2021-06-18 NOTE — PATIENT INSTRUCTIONS - HE
Patient Instructions by Kailey Chavez MD at 2/25/2021  8:15 AM     Author: Kailey Chavez MD Service: -- Author Type: Physician    Filed: 2/25/2021  8:49 AM Encounter Date: 2/25/2021 Status: Signed    : Kailey Chavez MD (Physician)       Patient Education     Osimertinib oral tablets  Brand Name: Tagrisso  What is this medicine?  Osimertinib (OH sim ER ti nib) is a medicine that targets proteins in cancer cells and stops the cancer cells from growing. It is used to treat non-small cell lung cancer.  How should I use this medicine?  Take osimertinib tablets by mouth with a glass of water. You can take it with or without food. Follow the directions on the prescription label. Take your medicine at regular intervals. Do not take it more often than directed. Do not stop taking except on your doctor's advice. If swallowing is difficult, you can take the tablet by placing your dose in a container with 2 ounces of cool water only. Stir until the tablet is in small pieces. The tablet will not completely dissolve. Do not crush or chew. Drink the water and the tablet pieces right away. Then add 4 to 8 ounces of water to the same container and drink to make sure you take your full dose.  Talk to your pediatrician regarding the use of this medicine in children. Special care may be needed.  What side effects may I notice from receiving this medicine?  Side effects that you should report to your doctor or health care professional as soon as possible:    allergic reactions like skin rash, itching or hives, swelling of the face, lips, or tongue    red spots on the skin    signs and symptoms of a dangerous change in heartbeat or heart rhythm like chest pain; dizziness; fast or irregular heartbeat; palpitations; feeling faint or lightheaded, falls; breathing problems    signs and symptoms of increased potassium like muscle weakness; chest pain; or fast, irregular  heartbeat    signs and symptoms of low potassium like muscle cramps or muscle pain; chest pain; dizziness; feeling faint or lightheaded, falls; palpitations; breathing problems; or fast, irregular heartbeat    swelling of the legs or ankles    unusually weak or tired  Side effects that usually do not require medical attention (report to your doctor or health care professional if they continue or are bothersome):    diarrhea    dry skin    nail changes  What may interact with this medicine?  Do not take this medicine with any of the following medications:    cisapride    dofetilide    dronedarone    penicillamine    pimozide    thioridazine    ziprasidone  This medicine may interact with the following medications:    certain medicines for seizures like carbamazepine, phenobarbital, phenytoin    other medicines that prolong the QT interval (cause an abnormal heart rhythm)    rifampin    rosuvastatin    Oscar's Wort    sulfasalazine    topotecan  What if I miss a dose?  If you miss a dose, do not make up for the missed dose. Take your next dose at your regular time. Do not take extra or double doses.  Where should I keep my medicine?  Keep out of the reach of children.  Store at room temperature between 20 and 25 degrees C (68 and 77 degrees F). Throw away any unused medicine after the expiration date.  What should I tell my health care provider before I take this medicine?  They need to know if you have any of these conditions:    heart disease    history of irregular heartbeat    history of low levels of calcium, magnesium, or potassium in the blood    lung or breathing disease, like asthma    QT prolongation    scarring or thickening of the lungs    an unusual or allergic reaction to osimertinib, other medicines, foods, dyes, or preservatives    pregnant or trying to get pregnant    breast-feeding  What should I watch for while using this medicine?  Visit your doctor for regular check ups. Report any side  effects. Continue your course of treatment unless your doctor tells you to stop. You will need blood work done while you are taking this medicine.  Do not become pregnant while taking this medicine or for 6 weeks after the last dose. Males with female partners of reproductive potential should use effective contraception for 4 months after the last dose. Women should inform their doctor if they wish to become pregnant or think they might be pregnant. There is a potential for serious side effects to an unborn child. This medicine may interfere with the ability to have a child for both men and women. You should talk with your doctor or health care professional if you are concerned about your fertility. Talk to your health care professional or pharmacist for more information. Do not breast-feed an infant while taking this medicine or for 2 weeks after the last dose.  This drug may make you feel generally unwell. This is not uncommon, as chemotherapy can affect healthy cells as well as cancer cells. Report any side effects. Continue your course of treatment even though you feel ill unless your doctor tells you to stop.  NOTE:This sheet is a summary. It may not cover all possible information. If you have questions about this medicine, talk to your doctor, pharmacist, or health care provider. Copyright  2018 [employer]

## 2021-06-19 NOTE — PROGRESS NOTES
Infectious Disease Progress Note     ASSESSMENT: 1. AIDS x years with on/off adherence but good the past few years.  Last CD4 265 6/17, VL < 20 4/17,  2. Allergic to INSTIs ( Isentress and Eltegravir) HIV is Resistant to Sustiva.   Thus he has been on Intellence + Trizivir x years with neg VL.  3. Colon CA stage 3 2015, s/p R hemicolectomy at [facility]. Refused adjuvant chemotherapy. F/U CT scan neg   And f/u coloscopy neg 4/18   4. Elevated PSA 2/17. Seen at [facility] urology. Had a scan and bx. Results neg for CA  5. Went to [facility] x 1 mo. Got mild falcip malarian on 5/2/18 after return.  Treated with malarone. Given Primiquine but only took x 3 days. Returned to [place] for [place] days and took malarone until 7 days after return.  6. Hemorrhoids  7. Mild macrocytosis due to AZT in Trizivir.   8. Nocturnal cough resolved.  9. Job: drives for [employer].       PLAN: 1. CBD, HIV, BMP  2. RTC 6 mos.   Thanks,  Chance Andersen MD    __________________________________________________________________________________________________________________________________    Subjective: doing well. No recurrence of CA. Drives for [employer] from 2pm to  [place]  Wife ok.  Family OK.    Review of systems:  Patient denies fever, chills, cough, sputum, vomiting, diarrhea, dysuria, urgency, flank pain, headache, stiff neck, rash, swollen glands or pain at IV sites.  SH/FH/ Habits/ PMH reviewed and unchanged. Nocturnal cough gone.  Objective:   There were no vitals taken for this visit.  /62 (Patient Site: Right Arm, Patient Position: Sitting, Cuff Size: Adult Regular)  Pulse 72  Wt 158 lb (71.7 kg)  BMI 22.04 kg/m2      Exam:looks well.  Skin: no rashes or petechiae  Nodes: no cervical, axillary or inguinal adenopathy  ENT: TMs clear, oropharynx without exudate or thrush  Respiratory: normal respiratory patter, no rales or rubs.  CV: normal heart tones. No rub, murmur or S3. No JVD.  Abdmomen: soft, normal bowel sounds, non-tender, no masses  or   organomegaly    Back: No spine or CVA tenderness  Extremities: normal venous pattern. Good pulses. No edema. No joint effusions.  Neurologic: oriented x 3. Cranial nerves 2-12 intact. No focal weakness or sensory loss.                 Lab Results   Component Value Date    ALT 15 12/05/2017    AST 17 12/05/2017    ALKPHOS 74 12/05/2017    BILITOT 0.3 12/05/2017       Imaging:none new  Microbiology:pending.    Patient Active Problem List   Diagnosis     Post-nasal drip     Malaria     HIV (human immunodeficiency virus infection) (H)

## 2021-06-19 NOTE — LETTER
Letter by Chance Andersen MD at      Author: Chance Andersen MD Service: -- Author Type: --    Filed:  Encounter Date: 8/27/2019 Status: (Other)         August 27, 2019     Harris MONAHAN MD  [address] [place] [id number]    Patient: Antonio Mercado   MR Number: [id number]   YOB: 1955   Date of Visit: 8/27/2019     Dear Dr. Usman MD:    Thank you for referring Antonio Mercado to me for evaluation. Below are the relevant portions of my assessment and plan of care.    If you have questions, please do not hesitate to call me. I look forward to following Antonio along with you.    Sincerely,        Chance Andersen MD        CC  No Recipients    Progress Notes:

## 2021-06-21 NOTE — LETTER
Letter by Yanni Dick RN at      Author: Yanni Dick RN Service: -- Author Type: --    Filed:  Encounter Date: 1/11/2021 Status: (Other)       Dear Antonio,    Thank you for choosing [facility] for your care.  We are committed to providing you with the highest quality and compassionate healthcare services.  The following information pertains to your first appointment with our clinic.      Due to the current COVID-19 pandemic, visitor restrictions are place.  You must arrive to your appointment unaccompanied, however, a friend or family member is welcome to join the appointment by phone.        For your protection and the protection of other patients and staff, please arrive for your appointment wearing a mask. Homemade masks, earloop masks, and clothing, such as a bandana or scarf, that covers your nose and mouth are acceptable. If you are interested, our website [email or web address] has instructions on how to make a mask.      Date/Time of appointment: Tuesday, 1/19/21, 10:30 AM     Name of your Physician: Dr. Kailey Chavez    What to bring to your appointment:      Completed Patient History/Initial Nursing Assessment and Medication/Allergy List (these forms were sent to you).    Any paperwork or films from your physician that we have asked you to bring.    Your current insurance card(s).    Parking:      Please refer to the map included to direct you.  The [facility] is located at the [address] end of [facility] in [place], [place].      After turning onto [address] from [facility], take a right turn at the first stop sign.  We have designated parking on the left, identified as parking for Cancer Care patients (Lot D).     The Code to Enter Lot D is: 0101. This code changes monthly and will always coincide with the current month followed by 01. For example August will be 0801.  The month will continue to change but the 01 will remain constant.  If lot D is  full please use Parking Lot A, directly across the street.    Please enter the Cancer Care Center on the north end of the hospital.  You will see a sign on the building.        For Medical Oncology or Hematology appointments, please take the elevator to the second floor to check in.   For Radiation Oncology appointments, please go straight through the double doors and check in.     Also please note appointments can last 1.5-2 hours.      We hope these instructions are helpful to you.  If you have any questions or concerns, please call us at [phone].  It is our pleasure to assist you.    Warm Regards,  Kendra Dick, RN  Nurse Navigator  [phone]

## 2021-06-24 NOTE — PROGRESS NOTES
"Nutrition Assessment/education    Patient seen for outpatient MNT initial assessment.    Referring diagnosis: HIV disease (H)  Malignant neoplasm of hepatic flexure (H)  Per MD referral:  Malnutrition: patient following extreme vegetable only diet now with weight loss and neuropathy.     Height: Height: 5' 11\" (1.803 m)    Weight: 155 lbs on 2/12/19  BMI:21.7  BMI indication: 18.5-24.9 normal weight  Patient's Goal Weight: 168 lbs   Weight history:  163 lbs 2/22/18                             168 lbs 4/3/18                             161 lbs 5/24/18    Labs: Vitamin B1: 44    Assessment of diet/weight history: Patient stated that he lost weight when he stopped eating meat about 6 months ago. Patient stated he now eats chicken and fish but no red meat. Patient only eats organic foods.    Typical foods eaten include:  22 ounces of celery juice and 22 ounces of carrot juice daily, avocado daily, large variety of vegetables with 1 -2 ounce chicken breast or fish two times per day served with brown rice and sometimes beans.  Snacks include a large bowl of fruit and sometimes he eats some nuts.   Beverages include: water, no milk, no soy milk but patient stated he will start to drink soy milk, no alcohol.  Patient stated he takes an iron supplement every day and was told to take Vitamin B1 for 20 days.      Assessment of physical activity: Patient stated he usually walks 30 minutes every day.     Nutrition intervention that addressed medical nutrition therapy for above diagnosis: Discussed food high in calories to help with weight gain.  Estimated nutrition needs: 2112 - 2464 calories per day or  30 - 35 kcals with 1.2 - 1.4 grams protein or 84 - 98 grams protein per day.  Discussed foods high in Vitamin B1 (whole grains, fish and poultry) and encouraged he eat at least 3 servings of whole grains per day.    Education materials provided:  Handout on foods high in calories and protein from the Nutrition Care Manual. " Handout on food sources of Vitamin B1.     Goals:   Patient stated he will try to eat 3 meals and 1 - 2 snacks per day.  He stated he will add more nuts to his snacks. He works as an [employer]  and plans on having some nuts with his fruit snack.  Patient stated he will start buying fortified soy milk (recommended 3 (8 ounces) servings per day.   Patient stated he will start eating a peanut butter soup with vegetables and chicken again.  He stated he had stopped eating this and now plans on eating it again.   Patient stated he will try to eat 3 servings of whole grains per day.     Patient had no barriers to learning, verbalized understanding, and compliance is expected.    Phone number provided for questions. Recommended follow-up in as needed.  Total time spent face-to-face with patient: 45 minutes.     Thank you for this consult. Call me at [phone] for questions.  MARILU Gardner

## 2021-06-24 NOTE — PROGRESS NOTES
Infectious Disease Progress Note    ASSESSMENT: 1. HIV/ AIDS x years on Intellence and Trizivir. Allergic to Raltegravir.  Last viral load < 20 8/7/18.  Runs a low WBC (2.5 - 3.2) and a macrocytic anemia with high MCV due to Trizivir-last Hgb - 10-12.4 range.  Ran out of meds/ insurance benefits but just got back on meds last week after a hiatus since 1/1 due to insurance issues.  Getting meds thru [place] [employer] [place]  2. Hx of R hemicolectomy for stage II colon cancer 2/16  Had colonoscopy at  12/17: some bleeding at anastomosos and tics seen. Benign polyp removed.  Next colonscopy in March, 2019.  3. Hx of elevated PSA to 9.4. Had a bx at the [facility]. Results equivocal. F/U biopsy at 6 mos. Has not gone back.  He is planning on making another bx soon.    PLAN: 1. Change Rx ot Odefsey (rilpivarine (2nd gen NNRTI) + Tenofovir alafeamide + Emtricitabine).  This should be just as good as present 4 drug Rx and w/o the Heme side-effects.  2. HIV viral load today  3. RTC 2 mos to be sure it is effective and f/u CBC then too.    Thanks,  Chance Andersen MD      ______________________________________________________________________    Subjective: co itchy back and burning feet. No other issues.  Drives for [employer]. Son is an MD in [place] now.  Family ok    Review of systems:  Patient denies fever, chills, cough, sputum, vomiting, diarrhea, dysuria, urgency, flank pain, headache, stiff neck, rash, swollen glands or pain at IV sites. Co some burning on urination.  Wt stable. No blood in stool  SH/FH/ Habits/ PMH reviewed and unchanged.  Current Outpatient Medications on File Prior to Visit   Medication Sig Dispense Refill     abacavir-lamiVUDine-zidovudine (TRIZIVIR) 300-150-300 mg per tablet TAKE 1 T PO BID  3     ARTIFICIAL TEARS,[id number], 0.1-0.3 % Drop INSTILL 1 DROP TO EYE BID PRN  0     baclofen (LIORESAL) 10 MG tablet TK ONE T PO TID PRF MUSCLE SPAMS  0     benzonatate (TESSALON PERLES) 100 MG capsule Take 1 capsule  (100 mg total) by mouth 3 (three) times a day as needed for cough. 30 capsule 1     fluconazole (DIFLUCAN) 200 MG tablet        INTELENCE 200 mg Tab tablet TK 1  T  PO   BID  WITH MEALS  3     naproxen (NAPROSYN) 375 MG tablet TK 1 T PO BID PRF MODERATE PAIN. TAKE WITH FOOD  0     ranitidine (ZANTAC) 150 MG tablet        sildenafil (VIAGRA) 50 MG tablet Take 50 mg by mouth as needed for erectile dysfunction.       tamsulosin (FLOMAX) 0.4 mg Cp24        No current facility-administered medications on file prior to visit.        Objective:   Exam: looks fine. Slender.  Nodes: no cervical, axillary or inguinal adenopathy  ENT: TMs clear, oropharynx without exudate or thrush  Eyes: anicteric. EOMs full. KARIME.  Respiratory: normal respiratory patter, no rales or rubs.  CV: normal heart tones. No rub, murmur or S3. No JVD.  Abdmomen: soft, normal bowel sounds, non-tender, no masses or   organomegaly    Back: No spine or CVA tenderness  Extremities: normal venous pattern. Good pulses. No edema. No joint effusions.  Feet normal.  Skin: no rashes or petechiae  Neurologic: oriented x 3. Cranial nerves 2-12 intact. No focal weakness or sensory loss.                 Lab Results   Component Value Date    ALT 21 01/25/2019    AST 23 01/25/2019    ALKPHOS 64 01/25/2019    BILITOT 0.7 01/25/2019     Lab Results   Component Value Date    WBC 2.5 (L) 08/07/2018    HGB 10.9 (L) 08/07/2018    HCT 32.5 (L) 08/07/2018     (H) 08/07/2018     08/07/2018             Imaging:none new  Microbiology: VL < 20 8/7/18    Patient Active Problem List   Diagnosis     Post-nasal drip     Malaria     HIV (human immunodeficiency virus infection) (H)

## 2021-06-24 NOTE — PATIENT INSTRUCTIONS - HE
Go to the lab for a blood and urine test.    Stop the Trizivir and Intellence once you get the new medication.    Start the new medicine: Odefsey once a day (it is 3 medicines in one).    Make a return appt in 2 mos.    Chance Andersen

## 2021-06-24 NOTE — TELEPHONE ENCOUNTER
HIV Genotyping cannot be added on to blood already obtained. Patient will have to be redrawn. Please cancel test and reorder as future.

## 2021-06-24 NOTE — PROGRESS NOTES
Addendum: VL [id number].  Lab did not keep specimen for genotype.  Will reorder Genoype.    Chance Andersen

## 2021-06-24 NOTE — TELEPHONE ENCOUNTER
Nathaly    Provider, Dr Harris [facility] for a return call to discuss recent labs.

## 2021-06-25 NOTE — TELEPHONE ENCOUNTER
Oral Chemotherapy Monitoring Program    Oral Chemotherapy Monitoring    Date: 6/9/21  Primary Oncologist: Kailey Chavez MD  Primary Oncology Clinic:  MEDICAL ONCOLOGY  Cancer Diagnosis: Non-small cell lung cancer (NSCLC) (H)  Regimen: NON-SMALL CELL LUNG CANCER (T790M+)- OSIMERTINIB  Drug Name: Osimertinib  Current Dosage: 80 mg  Current Frequency: Daily  Cycle Details: Continuous  Encounter: Mid-Cycle Assessment   New Start:   until disease progression or unacceptable toxcities   Intervention: Adherence/Side Effects      Who was educated?: Patient       Mid-Cycle Assessment:  Start Date of Last Cycle: 3/18/21   Unplanned doses missed in last 2 weeks: 0   Adherence assessment : adherent   Drug Interaction Assessment: No new medications reported   Adverse Effects reported: 0   Next pharmacist intervention date: 6/17/21   Intervention: R               Assessment/Plan:  Antonio denied any side effects and is tolerating therapy well. Denied missing any doses in the last 2 weeks. Continue as prescribed.    Follow-Up:  Will review appointment with Dr. Chavez on 6/17 and then plan for next follow up assessment a few weeks later. If he is still doing well then with no side effects will consider spacing him out to quarterly assessment calls. Antonio agrees to the plan and thanked me for the call.    Hesham Pacheco, PharmD, BCOP  Oral Chemotherapy Pharmacist  [phone]

## 2021-06-26 PROBLEM — C34.91 PRIMARY MALIGNANT NEOPLASM OF RIGHT LUNG METASTATIC TO OTHER SITE (H): Status: ACTIVE | Noted: 2021-01-01

## 2021-06-26 NOTE — PROGRESS NOTES
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 diagnosed January 2021  EGFR [id number](exon 19); RB1 552; TP53 L194R  PD-L1, HIGH EXPRESSION    MSI STABLE(CA); TMB LOW  NO FISH ALTERATIONS  MET Exon 14 Deletion: NOT DETECTED; Marroquin TRK, Not expressed  No abnormalities in ALK, BRAF, ERBB2, HER2, MET, RET, ROS1    History of colon Cancer status post hemicolectomy  History of prostate cancer with elevated PSA, declined follow-up  HIV/AIDS  Macrocytic anemia probably related to medication  Thyroid nodule on PET scan  Rash, skin breakdown, left buttock area  Hospitalization in April for bright red blood per rectum probably due to hemorrhoids, requiring transfusion    Patient with excellent clinical response to therapy.  No shortness of breath or chest wall pain and appetite and weight and activity level are all improved.  Labs are stable and EKG is stable as well.  We will continue Osimertinib.  We will see back in 1 month with repeat labs, EKG and also along the skin.  We will plan restaging scans in August.    Plan: As above      Current therapy: Start Osimertinib 80 mg once daily, March 18, 2021    Treatment history:  Carbo Alimta alone without Keytruda, 20% dose reduction, cycle 2 on February 25, 2021  Carboplatin, Alimta and Keytruda to start February 4, 2021        ECOG Performance        Distress Assessment  Distress Assessment Score: No distress    Pain         Problem List    1. Primary malignant neoplasm of right lung metastatic to other site (H)  NM Muga    ECG 12 lead with MUSE        CC: Harris Mary  MD    ______________________________________________________________________________    History of Present Illness    Mr. Antonio Mercado is here for a follow-up.  Seen 1 month ago.  Continues Osimertinib.  No side effects such as rash, diarrhea and lab work is fine.  Breathing is excellent with no chest pain.  Appetite is good and weight is up.  ECOG status is 0.    Pain Status  Currently in Pain: No/denies    Review of Systems    12 point review of systems completed     Patient Coping     Distress Assessment  Distress Assessment Score: No distress  Accompanied by  Accompanied by: Alone    Past History  Past Medical History:   Diagnosis Date     Constipation      HIV (human immunodeficiency virus infection) (H)      Hypertrophy of prostate with urinary obstruction      Iron deficiency anemia      Malignant neoplasm of hepatic flexure (H)     s/p right hemicolectomy in 2016         Past Surgical History:   Procedure Laterality Date     COLON SURGERY       HERNIA REPAIR       [place] ESOPHAGOGASTRODUODENOSCOPY TRANSORAL DIAGNOSTIC N/A 4/13/2021    Procedure: ESOPHAGOGASTRODUODENOSCOPY (EGD);  Surgeon: Harris Mon MD;  Location: [facility];  Service: Gastroenterology     US THORACENTESIS  1/5/2021     US THORACENTESIS  1/18/2021     US THORACENTESIS  1/26/2021     US THORACENTESIS  1/29/2021     US THORACENTESIS  2/2/2021     US THORACENTESIS  2/5/2021     US THORACENTESIS  2/9/2021     US THORACENTESIS  2/12/2021     US THORACENTESIS  2/15/2021     US THORACENTESIS  2/19/2021     US THORACENTESIS  2/23/2021     US THORACENTESIS  2/26/2021     US THORACENTESIS  3/2/2021     US THORACENTESIS  3/9/2021       Physical Exam    Recent Vitals 6/17/2021   Height -   Weight 154 lbs   BSA (m2) 1.86 m2   /61   Pulse 74   Temp 98.2   Temp src 1   SpO2 100   Some recent data might be hidden       GENERAL: Alert and oriented to time place and person. Seated comfortably. In no distress.    HEAD: Atraumatic and  normocephalic.    EYES: KARIME, EOMI.  No pallor.  No icterus.    Oral cavity: no mucosal lesion or tonsillar enlargement.    NECK: supple. JVP normal.  No thyroid enlargement.    LYMPH NODES: No palpable, cervical, axillary or inguinal lymphadenopathy.    CHEST: clear to auscultation bilaterally.  Resonant to percussion throughout bilaterally.  Symmetrical breath movements bilaterally.    CVS: S1 and S2 are heard. Regular rate and rhythm.  No murmur or gallop or rub heard.  No peripheral edema.    ABDOMEN: Soft. Not tender. Not distended.  No palpable hepatomegaly or splenomegaly.  No other mass palpable.  Bowel sounds heard.    EXTREMITIES: Warm.    SKIN: no rash, or bruising or purpura.  Grade 1 skin breakdown in the cleft between the buttocks on the right side.  Other areas are healed.    Has a full head of hair.      Lab Results    Recent Results (from the past 168 hour(s))   Comprehensive Metabolic Panel   Result Value Ref Range    Sodium 137 136 - 145 mmol/L    Potassium 4.4 3.5 - 5.0 mmol/L    Chloride 102 98 - 107 mmol/L    CO2 22 22 - 31 mmol/L    Anion Gap, Calculation 13 5 - 18 mmol/L    Glucose 108 70 - 125 mg/dL    BUN 20 8 - 22 mg/dL    Creatinine 1.44 (H) 0.70 - 1.30 mg/dL    GFR MDRD Af Amer 59 (L) >60 mL/min/1.73m2    GFR MDRD Non Af Amer 49 (L) >60 mL/min/1.73m2    Bilirubin, Total 0.4 0.0 - 1.0 mg/dL    Calcium 9.1 8.5 - 10.5 mg/dL    Protein, Total 7.3 6.0 - 8.0 g/dL    Albumin 3.5 3.5 - 5.0 g/dL    Alkaline Phosphatase 80 45 - 120 U/L    AST 13 0 - 40 U/L    ALT <9 0 - 45 U/L   HM1 (CBC with Diff)   Result Value Ref Range    WBC 3.6 (L) 4.0 - 11.0 thou/uL    RBC 3.90 (L) 4.40 - 6.20 mill/uL    Hemoglobin 11.0 (L) 14.0 - 18.0 g/dL    Hematocrit 33.9 (L) 40.0 - 54.0 %    MCV 87 80 - 100 fL    MCH 28.2 27.0 - 34.0 pg    MCHC 32.4 32.0 - 36.0 g/dL    RDW 15.6 (H) 11.0 - 14.5 %    Platelets 170 140 - 440 thou/uL    MPV 8.5 7.0 - 10.0 fL    Neutrophils % 61 50 - 70 %    Lymphocytes % 30 20 - 40 %     Monocytes % 7 2 - 10 %    Eosinophils % 1 0 - 6 %    Basophils % 0 0 - 2 %    Immature Granulocyte % 0 <=0 %    Neutrophils Absolute 2.2 2.0 - 7.7 thou/uL    Lymphocytes Absolute 1.1 0.8 - 4.4 thou/uL    Monocytes Absolute 0.3 0.0 - 0.9 thou/uL    Eosinophils Absolute 0.0 0.0 - 0.4 thou/uL    Basophils Absolute 0.0 0.0 - 0.2 thou/uL    Immature Granulocyte Absolute 0.0 <=0.0 thou/uL   ECG 12 lead with MUSE   Result Value Ref Range    SYSTOLIC BLOOD PRESSURE      DIASTOLIC BLOOD PRESSURE      VENTRICULAR RATE 67 BPM    ATRIAL RATE 67 BPM    P-R INTERVAL 184 ms    QRS DURATION 90 ms    Q-T INTERVAL 376 ms    QTC CALCULATION (BEZET) 397 ms    P Axis 52 degrees    R AXIS 32 degrees    T AXIS 40 degrees    MUSE DIAGNOSIS       Normal sinus rhythm  Normal ECG  When compared with ECG of 17-MAY-2021 10:13,  No significant change was found     HM1 (CBC with Diff)   Result Value Ref Range    WBC 3.5 (L) 4.0 - 11.0 thou/uL    RBC 4.15 (L) 4.40 - 6.20 mill/uL    Hemoglobin 11.5 (L) 14.0 - 18.0 g/dL    Hematocrit 37.2 (L) 40.0 - 54.0 %    MCV 90 80 - 100 fL    MCH 27.7 27.0 - 34.0 pg    MCHC 30.9 (L) 32.0 - 36.0 g/dL    RDW 15.9 (H) 11.0 - 14.5 %    Platelets 199 140 - 440 thou/uL    MPV 10.1 8.5 - 12.5 fL    Neutrophils % 59 50 - 70 %    Lymphocytes % 32 20 - 40 %    Monocytes % 8 2 - 10 %    Eosinophils % 1 0 - 6 %    Basophils % 0 0 - 2 %    Immature Granulocyte % 0 <=0 %    Neutrophils Absolute 2.1 2.0 - 7.7 thou/uL    Lymphocytes Absolute 1.1 0.8 - 4.4 thou/uL    Monocytes Absolute 0.3 0.0 - 0.9 thou/uL    Eosinophils Absolute 0.0 0.0 - 0.4 thou/uL    Basophils Absolute 0.0 0.0 - 0.2 thou/uL    Immature Granulocyte Absolute 0.0 <=0.0 thou/uL       Imaging    No results found.      Signed by: Kailey Chavez MD

## 2021-06-26 NOTE — PROGRESS NOTES
"Oncology Rooming Note    06/17/21 10:45 AM    Antonio Mercado is a 65 y.o. male who presents for:    Chief Complaint   Patient presents with     HE Cancer     Primary malignant neoplasm of right lung metastatic to other site       Initial Vitals: /61   Pulse 74   Temp 98.2  F (36.8  C) (Oral)   Wt 154 lb (69.9 kg)   SpO2 100%   BMI 22.10 kg/m       Estimated body mass index is 22.1 kg/m  as calculated from the following:    Height as of 4/12/21: 5' 10\" (1.778 m).    Weight as of this encounter: 154 lb (69.9 kg).     Body surface area is 1.86 meters squared.      Allergies reviewed: Yes  Medications reviewed: Yes    Refills needed: No     Clinical concerns: no concerns      Susy Grossman RN, Oncology Clinic   [facility]    "

## 2021-07-03 NOTE — ADDENDUM NOTE
Addendum Note by Chance Valles MD at 2/12/2019  3:10 PM     Author: Chance Valles MD Service: -- Author Type: Physician    Filed: 2/25/2019  8:32 AM Encounter Date: 2/12/2019 Status: Signed    : Chance Valles MD (Physician)    Addended by: CHANCE VALLES on: 2/25/2019 08:32 AM        Modules accepted: Orders

## 2021-07-03 NOTE — ADDENDUM NOTE
Addendum Note by Mya Laura RD at 2/18/2019  3:19 PM     Author: Mya Laura RD Service: -- Author Type: Registered Dietitian    Filed: 2/18/2019  3:19 PM Date of Service: 2/18/2019  3:19 PM Status: Signed    : Mya Laura RD (Registered Dietitian)    Encounter addended by: Mya Laura RD on: 2/18/2019  3:19 PM      Actions taken: Sign clinical note

## 2021-07-03 NOTE — ADDENDUM NOTE
Addendum Note by Shiloh Gamez MLT at 1/22/2021  3:50 PM     Author: Shiloh Gamez MLT Service: -- Author Type:     Filed: 1/22/2021  4:34 PM Encounter Date: 1/22/2021 Status: Signed    : Shiloh Gamez MLT ()    Addended by: SHILOH GAMEZ on: 1/22/2021 04:34 PM        Modules accepted: Orders

## 2021-07-04 NOTE — ADDENDUM NOTE
Addendum Note by Haylie Faria CMA at 2/9/2021  2:00 PM     Author: Haylie Faria CMA Service: -- Author Type: Certified Medical Assistant    Filed: 2/9/2021  2:18 PM Encounter Date: 2/9/2021 Status: Signed    : Haylie Faria CMA (Certified Medical Assistant)    Addended by: HAYLIE FARIA on: 2/9/2021 02:18 PM        Modules accepted: Orders
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
## 2021-07-16 NOTE — PROGRESS NOTES
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 diagnosed January 2021  EGFR [id number](exon 19); RB1 552; TP53 L194R  PD-L1, HIGH EXPRESSION    MSI STABLE(CA); TMB LOW  NO FISH ALTERATIONS  MET Exon 14 Deletion: NOT DETECTED; Marroquin TRK, Not expressed  No abnormalities in ALK, BRAF, ERBB2, HER2, MET, RET, ROS1    History of colon Cancer status post hemicolectomy  History of prostate cancer with elevated PSA, declined follow-up  HIV/AIDS  Macrocytic anemia probably related to medication  Thyroid nodule on PET scan  Rash, skin breakdown, left buttock area  Hospitalization in April for bright red blood per rectum probably due to hemorrhoids, requiring transfusion    Patient continues to feel well.  Tolerating Osimertinib well.  No concern for progression.  Labs and EKG are stable.  We will continue the medication.  We will see him back in a month with restaging CT scans, labs and EKG.    Plan: As above    Current therapy: Start Osimertinib 80 mg once daily, March 18, 2021    Treatment history:  Carbo Alimta alone without Keytruda, 20% dose reduction, cycle 2 on February 25, 2021  Carboplatin, Alimta and Keytruda to start February 4, 2021        ECOG Performance        Distress Assessment  Distress Assessment Score: No distress    Pain         Problem List    1. Primary malignant neoplasm of right lung metastatic to other site (H)  NM Muga    ECG 12 lead with MUSE        CC: Harris Mary MD    ______________________________________________________________________________    History of Present Illness    Mr. Antonio Mercado is here for a follow-up.  Seen 1 month ago.  Continues  Osimertinib.  No rash or diarrhea.  No shortness of breath or cough.  No lower extremity edema.  ECOG status is 0.      Pain Status  Currently in Pain: No/denies    Review of Systems    12 point review of systems completed     Patient Coping     Distress Assessment  Distress Assessment Score: No distress  Accompanied by  Accompanied by: Alone    Past History  Past Medical History:   Diagnosis Date     Constipation      HIV (human immunodeficiency virus infection) (H)      Hypertrophy of prostate with urinary obstruction      Iron deficiency anemia      Malignant neoplasm of hepatic flexure (H)     s/p right hemicolectomy in 2016         Past Surgical History:   Procedure Laterality Date     COLON SURGERY       HERNIA REPAIR       SD ESOPHAGOGASTRODUODENOSCOPY TRANSORAL DIAGNOSTIC N/A 4/13/2021    Procedure: ESOPHAGOGASTRODUODENOSCOPY (EGD);  Surgeon: Harris Mon MD;  Location: [facility];  Service: Gastroenterology     US THORACENTESIS  1/5/2021     US THORACENTESIS  1/18/2021     US THORACENTESIS  1/26/2021     US THORACENTESIS  1/29/2021     US THORACENTESIS  2/2/2021     US THORACENTESIS  2/5/2021     US THORACENTESIS  2/9/2021     US THORACENTESIS  2/12/2021     US THORACENTESIS  2/15/2021     US THORACENTESIS  2/19/2021     US THORACENTESIS  2/23/2021     US THORACENTESIS  2/26/2021     US THORACENTESIS  3/2/2021     US THORACENTESIS  3/9/2021       Physical Exam    Recent Vitals 6/17/2021   Height -   Weight 154 lbs   BSA (m2) 1.86 m2   /61   Pulse 74   Temp 98.2   Temp src 1   SpO2 100   Some recent data might be hidden       GENERAL: Alert and oriented to time place and person. Seated comfortably. In no distress.    HEAD: Atraumatic and normocephalic.    EYES: KARIME, EOMI.  No pallor.  No icterus.    Oral cavity: no mucosal lesion or tonsillar enlargement.    NECK: supple. JVP normal.  No thyroid enlargement.    LYMPH NODES: No palpable, cervical, axillary or inguinal lymphadenopathy.    CHEST:  clear to auscultation bilaterally.  Resonant to percussion throughout bilaterally.  Symmetrical breath movements bilaterally.    CVS: S1 and S2 are heard. Regular rate and rhythm.  No murmur or gallop or rub heard.  No peripheral edema.    ABDOMEN: Soft. Not tender. Not distended.  No palpable hepatomegaly or splenomegaly.  No other mass palpable.  Bowel sounds heard.    EXTREMITIES: Warm.    SKIN: no rash, or bruising or purpura.  Grade 1 skin breakdown in the cleft between the buttocks on the right side.  Other areas are healed.    Has a full head of hair.      Lab Results    Recent Results (from the past 168 hour(s))   Comprehensive Metabolic Panel   Result Value Ref Range    Sodium 137 136 - 145 mmol/L    Potassium 4.4 3.5 - 5.0 mmol/L    Chloride 102 98 - 107 mmol/L    CO2 22 22 - 31 mmol/L    Anion Gap, Calculation 13 5 - 18 mmol/L    Glucose 108 70 - 125 mg/dL    BUN 20 8 - 22 mg/dL    Creatinine 1.44 (H) 0.70 - 1.30 mg/dL    GFR MDRD Af Amer 59 (L) >60 mL/min/1.73m2    GFR MDRD Non Af Amer 49 (L) >60 mL/min/1.73m2    Bilirubin, Total 0.4 0.0 - 1.0 mg/dL    Calcium 9.1 8.5 - 10.5 mg/dL    Protein, Total 7.3 6.0 - 8.0 g/dL    Albumin 3.5 3.5 - 5.0 g/dL    Alkaline Phosphatase 80 45 - 120 U/L    AST 13 0 - 40 U/L    ALT <9 0 - 45 U/L   HM1 (CBC with Diff)   Result Value Ref Range    WBC 3.6 (L) 4.0 - 11.0 thou/uL    RBC 3.90 (L) 4.40 - 6.20 mill/uL    Hemoglobin 11.0 (L) 14.0 - 18.0 g/dL    Hematocrit 33.9 (L) 40.0 - 54.0 %    MCV 87 80 - 100 fL    MCH 28.2 27.0 - 34.0 pg    MCHC 32.4 32.0 - 36.0 g/dL    RDW 15.6 (H) 11.0 - 14.5 %    Platelets 170 140 - 440 thou/uL    MPV 8.5 7.0 - 10.0 fL    Neutrophils % 61 50 - 70 %    Lymphocytes % 30 20 - 40 %    Monocytes % 7 2 - 10 %    Eosinophils % 1 0 - 6 %    Basophils % 0 0 - 2 %    Immature Granulocyte % 0 <=0 %    Neutrophils Absolute 2.2 2.0 - 7.7 thou/uL    Lymphocytes Absolute 1.1 0.8 - 4.4 thou/uL    Monocytes Absolute 0.3 0.0 - 0.9 thou/uL    Eosinophils  Absolute 0.0 0.0 - 0.4 thou/uL    Basophils Absolute 0.0 0.0 - 0.2 thou/uL    Immature Granulocyte Absolute 0.0 <=0.0 thou/uL   ECG 12 lead with MUSE   Result Value Ref Range    SYSTOLIC BLOOD PRESSURE      DIASTOLIC BLOOD PRESSURE      VENTRICULAR RATE 67 BPM    ATRIAL RATE 67 BPM    P-R INTERVAL 184 ms    QRS DURATION 90 ms    Q-T INTERVAL 376 ms    QTC CALCULATION (BEZET) 397 ms    P Axis 52 degrees    R AXIS 32 degrees    T AXIS 40 degrees    MUSE DIAGNOSIS       Normal sinus rhythm  Normal ECG  When compared with ECG of 17-MAY-2021 10:13,  No significant change was found     HM1 (CBC with Diff)   Result Value Ref Range    WBC 3.5 (L) 4.0 - 11.0 thou/uL    RBC 4.15 (L) 4.40 - 6.20 mill/uL    Hemoglobin 11.5 (L) 14.0 - 18.0 g/dL    Hematocrit 37.2 (L) 40.0 - 54.0 %    MCV 90 80 - 100 fL    MCH 27.7 27.0 - 34.0 pg    MCHC 30.9 (L) 32.0 - 36.0 g/dL    RDW 15.9 (H) 11.0 - 14.5 %    Platelets 199 140 - 440 thou/uL    MPV 10.1 8.5 - 12.5 fL    Neutrophils % 59 50 - 70 %    Lymphocytes % 32 20 - 40 %    Monocytes % 8 2 - 10 %    Eosinophils % 1 0 - 6 %    Basophils % 0 0 - 2 %    Immature Granulocyte % 0 <=0 %    Neutrophils Absolute 2.1 2.0 - 7.7 thou/uL    Lymphocytes Absolute 1.1 0.8 - 4.4 thou/uL    Monocytes Absolute 0.3 0.0 - 0.9 thou/uL    Eosinophils Absolute 0.0 0.0 - 0.4 thou/uL    Basophils Absolute 0.0 0.0 - 0.2 thou/uL    Immature Granulocyte Absolute 0.0 <=0.0 thou/uL       Imaging    No results found.      Signed by: Kailey Chavez MD

## 2021-07-16 NOTE — LETTER
"    7/16/2021         RE: Antonio Mercado  [address]  [place] [address]        Dear Colleague,    Thank you for referring your patient, Antonio Mercado, to the [facility]. Please see a copy of my visit note below.    Oncology Rooming Note    07/16/21 8:43 AM     nAtonio Mercado is a 65 year old male who presents for:    Chief Complaint   Patient presents with     Oncology Clinic Visit     Malignant neoplasm of hepatic flexure       Initial Vitals: /67 (BP Location: Right arm, Patient Position: Sitting)   Pulse 68   Temp 98.5  F (36.9  C) (Oral)   Resp 18   Ht 1.778 m (5' 10\")   Wt 69.3 kg (152 lb 11.2 oz)   SpO2 98%   BMI 21.91 kg/m   Estimated body mass index is 21.91 kg/m  as calculated from the following:    Height as of this encounter: 1.778 m (5' 10\").    Weight as of this encounter: 69.3 kg (152 lb 11.2 oz). Body surface area is 1.85 meters squared.    Worst Pain (10) Comment: Data Unavailable     No LMP for male patient.    Allergies reviewed: Yes  Medications reviewed: Yes    Medications: Medication refills not needed today.  Pharmacy name entered into [employer]:    [id number] - [place], [place] - [address] [place] [address] AT [id number] - [place], [place] - [address] [place] [address] [facility]    Clinical concerns:  no concerns    Susy Grossman RN, Oncology Clinic   [facility], located within [facility] Hematology and Oncology Progress Note    Patient: Antonio Mercado  MRN: [id number]  Date of Service: 06/17/2021        Reason for Visit    Chief Complaint   Patient presents with     HE Cancer     Primary malignant neoplasm of right lung metastatic to other site       Assessment and Plan    Right lung adenocarcinoma with pleural metastasis and malignant pleural effusion diagnosed January 2021  EGFR [id number](exon 19); RB1 552; TP53 L194R  PD-L1, HIGH EXPRESSION    MSI " STABLE(CA); TMB LOW  NO FISH ALTERATIONS  MET Exon 14 Deletion: NOT DETECTED; Marroquin TRK, Not expressed  No abnormalities in ALK, BRAF, ERBB2, HER2, MET, RET, ROS1    History of colon Cancer status post hemicolectomy  History of prostate cancer with elevated PSA, declined follow-up  HIV/AIDS  Macrocytic anemia probably related to medication  Thyroid nodule on PET scan  Rash, skin breakdown, left buttock area  Hospitalization in April for bright red blood per rectum probably due to hemorrhoids, requiring transfusion    Patient continues to feel well.  Tolerating Osimertinib well.  No concern for progression.  Labs and EKG are stable.  We will continue the medication.  We will see him back in a month with restaging CT scans, labs and EKG.    Plan: As above    Current therapy: Start Osimertinib 80 mg once daily, March 18, 2021    Treatment history:  Carbo Alimta alone without Keytruda, 20% dose reduction, cycle 2 on February 25, 2021  Carboplatin, Alimta and Keytruda to start February 4, 2021        ECOG Performance        Distress Assessment  Distress Assessment Score: No distress    Pain         Problem List    1. Primary malignant neoplasm of right lung metastatic to other site (H)  NM Muga    ECG 12 lead with MUSE        CC: Harris Mary MD    ______________________________________________________________________________    History of Present Illness    Mr. Antonio Mercado is here for a follow-up.  Seen 1 month ago.  Continues Osimertinib.  No rash or diarrhea.  No shortness of breath or cough.  No lower extremity edema.  ECOG status is 0.      Pain Status  Currently in Pain: No/denies    Review of Systems    12 point review of systems completed     Patient Coping     Distress Assessment  Distress Assessment Score: No distress  Accompanied by  Accompanied by: Alone    Past History  Past Medical History:   Diagnosis Date     Constipation      HIV (human immunodeficiency virus infection) (H)      Hypertrophy of  prostate with urinary obstruction      Iron deficiency anemia      Malignant neoplasm of hepatic flexure (H)     s/p right hemicolectomy in 2016         Past Surgical History:   Procedure Laterality Date     COLON SURGERY       HERNIA REPAIR       [place] ESOPHAGOGASTRODUODENOSCOPY TRANSORAL DIAGNOSTIC N/A 4/13/2021    Procedure: ESOPHAGOGASTRODUODENOSCOPY (EGD);  Surgeon: Harris Mon MD;  Location: [facility];  Service: Gastroenterology     US THORACENTESIS  1/5/2021     US THORACENTESIS  1/18/2021     US THORACENTESIS  1/26/2021     US THORACENTESIS  1/29/2021     US THORACENTESIS  2/2/2021     US THORACENTESIS  2/5/2021     US THORACENTESIS  2/9/2021     US THORACENTESIS  2/12/2021     US THORACENTESIS  2/15/2021     US THORACENTESIS  2/19/2021     US THORACENTESIS  2/23/2021     US THORACENTESIS  2/26/2021     US THORACENTESIS  3/2/2021     US THORACENTESIS  3/9/2021       Physical Exam    Recent Vitals 6/17/2021   Height -   Weight 154 lbs   BSA (m2) 1.86 m2   /61   Pulse 74   Temp 98.2   Temp src 1   SpO2 100   Some recent data might be hidden       GENERAL: Alert and oriented to time place and person. Seated comfortably. In no distress.    HEAD: Atraumatic and normocephalic.    EYES: KARIME, EOMI.  No pallor.  No icterus.    Oral cavity: no mucosal lesion or tonsillar enlargement.    NECK: supple. JVP normal.  No thyroid enlargement.    LYMPH NODES: No palpable, cervical, axillary or inguinal lymphadenopathy.    CHEST: clear to auscultation bilaterally.  Resonant to percussion throughout bilaterally.  Symmetrical breath movements bilaterally.    CVS: S1 and S2 are heard. Regular rate and rhythm.  No murmur or gallop or rub heard.  No peripheral edema.    ABDOMEN: Soft. Not tender. Not distended.  No palpable hepatomegaly or splenomegaly.  No other mass palpable.  Bowel sounds heard.    EXTREMITIES: Warm.    SKIN: no rash, or bruising or purpura.  Grade 1 skin breakdown in the cleft between the  buttocks on the right side.  Other areas are healed.    Has a full head of hair.      Lab Results    Recent Results (from the past 168 hour(s))   Comprehensive Metabolic Panel   Result Value Ref Range    Sodium 137 136 - 145 mmol/L    Potassium 4.4 3.5 - 5.0 mmol/L    Chloride 102 98 - 107 mmol/L    CO2 22 22 - 31 mmol/L    Anion Gap, Calculation 13 5 - 18 mmol/L    Glucose 108 70 - 125 mg/dL    BUN 20 8 - 22 mg/dL    Creatinine 1.44 (H) 0.70 - 1.30 mg/dL    GFR MDRD Af Amer 59 (L) >60 mL/min/1.73m2    GFR MDRD Non Af Amer 49 (L) >60 mL/min/1.73m2    Bilirubin, Total 0.4 0.0 - 1.0 mg/dL    Calcium 9.1 8.5 - 10.5 mg/dL    Protein, Total 7.3 6.0 - 8.0 g/dL    Albumin 3.5 3.5 - 5.0 g/dL    Alkaline Phosphatase 80 45 - 120 U/L    AST 13 0 - 40 U/L    ALT <9 0 - 45 U/L   HM1 (CBC with Diff)   Result Value Ref Range    WBC 3.6 (L) 4.0 - 11.0 thou/uL    RBC 3.90 (L) 4.40 - 6.20 mill/uL    Hemoglobin 11.0 (L) 14.0 - 18.0 g/dL    Hematocrit 33.9 (L) 40.0 - 54.0 %    MCV 87 80 - 100 fL    MCH 28.2 27.0 - 34.0 pg    MCHC 32.4 32.0 - 36.0 g/dL    RDW 15.6 (H) 11.0 - 14.5 %    Platelets 170 140 - 440 thou/uL    MPV 8.5 7.0 - 10.0 fL    Neutrophils % 61 50 - 70 %    Lymphocytes % 30 20 - 40 %    Monocytes % 7 2 - 10 %    Eosinophils % 1 0 - 6 %    Basophils % 0 0 - 2 %    Immature Granulocyte % 0 <=0 %    Neutrophils Absolute 2.2 2.0 - 7.7 thou/uL    Lymphocytes Absolute 1.1 0.8 - 4.4 thou/uL    Monocytes Absolute 0.3 0.0 - 0.9 thou/uL    Eosinophils Absolute 0.0 0.0 - 0.4 thou/uL    Basophils Absolute 0.0 0.0 - 0.2 thou/uL    Immature Granulocyte Absolute 0.0 <=0.0 thou/uL   ECG 12 lead with MUSE   Result Value Ref Range    SYSTOLIC BLOOD PRESSURE      DIASTOLIC BLOOD PRESSURE      VENTRICULAR RATE 67 BPM    ATRIAL RATE 67 BPM    P-R INTERVAL 184 ms    QRS DURATION 90 ms    Q-T INTERVAL 376 ms    QTC CALCULATION (BEZET) 397 ms    P Axis 52 degrees    R AXIS 32 degrees    T AXIS 40 degrees    MUSE DIAGNOSIS       Normal sinus  rhythm  Normal ECG  When compared with ECG of 17-MAY-2021 10:13,  No significant change was found     HM1 (CBC with Diff)   Result Value Ref Range    WBC 3.5 (L) 4.0 - 11.0 thou/uL    RBC 4.15 (L) 4.40 - 6.20 mill/uL    Hemoglobin 11.5 (L) 14.0 - 18.0 g/dL    Hematocrit 37.2 (L) 40.0 - 54.0 %    MCV 90 80 - 100 fL    MCH 27.7 27.0 - 34.0 pg    MCHC 30.9 (L) 32.0 - 36.0 g/dL    RDW 15.9 (H) 11.0 - 14.5 %    Platelets 199 140 - 440 thou/uL    MPV 10.1 8.5 - 12.5 fL    Neutrophils % 59 50 - 70 %    Lymphocytes % 32 20 - 40 %    Monocytes % 8 2 - 10 %    Eosinophils % 1 0 - 6 %    Basophils % 0 0 - 2 %    Immature Granulocyte % 0 <=0 %    Neutrophils Absolute 2.1 2.0 - 7.7 thou/uL    Lymphocytes Absolute 1.1 0.8 - 4.4 thou/uL    Monocytes Absolute 0.3 0.0 - 0.9 thou/uL    Eosinophils Absolute 0.0 0.0 - 0.4 thou/uL    Basophils Absolute 0.0 0.0 - 0.2 thou/uL    Immature Granulocyte Absolute 0.0 <=0.0 thou/uL       Imaging    No results found.      Signed by: Kailey Chavez MD        Again, thank you for allowing me to participate in the care of your patient.        Sincerely,        Kailey Chavez MD

## 2021-07-16 NOTE — PROGRESS NOTES
"Oncology Rooming Note    07/16/21 8:43 AM     Antonio Mercado is a 65 year old male who presents for:    Chief Complaint   Patient presents with     Oncology Clinic Visit     Malignant neoplasm of hepatic flexure       Initial Vitals: /67 (BP Location: Right arm, Patient Position: Sitting)   Pulse 68   Temp 98.5  F (36.9  C) (Oral)   Resp 18   Ht 1.778 m (5' 10\")   Wt 69.3 kg (152 lb 11.2 oz)   SpO2 98%   BMI 21.91 kg/m   Estimated body mass index is 21.91 kg/m  as calculated from the following:    Height as of this encounter: 1.778 m (5' 10\").    Weight as of this encounter: 69.3 kg (152 lb 11.2 oz). Body surface area is 1.85 meters squared.    Worst Pain (10) Comment: Data Unavailable     No LMP for male patient.    Allergies reviewed: Yes  Medications reviewed: Yes    Medications: Medication refills not needed today.  Pharmacy name entered into Roomorama:    [id number] - [place], [place] - [address] [id number] - [place], [place] - [address] AT [facility] [address]    Clinical concerns:  no concerns    Susy Grossman RN, Oncology Clinic   [facility]    "

## 2021-07-30 PROBLEM — M75.121 NONTRAUMATIC COMPLETE TEAR OF RIGHT ROTATOR CUFF: Status: ACTIVE | Noted: 2021-01-01

## 2021-07-30 NOTE — TELEPHONE ENCOUNTER
"Oral Chemotherapy Monitoring Program    Subjective/Objective:  Antonio Mercado is a 65 year old male contacted by phone for a follow-up visit for oral chemotherapy.  Antonio continues to take osimertinib 80 mg daily and tolerates it well. He will be traveling to [place] right after appt with Dr. Chavez so he is making sure things are in place for his month trip.    No flowsheet data found.    Last PHQ-2 Score on record:   PHQ-2 ( 1999 [employer]) 7/30/2021 7/9/2021   Q1: Little interest or pleasure in doing things 0 0   Q2: Feeling down, depressed or hopeless 0 0   PHQ-2 Score 0 0       Vitals:  BP:   BP Readings from Last 1 Encounters:   07/16/21 106/67     Wt Readings from Last 1 Encounters:   07/16/21 69.3 kg (152 lb 11.2 oz)     Estimated body surface area is 1.85 meters squared as calculated from the following:    Height as of 7/16/21: 1.778 m (5' 10\").    Weight as of 7/16/21: 69.3 kg (152 lb 11.2 oz).    Labs:  _  Result Component Current Result Ref Range   Sodium 138 (7/16/2021) 136 - 145 mmol/L     _  Result Component Current Result Ref Range   Potassium 4.5 (7/16/2021) 3.5 - 5.0 mmol/L     _  Result Component Current Result Ref Range   Calcium 9.3 (7/16/2021) 8.5 - 10.5 mg/dL     No results found for Mag within last 30 days.     No results found for Phos within last 30 days.     _  Result Component Current Result Ref Range   Albumin 3.5 (7/16/2021) 3.5 - 5.0 g/dL     _  Result Component Current Result Ref Range   Urea Nitrogen 23 (H) (7/16/2021) 8 - 22 mg/dL     _  Result Component Current Result Ref Range   Creatinine 1.54 (H) (7/16/2021) 0.70 - 1.30 mg/dL     _  Result Component Current Result Ref Range   AST 17 (7/16/2021) 0 - 40 U/L     _  Result Component Current Result Ref Range   ALT 10 (7/16/2021) 0 - 45 U/L     _  Result Component Current Result Ref Range   Bilirubin Total 0.5 (7/16/2021) 0.0 - 1.0 mg/dL     _  Result Component Current Result Ref Range   WBC Count 3.2 (L) (7/16/2021) 4.0 - 11.0 " 10e3/uL     _  Result Component Current Result Ref Range   Hemoglobin 10.8 (L) (7/16/2021) 13.3 - 17.7 g/dL     _  Result Component Current Result Ref Range   Platelet Count 180 (7/16/2021) 150 - 450 10e3/uL     No results found for ANC within last 30 days.       Assessment/Plan:  Antonio will continue osimertinib. No concerns.     Follow-Up:  Will review 8/17 appt with Dr. Chavez and plan to follow-up with a phone call quarterly now that he has been stable.    Hetal Gold, PharmD  Oral Chemotherapy Pharmacist  [phone]

## 2021-07-30 NOTE — PROGRESS NOTES
TELEPHONE ENCOUNTER:    Antonio was seen today for referral.    Diagnoses and all orders for this visit:    Primary malignant neoplasm of right lung metastatic to other site (H)    Nontraumatic complete tear of right rotator cuff      I advised him that we should coordinate imaging with his oncologist since he is likely due for some form of surveillance in the future.  I have placed a call to his oncologist and called me back to discuss care.  Presently a CT chest is scheduled for 2.5 weeks away - oncologist will ask his staff to re-schedule this earlier in advance of patient's travel.  Their team will call patient to coordinate.    I had referred him to orthopedics for his right rotator cuff tear and he tells me that he was given another cortisone shot with a plan to wait to see if this second shot will improve his symptoms.    I will plan to see him in a few days for his travel visit.    Subjective:  This is a 65-year-old who is well-known to me.  He calls today to inquire whether he can be referred for an ultrasound of his chest.  He is concerned that he may have reaccumulated fluid on his lungs.  He reports that when he first developed a pleural effusion secondary to lung malignancy he was having right shoulder pain.  Although he does have a rotator cuff tear which may explain his shoulder pain, he is concerned that he may have reaccumulated fluid.  He is not feeling short of breath nor having pain elsewhere in his chest.  He is also traveling to [place] for about 1 month in about 3 weeks time and wants to be sure everything is clear so he does not have any medical emergencies.  He has scheduled a travel visit with me for a few days time.    Objective:  There were no vitals taken for this visit.    Encounter time: 8 mins; Total visit time including telephone calls to oncologist and documentation: 15 mins.

## 2021-08-02 NOTE — PROGRESS NOTES
[facility] Travel Visit       Antonio Mercado is a 65 year old male who presents for a pre-travel assessment visit.  He will be traveling to:    Destination(s):  Destination 1  [facility]  Date of departure 8/22/21  Date of return 9/24/21    Reason for travel: Visit family and friends    Antonio Mercado has completed the travel questionnaire and it is reviewed: YES  Are there special circumstances which place this traveler at higher risk (List if 'yes')?: YES - HIV positive, lung cancer on treatment    Patient Active Problem List   Diagnosis     Human immunodeficiency virus (HIV) disease (H)     Mechanical low back pain     Anemia, iron deficiency     Malignant neoplasm of hepatic flexure (H)     Hemorrhoids, unspecified hemorrhoid type     Prostate cancer (H)     Refusal of treatment for reasons of conscience     Bilateral renal cysts     CKD (chronic kidney disease) stage 3, GFR 30-59 ml/min     Primary malignant neoplasm of right lung metastatic to other site (H)     Nontraumatic complete tear of right rotator cuff        Allergies   Allergen Reactions     Stribild [Qikcwil-Ubdncsj-Vsntrdya-Tenof] Itching     Ibuprofen Itching and Other (See Comments)     Oxycodone Other (See Comments)     Prednisone Other (See Comments)     Raltegravir Unknown     Tamsulosin Other (See Comments)     Headache     Tylenol [Acetaminophen] Rash     Gets an itchy rash on back.        Social history: Has a wife in [facility].  Works as a  and [employer] .    Travelling with: alone    Immunizations, travel specific:  -- Yellow fever: Required and previously vaccinated  -- Hep A: UTD with immunization   -- Hep B: UTD with immunization   -- Typhoid (IM: booster every 2 years; PO: booster every 5 years): UTD with immunization   -- Rabies  (Data on the need for and timing of additional booster doses are not available): Immunity status unknown  -- Meningococcal meningitis Immunity status unknown   -- Vietnamese  encephalitis (Data on the need for and timing of additional booster doses are not available):Immunity status unknown    Immunizations, routine adult:  -- Influenza Immunity status unknown  -- Polio: UTD with immunization ; trip duration over 4 weeks so IPV booster is needed  -- Diphtheria, Tetanus & Pertussis (DTaP): UTD with immunization   -- Measles/ Mumps/ Rubella: UTD with immunization   -- Varicella: UTD with immunization   -- Pneumococcal (PPSV23 (Pneumovax)): UTD with immunization   -- Pneumococcal (PCV13 (Prevnar)): UTD with immunization   -- COVID-19: UTD with immunization     Malaria prophylaxis:  Recommended (refer to [place] for destination-specific recommendation) YES       Review of Systems:       CONSTITUTIONAL: NEGATIVE for fever, chills, change in weight  ENT/MOUTH: NEGATIVE for ear, mouth and throat problems  RESP: NEGATIVE for significant cough or SOB  CV: NEGATIVE for chest pain, palpitations or peripheral edema  ROS otherwise negative          Objective:     /76 (BP Location: Left arm, Patient Position: Sitting, Cuff Size: Adult Regular)   Pulse 74   Temp 97.8  F (36.6  C) (Oral)   Resp 18   Wt 68.9 kg (151 lb 12.8 oz)   SpO2 97%   BMI 21.78 kg/m    Body mass index is 21.78 kg/m .    GENERAL: healthy, alert, well nourished, well hydrated, no distress  HENT: ear canals- normal; TMs- normal; Nose- normal; Mouth- no ulcers, no lesions  NECK: no tenderness, no adenopathy, no asymmetry, no masses, no stiffness; thyroid- normal to palpation  RESP: lungs clear to auscultation - no rales, no rhonchi, no wheezes  CV: regular rates and rhythm, normal S1 S2, no S3 or S4 and no murmur, no click or rub -         Assessment and Plan     Based on patient's past history, review of immunization and immunity status, planned itinerary and current recommendations, the following are recommended:    Polio vaccine  Malaria: Doxycycline: Begin 1-2 days before travel. Take daily while in the malarious area  and for 30 days after returning.  Traveler's diarrhea treatment prescribed.    Has been scheduled for CT lung surveillance for 1 week before travel.    Return for COVID-19 PCR swab 3 days before travel.    Patient is given a copy of the [employer] [place] traveller report as well as destination-specific recommendations on sun protection, avoiding insect bites and travel safety.      Total of 25 minutes was spent in face to face contact with patient with > 50% in counseling and coordination of care.  Total encounter including preparation, review of travel guidelines, placing orders and completion of documentation: 32 minutes.  Options for treatment and/or follow-up care were reviewed with the patient. Antonio Princetalita was engaged and actively involved in the decision making process. He verbalized understanding of the options discussed and was satisfied with the final plan.      Harris Mary MD

## 2021-08-10 NOTE — PROGRESS NOTES
"Oncology Rooming Note    August 10, 2021 10:20 AM   Antonio Mercado is a 65 year old male who presents for:    Chief Complaint   Patient presents with     Oncology Clinic Visit     Lung Cancer     Initial Vitals: /69 (BP Location: Right arm, Patient Position: Sitting)   Pulse 83   Temp 98.8  F (37.1  C) (Oral)   Resp 16   Ht 1.803 m (5' 11\")   Wt 68 kg (149 lb 14.4 oz)   SpO2 98%   BMI 20.91 kg/m   Estimated body mass index is 20.91 kg/m  as calculated from the following:    Height as of this encounter: 1.803 m (5' 11\").    Weight as of this encounter: 68 kg (149 lb 14.4 oz). Body surface area is 1.85 meters squared.  Extreme Pain (8) Comment: Data Unavailable   No LMP for male patient.  Allergies reviewed: Yes  Medications reviewed: Yes    Medications: MEDICATION REFILLS NEEDED TODAY. Provider was notified. Folic Acid, Ferrous Sulfate, Zofran  Pharmacy name entered into Meusonic:    [id number] - [place], [place] - [address] AT [id number] - [place], [place] - [address] AT [facility] [address]    Clinical concerns: results of scan, patient is traveling and will need refills for trip to [place], [place]      Brandi Bright RN              "

## 2021-08-10 NOTE — PROGRESS NOTES
[facility] Hematology and Oncology Progress Note    Patient: Antonio Mercado  MRN: [id number]  Date of Service: 08/10/2021        Reason for Visit    Chief Complaint   Patient presents with     Oncology Clinic Visit     Lung Cancer       Assessment and Plan    Cancer Staging  Malignant neoplasm of hepatic flexure (H)  Staging form: Colon and Rectum, AJCC 7th Edition  - Clinical stage from 2/20/2016: Stage IIIB (T4a, N1c, M0) - Signed by Harris Mary MD on 2/20/2017  CEA: 97.2  EGFR [id number](exon 19); RB1 552; TP53 L194R  PD-L1, HIGH EXPRESSION   MSI STABLE(CA); TMB LOW  NO FISH ALTERATIONS  MET Exon 14 Deletion: NOT DETECTED; Marroquin TRK, Not expressed  No abnormalities in ALK, BRAF, ERBB2, HER2, MET, RET, ROS1    1. Lung cancer, stage IV: currently on osimertinib. Has been on this since March 2021. Had a PET scan that showed improvement in May 2021.  He is now here to review his CT scan.  He is tolerating the osimertinib well.  Unfortunately his CT scan does show mild progression.  There is no new areas of disease but there is a tumor in the left upper lobe that is slightly bigger.  I told patient we have 3 options and I will discuss with Dr. Chavez to see what he would like to do.  The first option would be to just continue on the osimertinib for another 6 to 8 weeks and repeat a CT scan.  The next option would be to switch over to Keytruda because he is PD-L1 overexpressed.  The third option would be to potentially radiate this tumor since it really is the only site of disease that were seeing and then continue on the osimertinib.  Patient is going to be out of town and is going to the [facility] from August 22 until September 24.  We will call him in the next week with the plan.    2.  Small pulmonary embolism found on CT scan incidentally: I will start him on Xarelto 15 mg twice daily for 3 weeks and then 20 mg daily.  I told him he will have to be on this for a minimum 3 to 6 months and longer if  he continues to have active cancer.    3.  Small pleural effusion: Patient had not needed to have a thoracentesis done for a long time.  He does feel that he has some mild shoulder pain that was happening before when he had a lot of fluid in his lungs so would like to attempt to get some fluid off especially before he goes on his trip.  Order placed.  Will be done tomorrow and he will start his blood thinner tomorrow after the procedure      ECOG Performance    0 - Independent    Distress Screening (within last 30 days)    1. How concerned are you about your ability to eat? : 0  2. How concerned are you about unintended weight loss or your current weight? : 0  3. How concerned are you about feeling depressed or very sad? : 0  4. How concerned are you about feeling anxious or very scared? : 0  5. Do you struggle with the loss of meaning and jorge in your life? : Not at all  6. How concerned are you about work and home life issues that may be affected by your cancer? : 0  7. How concerned are you about knowing what resources are available to help you? : 0  8. Do you currently have what you would describe as Episcopal or spiritual struggles?            : Not at all       Pain  Pain Score: Extreme Pain (8)  Pain Loc: Shoulder    Problem List    Patient Active Problem List   Diagnosis     Human immunodeficiency virus (HIV) disease (H)     Mechanical low back pain     Anemia, iron deficiency     Malignant neoplasm of hepatic flexure (H)     Hemorrhoids, unspecified hemorrhoid type     Prostate cancer (H)     Refusal of treatment for reasons of conscience     Bilateral renal cysts     CKD (chronic kidney disease) stage 3, GFR 30-59 ml/min     Primary malignant neoplasm of right lung metastatic to other site (H)     Nontraumatic complete tear of right rotator cuff        ______________________________________________________________________________    History of Present Illness    Measurable Disease: Pet, CT    Current  "therapy: Osimertinib 80 mg once daily, March 18, 2021     Treatment history:  Carbo Alimta alone without Keytruda, 20% dose reduction, cycle 2 on February 25, 2021  Carboplatin, Alimta and Keytruda to start February 4, 2021    Interim history: Patient is here today for follow-up and to review his CT scan.  He says he is doing well and has no major complaints.  He does have some mild tenderness up into the right shoulder that he said has been present for a while and he thinks that it is related to fluid in his lungs.  He said since he is traveling he is hoping that that may improve and he wants to try to get some fluid out.  In the past they have not been able to get a lot of fluid out because of loculated fluid.  He denies any breathing issues.  Denies any fevers.  He is a little anxious regarding his CT scan results.  He states that he has been taking his medication regularly.    Review of Systems    Pertinent items are noted in HPI.    Past History    Past Medical History:   Diagnosis Date     Constipation      Heel pain      History of blood transfusion      HIV (human immunodeficiency virus infection) (H)      HIV (human immunodeficiency virus infection) (H)      Hypertrophy of prostate with urinary obstruction      Iron deficiency anemia      LBP (low back pain)      Malignant neoplasm of hepatic flexure (H) 2/4/2016     Malignant neoplasm of hepatic flexure (H)     s/p right hemicolectomy in 2016     Trigger finger        PHYSICAL EXAM  /69 (BP Location: Right arm, Patient Position: Sitting)   Pulse 83   Temp 98.8  F (37.1  C) (Oral)   Resp 16   Ht 1.803 m (5' 11\")   Wt 68 kg (149 lb 14.4 oz)   SpO2 98%   BMI 20.91 kg/m      GENERAL: no acute distress. Cooperative in conversation. Here alone. Mask on  RESP: Regular respiratory rate. No expiratory wheezes. Mildly diminished in left lung.   MUSCULOSKELETAL: no bilateral leg swelling  NEURO: non focal. Alert and oriented x3.   PSYCH: within normal " limits. No depression or anxiety.  SKIN: exposed skin is dry intact.     Lab Results    Recent Results (from the past 168 hour(s))   Comprehensive metabolic panel   Result Value Ref Range    Sodium 138 136 - 145 mmol/L    Potassium 4.4 3.5 - 5.0 mmol/L    Chloride 105 98 - 107 mmol/L    Carbon Dioxide (CO2) 23 22 - 31 mmol/L    Anion Gap 10 5 - 18 mmol/L    Urea Nitrogen 17 8 - 22 mg/dL    Creatinine 1.46 (H) 0.70 - 1.30 mg/dL    Calcium 9.6 8.5 - 10.5 mg/dL    Glucose 93 70 - 125 mg/dL    Alkaline Phosphatase 70 45 - 120 U/L    AST 13 0 - 40 U/L    ALT <9 0 - 45 U/L    Protein Total 7.7 6.0 - 8.0 g/dL    Albumin 3.6 3.5 - 5.0 g/dL    Bilirubin Total 0.6 0.0 - 1.0 mg/dL    GFR Estimate 49 (L) >60 mL/min/1.73m2   CBC with platelets and differential   Result Value Ref Range    WBC Count 3.9 (L) 4.0 - 11.0 10e3/uL    RBC Count 4.19 (L) 4.40 - 5.90 10e6/uL    Hemoglobin 11.5 (L) 13.3 - 17.7 g/dL    Hematocrit 36.6 (L) 40.0 - 53.0 %    MCV 87 78 - 100 fL    MCH 27.4 26.5 - 33.0 pg    MCHC 31.4 (L) 31.5 - 36.5 g/dL    RDW 15.5 (H) 10.0 - 15.0 %    Platelet Count 191 150 - 450 10e3/uL    % Neutrophils 51 %    % Lymphocytes 35 %    % Monocytes 11 %    % Eosinophils 3 %    % Basophils 0 %    % Immature Granulocytes 0 %    NRBCs per 100 WBC 0 <1 /100    Absolute Neutrophils 2.0 1.6 - 8.3 10e3/uL    Absolute Lymphocytes 1.4 0.8 - 5.3 10e3/uL    Absolute Monocytes 0.4 0.0 - 1.3 10e3/uL    Absolute Eosinophils 0.1 0.0 - 0.7 10e3/uL    Absolute Basophils 0.0 0.0 - 0.2 10e3/uL    Absolute Immature Granulocytes 0.0 <=0.0 10e3/uL    Absolute NRBCs 0.0 10e3/uL   ECG 12-Lead with MUSE - [facility]   Result Value Ref Range    Systolic Blood Pressure  mmHg    Diastolic Blood Pressure  mmHg    Ventricular Rate 68 BPM    Atrial Rate 68 BPM    IA Interval 186 ms    QRS Duration 90 ms     ms    QTc 384 ms    P Axis -1 degrees    R AXIS 34 degrees    T Axis 24 degrees    Interpretation ECG       Sinus rhythm  Normal ECG  When compared  with ECG of 16-JUL-2021 08:58,  No significant change was found         Imaging    CT Chest/Abdomen/Pelvis w Contrast    Result Date: 8/9/2021  EXAM: CT CHEST/ABDOMEN/PELVIS W CONTRAST LOCATION: [facility] DATE/TIME: 8/9/2021 7:37 AM INDICATION: Primary malignant neoplasm of right lung, metastatic to other site. COMPARISON: 4/12/21 and 05/12/2021. TECHNIQUE: CT scan of the chest, abdomen, and pelvis was performed following injection of IV contrast. Multiplanar reformats were obtained. Dose reduction techniques were used. CONTRAST: 75 mL Isovue 370 FINDINGS: LUNGS AND PLEURA: A 3.3 x 2.8 cm right upper lobe mass was previously 1.7 x 1.2 cm (series 5 image 161). A moderate volume of loculated right pleural fluid has not significantly changed. There is round atelectasis in the right lung base. There is mucous plugging in the left upper lobe. MEDIASTINUM/AXILLAE: Heterogeneous enlargement of the thyroid gland. There is a 7 mm right hilar lymph node which has not changed in size. There is a filling defect in a segmental vessel to the left lower lobe (series 3 image 105). CORONARY ARTERY CALCIFICATION: None. HEPATOBILIARY: Circumscribed hypoattenuating hepatic lesions have not changed. PANCREAS: Normal. SPLEEN: Normal. ADRENAL GLANDS: Normal. KIDNEYS/BLADDER: Normal. BOWEL: Normal stomach. Normal caliber of the small bowel. There is a bowel suture line in the right lower quadrant. LYMPH NODES: Normal. VASCULATURE: Unremarkable. PELVIC ORGANS: The prostate gland is mildly enlarged. MUSCULOSKELETAL: No aggressive bone lesions.     IMPRESSION: 1.  Small left lower lobe segmental pulmonary embolus. 2.  The right upper lobe lesion has significantly increased in size and is concerning for increased malignancy. 3.  Heterogeneous enlargement of the thyroid lobe. Consider ultrasound if not already performed.  Discussed with Essence Carlos by John Fahrner over telephone at 10:04 AM.    MR Shoulder Right w/o  Contrast    Result Date: 7/16/2021  EXAM: MR SHOULDER RIGHT W/O CONTRAST LOCATION: [facility] DATE/TIME: 7/16/2021 10:31 AM INDICATION: Shoulder pain, rotator cuff disorder suspected, x-ray done. History of lung cancer. COMPARISON: PET scan dated 05/12/2021. TECHNIQUE: Unenhanced. FINDINGS: ROTATOR CUFF: Full-thickness tear distal supraspinatus tendon measuring 1.5 cm in AP dimension with up to 1.3 cm of proximal fiber retraction. Infraspinatus tendon intact. Mild tendinopathy. Subscapularis tendon intact. No tendinopathy. Teres minor tendon intact. No tendinopathy. No rotator cuff muscle atrophy or fatty infiltration. There is a small amount of infraspinatus myotendinous fluid and edema. CORACOACROMIAL ARCH: Type II acromion. Shallow subacromial spur. Subacromial space is maintained. Small amount of fluid in the subacromial/subdeltoid bursa.  ACROMIOCLAVICULAR JOINT: Mild AC joint arthrosis. BICEPS TENDON: Proximal long head biceps tendon is intact. No tendinopathy or tenosynovitis. No subluxation. GLENOHUMERAL JOINT AND LABRUM: Intrasubstance degeneration superior labrum. No displaced labral tear. Articular cartilage is preserved. No effusion. Glenohumeral ligaments are intact. BONES: No acute fracture or concerning marrow replacing lesion. SOFT TISSUES: Deltoid muscle bulk is normal. Unremarkable visualized chest wall and axilla. Partially visualized moderate right pleural effusion as seen on previous PET/CT.     IMPRESSION: 1.  Full-thickness distal supraspinatus tendon tear measuring 1.5 x 1.3 cm in dimension. 2.  Low-grade infraspinatus myotendinous strain. Mild underlying tendinopathy. 3.  No concerning marrow replacing lesions about the shoulder.     Discussed with son as well.     Signed by: ALBERTO Hobbs CNP

## 2021-08-10 NOTE — LETTER
"    8/10/2021         RE: Antonio Mercado  [address]  [place] [address]        Dear Colleague,    Thank you for referring your patient, Antonio Mercado, to the [facility] [place]. Please see a copy of my visit note below.    Oncology Rooming Note    August 10, 2021 10:20 AM   Antonio Mercado is a 65 year old male who presents for:    Chief Complaint   Patient presents with     Oncology Clinic Visit     Lung Cancer     Initial Vitals: /69 (BP Location: Right arm, Patient Position: Sitting)   Pulse 83   Temp 98.8  F (37.1  C) (Oral)   Resp 16   Ht 1.803 m (5' 11\")   Wt 68 kg (149 lb 14.4 oz)   SpO2 98%   BMI 20.91 kg/m   Estimated body mass index is 20.91 kg/m  as calculated from the following:    Height as of this encounter: 1.803 m (5' 11\").    Weight as of this encounter: 68 kg (149 lb 14.4 oz). Body surface area is 1.85 meters squared.  Extreme Pain (8) Comment: Data Unavailable   No LMP for male patient.  Allergies reviewed: Yes  Medications reviewed: Yes    Medications: MEDICATION REFILLS NEEDED TODAY. Provider was notified. Folic Acid, Ferrous Sulfate, Zofran  Pharmacy name entered into Covestor:    [id number] - [place], [place] - [address] [id number] - [place], [place] - [address] AT [address] [facility]    Clinical concerns: results of scan, patient is traveling and will need refills for trip to [place], [place]      Brandi Bright, RN                [facility] Hematology and Oncology Progress Note    Patient: Antonio Mercado  MRN: [id number]  Date of Service: 08/10/2021        Reason for Visit    Chief Complaint   Patient presents with     Oncology Clinic Visit     Lung Cancer       Assessment and Plan    Cancer Staging  Malignant neoplasm of hepatic flexure (H)  Staging form: Colon and Rectum, AJCC 7th Edition  - Clinical stage from 2/20/2016: Stage IIIB (T4a, N1c, " M0) - Signed by Harris Mary MD on 2/20/2017  CEA: 97.2  EGFR [id number](exon 19); RB1 552; TP53 L194R  PD-L1, HIGH EXPRESSION   MSI STABLE(CA); TMB LOW  NO FISH ALTERATIONS  MET Exon 14 Deletion: NOT DETECTED; Marroquin TRK, Not expressed  No abnormalities in ALK, BRAF, ERBB2, HER2, MET, RET, ROS1    1. Lung cancer, stage IV: currently on osimertinib. Has been on this since March 2021. Had a PET scan that showed improvement in May 2021.  He is now here to review his CT scan.  He is tolerating the osimertinib well.  Unfortunately his CT scan does show mild progression.  There is no new areas of disease but there is a tumor in the left upper lobe that is slightly bigger.  I told patient we have 3 options and I will discuss with Dr. Chavez to see what he would like to do.  The first option would be to just continue on the osimertinib for another 6 to 8 weeks and repeat a CT scan.  The next option would be to switch over to Keytruda because he is PD-L1 overexpressed.  The third option would be to potentially radiate this tumor since it really is the only site of disease that were seeing and then continue on the osimertinib.  Patient is going to be out of town and is going to the [facility] from August 22 until September 24.  We will call him in the next week with the plan.    2.  Small pulmonary embolism found on CT scan incidentally: I will start him on Xarelto 15 mg twice daily for 3 weeks and then 20 mg daily.  I told him he will have to be on this for a minimum 3 to 6 months and longer if he continues to have active cancer.    3.  Small pleural effusion: Patient had not needed to have a thoracentesis done for a long time.  He does feel that he has some mild shoulder pain that was happening before when he had a lot of fluid in his lungs so would like to attempt to get some fluid off especially before he goes on his trip.  Order placed.  Will be done tomorrow and he will start his blood thinner tomorrow after the  procedure      ECOG Performance    0 - Independent    Distress Screening (within last 30 days)    1. How concerned are you about your ability to eat? : 0  2. How concerned are you about unintended weight loss or your current weight? : 0  3. How concerned are you about feeling depressed or very sad? : 0  4. How concerned are you about feeling anxious or very scared? : 0  5. Do you struggle with the loss of meaning and jorge in your life? : Not at all  6. How concerned are you about work and home life issues that may be affected by your cancer? : 0  7. How concerned are you about knowing what resources are available to help you? : 0  8. Do you currently have what you would describe as Latter day or spiritual struggles?            : Not at all       Pain  Pain Score: Extreme Pain (8)  Pain Loc: Shoulder    Problem List    Patient Active Problem List   Diagnosis     Human immunodeficiency virus (HIV) disease (H)     Mechanical low back pain     Anemia, iron deficiency     Malignant neoplasm of hepatic flexure (H)     Hemorrhoids, unspecified hemorrhoid type     Prostate cancer (H)     Refusal of treatment for reasons of conscience     Bilateral renal cysts     CKD (chronic kidney disease) stage 3, GFR 30-59 ml/min     Primary malignant neoplasm of right lung metastatic to other site (H)     Nontraumatic complete tear of right rotator cuff        ______________________________________________________________________________    History of Present Illness    Measurable Disease: Pet, CT    Current therapy: Osimertinib 80 mg once daily, March 18, 2021     Treatment history:  Carbo Alimta alone without Keytruda, 20% dose reduction, cycle 2 on February 25, 2021  Carboplatin, Alimta and Keytruda to start February 4, 2021    Interim history: Patient is here today for follow-up and to review his CT scan.  He says he is doing well and has no major complaints.  He does have some mild tenderness up into the right shoulder that he said  "has been present for a while and he thinks that it is related to fluid in his lungs.  He said since he is traveling he is hoping that that may improve and he wants to try to get some fluid out.  In the past they have not been able to get a lot of fluid out because of loculated fluid.  He denies any breathing issues.  Denies any fevers.  He is a little anxious regarding his CT scan results.  He states that he has been taking his medication regularly.    Review of Systems    Pertinent items are noted in HPI.    Past History    Past Medical History:   Diagnosis Date     Constipation      Heel pain      History of blood transfusion      HIV (human immunodeficiency virus infection) (H)      HIV (human immunodeficiency virus infection) (H)      Hypertrophy of prostate with urinary obstruction      Iron deficiency anemia      LBP (low back pain)      Malignant neoplasm of hepatic flexure (H) 2/4/2016     Malignant neoplasm of hepatic flexure (H)     s/p right hemicolectomy in 2016     Trigger finger        PHYSICAL EXAM  /69 (BP Location: Right arm, Patient Position: Sitting)   Pulse 83   Temp 98.8  F (37.1  C) (Oral)   Resp 16   Ht 1.803 m (5' 11\")   Wt 68 kg (149 lb 14.4 oz)   SpO2 98%   BMI 20.91 kg/m      GENERAL: no acute distress. Cooperative in conversation. Here alone. Mask on  RESP: Regular respiratory rate. No expiratory wheezes. Mildly diminished in left lung.   MUSCULOSKELETAL: no bilateral leg swelling  NEURO: non focal. Alert and oriented x3.   PSYCH: within normal limits. No depression or anxiety.  SKIN: exposed skin is dry intact.     Lab Results    Recent Results (from the past 168 hour(s))   Comprehensive metabolic panel   Result Value Ref Range    Sodium 138 136 - 145 mmol/L    Potassium 4.4 3.5 - 5.0 mmol/L    Chloride 105 98 - 107 mmol/L    Carbon Dioxide (CO2) 23 22 - 31 mmol/L    Anion Gap 10 5 - 18 mmol/L    Urea Nitrogen 17 8 - 22 mg/dL    Creatinine 1.46 (H) 0.70 - 1.30 mg/dL    " Calcium 9.6 8.5 - 10.5 mg/dL    Glucose 93 70 - 125 mg/dL    Alkaline Phosphatase 70 45 - 120 U/L    AST 13 0 - 40 U/L    ALT <9 0 - 45 U/L    Protein Total 7.7 6.0 - 8.0 g/dL    Albumin 3.6 3.5 - 5.0 g/dL    Bilirubin Total 0.6 0.0 - 1.0 mg/dL    GFR Estimate 49 (L) >60 mL/min/1.73m2   CBC with platelets and differential   Result Value Ref Range    WBC Count 3.9 (L) 4.0 - 11.0 10e3/uL    RBC Count 4.19 (L) 4.40 - 5.90 10e6/uL    Hemoglobin 11.5 (L) 13.3 - 17.7 g/dL    Hematocrit 36.6 (L) 40.0 - 53.0 %    MCV 87 78 - 100 fL    MCH 27.4 26.5 - 33.0 pg    MCHC 31.4 (L) 31.5 - 36.5 g/dL    RDW 15.5 (H) 10.0 - 15.0 %    Platelet Count 191 150 - 450 10e3/uL    % Neutrophils 51 %    % Lymphocytes 35 %    % Monocytes 11 %    % Eosinophils 3 %    % Basophils 0 %    % Immature Granulocytes 0 %    NRBCs per 100 WBC 0 <1 /100    Absolute Neutrophils 2.0 1.6 - 8.3 10e3/uL    Absolute Lymphocytes 1.4 0.8 - 5.3 10e3/uL    Absolute Monocytes 0.4 0.0 - 1.3 10e3/uL    Absolute Eosinophils 0.1 0.0 - 0.7 10e3/uL    Absolute Basophils 0.0 0.0 - 0.2 10e3/uL    Absolute Immature Granulocytes 0.0 <=0.0 10e3/uL    Absolute NRBCs 0.0 10e3/uL   ECG 12-Lead with MUSE - SJN,SJO,WW   Result Value Ref Range    Systolic Blood Pressure  mmHg    Diastolic Blood Pressure  mmHg    Ventricular Rate 68 BPM    Atrial Rate 68 BPM    WA Interval 186 ms    QRS Duration 90 ms     ms    QTc 384 ms    P Axis -1 degrees    R AXIS 34 degrees    T Axis 24 degrees    Interpretation ECG       Sinus rhythm  Normal ECG  When compared with ECG of 16-JUL-2021 08:58,  No significant change was found         Imaging    CT Chest/Abdomen/Pelvis w Contrast    Result Date: 8/9/2021  EXAM: CT CHEST/ABDOMEN/PELVIS W CONTRAST LOCATION: [facility] DATE/TIME: 8/9/2021 7:37 AM INDICATION: Primary malignant neoplasm of right lung, metastatic to other site. COMPARISON: 4/12/21 and 05/12/2021. TECHNIQUE: CT scan of the chest, abdomen, and pelvis was  performed following injection of IV contrast. Multiplanar reformats were obtained. Dose reduction techniques were used. CONTRAST: 75 mL Isovue 370 FINDINGS: LUNGS AND PLEURA: A 3.3 x 2.8 cm right upper lobe mass was previously 1.7 x 1.2 cm (series 5 image 161). A moderate volume of loculated right pleural fluid has not significantly changed. There is round atelectasis in the right lung base. There is mucous plugging in the left upper lobe. MEDIASTINUM/AXILLAE: Heterogeneous enlargement of the thyroid gland. There is a 7 mm right hilar lymph node which has not changed in size. There is a filling defect in a segmental vessel to the left lower lobe (series 3 image 105). CORONARY ARTERY CALCIFICATION: None. HEPATOBILIARY: Circumscribed hypoattenuating hepatic lesions have not changed. PANCREAS: Normal. SPLEEN: Normal. ADRENAL GLANDS: Normal. KIDNEYS/BLADDER: Normal. BOWEL: Normal stomach. Normal caliber of the small bowel. There is a bowel suture line in the right lower quadrant. LYMPH NODES: Normal. VASCULATURE: Unremarkable. PELVIC ORGANS: The prostate gland is mildly enlarged. MUSCULOSKELETAL: No aggressive bone lesions.     IMPRESSION: 1.  Small left lower lobe segmental pulmonary embolus. 2.  The right upper lobe lesion has significantly increased in size and is concerning for increased malignancy. 3.  Heterogeneous enlargement of the thyroid lobe. Consider ultrasound if not already performed.  Discussed with Essence Carlos by John Fahrner over telephone at 10:04 AM.    MR Shoulder Right w/o Contrast    Result Date: 7/16/2021  EXAM: MR SHOULDER RIGHT W/O CONTRAST LOCATION: [facility] DATE/TIME: 7/16/2021 10:31 AM INDICATION: Shoulder pain, rotator cuff disorder suspected, x-ray done. History of lung cancer. COMPARISON: PET scan dated 05/12/2021. TECHNIQUE: Unenhanced. FINDINGS: ROTATOR CUFF: Full-thickness tear distal supraspinatus tendon measuring 1.5 cm in AP dimension with up to 1.3 cm of proximal  fiber retraction. Infraspinatus tendon intact. Mild tendinopathy. Subscapularis tendon intact. No tendinopathy. Teres minor tendon intact. No tendinopathy. No rotator cuff muscle atrophy or fatty infiltration. There is a small amount of infraspinatus myotendinous fluid and edema. CORACOACROMIAL ARCH: Type II acromion. Shallow subacromial spur. Subacromial space is maintained. Small amount of fluid in the subacromial/subdeltoid bursa.  ACROMIOCLAVICULAR JOINT: Mild AC joint arthrosis. BICEPS TENDON: Proximal long head biceps tendon is intact. No tendinopathy or tenosynovitis. No subluxation. GLENOHUMERAL JOINT AND LABRUM: Intrasubstance degeneration superior labrum. No displaced labral tear. Articular cartilage is preserved. No effusion. Glenohumeral ligaments are intact. BONES: No acute fracture or concerning marrow replacing lesion. SOFT TISSUES: Deltoid muscle bulk is normal. Unremarkable visualized chest wall and axilla. Partially visualized moderate right pleural effusion as seen on previous PET/CT.     IMPRESSION: 1.  Full-thickness distal supraspinatus tendon tear measuring 1.5 x 1.3 cm in dimension. 2.  Low-grade infraspinatus myotendinous strain. Mild underlying tendinopathy. 3.  No concerning marrow replacing lesions about the shoulder.     Discussed with son as well.     Signed by: ALBERTO Hobbs CNP      Again, thank you for allowing me to participate in the care of your patient.        Sincerely,        ALBERTO Hobbs CNP

## 2021-08-12 NOTE — TELEPHONE ENCOUNTER
[facility].  Dr Mary is on vacation and will not be back in the clinic until 8/19/21.  Pt is planning trip to [facility] 8/22/21-9/24/21.  Per Dr Mary's note pt will need to have COVID swab done 3 days prior to leaving (8/19/21)./NG

## 2021-08-12 NOTE — TELEPHONE ENCOUNTER
[facility] phone call message- patient requesting results:    Test: Lab    Date of test: ?    Additional Comments:     Pt wants to talk to Dr. Mary about his results for covid testing    OK to leave a message on voice mail? Yes    Primary language: English      needed? No    Call taken on August 12, 2021 at 12:30 PM by Isabella Hendricks

## 2021-08-13 NOTE — TELEPHONE ENCOUNTER
"Oral Chemotherapy Monitoring Program    Subjective/Objective:  Antonio Mercado is a 65 year old male contacted by phone for a follow-up visit for oral chemotherapy.    ORAL CHEMOTHERAPY 7/30/2021 8/13/2021   Assessment Type Monthly Follow up Monthly Follow up   Diagnosis Code Non-Small Cell Lung Cancer Non-Small Cell Lung Cancer   Providers Dr. Renetta Jackson   Clinic Name/Location [facility]   Drug Name Tagrisso (osimertinib) Tagrisso (osimertinib)   Dose 80 mg 80 mg   Current Schedule Daily Daily   Doses missed in last 2 weeks 0 0   Adherence Assessment Adherent Non-adherent   Reason for Non-adherence - Other   Adherence Intervention Recommended - (No Data)   Any new drug interactions? - No   Is the dose as ordered appropriate for the patient? - Yes       Last PHQ-2 Score on record:   PHQ-2 ( 1999 Pfizer) 8/2/2021 7/30/2021   Q1: Little interest or pleasure in doing things 0 0   Q2: Feeling down, depressed or hopeless 0 0   PHQ-2 Score 0 0       Vitals:  BP:   BP Readings from Last 1 Encounters:   08/10/21 121/69     Wt Readings from Last 1 Encounters:   08/10/21 68 kg (149 lb 14.4 oz)     Estimated body surface area is 1.85 meters squared as calculated from the following:    Height as of 8/10/21: 1.803 m (5' 11\").    Weight as of 8/10/21: 68 kg (149 lb 14.4 oz).    Labs:  _  Result Component Current Result Ref Range   Sodium 138 (8/10/2021) 136 - 145 mmol/L     _  Result Component Current Result Ref Range   Potassium 4.4 (8/10/2021) 3.5 - 5.0 mmol/L     _  Result Component Current Result Ref Range   Calcium 9.6 (8/10/2021) 8.5 - 10.5 mg/dL     No results found for Mag within last 30 days.     No results found for Phos within last 30 days.     _  Result Component Current Result Ref Range   Albumin 3.6 (8/10/2021) 3.5 - 5.0 g/dL     _  Result Component Current Result Ref Range   Urea Nitrogen 17 (8/10/2021) 8 - 22 mg/dL     _  Result Component Current Result Ref Range   Creatinine 1.46 (H) " (8/10/2021) 0.70 - 1.30 mg/dL     _  Result Component Current Result Ref Range   AST 13 (8/10/2021) 0 - 40 U/L     _  Result Component Current Result Ref Range   ALT <9 (8/10/2021) 0 - 45 U/L     _  Result Component Current Result Ref Range   Bilirubin Total 0.6 (8/10/2021) 0.0 - 1.0 mg/dL     _  Result Component Current Result Ref Range   WBC Count 3.9 (L) (8/10/2021) 4.0 - 11.0 10e3/uL     _  Result Component Current Result Ref Range   Hemoglobin 11.5 (L) (8/10/2021) 13.3 - 17.7 g/dL     _  Result Component Current Result Ref Range   Platelet Count 191 (8/10/2021) 150 - 450 10e3/uL     No results found for ANC within last 30 days.         Assessment/Plan:  Antonio has been taking his osimertinib 80 mg BID since 8/10/21 since he thought that is what Marlys told him to do. I informed him that he was only supposed to be taking it once daily. I asked if he has started taking the Xarelto which was new and supposed to be BID for the first 3 weeks and he said no and didn't know about it or where the prescription had been sent. I let him know it was sent to the [facility] in [place] and he said he'd go pick it up today. We briefly discussed why he was supposed to take the Xarelto and what to watch out for.     Antonio reminded me that he was leaving for [place] on 8.22.21 and needed another script of his osimertinib (did not know currently how many he has remaining). I let him know that once Dr. Chavez could weigh in on his scans then we'd make sure a script for osimertinib was sent as appropriate.    Follow-Up:  Will plan to review next appointment (unscheduled currently) and ensure script is sent as appropriate.     Hesham Pacheco, PharmD, [facility]  Clinical Oncology Pharmacist  August 13, 2021

## 2021-08-13 NOTE — TELEPHONE ENCOUNTER
Pt calling to make sure that he is scheduled to have COVID swab done on 8/19 before he leaves for his trip on 8/22.  Confirmed that he is and he is scheduled for 10:00 AM.  Pt verbalized understanding./NG

## 2021-08-13 NOTE — TELEPHONE ENCOUNTER
Correction- Antonio is not in need of another script yet as he last filled a 30 day supply of osimertinib on 8/4/21. Assuming he took 2 tabs/day on 8/10-today then he'd run out of osimertinib on 8/30. Marlys did sent a 90 day supply script on 8/10/21 (with 1 refill) so Antonio should have enough on hand until the pharmacy can fill his next script prior to his trip, assuming the plan is to continue the osimertinib.    Hesham Pacheco, PharmD, [facility]  Clinical Oncology Pharmacist  August 13, 2021

## 2021-08-20 PROBLEM — I26.93 SINGLE SUBSEGMENTAL PULMONARY EMBOLISM WITHOUT ACUTE COR PULMONALE (H): Status: ACTIVE | Noted: 2021-01-01

## 2021-08-20 NOTE — LETTER
8/20/2021         RE: Antonio Mercado  [address]  [place] [address]      Antonio was seen today for minor surgery and covid 19 testing.    Diagnoses and all orders for this visit:    Infected sebaceous cyst  -     cephALEXin (KEFLEX) 500 MG capsule; Take 1 capsule (500 mg) by mouth 2 times daily for 7 days    Encounter for laboratory testing for COVID-19 virus  -     Asymptomatic COVID-19 Virus (Coronavirus) by PCR Nasopharyngeal    Primary malignant neoplasm of right lung metastatic to other site (H)    Single subsegmental pulmonary embolism without acute cor pulmonale (H)      He is currently taking an oral anticoagulant started 10 days ago when an incidental PE was noted on CT scan.  I advised him that I can excise the sebaceous cyst quite easily however this would require him holding his DOAC which would not be recommended so soon after a diagnosis of embolic disease.  He understands this.  It is possible that it is infected given that it is bothering him and is tender to palpation and we agreed to a trial of oral cephalexin for 7 days which he can take while he is traveling.    His cancer team will follow up with him concerning any further recommendations.  Once he returns and once he has been established on his anticoagulation we can explore excising the left axillary sebaceous cyst in the future.  He agrees with this plan.    Total visit time with patient was 25 mins, all of which was face to face MD time, and over 50% of this time was spent in counseling and coordination of care.  Including post-encounter documentation and orders, total encounter time was 32 mins.      Subjective:  This is a 65-year-old who is well-known to me.  He attends today principally because he has a lump under his left armpit which is now tender and bothers him.  He is leaving for [place] in 3 days time and ideally would like to get this addressed before he departs.  He was able to express some whitish matter  from it and thinks it has been present for maybe 1 or 2 months although he had not mentioned it previously to me.    ROS:  Pulmonary: Since his last visit with me, I had talked with his oncologist and we had scheduled a CT lung.  I reviewed the external notes from the cancer provider who incidentally refer to tumor in the left upper lobe which appears to be a typo given that the CT reports increase size of tumor in the right lung but evidence of PE in the left lung.  He has been started on a DOAC and understands to increase the dose after 3 weeks of therapy.  He tells me that he did not hear back after his visit with the cancer provider 10 days ago and was expecting to increase his oral chemotherapy dose to 2 tabs daily.  This seems to be his own thinking that if the cancer recurred or grew while he was taking 1 tablet daily that he then needs a higher dose to suppress further growth.    Travel: I confirmed that he has adequate antimalaria medication given that previously he ran out of this medication while in [place] and contracted malaria.    Objective:  /74 (BP Location: Left arm, Patient Position: Sitting, Cuff Size: Adult Regular)   Pulse 106   Temp 98.2  F (36.8  C) (Oral)   Resp 16   Wt 68 kg (150 lb)   SpO2 97%   BMI 20.92 kg/m    His vitals are excellent  He does have a mobile approximately 3 x 3 cm ovoid lump in his left axilla most consistent clinically with a sebaceous cyst which may be infected given that it is also tender to palpation.    I reviewed notes from his cancer provider as well as the CT report and ultrasound thoracentesis report.    Together with the patient I placed a call to his cancer providers asking to clarify whether he should continue the current dose of oral medication.  Triage RN will call him back with the response and I advised him to assume he continue with a once daily dose unless he is instructed otherwise.        Harris Mary MD

## 2021-08-20 NOTE — TELEPHONE ENCOUNTER
Call came in today from patient's PCP, Dr. Mary with some questions regarding patient prior to the patient's departure to [place].  He wants to know what the patient should be doing as far as treatment while he is away in [place].  Dr Chavez is out of the clinic today, however his nurse did put in orders Per Dr Chavez for a CT scan 6-8 weeks after last visit.  He saw Marlys Carlos CNP on 8/10/2021.  Dr Kathy Jain's nurse states that the patient should continue on osimertinib at the same dosing for the next 6 to 8 weeks and then we will repeat a CT scan.  Patient should have CT scan and then follow-up with our office.  I called patient back with his update on his cell phone per his request.  He verbalized understanding and I attempted to transfer him to the  but they were busy.  I told him that our office would reach out to him to schedule.    Nohemy Acrher RN

## 2021-08-20 NOTE — PROGRESS NOTES
Antonio was seen today for minor surgery and covid 19 testing.    Diagnoses and all orders for this visit:    Infected sebaceous cyst  -     cephALEXin (KEFLEX) 500 MG capsule; Take 1 capsule (500 mg) by mouth 2 times daily for 7 days    Encounter for laboratory testing for COVID-19 virus  -     Asymptomatic COVID-19 Virus (Coronavirus) by PCR Nasopharyngeal    Primary malignant neoplasm of right lung metastatic to other site (H)    Single subsegmental pulmonary embolism without acute cor pulmonale (H)      He is currently taking an oral anticoagulant started 10 days ago when an incidental PE was noted on CT scan.  I advised him that I can excise the sebaceous cyst quite easily however this would require him holding his DOAC which would not be recommended so soon after a diagnosis of embolic disease.  He understands this.  It is possible that it is infected given that it is bothering him and is tender to palpation and we agreed to a trial of oral cephalexin for 7 days which he can take while he is traveling.    His cancer team will follow up with him concerning any further recommendations.  Once he returns and once he has been established on his anticoagulation we can explore excising the left axillary sebaceous cyst in the future.  He agrees with this plan.    Total visit time with patient was 25 mins, all of which was face to face MD time, and over 50% of this time was spent in counseling and coordination of care.  Including post-encounter documentation and orders, total encounter time was 32 mins.      Subjective:  This is a 65-year-old who is well-known to me.  He attends today principally because he has a lump under his left armpit which is now tender and bothers him.  He is leaving for [place] in 3 days time and ideally would like to get this addressed before he departs.  He was able to express some whitish matter from it and thinks it has been present for maybe 1 or 2 months although he had not mentioned it  previously to me.    ROS:  Pulmonary: Since his last visit with me, I had talked with his oncologist and we had scheduled a CT lung.  I reviewed the external notes from the cancer provider who incidentally refer to tumor in the left upper lobe which appears to be a typo given that the CT reports increase size of tumor in the right lung but evidence of PE in the left lung.  He has been started on a DOAC and understands to increase the dose after 3 weeks of therapy.  He tells me that he did not hear back after his visit with the cancer provider 10 days ago and was expecting to increase his oral chemotherapy dose to 2 tabs daily.  This seems to be his own thinking that if the cancer recurred or grew while he was taking 1 tablet daily that he then needs a higher dose to suppress further growth.    Travel: I confirmed that he has adequate antimalaria medication given that previously he ran out of this medication while in [place] and contracted malaria.    Objective:  /74 (BP Location: Left arm, Patient Position: Sitting, Cuff Size: Adult Regular)   Pulse 106   Temp 98.2  F (36.8  C) (Oral)   Resp 16   Wt 68 kg (150 lb)   SpO2 97%   BMI 20.92 kg/m    His vitals are excellent  He does have a mobile approximately 3 x 3 cm ovoid lump in his left axilla most consistent clinically with a sebaceous cyst which may be infected given that it is also tender to palpation.    I reviewed notes from his cancer provider as well as the CT report and ultrasound thoracentesis report.    Together with the patient I placed a call to his cancer providers asking to clarify whether he should continue the current dose of oral medication.  Triage RN will call him back with the response and I advised him to assume he continue with a once daily dose unless he is instructed otherwise.

## 2021-08-20 NOTE — PATIENT INSTRUCTIONS
You have what appears to be an infected sebaceous cyst under your left armpit.  This can be easily removed but you need to stop taking the blood thinner for 1 day prior to that to reduce the risk of excessive bleeding.  If this is still a problem when you return from your trip, please call my office and we can plan to remove this for you.  OK?

## 2021-09-28 PROBLEM — C34.90 MALIGNANT NEOPLASM OF LUNG, UNSPECIFIED LATERALITY, UNSPECIFIED PART OF LUNG (H): Status: ACTIVE | Noted: 2021-01-01

## 2021-09-28 PROBLEM — I26.99 OTHER PULMONARY EMBOLISM WITHOUT ACUTE COR PULMONALE, UNSPECIFIED CHRONICITY (H): Status: ACTIVE | Noted: 2021-01-01

## 2021-09-28 PROBLEM — E86.0 DEHYDRATION: Status: ACTIVE | Noted: 2021-01-01

## 2021-09-28 NOTE — ED NOTES
65 y/o man with lung adenocarcinoma who has had rapid progression of cancer over the past few months and is being sent in for multiple problems including significant dysphagia, weight loss, and tachycardia. They tried to direct admit but no beds.     Oncology concerned about tumor growing into mediastinum and concern for possible tamponade. Wants echocardiogram during this admission.     Also needs IV hydration.     Note that just had a CT done yesterday so no need to repeat today. He has evidence of PE on recent CT scans and hasn't taken his Xarelto since Friday so oncology recommending we start him on a heparin gtt. f    To do: remainder of labs, IV access and fluids, admit to medicine.      Reina Rocha MD  09/28/21 [id number]

## 2021-09-28 NOTE — PROGRESS NOTES
Pt here for iv hydration then admit to hospital. Iv placed and one liter d5 1/2 ns infused over one hour. Iv capped and pt taken in wheelchair to ER.

## 2021-09-28 NOTE — PHARMACY-ADMISSION MEDICATION HISTORY
Pharmacy Note - Admission Medication History    Pertinent Provider Information: Patient last took Xarelto on Friday 9/24. He completed 15mg twice daily x 3 weeks but admits to missing some doses of medications.      ______________________________________________________________________    Prior To Admission (PTA) med list completed and updated in EMR.       PTA Med List   Medication Sig Note Last Dose     acetaminophen (TYLENOL) 500 MG tablet Take 1-2 tablets (500-1,000 mg) by mouth every 6 hours as needed for mild pain Max dose of 3000mg (6 tabs) in one day.  Past Month at Unknown time     ammonium lactate (LAC-HYDRIN) 12 % lotion Apply topically 2 times daily (Patient taking differently: Apply topically daily as needed )  Past Month at Unknown time     docusate sodium (DOK) 100 MG capsule TAKE 1-2 CAPSULE BY MOUTH DAILY as needed TO KEEP STOOLS SOFT  Past Month at Unknown time     emtricitabine-rilpivirine-tenofovir (ODEFSEY) 200-25-25 MG TABS per tablet Take 1 tablet by mouth daily (with breakfast)  9/27/2021 at Unknown time     FEROSUL 325 (65 Fe) MG tablet Take 1 tablet (325 mg) by mouth daily 9/28/2021: Stopped 1 week ago Past Month at Unknown time     loperamide (IMODIUM A-D) 2 MG tablet Take 1 tablet (2 mg) by mouth 4 times daily as needed for diarrhea  Past Month at Unknown time     ondansetron (ZOFRAN) 8 MG tablet Take 1 tablet (8 mg) by mouth every 6 hours as needed for nausea  Past Month at Unknown time     osimertinib (TAGRISSO) 80 MG tablet Take 1 tablet (80 mg) by mouth daily 9/28/2021: Patient states medication is not working anymore. Plan to stop medication per Dr. Chavez's note 9/28 Past Week at Unknown time     psyllium (METAMUCIL/KONSYL) 58.6 % powder Add one teaspoonful to liquid daily (Patient taking differently: Take by mouth daily as needed Add one teaspoonful to liquid daily)  Past Month at Unknown time     rivaroxaban ANTICOAGULANT (XARELTO ANTICOAGULANT) 20 MG TABS tablet Take 1 tablet  (20 mg) by mouth daily (with dinner)  9/24/2021     sodium chloride (OCEAN) 0.65 % nasal spray Two sprays per nostril two times daily as needed.  Past Month at Unknown time       Information source(s): Patient and CareEverywhere/SureScripts  Method of interview communication: in-person    Summary of Changes to PTA Med List  New: none  Discontinued: xarelto 15mg  Changed: none     Patient was asked about OTC/herbal products specifically.  PTA med list reflects this.    In the past week, patient estimated taking medication this percent of the time:  greater than 90%.    Allergies were reviewed, assessed, and updated with the patient.      Patient did not bring any medications to the hospital and can't retrieve from home. No multi-dose medications are available for use during hospital stay.     The information provided in this note is only as accurate as the sources available at the time of the update(s).    Thank you for the opportunity to participate in the care of this patient.    Marcella Sanchez [facility]  9/28/2021 6:46 PM

## 2021-09-28 NOTE — LETTER
"    9/28/2021         RE: Antonio Mercado  [address]  [place] [address]        Dear Colleague,    Thank you for referring your patient, Antonio Mercado, to the [facility]. Please see a copy of my visit note below.    Oncology Rooming Note    September 28, 2021 2:18 PM   Antonio Mercado is a 66 year old male who presents for:    Chief Complaint   Patient presents with     Oncology Clinic Visit     Primary malignant neoplasm of right lung metastatic to other site (H)     Initial Vitals: /64   Pulse (!) 133   Temp 98.6  F (37  C)   Resp 22   Wt 63 kg (139 lb)   SpO2 93%   BMI 19.39 kg/m   Estimated body mass index is 19.39 kg/m  as calculated from the following:    Height as of 8/10/21: 1.803 m (5' 11\").    Weight as of this encounter: 63 kg (139 lb). Body surface area is 1.78 meters squared.  Moderate Pain (5) Comment: Data Unavailable   No LMP for male patient.  Allergies reviewed: Yes  Medications reviewed: Yes    Medications: Medication refills not needed today.  Pharmacy name entered into [employer]:    [id number] - [place], [place] - [address] [id number] - [place] [facility] [address] [facility] [address]    Clinical concerns: Pt need's letter for at home care. Son is flying back home [place] tomorrow.        Shanice Logan              [facility] Hematology and Oncology Progress Note    Patient: Antonio Mercado  MRN: [id number]  Date of Service: 06/17/2021        Reason for Visit    Chief Complaint   Patient presents with     HE Cancer     Primary malignant neoplasm of right lung metastatic to other site       Assessment and Plan    Significant progression of cancer, September 2021  Associated dysphagia cough and weight loss  Tachycardia probably secondary to dehydration    Right lung adenocarcinoma with pleural metastasis and malignant pleural effusion diagnosed January " 2021  EGFR [id number](exon 19); RB1 552; TP53 [id number]  PD-L1, HIGH EXPRESSION    MSI STABLE(CA); TMB LOW  NO FISH ALTERATIONS  MET Exon 14 Deletion: NOT DETECTED; Marroquin TRK, Not expressed  No abnormalities in ALK, BRAF, ERBB2, HER2, MET, RET, ROS1    History of colon Cancer status post hemicolectomy  History of prostate cancer with elevated PSA, declined follow-up  HIV/AIDS  Macrocytic anemia probably related to medication  Thyroid nodule on PET scan  Rash, skin breakdown, left buttock area  Hospitalization in April for bright red blood per rectum probably due to hemorrhoids, requiring transfusion  Pulmonary emboli    CT scans show significant progression of his cancer. Patient with significant associated dysphagia, cough, weight loss and declining performance status.    Labs suggest he is dehydrated.    Reviewed situation with patient and his son. He will need to be hospitalized to manage these acute symptoms. He needs IV fluids for hydration. Echocardiogram to rule out tamponade. GI evaluation for possible stent to help dysphagia.    Primary requirement will be alternative therapy as he is feeling Osimertinib. We will stop the medication. Best option is likely single agent Keytruda as he is PD-L1 highly expressed and probably cannot tolerate chemotherapy.    May need to consider starting this as inpatient.    In regards to pulmonary emboli he needs to be restarted on Xarelto which she has not taken for the last few days. If he is not able to swallow the medication then may need to switch to Lovenox or heparin temporarily.    Prognosis is guarded and this was discussed.    Respiratory ER physician about the patient    Recommendations: Echocardiogram to rule out tamponade  IV hydration  GI consultation for possible esophageal stent  Radiation oncology consult for possible palliative radiation  IV heparin for pulmonary embolism as he may have difficulty swallowing pills  We will consider IV Keytruda in the hospital as  well      Treatment history:  Carbo Alimta alone without Keytruda, 20% dose reduction, cycle 2 on February 25, 2021  Carboplatin, Alimta and Keytruda to start February 4, 2021        ECOG Performance        Distress Assessment  Distress Assessment Score: No distress    Pain         Problem List    1. Primary malignant neoplasm of right lung metastatic to other site (H)  NM Muga    ECG 12 lead with MUSE        CC: Harris Mary MD    ______________________________________________________________________________    History of Present Illness    Mr. Antonio Mercado is here for a follow-up. Seen about 2 months ago. He has been in [place] and has been doing poorly. Has continued on the Osimertinib. Describes persistent cough. Difficulty eating and drinking and has lost quite a lot of weight with significant fatigue. Some pain in the left anterior chest wall. No bleeding symptoms. ECOG status is 2-3.    Pain Status  Currently in Pain: No/denies    Review of Systems    12 point review of systems completed     Patient Coping     Distress Assessment  Distress Assessment Score: No distress  Accompanied by  Accompanied by: Alone    Past History  Past Medical History:   Diagnosis Date     Constipation      HIV (human immunodeficiency virus infection) (H)      Hypertrophy of prostate with urinary obstruction      Iron deficiency anemia      Malignant neoplasm of hepatic flexure (H)     s/p right hemicolectomy in 2016         Past Surgical History:   Procedure Laterality Date     COLON SURGERY       HERNIA REPAIR       UT ESOPHAGOGASTRODUODENOSCOPY TRANSORAL DIAGNOSTIC N/A 4/13/2021    Procedure: ESOPHAGOGASTRODUODENOSCOPY (EGD);  Surgeon: Harris Mon MD;  Location: [facility];  Service: Gastroenterology     US THORACENTESIS  1/5/2021     US THORACENTESIS  1/18/2021     US THORACENTESIS  1/26/2021     US THORACENTESIS  1/29/2021     US THORACENTESIS  2/2/2021      THORACENTESIS  2/5/2021     US THORACENTESIS  2/9/2021      US THORACENTESIS  2/12/2021     US THORACENTESIS  2/15/2021     US THORACENTESIS  2/19/2021     US THORACENTESIS  2/23/2021     US THORACENTESIS  2/26/2021     US THORACENTESIS  3/2/2021     US THORACENTESIS  3/9/2021       Physical Exam    Recent Vitals 6/17/2021   Height -   Weight 154 lbs   BSA (m2) 1.86 m2   /61   Pulse 74   Temp 98.2   Temp src 1   SpO2 100   Some recent data might be hidden       GENERAL: Alert and oriented to time place and person. Seated comfortably. In no distress.    HEAD: Atraumatic and normocephalic.    EYES: KARIME, EOMI.  No pallor.  No icterus.    Oral cavity: no mucosal lesion or tonsillar enlargement.    NECK: supple. JVP normal.  No thyroid enlargement.    LYMPH NODES: No palpable, cervical, axillary or inguinal lymphadenopathy.    CHEST: clear to auscultation bilaterally.  Resonant to percussion throughout bilaterally.  Symmetrical breath movements bilaterally.    CVS: S1 and S2 are heard. Regular rate and rhythm.  No murmur or gallop or rub heard.  No peripheral edema.    ABDOMEN: Soft. Not tender. Not distended.  No palpable hepatomegaly or splenomegaly.  No other mass palpable.  Bowel sounds heard.    EXTREMITIES: Warm.    SKIN: no rash, or bruising or purpura.  Grade 1 skin breakdown in the cleft between the buttocks on the right side.  Other areas are healed.    Has a full head of hair.      Lab Results    Recent Results (from the past 168 hour(s))   Comprehensive Metabolic Panel   Result Value Ref Range    Sodium 137 136 - 145 mmol/L    Potassium 4.4 3.5 - 5.0 mmol/L    Chloride 102 98 - 107 mmol/L    CO2 22 22 - 31 mmol/L    Anion Gap, Calculation 13 5 - 18 mmol/L    Glucose 108 70 - 125 mg/dL    BUN 20 8 - 22 mg/dL    Creatinine 1.44 (H) 0.70 - 1.30 mg/dL    GFR MDRD Af Amer 59 (L) >60 mL/min/1.73m2    GFR MDRD Non Af Amer 49 (L) >60 mL/min/1.73m2    Bilirubin, Total 0.4 0.0 - 1.0 mg/dL    Calcium 9.1 8.5 - 10.5 mg/dL    Protein, Total 7.3 6.0 - 8.0 g/dL     Albumin 3.5 3.5 - 5.0 g/dL    Alkaline Phosphatase 80 45 - 120 U/L    AST 13 0 - 40 U/L    ALT <9 0 - 45 U/L   HM1 (CBC with Diff)   Result Value Ref Range    WBC 3.6 (L) 4.0 - 11.0 thou/uL    RBC 3.90 (L) 4.40 - 6.20 mill/uL    Hemoglobin 11.0 (L) 14.0 - 18.0 g/dL    Hematocrit 33.9 (L) 40.0 - 54.0 %    MCV 87 80 - 100 fL    MCH 28.2 27.0 - 34.0 pg    MCHC 32.4 32.0 - 36.0 g/dL    RDW 15.6 (H) 11.0 - 14.5 %    Platelets 170 140 - 440 thou/uL    MPV 8.5 7.0 - 10.0 fL    Neutrophils % 61 50 - 70 %    Lymphocytes % 30 20 - 40 %    Monocytes % 7 2 - 10 %    Eosinophils % 1 0 - 6 %    Basophils % 0 0 - 2 %    Immature Granulocyte % 0 <=0 %    Neutrophils Absolute 2.2 2.0 - 7.7 thou/uL    Lymphocytes Absolute 1.1 0.8 - 4.4 thou/uL    Monocytes Absolute 0.3 0.0 - 0.9 thou/uL    Eosinophils Absolute 0.0 0.0 - 0.4 thou/uL    Basophils Absolute 0.0 0.0 - 0.2 thou/uL    Immature Granulocyte Absolute 0.0 <=0.0 thou/uL   ECG 12 lead with MUSE   Result Value Ref Range    SYSTOLIC BLOOD PRESSURE      DIASTOLIC BLOOD PRESSURE      VENTRICULAR RATE 67 BPM    ATRIAL RATE 67 BPM    P-R INTERVAL 184 ms    QRS DURATION 90 ms    Q-T INTERVAL 376 ms    QTC CALCULATION (BEZET) 397 ms    P Axis 52 degrees    R AXIS 32 degrees    T AXIS 40 degrees    MUSE DIAGNOSIS       Normal sinus rhythm  Normal ECG  When compared with ECG of 17-MAY-2021 10:13,  No significant change was found     HM1 (CBC with Diff)   Result Value Ref Range    WBC 3.5 (L) 4.0 - 11.0 thou/uL    RBC 4.15 (L) 4.40 - 6.20 mill/uL    Hemoglobin 11.5 (L) 14.0 - 18.0 g/dL    Hematocrit 37.2 (L) 40.0 - 54.0 %    MCV 90 80 - 100 fL    MCH 27.7 27.0 - 34.0 pg    MCHC 30.9 (L) 32.0 - 36.0 g/dL    RDW 15.9 (H) 11.0 - 14.5 %    Platelets 199 140 - 440 thou/uL    MPV 10.1 8.5 - 12.5 fL    Neutrophils % 59 50 - 70 %    Lymphocytes % 32 20 - 40 %    Monocytes % 8 2 - 10 %    Eosinophils % 1 0 - 6 %    Basophils % 0 0 - 2 %    Immature Granulocyte % 0 <=0 %    Neutrophils Absolute 2.1  2.0 - 7.7 thou/uL    Lymphocytes Absolute 1.1 0.8 - 4.4 thou/uL    Monocytes Absolute 0.3 0.0 - 0.9 thou/uL    Eosinophils Absolute 0.0 0.0 - 0.4 thou/uL    Basophils Absolute 0.0 0.0 - 0.2 thou/uL    Immature Granulocyte Absolute 0.0 <=0.0 thou/uL       Imaging    No results found.      Signed by: Kailey Chavez MD        Again, thank you for allowing me to participate in the care of your patient.        Sincerely,        Kailey Chavez MD

## 2021-09-28 NOTE — PROGRESS NOTES
"Oncology Rooming Note    September 28, 2021 2:18 PM   Antonio Mercado is a 66 year old male who presents for:    Chief Complaint   Patient presents with     Oncology Clinic Visit     Primary malignant neoplasm of right lung metastatic to other site (H)     Initial Vitals: /64   Pulse (!) 133   Temp 98.6  F (37  C)   Resp 22   Wt 63 kg (139 lb)   SpO2 93%   BMI 19.39 kg/m   Estimated body mass index is 19.39 kg/m  as calculated from the following:    Height as of 8/10/21: 1.803 m (5' 11\").    Weight as of this encounter: 63 kg (139 lb). Body surface area is 1.78 meters squared.  Moderate Pain (5) Comment: Data Unavailable   No LMP for male patient.  Allergies reviewed: Yes  Medications reviewed: Yes    Medications: Medication refills not needed today.  Pharmacy name entered into [employer]:    [id number] - [place], [place] - [address] [id number] - [place], [place] - [address]    Clinical concerns: Pt need's letter for at home care. Son is flying back home [place] tomorrow.        Shanice Logan            "

## 2021-09-28 NOTE — ED PROVIDER NOTES
EMERGENCY DEPARTMENT ENCOUNTER      NAME: Antonio Mercado  AGE: 66 year old male  YOB: 1955  MRN: [id number]  EVALUATION DATE & TIME: 9/28/2021  6:25 PM    PCP: Harris Mary    ED PROVIDER: Nina Monroy MD    Chief Complaint   Patient presents with     Dysphagia         FINAL IMPRESSION:  1. Dehydration    2. Malignant neoplasm of lung, unspecified laterality, unspecified part of lung (H)    3. Other pulmonary embolism without acute cor pulmonale, unspecified chronicity (H)    4. Acute respiratory failure with hypoxia (H)          ED COURSE & MEDICAL DECISION MAKING:    Pertinent Labs & Imaging studies reviewed. (See chart for details)  66 year old male with history of lung cancer that has been progressive significantly, PE on anticoagulation, CKD who has been having increasing weight loss due to poor swallowing from bulky compression of his esophagus who presents to the Emergency Department for evaluation of increasing dehydration from poor oral intake, and dysphagia.  He is also had a painful dry cough.  Patient has known PE, and has been off of his Xarelto for the last several days.  He is hypoxic however here.  I suspect that his pleuritic type chest pain and his hypoxia is due to his known PE and evolving infarct seen on his chest CT yesterday.  He was just imaged yesterday and being noncompliant I do not think he warrants any repeat imaging today.  Was placed on heparin with bolus and drip.  VBG with normal pH is 7.36, CBC and BMP notable for hemoglobin 9.6 down slightly from previous.  Creatinine 1.39 which appears to be baseline.  Patient looks dry clinically.  Given normal saline bolus and platelets on maintenance IV fluids.  Needs admission for GI consultation for possible esophageal stent versus PEG tube.  Patient agreeable with plan and admitted to resident medicine service.           6:41 PM I met with patient for initial interview and encounter. PPE worn includes N95 mask and surgical  mask.  7:00 PM I spoke to hospitalist Dr. Tamez regarding plan for admission.    At the conclusion of the encounter I discussed the results of all of the tests and the disposition. The questions were answered. The patient or family acknowledged understanding and was agreeable with the care plan.    CRITICAL CARE:  Critical Care  Performed by: Nina Monroy MD  Authorized by: Nina Monroy MD     Total critical care time: 42 minutes  Criticalcare time was exclusive of separately billable procedures and treating other patients.  Critical care was necessary to treat or prevent imminent or life-threatening deterioration of the following conditions: Acute hypoxic respiratory failure  Critical care was time spent personally by me on the following activities: development of treatment plan with patient or surrogate, discussions with consultants, examination of patient, evaluation of patient's response totreatment, obtaining history from patient or surrogate, ordering and performing treatments and interventions, ordering and review of laboratory studies, ordering and review of radiographic studies and re-evaluation ofpatient's condition, this excludes any separately billable procedures.      MEDICATIONS GIVEN IN THE EMERGENCY:  Medications   heparin 25,000 units in 0.45% NaCl 250 mL ANTICOAGULANT infusion (1,550 Units/hr Intravenous New Bag 9/28/21 2132)   sodium chloride 0.9% infusion (1,000 mLs Intravenous Not Given 9/28/21 2143)   emtricitabine-rilpivirine-tenofovir (ODEFSEY) 200-25-25 MG per tablet 1 tablet (has no administration in time range)   lidocaine 1 % 0.1-1 mL (has no administration in time range)   lidocaine (LMX4) cream (has no administration in time range)   sodium chloride (PF) 0.9% PF flush 3 mL (3 mLs Intracatheter Given 9/28/21 2134)   sodium chloride (PF) 0.9% PF flush 3 mL (has no administration in time range)   melatonin tablet 1 mg (has no administration in time range)   sodium chloride 0.9%  infusion ( Intravenous New Bag 9/28/21 2138)   senna-docusate (SENOKOT-S/PERICOLACE) 8.6-50 MG per tablet 1 tablet (has no administration in time range)     Or   senna-docusate (SENOKOT-S/PERICOLACE) 8.6-50 MG per tablet 2 tablet (has no administration in time range)   ondansetron (ZOFRAN-ODT) ODT tab 4 mg (has no administration in time range)     Or   ondansetron (ZOFRAN) injection 4 mg (has no administration in time range)   prochlorperazine (COMPAZINE) injection 5 mg (has no administration in time range)     Or   prochlorperazine (COMPAZINE) tablet 5 mg (has no administration in time range)     Or   prochlorperazine (COMPAZINE) suppository 12.5 mg (has no administration in time range)   Patient is already receiving anticoagulation with heparin, enoxaparin (LOVENOX), warfarin (COUMADIN)  or other anticoagulant medication (has no administration in time range)   heparin ANTICOAGULANT Loading dose for HIGH INTENSITY TREATMENT * Give BEFORE starting heparin infusion (5,000 Units Intravenous Given 9/28/21 1915)   0.9% sodium chloride BOLUS (1,000 mLs Intravenous New Bag 9/28/21 1925)   HYDROcodone-acetaminophen (NORCO) 5-325 MG per tablet 1 tablet (1 tablet Oral Given 9/28/21 2139)   heparin - BOLUS DOSE from infusion (3,800 Units Intravenous Given 9/28/21 2133)       NEW PRESCRIPTIONS STARTED AT TODAY'S ER VISIT  Current Discharge Medication List             =================================================================    HPI    Patient information was obtained from: Patient     Use of Intrepreter: N/A        Antonio Mercado is a 66 year old male with pertinent medical history of lung cancer, PE, HIV, CKD stage 3, who presents via walk-in for evaluation of trouble swallowing, cough.    Per chart review, patient seen for oncology follow up at [facility] earlier today. CT scans showed significant progression of his lung cancer. Patient with significant associated dysphagia, cough, weight loss and  declining performance status. Labs suggested he is dehydrated. Patient noted he has not taken his Xarelto for a few days due to his trouble swallowing. Patient was referred to the ED for overnight stay with IV fluids and echocardiogram.     Patient reports he has had worsening dysphagia over the past month which is thought to be due to a mass pressing on his esophagus. He has been eating and drinking less due to this and thinks he has lost 20 pounds in the past month. He also endorses a persistent painful dry cough. He has not taken any pain medications for his symptoms and has not taken his Xarelto for a few days due to his trouble swallowing. He has also been having fewer BMs than normal and thinks he may be constipated.     Patient denies any fever, chills, urinary symptoms, or any other complaints.      REVIEW OF SYSTEMS  Constitutional:  Positive for weight loss, Denies fever, chills, or weakness  HENT: Positive for dysphagia, Denies sore throat, ear pain, congestion  Respiratory: Positive for dry cough, No SOB  Cardiovascular:  No CP, palpitations  GI:  Positive for constipation, Denies abdominal pain, nausea, vomiting, diarrhea  Musculoskeletal:  Denies any new muscle/joint pain, swelling or loss of function.  Neurologic:  Denies headache, focal weakness or sensory changes  All other systems negative unless noted in HPI.      PAST MEDICAL HISTORY:  Past Medical History:   Diagnosis Date     Constipation      Heel pain      History of blood transfusion      HIV (human immunodeficiency virus infection) (H)      HIV (human immunodeficiency virus infection) (H)      Hypertrophy of prostate with urinary obstruction      Iron deficiency anemia      LBP (low back pain)      Malignant neoplasm of hepatic flexure (H) 2/4/2016     Malignant neoplasm of hepatic flexure (H)     s/p right hemicolectomy in 2016     Trigger finger        PAST SURGICAL HISTORY:  Past Surgical History:   Procedure Laterality Date     COLON  SURGERY       GI SURGERY       HERNIA REPAIR       ND ESOPHAGOGASTRODUODENOSCOPY TRANSORAL DIAGNOSTIC N/A 4/13/2021    Procedure: ESOPHAGOGASTRODUODENOSCOPY (EGD);  Surgeon: Harris Mon MD;  Location: [facility];  Service: Gastroenterology      THORACENTESIS  1/5/2021      THORACENTESIS  1/18/2021      THORACENTESIS  1/26/2021     US THORACENTESIS  1/29/2021     US THORACENTESIS  2/2/2021     US THORACENTESIS  2/5/2021     US THORACENTESIS  2/9/2021     US THORACENTESIS  2/12/2021     US THORACENTESIS  2/15/2021     US THORACENTESIS  2/19/2021     US THORACENTESIS  2/23/2021     US THORACENTESIS  2/26/2021     US THORACENTESIS  3/2/2021     US THORACENTESIS  3/9/2021       CURRENT MEDICATIONS:    Prior to Admission Medications   Prescriptions Last Dose Informant Patient Reported? Taking?   FEROSUL 325 (65 Fe) MG tablet Past Month at Unknown time  No Yes   Sig: Take 1 tablet (325 mg) by mouth daily   acetaminophen (TYLENOL) 500 MG tablet Past Month at Unknown time  No Yes   Sig: Take 1-2 tablets (500-1,000 mg) by mouth every 6 hours as needed for mild pain Max dose of 3000mg (6 tabs) in one day.   ammonium lactate (LAC-HYDRIN) 12 % lotion Past Month at Unknown time  No Yes   Sig: Apply topically 2 times daily   Patient taking differently: Apply topically daily as needed    docusate sodium (DOK) 100 MG capsule Past Month at Unknown time  No Yes   Sig: TAKE 1-2 CAPSULE BY MOUTH DAILY as needed TO KEEP STOOLS SOFT   emtricitabine-rilpivirine-tenofovir (ODEFSEY) 200-25-25 MG TABS per tablet 9/27/2021 at Unknown time  Yes Yes   Sig: Take 1 tablet by mouth daily (with breakfast)   loperamide (IMODIUM A-D) 2 MG tablet Past Month at Unknown time  No Yes   Sig: Take 1 tablet (2 mg) by mouth 4 times daily as needed for diarrhea   ondansetron (ZOFRAN) 8 MG tablet Past Month at Unknown time  No Yes   Sig: Take 1 tablet (8 mg) by mouth every 6 hours as needed for nausea   osimertinib (TAGRISSO) 80 MG tablet Past  Week at Unknown time  No Yes   Sig: Take 1 tablet (80 mg) by mouth daily   psyllium (METAMUCIL/KONSYL) 58.6 % powder Past Month at Unknown time  No Yes   Sig: Add one teaspoonful to liquid daily   Patient taking differently: Take by mouth daily as needed Add one teaspoonful to liquid daily   rivaroxaban ANTICOAGULANT (XARELTO ANTICOAGULANT) 20 MG TABS tablet 9/24/2021  No Yes   Sig: Take 1 tablet (20 mg) by mouth daily (with dinner)   sodium chloride (OCEAN) 0.65 % nasal spray Past Month at Unknown time  No Yes   Sig: Two sprays per nostril two times daily as needed.      Facility-Administered Medications: None       ALLERGIES:  Allergies   Allergen Reactions     Stribild [Zsgsyop-Klikeqg-Pzdlmunr-Tenof] Itching     Ibuprofen Itching and Other (See Comments)     Oxycodone Other (See Comments)     Prednisone Other (See Comments)     Raltegravir Unknown     Tamsulosin Other (See Comments)     Headache     Tylenol [Acetaminophen] Rash     Gets an itchy rash on back.        FAMILY HISTORY:  Family History   Problem Relation Age of Onset     No Known Problems Mother      No Known Problems Father      No Known Problems Maternal Grandmother      No Known Problems Maternal Grandfather      No Known Problems Paternal Grandmother      No Known Problems Paternal Grandfather      No Known Problems Brother      No Known Problems Sister      No Known Problems Son      No Known Problems Daughter      No Known Problems Maternal Half-Brother      No Known Problems Maternal Half-Sister      No Known Problems Paternal Half-Brother      No Known Problems Paternal Half-Sister      No Known Problems Niece      No Known Problems Nephew      No Known Problems Cousin      No Known Problems Other      C.A.D. No family hx of      Diabetes No family hx of      Hypertension No family hx of      Hyperlipidemia No family hx of      Breast Cancer No family hx of      Cancer - colorectal No family hx of      Ovarian Cancer No family hx of       "Prostate Cancer No family hx of      Depression/Anxiety No family hx of      Cerebrovascular Disease No family hx of      Anesthesia Reaction No family hx of      Thyroid Disease No family hx of      Asthma No family hx of      Osteoporosis No family hx of      Chemical Addiction No family hx of      Known Genetic Syndrome No family hx of      Cancer No family hx of      Coronary Artery Disease No family hx of      Heart Disease No family hx of      Kidney Disease No family hx of      Obesity No family hx of      Thrombosis No family hx of      Arthritis No family hx of      Thyroid Disease No family hx of      Depression No family hx of      Mental Illness No family hx of      Substance Abuse No family hx of      Cystic Fibrosis No family hx of      Early Death No family hx of      Coronary Artery Disease Early Onset No family hx of      Heart Failure No family hx of      Bleeding Diathesis No family hx of      Dementia No family hx of      Uterine Cancer No family hx of      Colorectal Cancer No family hx of      Pancreatic Cancer No family hx of      Lung Cancer No family hx of      Melanoma No family hx of      Autoimmune Disease No family hx of      Unknown/Adopted No family hx of      Genetic Disorder No family hx of        SOCIAL HISTORY:  Social History     Tobacco Use     Smoking status: Never Smoker     Smokeless tobacco: Never Used   Substance Use Topics     Alcohol use: No     Drug use: No        VITALS:  Patient Vitals for the past 24 hrs:   BP Temp Temp src Pulse Resp SpO2 Height Weight   09/28/21 1957 105/67 98.6  F (37  C) Oral 98 18 90 % 1.803 m (5' 11\") 63.7 kg (140 lb 8 oz)   09/28/21 1640 106/62 97.4  F (36.3  C) Tympanic 96 22 94 % -- 62.6 kg (138 lb)       PHYSICAL EXAM    General Appearance: Chronically ill-appearing, well-nourished, no acute distress   Head:  Normocephalic  Eyes:  conjunctiva/corneas yuliet  ENT:   membranes are moist without pallor  Neck:  Supple  Chest:  No tenderness or " deformity, no crepitus  Cardio:  Regular rate and rhythm, no murmur/gallop/rub, 2+ pulses symmetric in all extremities  Pulm:  Hypoxic 82% on room air, frequent dry cough, breath sounds decreased  Back:  No CVA tenderness, normal ROM  Abdomen:  Soft, non-tender, non distended,no rebound or guarding.  Extremities: Moves all extremities normally, normal gait  Skin:  Skin warm, dry, no rashes  Neuro:  Alert and oriented ×3, moving all extremities, no gross sensory defects     RADIOLOGY/LABS:  Reviewed all pertinent imaging. Please see official radiology report. All pertinent labs reviewed and interpreted.    Results for orders placed or performed during the hospital encounter of 09/28/21   CBC with platelets   Result Value Ref Range    WBC Count 5.7 4.0 - 11.0 10e3/uL    RBC Count 3.50 (L) 4.40 - 5.90 10e6/uL    Hemoglobin 9.6 (L) 13.3 - 17.7 g/dL    Hematocrit 31.8 (L) 40.0 - 53.0 %    MCV 91 78 - 100 fL    MCH 27.4 26.5 - 33.0 pg    MCHC 30.2 (L) 31.5 - 36.5 g/dL    RDW 15.1 (H) 10.0 - 15.0 %    Platelet Count 297 150 - 450 10e3/uL   Basic metabolic panel   Result Value Ref Range    Sodium 137 136 - 145 mmol/L    Potassium 4.7 3.5 - 5.0 mmol/L    Chloride 104 98 - 107 mmol/L    Carbon Dioxide (CO2) 24 22 - 31 mmol/L    Anion Gap 9 5 - 18 mmol/L    Urea Nitrogen 16 8 - 22 mg/dL    Creatinine 1.39 (H) 0.70 - 1.30 mg/dL    Calcium 9.2 8.5 - 10.5 mg/dL    Glucose 181 (H) 70 - 125 mg/dL    GFR Estimate 52 (L) >60 mL/min/1.73m2   Asymptomatic COVID-19 Virus (Coronavirus) by PCR Nasopharyngeal    Specimen: Nasopharyngeal; Swab   Result Value Ref Range    SARS CoV2 PCR Negative Negative   Result Value Ref Range    Anti Xa Unfractionated Heparin <0.10 For Reference Range, See Comment IU/mL   Blood gas venous   Result Value Ref Range    pH Venous 7.36 7.35 - 7.45    pCO2 Venous 47 35 - 50 mm Hg    pO2 Venous 23 (L) 25 - 47 mm Hg    Bicarbonate Venous 24 24 - 30 mmol/L    Base Excess/Deficit (+/-) 0.8   mmol/L    Oxyhemoglobin  Venous 31.7 (L) 70.0 - 75.0 %    O2 Sat, Venous 32.4 (L) 70.0 - 75.0 %       The creation of this record is based on the scribe s observations of the work being performed by Nina Monroy MD and the provider s statements to them. It was created on his behalf by Keanu Patel, a trained medical scribe. This document has been checked and approved by the attending provider.    Nina Monroy MD  Emergency Medicine  [facility] EMERGENCY DEPARTMENT  [address] [place] [id number] [phone]  Dept: [phone]     Nina Monroy MD  09/28/21 0000

## 2021-09-28 NOTE — PROGRESS NOTES
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 diagnosed January 2021  EGFR [id number](exon 19); RB1 552; TP53 [id number]  PD-L1, HIGH EXPRESSION    MSI STABLE(CA); TMB LOW  NO FISH ALTERATIONS  MET Exon 14 Deletion: NOT DETECTED; Marroquin TRK, Not expressed  No abnormalities in ALK, BRAF, ERBB2, HER2, MET, RET, ROS1    History of colon Cancer status post hemicolectomy  History of prostate cancer with elevated PSA, declined follow-up  HIV/AIDS  Macrocytic anemia probably related to medication  Thyroid nodule on PET scan  Rash, skin breakdown, left buttock area  Hospitalization in April for bright red blood per rectum probably due to hemorrhoids, requiring transfusion  Pulmonary emboli    CT scans show significant progression of his cancer. Patient with significant associated dysphagia, cough, weight loss and declining performance status.    Labs suggest he is dehydrated.    Reviewed situation with patient and his son. He will need to be hospitalized to manage these acute symptoms. He needs IV fluids for hydration. Echocardiogram to rule out tamponade. GI evaluation for possible stent to help dysphagia.    Primary requirement will be alternative therapy as he is feeling Osimertinib. We will stop the medication. Best option is likely single agent Keytruda as he is PD-L1 highly expressed and probably cannot tolerate chemotherapy.    May need to consider starting this as inpatient.    In regards to pulmonary emboli he needs to be restarted on Xarelto which  she has not taken for the last few days. If he is not able to swallow the medication then may need to switch to Lovenox or heparin temporarily.    Prognosis is guarded and this was discussed.    Respiratory ER physician about the patient    Recommendations: Echocardiogram to rule out tamponade  IV hydration  GI consultation for possible esophageal stent  Radiation oncology consult for possible palliative radiation  IV heparin for pulmonary embolism as he may have difficulty swallowing pills  We will consider IV Keytruda in the hospital as well      Treatment history:  Carbo Alimta alone without Keytruda, 20% dose reduction, cycle 2 on February 25, 2021  Carboplatin, Alimta and Keytruda to start February 4, 2021        ECOG Performance        Distress Assessment  Distress Assessment Score: No distress    Pain         Problem List    1. Primary malignant neoplasm of right lung metastatic to other site (H)  NM Muga    ECG 12 lead with MUSE        CC: Harris Mary MD    ______________________________________________________________________________    History of Present Illness    Mr. Antonio Mercado is here for a follow-up. Seen about 2 months ago. He has been in [place] and has been doing poorly. Has continued on the Osimertinib. Describes persistent cough. Difficulty eating and drinking and has lost quite a lot of weight with significant fatigue. Some pain in the left anterior chest wall. No bleeding symptoms. ECOG status is 2-3.    Pain Status  Currently in Pain: No/denies    Review of Systems    12 point review of systems completed     Patient Coping     Distress Assessment  Distress Assessment Score: No distress  Accompanied by  Accompanied by: Alone    Past History  Past Medical History:   Diagnosis Date     Constipation      HIV (human immunodeficiency virus infection) (H)      Hypertrophy of prostate with urinary obstruction      Iron deficiency anemia      Malignant neoplasm of hepatic flexure (H)     s/p  right hemicolectomy in 2016         Past Surgical History:   Procedure Laterality Date     COLON SURGERY       HERNIA REPAIR       NH ESOPHAGOGASTRODUODENOSCOPY TRANSORAL DIAGNOSTIC N/A 4/13/2021    Procedure: ESOPHAGOGASTRODUODENOSCOPY (EGD);  Surgeon: Harris Mon MD;  Location: [facility];  Service: Gastroenterology     US THORACENTESIS  1/5/2021     US THORACENTESIS  1/18/2021     US THORACENTESIS  1/26/2021     US THORACENTESIS  1/29/2021     US THORACENTESIS  2/2/2021     US THORACENTESIS  2/5/2021     US THORACENTESIS  2/9/2021     US THORACENTESIS  2/12/2021     US THORACENTESIS  2/15/2021     US THORACENTESIS  2/19/2021     US THORACENTESIS  2/23/2021     US THORACENTESIS  2/26/2021     US THORACENTESIS  3/2/2021     US THORACENTESIS  3/9/2021       Physical Exam    Recent Vitals 6/17/2021   Height -   Weight 154 lbs   BSA (m2) 1.86 m2   /61   Pulse 74   Temp 98.2   Temp src 1   SpO2 100   Some recent data might be hidden       GENERAL: Alert and oriented to time place and person. Seated comfortably. In no distress.    HEAD: Atraumatic and normocephalic.    EYES: KARIME, EOMI.  No pallor.  No icterus.    Oral cavity: no mucosal lesion or tonsillar enlargement.    NECK: supple. JVP normal.  No thyroid enlargement.    LYMPH NODES: No palpable, cervical, axillary or inguinal lymphadenopathy.    CHEST: clear to auscultation bilaterally.  Resonant to percussion throughout bilaterally.  Symmetrical breath movements bilaterally.    CVS: S1 and S2 are heard. Regular rate and rhythm.  No murmur or gallop or rub heard.  No peripheral edema.    ABDOMEN: Soft. Not tender. Not distended.  No palpable hepatomegaly or splenomegaly.  No other mass palpable.  Bowel sounds heard.    EXTREMITIES: Warm.    SKIN: no rash, or bruising or purpura.  Grade 1 skin breakdown in the cleft between the buttocks on the right side.  Other areas are healed.    Has a full head of hair.      Lab Results    Recent Results (from  the past 168 hour(s))   Comprehensive Metabolic Panel   Result Value Ref Range    Sodium 137 136 - 145 mmol/L    Potassium 4.4 3.5 - 5.0 mmol/L    Chloride 102 98 - 107 mmol/L    CO2 22 22 - 31 mmol/L    Anion Gap, Calculation 13 5 - 18 mmol/L    Glucose 108 70 - 125 mg/dL    BUN 20 8 - 22 mg/dL    Creatinine 1.44 (H) 0.70 - 1.30 mg/dL    GFR MDRD Af Amer 59 (L) >60 mL/min/1.73m2    GFR MDRD Non Af Amer 49 (L) >60 mL/min/1.73m2    Bilirubin, Total 0.4 0.0 - 1.0 mg/dL    Calcium 9.1 8.5 - 10.5 mg/dL    Protein, Total 7.3 6.0 - 8.0 g/dL    Albumin 3.5 3.5 - 5.0 g/dL    Alkaline Phosphatase 80 45 - 120 U/L    AST 13 0 - 40 U/L    ALT <9 0 - 45 U/L   HM1 (CBC with Diff)   Result Value Ref Range    WBC 3.6 (L) 4.0 - 11.0 thou/uL    RBC 3.90 (L) 4.40 - 6.20 mill/uL    Hemoglobin 11.0 (L) 14.0 - 18.0 g/dL    Hematocrit 33.9 (L) 40.0 - 54.0 %    MCV 87 80 - 100 fL    MCH 28.2 27.0 - 34.0 pg    MCHC 32.4 32.0 - 36.0 g/dL    RDW 15.6 (H) 11.0 - 14.5 %    Platelets 170 140 - 440 thou/uL    MPV 8.5 7.0 - 10.0 fL    Neutrophils % 61 50 - 70 %    Lymphocytes % 30 20 - 40 %    Monocytes % 7 2 - 10 %    Eosinophils % 1 0 - 6 %    Basophils % 0 0 - 2 %    Immature Granulocyte % 0 <=0 %    Neutrophils Absolute 2.2 2.0 - 7.7 thou/uL    Lymphocytes Absolute 1.1 0.8 - 4.4 thou/uL    Monocytes Absolute 0.3 0.0 - 0.9 thou/uL    Eosinophils Absolute 0.0 0.0 - 0.4 thou/uL    Basophils Absolute 0.0 0.0 - 0.2 thou/uL    Immature Granulocyte Absolute 0.0 <=0.0 thou/uL   ECG 12 lead with MUSE   Result Value Ref Range    SYSTOLIC BLOOD PRESSURE      DIASTOLIC BLOOD PRESSURE      VENTRICULAR RATE 67 BPM    ATRIAL RATE 67 BPM    P-R INTERVAL 184 ms    QRS DURATION 90 ms    Q-T INTERVAL 376 ms    QTC CALCULATION (BEZET) 397 ms    P Axis 52 degrees    R AXIS 32 degrees    T AXIS 40 degrees    MUSE DIAGNOSIS       Normal sinus rhythm  Normal ECG  When compared with ECG of 17-MAY-2021 10:13,  No significant change was found     HM1 (CBC with Diff)    Result Value Ref Range    WBC 3.5 (L) 4.0 - 11.0 thou/uL    RBC 4.15 (L) 4.40 - 6.20 mill/uL    Hemoglobin 11.5 (L) 14.0 - 18.0 g/dL    Hematocrit 37.2 (L) 40.0 - 54.0 %    MCV 90 80 - 100 fL    MCH 27.7 27.0 - 34.0 pg    MCHC 30.9 (L) 32.0 - 36.0 g/dL    RDW 15.9 (H) 11.0 - 14.5 %    Platelets 199 140 - 440 thou/uL    MPV 10.1 8.5 - 12.5 fL    Neutrophils % 59 50 - 70 %    Lymphocytes % 32 20 - 40 %    Monocytes % 8 2 - 10 %    Eosinophils % 1 0 - 6 %    Basophils % 0 0 - 2 %    Immature Granulocyte % 0 <=0 %    Neutrophils Absolute 2.1 2.0 - 7.7 thou/uL    Lymphocytes Absolute 1.1 0.8 - 4.4 thou/uL    Monocytes Absolute 0.3 0.0 - 0.9 thou/uL    Eosinophils Absolute 0.0 0.0 - 0.4 thou/uL    Basophils Absolute 0.0 0.0 - 0.2 thou/uL    Immature Granulocyte Absolute 0.0 <=0.0 thou/uL       Imaging    No results found.      Signed by: Kailey Chavez MD

## 2021-09-29 NOTE — PLAN OF CARE
Problem: Adult Inpatient Plan of Care  Goal: Plan of Care Review  9/29/2021 0531 by Abena Cardona RN  Outcome: Improving  9/29/2021 0342 by Abena Cardona RN  Outcome: Improving  Goal: Patient-Specific Goal (Individualized)  9/29/2021 0531 by Abena Cardona RN  Outcome: Improving  9/29/2021 0342 by Abena Cardona RN  Outcome: Improving  Goal: Absence of Hospital-Acquired Illness or Injury  9/29/2021 0531 by Abena Cardona RN  Outcome: Improving  9/29/2021 0342 by Abena Cardona RN  Outcome: Improving  Intervention: Identify and Manage Fall Risk  Recent Flowsheet Documentation  Taken 9/29/2021 0335 by Abena Cardona RN  Safety Promotion/Fall Prevention:   lighting adjusted   room door open   room near nurse's station  Taken 9/29/2021 0010 by Abena Cardona RN  Safety Promotion/Fall Prevention:   lighting adjusted   room door open   room near nurse's station  Intervention: Prevent Skin Injury  Recent Flowsheet Documentation  Taken 9/29/2021 0335 by Abena Cardona RN  Body Position: position changed independently  Taken 9/29/2021 0010 by Abena Cardona RN  Body Position: position changed independently  Goal: Optimal Comfort and Wellbeing  9/29/2021 0531 by Abena Cardona RN  Outcome: Improving  9/29/2021 0342 by Abena Cardona RN  Outcome: Improving  Goal: Readiness for Transition of Care  9/29/2021 0531 by Abena Cardona RN  Outcome: Improving  9/29/2021 0342 by Abena Cardona RN  Outcome: Improving     Problem: Coping Ineffective (Oncology Care)  Goal: Effective Coping  9/29/2021 0531 by Abena Cardona RN  Outcome: Improving  9/29/2021 0342 by Abena Cardona RN  Outcome: Improving     Problem: Fatigue (Oncology Care)  Goal: Improved Activity Tolerance  9/29/2021 0531 by Abena Cardona RN  Outcome: Improving  9/29/2021 0342 by Abena Cardona RN  Outcome: Improving  Intervention: Promote Energy Conservation  Recent Flowsheet Documentation  Taken  9/29/2021 0335 by Abena Cardona, RN  Activity Management: activity adjusted per tolerance  Taken 9/29/2021 0010 by Abena Cardona RN  Activity Management: activity adjusted per tolerance     Problem: Oral Intake Altered (Oncology Care)  Goal: Optimal Oral Intake  9/29/2021 0531 by Abena Cardona, RN  Outcome: Improving  9/29/2021 0342 by Abena Cardona RN  Outcome: Improving     Problem: Oral Mucositis (Oncology Care)  Goal: Improved Oral Mucous Membrane Integrity  9/29/2021 0531 by Abena Cardona, RN  Outcome: Improving  9/29/2021 0342 by Abena Cardona RN  Outcome: Improving     Problem: Pain Acute (Oncology Care)  Goal: Optimal Pain Control  9/29/2021 0531 by Abena Cardona RN  Outcome: Improving  9/29/2021 0342 by Abena Cardona RN  Outcome: Improving     Problem: Fluid Volume Deficit  Goal: Fluid Balance  9/29/2021 0531 by Abena Cardona RN  Outcome: Improving  9/29/2021 0342 by Abena Cardona, RN  Outcome: Improving     Pt a/o without c/o. Pt exhausted / sleeping most of shift. AntiXA critical this am / md notified and protocol followed. Hgb dropping to 7.7 with a recheck ordered at 1030am. Will continue to monitor.

## 2021-09-29 NOTE — CONSULTS
"GI CONSULT NOTE      Name: Antonio Mercado  Medical Record #: [id number]  YOB: 1955  Date of Admission: 9/28/2021  Date/Time: 9/29/2021/8:11 AM     CHIEF COMPLAINT: Dysphagia     HISTORY OF PRESENT ILLNESS: We were asked to see Antonio Mercado by Dr. Monroy for \"dysphagia, lung ca. eval need for esophgeal stent.\"     Antonio Mercado is a 66 year old year old male with history of metastatic lung cancer, HIV, CKD stage 3, PE who is admitted due to worsening dysphagia. He was seen by his oncologist yesterday and was reporting 1 month of worsening dysphagia, poor oral intake, 20lb weight loss. He reports tolerating liquids, and soft foods like mashed potatoes, but has not been able to eat many solid foods. He had not been able to take Xarelto recently due to the dysphagia; last dose 9/24/21. He as tachycardic and there was concern for dehydration. CT scan 9/27/21 showed progression of his lung cancer with mass effect/or invasion of the right hemidiaphram and esophagus with proximal esophageal dilatation. Oncology recommended he present to the ER for IV fluids, echocardiogram to rule out tamponade and GI evaluation.     Echo planned for later today. He is NPO. He reports chest pain associated with his dysphagia. No nausea or vomiting, abdominal pain or change in bowel habits.     REVIEW OF SYSTEMS (ROS): Complete review of systems negative other than listed in HPI.    PAST MEDICAL HISTORY:  Past Medical History:   Diagnosis Date     Constipation      Heel pain      History of blood transfusion      HIV (human immunodeficiency virus infection) (H)      HIV (human immunodeficiency virus infection) (H)      Hypertrophy of prostate with urinary obstruction      Iron deficiency anemia      LBP (low back pain)      Malignant neoplasm of hepatic flexure (H) 2/4/2016     Malignant neoplasm of hepatic flexure (H)     s/p right hemicolectomy in 2016     Trigger finger         FAMILY HISTORY:  Family History   Problem " Relation Age of Onset     No Known Problems Mother      No Known Problems Father      No Known Problems Maternal Grandmother      No Known Problems Maternal Grandfather      No Known Problems Paternal Grandmother      No Known Problems Paternal Grandfather      No Known Problems Brother      No Known Problems Sister      No Known Problems Son      No Known Problems Daughter      No Known Problems Maternal Half-Brother      No Known Problems Maternal Half-Sister      No Known Problems Paternal Half-Brother      No Known Problems Paternal Half-Sister      No Known Problems Niece      No Known Problems Nephew      No Known Problems Cousin      No Known Problems Other      C.A.D. No family hx of      Diabetes No family hx of      Hypertension No family hx of      Hyperlipidemia No family hx of      Breast Cancer No family hx of      Cancer - colorectal No family hx of      Ovarian Cancer No family hx of      Prostate Cancer No family hx of      Depression/Anxiety No family hx of      Cerebrovascular Disease No family hx of      Anesthesia Reaction No family hx of      Thyroid Disease No family hx of      Asthma No family hx of      Osteoporosis No family hx of      Chemical Addiction No family hx of      Known Genetic Syndrome No family hx of      Cancer No family hx of      Coronary Artery Disease No family hx of      Heart Disease No family hx of      Kidney Disease No family hx of      Obesity No family hx of      Thrombosis No family hx of      Arthritis No family hx of      Thyroid Disease No family hx of      Depression No family hx of      Mental Illness No family hx of      Substance Abuse No family hx of      Cystic Fibrosis No family hx of      Early Death No family hx of      Coronary Artery Disease Early Onset No family hx of      Heart Failure No family hx of      Bleeding Diathesis No family hx of      Dementia No family hx of      Uterine Cancer No family hx of      Colorectal Cancer No family hx of       Pancreatic Cancer No family hx of      Lung Cancer No family hx of      Melanoma No family hx of      Autoimmune Disease No family hx of      Unknown/Adopted No family hx of      Genetic Disorder No family hx of      SOCIAL HISTORY:  Social History     Socioeconomic History     Marital status:      Spouse name: Not on file     Number of children: Not on file     Years of education: Not on file     Highest education level: Not on file   Occupational History     Not on file   Tobacco Use     Smoking status: Never Smoker     Smokeless tobacco: Never Used   Substance and Sexual Activity     Alcohol use: No     Drug use: No     Sexual activity: Not Currently     Partners: Female     Comment: No intercourse for >1 year.   Other Topics Concern     Not on file   Social History Narrative    From [facility]    2 adult children live at home. Wife lives in [place] for work.      Social Determinants of Health     Financial Resource Strain:      Difficulty of Paying Living Expenses:    Food Insecurity:      Worried About Running Out of Food in the Last Year:      Ran Out of Food in the Last Year:    Transportation Needs:      Lack of Transportation (Medical):      Lack of Transportation (Non-Medical):    Physical Activity:      Days of Exercise per Week:      Minutes of Exercise per Session:    Stress:      Feeling of Stress :    Social Connections:      Frequency of Communication with Friends and Family:      Frequency of Social Gatherings with Friends and Family:      Attends [employer] Services:      Active Member of Clubs or Organizations:      Attends Club or Organization Meetings:      Marital Status:    Intimate Partner Violence:      Fear of Current or Ex-Partner:      Emotionally Abused:      Physically Abused:      Sexually Abused:      MEDICATIONS PRIOR TO ADMISSION:   Facility-Administered Medications Prior to Admission   Medication Dose Route Frequency Provider Last Rate Last Admin     [COMPLETED] dextrose  5% and 0.45% NaCl BOLUS  1,000 mL Intravenous Once Kailey Chavez MD   Stopped at 09/28/21 1558     Medications Prior to Admission   Medication Sig Dispense Refill Last Dose     acetaminophen (TYLENOL) 500 MG tablet Take 1-2 tablets (500-1,000 mg) by mouth every 6 hours as needed for mild pain Max dose of 3000mg (6 tabs) in one day. 60 tablet 0 Past Month at Unknown time     ammonium lactate (LAC-HYDRIN) 12 % lotion Apply topically 2 times daily (Patient taking differently: Apply topically daily as needed ) 225 g 11 Past Month at Unknown time     docusate sodium (DOK) 100 MG capsule TAKE 1-2 CAPSULE BY MOUTH DAILY as needed TO KEEP STOOLS SOFT 90 capsule 3 Past Month at Unknown time     emtricitabine-rilpivirine-tenofovir (ODEFSEY) 200-25-25 MG TABS per tablet Take 1 tablet by mouth daily (with breakfast)   9/27/2021 at Unknown time     FEROSUL 325 (65 Fe) MG tablet Take 1 tablet (325 mg) by mouth daily 90 tablet 1 Past Month at Unknown time     loperamide (IMODIUM A-D) 2 MG tablet Take 1 tablet (2 mg) by mouth 4 times daily as needed for diarrhea 20 tablet 0 Past Month at Unknown time     ondansetron (ZOFRAN) 8 MG tablet Take 1 tablet (8 mg) by mouth every 6 hours as needed for nausea 30 tablet 2 Past Month at Unknown time     osimertinib (TAGRISSO) 80 MG tablet Take 1 tablet (80 mg) by mouth daily 90 tablet 1 Past Week at Unknown time     psyllium (METAMUCIL/KONSYL) 58.6 % powder Add one teaspoonful to liquid daily (Patient taking differently: Take by mouth daily as needed Add one teaspoonful to liquid daily) 283 g 11 Past Month at Unknown time     rivaroxaban ANTICOAGULANT (XARELTO ANTICOAGULANT) 20 MG TABS tablet Take 1 tablet (20 mg) by mouth daily (with dinner) 30 tablet 2 9/24/2021     sodium chloride (OCEAN) 0.65 % nasal spray Two sprays per nostril two times daily as needed. 44 mL 3 Past Month at Unknown time          ALLERGIES: Stribild [ynzwoam-nnpiiie-culxczsn-tenof], Ibuprofen, Oxycodone,  "Prednisone, Raltegravir, Tamsulosin, and Tylenol [acetaminophen]    PHYSICAL EXAM:    /65 (BP Location: Right arm)   Pulse 91   Temp 98.4  F (36.9  C) (Oral)   Resp 20   Ht 1.803 m (5' 11\")   Wt 63.7 kg (140 lb 8 oz)   SpO2 97%   BMI 19.60 kg/m      GENERAL: Pleasant, no obvious distress. Cachectic appearing.   NECK: Supple without adenopathy  EYES: No scleral icterus  LUNGS: Clear to auscultation bilaterally  HEART: Regular rate and rhythm, S1 and S2 present, no lower extremity edema  ABDOMEN: Non-distended. Positive bowel sounds. Soft, non-tender, no guarding/rebound/mass, no obvious organomegaly  MUSKULOSKELETAL:  Warm and well perfused, moves all extremities well  SKIN: No jaundice  NEUROLOGIC: Alert and oriented  PSYCHIATRIC: Normal affect    LAB DATA:  CMP Results:   Recent Labs   Lab Test 09/29/21 0233 09/28/21  1747 09/28/21  1401 09/27/21  1223 08/10/21  0942 07/16/21  0818 07/16/21  0818    137 139  --  138   < > 138   POTASSIUM 4.3 4.7 4.3  --  4.4   < > 4.5   CHLORIDE 109* 104 106  --  105   < > 105   CO2 24 24 25  --  23   < > 24   ANIONGAP 6 9 8  --  10   < > 9   GLC 99 181* 107  --  93   < > 90   BUN 13 16 18  --  17   < > 23*   CR 1.09 1.39* 1.54*   < > 1.46*   < > 1.54*   BILITOTAL  --   --  0.6  --  0.6  --  0.5   ALKPHOS  --   --  63  --  70  --  64   ALT  --   --  <9  --  <9  --  10   AST  --   --  14  --  13  --  17    < > = values in this interval not displayed.      CBC  Recent Labs   Lab 09/29/21 0233 09/28/21  1748 09/28/21  1401   WBC 4.0 5.7 4.5   RBC 2.85* 3.50* 3.31*   HGB 7.7* 9.6* 9.0*   HCT 25.1* 31.8* 29.1*   MCV 88 91 88   MCH 27.0 27.4 27.2   MCHC 30.7* 30.2* 30.9*   RDW 14.7 15.1* 14.9    297 303     INRNo lab results found in last 7 days.   Lipase   Date Value Ref Range Status   04/12/2021 24 0 - 52 U/L Final     IMAGING:  EXAM: CT CHEST/ABDOMEN/PELVIS W CONTRAST  LOCATION: [facility]  DATE/TIME: 9/27/2021 12:06 " PM     INDICATION:  Right upper lobe lung adenocarcinoma with malignant pleural effusion status post chemotherapy/immunotherapy   COMPARISON: CT CAP 08/09/2021, PET/CT 05/12/2021 and CT CAP 04/12/2021  TECHNIQUE: CT scan of the chest, abdomen, and pelvis was performed following injection of IV contrast. Multiplanar reformats were obtained. Dose reduction techniques were used.   CONTRAST: 75ml isovue 370     FINDINGS:   LUNGS AND PLEURA: Right upper lobe mass 7.3 x 7.3 x 6.2 cm (previously 3.3 x 2.8 x 2.6 cm) with new direct extension invasion into the perihilar and upper right mediastinum with encasement of the right superior pulmonary vein, right upper lobe pulmonary   artery and right upper lobe bronchus (causing complete post obstructive right upper lobe consolidation) as well as broad abutment of the SVC and right pericardium.     Marked progression of numerous bulky pleural metastases mid and inferior right hemithorax causing localized mass effect and/or invasion of the right hemidiaphragm and invasion into the inferior right periesophageal posterior mediastinum resulting in   proximal esophageal dilatation consistent with obstruction.     Diffuse tumor infiltration of the atelectatic right middle and lower lobes has developed      A large loculated pleural fluid collection mid and inferior right hemithorax unchanged.     Trace amount of airspace opacity posterior basilar segment left lower lobe has developed. Left lung otherwise clear. No left pleural effusion.     MEDIASTINUM/AXILLAE: Several small pulmonary emboli left lower lobar and subsegmental pulmonary arteries.     CORONARY ARTERY CALCIFICATION: None.     HEPATOBILIARY: Several benign hepatic cysts are unchanged. No significant mass or bile duct dilatation. No calcified gallstones.      PANCREAS: Normal.  SPLEEN: Normal.  ADRENAL GLANDS: Normal.  KIDNEYS/BLADDER: Normal.  BOWEL: Normal.  LYMPH NODES: Abdominal and pelvic lymph nodes  "unremarkable.  VASCULATURE: Unremarkable.  PELVIC ORGANS: Normal.  MUSCULOSKELETAL: No bone lesions.                                        IMPRESSION:  1.  Marked progression right upper lobe mass and right pleural metastases with mediastinal invasion causing esophageal obstruction, complete postobstructive right upper lobe consolidation, right hilar vascular encasement and mass effect on the SVC, right   pericardium and right hemidiaphragm.  2.  Several small acute pulmonary emboli left lower lobe small focus of left lower lobe consolidation likely represents an evolving infarct.    ASSESSMENT:  1. Dysphagia  2. Metastatic lung cancer  66 year old male with metastatic lung cancer and 1 month of worsening dysphagia associated with weight loss. Recent CT scan shows mass effect vs. Invasion in the esophagus from mets. Xarelto has been held since 9/24/21. Plan for EGD today with possible stent placement. Will await echocardiogram first. Patient is agreeable.     PLAN:  1. Keep NPO  2. EGD after echocardiogram.   3. Continue to hold Xarelto     ADDENDUM: EGD currently scheduled at 1630.     Discussed with Dr. Silva, who will also visit with the patient. Discussed with Dr. Dye, who will be performing the procedure.                                                Jadyn Lang PA-C  Thank you for the opportunity to participate in the care of this patient.   Please feel free to call me with any questions or concerns.  Phone number [phone].            [facility] staff addendum    Pt seen and examined in conjunction with Jadyn Lang PA-C. Pt has history of metastatic lung CA with month long history of weight loss and \"dysphagia\".  CT showed right upper lobe mass causing esophageal obstruction. Pt states that his problem is lack of appetite and has no problems with dysphagia. Will proceed with EGD but may not need stent if there is no significant obstruction. There is also risk with stent migration since we are " likely dealing with an extrinsic obstruction. Stent may also not be feasible if lesion is high up in his esophagus.    Rangel Dye MD

## 2021-09-29 NOTE — ANESTHESIA PREPROCEDURE EVALUATION
Anesthesia Pre-Procedure Evaluation    Patient: Antonio Mercado   MRN: [id number] : 1955        Preoperative Diagnosis: Esophageal dysphagia [R13.10]   Procedure : Procedure(s):  ESOPHAGOGASTRODUODENOSCOPY (EGD)     Past Medical History:   Diagnosis Date     Constipation      Heel pain      History of blood transfusion      HIV (human immunodeficiency virus infection) (H)      HIV (human immunodeficiency virus infection) (H)      Hypertrophy of prostate with urinary obstruction      Iron deficiency anemia      LBP (low back pain)      Malignant neoplasm of hepatic flexure (H) 2016     Malignant neoplasm of hepatic flexure (H)     s/p right hemicolectomy in 2016     Trigger finger       Past Surgical History:   Procedure Laterality Date     COLON SURGERY       GI SURGERY       HERNIA REPAIR       OK ESOPHAGOGASTRODUODENOSCOPY TRANSORAL DIAGNOSTIC N/A 2021    Procedure: ESOPHAGOGASTRODUODENOSCOPY (EGD);  Surgeon: Harris Mon MD;  Location: [facility];  Service: Gastroenterology      THORACENTESIS  2021     US THORACENTESIS  2021     US THORACENTESIS  2021     US THORACENTESIS  2021     US THORACENTESIS  2021     US THORACENTESIS  2021     US THORACENTESIS  2021     US THORACENTESIS  2021     US THORACENTESIS  2/15/2021     US THORACENTESIS  2021     US THORACENTESIS  2021     US THORACENTESIS  2021     US THORACENTESIS  3/2/2021     US THORACENTESIS  3/9/2021      Allergies   Allergen Reactions     Stribild [Isjjaft-Afkhtyw-Ryfdhdml-Tenof] Itching     Ibuprofen Itching and Other (See Comments)     Oxycodone Other (See Comments)     Prednisone Other (See Comments)     Raltegravir Unknown     Tamsulosin Other (See Comments)     Headache     Tylenol [Acetaminophen] Rash     Gets an itchy rash on back.       Social History     Tobacco Use     Smoking status: Never Smoker     Smokeless tobacco: Never Used   Substance Use Topics     Alcohol use: No       Wt Readings from Last 1 Encounters:   09/28/21 63.7 kg (140 lb 8 oz)        Anesthesia Evaluation            ROS/MED HX  ENT/Pulmonary:  - neg pulmonary ROS     Neurologic:  - neg neurologic ROS     Cardiovascular: Comment: 9/29/21  1.Left ventricular size, wall motion and function are normal. The ejection  fraction is > 65%.  2.TAPSE is normal, which is consistent with normal right ventricular systolic  function.  3.Right ventricular systolic pressure is elevated, consistent with mild  pulmonary hypertension.  4.No hemodynamically significant valvular abnormalities on 2D or color flow  imaging.  5.There is no comparison study available.    (+) -----pulmonary hypertension,     METS/Exercise Tolerance:     Hematologic:     (+) anemia,     Musculoskeletal:       GI/Hepatic:     (+) esophageal disease, Stricture,     Renal/Genitourinary:     (+) renal disease,     Endo:       Psychiatric/Substance Use:  - neg psychiatric ROS     Infectious Disease:     (+) HIV,     Malignancy:   (+) Malignancy, History of Lung.  Lung CA Active status post.        Other:            Physical Exam    Airway  airway exam normal      Mallampati: I   TM distance: > 3 FB   Neck ROM: full   Mouth opening: > 3 cm    Respiratory Devices and Support         Dental  no notable dental history         Cardiovascular   cardiovascular exam normal       Rhythm and rate: regular and normal     Pulmonary   pulmonary exam normal        breath sounds clear to auscultation           OUTSIDE LABS:  CBC:   Lab Results   Component Value Date    WBC 4.0 09/29/2021    WBC 5.7 09/28/2021    HGB 8.7 (L) 09/29/2021    HGB 7.7 (L) 09/29/2021    HCT 25.1 (L) 09/29/2021    HCT 31.8 (L) 09/28/2021     09/29/2021     09/28/2021     BMP:   Lab Results   Component Value Date     09/29/2021     09/28/2021    POTASSIUM 4.3 09/29/2021    POTASSIUM 4.7 09/28/2021    CHLORIDE 109 (H) 09/29/2021    CHLORIDE 104 09/28/2021    CO2 24 09/29/2021     CO2 24 09/28/2021    BUN 13 09/29/2021    BUN 16 09/28/2021    CR 1.09 09/29/2021    CR 1.39 (H) 09/28/2021    GLC 99 09/29/2021     (H) 09/28/2021     COAGS:   Lab Results   Component Value Date    PTT 28 04/12/2021    INR 1.19 (H) 04/12/2021     POC: No results found for: BGM, HCG, HCGS  HEPATIC:   Lab Results   Component Value Date    ALBUMIN 2.8 (L) 09/28/2021    PROTTOTAL 7.0 09/28/2021    ALT <9 09/28/2021    AST 14 09/28/2021    ALKPHOS 63 09/28/2021    BILITOTAL 0.6 09/28/2021    BILIDIRECT 0.1 09/10/2014     OTHER:   Lab Results   Component Value Date    LACT 0.8 09/29/2021    A1C 5.3 08/22/2019    BRIGETTE 8.3 (L) 09/29/2021    LIPASE 24 04/12/2021    TSH 2.94 03/18/2021       Anesthesia Plan    ASA Status:  3   NPO Status:  NPO Appropriate    Anesthesia Type: MAC.              Consents    Anesthesia Plan(s) and associated risks, benefits, and realistic alternatives discussed. Questions answered and patient/representative(s) expressed understanding.     - Discussed with:  Patient         Postoperative Care            Comments:        MAC with GETA backup if procedure proceeds to stenting            Vinay Burnett MD

## 2021-09-29 NOTE — PROGRESS NOTES
[facility] Progress Note    Assessment/Plan  Active Problems:    Dehydration    Malignant neoplasm of lung, unspecified laterality, unspecified part of lung (H)    Other pulmonary embolism without acute cor pulmonale, unspecified chronicity (H)      Antonio Mercado is a 66 year old male admitted on 9/28/2021. He has a history of HIV on antiretrovirals, colon cancer s/p right hemicolectomy, prostate cancer, right lung adenocarcinoma with pleural metastasis, malignant pleural effusion, and macrocytic anemia and is admitted for dysphagia, weight loss secondary to progression of lung cancer, and management of PE.     #Right lung adenocarcinoma  #Pleural metastasis and malignant pleural effusion  #Dysphagia  #Dehydration  Patient has known lung cancer diagnosed in January 2021. He has been following with  for this and was seen on 9/28for routine care. Found to be tachycardic and having issues with dysphagia, cough, and weight loss. Patient was sent from oncology clinic for further management of his symptoms and the fact that CT from 9/27 showed progression of malignancy with oncern for mass-effect causing dysphagia and PE. Patient notes over the last month he has had worsening dysphagia, cough, and 20 pound weight. His issues swallowing have now become so severe he has issues tolerating pills.  Per Dr. Chavez's clinic note, recommended GI consult for possible stent and Echo for possible cardiac tamponade. Patient has not been tolerating his chemo treatment and it is recommended to consult radiation oncology for possible palliative radiation.  At the time of admission, staff had long discussion of code status.  Patient elected to be full code at this time with the understanding that he may not tolerate chest compressions or intubation.  Per chart review it looks like this progression was likely a surprise as he had been responding to treatment his previous two visits (5/2021 and 7/2021).   Depending on the findings of further workup here and recommendations from specialists, the patient may benefit for further discussion on goals of care. Exam today was significant for a cachetic appearing male with diffuse crackles in lungs and intermittent cough.   -NPO at midnight for possible GI procedure (likely EGD this afternoon)  -Radiation oncology consult for possible palliative radiation  -GI consult  -Hem/onc consult  -mIVF following 1L bolus  -Echocardiogram scheduled today   -Consider palliative consult     #Acute pulmonary emboli  Patient has not been taking his Xarelto for 4 days secondary to dysphagia. Found to have PE on CT scan from 9/27.  Start heparin drip on admission for management at this time per oncology's recommendations. Currently requiring 1 LPM O2 however, most likely a combination of significant lung cancer and emboli.   -Heparin infusion  -Supplemental O2 as needed     #Renal insufficiency vs MONTRELL - resolved  It appears patient has had progressively worsening kidney function over the last few months. Possibly secondary to chemo side effects vs acute dehydration and hypovolemia. On admission it appears stable from the last few months.  After receiving some IV fluids it appeared to improve prior to discharge.    -mIVF   -Daily BMP     Chronic  #Normocytic anemia  Patient has long standing history of macrocytic anemia, has been attributed to medication side effect. He typically takes oral iron at home but has not in the last week secondary to dysphagia. Hemoglobin stable. No signs of acute bleeding.  -CBC daily     # History of HIV  Patient was last seen by ID on 6/22 and recommend RTC in 6 months. Has been stable from this regard.  -NPO at midnight -- Holding PTA Odefsey in the setting of NPO for possible procedure, should be able to resume once decision is made regarding procedure or not.     Diet: NPO for Medical/Clinical Reasons Except for: No Exceptions    DVT Prophylaxis: Heparin  infusion  Ponce Catheter: Not present  Fluids:  mIVF  Central Lines: None  Code Status: Full Code      Subjective  Patient feeling stable today without acute concerns.  Patient states that he is most concerned with walking again.  Discussed that it is unclear if we could get him ambulating to a point without his cane at this time.  Patient verbalized understanding.  Understands that today we are assessing his situation and we will likely have a conversation regarding goals of care once we have a better understanding of what is going on.      Objective    Vital signs in last 24 hours Temp:  [97.4  F (36.3  C)-98.6  F (37  C)] 98.4  F (36.9  C)  Pulse:  [] 91  Resp:  [18-26] 20  BP: (101-123)/(62-70) 114/65  SpO2:  [81 %-100 %] 97 %       Intake/Output last 3 shift I/O last 3 completed shifts:  In: 25 [P.O.:25]  Out: 475 [Urine:475]    Intake/Output this shift:No intake/output data recorded.    Physical Exam  Constitutional: cachetic appearing male , awake, alert, cooperative, no apparent distress, and appears stated age, finishing speaking with care management  Eyes: Lids and lashes normal, pupils equal, round and reactive to light, extra ocular muscles intact, sclera clear, conjunctiva normal  ENT: Normocephalic, without obvious abnormality, atraumatic, oral pharynx with moist mucous membranes, tonsils without erythema or exudates  Hematologic / Lymphatic: no cervical lymphadenopathy  Respiratory: Crackles diffusely R>L, coughs during exam, No increased work of breathing, good air exchange,  Cardiovascular: regular rate and rhythm, normal S1 and S2, no S3 or S4, and no murmur noted  GI: normal bowel sounds, soft, non-distended, non-tender, no masses palpated, no hepatosplenomegally  Skin: no bruising or bleeding, warm to touch and dry  Musculoskeletal: There is no redness, warmth, or swelling of the joints.  Full range of motion noted.    Neurologic: Awake, alert, oriented to name, place and time.  Cranial  nerves II-XII are grossly intact.      Pertinent Labs and Pertinent Radiology   Lab Results: personally reviewed.     Radiology Results: Personally reviewed image/s and impression/s    Precepted patient with Dr. Billy Grullon.    Dave Hamm MD  [facility], PGY-3  Pager # [phone]

## 2021-09-29 NOTE — PLAN OF CARE
PRIMARY DIAGNOSIS: dehydration, lung CA  OUTPATIENT/OBSERVATION GOALS TO BE MET BEFORE DISCHARGE:  1. Stable vital signs No o2 sats low requiring supplemental o2  2. Tolerating diet:No NPO pending GI dr visit  3. Pain controlled with oral pain medications:  Yes  4. Positive bowel sounds:  Yes  5. Voiding without difficulty:  Yes  6. Able to ambulate:  Yes  7. Provider specific discharge goals met:  No    Discharge Planner Nurse   Safe discharge environment identified: No  Barriers to discharge: Yes unable to tolerate even taking po medications. Iv heparin       Entered by: Abena Cardona 09/29/2021 3:43 AM     Please review provider order for any additional goals.   Nurse to notify provider when observation goals have been met and patient is ready for discharge.

## 2021-09-29 NOTE — H&P
[facility]    History and Physical - [facility]        Date of Admission:  9/28/2021    Assessment & Plan      Antonio Mercado is a 66 year old male admitted on 9/28/2021. He has a history of HIV on antiretrovirals, colon cancer s/p right hemicolectomy, prostate cancer, right lung adenocarcinoma with pleural metastasis, malignant pleural effusion, and macrocytic anemia and is admitted for dysphagia, weight loss secondary to progression of lung cancer.    #Right lung adenocarcinoma  #Pleural metastasis and malignant pleural effusion  #Dysphagia  #Dehydration  Patient has known lung cancer diagnosed in January 2021. He has been following with  for this and was seen today for routine care with him. He was found to be tachycardic and having issues with dysphagia, cough, and weight loss. He was sent from there for further management. Patient notes over the last month he has had worsening dysphagia, cough, and 20 pound weight. His issues swallowing have now become so severe he has issues swallowing pills. A CT from yesterday from Dr. Chavez was significant for cancer progression with concern for mass-effect causing dysphagia.  Per Dr. Chavez's clinic note today, recommended GI consult for possible stent and Echo for possible cardiac tamponade. Patient has not been tolerating his chemo treatment and it is recommended to consult radiation oncology for possible palliative radiation. In discussion of code status, I discussed his rapid disease progression in the last month and and patient states he will get through this all and would like to be full code at this time. I think he would benefit for further discussion on this matter. Exam today was significant for a cachetic appearing male with diffuse crackles in lungs and intermittent cough.   -NPO at midnight for possible GI procedure  -radiation oncology for possible palliative radiation  -GI consult  -Hem/onc consult  -mIVF  following 1L bolus  -Echocardiogram   -O2 supplement    #Acute pulmonary emboli  Patient has not been taking his Xarelto for 4 days secondary to dysphagia. We will start heparin this hospitalization for management at this time. Currently requiring O2 however, most likely a combination of significant lung cancer and emboli.   -Heparin infusion    #Renal insufficiency  It appears patient has had progressively worsening kidney function over the last few months. Possibly secondary to chemo side effects vs acute dehydration and hypovolemia. On admission it appears stable from the last few months.   -mIVF   -morning BMP    Chronic  #Normocytic anemia  Patient has long standing history of macrocytic anemia, has been attributed to medication side effect. He typically takes oral iron at home but has not in the last week secondary to dysphagia. Hemoglobin stable. No signs of acute bleeding.  -CBC in am    # History of HIV  Patient was last seen by ID on 6/22 and recommend RTC in 6 months. Has been stable from this regard.  -NPO at midnight -- Holding PTA Odefsey tomorrow morning     Diet: NPO for Medical/Clinical Reasons Except for: No Exceptions    DVT Prophylaxis: Heparin infusion  Ponce Catheter: Not present  Fluids:  mIVF  Central Lines: None  Code Status: Full Code      Disposition Plan   Expected discharge: unknown      The patient's care was discussed with the Attending Physician, Dr. Grullon.    Simona Gaytan MD  [facility]  ___________________________________________________________________    Chief Complaint   Dysphagia    History is obtained from the patient and chart review    History of Present Illness   Antonio Mercado is a 66 year old male who has a history of HIV, adenocarcinoma with pleural metastasis, malignant pleural effusion, and macrocytic anemia and is admitted for progression of his adenocarcinoma causing dysphagia and cough.  Patient was seen by oncologist  today for these concerns and sent to the hospital for admission and treatment.  A CT was done yesterday and was significant for progression of lung cancer as well as coronary emboli.  Patient was diagnosed with adenocarcinoma January 2021 but over the last month has rapidly progressive dysphagia, cough and 20 pound weight loss.  He states for about a week he has had these issues swallowing and has not been able to take including his Xarelto.  Patient denies any other symptoms except for what is noted above.    Review of Systems    The 10 point Review of Systems is negative other than noted in the HPI or here.     Past Medical History    I have reviewed this patient's medical history and updated it with pertinent information if needed.   Past Medical History:   Diagnosis Date     Constipation      Heel pain      History of blood transfusion      HIV (human immunodeficiency virus infection) (H)      HIV (human immunodeficiency virus infection) (H)      Hypertrophy of prostate with urinary obstruction      Iron deficiency anemia      LBP (low back pain)      Malignant neoplasm of hepatic flexure (H) 2/4/2016     Malignant neoplasm of hepatic flexure (H)     s/p right hemicolectomy in 2016     Trigger finger    Colon cancer post hemicolectomy, prostate cancer decline follow-up, right lung adenocarcinoma    Past Surgical History   I have reviewed this patient's surgical history and updated it with pertinent information if needed.  Past Surgical History:   Procedure Laterality Date     COLON SURGERY       GI SURGERY       HERNIA REPAIR       AZ ESOPHAGOGASTRODUODENOSCOPY TRANSORAL DIAGNOSTIC N/A 4/13/2021    Procedure: ESOPHAGOGASTRODUODENOSCOPY (EGD);  Surgeon: Harris Mon MD;  Location: [facility];  Service: Gastroenterology      THORACENTESIS  1/5/2021     US THORACENTESIS  1/18/2021     US THORACENTESIS  1/26/2021     US THORACENTESIS  1/29/2021     US THORACENTESIS  2/2/2021      THORACENTESIS  2/5/2021      US THORACENTESIS  2/9/2021     US THORACENTESIS  2/12/2021     US THORACENTESIS  2/15/2021     US THORACENTESIS  2/19/2021     US THORACENTESIS  2/23/2021     US THORACENTESIS  2/26/2021     US THORACENTESIS  3/2/2021     US THORACENTESIS  3/9/2021     Social History   I have reviewed this patient's social history and updated it with pertinent information if needed. Antonio Mercado  reports that he has never smoked. He has never used smokeless tobacco. He reports that he does not drink alcohol and does not use drugs.    Family History   I have reviewed this patient's family history and updated it with pertinent information if needed.  Family History   Problem Relation Age of Onset     No Known Problems Mother      No Known Problems Father      No Known Problems Maternal Grandmother      No Known Problems Maternal Grandfather      No Known Problems Paternal Grandmother      No Known Problems Paternal Grandfather      No Known Problems Brother      No Known Problems Sister      No Known Problems Son      No Known Problems Daughter      No Known Problems Maternal Half-Brother      No Known Problems Maternal Half-Sister      No Known Problems Paternal Half-Brother      No Known Problems Paternal Half-Sister      No Known Problems Niece      No Known Problems Nephew      No Known Problems Cousin      No Known Problems Other      C.A.D. No family hx of      Diabetes No family hx of      Hypertension No family hx of      Hyperlipidemia No family hx of      Breast Cancer No family hx of      Cancer - colorectal No family hx of      Ovarian Cancer No family hx of      Prostate Cancer No family hx of      Depression/Anxiety No family hx of      Cerebrovascular Disease No family hx of      Anesthesia Reaction No family hx of      Thyroid Disease No family hx of      Asthma No family hx of      Osteoporosis No family hx of      Chemical Addiction No family hx of      Known Genetic Syndrome No family hx of      Cancer No  family hx of      Coronary Artery Disease No family hx of      Heart Disease No family hx of      Kidney Disease No family hx of      Obesity No family hx of      Thrombosis No family hx of      Arthritis No family hx of      Thyroid Disease No family hx of      Depression No family hx of      Mental Illness No family hx of      Substance Abuse No family hx of      Cystic Fibrosis No family hx of      Early Death No family hx of      Coronary Artery Disease Early Onset No family hx of      Heart Failure No family hx of      Bleeding Diathesis No family hx of      Dementia No family hx of      Uterine Cancer No family hx of      Colorectal Cancer No family hx of      Pancreatic Cancer No family hx of      Lung Cancer No family hx of      Melanoma No family hx of      Autoimmune Disease No family hx of      Unknown/Adopted No family hx of      Genetic Disorder No family hx of        Prior to Admission Medications   Prior to Admission Medications   Prescriptions Last Dose Informant Patient Reported? Taking?   FEROSUL 325 (65 Fe) MG tablet Past Month at Unknown time  No Yes   Sig: Take 1 tablet (325 mg) by mouth daily   acetaminophen (TYLENOL) 500 MG tablet Past Month at Unknown time  No Yes   Sig: Take 1-2 tablets (500-1,000 mg) by mouth every 6 hours as needed for mild pain Max dose of 3000mg (6 tabs) in one day.   ammonium lactate (LAC-HYDRIN) 12 % lotion Past Month at Unknown time  No Yes   Sig: Apply topically 2 times daily   Patient taking differently: Apply topically daily as needed    docusate sodium (DOK) 100 MG capsule Past Month at Unknown time  No Yes   Sig: TAKE 1-2 CAPSULE BY MOUTH DAILY as needed TO KEEP STOOLS SOFT   emtricitabine-rilpivirine-tenofovir (ODEFSEY) 200-25-25 MG TABS per tablet 9/27/2021 at Unknown time  Yes Yes   Sig: Take 1 tablet by mouth daily (with breakfast)   loperamide (IMODIUM A-D) 2 MG tablet Past Month at Unknown time  No Yes   Sig: Take 1 tablet (2 mg) by mouth 4 times daily as  needed for diarrhea   ondansetron (ZOFRAN) 8 MG tablet Past Month at Unknown time  No Yes   Sig: Take 1 tablet (8 mg) by mouth every 6 hours as needed for nausea   osimertinib (TAGRISSO) 80 MG tablet Past Week at Unknown time  No Yes   Sig: Take 1 tablet (80 mg) by mouth daily   psyllium (METAMUCIL/KONSYL) 58.6 % powder Past Month at Unknown time  No Yes   Sig: Add one teaspoonful to liquid daily   Patient taking differently: Take by mouth daily as needed Add one teaspoonful to liquid daily   rivaroxaban ANTICOAGULANT (XARELTO ANTICOAGULANT) 20 MG TABS tablet 9/24/2021  No Yes   Sig: Take 1 tablet (20 mg) by mouth daily (with dinner)   sodium chloride (OCEAN) 0.65 % nasal spray Past Month at Unknown time  No Yes   Sig: Two sprays per nostril two times daily as needed.      Facility-Administered Medications: None     Allergies   Allergies   Allergen Reactions     Stribild [Upwywzk-Uhfacjn-Srcnumto-Tenof] Itching     Ibuprofen Itching and Other (See Comments)     Oxycodone Other (See Comments)     Prednisone Other (See Comments)     Raltegravir Unknown     Tamsulosin Other (See Comments)     Headache     Tylenol [Acetaminophen] Rash     Gets an itchy rash on back.        Physical Exam   Vital Signs: Temp: 98.6  F (37  C) Temp src: Oral BP: 105/67 Pulse: 98   Resp: 18 SpO2: 90 % O2 Device: None (Room air)    Weight: 140 lbs 8 oz    Constitutional: cachetic appearing male , awake, alert, cooperative, no apparent distress, and appears stated age  Eyes: Lids and lashes normal, pupils equal, round and reactive to light, extra ocular muscles intact, sclera clear, conjunctiva normal  ENT: Normocephalic, without obvious abnormality, atraumatic, sinuses nontender on palpation, external ears without lesions, oral pharynx with moist mucous membranes, tonsils without erythema or exudates, gums normal and good dentition.  Hematologic / Lymphatic: no cervical lymphadenopathy  Respiratory: Crackles diffusely R>L, coughs during exam,  No increased work of breathing, good air exchange,  Cardiovascular: regular rate and rhythm, normal S1 and S2, no S3 or S4, and no murmur noted  GI: normal bowel sounds, soft, non-distended, non-tender, no masses palpated, no hepatosplenomegally  Skin: no bruising or bleeding, warm to touch and dry  Musculoskeletal: There is no redness, warmth, or swelling of the joints.  Full range of motion noted.    Neurologic: Awake, alert, oriented to name, place and time.  Cranial nerves II-XII are grossly intact.      Data   Data reviewed today: I reviewed all medications, new labs and imaging results over the last 24 hours. I personally reviewed no images or EKG's today.    Recent Labs   Lab 09/28/21  1748 09/28/21  1747 09/28/21  1401 09/27/21  1223   WBC 5.7  --  4.5  --    HGB 9.6*  --  9.0*  --    MCV 91  --  88  --      --  303  --    NA  --  137 139  --    POTASSIUM  --  4.7 4.3  --    CHLORIDE  --  104 106  --    CO2  --  24 25  --    BUN  --  16 18  --    CR  --  1.39* 1.54* 1.7*   ANIONGAP  --  9 8  --    BRIGETTE  --  9.2 9.3  --    GLC  --  181* 107  --    ALBUMIN  --   --  2.8*  --    PROTTOTAL  --   --  7.0  --    BILITOTAL  --   --  0.6  --    ALKPHOS  --   --  63  --    ALT  --   --  <9  --    AST  --   --  14  --      9/27: EXAM: CT CHEST/ABDOMEN/PELVIS W CONTRAST  IMPRESSION:  1.  Marked progression right upper lobe mass and right pleural metastases with mediastinal invasion causing esophageal obstruction, complete postobstructive right upper lobe consolidation, right hilar vascular encasement and mass effect on the SVC, right   pericardium and right hemidiaphragm.  2.  Several small acute pulmonary emboli left lower lobe small focus of left lower lobe consolidation likely represents an evolving infarct.

## 2021-09-29 NOTE — PLAN OF CARE
"  Problem: Adult Inpatient Plan of Care  Goal: Absence of Hospital-Acquired Illness or Injury  Intervention: Identify and Manage Fall Risk  Recent Flowsheet Documentation  Taken 9/29/2021 1430 by Teagan Gudino, RN  Safety Promotion/Fall Prevention:   activity supervised   bed alarm on  Taken 9/29/2021 0800 by Teagan Guidno, RN  Safety Promotion/Fall Prevention:   activity supervised   bed alarm on   nonskid shoes/slippers when out of bed   patient and family education     Problem: Fatigue (Oncology Care)  Goal: Improved Activity Tolerance  Intervention: Promote Energy Conservation  Recent Flowsheet Documentation  Taken 9/29/2021 1430 by Teagan Gudino, RN  Activity Management: activity adjusted per tolerance   Patient reported feeling somewhat better today. Verbalized to this writer and team in PACU that he doesn't have trouble swallowing he \"just doesn't   have an appetite and food doesn't taste right.\" Patient did have and EGD done returned to unit around 1430. Patient did cough a few time and coughed up some blood streaked phlegm. Patient is on heparin drip, last anti XA was 0.79 which called for heparin to be held for 1 hour so it was paused and restarted at 1150 units/hr @ 1350 by surgery nurse. Will have anti XA drawn again at 1745 this evening. Patient restarted on clear liquid diet.   "

## 2021-09-29 NOTE — ANESTHESIA POSTPROCEDURE EVALUATION
Patient: Antonio Mercado    Procedure(s):  ESOPHAGOGASTRODUODENOSCOPY (EGD)    Diagnosis:Esophageal dysphagia [R13.10]  Diagnosis Additional Information: No value filed.    Anesthesia Type:  MAC    Note:  Disposition: Inpatient   Postop Pain Control: Uneventful            Sign Out: Well controlled pain   PONV: No   Neuro/Psych: Uneventful            Sign Out: Acceptable/Baseline neuro status   Airway/Respiratory: Uneventful            Sign Out: Acceptable/Baseline resp. status   CV/Hemodynamics: Uneventful            Sign Out: Acceptable CV status; No obvious hypovolemia; No obvious fluid overload   Other NRE: NONE   DID A NON-ROUTINE EVENT OCCUR? No           Last vitals:  Vitals Value Taken Time   BP     Temp     Pulse     Resp     SpO2         Electronically Signed By: Vinay Burnett MD  September 29, 2021  2:59 PM

## 2021-09-29 NOTE — PROGRESS NOTES
RECORDS STATUS - ALL OTHER DIAGNOSIS      RECORDS RECEIVED FROM: [facility]/ [facility]   DATE RECEIVED: 9/29/2021   NOTES STATUS DETAILS   OFFICE NOTE from referring provider Complete Epic Ref by Dr. Chavez    OFFICE NOTE from medical oncologist Complete Epic- Oncology Visit- 9/28/2021   Primary Malignant Neoplasm of right lung met     More in EPIC   DISCHARGE SUMMARY from hospital Complete 9/28/2021 Dehydration, Malignant Neoplasm of Lung    DISCHARGE REPORT from the ER COmplete 9/28/2021 Dehydration, Malignant Neoplasm of Lung    OPERATIVE REPORT     MEDICATION LIST Complete [facility]   CLINICAL TRIAL TREATMENTS TO DATE     LABS     PATHOLOGY REPORTS     ANYTHING RELATED TO DIAGNOSIS Complete Labs last updated on 9/29/2021   GENONOMIC TESTING     TYPE:     IMAGING (NEED IMAGES & REPORT)     CT SCANS Complete CT Chest Abdomen Pelvis 9/27/2021, 8/9/2021   MRI     MAMMO     ULTRASOUND     PET Complete 5/12/2021      Action    Action Taken 9/29/2021 8:43AM KEB   I pulled imaging from  into PACS    I called pt Antonio - he is at the [facility] right now. It looks like all his records are in [facility]/

## 2021-09-29 NOTE — PROGRESS NOTES
[facility] Hematology and Oncology Inpatient Progress Note    Patient: Antonio Mercado  MRN: [id number]  Date of Service: September 29, 2021         Reason for Visit    Progressive lung cancer  Dysphagia and weight loss    Assessment and Plan    Cancer Staging  Malignant neoplasm of hepatic flexure (H)  Staging form: Colon and Rectum, AJCC 7th Edition  - Clinical stage from 2/20/2016: Stage IIIB (T4a, N1c, M0) - Signed by Harris Mary MD on 2/20/2017    1.  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  Had some mild progression in August but was going to the [facility] for a month so just continued on the osimertinib.  Now presented to our clinic yesterday with severe progression.  Mass has gone from 3.3 cm to 7.3 cm.  He does have some vision into the mediastinum with some encasement of vessels.  Patient did have an EGD today and it does not look like the tumor is inside the esophagus.  Radiation oncology has been consulted and they will see the patient tomorrow and do a simulation.  We will then have to see patient as an outpatient to get started on a new chemo regimen.  He is overexpressed on his PD-L1 so we potentially will use single agent Keytruda. Echo was normal.     2.  Pulmonary embolism: Patient needs to be on Xarelto.  I believe he was on this previously but was not able to swallow it so is on heparin while he is here.  Hopefully that can be switched over to pills if we can get him to tolerate that once his radiation is done.  We could potentially do Lovenox short-term if we need to as well, so since his kidney function has now improved      ECOG Performance Status: 2-3        ______________________________________________________________________________    History of Present Illness    Mr. Antonio Mercado is a pleasant 66-year-old gentleman who has a history of metastatic lung cancer.  He does have  "an EGFR mutation so was previously on osimertinib.  He now presented to our clinic yesterday after being in the [place] for 1 month with severe progression.  He was having issues with swallowing and weight loss.  He was unable to take his pills.  He was very dehydrated.  He is now admitted.  He states he feels significantly better than he did over the last couple of weeks.  He did have an EGD today.  He is hoping that he continues to improve.  Denies any fevers.  He says he was able to drink some broth after his procedure and that went okay.  Denies any new bone or back pain.    Review of Systems    ROS: As above in the history, otherwise the remainder of the 10point ROS is negative and not contributory to current reason for visit.       PHYSICAL EXAM  /69 (BP Location: Left arm)   Pulse 86   Temp 99.4  F (37.4  C) (Oral)   Resp 16   Ht 1.803 m (5' 11\")   Wt 63.7 kg (140 lb 8 oz)   SpO2 100%   BMI 19.60 kg/m      GENERAL: no acute distress. Cooperative in conversation. Here alone. Mask on. Resting in bed  RESP: Regular respiratory rate. No expiratory wheezes   MUSCULOSKELETAL: no bilateral leg swelling  NEURO: non focal. Alert and oriented x3.   PSYCH: within normal limits. No depression or anxiety.  SKIN: exposed skin is dry intact.       Lab Results    Recent Results (from the past 24 hour(s))   Basic metabolic panel    Collection Time: 09/28/21  5:47 PM   Result Value Ref Range    Sodium 137 136 - 145 mmol/L    Potassium 4.7 3.5 - 5.0 mmol/L    Chloride 104 98 - 107 mmol/L    Carbon Dioxide (CO2) 24 22 - 31 mmol/L    Anion Gap 9 5 - 18 mmol/L    Urea Nitrogen 16 8 - 22 mg/dL    Creatinine 1.39 (H) 0.70 - 1.30 mg/dL    Calcium 9.2 8.5 - 10.5 mg/dL    Glucose 181 (H) 70 - 125 mg/dL    GFR Estimate 52 (L) >60 mL/min/1.73m2   CBC with platelets    Collection Time: 09/28/21  5:48 PM   Result Value Ref Range    WBC Count 5.7 4.0 - 11.0 10e3/uL    RBC Count 3.50 (L) 4.40 - 5.90 10e6/uL    Hemoglobin 9.6 " (L) 13.3 - 17.7 g/dL    Hematocrit 31.8 (L) 40.0 - 53.0 %    MCV 91 78 - 100 fL    MCH 27.4 26.5 - 33.0 pg    MCHC 30.2 (L) 31.5 - 36.5 g/dL    RDW 15.1 (H) 10.0 - 15.0 %    Platelet Count 297 150 - 450 10e3/uL   Asymptomatic COVID-19 Virus (Coronavirus) by PCR Nasopharyngeal    Collection Time: 09/28/21  7:03 PM    Specimen: Nasopharyngeal; Swab   Result Value Ref Range    SARS CoV2 PCR Negative Negative   Heparin Unfractionated Anti Xa Level    Collection Time: 09/28/21  7:11 PM   Result Value Ref Range    Anti Xa Unfractionated Heparin <0.10 For Reference Range, See Comment IU/mL   Blood gas venous    Collection Time: 09/28/21  7:11 PM   Result Value Ref Range    pH Venous 7.36 7.35 - 7.45    pCO2 Venous 47 35 - 50 mm Hg    pO2 Venous 23 (L) 25 - 47 mm Hg    Bicarbonate Venous 24 24 - 30 mmol/L    Base Excess/Deficit (+/-) 0.8   mmol/L    Oxyhemoglobin Venous 31.7 (L) 70.0 - 75.0 %    O2 Sat, Venous 32.4 (L) 70.0 - 75.0 %   Lactic Acid STAT    Collection Time: 09/29/21 12:19 AM   Result Value Ref Range    Lactic Acid 0.8 0.7 - 2.0 mmol/L   Basic metabolic panel    Collection Time: 09/29/21  2:33 AM   Result Value Ref Range    Sodium 139 136 - 145 mmol/L    Potassium 4.3 3.5 - 5.0 mmol/L    Chloride 109 (H) 98 - 107 mmol/L    Carbon Dioxide (CO2) 24 22 - 31 mmol/L    Anion Gap 6 5 - 18 mmol/L    Urea Nitrogen 13 8 - 22 mg/dL    Creatinine 1.09 0.70 - 1.30 mg/dL    Calcium 8.3 (L) 8.5 - 10.5 mg/dL    Glucose 99 70 - 125 mg/dL    GFR Estimate 69 >60 mL/min/1.73m2   CBC with platelets    Collection Time: 09/29/21  2:33 AM   Result Value Ref Range    WBC Count 4.0 4.0 - 11.0 10e3/uL    RBC Count 2.85 (L) 4.40 - 5.90 10e6/uL    Hemoglobin 7.7 (L) 13.3 - 17.7 g/dL    Hematocrit 25.1 (L) 40.0 - 53.0 %    MCV 88 78 - 100 fL    MCH 27.0 26.5 - 33.0 pg    MCHC 30.7 (L) 31.5 - 36.5 g/dL    RDW 14.7 10.0 - 15.0 %    Platelet Count 252 150 - 450 10e3/uL   Heparin Unfractionated Anti Xa Level    Collection Time: 09/29/21  4:02  AM   Result Value Ref Range    Anti Xa Unfractionated Heparin >1.10 (HH) For Reference Range, See Comment IU/mL   Echocardiogram Complete    Collection Time: 09/29/21 10:04 AM   Result Value Ref Range    LVEF  > 65%    Hemoglobin    Collection Time: 09/29/21 11:47 AM   Result Value Ref Range    Hemoglobin 8.7 (L) 13.3 - 17.7 g/dL   Heparin Unfractionated Anti Xa Level    Collection Time: 09/29/21 11:47 AM   Result Value Ref Range    Anti Xa Unfractionated Heparin 0.79 For Reference Range, See Comment IU/mL   UPPER GI ENDOSCOPY    Collection Time: 09/29/21  1:24 PM   Result Value Ref Range    Upper GI Endoscopy       [facility]  [address] [place] [address]  _______________________________________________________________________________  Patient Name: Antonio Darlingleonel         Procedure Date: 9/29/2021 1:24 PM  MRN: [id number]                       Account Number: [id number]  YOB: 1955               Admit Type: Inpatient  Age: 66                                Note Status: Finalized  Attending MD: Rangel Dye MD Instrument Name: EGD Scope [id number]  _______________________________________________________________________________     Procedure:           Upper GI endoscopy  Indications:         Anorexia, Weight loss  Providers:           Rangel Dye MD  Referring MD:          Medicines:           Propofol per Anesthesia  Complications:       No immediate complications.  _______________________________________________________________________________  Procedure:           Pre-Anesthesia Assessment:                       - Prior to the pro cedure, a History and Physical was                        performed, and patient medications and allergies were                        reviewed. The patient is competent. The risks and                        benefits of the procedure and the sedation options and                        risks were discussed with the patient.  All questions                        were answered and informed consent was obtained. Patient                        identification and proposed procedure were verified by                        the physician and the nurse in the pre-procedure area.                        Mental Status Examination: alert and oriented. Airway                        Examination: normal oropharyngeal airway and neck                        mobility. Respiratory Examination: clear to                        auscultation. CV Examination: normal. ASA Grade                        Assessment: III - A patient with severe systemic                        disease. After reviewing the risks and debra efits, the                        patient was deemed in satisfactory condition to undergo                        the procedure. The anesthesia plan was to use monitored                        anesthesia care (MAC). Immediately prior to                        administration of medications, the patient was                        re-assessed for adequacy to receive sedatives. The heart                        rate, respiratory rate, oxygen saturations, blood                        pressure, adequacy of pulmonary ventilation, and                        response to care were monitored throughout the                        procedure. The physical status of the patient was                        re-assessed after the procedure.                       After obtaining informed consent, the endoscope was                        passed under direct vision. Throughout the procedure,                        the patient's blood pressure, pulse, and oxygen                        saturations were monitore d continuously. The endoscope                        was introduced through the mouth, and advanced to the                        second part of duodenum. The upper GI endoscopy was                        accomplished with ease. The patient tolerated the                         procedure well.                                                                                   Findings:       The examined esophagus was normal.       The Z-line was regular and was found 40 cm from the incisors.       The entire examined stomach was normal.       The examined duodenum was normal.                                                                                   Moderate Sedation:       See the other procedure note for documentation of moderate sedation with        intraservice time.  Impression:          - Normal esophagus.                       - Z-line regular, 40 cm from the incisors.                       - Normal stomach.                       - Normal examined duodenum.                        - No specimens collected.  Recommendation:      - [facility].                       - Advance diet as tolerated. (Pt denies dysphagia c/o                        poor appetite)                                                                                     Rangel Dye MD  __________________________  Rangel Dye MD  9/29/2021 1:58:43 PM  I was physically present for the entire viewing portion of the exam.  __________________________  Signature of teaching physician  [id number] Dye MD  Number of Addenda: 0    Note Initiated On: 9/29/2021 1:24 PM  Scope In: 1:44:19 PM  Scope Out: 1:48:29 PM          Imaging    CT Chest/Abdomen/Pelvis w Contrast    Result Date: 9/27/2021  EXAM: CT CHEST/ABDOMEN/PELVIS W CONTRAST LOCATION: [facility] DATE/TIME: 9/27/2021 12:06 PM INDICATION:  Right upper lobe lung adenocarcinoma with malignant pleural effusion status post chemotherapy/immunotherapy COMPARISON: CT CAP 08/09/2021, PET/CT 05/12/2021 and CT CAP 04/12/2021 TECHNIQUE: CT scan of the chest, abdomen, and pelvis was performed following injection of IV contrast. Multiplanar reformats were obtained. Dose reduction techniques were  used. CONTRAST: 75ml isovue 370 FINDINGS: LUNGS AND PLEURA: Right upper lobe mass 7.3 x 7.3 x 6.2 cm (previously 3.3 x 2.8 x 2.6 cm) with new direct extension invasion into the perihilar and upper right mediastinum with encasement of the right superior pulmonary vein, right upper lobe pulmonary artery and right upper lobe bronchus (causing complete post obstructive right upper lobe consolidation) as well as broad abutment of the SVC and right pericardium. Marked progression of numerous bulky pleural metastases mid and inferior right hemithorax causing localized mass effect and/or invasion of the right hemidiaphragm and invasion into the inferior right periesophageal posterior mediastinum resulting in proximal esophageal dilatation consistent with obstruction. Diffuse tumor infiltration of the atelectatic right middle and lower lobes has developed A large loculated pleural fluid collection mid and inferior right hemithorax unchanged. Trace amount of airspace opacity posterior basilar segment left lower lobe has developed. Left lung otherwise clear. No left pleural effusion. MEDIASTINUM/AXILLAE: Several small pulmonary emboli left lower lobar and subsegmental pulmonary arteries. CORONARY ARTERY CALCIFICATION: None. HEPATOBILIARY: Several benign hepatic cysts are unchanged. No significant mass or bile duct dilatation. No calcified gallstones. PANCREAS: Normal. SPLEEN: Normal. ADRENAL GLANDS: Normal. KIDNEYS/BLADDER: Normal. BOWEL: Normal. LYMPH NODES: Abdominal and pelvic lymph nodes unremarkable. VASCULATURE: Unremarkable. PELVIC ORGANS: Normal. MUSCULOSKELETAL: No bone lesions.     IMPRESSION: 1.  Marked progression right upper lobe mass and right pleural metastases with mediastinal invasion causing esophageal obstruction, complete postobstructive right upper lobe consolidation, right hilar vascular encasement and mass effect on the SVC, right  pericardium and right hemidiaphragm. 2.  Several small acute pulmonary  emboli left lower lobe small focus of left lower lobe consolidation likely represents an evolving infarct. [Critical Result: Acute pulmonary embolism and sequela of tumor progression. Finding was identified on 2021 3:38 PM. Dr. Chavez was contacted by me on 2021 4:03 PM and verbalized understanding of the critical result.     Echocardiogram Complete    Result Date: 2021  [id number] [address]  Name: GIOVANNI DISLA MRN: [id number] : 1955 Study Date: 2021 09:12 AM Age: 66 yrs Gender: Male Patient Location: [facility] Reason For Study: Syncope Ordering Physician: EDSON CHOUDHURY Performed By: YELENA  BSA: 1.8 m2 Height: 71 in Weight: 140 lb ______________________________________________________________________________ Procedure Complete Portable Echo Adult. No hemodynamically significant valvular abnormalities on 2D or color flow imaging. There is no comparison study available. ______________________________________________________________________________ Interpretation Summary  1.Left ventricular size, wall motion and function are normal. The ejection fraction is > 65%. 2.TAPSE is normal, which is consistent with normal right ventricular systolic function. 3.Right ventricular systolic pressure is elevated, consistent with mild pulmonary hypertension. 4.No hemodynamically significant valvular abnormalities on 2D or color flow imaging. 5.There is no comparison study available. ______________________________________________________________________________ I      WMSI = 1.00     % Normal = 100  X - Cannot   0 -                      (2) - Mildly 2 -          Segments  Size Interpret    Hyperkinetic 1 - Normal  Hypokinetic  Hypokinetic  1-2     small                                                    7 -          3-5    moderate 3 - Akinetic 4 -          5 -         6 - Akinetic Dyskinetic   6-14    large               Dyskinetic   Aneurysmal  w/scar       w/scar       15-16   diffuse  Left Ventricle Left ventricular size, wall motion and function are normal. The ejection fraction is > 65%. There is normal left ventricular wall thickness. Left ventricular diastolic function is normal. No regional wall motion abnormalities noted.  Right Ventricle Normal right ventricle size and systolic function. TAPSE is normal, which is consistent with normal right ventricular systolic function.  Atria Normal left atrial size. Right atrial size is normal. There is no color Doppler evidence of an atrial shunt.  Mitral Valve Mitral valve leaflets appear normal. There is no evidence of mitral stenosis or clinically significant mitral regurgitation. There is no mitral regurgitation noted. There is no mitral valve stenosis.  Tricuspid Valve Tricuspid valve leaflets appear normal. There is mild (1+) tricuspid regurgitation. Right ventricular systolic pressure is elevated, consistent with mild pulmonary hypertension. There is no tricuspid stenosis.  Aortic Valve Aortic valve leaflets appear normal. There is no evidence of aortic stenosis or clinically significant aortic regurgitation. The aortic valve is trileaflet. No aortic regurgitation is present. No aortic stenosis is present.  Pulmonic Valve The pulmonic valve is not well seen, but is grossly normal. This degree of valvular regurgitation is within normal limits. There is no pulmonic valvular stenosis.  Vessels The aorta root is normal. Normal size ascending aorta. IVC diameter <2.1 cm collapsing >50% with sniff suggests a normal RA pressure of 3 mmHg.  Pericardium There is no pericardial effusion.  ______________________________________________________________________________ MMode/2D Measurements & Calculations IVSd: 0.85 cm LVIDd: 4.2 cm LVIDs: 2.7 cm LVPWd: 0.87 cm FS: 35.2 % LV mass(C)d: 108.9 grams LV mass(C)dI: 60.1 grams/m2  Ao root diam: 3.4 cm LA dimension: 2.4 cm asc Aorta Diam: 3.6  cm LA/Ao: 0.71 LVOT diam: 2.0 cm LVOT area: 3.1 cm2 LA Volume Indexed (AL/bp): 12.9 ml/m2 RWT: 0.42  Time Measurements MM HR: 75.0 BPM  Doppler Measurements & Calculations MV E max siria: 69.1 cm/sec MV A max siria: 91.6 cm/sec MV E/A: 0.75 MV dec time: 0.26 sec LV V1 max P.6 mmHg LV V1 max: 106.8 cm/sec LV V1 VTI: 16.7 cm SV(LVOT): 52.4 ml SI(LVOT): 28.9 ml/m2 PI end-d siria: 181.3 cm/sec TR max siria: 312.2 cm/sec TR max P.0 mmHg E/E' av.0 Lateral E/e': 6.9 Medial E/e': 9.1  ______________________________________________________________________________ Report approved by: Dana Zurita 2021 11:12 AM          Signed by: ALBERTO Hobbs CNP

## 2021-09-29 NOTE — CONSULTS
Care Management Initial Consult    General Information  Assessment completed with: Patient,    Type of CM/SW Visit: Initial Assessment    Primary Care Provider verified and updated as needed: Yes   Readmission within the last 30 days: no previous admission in last 30 days      Reason for Consult: discharge planning  Advance Care Planning:            Communication Assessment  Patient's communication style: spoken language (English or Bilingual)    Hearing Difficulty or Deaf: no   Wear Glasses or Blind: no    Cognitive  Cognitive/Neuro/Behavioral: WDL                      Living Environment:   People in home: alone     Current living Arrangements: apartment      Able to return to prior arrangements: yes       Family/Social Support:  Care provided by: self  Provides care for: no one  Marital Status:   Children, Other (specify) (Cousin)          Description of Support System: Supportive         Current Resources:   Patient receiving home care services:       Community Resources:    Equipment currently used at home: cane, straight  Supplies currently used at home:      Employment/Financial:  Employment Status: other (see comments) (Currently not working, may be able to get disability)        Financial Concerns: unemployed   Referral to Financial Counselor: No       Lifestyle & Psychosocial Needs:  Social Determinants of Health     Tobacco Use: Low Risk      Smoking Tobacco Use: Never Smoker     Smokeless Tobacco Use: Never Used   Alcohol Use:      Frequency of Alcohol Consumption:      Average Number of Drinks:      Frequency of Binge Drinking:    Financial Resource Strain:      Difficulty of Paying Living Expenses:    Food Insecurity:      Worried About Running Out of Food in the Last Year:      Ran Out of Food in the Last Year:    Transportation Needs:      Lack of Transportation (Medical):      Lack of Transportation (Non-Medical):    Physical Activity:      Days of Exercise per Week:      Minutes of Exercise  per Session:    Stress:      Feeling of Stress :    Social Connections:      Frequency of Communication with Friends and Family:      Frequency of Social Gatherings with Friends and Family:      Attends Sabianist Services:      Active Member of Clubs or Organizations:      Attends Club or Organization Meetings:      Marital Status:    Intimate Partner Violence:      Fear of Current or Ex-Partner:      Emotionally Abused:      Physically Abused:      Sexually Abused:    Depression: Not at risk     PHQ-2 Score: 0   Housing Stability:      Unable to Pay for Housing in the Last Year:      Number of Places Lived in the Last Year:      Unstable Housing in the Last Year:        Functional Status:  Prior to admission patient needed assistance:              Mental Health Status:          Chemical Dependency Status:                Values/Beliefs:  Spiritual, Cultural Beliefs, Sabianist Practices, Values that affect care: yes  Description of Beliefs that Will Affect Care: Patient is a             Additional Information:  The writer met with the patient in the room to discuss discharge planning. The patient is from [facility]), but he speaks English well. At baseline, he lives alone in an apartment and uses a cane, but he is mostly independent. The patient said he has not been driving for about a month. The patient did state he is , but his wife is still living in [facility], and he has requested a letter from the doctor to see if he can get her here. The patient's discharge goal is to return home, pending progress and PT/OT recommendations. The patient does have a son and a cousin for support, and the cousin can transport at discharge. CM to follow.    William Bob RN

## 2021-09-29 NOTE — ANESTHESIA CARE TRANSFER NOTE
Patient: Antonio Mercado    Procedure(s):  ESOPHAGOGASTRODUODENOSCOPY (EGD)    Diagnosis: Esophageal dysphagia [R13.10]  Diagnosis Additional Information: No value filed.    Anesthesia Type:   MAC     Note:    Oropharynx: oropharynx clear of all foreign objects and spontaneously breathing  Level of Consciousness: awake  Oxygen Supplementation: nasal cannula  Level of Supplemental Oxygen (L/min / FiO2): 1  Independent Airway: airway patency satisfactory and stable  Dentition: dentition unchanged  Vital Signs Stable: post-procedure vital signs reviewed and stable  Report to RN Given: handoff report given  Patient transferred to: Medical/Surgical Unit  Comments: Pt alert and responding. VSS. Placed back on 1L NC which he arrived on. Report called to floor RN. All questions answered. Transported to floor with nursing assistant.   Handoff Report: Identifed the Patient, Identified the Reponsible Provider, Reviewed the pertinent medical history, Discussed the surgical course, Reviewed Intra-OP anesthesia mangement and issues during anesthesia, Set expectations for post-procedure period and Allowed opportunity for questions and acknowledgement of understanding      Vitals:  Vitals Value Taken Time   BP 98/57    Temp 36    Pulse 94    Resp 18    SpO2 100        Electronically Signed By: ALBERTO Smith CRNA  September 29, 2021  1:57 PM

## 2021-09-30 NOTE — CONSULTS
[facility] Radiation Oncology Inpatient Consult Note    Patient: Antonio Mercado  MRN: [id number]  Date of Service: 9/30/2021      Assessment:       ICD-10-CM    1. Esophageal dysphagia  R13.10 Case Request: ESOPHAGOGASTRODUODENOSCOPY (EGD)     Case Request: ESOPHAGOGASTRODUODENOSCOPY (EGD)     Esophagoscopy, gastroscopy, duodenoscopy (EGD), combined     Esophagoscopy, gastroscopy, duodenoscopy (EGD), combined   2. Dehydration  E86.0    3. Malignant neoplasm of lung, unspecified laterality, unspecified part of lung (H)  C34.90    4. Other pulmonary embolism without acute cor pulmonale, unspecified chronicity (H)  I26.99    5. Acute respiratory failure with hypoxia (H)  J96.01           Cancer Staging  Malignant neoplasm of hepatic flexure (H)  Staging form: Colon and Rectum, AJCC 7th Edition  - Clinical stage from 2/20/2016: Stage IIIB (T4a, N1c, M0) - Signed by Harris Mary MD on 2/20/2017  CEA: 97.2       Impression/Plan:   I was consulted by Dr. Chavez regarding radiation therapy.    66 year old male with significant progression of RUL adenocarcinoma. Unable to see patient as upper EGD performed same day. Fortunately GI noted normal esophagus, stomach, and examined duodenum. No evidence for disease. To be formally consulted by my partner Dr. Manrique for consideration of radiation to disease abutting airways and SVC tomorrow.       Staging History    Cancer Staging  Malignant neoplasm of hepatic flexure (H)  Staging form: Colon and Rectum, AJCC 7th Edition  - Clinical stage from 2/20/2016: Stage IIIB (T4a, N1c, M0) - Signed by Harris Mary MD on 2/20/2017  CEA: 97.2      Subjective:                    HPI:  Antonio Mercado is a 66 year old male with significant progression RUL adenocarcinoma.       Diagnosed with right lung adenocarcinoma with pleural mets and malignant pleural effusion in January 2021. Previous history of colon cancer s/p hemicolectomy, prostate cancer with elevated PSA untreated, and  HIV.     Previous treatment history:   Carbo Alimta alone without Keytruda, 20% dose reduction, cycle 2 on February 25, 2021  Carboplatin, Alimta and Keytruda to start February 4, 2021  Start Osimertinib 80 mg once daily, March 18, 2021 - 9/28/2021    No radiation therapy to date.     PET CT on 5/12/2021 showing decrease size in RUL adeno consistent with treatment response. Continued on Osimertinib therapy. CT chest on 8/9/2021 showing increase in RUL lesion concerning for progressive malignancy.     Returned to oncology clinic on 9/28/2021. Patient with significant weight loss, dehydration, dysphagia, and cough. CT chest CAP w contrast 9/27/2021 demonstrating significant progression of RUL mass with encapsulation of multiple vessels, broad abutment of the SVC, as well as invasion into the inferior right perisesophageal posterior mediastinum resulting in proximal esophageal dilatation consistent with obstruction. Admitted for symptom management.       Review of Systems    Pertinent items are noted in HPI.    Past History  Past Medical History:   Diagnosis Date     Constipation      Heel pain      History of blood transfusion      HIV (human immunodeficiency virus infection) (H)      HIV (human immunodeficiency virus infection) (H)      Hypertrophy of prostate with urinary obstruction      Iron deficiency anemia      LBP (low back pain)      Malignant neoplasm of hepatic flexure (H) 2/4/2016     Malignant neoplasm of hepatic flexure (H)     s/p right hemicolectomy in 2016     Trigger finger      Past Surgical History:   Procedure Laterality Date     COLON SURGERY       ESOPHAGOSCOPY, GASTROSCOPY, DUODENOSCOPY (EGD), COMBINED N/A 9/29/2021    Procedure: ESOPHAGOGASTRODUODENOSCOPY (EGD);  Surgeon: Rangel Dye MD;  Location: [facility]     GI SURGERY       HERNIA REPAIR       NC ESOPHAGOGASTRODUODENOSCOPY TRANSORAL DIAGNOSTIC N/A 4/13/2021    Procedure: ESOPHAGOGASTRODUODENOSCOPY (EGD);  Surgeon:  Harris Mon MD;  Location: [facility];  Service: Gastroenterology     US THORACENTESIS  1/5/2021     US THORACENTESIS  1/18/2021     US THORACENTESIS  1/26/2021     US THORACENTESIS  1/29/2021     US THORACENTESIS  2/2/2021     US THORACENTESIS  2/5/2021     US THORACENTESIS  2/9/2021     US THORACENTESIS  2/12/2021     US THORACENTESIS  2/15/2021     US THORACENTESIS  2/19/2021     US THORACENTESIS  2/23/2021     US THORACENTESIS  2/26/2021     US THORACENTESIS  3/2/2021     US THORACENTESIS  3/9/2021     Current Facility-Administered Medications   Medication     emtricitabine-rilpivirine-tenofovir (ODEFSEY) 200-25-25 MG per tablet 1 tablet     gabapentin (NEURONTIN) capsule 100 mg     heparin 25,000 units in 0.45% NaCl 250 mL ANTICOAGULANT infusion     lidocaine (LMX4) cream     lidocaine 1 % 0.1-1 mL     melatonin tablet 1 mg     ondansetron (ZOFRAN-ODT) ODT tab 4 mg    Or     ondansetron (ZOFRAN) injection 4 mg     prochlorperazine (COMPAZINE) injection 5 mg    Or     prochlorperazine (COMPAZINE) tablet 5 mg    Or     prochlorperazine (COMPAZINE) suppository 12.5 mg     senna-docusate (SENOKOT-S/PERICOLACE) 8.6-50 MG per tablet 1 tablet    Or     senna-docusate (SENOKOT-S/PERICOLACE) 8.6-50 MG per tablet 2 tablet     sodium chloride (PF) 0.9% PF flush 3 mL     sodium chloride (PF) 0.9% PF flush 3 mL     sodium chloride 0.9% infusion     traMADol (ULTRAM) tablet 50 mg     Stribild [pekevxn-txtgydf-acffirin-tenof], Ibuprofen, Oxycodone, Prednisone, Raltegravir, Tamsulosin, and Tylenol [acetaminophen]  Social History     Socioeconomic History     Marital status:      Spouse name: Not on file     Number of children: Not on file     Years of education: Not on file     Highest education level: Not on file   Occupational History     Not on file   Tobacco Use     Smoking status: Never Smoker     Smokeless tobacco: Never Used   Substance and Sexual Activity     Alcohol use: No     Drug use: No     Sexual  activity: Not Currently     Partners: Female     Comment: No intercourse for >1 year.   Other Topics Concern     Not on file   Social History Narrative    From [facility]    2 adult children live at home. Wife lives in [place] for work.      Social Determinants of Health     Financial Resource Strain:      Difficulty of Paying Living Expenses:    Food Insecurity:      Worried About Running Out of Food in the Last Year:      Ran Out of Food in the Last Year:    Transportation Needs:      Lack of Transportation (Medical):      Lack of Transportation (Non-Medical):    Physical Activity:      Days of Exercise per Week:      Minutes of Exercise per Session:    Stress:      Feeling of Stress :    Social Connections:      Frequency of Communication with Friends and Family:      Frequency of Social Gatherings with Friends and Family:      Attends [employer] Services:      Active Member of Clubs or Organizations:      Attends Club or Organization Meetings:      Marital Status:    Intimate Partner Violence:      Fear of Current or Ex-Partner:      Emotionally Abused:      Physically Abused:      Sexually Abused:        Objective:     Physical Exam    N/A     Lab Results     Results for orders placed or performed during the hospital encounter of 09/28/21   Comprehensive metabolic panel   Result Value Ref Range    Sodium 139 136 - 145 mmol/L    Potassium 4.3 3.5 - 5.0 mmol/L    Chloride 110 (H) 98 - 107 mmol/L    Carbon Dioxide (CO2) 24 22 - 31 mmol/L    Anion Gap 5 5 - 18 mmol/L    Urea Nitrogen 7 (L) 8 - 22 mg/dL    Creatinine 1.05 0.70 - 1.30 mg/dL    Calcium 8.4 (L) 8.5 - 10.5 mg/dL    Glucose 80 70 - 125 mg/dL    Alkaline Phosphatase 55 45 - 120 U/L    AST 16 0 - 40 U/L    ALT <9 0 - 45 U/L    Protein Total 5.7 (L) 6.0 - 8.0 g/dL    Albumin 2.2 (L) 3.5 - 5.0 g/dL    Bilirubin Total 0.4 0.0 - 1.0 mg/dL    GFR Estimate 73 >60 mL/min/1.73m2     No results found for this or any previous visit.     Imaging Results    CT  Chest/Abdomen/Pelvis w Contrast    Result Date: 9/27/2021  EXAM: CT CHEST/ABDOMEN/PELVIS W CONTRAST LOCATION: [facility] DATE/TIME: 9/27/2021 12:06 PM INDICATION:  Right upper lobe lung adenocarcinoma with malignant pleural effusion status post chemotherapy/immunotherapy COMPARISON: CT CAP 08/09/2021, PET/CT 05/12/2021 and CT CAP 04/12/2021 TECHNIQUE: CT scan of the chest, abdomen, and pelvis was performed following injection of IV contrast. Multiplanar reformats were obtained. Dose reduction techniques were used. CONTRAST: 75ml isovue 370 FINDINGS: LUNGS AND PLEURA: Right upper lobe mass 7.3 x 7.3 x 6.2 cm (previously 3.3 x 2.8 x 2.6 cm) with new direct extension invasion into the perihilar and upper right mediastinum with encasement of the right superior pulmonary vein, right upper lobe pulmonary artery and right upper lobe bronchus (causing complete post obstructive right upper lobe consolidation) as well as broad abutment of the SVC and right pericardium. Marked progression of numerous bulky pleural metastases mid and inferior right hemithorax causing localized mass effect and/or invasion of the right hemidiaphragm and invasion into the inferior right periesophageal posterior mediastinum resulting in proximal esophageal dilatation consistent with obstruction. Diffuse tumor infiltration of the atelectatic right middle and lower lobes has developed A large loculated pleural fluid collection mid and inferior right hemithorax unchanged. Trace amount of airspace opacity posterior basilar segment left lower lobe has developed. Left lung otherwise clear. No left pleural effusion. MEDIASTINUM/AXILLAE: Several small pulmonary emboli left lower lobar and subsegmental pulmonary arteries. CORONARY ARTERY CALCIFICATION: None. HEPATOBILIARY: Several benign hepatic cysts are unchanged. No significant mass or bile duct dilatation. No calcified gallstones. PANCREAS: Normal. SPLEEN: Normal. ADRENAL  GLANDS: Normal. KIDNEYS/BLADDER: Normal. BOWEL: Normal. LYMPH NODES: Abdominal and pelvic lymph nodes unremarkable. VASCULATURE: Unremarkable. PELVIC ORGANS: Normal. MUSCULOSKELETAL: No bone lesions.     IMPRESSION: 1.  Marked progression right upper lobe mass and right pleural metastases with mediastinal invasion causing esophageal obstruction, complete postobstructive right upper lobe consolidation, right hilar vascular encasement and mass effect on the SVC, right  pericardium and right hemidiaphragm. 2.  Several small acute pulmonary emboli left lower lobe small focus of left lower lobe consolidation likely represents an evolving infarct. [Critical Result: Acute pulmonary embolism and sequela of tumor progression. Finding was identified on 2021 3:38 PM. Dr. Chavez was contacted by me on 2021 4:03 PM and verbalized understanding of the critical result.     Echocardiogram Complete    Result Date: 2021  [id number] [address]  Name: GIOVANNI DISLA MRN: [id number] : 1955 Study Date: 2021 09:12 AM Age: 66 yrs Gender: Male Patient Location: [facility] Reason For Study: Syncope Ordering Physician: EDSON CHOUDHURY Performed By: YELENA  BSA: 1.8 m2 Height: 71 in Weight: 140 lb ______________________________________________________________________________ Procedure Complete Portable Echo Adult. No hemodynamically significant valvular abnormalities on 2D or color flow imaging. There is no comparison study available. ______________________________________________________________________________ Interpretation Summary  1.Left ventricular size, wall motion and function are normal. The ejection fraction is > 65%. 2.TAPSE is normal, which is consistent with normal right ventricular systolic function. 3.Right ventricular systolic pressure is elevated, consistent with mild pulmonary hypertension. 4.No hemodynamically  significant valvular abnormalities on 2D or color flow imaging. 5.There is no comparison study available. ______________________________________________________________________________ I      WMSI = 1.00     % Normal = 100  X - Cannot   0 -                      (2) - Mildly 2 -          Segments  Size Interpret    Hyperkinetic 1 - Normal  Hypokinetic  Hypokinetic  1-2     small                                                    7 -          3-5    moderate 3 - Akinetic 4 -          5 -         6 - Akinetic Dyskinetic   6-14    large              Dyskinetic   Aneurysmal  w/scar       w/scar       15-16   diffuse  Left Ventricle Left ventricular size, wall motion and function are normal. The ejection fraction is > 65%. There is normal left ventricular wall thickness. Left ventricular diastolic function is normal. No regional wall motion abnormalities noted.  Right Ventricle Normal right ventricle size and systolic function. TAPSE is normal, which is consistent with normal right ventricular systolic function.  Atria Normal left atrial size. Right atrial size is normal. There is no color Doppler evidence of an atrial shunt.  Mitral Valve Mitral valve leaflets appear normal. There is no evidence of mitral stenosis or clinically significant mitral regurgitation. There is no mitral regurgitation noted. There is no mitral valve stenosis.  Tricuspid Valve Tricuspid valve leaflets appear normal. There is mild (1+) tricuspid regurgitation. Right ventricular systolic pressure is elevated, consistent with mild pulmonary hypertension. There is no tricuspid stenosis.  Aortic Valve Aortic valve leaflets appear normal. There is no evidence of aortic stenosis or clinically significant aortic regurgitation. The aortic valve is trileaflet. No aortic regurgitation is present. No aortic stenosis is present.  Pulmonic Valve The pulmonic valve is not well seen, but is grossly normal. This degree of valvular regurgitation is within normal  limits. There is no pulmonic valvular stenosis.  Vessels The aorta root is normal. Normal size ascending aorta. IVC diameter <2.1 cm collapsing >50% with sniff suggests a normal RA pressure of 3 mmHg.  Pericardium There is no pericardial effusion.  ______________________________________________________________________________ MMode/2D Measurements & Calculations IVSd: 0.85 cm LVIDd: 4.2 cm LVIDs: 2.7 cm LVPWd: 0.87 cm FS: 35.2 % LV mass(C)d: 108.9 grams LV mass(C)dI: 60.1 grams/m2  Ao root diam: 3.4 cm LA dimension: 2.4 cm asc Aorta Diam: 3.6 cm LA/Ao: 0.71 LVOT diam: 2.0 cm LVOT area: 3.1 cm2 LA Volume Indexed (AL/bp): 12.9 ml/m2 RWT: 0.42  Time Measurements MM HR: 75.0 BPM  Doppler Measurements & Calculations MV E max siria: 69.1 cm/sec MV A max siria: 91.6 cm/sec MV E/A: 0.75 MV dec time: 0.26 sec LV V1 max P.6 mmHg LV V1 max: 106.8 cm/sec LV V1 VTI: 16.7 cm SV(LVOT): 52.4 ml SI(LVOT): 28.9 ml/m2 PI end-d siria: 181.3 cm/sec TR max siria: 312.2 cm/sec TR max P.0 mmHg E/E' av.0 Lateral E/e': 6.9 Medial E/e': 9.1  ______________________________________________________________________________ Report approved by: Dana Zurita 2021 11:12 AM         Pathology    I, Mely Angulo MD personally performed the services described in this documentation, as scribed by Will Jones in my presence, and it is both accurate and complete.    Signed by: Mely Angulo MD, MPH

## 2021-09-30 NOTE — PROGRESS NOTES
[facility] Progress Note    Assessment/Plan  Active Problems:    Dehydration    Malignant neoplasm of lung, unspecified laterality, unspecified part of lung (H)    Other pulmonary embolism without acute cor pulmonale, unspecified chronicity (H)    Antonio Mercado is a 66 year old male admitted on 9/28/2021. He has a history of HIV on antiretrovirals, colon cancer s/p right hemicolectomy, prostate cancer, right lung adenocarcinoma with pleural metastasis, malignant pleural effusion, and macrocytic anemia and is admitted for dysphagia, weight loss secondary to progression of lung cancer, and management of PE.     #Right lung adenocarcinoma  #Pleural metastasis and malignant pleural effusion  #Dehydration  Patient has known lung cancer diagnosed in January 2021. He has been following with  for this and was seen on 9/28 for routine care, found to be tachycardic and having issues with worsening dysphagia with difficulty tolerating pills, cough, and 20 lb weight loss over the last month. Patient was sent from oncology clinic for further management of his symptoms and the fact that CT from 9/27 showed marked progression of RUL mass and pleural mets with mediastinal invasion causing esophageal obstruction, complete postobstructive RUL consolidation, right hilar vascular encasement and mess effect on the SVC, right pericardium and right hemidiaphragm. Per chart review it looks like this progression was likely a surprise as he had been responding to treatment his previous two visits (5/2021 and 7/2021). EGD 9/29 showed no invasion of the tumor into the esophagus and no evidence for acute dysphagia or need for change in diet. No further GI work-up planned. ECHO 9/29 was unremarkable, with no signs of cardiac tamponade.     At the time of admission, staff had long discussion of code status.  Patient elected to be full code at this time with the understanding that he may not tolerate chest  compressions or intubation. Depending on the findings of further workup here and recommendations from specialists, the patient may benefit for further discussion on goals of care.     Plan:  - Regular diet  - mIVF  - Radiation oncology consulted for possible palliative radiation   - Pt meeting with Dr. Manrique for simulation CT planning scan today    - Plan to start treatment either later today or Monday.    - Can treat as an outpatient pending placement and rides.    - Primary oncology team will work with rad onc on goals and plans for ongoing treatment  -Hem/onc consulted   - Recommend pt have new chemotherapy regimen outpatient   - Have next clinic appointment scheduled for 10/6 for follow up   - Recommend restarting Xarelto    - Recommend holding off on palliative radiation at this time    #Acute pulmonary emboli  #Chest pain, worse with inspiration  Patient had not been taking his Xarelto for 4 days prior to hospitalization 2/2 dysphagia. On CT from 9/27, was found to have several small acute PE of LLL with a small focus of LLL consolidation likely represent ing an evolving infarct, was started on a heparin drip on admission for management at this time per oncology's recommendations. Currently requiring 3 LPM O2 however, most likely a combination of significant lung cancer and emboli.     Plan:  - Heparin infusion   - Heme/onc would like to switch to Xarelto when he is tolerating oral medications better prior to discharge    - Will need to then restart xarelto outpatient after discharge.   - Supplemental O2 as needed  - Start gabapentin 100 mg TID for pain  - Prn tramadol 50 mg q6hrs for moderate pain    #Renal insufficiency vs MONTRELL - resolved  It appears patient has had progressively worsening kidney function over the last few months. Possibly secondary to chemo side-effects vs acute dehydration and hypovolemia. On admission it appears stable from the last few months.  After receiving some IV fluids it appears to  have improved prior to discharge.      Plan:  -mIVF   -Daily CMP     Chronic  #Normocytic anemia  Patient has long standing history of macrocytic anemia, has been attributed to medication side effect. He typically takes oral iron at home but has not in the last week secondary to dysphagia. Hemoglobin stable. No signs of acute bleeding.    Plan:  -CBC daily       #History of HIV  Patient was last seen by ID on 6/22 and recommend RTC in 6 months. Has been stable from this regard.     Plan:   - Home [place]     Diet: Regular diet.   DVT Prophylaxis: Heparin infusion  [place]once Catheter: Not present  Fluids:  mIVF  Central Lines: None  Code Status: Full Code      Subjective  Patient feeling a better today. Only acute concern is some chest pain along his anterior left lower rib region that worsens with inspiration. Improved with tramadol overnight. Patient states he never had any difficulties swallowing, it was almost exclusively just lack of appetite.  he is unclear why that made its way into the chart and denies any concerns for aspiration or difficulty swallowing.  Requiring 3LPM NC. Denies SOB, headache, dizziness, lightheadedness, nausea, abdominal pain, LE edema.     Objective    Vital signs in last 24 hours Temp:  [97.6  F (36.4  C)-99.4  F (37.4  C)] 98.5  F (36.9  C)  Pulse:  [86-93] 88  Resp:  [16-20] 20  BP: (112-128)/(63-69) 120/63  SpO2:  [98 %-100 %] 98 %       Intake/Output last 3 shift I/O last 3 completed shifts:  In: 1553 [P.O.:530; I.V.:1023]  Out: 100 [Urine:100]    Intake/Output this shift:I/O this shift:  In: -   Out: 150 [Urine:150]    Physical Exam  Constitutional: Cachetic appearing male. Awake, alert, cooperative, no apparent distress, and appears stated age.   Eyes: Lids and lashes normal. EOMI. Sclera clear, conjunctiva normal  ENT: Normocephalic, without obvious abnormality, atraumatic.  Respiratory: Diffusely decreased breath sounds and crackles. No increased work of breathing while on NC.    Cardiovascular: RRR. Normal S1 and S2, no S3 or S4, and no murmur noted  GI: normal bowel sounds, soft, non-distended, non-tender.  Skin: Skin warm to touch and dry  Musculoskeletal: There is no redness, warmth, or swelling of the joints.   Neurologic: Awake, alert, Oriented. No focal neurologic deficits.      Pertinent Labs and Pertinent Radiology   Lab Results: personally reviewed.     Radiology Results: Personally reviewed image/s and impression/s    Precepted patient with Dr. Billy Grullon.    Maci Pompa, MS4  [facility]  9/30/2021    Resident/Fellow Attestation   I, Dave Hamm, was present with the medical/RODGER student who participated in the service and in the documentation of the note.  I have verified the history and personally performed the physical exam and medical decision making.  I agree with the assessment and plan of care as documented in the note.      Dave Hamm MD  [id number]

## 2021-09-30 NOTE — LETTER
9/30/2021         RE: Antonio Mercado  [address]  [place] [address]        Dear Colleague,    Thank you for referring your patient, Antonio Mercado, to the [facility] RADIATION ONCOLOGY [place]. Please see a copy of my visit note below.    Pt via w/c from inpatient unit, RN on P4 had given him pain medication prior to sending him down. Pt meeting with Dr. Manrique and plan for simulation CT planning scan today and start treatment either later today or Monday. Can treat as an outpatient pending placement and rides. Some U[facility] will not allow daily radiation. Pt would need to be able to arrange rides for radiation and be able to get in and out of care by himself or with the help of family/friends.     Larisa Laura RN, MAN, OCN  Radiation Oncology  [phone]      [facility] Radiation Oncology Follow Up Note    Patient: Antonio Mercado  MRN: [id number]  Date of Service: 09/30/2021        Mr. Antonio Mercado is a pleasant 66-year-old gentleman who has a history of metastatic lung cancer.  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  The patient also had a history of colon cancer status post surgery and history of prostate cancer with elevated PSA who declined follow-up and treatment.  Patient presented with a recent history of difficulty swallowing and underwent upper GI endoscopy yesterday which showed no evidence of esophageal lesion.  The patient now is able to use eat and swallowing without any significant difficulty.  His most recent CT chest on 9/27/2021 showed a large right upper lobe mass and right pleural metastasis consistent with the progression of lung cancer.  Patient is referred to radiation oncology for evaluation and consideration of possible palliative radiation therapy to the thoracic region.    I have personally reviewed his upcoming medical record today.  I have also reviewed his most recent radiology study including CT scan.  I have discussed  his case with Dr. Chavez, medical oncology earlier today.  The patient is stable without any significant difficulty of swallowing.  His upper GI endoscopy showed no significant esophageal lesion or obstruction.  The decision is to proceed with systemic therapy next week.  We will withhold palliative radiation therapy for the future.  This has been explained to the patient and he understands well and wished to proceed.    Patient will continue his long-term follow-up ongoing care for his lung cancer with Dr. Chavez, medical oncology.  The patient will be followed by radiation oncology as needed.    I spent about 20 minutes today with the patient 80% time was used for counseling.      Liza Manrique MD, PhD  Department of Radiation Oncology   [facility] Radiation Oncology  Tel: [phone]  Page: [phone]    [facility]  [address] [place] [address]     [address]  [place] [address]                Again, thank you for allowing me to participate in the care of your patient.        Sincerely,        Liza Manrique MD

## 2021-09-30 NOTE — ANESTHESIA POSTPROCEDURE EVALUATION
Patient: Antonio Mercado    Procedure(s):  ESOPHAGOGASTRODUODENOSCOPY (EGD)    Diagnosis:Esophageal dysphagia [R13.10]  Diagnosis Additional Information: No value filed.    Anesthesia Type:  MAC    Note:  Disposition: Inpatient   Postop Pain Control: Uneventful            Sign Out: Well controlled pain   PONV: No   Neuro/Psych: Uneventful            Sign Out: Acceptable/Baseline neuro status   Airway/Respiratory: Uneventful            Sign Out: Acceptable/Baseline resp. status   CV/Hemodynamics: Uneventful            Sign Out: Acceptable CV status; No obvious hypovolemia; No obvious fluid overload   Other NRE: NONE   DID A NON-ROUTINE EVENT OCCUR?            Last vitals:  Vitals Value Taken Time   /65 09/29/21 1900   Temp 37  C (98.6  F) 09/29/21 1900   Pulse 89 09/29/21 1900   Resp 18 09/29/21 1900   SpO2 98 % 09/29/21 1900       Electronically Signed By: Silvino Robles MD  September 29, 2021  10:28 PM

## 2021-09-30 NOTE — PATIENT INSTRUCTIONS
Patient Education     Radiation Therapy Treatment  Radiation therapy uses high-energy X-rays to kill cancer cells. Radiation therapy can help you in your fight against cancer. It begins with a session to discuss treatment with your healthcare provider. If you and your provider decide on radiation, you will return for a treatment planning visit called a simulation. Then you will start treatment.  The simulation is a planning session that helps your healthcare provider target your cancer. He or she will design a radiation plan to protect your healthy tissues from radiation. Your radiation therapy team uses a special machine called a simulator to map out your treatment. The simulator is usually an X-ray machine (fluoroscopy), CT scanner, MRI scanner, or PET-CT scanner machine. Laser lights act as guides to help position your body accurately. During this visit:    The team figures out the best position for your body. They make notes in your chart so you ll be placed the same way each time.    They may use special devices to keep your body correctly positioned and still during treatment. These may include molds, masks, rests, and blocks.    The team makes ink marks on your skin. These will help you get in the same position for each treatment. Tiny permanent tattoos may also be used. These tattoos may be removed later with laser treatments.    Markers such as metal balls or wires may be put on or in your body. Sometime these are taped to the skin to help with the imaging process. These work with the X-rays to position your body. The markers are removed when the visit is over.  After the team has the imaging and data, the information is sent into the computer planning system. Your doctor and the team of physicists and dosimetrists design a treatment field. The field will best target your cancer and how it might spread. It will also help limit radiation to nearby normal tissues.  Your treatments  When the simulation and  plan are completed, you will begin your daily treatments. Treatment is usually once daily, Monday through Friday, for 5 to 7 weeks. It takes less than 30 minutes. Sometimes you may need radiation twice a day, with about 6 hours between treatments.  You may need to change into a hospital gown. The radiation therapist puts you in the correct position on the treatment table, then leaves the room. Sometimes you may need more imaging before each treatment. The machine may take digital X-rays or a CT scan to help make sure you are lined up correctly. During treatment, lie as still as you can and breathe normally. You will hear noises coming from the machine. You can talk to the radiation therapist, who watches you from the control room on a TV monitor. After treatment, the therapist will help you off the table. You can then get dressed and go back to your normal activities.  After treatment  After your radiation treatments are done, you will have follow-up appointments. These are to make sure the cancer is under control. Tell your healthcare team about any side effects from the treatment. The team will help you manage them.  [employer] last reviewed this educational content on 6/1/2018 2000-2021 [employer]. All rights reserved. This information is not intended as a substitute for professional medical care. Always follow your healthcare professional's instructions.

## 2021-09-30 NOTE — PROGRESS NOTES
[facility] Hematology and Oncology Inpatient Progress Note    Patient: Antonio Mercado  MRN: [id number]  Date of Service: September 30, 2021         Reason for Visit    Follow-up for metastatic lung cancer    Assessment and Plan    Progressive metastatic lung adenocarcinoma  Cough  Dysphagia  Pulmonary embolism  Poor performance status    No evidence of esophageal obstruction on EGD.  Patient appears to be tolerating oral medications.  Blood pressure and pulse much improved with IV hydration.    He is currently being evaluated by radiation oncology for palliative radiation.    I think he can be transitioned to Xarelto as long as he is able to tolerate oral medication at 20 mg p.o. daily.    Plan will be to see him in the clinic next Wednesday to resume systemic therapy with carboplatin, Alimta and Keytruda.  We will give him a B12 injection today.  We will start folic acid 1 mg p.o. daily.    Prescriptions for dexamethasone, ondansetron, Compazine and folic acid have been sent to his pharmacy for outpatient use.    We will have him return to clinic next Wednesday for labs and plan to start treatment with carboplatin, Alimta and Keytruda with 50% dose reduction of both chemotherapy drugs.    We will hold off palliative radiation therapy unless a single dose option is possible.    Discussed with Dr. Carrizales.  35 minutes spent.    Plan: As above    Dr Bustos/Teresa covering over next 3 days    Cancer Staging  Malignant neoplasm of hepatic flexure (H)  Staging form: Colon and Rectum, AJCC 7th Edition  - Clinical stage from 2/20/2016: Stage IIIB (T4a, N1c, M0) - Signed by Harris Mary MD on 2/20/2017      ECOG Performance Status: 2        ______________________________________________________________________________    History of Present Illness    Mr. Antonio Mercado reports to be doing better.  Cough is improved.  Eating and drinking fine with no nausea or vomiting.  Remains weak.  Some left anterior chest wall  "pain.    Review of Systems    As per the HPI.    Physical Exam    /63 (BP Location: Left arm)   Pulse 88   Temp 98.5  F (36.9  C) (Oral)   Resp 20   Ht 1.803 m (5' 11\")   Wt 63.7 kg (140 lb 8 oz)   SpO2 98%   BMI 19.60 kg/m        GENERAL: Alert and oriented to time place and person. Seated comfortably. In no distress.    HEAD: Atraumatic and normocephalic.    EYES: KARIME, EOMI.  No pallor.  No icterus.    Oral cavity: no mucosal lesion or tonsillar enlargement.    NECK: supple. JVP normal.  No thyroid enlargement.    LYMPH NODES: No palpable, cervical, axillary or inguinal lymphadenopathy.    CHEST: clear to auscultation bilaterally.  Resonant to percussion throughout bilaterally.  Symmetrical breath movements bilaterally.    CVS: S1 and S2 are heard. Regular rate and rhythm.  No murmur or gallop or rub heard.  No peripheral edema.    ABDOMEN: Soft. Not tender. Not distended.  No palpable hepatomegaly or splenomegaly.  No other mass palpable.  Bowel sounds heard.    EXTREMITIES: Warm.    SKIN: no rash, or bruising or purpura.  Has a full head of hair.      Lab Results    Recent Results (from the past 24 hour(s))   Hemoglobin    Collection Time: 09/29/21 11:47 AM   Result Value Ref Range    Hemoglobin 8.7 (L) 13.3 - 17.7 g/dL   Heparin Unfractionated Anti Xa Level    Collection Time: 09/29/21 11:47 AM   Result Value Ref Range    Anti Xa Unfractionated Heparin 0.79 For Reference Range, See Comment IU/mL   UPPER GI ENDOSCOPY    Collection Time: 09/29/21  1:24 PM   Result Value Ref Range    Upper GI Endoscopy       [facility]  [address], [place] [address]  _______________________________________________________________________________  Patient Name: Antonio Mercado         Procedure Date: 9/29/2021 1:24 PM  MRN: [id number]                       Account Number: [id number]  YOB: 1955               Admit Type: Inpatient  Age: 66                                " Note Status: Finalized  Attending MD: Rangel Dye MD Instrument Name: EGD Scope [id number]  _______________________________________________________________________________     Procedure:           Upper GI endoscopy  Indications:         Anorexia, Weight loss  Providers:           Rangel Dye MD  Referring MD:          Medicines:           Propofol per Anesthesia  Complications:       No immediate complications.  _______________________________________________________________________________  Procedure:           Pre-Anesthesia Assessment:                       - Prior to the pro cedure, a History and Physical was                        performed, and patient medications and allergies were                        reviewed. The patient is competent. The risks and                        benefits of the procedure and the sedation options and                        risks were discussed with the patient. All questions                        were answered and informed consent was obtained. Patient                        identification and proposed procedure were verified by                        the physician and the nurse in the pre-procedure area.                        Mental Status Examination: alert and oriented. Airway                        Examination: normal oropharyngeal airway and neck                        mobility. Respiratory Examination: clear to                        auscultation. CV Examination: normal. ASA Grade                        Assessment: III - A patient with severe systemic                        disease. After reviewing the risks and debra efits, the                        patient was deemed in satisfactory condition to undergo                        the procedure. The anesthesia plan was to use monitored                        anesthesia care (MAC). Immediately prior to                        administration of medications, the patient was                        re-assessed  for adequacy to receive sedatives. The heart                        rate, respiratory rate, oxygen saturations, blood                        pressure, adequacy of pulmonary ventilation, and                        response to care were monitored throughout the                        procedure. The physical status of the patient was                        re-assessed after the procedure.                       After obtaining informed consent, the endoscope was                        passed under direct vision. Throughout the procedure,                        the patient's blood pressure, pulse, and oxygen                        saturations were monitore d continuously. The endoscope                        was introduced through the mouth, and advanced to the                        second part of duodenum. The upper GI endoscopy was                        accomplished with ease. The patient tolerated the                        procedure well.                                                                                   Findings:       The examined esophagus was normal.       The Z-line was regular and was found 40 cm from the incisors.       The entire examined stomach was normal.       The examined duodenum was normal.                                                                                   Moderate Sedation:       See the other procedure note for documentation of moderate sedation with        intraservice time.  Impression:          - Normal esophagus.                       - Z-line regular, 40 cm from the incisors.                       - Normal stomach.                       - Normal examined duodenum.                        - No specimens collected.  Recommendation:      - Return patient to [facility] care.                       - Advance diet as tolerated. (Pt denies dysphagia c/o                        poor appetite)                                                                                      Rangel Dye MD  __________________________  Rangel Dye MD  9/29/2021 1:58:43 PM  I was physically present for the entire viewing portion of the exam.  __________________________  Signature of teaching physician  Nisreen/Solomon Dye MD  Number of Addenda: 0    Note Initiated On: 9/29/2021 1:24 PM  Scope In: 1:44:19 PM  Scope Out: 1:48:29 PM     Heparin Unfractionated Anti Xa Level    Collection Time: 09/29/21  9:05 PM   Result Value Ref Range    Anti Xa Unfractionated Heparin 0.38 For Reference Range, See Comment IU/mL   Heparin Unfractionated Anti Xa Level    Collection Time: 09/30/21  4:49 AM   Result Value Ref Range    Anti Xa Unfractionated Heparin 0.37 For Reference Range, See Comment IU/mL   CBC with platelets    Collection Time: 09/30/21  9:00 AM   Result Value Ref Range    WBC Count 4.3 4.0 - 11.0 10e3/uL    RBC Count 2.65 (L) 4.40 - 5.90 10e6/uL    Hemoglobin 7.4 (L) 13.3 - 17.7 g/dL    Hematocrit 24.0 (L) 40.0 - 53.0 %    MCV 91 78 - 100 fL    MCH 27.9 26.5 - 33.0 pg    MCHC 30.8 (L) 31.5 - 36.5 g/dL    RDW 14.6 10.0 - 15.0 %    Platelet Count 280 150 - 450 10e3/uL   Comprehensive metabolic panel    Collection Time: 09/30/21  9:00 AM   Result Value Ref Range    Sodium 139 136 - 145 mmol/L    Potassium 4.3 3.5 - 5.0 mmol/L    Chloride 110 (H) 98 - 107 mmol/L    Carbon Dioxide (CO2) 24 22 - 31 mmol/L    Anion Gap 5 5 - 18 mmol/L    Urea Nitrogen 7 (L) 8 - 22 mg/dL    Creatinine 1.05 0.70 - 1.30 mg/dL    Calcium 8.4 (L) 8.5 - 10.5 mg/dL    Glucose 80 70 - 125 mg/dL    Alkaline Phosphatase 55 45 - 120 U/L    AST 16 0 - 40 U/L    ALT <9 0 - 45 U/L    Protein Total 5.7 (L) 6.0 - 8.0 g/dL    Albumin 2.2 (L) 3.5 - 5.0 g/dL    Bilirubin Total 0.4 0.0 - 1.0 mg/dL    GFR Estimate 73 >60 mL/min/1.73m2        Imaging    CT Chest/Abdomen/Pelvis w Contrast    Result Date: 9/27/2021  EXAM: CT CHEST/ABDOMEN/PELVIS W CONTRAST LOCATION: [facility] DATE/TIME: 9/27/2021 12:06  PM INDICATION:  Right upper lobe lung adenocarcinoma with malignant pleural effusion status post chemotherapy/immunotherapy COMPARISON: CT CAP 08/09/2021, PET/CT 05/12/2021 and CT CAP 04/12/2021 TECHNIQUE: CT scan of the chest, abdomen, and pelvis was performed following injection of IV contrast. Multiplanar reformats were obtained. Dose reduction techniques were used. CONTRAST: 75ml isovue 370 FINDINGS: LUNGS AND PLEURA: Right upper lobe mass 7.3 x 7.3 x 6.2 cm (previously 3.3 x 2.8 x 2.6 cm) with new direct extension invasion into the perihilar and upper right mediastinum with encasement of the right superior pulmonary vein, right upper lobe pulmonary artery and right upper lobe bronchus (causing complete post obstructive right upper lobe consolidation) as well as broad abutment of the SVC and right pericardium. Marked progression of numerous bulky pleural metastases mid and inferior right hemithorax causing localized mass effect and/or invasion of the right hemidiaphragm and invasion into the inferior right periesophageal posterior mediastinum resulting in proximal esophageal dilatation consistent with obstruction. Diffuse tumor infiltration of the atelectatic right middle and lower lobes has developed A large loculated pleural fluid collection mid and inferior right hemithorax unchanged. Trace amount of airspace opacity posterior basilar segment left lower lobe has developed. Left lung otherwise clear. No left pleural effusion. MEDIASTINUM/AXILLAE: Several small pulmonary emboli left lower lobar and subsegmental pulmonary arteries. CORONARY ARTERY CALCIFICATION: None. HEPATOBILIARY: Several benign hepatic cysts are unchanged. No significant mass or bile duct dilatation. No calcified gallstones. PANCREAS: Normal. SPLEEN: Normal. ADRENAL GLANDS: Normal. KIDNEYS/BLADDER: Normal. BOWEL: Normal. LYMPH NODES: Abdominal and pelvic lymph nodes unremarkable. VASCULATURE: Unremarkable. PELVIC ORGANS: Normal.  MUSCULOSKELETAL: No bone lesions.     IMPRESSION: 1.  Marked progression right upper lobe mass and right pleural metastases with mediastinal invasion causing esophageal obstruction, complete postobstructive right upper lobe consolidation, right hilar vascular encasement and mass effect on the SVC, right  pericardium and right hemidiaphragm. 2.  Several small acute pulmonary emboli left lower lobe small focus of left lower lobe consolidation likely represents an evolving infarct. [Critical Result: Acute pulmonary embolism and sequela of tumor progression. Finding was identified on 2021 3:38 PM. Dr. Chavez was contacted by me on 2021 4:03 PM and verbalized understanding of the critical result.     Echocardiogram Complete    Result Date: 2021  [id number][address] [address]  Name: GIOVANNI DISLA MRN: [id number] : 1955 Study Date: 2021 09:12 AM Age: 66 yrs Gender: Male Patient Location: [facility] Reason For Study: Syncope Ordering Physician: EDSON CHOUDHURY Performed By: YELENA  BSA: 1.8 m2 Height: 71 in Weight: 140 lb ______________________________________________________________________________ Procedure Complete Portable Echo Adult. No hemodynamically significant valvular abnormalities on 2D or color flow imaging. There is no comparison study available. ______________________________________________________________________________ Interpretation Summary  1.Left ventricular size, wall motion and function are normal. The ejection fraction is > 65%. 2.TAPSE is normal, which is consistent with normal right ventricular systolic function. 3.Right ventricular systolic pressure is elevated, consistent with mild pulmonary hypertension. 4.No hemodynamically significant valvular abnormalities on 2D or color flow imaging. 5.There is no comparison study available.  ______________________________________________________________________________ I      WMSI = 1.00     % Normal = 100  X - Cannot   0 -                      (2) - Mildly 2 -          Segments  Size Interpret    Hyperkinetic 1 - Normal  Hypokinetic  Hypokinetic  1-2     small                                                    7 -          3-5    moderate 3 - Akinetic 4 -          5 -         6 - Akinetic Dyskinetic   6-14    large              Dyskinetic   Aneurysmal  w/scar       w/scar       15-16   diffuse  Left Ventricle Left ventricular size, wall motion and function are normal. The ejection fraction is > 65%. There is normal left ventricular wall thickness. Left ventricular diastolic function is normal. No regional wall motion abnormalities noted.  Right Ventricle Normal right ventricle size and systolic function. TAPSE is normal, which is consistent with normal right ventricular systolic function.  Atria Normal left atrial size. Right atrial size is normal. There is no color Doppler evidence of an atrial shunt.  Mitral Valve Mitral valve leaflets appear normal. There is no evidence of mitral stenosis or clinically significant mitral regurgitation. There is no mitral regurgitation noted. There is no mitral valve stenosis.  Tricuspid Valve Tricuspid valve leaflets appear normal. There is mild (1+) tricuspid regurgitation. Right ventricular systolic pressure is elevated, consistent with mild pulmonary hypertension. There is no tricuspid stenosis.  Aortic Valve Aortic valve leaflets appear normal. There is no evidence of aortic stenosis or clinically significant aortic regurgitation. The aortic valve is trileaflet. No aortic regurgitation is present. No aortic stenosis is present.  Pulmonic Valve The pulmonic valve is not well seen, but is grossly normal. This degree of valvular regurgitation is within normal limits. There is no pulmonic valvular stenosis.  Vessels The aorta root is normal. Normal size ascending  aorta. IVC diameter <2.1 cm collapsing >50% with sniff suggests a normal RA pressure of 3 mmHg.  Pericardium There is no pericardial effusion.  ______________________________________________________________________________ MMode/2D Measurements & Calculations IVSd: 0.85 cm LVIDd: 4.2 cm LVIDs: 2.7 cm LVPWd: 0.87 cm FS: 35.2 % LV mass(C)d: 108.9 grams LV mass(C)dI: 60.1 grams/m2  Ao root diam: 3.4 cm LA dimension: 2.4 cm asc Aorta Diam: 3.6 cm LA/Ao: 0.71 LVOT diam: 2.0 cm LVOT area: 3.1 cm2 LA Volume Indexed (AL/bp): 12.9 ml/m2 RWT: 0.42  Time Measurements MM HR: 75.0 BPM  Doppler Measurements & Calculations MV E max siria: 69.1 cm/sec MV A max siria: 91.6 cm/sec MV E/A: 0.75 MV dec time: 0.26 sec LV V1 max P.6 mmHg LV V1 max: 106.8 cm/sec LV V1 VTI: 16.7 cm SV(LVOT): 52.4 ml SI(LVOT): 28.9 ml/m2 PI end-d siria: 181.3 cm/sec TR max siria: 312.2 cm/sec TR max P.0 mmHg E/E' av.0 Lateral E/e': 6.9 Medial E/e': 9.1  ______________________________________________________________________________ Report approved by: Dana Zurita 2021 11:12 AM          Signed by: Kailey Chavez MD

## 2021-09-30 NOTE — PROGRESS NOTES
"GI PROGRESS NOTE  9/30/2021  Antonio Mercado  1955  /-50    Subjective:   Tolerating liquids. Interestingly, feeling better since EGD although no abnormality seen or treated.      Objective:     Blood pressure 120/63, pulse 88, temperature 98.5  F (36.9  C), temperature source Oral, resp. rate 20, height 1.803 m (5' 11\"), weight 63.7 kg (140 lb 8 oz), SpO2 98 %.    Body mass index is 19.6 kg/m .  Gen: NAD, cachectic male   Cardio: RRR  GI: Non-distended, BS positive, soft, non-tender    Laboratory:  CMP Results:   Recent Labs   Lab Test 09/30/21  0900      POTASSIUM 4.3   CHLORIDE 110*   CO2 24   ANIONGAP 5   GLC 80   BUN 7*   CR 1.05   BILITOTAL 0.4   ALKPHOS 55   ALT <9   AST 16      CBC  Recent Labs   Lab 09/30/21  0900 09/29/21  1147 09/29/21  0233 09/28/21  1748 09/28/21  1401 09/28/21  1401   WBC 4.3  --  4.0 5.7  --  4.5   RBC 2.65*  --  2.85* 3.50*  --  3.31*   HGB 7.4* 8.7* 7.7* 9.6*   < > 9.0*   HCT 24.0*  --  25.1* 31.8*  --  29.1*   MCV 91  --  88 91  --  88   MCH 27.9  --  27.0 27.4  --  27.2   MCHC 30.8*  --  30.7* 30.2*  --  30.9*   RDW 14.6  --  14.7 15.1*  --  14.9     --  252 297  --  303    < > = values in this interval not displayed.     INRNo lab results found in last 7 days.   Lipase   Date Value Ref Range Status   04/12/2021 24 0 - 52 U/L Final     Imaging:  EXAM: CT CHEST/ABDOMEN/PELVIS W CONTRAST  LOCATION: [facility]  DATE/TIME: 9/27/2021 12:06 PM     INDICATION:  Right upper lobe lung adenocarcinoma with malignant pleural effusion status post chemotherapy/immunotherapy   COMPARISON: CT CAP 08/09/2021, PET/CT 05/12/2021 and CT CAP 04/12/2021  TECHNIQUE: CT scan of the chest, abdomen, and pelvis was performed following injection of IV contrast. Multiplanar reformats were obtained. Dose reduction techniques were used.   CONTRAST: 75ml isovue 370     FINDINGS:   LUNGS AND PLEURA: Right upper lobe mass 7.3 x 7.3 x 6.2 cm (previously 3.3 x 2.8 " x 2.6 cm) with new direct extension invasion into the perihilar and upper right mediastinum with encasement of the right superior pulmonary vein, right upper lobe pulmonary   artery and right upper lobe bronchus (causing complete post obstructive right upper lobe consolidation) as well as broad abutment of the SVC and right pericardium.     Marked progression of numerous bulky pleural metastases mid and inferior right hemithorax causing localized mass effect and/or invasion of the right hemidiaphragm and invasion into the inferior right periesophageal posterior mediastinum resulting in   proximal esophageal dilatation consistent with obstruction.     Diffuse tumor infiltration of the atelectatic right middle and lower lobes has developed      A large loculated pleural fluid collection mid and inferior right hemithorax unchanged.     Trace amount of airspace opacity posterior basilar segment left lower lobe has developed. Left lung otherwise clear. No left pleural effusion.     MEDIASTINUM/AXILLAE: Several small pulmonary emboli left lower lobar and subsegmental pulmonary arteries.     CORONARY ARTERY CALCIFICATION: None.     HEPATOBILIARY: Several benign hepatic cysts are unchanged. No significant mass or bile duct dilatation. No calcified gallstones.      PANCREAS: Normal.  SPLEEN: Normal.  ADRENAL GLANDS: Normal.  KIDNEYS/BLADDER: Normal.  BOWEL: Normal.  LYMPH NODES: Abdominal and pelvic lymph nodes unremarkable.  VASCULATURE: Unremarkable.  PELVIC ORGANS: Normal.  MUSCULOSKELETAL: No bone lesions.                                        IMPRESSION:  1.  Marked progression right upper lobe mass and right pleural metastases with mediastinal invasion causing esophageal obstruction, complete postobstructive right upper lobe consolidation, right hilar vascular encasement and mass effect on the SVC, right   pericardium and right hemidiaphragm.  2.  Several small acute pulmonary emboli left lower lobe small focus of  left lower lobe consolidation likely represents an evolving infarct.    Procedures:  EGD 9/29/21:  Findings:        The examined esophagus was normal.        The Z-line was regular and was found 40 cm from the incisors.        The entire examined stomach was normal.        The examined duodenum was normal.                                                                                     Moderate Sedation:        See the other procedure note for documentation of moderate sedation with        intraservice time.   Impression:          - Normal esophagus.                        - Z-line regular, 40 cm from the incisors.                        - Normal stomach.                        - Normal examined duodenum.                        - No specimens collected.      Assessment:   1. Dysphagia  2. Metastatic lung cancer  66 year old male with metastatic lung cancer and 1 month of worsening dysphagia associated with weight loss. Recent CT scan shows mass effect vs. Invasion in the esophagus from mets. EGD yesterday showed no invasion of the tumor into the esophagus. Patient has having a hard time swallowing his Xarelto, but is now able to tolerate liquids etc.       Plan:   1. Diet as tolerated  2. No further GI work-up planned at this time.                                                                          Jadyn Lang PA-C  Thank you for the opportunity to participate in the care of this patient.   Please feel free to call me with any questions or concerns.  Phone number [phone].

## 2021-09-30 NOTE — PLAN OF CARE
Patient is A&O. He is fatigued. Patient had lg emesis at 1600, clear yellow fluid. He continued to have nausea and was given prn Zofran with relief. Patient slept for 3 hours. He continues to be on clear liquids and was able to drink 320 ml liquid. He denies pain. Heparin is running and AntiXA lab was drawn late. AntiXA was 0.38 and Heparin dose remains the same with recheck at 0345. Patient denies any further nausea. Will continue to monitor.    Problem: Adult Inpatient Plan of Care  Goal: Absence of Hospital-Acquired Illness or Injury  Intervention: Identify and Manage Fall Risk  Recent Flowsheet Documentation  Taken 9/29/2021 1900 by Eliana Jones, RN  Safety Promotion/Fall Prevention:   activity supervised   chair alarm on  Intervention: Prevent Skin Injury  Recent Flowsheet Documentation  Taken 9/29/2021 1900 by Eliana Jones, RN  Body Position: position changed independently     Problem: Fatigue (Oncology Care)  Goal: Improved Activity Tolerance  Outcome: No Change  Intervention: Promote Energy Conservation  Recent Flowsheet Documentation  Taken 9/29/2021 1900 by Eliana Jones, RN  Activity Management: activity adjusted per tolerance     Problem: Oral Intake Altered (Oncology Care)  Goal: Optimal Oral Intake  Outcome: No Change     Problem: Pain Acute (Oncology Care)  Goal: Optimal Pain Control  Outcome: Improving

## 2021-09-30 NOTE — PLAN OF CARE
Problem: Adult Inpatient Plan of Care  Goal: Plan of Care Review  Outcome: Improving  Goal: Patient-Specific Goal (Individualized)  Outcome: Improving  Goal: Absence of Hospital-Acquired Illness or Injury  Outcome: Improving  Intervention: Identify and Manage Fall Risk  Recent Flowsheet Documentation  Taken 9/30/2021 0100 by Abena Cardona RN  Safety Promotion/Fall Prevention: bed alarm on  Intervention: Prevent Skin Injury  Recent Flowsheet Documentation  Taken 9/30/2021 0100 by Abena Cardona RN  Body Position: position changed independently  Goal: Optimal Comfort and Wellbeing  Outcome: Improving  Goal: Readiness for Transition of Care  Outcome: Improving     Problem: Coping Ineffective (Oncology Care)  Goal: Effective Coping  Outcome: Improving     Problem: Fatigue (Oncology Care)  Goal: Improved Activity Tolerance  Outcome: Improving  Intervention: Promote Energy Conservation  Recent Flowsheet Documentation  Taken 9/30/2021 0100 by Abena Cardona RN  Activity Management: activity adjusted per tolerance     Problem: Oral Intake Altered (Oncology Care)  Goal: Optimal Oral Intake  Outcome: Improving     Problem: Oral Mucositis (Oncology Care)  Goal: Improved Oral Mucous Membrane Integrity  Outcome: Improving     Problem: Fluid Volume Deficit  Goal: Fluid Balance  Outcome: Improving     Problem: Pain Acute (Oncology Care)  Goal: Optimal Pain Control  Outcome: Declining  Intervention: Develop Pain Management Plan  Recent Flowsheet Documentation  Taken 9/30/2021 0400 by Abena Cardona RN  Pain Management Interventions: (page out to resident)   emotional support   MD notified (comment)  Taken 9/30/2021 0100 by Abena Cardona RN  Pain Management Interventions:   medication (see MAR)   emotional support   Pt has increase in pain. Resident called for prn medication; one time order of tramadol ordered. Pt continues to have pain. Resident paged without response so far. Note left for md on chart. No  nausea or emesis this shift. Iv heparin continuing to infuse. Will continue to monitor.

## 2021-09-30 NOTE — PROGRESS NOTES
Pt via w/c from inpatient unit, RN on P4 had given him pain medication prior to sending him down. Pt meeting with Dr. Manrique and plan for simulation CT planning scan today and start treatment either later today or Monday. Can treat as an outpatient pending placement and rides. Some TCU/[facility] will not allow daily radiation. Pt would need to be able to arrange rides for radiation and be able to get in and out of care by himself or with the help of family/friends.     Larisa Laura RN, MAN, OCN  Radiation Oncology  [phone]

## 2021-09-30 NOTE — PLAN OF CARE
Patient Alert and Oriented this shift. Patient has complaints of pain on left side with coughing. MD placed new orders for PRN tramadol every 6 hours. Tramadol given at 0945 and was somewhat helpful. Patient also went down to oncology radiology appt this morning, and will pursue chemo therapy next week. Patient ate some breakfast and lunch today. He did have PRN zofran x 1 and PRN compazine x 1. He also stated it has been 3 days since last BM. PRN senna plus was given this morning. Vitamin B 12 shot given in left arm.  Pt continues on IV heparin next lab will be tomorrow morning. Will pass onto continue to monitor. Karoline Ibarra RN     Problem: Coping Ineffective (Oncology Care)  Goal: Effective Coping  Outcome: No Change     Problem: Fatigue (Oncology Care)  Goal: Improved Activity Tolerance  Outcome: No Change     Problem: Oral Intake Altered (Oncology Care)  Goal: Optimal Oral Intake  Outcome: Improving     Problem: Oral Mucositis (Oncology Care)  Goal: Improved Oral Mucous Membrane Integrity  Outcome: Improving     Problem: Pain Acute (Oncology Care)  Goal: Optimal Pain Control  Outcome: Improving     Problem: Fluid Volume Deficit  Goal: Fluid Balance  Outcome: Improving     Problem: Adult Inpatient Plan of Care  Goal: Absence of Hospital-Acquired Illness or Injury  Intervention: Identify and Manage Fall Risk  Recent Flowsheet Documentation  Taken 9/30/2021 4990 by Karoline Ibarra, RN  Safety Promotion/Fall Prevention:   assistive device/personal items within reach   bed alarm on   nonskid shoes/slippers when out of bed   safety round/check completed   patient and family education

## 2021-09-30 NOTE — PROGRESS NOTES
09/30/21 1400   Quick Adds   Type of Visit Initial PT Evaluation   Living Environment   People in home alone  (per chart, pt has some support from son and cousin)   Current Living Arrangements apartment  (first floor)   Home Accessibility stairs to enter home;stairs within home   Number of Stairs, Main Entrance 4   Stair Railings, Main Entrance railing on left side (ascending)   Number of Stairs, Within Home, Primary 5   Stair Railings, Within Home, Primary   (on the left. )   Transportation Anticipated car, drives self   Self-Care   Usual Activity Tolerance good   Equipment Currently Used at Home cane, straight   Activity/Exercise/Self-Care Comment Indep with mobility and he will use his SEC on occ.  Indep with ADLS    Disability/Function   Hearing Difficulty or Deaf no   Wear Glasses or Blind yes   Vision Management glasses for reading.    Fall history within last six months yes   Number of times patient has fallen within last six months 1   General Information   Onset of Illness/Injury or Date of Surgery 09/28/21   Referring Physician Cornelio Grullon   Patient/Family Therapy Goals Statement (PT) none stated.    Pertinent History of Current Problem (include personal factors and/or comorbidities that impact the POC) Pt was admitted with malignant neoplasm of the lung and dehydration.    Existing Precautions/Restrictions   (Lung neoplasm. )   Weight-Bearing Status - LLE weight-bearing as tolerated   Weight-Bearing Status - RLE weight-bearing as tolerated   Cognition   Orientation Status (Cognition) person;place;situation;time   Pain Assessment   Patient Currently in Pain   (Pt has chest pain with coughing)   Range of Motion (ROM)   ROM Quick Adds ROM WNL   Strength   Manual Muscle Testing Quick Adds Strength WNL   Bed Mobility   Comment (Bed Mobility) supine<>sit with SBA. Sit bal is WFL.     Transfers   Transfer Safety Comments Sit<>stand with FWW with SBA . Some cues for hand placement. Transfers x 2 reps     Gait/Stairs (Locomotion)   [place] Level (Gait) verbal cues  (SBAx1)   Assistive Device (Gait) walker, front-wheeled   Distance in Feet (Required for LE Total Joints) 90'   Pattern (Gait)   (decreased pace. )   Comment (Gait/Stairs) Pt walked slowly and had no LOB.  Pt did use the FWW. O2 at 3L.  Resting HR 93 , O2 SATS 99%, After walking, O2 SATS 94% and HR was 106.       Balance   Balance Comments SBA with FWW, No LOB.     Sensory Examination   Sensory Perception WNL   Clinical Impression   Criteria for Skilled Therapeutic Intervention yes, treatment indicated   PT Diagnosis (PT) impaired mobility   Influenced by the following impairments weakness, pt is below his PLOF, dec activity radha    Functional limitations due to impairments bed mobility, transfers, gait and steps    Clinical Presentation Stable/Uncomplicated   Clinical Presentation Rationale Pt presents medically diagnosed   Clinical Decision Making (Complexity) moderate complexity   Therapy Frequency (PT) Daily   Predicted Duration of Therapy Intervention (days/wks) 7   Planned Therapy Interventions (PT) bed mobility training;gait training;home exercise program;stair training;strengthening   Anticipated Equipment Needs at Discharge (PT) cane, straight  (TBD)   Risk & Benefits of therapy have been explained evaluation/treatment results reviewed;care plan/treatment goals reviewed;risks/benefits reviewed;participants voiced agreement with care plan;patient   PT Discharge Planning    PT Discharge Recommendation (DC Rec) home with home care physical therapy   PT Rationale for DC Rec PT does recommend the pt use an AD and could benefit from some assist with home ADLs and home PT. Pt does need to do steps yet.    Total Evaluation Time   Total Evaluation Time (Minutes) 10

## 2021-09-30 NOTE — PROGRESS NOTES
[facility] Radiation Oncology Follow Up Note    Patient: Antonio Mercado  MRN: [id number]  Date of Service: 09/30/2021        Mr. Antonio Mercado is a pleasant 66-year-old gentleman who has a history of metastatic lung cancer.  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  The patient also had a history of colon cancer status post surgery and history of prostate cancer with elevated PSA who declined follow-up and treatment.  Patient presented with a recent history of difficulty swallowing and underwent upper GI endoscopy yesterday which showed no evidence of esophageal lesion.  The patient now is able to use eat and swallowing without any significant difficulty.  His most recent CT chest on 9/27/2021 showed a large right upper lobe mass and right pleural metastasis consistent with the progression of lung cancer.  Patient is referred to radiation oncology for evaluation and consideration of possible palliative radiation therapy to the thoracic region.    I have personally reviewed his upcoming medical record today.  I have also reviewed his most recent radiology study including CT scan.  I have discussed his case with Dr. Chavez, medical oncology earlier today.  The patient is stable without any significant difficulty of swallowing.  His upper GI endoscopy showed no significant esophageal lesion or obstruction.  The decision is to proceed with systemic therapy next week.  We will withhold palliative radiation therapy for the future.  This has been explained to the patient and he understands well and wished to proceed.    Patient will continue his long-term follow-up ongoing care for his lung cancer with Dr. Chavez, medical oncology.  The patient will be followed by radiation oncology as needed.    I spent about 20 minutes today with the patient 80% time was used for counseling.      Liza Manrique MD, PhD  Department of Radiation Oncology   [facility] Radiation Oncology  Tel:  [phone]  Page: [phone]    [facility]  [address] [place] [address]     [place] [id number] [facility]   [place] [address]

## 2021-10-01 NOTE — PLAN OF CARE
Problem: Oral Mucositis (Oncology Care)  Goal: Improved Oral Mucous Membrane Integrity  Outcome: No Change     Problem: Adult Inpatient Plan of Care  Goal: Plan of Care Review  Outcome: Improving  Flowsheets (Taken 9/30/2021 2340)  Plan of Care Reviewed With: patient  Progress: improving  Goal: Patient-Specific Goal (Individualized)  Outcome: Improving  Goal: Absence of Hospital-Acquired Illness or Injury  Outcome: Improving  Intervention: Identify and Manage Fall Risk  Recent Flowsheet Documentation  Taken 9/30/2021 1551 by Nancy Lee RN  Safety Promotion/Fall Prevention:   bed alarm on   mobility aid in reach   nonskid shoes/slippers when out of bed   patient and family education   room door open   room organization consistent   safety round/check completed   clutter free environment maintained   fall prevention program maintained   lighting adjusted  Intervention: Prevent Skin Injury  Recent Flowsheet Documentation  Taken 9/30/2021 1551 by Nancy Lee RN  Body Position:   position changed independently   supine, head elevated  Intervention: Prevent and Manage VTE (Venous Thromboembolism) Risk  Recent Flowsheet Documentation  Taken 9/30/2021 1551 by Nancy Lee RN  VTE Prevention/Management:   fluids promoted   intravenous therapy administered   anticoagulant therapy maintained  Goal: Optimal Comfort and Wellbeing  Outcome: Improving  Goal: Readiness for Transition of Care  Outcome: Improving  Flowsheets (Taken 9/30/2021 2340)  Concerns to be Addressed: discharge planning  Barriers to Discharge: O2 needs  Intervention: Mutually Develop Transition Plan  Recent Flowsheet Documentation  Taken 9/30/2021 2340 by Nancy Lee RN  Concerns to be Addressed: discharge planning     Problem: Coping Ineffective (Oncology Care)  Goal: Effective Coping  Outcome: Improving     Problem: Fatigue (Oncology Care)  Goal: Improved Activity Tolerance  Outcome: Improving  Intervention: Promote Energy  Conservation  Recent Flowsheet Documentation  Taken 9/30/2021 1551 by Nancy Lee, RN  Activity Management: activity adjusted per tolerance     Problem: Oral Intake Altered (Oncology Care)  Goal: Optimal Oral Intake  Outcome: Improving     Problem: Pain Acute (Oncology Care)  Goal: Optimal Pain Control  Outcome: Improving  Intervention: Develop Pain Management Plan  Recent Flowsheet Documentation  Taken 9/30/2021 1551 by Nancy Lee, RN  Pain Management Interventions: medication (see MAR)  Intervention: Prevent or Manage Pain  Recent Flowsheet Documentation  Taken 9/30/2021 1551 by Nancy Lee, RN  Bowel Elimination Promotion: adequate fluid intake promoted     Problem: Fluid Volume Deficit  Goal: Fluid Balance  Outcome: Improving  Patient reports better pain control with PRN Tramadol & ice pack. Able to rest after pain meds given. Ate dinner late tonight. Fluctuates between 2-3L O2 via NC/OxyMask, O2 sats can drop to high-80's when falls asleep & then turn up to 3L. Continuous pulse ox intact. Heparin discontinued on shift by Dr. Hamm & patient restarted Xarelto. No BM for a couple days, gave prune juice.

## 2021-10-01 NOTE — PLAN OF CARE
Physical Therapy Discharge Summary    Reason for therapy discharge:    Discharged to home with home therapy.    Progress towards therapy goal(s). See goals on Care Plan in [facility] electronic health record for goal details.  Goals partially met.  Barriers to achieving goals:   limited tolerance for therapy and discharge from facility.    Therapy recommendation(s):    Continued therapy is recommended.  Rationale/Recommendations:  Pt is making progress with mobility.  Continued PT is recommended to improve mobility.

## 2021-10-01 NOTE — PROGRESS NOTES
HOME OXYGEN EVALUATION NOTE    Antonio Mercado was evaluated for home oxygen on 10/1/2021,    At rest on room air 86%  At rest on 2L nasal cannula 93%  With activity on 2L nasal cannula 90%    Patient does  qualify for oxygen at this time based on Medicare guidelines.    Karen Fagan, RT

## 2021-10-01 NOTE — DISCHARGE INSTRUCTIONS
Home care services have been arranged for you.  Agency: [facility]   Services: PT, RN   Phone: [phone]  Instructions: Home Care will contact you within 24 -48 hours to arrange the first visit.     Oxygen Provider:  Arranged through [employer], contact number [phone].  If you have any questions or concerns please call the oxygen company directly.

## 2021-10-01 NOTE — ORAL ONC MGMT
Thank you for the opportunity to be a part in this patient's oral chemotherapy. The oral chemotherapy pharmacy team will no longer be following this patient for oral chemotherapy. If there are an questions or the plan changes, feel free to contact us.    Hesham Pacheco, PharmD, [facility]  Clinical Oncology Pharmacist  October 1, 2021

## 2021-10-01 NOTE — PROGRESS NOTES
10/01/21 1335   Quick Adds   Type of Visit Initial Occupational Therapy Evaluation   Living Environment   People in home alone   Current Living Arrangements apartment   Living Environment Comments see PT note from 9/30, per chart sp. lives in [facility],    Self-Care   Current Activity Tolerance fair   Equipment Currently Used at Home cane, straight   Instrumental Activities of Daily Living (IADL)   IADL Comments indep. w/ ADLs    Disability/Function   Hearing Difficulty or Deaf no   Wear Glasses or Blind other (see comments)  (reading)   Concentrating, Remembering or Making Decisions Difficulty no   Difficulty Communicating no   Difficulty Eating/Swallowing   (coughing during eval, RN aware)   Walking or Climbing Stairs Difficulty yes   Walking or Climbing Stairs ambulation difficulty, requires equipment   Dressing/Bathing Difficulty no   Toileting issues no   General Information   Onset of Illness/Injury or Date of Surgery 09/28/21   Patient/Family Therapy Goal Statement (OT) none stated   Existing Precautions/Restrictions fall   Cognitive Status Examination   Orientation Status place;person  (September for month, knew day of week)   Visual Perception   Visual Impairment/Limitations corrective lenses for reading   Sensory   Sensory Quick Adds No deficits were identified   Range of Motion Comprehensive   General Range of Motion no range of motion deficits identified   Strength Comprehensive (MMT)   General Manual Muscle Testing (MMT) Assessment   (grossly WFL for ADLs)   Coordination   Upper Extremity Coordination No deficits were identified   Bed Mobility   Bed Mobility supine-sit   Supine-Sit [place] (Bed Mobility) independent   Transfers   Transfers toilet transfer;bed-chair transfer   Transfer Comments o2 sats 97% (on o2 now) after rest from standing activity   Transfer Skill: Bed to Chair/Chair to Bed   Bed-Chair [place] (Transfers) supervision   Assistive Device (Bed-Chair Transfers) straight  cane   Transfer Comments slower moving,    Toilet Transfer   Type (Toilet Transfer) stand-sit   [place] Level (Toilet Transfer) supervision;contact guard   Assistive Device (Toilet Transfer) grab bars/safety frame;cane, straight   Balance   Balance Assessment standing balance: static   Standing Balance: Static good balance   Grooming Assessment   [place] Level (Grooming) supervision   Position (Grooming) unsupported standing   Comment (Grooming) (teeth, hands), weakness, increased time needed   Toileting   [place] Level (Toileting) supervision   Comment (Toileting) increased time needed, pt moving slowly   Clinical Impression   Criteria for Skilled Therapeutic Interventions Met (OT) yes   OT Diagnosis decreased ADLs   OT Problem List-Impairments impacting ADL activity tolerance impaired;strength;mobility   Assessment of Occupational Performance 1-3 Performance Deficits   Identified Performance Deficits alirio, drsg, trsfs   Planned Therapy Interventions (OT) ADL retraining;transfer training   Clinical Decision Making Complexity (OT) moderate complexity   Therapy Frequency (OT) Daily   Predicted Duration of Therapy 5 days   Anticipated Equipment Needs Upon Discharge (OT)   (cont.to assess)   Risk & Benefits of therapy have been explained patient   OT Discharge Planning    OT Discharge Recommendation (DC Rec) home with home care occupational therapy   OT Rationale for DC Rec assist with household tasks as needed due to weakness   Total Evaluation Time (Minutes)   Total Evaluation Time (Minutes) 10

## 2021-10-01 NOTE — DISCHARGE SUMMARY
[facility]  DISCHARGE SUMMARY     Primary Care Physician: Harris Mary  Admission Date: 9/28/2021   Discharge Provider: Maci Pompa Discharge Date: 10/1/2021   Diet: Orders Placed This Encounter      Regular Diet Adult      Diet     Code Status: Full Code   Activity: Regular        Condition at Discharge: Fair, stable.     REASON FOR PRESENTATION(See Admission Note for Details)     Antonio Mercado is a 66 year old male admitted on 9/28/2021. He has a history of HIV on antiretrovirals, colon cancer s/p right hemicolectomy, prostate cancer, right lung adenocarcinoma with pleural metastasis, malignant pleural effusion, and macrocytic anemia. He was admitted for weight loss secondary to progression of his lung cancer, possible dysphagia (report of this by pt has fluctuated throughout hospitalization), and management of acute pulmonary emboli.     PRINCIPAL & ACTIVE DISCHARGE DIAGNOSES     Active Problems:    Dehydration    Malignant neoplasm of lung, unspecified laterality, unspecified part of lung (H)    Other pulmonary embolism without acute cor pulmonale, unspecified chronicity (H)    Active Hospital Problems:   Right lung adenocarcinoma  Pleural metastasis and malignant pleural effusion  Acute pulmonary emboli  Acute normocytic anemia on chronic macrocytic anemia    Resolved Hospital Problems:   Renal insufficiency vs MONTRELL  Dehydration     Chronic Problems:   HIV on antiretrovirals  Chronic macrocytic anemia  Hx of Colon cancer s/p right hemicolectomy  Hx of prostate cancer    SIGNIFICANT FINDINGS (Imaging, labs):     Hgb trend: 9.0, 9.6 (9/28), 7.7, 8.7 (9/29), 7.4 (9/30), 7.5 (10/1)   MCV range: 88-91  Creatinine trend: 1.54, 1.39 (9/28), 1.09 (9/29), 1.05 (9/30), 0.83 (10/1)    CT chest (9/27/21, prior to admission): Marked progression of RUL mass and pleural metastasis with mediastinal invasion causing esophageal obstruction, complete postobstructive RUL consolidation, right hilar vascular  encasement and mess effect on the SVC, right pericardium and right hemidiaphragm. Several small acute PE of LLL with a small focus of LLL consolidation likely representing an evolving infarct.     TTE (9/29): LV size, wall motion and function are normal. EF is >65%. RV systolic pressure elevated, consistent with mild pulmonary HTN. No hemodynamically significant valvular abnormalities, only mild (1+) tricuspid regurgitation. No pericardial effusion.    PENDING LABS     None    PROCEDURES ( this hospitalization only)      EGD (9/29): Normal esophagus, stomach, and duodenum. No specimens were collected. No invasion of the tumor into the esophagus.    Home O2 Evaluation (10/1): At rest on room air 86%. At rest on 2L nasal cannula 93%. With activity on 2L nasal cannula 90%. Pt does qualify for oxygen at this time based on Medicare guidelines.     RECOMMENDATIONS TO OUTPATIENT PROVIDER FOR F/U VISIT     Follow-up with PCP within 1 week for hospital follow-up and to ensure home oxygen is going well for him. Patient has outpatient prescriptions including:   - Dexamethasone 4 mg BID to start 1 day prior to Pemetrexed (Alimta), continue on day of trx, and the day following Pemetrexed.  - Daily folic acid 1 mg daily  - Gabapentin 100 mg TID and prn tramadol for chest discomfort  - prn tessalon pearls for cough  - prn ondansetron and compazine for nausea    Follow-up with Oncology on 10/6/2021 for systemic therapy.     DISPOSITION     Home with home PT, OT and RN    SUMMARY OF HOSPITAL COURSE:      Antonio Mercado is a 66 year old male admitted on 9/28/2021. He has a history of HIV on antiretrovirals, colon cancer s/p right hemicolectomy, prostate cancer, right lung adenocarcinoma (dx January 2021) with pleural metastasis, malignant pleural effusion, and macrocytic anemia, and recent LLL segmental PE (8/9/21 on xarelto) who was admitted for management of PE and for symptoms of dysphagia (hx of this has fluctuated), cough, and  weight loss secondary to progression of his lung cancer. Pt was sent from oncology clinic following chest CT on 9/27/21 that revealed marked progression of RUL mass and pleural metastasis with mediastinal invasion causing esophageal obstruction, complete postobstructive RUL consolidation, right hilar vascular encasement and mess effect on the SVC, right pericardium and right hemidiaphragm. CT chest additionally showed several small acute PE of the LLL with a small focus of LLL consolidation likely representing an evolving infarct. In the hospital pt was fluid resuscitated for dehydration and started on a heparin infusion per Heme/Onc recommendation for PE; later restarted on his xarelto. GI was consulted, EGD (9/29) revealed a normal esophagus, stomach, and duodenum; No specimens were collected; No invasion of the tumor into the esophagus; No stents placed. TTE (9/29) showed no evidence of pericardial effusion. Pt required supplemental oxygen during his hospitalization and home health eval done qualified him for home oxygen. He experienced some left-sided chest discomfort with inspiration and ,he was started on gabapentin and prn tramadol. Rad/Onc and Heme/Onc were consulted during hospitalization, the decision was made to proceed with systemic therapy next week and to withhold palliative radiation therapy given the pt was stable and without any significant difficulty swallowing at that time. He was given a vitamin B12 injection and started on daily folic acid. On the day of discharge pt had proved to be stable and therapy concluded he would be able to return home with PT, OT, and RN. Xarelto previously prescribed by oncology was resumed on discharge.     Discharge Medications with Med changes:        Review of your medicines      START taking      Dose / Directions   dexamethasone 4 MG tablet  Commonly known as: DECADRON      Dose: 4 mg  Start taking on: October 6, 2021  Take 1 tablet (4 mg) by mouth 2 times daily  (with meals) Start one day prior to Pemetrexed (Alimta), continue on the day of treatment, and the day following Pemetrexed.  Quantity: 24 tablet  Refills: 3     folic acid 1 MG tablet  Commonly known as: FOLVITE      Dose: 1,000 mcg  Take 1 tablet (1,000 mcg) by mouth daily Begin 7 days before Pemetrexed (Alimta) starts and continue for 21 days after Pemetrexed is discontinued.  Quantity: 30 tablet  Refills: 4     gabapentin 100 MG capsule  Commonly known as: NEURONTIN  Used for: Single subsegmental pulmonary embolism without acute cor pulmonale (H)      Dose: 100 mg  Take 1 capsule (100 mg) by mouth 3 times daily  Quantity: 90 capsule  Refills: 0     prochlorperazine 10 MG tablet  Commonly known as: COMPAZINE      Dose: 10 mg  Take 1 tablet (10 mg) by mouth every 6 hours as needed (Nausea/Vomiting)  Quantity: 30 tablet  Refills: 3     traMADol 50 MG tablet  Commonly known as: ULTRAM  Used for: Single subsegmental pulmonary embolism without acute cor pulmonale (H)      Dose: 50 mg  Take 1 tablet (50 mg) by mouth every 6 hours as needed for moderate pain  Quantity: 10 tablet  Refills: 0        CONTINUE these medicines which may have CHANGED, or have new prescriptions. If we are uncertain of the size of tablets/capsules you have at home, strength may be listed as something that might have changed.      Dose / Directions   ammonium lactate 12 % external lotion  Commonly known as: LAC-HYDRIN  This may have changed:     when to take this    reasons to take this  Used for: Dry skin      Apply topically 2 times daily  Quantity: 225 g  Refills: 11     * ondansetron 8 MG tablet  Commonly known as: ZOFRAN  This may have changed: Another medication with the same name was added. Make sure you understand how and when to take each.  Used for: Chemotherapy induced nausea and vomiting      Dose: 8 mg  Take 1 tablet (8 mg) by mouth every 6 hours as needed for nausea  Quantity: 30 tablet  Refills: 2     * ondansetron 8 MG  tablet  Commonly known as: ZOFRAN  This may have changed: You were already taking a medication with the same name, and this prescription was added. Make sure you understand how and when to take each.      Dose: 8 mg  Take 1 tablet (8 mg) by mouth every 8 hours as needed for nausea (vomiting)  Quantity: 10 tablet  Refills: 3     psyllium 58.6 % powder  Commonly known as: METAMUCIL/KONSYL  This may have changed:     how to take this    when to take this    reasons to take this  Used for: Constipation, unspecified constipation type      Add one teaspoonful to liquid daily  Quantity: 283 g  Refills: 11         * This list has 2 medication(s) that are the same as other medications prescribed for you. Read the directions carefully, and ask your doctor or other care provider to review them with you.            CONTINUE these medicines which have NOT CHANGED      Dose / Directions   acetaminophen 500 MG tablet  Commonly known as: TYLENOL  Used for: Left wrist pain      Dose: 500-1,000 mg  Take 1-2 tablets (500-1,000 mg) by mouth every 6 hours as needed for mild pain Max dose of 3000mg (6 tabs) in one day.  Quantity: 60 tablet  Refills: 0     docusate sodium 100 MG capsule  Commonly known as: DOK  Used for: Constipation, unspecified constipation type      TAKE 1-2 CAPSULE BY MOUTH DAILY as needed TO KEEP STOOLS SOFT  Quantity: 90 capsule  Refills: 3     emtricitabine-rilpivirine-tenofovir 200-25-25 MG Tabs per tablet  Commonly known as: ODEFSEY      Dose: 1 tablet  Take 1 tablet by mouth daily (with breakfast)  Refills: 0     FeroSul 325 (65 Fe) MG tablet  Generic drug: ferrous sulfate      Dose: 325 mg  Take 1 tablet (325 mg) by mouth daily  Quantity: 90 tablet  Refills: 1     loperamide 2 MG tablet  Commonly known as: IMODIUM A-D  Used for: Travel advice encounter      Dose: 2 mg  Take 1 tablet (2 mg) by mouth 4 times daily as needed for diarrhea  Quantity: 20 tablet  Refills: 0     osimertinib 80 MG tablet  Commonly known  as: TAGRISSO      Dose: 80 mg  Take 1 tablet (80 mg) by mouth daily  Quantity: 90 tablet  Refills: 1     rivaroxaban ANTICOAGULANT 20 MG Tabs tablet  Commonly known as: XARELTO ANTICOAGULANT  Used for: Single subsegmental pulmonary embolism without acute cor pulmonale (H)      Dose: 20 mg  Take 1 tablet (20 mg) by mouth daily (with dinner)  Quantity: 30 tablet  Refills: 2     sodium chloride 0.65 % nasal spray  Commonly known as: OCEAN  Used for: Dry nose      Two sprays per nostril two times daily as needed.  Quantity: 44 mL  Refills: 3           Where to get your medicines      These medications were sent to [id number] - [place], [place] - [address] [place] [address] AT [facility] & [address]  [address], [place] [address]    Phone: [phone]     dexamethasone 4 MG tablet    folic acid 1 MG tablet    gabapentin 100 MG capsule    ondansetron 8 MG tablet    prochlorperazine 10 MG tablet    traMADol 50 MG tablet       Rationale for medication changes:      - Restarted xarelto for pulmonary emboli.   - See follow-up recommendations for other changes.    Consults     - PT/OT consulted, recommendations as above.  - GI consulted for EGD as above.  - Heme/Onc consulted, recommendations as above.  - Rad/Onc consulted, decision was made to proceed with systemic therapy next week and to withhold palliative radiation therapy given the pt was stable and without any significant difficulty swallowing at that time.     Immunizations given this encounter     Most Recent Immunizations   Administered Date(s) Administered     COVID-19,PF,Moderna 04/17/2021     FLUAD -HD 65+ QUAD ([facility] [place] [place] [facility] ONLY) 01/03/2021     HEPA 03/08/2016     HepA-Adult 02/20/2017     HepB 02/20/2017     Influenza (IIV3) PF 12/23/2013     Influenza Vaccine, 6+MO IM (QUADRIVALENT W/PRESERVATIVES) 02/20/2017     MMR 08/09/2005     Mantoux Tuberculin Skin Test 03/08/2016     Pneumo Conj 13-V (2010&after) 02/20/2017     Pneumococcal 23 valent  03/08/2016     Poliovirus, inactivated (IPV) 08/02/2021     TD (ADULT, 7+) 01/28/2009     TDAP Vaccine (Boostrix) 03/08/2016     Td (Adult), Adsorbed 08/09/2005     Typhoid IM 07/09/2020     Varicella 08/09/2005     Yellow Fever 02/27/2017          Anticoagulation Information      Pt was initially managed with heparin infusion for PE in the hospital. He was restarted on xarelto prior to discharge.    Discharge Orders     Discharge Procedure Orders   Home Care PT Referral for Hospital Discharge   Referral Priority: Routine Referral Type: Home Health Therapies & Aides   Number of Visits Requested: 1     Home Care OT Referral for Hospital Discharge   Referral Priority: Routine Referral Type: Consultation   Number of Visits Requested: 1     Home care nursing referral   Referral Priority: Routine Referral Type: Home Health Therapies & Aides   Number of Visits Requested: 1     Reason for your hospital stay   Order Comments: You were hospitalized for progression of your cancer and blood clots in the lungs.     Follow-up and recommended labs and tests    Order Comments: Follow up with primary care provider, Harris Mary, within 7 days for hospital follow- up.  No follow up labs or test are needed.     Activity   Order Comments: Your activity upon discharge: activity as tolerated     Order Specific Question Answer Comments   Is discharge order? Yes      Home Oxygen Order for DME - ONLY FOR DME   Order Comments: Oxygen Documentation:   I certify that this patient, Antonio Mercado has been under my care (or a nurse practitioner or physican's assistant working with me). This is the face-to-face encounter for oxygen medical necessity.      Antonio Mercado is now in a chronic stable state and continues to require supplemental oxygen. 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    Alternative treatment(s) tried or considered and deemed clinically infective for treatment of Pulmonary Neoplasm C34.90  include pulmonary toileting.  If portability is ordered, is the patient mobile within the home? yes    **Patients who qualify for home O2 coverage under the CMS guidelines require ABG tests or O2 sat readings obtained closest to, but no earlier than 2 days prior to the discharge, as evidence of the need for home oxygen therapy. Testing must be performed while patient is in the chronic stable state. See notes for O2 sats.**     Order Specific Question Answer Comments   DME Provider: [facility]    Type: New/Recertification    Oxygen Consult Reqt: Call [facility] before patient leaves at [phone] (to ensure SATS testing is completed).    Did the patient have SpO2 (sat) testing (only needed for new oxgyen or liter flow changes)? Yes    Length of Need: Lifetime    Frequency of Use: Continuous    Frequency of Use: With Activity    Mode of Delivery - Continuous Nasal Cannula    Liter Flow - Continuous (LPM): 2    Mode of Delivery - With Activity Nasal Cannula    Liter Flow - With Activity (LPM): 2    Need for Portability: Yes    Evaluate for Conserving Device: Yes    Maintain Sats >= 90%    The face to face evaluation was performed on: 10/1/2021      Diet   Order Comments: Follow this diet upon discharge: Orders Placed This Encounter      Snacks/Supplements Adult: Ensure Enlive; Between Meals      Snacks/Supplements Adult: Ensure Enlive; With Meals      Regular Diet Adult     Order Specific Question Answer Comments   Is discharge order? Yes        Examination     Vital Signs in last 24 hours:   B/P: 100/64, T: 97.8, P: 98, R: 20    General: Awake, alert and oriented. No acute distress.   Respiratory: No respiratory distress. Lungs CTA.  Cardiac: RRR, brisk cap refill. Warm and well perfused.  Abdominal: Abdomen is soft and non-tender.  Extremities: Grossly normal.  Skin: Warm and intact.   Neurological: The patient is fully oriented.      Maci Pompa, MS4  [facility]  [facility]  10/1/2021    Precepted patient with Dr. Jessica Núñez    I was present with the medical student who participated in the service and in the documentation of this note. I have verified the history and personally performed the physical exam and medical decision making, and have verified the content of the note, which accurately reflects my assessment of the patient and the plan of care.     Joaquin Brito MD  [facility] Resident  Pager# [phone]    CC:Harris Mary

## 2021-10-01 NOTE — PLAN OF CARE
Problem: Adult Inpatient Plan of Care  Goal: Absence of Hospital-Acquired Illness or Injury  Intervention: Prevent and Manage VTE (Venous Thromboembolism) Risk  Recent Flowsheet Documentation  Taken 10/1/2021 0123 by Jayne Gutierrez RN  VTE Prevention/Management: fluids promoted     Problem: Pain Acute (Oncology Care)  Goal: Optimal Pain Control  Intervention: Prevent or Manage Pain  Recent Flowsheet Documentation  Taken 10/1/2021 0123 by Jayne Gutierrez RN  Bowel Elimination Promotion: adequate fluid intake promoted   Pt. Slept most of the shift. Had given prn tramadol for c/o right shoulder pain. Cold pack offered. Given compazine for c/o nausea.  O2 sat > 98% at 3L/NC. VS stable. NS run continues 100 ml/hr.

## 2021-10-01 NOTE — PROGRESS NOTES
Care Management Discharge Note    Discharge Date: 10/02/2021       Discharge Disposition: Home Care    Discharge Services: None    Discharge DME: Oxygen    Discharge Transportation: family or friend will provide    Private pay costs discussed: Not applicable    PAS Confirmation Code:  N/A  Patient/family educated on Medicare website which has current facility and service quality ratings: no    Education Provided on the Discharge Plan: Yes   Persons Notified of Discharge Plans: Pt   Patient/Family in Agreement with the Plan: yes    Handoff Referral Completed: Yes     Additional Information:  NICOLETTE met and introduced self and CM services to Pt.  Pt lives alone.  Pt is recommended for Home PT and RN.  Pt has no preference for Home Care.  Referrals placed to [facility] and [facility].  [facility] declined as cannot staff.  [facility] can see Pt on Tuesday.  Pt states that he will get a ride from his Nephew.      2:12 PM  NICOLETTE faxed discharge orders to [facility].     LC Arechiga

## 2021-10-01 NOTE — PROGRESS NOTES
"CLINICAL NUTRITION SERVICES - ASSESSMENT NOTE     Nutrition Prescription    RECOMMENDATIONS FOR MDs/PROVIDERS TO ORDER:  None.    Malnutrition Status:    Severe    Recommendations already ordered by Registered Dietitian (RD):  Ensure Enlive BID at breakfast and 2pm snack  Multivit    Future/Additional Recommendations:  Anticipate improved intake during stay. Adjust supplements pending P.O. intake.      REASON FOR ASSESSMENT  Antonio Mercado is a/an 66 year old male assessed by the dietitian for Nutrition Risk Monitoring for positive with 14-23 lb unintended weight loss, and poor p.o. secondary to dysphagia related to progressing lung cancer.     Hx of HIV on antiretrovirals, colon cancer s/p right hemicolectomy, prostate cancer, right lung adenocarcinoma with pleural metastasis, malignant pleural effusion, CKD stage 3, and macrocytic anemia.     NUTRITION HISTORY  Pt admitted to the ED 9/28 for tachycardia, dysphagia with trouble swallowing pills, cough, and a stated 20 lb weight loss over the last month secondary to lung cancer progression (diagnosed Jan 2021). Pt reports tolerating liquids, soft foods like mashed potatoes and rice, and a \"piece\" of chicken every so often. He has boost supplements at home and reports drinking about 3 a day. Pt was not able to consume full meals for at least two weeks and attributes poor p.o. intake to lack of appetite, denies having any dysphagia.    Less than 50% intake >/= 2 wks    GI   No nausea or vomiting, abdominal pain, or change in bowel movements. EGD revealed no esophageal obstruction, normal stomach and duodenum.     CURRENT NUTRITION ORDERS  Diet: Regular  Intake/Tolerance: Pt reports feeling better after EGD reveled normal findings. 9/30-- Pt ate 50% of breakfast and 100% of lunch @ 1134 kcal and 52g pro. Dinner intake undocumented meal totalled 487kcal 25g pro. Pt ate half of breakfast 10/1 @ 514 kcal and 19g pro. He reports improved appetite in hospital. " "    LABS  Labs reviewed  Cl 111  BUN 6    MEDICATIONS  Medications reviewed  B12- 1,000mg injection q 30d  Folic Acid- 1mg daily    ANTHROPOMETRICS  Height: 180.3 cm (5' 11\")  Most Recent Weight: 63.7 kg (140 lb 8 oz)    IBW: 78 kg (172 lb)  BMI: Normal BMI  Weight History:   Wt Readings from Last 10 Encounters:   09/28/21 63.7 kg (140 lb 8 oz)   09/30/21 63.7 kg (140 lb 8 oz)   09/28/21 63 kg (139 lb)   08/20/21 68 kg (150 lb)   08/10/21 68 kg (149 lb 14.4 oz)   08/02/21 68.9 kg (151 lb 12.8 oz)   07/16/21 69.3 kg (152 lb 11.2 oz)   07/09/21 68 kg (150 lb)   06/22/21 69.9 kg (154 lb)   06/17/21 69.9 kg (154 lb)   7% wt loss over a month     Dosing Weight: 64 kg    ASSESSED NUTRITION NEEDS  Estimated Energy Needs: 1950- 2340 kcals/day (25 - 30 kcals/kg)  Justification: Maintenance  Estimated Protein Needs: 85.8- 109.2 grams protein/day (1.1 - 1.4 grams of pro/kg)  Justification: Increased needs  Estimated Fluid Needs: 2687-2229 mL/day (1 mL/kcal)   Justification: Maintenance    PHYSICAL FINDINGS  See malnutrition section below.  No abnormal nutrition-related physical findings observed.     MALNUTRITION  % Intake: </=50% for >/= 1 wk (severe)  % Weight Loss: > 5% in 1 month (severe)  Subcutaneous Fat Loss: Facial region: moderate,  Upper arm: moderate  Muscle Loss: Temporal: moderate, Thoracic region: moderate, Dorsal hand: moderate, Patellar region: moderate, and Posterior calf: moderate  Fluid Accumulation/Edema: None noted  Malnutrition Diagnosis: Severe malnutrition in the context of acute illness secondary to chronic disease.     NUTRITION DIAGNOSIS  Malnutrition related to acute illness as evidenced by less than 50% intake >/= 2 wks, 7% wt loss in a month, and moderate fat and muscle loss.     INTERVENTIONS  Implementation  Ensure enlive bid= 700 kcal, 40g pro to supplement meals  Multivit    Goals  Anticipate improved adequate intake in hospital.   Patient to consume % of nutritionally adequate meals " three times per day, or the equivalent with supplements/snacks.     Monitoring/Evaluation  Progress toward goals will be monitored and evaluated per protocol.

## 2021-10-01 NOTE — PLAN OF CARE
Patient was alert and oriented. Continues to have some pain in left side with cough, and also has pain in right shoulder. Prn tramadol given and helpful. Patient on 2L of oxygen. Will be discharged with home oxygen. Tolerating food and fluids.  Karoline Ibarra RN     Problem: Adult Inpatient Plan of Care  Goal: Plan of Care Review  Outcome: Adequate for Discharge  Goal: Patient-Specific Goal (Individualized)  Outcome: Adequate for Discharge  Goal: Absence of Hospital-Acquired Illness or Injury  Outcome: Adequate for Discharge  Intervention: Identify and Manage Fall Risk  Recent Flowsheet Documentation  Taken 10/1/2021 0900 by Karoline Ibarra, RN  Safety Promotion/Fall Prevention:   room door open   safety round/check completed   patient and family education   nonskid shoes/slippers when out of bed   lighting adjusted   bed alarm on   chair alarm on  Goal: Optimal Comfort and Wellbeing  Outcome: Adequate for Discharge  Goal: Readiness for Transition of Care  Outcome: Adequate for Discharge     Problem: Coping Ineffective (Oncology Care)  Goal: Effective Coping  Outcome: Adequate for Discharge     Problem: Fatigue (Oncology Care)  Goal: Improved Activity Tolerance  Outcome: Adequate for Discharge     Problem: Oral Intake Altered (Oncology Care)  Goal: Optimal Oral Intake  Outcome: Adequate for Discharge     Problem: Oral Mucositis (Oncology Care)  Goal: Improved Oral Mucous Membrane Integrity  Outcome: Adequate for Discharge     Problem: Pain Acute (Oncology Care)  Goal: Optimal Pain Control  Outcome: Adequate for Discharge     Problem: Fluid Volume Deficit  Goal: Fluid Balance  Outcome: Adequate for Discharge

## 2021-10-02 NOTE — PLAN OF CARE
Occupational Therapy Discharge Summary    Reason for therapy discharge:    Discharged to home with home therapy.    Progress towards therapy goal(s). See goals on Care Plan in [facility] electronic health record for goal details.  Goals not met.  Barriers to achieving goals:   discharge from facility.    Therapy recommendation(s):    Continued therapy is recommended.  Rationale/Recommendations:  Weakness.

## 2021-10-03 PROBLEM — I48.0 PAROXYSMAL ATRIAL FIBRILLATION (H): Status: ACTIVE | Noted: 2021-01-01

## 2021-10-03 PROBLEM — C34.91 MALIGNANT NEOPLASM OF RIGHT LUNG, UNSPECIFIED PART OF LUNG (H): Status: ACTIVE | Noted: 2021-01-01

## 2021-10-03 PROBLEM — J96.01 ACUTE HYPOXEMIC RESPIRATORY FAILURE (H): Status: ACTIVE | Noted: 2021-01-01

## 2021-10-03 PROBLEM — A41.9 ACUTE SEPSIS (H): Status: ACTIVE | Noted: 2021-01-01

## 2021-10-04 PROBLEM — D64.9 ANEMIA, UNSPECIFIED TYPE: Status: ACTIVE | Noted: 2021-01-01

## 2021-10-04 NOTE — ED NOTES
Pt being transported to oncology for emergent radiation treatment. Accompanied by RT and RN. Pt remains on Bipap. Dr. Pittman in room briefly prior to transport.

## 2021-10-04 NOTE — ED NOTES
RN updated by hospitalist regarding immediate plan for patient. Awaiting oncology consult. Hospitalist aware that patient cannot be off bipap due to oxygen level decreasing to 70s.

## 2021-10-04 NOTE — ED NOTES
[facility] ED Handoff Report    ED Chief Complaint: SOB    ED Diagnosis:  (J96.01) Acute hypoxemic respiratory failure (H)  Comment:   Plan:     (A41.9) Acute sepsis (H)  Comment:   Plan:     (C34.91) Malignant neoplasm of right lung, unspecified part of lung (H)  Comment:   Plan:     (I48.0) Paroxysmal atrial fibrillation (H)  Comment:   Plan:        PMH:    Past Medical History:   Diagnosis Date     Constipation      Heel pain      History of blood transfusion      HIV (human immunodeficiency virus infection) (H)      Hypertrophy of prostate with urinary obstruction      Iron deficiency anemia      LBP (low back pain)      Malignant neoplasm of hepatic flexure (H) 02/04/2016     Paroxysmal atrial fibrillation (H) 10/03/2021     Trigger finger         Code Status:  Full Code     Falls Risk: Yes Band: Applied    Current Living Situation/Residence: lives in a house     Elimination Status: Continent: Yes     Activity Level: Total assist/lift    Patients Preferred Language:  English     Needed: No    Vital Signs:  /70   Pulse 92   Temp 98.1  F (36.7  C) (Oral)   Resp 24   Wt 63.5 kg (140 lb)   SpO2 99%   BMI 19.53 kg/m       Cardiac Rhythm: NSR now however, was in A-flutter on Diltiazem drip max'd at 15mg/hr    Pain Score: 2/10    Is the Patient Confused:  No    Last Food or Drink: 10/3/21 at     Focused Assessment:  Pt has hx of CA, HIV+, recently d/c'd from hospital w/ O2 at 3L, family decreased to 2L NC when pt arrived to ED sats were 70%, absent LS on right side, pt has only been able to speak in one word at a time and any activity or movement causes pt to desat into the 80%, pt was initially placed on high flow O2 with some improvement remained in the % at rest, pt has been placed on BiPap     Tests Performed: Done: Labs and Imaging    Treatments Provided:  Fluids, medications, BiPap    Family Dynamics/Concerns: No    Family Updated On Visitor Policy: Yes    Plan of Care  Communicated to Family: No    Who Was Updated about Plan of Care: Pt, son was here last night and would like a call in the morning when pt is admitted. Son was aware of admission    Belongings Checklist Done and Signed by Patient: Yes    Covid: symptomatic, negative    Additional Information: Visitor from last night is pt's nephew not his son    RN: Mely Shields   10/4/2021 5:46 AM

## 2021-10-04 NOTE — LETTER
10/4/2021         RE: Antonio Mercado  [address]  [place] [address]        Dear Colleague,    Thank you for referring your patient, Antonio Mercado, to the [facility] RADIATION ONCOLOGY [place]. Please see a copy of my visit note below.      [facility] Radiation Oncology Follow Up Note    Patient: Antonio Mercado  MRN: [id number]  Date of Service: 10/04/2021          Mr. Antonio Mercado is a pleasant 66-year-old gentleman who has a history of metastatic lung cancer.  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  The patient also had a history of colon cancer status post surgery and history of prostate cancer with elevated PSA who declined follow-up and treatment.  Patient presented with a recent history of difficulty swallowing and underwent upper GI endoscopy on 9/29/2021 which showed no evidence of esophageal lesion.  The patient now is able to use eat and swallowing without any significant difficulty.  His most recent CT chest on 9/27/2021 showed a large right upper lobe mass and right pleural metastasis consistent with the progression of lung cancer.    The patient was initially seen and evaluated on 9/30/2021 for consideration of possible palliative radiation therapy. I have discussed his case with Dr. Chavez, medical oncology earlier today.  The patient is stable without any significant difficulty of swallowing.  His upper GI endoscopy showed no significant esophageal lesion or obstruction.  The decision is to proceed with systemic therapy next week.  We will withhold palliative radiation therapy for the future.   The patient was then discharged home and plan to start his systemic therapy this week.  Unfortunately he presented with acute onset of shortness of breath for which he was admitted to ER for further evaluation and management.  The patient was found to have significant progression of the RUL mass with evidence of esophageal obstruction, SVC  effacement, RUL post-obstructive collapse. Currently boarding in the ER on NIPPV (BiPAP) with stable respiratory status but high risk for decompensation.  He is now referred to radiation oncology for evaluation and consideration of possible palliative radiation therapy to the mediastinal region for local control and symptom relief.    Assessment / Impression     Stage IV non-small cell lung cancer with clinically symptomatic mediastinal disease causing lung collapse and pulmonary vessel compression.    Plan:     I have personally reviewed his upcoming medical record today.  I have also reviewed his most recent radiology study including CT scan.  This is a 66-year-old gentleman with a diagnosis of stage IV lung cancer with clinically symptomatic mediastinal disease causing lung collapse and pulmonary vessel compression.  His symptoms has been seen in progress over the past few days.  The possible treatment options including surgery, systemic therapy, and radiation therapy has been discussed with the patient in detail and at great lengths.  The possible risks and side effects of radiation therapy has also been explained to the patient.  I agree the patient is a good candidate to considering a short course of palliative radiation therapy targeted to the mediastinum region for local control and symptom relief.  Therefore radiation therapy is offered to the patient and he is willing to proceed being aware of potential risks and side effects involved.  He is scheduled to return to radiation oncology later on today for simulation.  I plan to give radiation therapy to a total dose of 2000 cGy in 5 treatments.  His radiation therapy is planned to start today.     I spent about 20 minutes today with the patient 80% time was used for counseling.        Liza Manrique MD, PhD  Department of Radiation Oncology   [facility] Radiation Oncology  Tel: [phone]  Page: [phone]    [facility]  [address] [place] [address]     [facility]  [address] [place] [address]                Again, thank you for allowing me to participate in the care of your patient.        Sincerely,        Liza Manrique MD

## 2021-10-04 NOTE — PROGRESS NOTES
..Area Treated: Right Lung  Treatment 1 of 5  Total Dose: 400cGy  Next Treatment: 10/5/21 at 1:15PM

## 2021-10-04 NOTE — ED NOTES
Pt transported to emergent radiation treatment and then to US for thoracentesis. RT and RN monitoring patient.

## 2021-10-04 NOTE — PROCEDURES
Procedures      Right thoracentesis    Highly loculated right pleural effusion.  Only 100 ml bloody fluid able to be aspirated from right side.      Loculations would likely make chest tube drainage limited at large volume fluid removal.  Role for TPA limited given likely bleeding pleural and lung masses.    Magdiel Roman MD  Interventional Radiology  Clinic: [phone]  Pager: [phone]

## 2021-10-04 NOTE — PROCEDURES
Clinical Treatment Planning Note    This is a 66-year-old male with diagnosis of Stage IV lung cancer with clinically symptomatic mediastinal metastases.  The palliative radiation therapy is recommended to the patient. The patient is simulated today in the supine position with head rest and under knee cushion to help keep the same position during the daily radiation therapy. Parallel-opposed AP/PA field will be used to set up radiation therapy fields to include the gross mediastinal disease with margin. I plan to give him radiation therapy at 400 cGy each fraction to a total dose of 2000 cGy in 5 treatments.      Liza Manrique MD, PhD  Department of Radiation Oncology   [facility] Radiation Oncology  Tel: [phone]  Page: [phone]    [facility]  [address] [place] [address]     [address] [place] [address]

## 2021-10-04 NOTE — PROGRESS NOTES
Interventional Radiology - Pre-Procedure Note:  10/4/2021    Procedure Requested: Non-tunneled Chest Tube Placement  Requested by: Michel Calderon MD    History and Physical Reviewed: H&P documented within 30 days (by Nicole Eid MD on 10/04/2021).  I have personally reviewed the patient's medical history and have updated the medical record as necessary.    Brief HPI: Antonio Mercado is a 66 year old male with a with a history of HIV, colon cancer s/p right hemicolectomy, prostate cancer, right lung adenocarcinoma with known pleural metastasis and malignant pleural effusion, recently hospitalized 9/28-10/1 for dysphagia and weight loss and discharged on home O2, readmitted on 10/4 for shortness of breath, found to have near-opacification of his right hemithorax on chest x-ray and new atrial fibrillation with RVR. Recent travel to [facility], has tested neg for Covid.   Shortness of breath has progressively worsened and CXR in the ED shows complete opacification of the right hemithorax consistent with progression of malignant pleural effusion, mass, consolidation and atelectasis.  Patient now presents to IR for placement of non-tunneled RIGHT chest tube for loculated RIGHT pleural effusion.        IMAGING:  Narrative & Impression   EXAM: CT CHEST/ABDOMEN/PELVIS W CONTRAST  LOCATION: [facility]  DATE/TIME: 9/27/2021 12:06 PM     INDICATION:  Right upper lobe lung adenocarcinoma with malignant pleural effusion status post chemotherapy/immunotherapy   COMPARISON: CT CAP 08/09/2021, PET/CT 05/12/2021 and CT CAP 04/12/2021  TECHNIQUE: CT scan of the chest, abdomen, and pelvis was performed following injection of IV contrast. Multiplanar reformats were obtained. Dose reduction techniques were used.   CONTRAST: 75ml isovue 370     FINDINGS:   LUNGS AND PLEURA: Right upper lobe mass 7.3 x 7.3 x 6.2 cm (previously 3.3 x 2.8 x 2.6 cm) with new direct extension invasion into the perihilar and  upper right mediastinum with encasement of the right superior pulmonary vein, right upper lobe pulmonary   artery and right upper lobe bronchus (causing complete post obstructive right upper lobe consolidation) as well as broad abutment of the SVC and right pericardium.     Marked progression of numerous bulky pleural metastases mid and inferior right hemithorax causing localized mass effect and/or invasion of the right hemidiaphragm and invasion into the inferior right periesophageal posterior mediastinum resulting in   proximal esophageal dilatation consistent with obstruction.     Diffuse tumor infiltration of the atelectatic right middle and lower lobes has developed      A large loculated pleural fluid collection mid and inferior right hemithorax unchanged.     Trace amount of airspace opacity posterior basilar segment left lower lobe has developed. Left lung otherwise clear. No left pleural effusion.     MEDIASTINUM/AXILLAE: Several small pulmonary emboli left lower lobar and subsegmental pulmonary arteries.     CORONARY ARTERY CALCIFICATION: None.     HEPATOBILIARY: Several benign hepatic cysts are unchanged. No significant mass or bile duct dilatation. No calcified gallstones.      PANCREAS: Normal.     SPLEEN: Normal.     ADRENAL GLANDS: Normal.     KIDNEYS/BLADDER: Normal.     BOWEL: Normal.     LYMPH NODES: Abdominal and pelvic lymph nodes unremarkable.     VASCULATURE: Unremarkable.     PELVIC ORGANS: Normal.     MUSCULOSKELETAL: No bone lesions.                                                                      IMPRESSION:  1.  Marked progression right upper lobe mass and right pleural metastases with mediastinal invasion causing esophageal obstruction, complete postobstructive right upper lobe consolidation, right hilar vascular encasement and mass effect on the SVC, right   pericardium and right hemidiaphragm.  2.  Several small acute pulmonary emboli left lower lobe small focus of left lower lobe  consolidation likely represents an evolving infarct.        [Critical Result: Acute pulmonary embolism and sequela of tumor progression.     Finding was identified on 9/27/2021 3:38 PM.      Dr. Chavez was contacted by me on 9/27/2021 4:03 PM and verbalized understanding of the critical result.      Component   Radiologist flags Panic    Acute pulmonary embolism and sequela of tumor progression.          NPO: 1800  ANTICOAGULANTS: Heparin Infusion  ANTIBIOTICS: None recommended for this procedure (Zosyn 3.375 gm scheduled)    ALLERGIES  Allergies   Allergen Reactions     Stribild [Vuhgvbc-Amfvkgx-Iqkmcvyo-Tenof] Itching     Ibuprofen Itching and Other (See Comments)     Oxycodone Other (See Comments)     Prednisone Other (See Comments)     Raltegravir Unknown     Tamsulosin Other (See Comments)     Headache     Tylenol [Acetaminophen] Rash     Gets an itchy rash on back.          LABS:  INR   Date Value Ref Range Status   10/03/2021 1.24 (H) 0.85 - 1.15 Final     Comment:     Effective 7/11/2021, the reference range for this assay has changed.   03/02/2020 1.06 0.90 - 1.10 Final      Hemoglobin   Date Value Ref Range Status   10/03/2021 7.9 (L) 13.3 - 17.7 g/dL Final   06/08/2020 14.3 13.3 - 17.7 g/dL Final   ]  Platelet Count   Date Value Ref Range Status   10/03/2021 307 150 - 450 10e3/uL Final     Creatinine   Date Value Ref Range Status   10/03/2021 1.02 0.70 - 1.30 mg/dL Final   06/08/2020 1.4 (H) 0.7 - 1.3 mg/dL Final     Potassium   Date Value Ref Range Status   10/03/2021 3.8 3.5 - 5.0 mmol/L Final   06/08/2020 4.4 3.2 - 4.6 mmol/L Final         EXAM:  /78   Pulse 79   Temp 99.1  F (37.3  C) (Rectal)   Resp 20   Wt 63.5 kg (140 lb)   SpO2 97%   BMI 19.53 kg/m    General:  Stable.  In no acute distress.    Neuro:  A&O x 3. Moves all extremities equally.  Resp:  Poor air movement throughout right lung field  Cardio:  S1S2 and reg, +murmur, no clicks or rubs      Pre-Sedation  Assessment:  Interventional Radiology Pre-Procedure Sedation Assessment   Time of Assessment: 3:58 PM    Expected Level: Moderate Sedation    Indication: Sedation is required for the following type of Procedure: Drain    Sedation and procedural consent: Risks, benefits and alternatives were discussed with Patient    PO Intake: Appropriately NPO for procedure    ASA Class: Class 4 - SEVERE SYSTEMIC DISEASE, ACUTE UNSTABLE PROBLEMS.    Mallampati: Grade 2:  Soft palate, base of uvula, tonsillar pillars, and portion of posterior pharyngeal wall visible    Lungs: Poor air movement on right    Heart: Normal heart sounds and rate    I have reviewed the lab findings, diagnostic data, medications, and the plan for sedation. I have determined this patient to be an appropriate candidate for the planned sedation and procedure and have reassessed the patient IMMEDIATELY PRIOR to sedation and procedure.    Michelle Marroquin, CNP        Patient to receive local anesthetic and Fentanyl for pain for this procedure    Code Status: Full Code intra procedure, per medical record review        ASSESSMENT/PLAN:   66 year old male with a with a history of right lung adenocarcinoma with known pleural metastasis and malignant pleural effusion, readmitted on 10/4 for shortness of breath, found to have near-opacification of his right hemithorax on chest x-ray and new atrial fibrillation with RVR.   Recent travel to [facility], has tested neg for Covid. Shortness of breath has progressively worsened and CXR in the ED shows complete opacification of the right hemithorax consistent with progression of malignant pleural effusion, mass, consolidation and atelectasis.  Patient now presents to IR for placement of non-tunneled RIGHT chest tube for loculated RIGHT pleural effusion.     Proceed with placement of non-tunneled RIGHT chest tube    Procedure, risks/benefits, details, alternatives, and sedation reviewed with patient/family and he  verbalized understanding. All questions answered. OK to proceed with above radiology procedure.     Michelle Marroquin, CNP  Interventional Radiology

## 2021-10-04 NOTE — CONSULTS
Care Management Initial Consult    General Information  Assessment completed with: VM-chart review,    Type of CM/SW Visit: Initial Assessment    Primary Care Provider verified and updated as needed: Yes   Readmission within the last 30 days: current reason for admission unrelated to previous admission   Return Category: Progression of disease  Reason for Consult: discharge planning  Advance Care Planning:            Communication Assessment  Patient's communication style: spoken language (English or Bilingual)             Cognitive  Cognitive/Neuro/Behavioral: WDL                      Living Environment:   People in home: alone     Current living Arrangements: apartment      Able to return to prior arrangements: other (see comments)       Family/Social Support:  Care provided by:    Provides care for:                  Description of Support System:           Current Resources:   Patient receiving home care services: Yes  Skilled Home Care Services: Skilled Nursing, Physicial Therapy  Community Resources: None  Equipment currently used at home:    Supplies currently used at home: Oxygen Tubing/Supplies    Employment/Financial:  Employment Status:          Financial Concerns:             Lifestyle & Psychosocial Needs:  Social Determinants of Health     Tobacco Use: Low Risk      Smoking Tobacco Use: Never Smoker     Smokeless Tobacco Use: Never Used   Alcohol Use:      Frequency of Alcohol Consumption:      Average Number of Drinks:      Frequency of Binge Drinking:    Financial Resource Strain:      Difficulty of Paying Living Expenses:    Food Insecurity:      Worried About Running Out of Food in the Last Year:      Ran Out of Food in the Last Year:    Transportation Needs:      Lack of Transportation (Medical):      Lack of Transportation (Non-Medical):    Physical Activity:      Days of Exercise per Week:      Minutes of Exercise per Session:    Stress:      Feeling of Stress :    Social Connections:       Frequency of Communication with Friends and Family:      Frequency of Social Gatherings with Friends and Family:      Attends [employer] Services:      Active Member of Clubs or Organizations:      Attends Club or Organization Meetings:      Marital Status:    Intimate Partner Violence:      Fear of Current or Ex-Partner:      Emotionally Abused:      Physically Abused:      Sexually Abused:    Depression: Not at risk     PHQ-2 Score: 0   Housing Stability:      Unable to Pay for Housing in the Last Year:      Number of Places Lived in the Last Year:      Unstable Housing in the Last Year:        Functional Status:  Prior to admission patient needed assistance:   Dependent ADLs:: Independent  Dependent IADLs:: Independent  Assesssment of Functional Status: Not at baseline with ADL Functioning    Mental Health Status:          Chemical Dependency Status:                Values/Beliefs:  Spiritual, Cultural Beliefs, [employer] Practices, Values that affect care:                 Additional Information:    .Patient now on BiPAP and unable to communicate. He will be admitted to ICU. He was hospitalized 9/28- 10/1/2021 with weight loss. He was discharged with oxygen which he has on continuously at 2 L. [facility] was also set up and recently started: RN, PT. Pt lives alone.   Had been independent with cares. Nephew here last evening.     Discharge dependent on progression of care. Family available to transport.     Malaika Reynaga RN

## 2021-10-04 NOTE — PROGRESS NOTES
Patient transported to ultrasound and then to CT then to P3 on BiPAP with out incident.  Juan Astorga, RT

## 2021-10-04 NOTE — ED PROVIDER NOTES
EMERGENCY DEPARTMENT ENCOUNTER      NAME: Antonio Mercado  AGE: 66 year old male  YOB: 1955  MRN: [id number]  EVALUATION DATE & TIME: 10/3/2021  9:12 PM    PCP: Harris Mayr    ED PROVIDER: YORDAN Pruitt    Chief Complaint   Patient presents with     Shortness of Breath     FINAL IMPRESSION:  1. Acute hypoxemic respiratory failure (H)    2. Acute sepsis (H)    3. Malignant neoplasm of right lung, unspecified part of lung (H)    4. Paroxysmal atrial fibrillation (H)      ED COURSE & MEDICAL DECISION MAKING:    Pertinent Labs & Imaging studies reviewed. (See chart for details)  66 year old male presents to the Emergency Department for evaluation of escalating shortness of breath and weakness.  Patient with a complicated past medical history including recent admission to the hospital for hypoxemic respiratory failure secondary to lung malignancy.  Discharged on home oxygen.  Patient has been continue his oxygen but per patient and family progressive decline since discharge from the hospital.  Increase shortness of breath increased weakness and decreased ability to ambulate secondary to the above.  Emergency department noted to be hypoxic.  O2 saturations 70% on supplemental oxygenation.  Transition to oxygen mask.  This did result in some improvement to his symptoms.  ABG notable for significant hypoxia.  Transition then to high flow nasal cannula currently this is maintaining his O2 saturations at 97% and he is feeling improved with the supplementation.  Patient is currently full CODE STATUS.  Laboratory testing obtained demonstrating anemia stable compared to most recent hospitalization.  Magnesium was depressed at 1.5 initiated on replacement.  ECG demonstrating atrial flutter with variable AV block.  ST segments appear nonischemic.  Heart rate noted to be 140-150.  This is a new development compared to his most recent hospitalization.  In addition lactic acid was elevated which could suggest a septic  component.  Troponin was 0.12.  Blood cultures obtained empiric Avelox administered.  Continue on O2 supplementation.  The x-ray obtained in the emergency department demonstrating near complete opacification of the right hemithorax which is consistent with the CT scan that was obtained with his hospitalization.  Overall I do feel patient requires readmission to the hospital given given his new atrial tachydysrhythmia, increased hypoxia and declining status on an outpatient basis.    11:00 PM  There are no beds available at this facility [place]  Overall plan of care for boarding the patient in the emergency department pending admission and bed availability.    9:22 PM I met with the patient for the initial interview and physical examination. Discussed plan for treatment and workup in the ED.      Diagnosis discussed with and explained to the patient and/or associated parties to their satisfaction.  All questions were answered at this time and they are in agreement with this diagnosis, treatment, and plan. No further emergent ED workup warranted at this time and patient did accept admission to the hospital.    I did see the patient while wearing full COVID-compliant PPE.         MEDICATIONS GIVEN IN THE EMERGENCY:  New Prescriptions    No medications on file       NEW PRESCRIPTIONS STARTED AT TODAY'S ER VISIT  Patient's Medications   New Prescriptions    No medications on file   Previous Medications    ACETAMINOPHEN (TYLENOL) 500 MG TABLET    Take 1-2 tablets (500-1,000 mg) by mouth every 6 hours as needed for mild pain Max dose of 3000mg (6 tabs) in one day.    AMMONIUM LACTATE (LAC-HYDRIN) 12 % LOTION    Apply topically 2 times daily    BENZONATATE (TESSALON) 100 MG CAPSULE    Take 1 capsule (100 mg) by mouth 3 times daily as needed for cough    DEXAMETHASONE (DECADRON) 4 MG TABLET    Take 1 tablet (4 mg) by mouth 2 times daily (with meals) Start one day prior to Pemetrexed (Alimta),  continue on the day of treatment, and the day following Pemetrexed.    DOCUSATE SODIUM (DOK) 100 MG CAPSULE    TAKE 1-2 CAPSULE BY MOUTH DAILY as needed TO KEEP STOOLS SOFT    EMTRICITABINE-RILPIVIRINE-TENOFOVIR (ODEFSEY) 200-25-25 MG TABS PER TABLET    Take 1 tablet by mouth daily (with breakfast)    FEROSUL 325 (65 FE) MG TABLET    Take 1 tablet (325 mg) by mouth daily    FOLIC ACID (FOLVITE) 1 MG TABLET    Take 1 tablet (1,000 mcg) by mouth daily Begin 7 days before Pemetrexed (Alimta) starts and continue for 21 days after Pemetrexed is discontinued.    GABAPENTIN (NEURONTIN) 100 MG CAPSULE    Take 1 capsule (100 mg) by mouth 3 times daily    LOPERAMIDE (IMODIUM A-D) 2 MG TABLET    Take 1 tablet (2 mg) by mouth 4 times daily as needed for diarrhea    ONDANSETRON (ZOFRAN) 8 MG TABLET    Take 1 tablet (8 mg) by mouth every 6 hours as needed for nausea    ONDANSETRON (ZOFRAN) 8 MG TABLET    Take 1 tablet (8 mg) by mouth every 8 hours as needed for nausea (vomiting)    OSIMERTINIB (TAGRISSO) 80 MG TABLET    Take 1 tablet (80 mg) by mouth daily    PROCHLORPERAZINE (COMPAZINE) 10 MG TABLET    Take 1 tablet (10 mg) by mouth every 6 hours as needed (Nausea/Vomiting)    PSYLLIUM (METAMUCIL/KONSYL) 58.6 % POWDER    Add one teaspoonful to liquid daily    RIVAROXABAN ANTICOAGULANT (XARELTO ANTICOAGULANT) 20 MG TABS TABLET    Take 1 tablet (20 mg) by mouth daily (with dinner)    SODIUM CHLORIDE (OCEAN) 0.65 % NASAL SPRAY    Two sprays per nostril two times daily as needed.    TRAMADOL (ULTRAM) 50 MG TABLET    Take 1 tablet (50 mg) by mouth every 6 hours as needed for moderate pain   Modified Medications    No medications on file   Discontinued Medications    No medications on file          =================================================================    HPI    Patient information was obtained from: self     Use of Intrepreter: N/A         Antonio Mercado is a 66 year old male with a pertinent medical history of HIV  disease, prostate cancer, stage 3 chronic kidney disease, lung cancer, single subsegmental pulmonary embolism, other pulmonary embolism without acute cor pulmonale, pleural effusion, who presents to the ED via walk-in for evaluation of shortness of breath.      Patient reports since he was discharged on 10/1 his symptoms have progressively worsened including shortness of breath, weakness, fatigue, leg swelling, a persistent cough with occasional blood-tinged sputum, and inability to ambulate secondary to weakness. Reports that he felt extremely short of breath tonight prompting him to present to the ED. Currently endorses generalized fatigue and malaise. Patient denies any fever, chest pain, lightheadedness, dizziness, and any other symptoms or complaints at this time.     Per chart review, patient was admitted to [facility] from 9/28-10/1/2021 for evaluation of weight loss secondary to progression of his lung cancer and management of acute pulmonary emboli. Patient was started on dexamethasone, folic acid, gabapentin, prochlorperazine, and tramadol. Resumed Xarelto previously prescribed by oncology on discharge and recommended to follow up with PCP and oncology.       REVIEW OF SYSTEMS   Review of Systems   Constitutional: Positive for activity change and fatigue. Negative for fever.        Positive for malaise.   Respiratory: Positive for cough and shortness of breath.    Cardiovascular: Positive for leg swelling. Negative for chest pain.   Neurological: Positive for weakness. Negative for dizziness and light-headedness.   All other systems reviewed and are negative.       PAST MEDICAL HISTORY:  Past Medical History:   Diagnosis Date     Constipation      Heel pain      History of blood transfusion      HIV (human immunodeficiency virus infection) (H)      HIV (human immunodeficiency virus infection) (H)      Hypertrophy of prostate with urinary obstruction      Iron deficiency anemia      LBP (low back  pain)      Malignant neoplasm of hepatic flexure (H) 2/4/2016     Malignant neoplasm of hepatic flexure (H)     s/p right hemicolectomy in 2016     Trigger finger        PAST SURGICAL HISTORY:  Past Surgical History:   Procedure Laterality Date     COLON SURGERY       ESOPHAGOSCOPY, GASTROSCOPY, DUODENOSCOPY (EGD), COMBINED N/A 9/29/2021    Procedure: ESOPHAGOGASTRODUODENOSCOPY (EGD);  Surgeon: Rangel Dye MD;  Location: [facility]     GI SURGERY       HERNIA REPAIR       CA ESOPHAGOGASTRODUODENOSCOPY TRANSORAL DIAGNOSTIC N/A 4/13/2021    Procedure: ESOPHAGOGASTRODUODENOSCOPY (EGD);  Surgeon: Harris Mon MD;  Location[facility];  Service: Gastroenterology     US THORACENTESIS  1/5/2021     US THORACENTESIS  1/18/2021     US THORACENTESIS  1/26/2021     US THORACENTESIS  1/29/2021     US THORACENTESIS  2/2/2021     US THORACENTESIS  2/5/2021     US THORACENTESIS  2/9/2021     US THORACENTESIS  2/12/2021     US THORACENTESIS  2/15/2021     US THORACENTESIS  2/19/2021     US THORACENTESIS  2/23/2021     US THORACENTESIS  2/26/2021     US THORACENTESIS  3/2/2021     US THORACENTESIS  3/9/2021       ALLERGIES:  Allergies   Allergen Reactions     Stribild [Xndvimm-Uwqwzsc-Uhhgqjcq-Tenof] Itching     Ibuprofen Itching and Other (See Comments)     Oxycodone Other (See Comments)     Prednisone Other (See Comments)     Raltegravir Unknown     Tamsulosin Other (See Comments)     Headache     Tylenol [Acetaminophen] Rash     Gets an itchy rash on back.        FAMILY HISTORY:  Family History   Problem Relation Age of Onset     No Known Problems Mother      No Known Problems Father      No Known Problems Maternal Grandmother      No Known Problems Maternal Grandfather      No Known Problems Paternal Grandmother      No Known Problems Paternal Grandfather      No Known Problems Brother      No Known Problems Sister      No Known Problems Son      No Known Problems Daughter      No Known Problems  Maternal Half-Brother      No Known Problems Maternal Half-Sister      No Known Problems Paternal Half-Brother      No Known Problems Paternal Half-Sister      No Known Problems Niece      No Known Problems Nephew      No Known Problems Cousin      No Known Problems Other      C.A.D. No family hx of      Diabetes No family hx of      Hypertension No family hx of      Hyperlipidemia No family hx of      Breast Cancer No family hx of      Cancer - colorectal No family hx of      Ovarian Cancer No family hx of      Prostate Cancer No family hx of      Depression/Anxiety No family hx of      Cerebrovascular Disease No family hx of      Anesthesia Reaction No family hx of      Thyroid Disease No family hx of      Asthma No family hx of      Osteoporosis No family hx of      Chemical Addiction No family hx of      Known Genetic Syndrome No family hx of      Cancer No family hx of      Coronary Artery Disease No family hx of      Heart Disease No family hx of      Kidney Disease No family hx of      Obesity No family hx of      Thrombosis No family hx of      Arthritis No family hx of      Thyroid Disease No family hx of      Depression No family hx of      Mental Illness No family hx of      Substance Abuse No family hx of      Cystic Fibrosis No family hx of      Early Death No family hx of      Coronary Artery Disease Early Onset No family hx of      Heart Failure No family hx of      Bleeding Diathesis No family hx of      Dementia No family hx of      Uterine Cancer No family hx of      Colorectal Cancer No family hx of      Pancreatic Cancer No family hx of      Lung Cancer No family hx of      Melanoma No family hx of      Autoimmune Disease No family hx of      Unknown/Adopted No family hx of      Genetic Disorder No family hx of        SOCIAL HISTORY:   Social History     Socioeconomic History     Marital status:      Spouse name: Not on file     Number of children: Not on file     Years of education: Not  on file     Highest education level: Not on file   Occupational History     Not on file   Tobacco Use     Smoking status: Never Smoker     Smokeless tobacco: Never Used   Substance and Sexual Activity     Alcohol use: No     Drug use: No     Sexual activity: Not Currently     Partners: Female     Comment: No intercourse for >1 year.   Other Topics Concern     Not on file   Social History Narrative    From [facility]    2 adult children live at home. Wife lives in [place] for work.      Social Determinants of Health     Financial Resource Strain:      Difficulty of Paying Living Expenses:    Food Insecurity:      Worried About Running Out of Food in the Last Year:      Ran Out of Food in the Last Year:    Transportation Needs:      Lack of Transportation (Medical):      Lack of Transportation (Non-Medical):    Physical Activity:      Days of Exercise per Week:      Minutes of Exercise per Session:    Stress:      Feeling of Stress :    Social Connections:      Frequency of Communication with Friends and Family:      Frequency of Social Gatherings with Friends and Family:      Attends Hoahaoism Services:      Active Member of Clubs or Organizations:      Attends Club or Organization Meetings:      Marital Status:    Intimate Partner Violence:      Fear of Current or Ex-Partner:      Emotionally Abused:      Physically Abused:      Sexually Abused:        VITALS:  Patient Vitals for the past 24 hrs:   BP Temp Temp src Pulse Resp SpO2 Weight   10/03/21 2210 124/78 -- -- (!) 145 (!) 41 96 % --   10/03/21 2200 109/73 -- -- (!) 149 (!) 33 90 % --   10/03/21 2155 118/74 -- -- (!) 126 (!) 57 92 % --   10/03/21 2135 121/88 -- -- (!) 145 29 100 % --   10/03/21 2120 -- -- -- (!) 151 (!) 37 91 % --   10/03/21 2110 (!) 143/78 98.1  F (36.7  C) Oral (!) 142 24 (!) 84 % 63.5 kg (140 lb)       PHYSICAL EXAM    Constitutional:   Ill-appearing adult male, appears to be in acute distress appears short of breath.  HENT:   Normocephalic, Atraumatic, Bilateral external ears normal, Oropharynx moist Nose normal.   Neck: Normal range of motion   Eyes: Conjunctiva normal, No discharge.   Respiratory:   Apneic, absent breath sounds on the right side  Cardiovascular: tachycardic, irregular rhythm  GI:  Soft, No tenderness, No masses, No flank tenderness.  No rebound or guarding.  : No CVA tenderness  Musculoskeletal: Trace lower extremity edema without tenderness.  Integument:  Warm, Dry, No erythema, No rash.    Neurologic:  Alert & oriented.  No focal deficits appreciated.  Nonambulatory at this time due to the multiple medical issues and weakness and shortness of breath and hypoxia  Psychiatric:  Affect normal, Judgment normal, Mood normal.     LAB:  All pertinent labs reviewed and interpreted.  Results for orders placed or performed during the hospital encounter of 10/03/21   Symptomatic COVID-19 Virus (Coronavirus) by PCR Nasopharyngeal    Specimen: Nasopharyngeal; Swab   Result Value Ref Range    SARS CoV2 PCR Negative Negative   Blood gas arterial   Result Value Ref Range    pH Arterial 7.40 7.37 - 7.44    pCO2 Arterial 40 35 - 45 mm Hg    pO2 Arterial 31 (LL) 75 - 85 mm Hg    Bicarbonate Arterial 24 23 - 29 mmol/L    O2 Sat, Arterial 54.9 (LL) 95.0 - 96.0 %    Oxyhemoglobin 53.9 (LL) 95.0 - 96.0 %    Base Excess/Deficit (+/-) -0.2   mmol/L    Flow 6.0 LPM    Sample Stabilized Temperature 37.0 degrees C   Comprehensive metabolic panel   Result Value Ref Range    Sodium 139 136 - 145 mmol/L    Potassium 3.8 3.5 - 5.0 mmol/L    Chloride 104 98 - 107 mmol/L    Carbon Dioxide (CO2) 23 22 - 31 mmol/L    Anion Gap 12 5 - 18 mmol/L    Urea Nitrogen 20 8 - 22 mg/dL    Creatinine 1.02 0.70 - 1.30 mg/dL    Calcium 9.0 8.5 - 10.5 mg/dL    Glucose 171 (H) 70 - 125 mg/dL    Alkaline Phosphatase 112 45 - 120 U/L    AST 22 0 - 40 U/L    ALT 22 0 - 45 U/L    Protein Total 6.4 6.0 - 8.0 g/dL    Albumin 2.5 (L) 3.5 - 5.0 g/dL    Bilirubin Total 0.4  0.0 - 1.0 mg/dL    GFR Estimate 75 >60 mL/min/1.73m2   Result Value Ref Range    INR 1.24 (H) 0.85 - 1.15   Lactic acid whole blood   Result Value Ref Range    Lactic Acid 2.6 (H) 0.7 - 2.0 mmol/L   Result Value Ref Range    Magnesium 1.5 (L) 1.8 - 2.6 mg/dL   Result Value Ref Range    Troponin I 0.12 0.00 - 0.29 ng/mL   CBC with platelets and differential   Result Value Ref Range    WBC Count 5.4 4.0 - 11.0 10e3/uL    RBC Count 2.90 (L) 4.40 - 5.90 10e6/uL    Hemoglobin 7.9 (L) 13.3 - 17.7 g/dL    Hematocrit 25.2 (L) 40.0 - 53.0 %    MCV 87 78 - 100 fL    MCH 27.2 26.5 - 33.0 pg    MCHC 31.3 (L) 31.5 - 36.5 g/dL    RDW 14.9 10.0 - 15.0 %    Platelet Count 307 150 - 450 10e3/uL    % Neutrophils 80 %    % Lymphocytes 9 %    % Monocytes 8 %    % Eosinophils 1 %    % Basophils 0 %    % Immature Granulocytes 2 %    NRBCs per 100 WBC 1 (H) <1 /100    Absolute Neutrophils 4.5 1.6 - 8.3 10e3/uL    Absolute Lymphocytes 0.5 (L) 0.8 - 5.3 10e3/uL    Absolute Monocytes 0.4 0.0 - 1.3 10e3/uL    Absolute Eosinophils 0.0 0.0 - 0.7 10e3/uL    Absolute Basophils 0.0 0.0 - 0.2 10e3/uL    Absolute Immature Granulocytes 0.1 (H) <=0.0 10e3/uL    Absolute NRBCs 0.0 10e3/uL       RADIOLOGY:  Reviewed all pertinent imaging. Please see official radiology report.  XR Chest Port 1 View    (Results Pending)       EKG:    Performed at: 2140  Atrial flutter  Variable AV block  Incomplete right bundle branch block  Nonspecific ST-T wave abnormalities  Comparison to previous ECG the atrial flutter has now developed with associated elevated heart rate  Clinical impression: Atrial flutter with variable AV block and nonspecific ST-T wave abnormalities    I have independently reviewed and interpreted the EKG(s) documented above.    PROCEDURES:   Procedures    Critical Care     Performed by: José Luis Pruitt   Total critical care time: 35 minutes  Critical care was necessary to treat or prevent imminent or life-threatening deterioration of the following  conditions:  Hypoxemic respiratory failure atrial flutter with variable AV block requiring diltiazem drip high flow nasal cannula  Critical care was time spent personally by me on the following activities: development of treatment plan with patient or surrogate, discussions with consultants, examination of patient, evaluation of patient's response to treatment, obtaining history from patient or surrogate, ordering and performing treatments and interventions, ordering and review of laboratory studies, ordering and review of radiographic studies, re-evaluation of patient's condition and monitoring for potential decompensation.  Critical care time was exclusive of separately billable procedures and treating other patients.        I, Peace Peña, am serving as a scribe to document services personally performed by Dr. Pruitt based on my observation and the provider's statements to me. I, José Luis Pruitt MD attest that Peace Pompa is acting in a scribe capacity, has observed my performance of the services and has documented them in accordance with my direction.    José Luis Pruitt M.D.  Emergency Medicine  [facility] EMERGENCY DEPARTMENT  [facility] [facility] [id number] [phone]  Dept: [phone]      José Luis Pruitt MD  10/03/21 9726

## 2021-10-04 NOTE — ED TRIAGE NOTES
Pt states that he was seen here last Tuesday for eval of sob and was prescribed home oxygen; he tested negative for COVID; he complains of continued shortness of breath; he recently went to the [facility]. Pt is on oxygen 2L in triage and his saturation is 75%. HR is 141 beats per minute.

## 2021-10-04 NOTE — ED NOTES
Removed Bipap for medications and patient wanted short break from mask. O2 sats dropped to mid-70's momentarily on room air but improved quickly to mid-90's after bipap replaced.

## 2021-10-04 NOTE — ED NOTES
Pulmonologist in room. Code status discussed with patient and his son. Pt stated he did not want to be intubated, but does want CPR if needed.

## 2021-10-04 NOTE — ED NOTES
Report called to CONCHITA Huang in ICU. Planning for patient to go to CT prior to ICU. Pt on Bipap with VSS.

## 2021-10-04 NOTE — PHARMACY-ADMISSION MEDICATION HISTORY
Pharmacy Note - Admission Medication History    Pertinent Provider Information: none     ______________________________________________________________________    Prior To Admission (PTA) med list completed and updated in EMR.       PTA Med List   Medication Sig Last Dose     acetaminophen (TYLENOL) 500 MG tablet Take 1-2 tablets (500-1,000 mg) by mouth every 6 hours as needed for mild pain Max dose of 3000mg (6 tabs) in one day.      ammonium lactate (LAC-HYDRIN) 12 % lotion Apply topically 2 times daily (Patient taking differently: Apply topically daily as needed )      benzonatate (TESSALON) 100 MG capsule Take 1 capsule (100 mg) by mouth 3 times daily as needed for cough      [START ON 10/6/2021] dexamethasone (DECADRON) 4 MG tablet Take 1 tablet (4 mg) by mouth 2 times daily (with meals) Start one day prior to Pemetrexed (Alimta), continue on the day of treatment, and the day following Pemetrexed. Unknown at Unknown time     docusate sodium (DOK) 100 MG capsule TAKE 1-2 CAPSULE BY MOUTH DAILY as needed TO KEEP STOOLS SOFT      emtricitabine-rilpivirine-tenofovir (ODEFSEY) 200-25-25 MG TABS per tablet Take 1 tablet by mouth daily (with breakfast) 10/3/2021 at Unknown time     FEROSUL 325 (65 Fe) MG tablet Take 1 tablet (325 mg) by mouth daily Past Week at Unknown time     folic acid (FOLVITE) 1 MG tablet Take 1 tablet (1,000 mcg) by mouth daily Begin 7 days before Pemetrexed (Alimta) starts and continue for 21 days after Pemetrexed is discontinued. Unknown at Unknown time     gabapentin (NEURONTIN) 100 MG capsule Take 1 capsule (100 mg) by mouth 3 times daily 10/3/2021 at am     loperamide (IMODIUM A-D) 2 MG tablet Take 1 tablet (2 mg) by mouth 4 times daily as needed for diarrhea      ondansetron (ZOFRAN) 8 MG tablet Take 1 tablet (8 mg) by mouth every 8 hours as needed for nausea (vomiting)      ondansetron (ZOFRAN) 8 MG tablet Take 1 tablet (8 mg) by mouth every 6 hours as needed for nausea      osimertinib  (TAGRISSO) 80 MG tablet Take 1 tablet (80 mg) by mouth daily Unknown at Unknown time     prochlorperazine (COMPAZINE) 10 MG tablet Take 1 tablet (10 mg) by mouth every 6 hours as needed (Nausea/Vomiting)      psyllium (METAMUCIL/KONSYL) 58.6 % powder Add one teaspoonful to liquid daily (Patient taking differently: Take by mouth daily as needed Add one teaspoonful to liquid daily)      rivaroxaban ANTICOAGULANT (XARELTO ANTICOAGULANT) 20 MG TABS tablet Take 1 tablet (20 mg) by mouth daily (with dinner) 10/2/2021 at Unknown time     sodium chloride (OCEAN) 0.65 % nasal spray Two sprays per nostril two times daily as needed.      traMADol (ULTRAM) 50 MG tablet Take 1 tablet (50 mg) by mouth every 6 hours as needed for moderate pain        Information source(s): Patient, Family member and Hospital records  Method of interview communication: in-person    Summary of Changes to PTA Med List  New: none  Discontinued: none  Changed: none    Patient was asked about OTC/herbal products specifically.  PTA med list reflects this.    In the past week, patient estimated taking medication this percent of the time:  50-90% due to illness.    Allergies were reviewed, assessed, and updated with the patient.      Patient did not bring any medications to the hospital and can't retrieve from home. No multi-dose medications are available for use during hospital stay.     The information provided in this note is only as accurate as the sources available at the time of the update(s).    Thank you for the opportunity to participate in the care of this patient.    Wesly Gibson RPH  10/3/2021 10:47 PM

## 2021-10-04 NOTE — ED PROVIDER NOTES
EMERGENCY DEPARTMENT SIGN OUT NOTE        ED COURSE AND MEDICAL DECISION MAKING  Patient was signed out to me by Dr. Dr. Gasper Boo at 7:00 AM    In brief, Antonio Mercado is a 66 year old male who initially presented with shortness of breath.     12:56 PM I spoke with Dr. Calderon from critical care, he evaluated patient at bedside, has clinical concerns for SVC syndrome.  Discussed whether patient may need transfer for pulmonary interventionalists, possible airway stenting with tumor ablation for symptomatic SVC syndrome.  Paging IR to discuss whether they are able to do a SVC stent here at [facility].   1:10 PM Dr Manrique (Rad Onc) down to see patient at bedside, evaluated him, is setting up emergent radiation therapy here at [facility].  1:14 PM I spoke with IR Dr Roman.  They recommend drainage of fluid via thoracentesis or chest tube placement, and they are going to see if they have the equipment to do SVC stent here.   1:30 PM Spoke again with IR Dr Roman.  They do have the ability to perform IVC stenting here, though getting the appropriate size stent may take 24 hours notice.  If patient needs to be done emergently, should obviously be transferred, however if it can be done on active for urgent basis, they should likely be able to do it here with some notice/heads up.  3:21 PM Spoke again with Dr Medley, he is comfortable with keeping patient here at [facility].  Will be going to get chest tube placement, will then get CT chest after tube placement to evaluate for external compression and mass-effect following chest tube placement as his prior CT chest is now 1 week old.    At time of sign out, disposition was pending admission.    Ultimately, there was significant back-and-forth discussion    FINAL IMPRESSION    1. Acute hypoxemic respiratory failure (H)    2. Acute sepsis (H)    3. Malignant neoplasm of right lung, unspecified part of lung (H)    4. Paroxysmal atrial fibrillation (H)        Critical  Care     Performed by: Hesham Munroe MD  Authorized by: Hesham Munroe MD                   Total critical care time: 55 minutes  Critical care was necessary to treat or prevent imminent or life-threatening deterioration of the following conditions:  Clinical SVC syndrome, discussion with rad [place], IR, and critical care on multiple occasions  Critical care was time spent personally by me on the following activities: development of treatment plan with patient or surrogate, discussions with consultants, examination of patient, evaluation of patient's response to treatment, obtaining history from patient or surrogate, ordering and performing treatments and interventions, ordering and review of laboratory studies, ordering and review of radiographic studies, re-evaluation of patient's condition and monitoring for potential decompensation.  Critical care time was exclusive of separately billable procedures and treating other patients.      ED MEDS  Medications   0.9% sodium chloride BOLUS (0 mLs Intravenous Stopped 10/3/21 2326)     Followed by   sodium chloride 0.9% infusion ( Intravenous Rate/Dose Verify 10/4/21 1034)   diltiazem (CARDIZEM) 125 mg in sodium chloride 0.9 % 125 mL infusion (15 mg/hr Intravenous Rate/Dose Verify 10/4/21 1035)   metoprolol tartrate (LOPRESSOR) tablet 25 mg (25 mg Oral Not Given 10/4/21 1140)   emtricitabine-rilpivirine-tenofovir (ODEFSEY) 200-25-25 MG per tablet 1 tablet (1 tablet Oral Given 10/4/21 0909)   gabapentin (NEURONTIN) capsule 100 mg (100 mg Oral Given 10/4/21 1148)   rivaroxaban ANTICOAGULANT (XARELTO) tablet 20 mg ( Oral Automatically Held 10/7/21 1700)   traMADol (ULTRAM) tablet 50 mg (has no administration in time range)   lidocaine 1 % 0.1-1 mL (has no administration in time range)   lidocaine (LMX4) cream (has no administration in time range)   sodium chloride (PF) 0.9% PF flush 3 mL (3 mLs Intracatheter Given 10/4/21 0909)   sodium chloride (PF) 0.9% PF flush 3  mL (has no administration in time range)   melatonin tablet 1 mg (has no administration in time range)   Patient is already receiving anticoagulation with heparin, enoxaparin (LOVENOX), warfarin (COUMADIN)  or other anticoagulant medication (has no administration in time range)   piperacillin-tazobactam (ZOSYN) 3.375 g vial to attach to  mL bag (3.375 g Intravenous Given 10/4/21 0910)   diltiazem (CARDIZEM) injection 15 mg (15 mg Intravenous Given 10/3/21 2149)   piperacillin-tazobactam (ZOSYN) 3.375 g vial to attach to  mL bag (0 g Intravenous Stopped 10/3/21 2315)   vancomycin (VANCOCIN) 1,250 mg in sodium chloride 0.9 % 250 mL intermittent infusion (0 mg Intravenous Stopped 10/4/21 0120)   magnesium sulfate 2 g in water intermittent infusion (0 g Intravenous Stopped 10/4/21 0350)       LAB  Labs Ordered and Resulted from Time of ED Arrival Up to the Time of Departure from the ED   BLOOD GAS ARTERIAL - Abnormal; Notable for the following components:       Result Value    pO2 Arterial 31 (*)     O2 Sat, Arterial 54.9 (*)     Oxyhemoglobin 53.9 (*)     All other components within normal limits   B-TYPE NATRIURETIC PEPTIDE ( EAST ONLY) - Abnormal; Notable for the following components:     (*)     All other components within normal limits   COMPREHENSIVE METABOLIC PANEL - Abnormal; Notable for the following components:    Glucose 171 (*)     Albumin 2.5 (*)     All other components within normal limits   INR - Abnormal; Notable for the following components:    INR 1.24 (*)     All other components within normal limits   LACTIC ACID WHOLE BLOOD - Abnormal; Notable for the following components:    Lactic Acid 2.6 (*)     All other components within normal limits   MAGNESIUM - Abnormal; Notable for the following components:    Magnesium 1.5 (*)     All other components within normal limits   CBC WITH PLATELETS AND DIFFERENTIAL - Abnormal; Notable for the following components:    RBC Count 2.90 (*)      Hemoglobin 7.9 (*)     Hematocrit 25.2 (*)     MCHC 31.3 (*)     NRBCs per 100 WBC 1 (*)     Absolute Lymphocytes 0.5 (*)     Absolute Immature Granulocytes 0.1 (*)     All other components within normal limits   BLOOD GAS ARTERIAL - Abnormal; Notable for the following components:    pO2 Arterial 43 (*)     O2 Sat, Arterial 78.2 (*)     Oxyhemoglobin 76.9 (*)     All other components within normal limits   COVID-19 VIRUS (CORONAVIRUS) BY PCR - Normal    Narrative:     Testing was performed using the joe  SARS-CoV-2 & Influenza A/B Assay on the joe  Celia  System.  This test should be ordered for the detection of SARS-COV-2 in individuals who meet SARS-CoV-2 clinical and/or epidemiological criteria. Test performance is unknown in asymptomatic patients.  This test is for in vitro diagnostic use under the FDA EUA for laboratories certified under CLIA to perform moderate and/or high complexity testing. This test has not been FDA cleared or approved.  A negative test does not rule out the presence of PCR inhibitors in the specimen or target RNA in concentration below the limit of detection for the assay. The possibility of a false negative should be considered if the patient's recent exposure or clinical presentation suggests COVID-19.  [facility] are certified under the Clinical Laboratory Improvement Amendments of 1988 (CLIA-88) as qualified to perform moderate and/or high complexity laboratory testing.   TROPONIN I - Normal   PROCALCITONIN - Normal   TROPONIN I - Normal   LACTIC ACID WHOLE BLOOD - Normal   EXTRA BLOOD CULTURE BOTTLE   EXTRA BLUE TOP TUBE   EXTRA RED TOP TUBE   EXTRA GREEN TOP (LITHIUM HEPARIN) TUBE   EXTRA PURPLE TOP TUBE   EXTRA GREEN TOP (LITHIUM HEPARIN) ON ICE   EXTRA PURPLE TOP TUBE   PERIPHERAL IV CATHETER   ASSIGN ATTENDING PROVIDER   CALL   CALL   VITAL SIGNS   PERIPHERAL IV CATHETER   PULSE OXIMETRY NURSING   TELEMETRY MONITORING MED/SURG   INTAKE AND OUTPUT   NO VTE  PROPHYLAXIS   ICE TO AFFECTED AREA   APPLY WARM PACK   BLOOD CULTURE   BLOOD CULTURE   EXTRA TUBE    Narrative:     The following orders were created for panel order [place] Draw.  Procedure                               Abnormality         Status                     ---------                               -----------         ------                     Extra Blood Culture Bottle[[id number]]                       Final result               Extra Blue Top Tube[[id number]]                              Final result               Extra Red Top Tube[[id number]]                               Final result               Extra Green Top (Lithium...[[id number]]                      Final result               Extra Purple Top Tube[[id number]]                            Final result               Extra Green Top (Lithium...[[id number]]                      Final result                 Please view results for these tests on the individual orders.   CBC WITH PLATELETS & DIFFERENTIAL    Narrative:     The following orders were created for panel order CBC with platelets differential.  Procedure                               Abnormality         Status                     ---------                               -----------         ------                     CBC with platelets and d...[[id number]]  Abnormal            Final result                 Please view results for these tests on the individual orders.   EXTRA TUBE    Narrative:     The following orders were created for panel order Extra Tube.  Procedure                               Abnormality         Status                     ---------                               -----------         ------                     Extra Purple Top Tube[[id number]]                            Final result                 Please view results for these tests on the individual orders.   CBC WITH PLATELETS & DIFFERENTIAL       RADIOLOGY    XR Chest Port 1 View   Final Result   IMPRESSION: Near complete  opacification of the right hemithorax, correlating with recent CT showing prominent consolidation, mass, atelectasis, and pleural fluid. Left lung remains clear. Normal heart size. No pneumothorax.          DISCHARGE MEDS  New Prescriptions    No medications on file         Hesham Munroe MD  10/04/21 153

## 2021-10-04 NOTE — PROGRESS NOTES
[facility] Progress Note    Assessment/Plan  Active Problems:    Paroxysmal atrial fibrillation (H)    Malignant neoplasm of right lung, unspecified part of lung (H)    Acute hypoxemic respiratory failure (H)    Acute sepsis (H)    Antonio Mercado is a 66 year old male with a history of HIV, colon cancer s/p right hemicolectomy, prostate cancer, right lung adenocarcinoma with known pleural metastasis and malignant pleural effusion, recently hospitalized 9/28-10/1 for dysphagia and weight loss and discharged on home O2, readmitted on 10/4 for shortness of breath, found to have near-opacification of his right hemithorax on chest x-ray and new atrial fibrillation with RVR and likely SVC syndrome undergoing emergent radiation and evaluation for stent.      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 Chavez. Decision made during most recent hospitalization to proceed with systemic chemotherapy this coming week (Wednesday 10/6) and to withhold palliative radiation therapy given that the patient was stable and without any difficulty swallowing at that time. He was discharged with 2LNC home oxygen which was new for him. Shortness of breath has progressively worsened and CXR in the ED shows complete opacification of the right hemithorax consistent with progression of malignant pleural effusion, mass, consolidation and atelectasis. ABG 7.40/40/31/24. COVID negative. Symptomatically improved on high flow nasal cannula however subsequently required BiPAP.  Has had therapeutic thoracenteses earlier in 2021, however felt this would be unlikely to improve symptoms much at this time given size of his mass itself is largely contributing to his symptoms. Esophageal obstruction also noted but was scoped last admission and was negative.  - Continuous BiPAP  - Pulmonology  consulted: recommends higher level of care at the [facility]. Patient requires a chest tube.  - Heme/Onc consulted  - Rad/Onc consulted: emergent radiation for possible SVC syndrome  - IR consulted for stent evaluation and chest tube  - If patient is amenable, will seek to transfer him to the [facility] for higher level of care.     #Elevated lactate, resolved  Lactate 2.6 upon arrival, tachycardic (d/t afib), tachypneic, WBC 5.4, Procal 0.18. Remains afebrile. There was concern for pneumonia possibly causing sepsis and vancomycin and zosyn were started in the ED after blood cultures were drawn. Vancomycin was subsequently discontinued. Pt has remained afebrile, lactate has normalized, and ABG has improved. He does have other reason (mass) for his hypoxemic respiratory failure, less concern for sepsis.  - Lactate trend: 2.6 --> 1.1  - ABG trend:  7.40/40/31/24 --> 7.40/37/43/23  - Continue zosyn q8h  - IVNS at 125 ml/hr  - Follow blood cultures, pending  - Recheck CBC    #Atrial Fibrillation, converted  #Elevated BNP  #Elevated Troponin  New atrial fibrillation/flutter with RVR, given diltiazem bolus and drip in the ED with continued atrial flutter with rates in the 110s-120s. No ST/T wave changes concerning for ischemia. Troponin 0.12 and  on admission. Discussed with cardiology - recommended continuing the diltiazem drip and starting oral metoprolol 25 mg QID and they will see the patient. Etiology likely secondary to mass effect vs. pleural effusion from malignancy vs. Infection. Troponin trend: 0.12 --> 0.11  - Troponin x3, currently down-trending   - Tele  - Continue diltiazem drip  - Metoprolol 25 mg QID  - Cardiology consulted, appreciate recommendations      #Recently Diagnosed Pulmonary Emboli  #Intermittent Chest Pain  Originally diagnosed on CT incidentally in August 2021, still seen on CT 9/27 and at that time was not taking xarelto consistently due to dysphagia, received heparin while  hospitalized 9/28-10/1, discharged on xarelto 20 mg.   - Hold xarelto 20 mg for procedures  - Gabapentin 100 mg TID for pain  - Tramadol 50 mg q6h for moderate pain    #Hypomagnesemia  Mag 1.5 on admission, does not appear to have been checked on prior admissions. Potassium 3.8 on admission.  - Recheck mag  - Recheck BMP  - Magnesium replacement protocol    Chronic Problems:  Normocytic Anemia - Takes oral iron and folic acid. Hgb stable. Daily CBC.  HIV - Continue PTA [place]    Diet: NPO per Anesthesia Guidelines for Procedure/Surgery Except for: Meds - NPO in case of therapeutic thoracentesis or other procedure.   Fluids: NS at 125 mL/hr   Central Lines: None  [place] Catheter: Not present  DVT Prophylaxis: DOAC  Code Status: Full Code - however would benefit from repeated goals of care discussion this admission with possible palliative care consult considering multiple hospitalizations, declining overall status.       Disposition/Advanced Care Planning  Barriers to discharge: hypoxia  Anticipated discharge date: 2-3 days      Subjective  Pt feels better with BiPAP in place. Still feels short of breath, but improved. Has a wet cough. Feels weak and fatigued. Questions when he will be able to get a room in the hospital outside of the ED. Denies headache, dizziness/lightheadedness, chest pain, palpitations, abdominal pain or dysuria.     Objective    Vital signs in last 24 hours Temp:  [98.1  F (36.7  C)-99.1  F (37.3  C)] 99.1  F (37.3  C)  Pulse:  [] 75  Resp:  [19-57] 33  BP: (109-173)/(67-89) 117/81  FiO2 (%):  [50 %] 50 %  SpO2:  [81 %-100 %] 99 %   Weight:   Wt Readings from Last 2 Encounters:   10/03/21 63.5 kg (140 lb)   09/28/21 63.7 kg (140 lb 8 oz)         Intake/Output last 3 shift I/O last 3 completed shifts:  In: 900 [IV Piggyback:900]  Out: -     Intake/Output this shift:No intake/output data recorded.    Physical Exam  General appearance: Alert. Comfortably resting in bed. No acute distress with  BiPAP in place. Cooperative.   Head: Normocephalic, without obvious abnormality, atraumatic  Eyes: conjunctivae/corneas clear  Lungs: Diffuse rhonchi. Wet cough on inspiration.   Heart: RRR. No murmurs  Abdomen: soft, non-tender; bowel sounds normal.  Extremities: extremities normal, atraumatic, no cyanosis or edema  Skin: Skin color, texture, turgor normal. No rashes or lesions  Neurologic: No focal neurologic deficits  Psychiatric: mood and affect normal    Pertinent Labs and Pertinent Radiology   Lab Results: personally reviewed.     Radiology Results: Personally reviewed image/s and impression/s    Precepted patient with Dr. Tobi Reyes.    Maci Pompa, MS4  [facility]  10/4/2021    I was present with the medical student for the service. I personally verified the history of present illness and  performed the physical examination and medical decision making. I have verified all of the medical student s  documentation for this encounter.    Nery Garrison MD  [facility], PGY-3  Pager # [phone]

## 2021-10-04 NOTE — ED NOTES
"Pt placed on BiPap approx 0500.  Pt sats remained at 80-81%, pt placed call light on requesting help stating \"he can't do this anymore and was very SOB\" pt unable to speak in a sentence let alone one word.  Continuing to monitor.   "

## 2021-10-04 NOTE — ED PROVIDER NOTES
ED Signout Note From Dr. Watson Pruitt      Brief HPI:  Antonio Mercado is a 66 year old male with a pertinent medical history of HIV disease, prostate cancer, stage 3 chronic kidney disease, lung cancer, single subsegmental pulmonary embolism, other pulmonary embolism without acute cor pulmonale, pleural effusion, who presents for evaluation of shortness of breath. Patient reports since he was discharged on 10/1 his symptoms have progressively worsened including shortness of breath, weakness, fatigue, leg swelling, a persistent cough with occasional blood-tinged sputum, and inability to ambulate secondary to weakness. Reports that he felt extremely short of breath tonight prompting him to present to the ED. Currently endorses generalized fatigue and malaise. Patient denies any other symptoms or complaints at this time.     Vitals:    10/04/21 0115 10/04/21 0130 10/04/21 0145 10/04/21 0200   BP: 127/76 116/69 123/70 135/72   Pulse: (!) 127 105 81 108   Resp: (!) 33 24 21 (!) 31   Temp:       TempSrc:       SpO2: (!) 88% 94% 94% (!) 89%   Weight:             Labs  Results for orders placed or performed during the hospital encounter of 10/03/21   XR Chest Port 1 View    Impression    IMPRESSION: Near complete opacification of the right hemithorax, correlating with recent CT showing prominent consolidation, mass, atelectasis, and pleural fluid. Left lung remains clear. Normal heart size. No pneumothorax.   Extra Blood Culture Bottle   Result Value Ref Range    Hold Specimen JIC    Extra Blue Top Tube   Result Value Ref Range    Hold Specimen JIC    Extra Red Top Tube   Result Value Ref Range    Hold Specimen JIC    Extra Green Top (Lithium Heparin) Tube   Result Value Ref Range    Hold Specimen JIC    Extra Purple Top Tube   Result Value Ref Range    Hold Specimen JIC    Extra Green Top (Lithium Heparin) ON ICE   Result Value Ref Range    Hold Specimen JIC    Symptomatic COVID-19 Virus (Coronavirus) by PCR  Nasopharyngeal    Specimen: Nasopharyngeal; Swab   Result Value Ref Range    SARS CoV2 PCR Negative Negative   Blood gas arterial   Result Value Ref Range    pH Arterial 7.40 7.37 - 7.44    pCO2 Arterial 40 35 - 45 mm Hg    pO2 Arterial 31 (LL) 75 - 85 mm Hg    Bicarbonate Arterial 24 23 - 29 mmol/L    O2 Sat, Arterial 54.9 (LL) 95.0 - 96.0 %    Oxyhemoglobin 53.9 (LL) 95.0 - 96.0 %    Base Excess/Deficit (+/-) -0.2   mmol/L    Flow 6.0 LPM    Sample Stabilized Temperature 37.0 degrees C   B-Type Natriuretic Peptide ([facility] Only)   Result Value Ref Range     (H) 0 - 64 pg/mL   Comprehensive metabolic panel   Result Value Ref Range    Sodium 139 136 - 145 mmol/L    Potassium 3.8 3.5 - 5.0 mmol/L    Chloride 104 98 - 107 mmol/L    Carbon Dioxide (CO2) 23 22 - 31 mmol/L    Anion Gap 12 5 - 18 mmol/L    Urea Nitrogen 20 8 - 22 mg/dL    Creatinine 1.02 0.70 - 1.30 mg/dL    Calcium 9.0 8.5 - 10.5 mg/dL    Glucose 171 (H) 70 - 125 mg/dL    Alkaline Phosphatase 112 45 - 120 U/L    AST 22 0 - 40 U/L    ALT 22 0 - 45 U/L    Protein Total 6.4 6.0 - 8.0 g/dL    Albumin 2.5 (L) 3.5 - 5.0 g/dL    Bilirubin Total 0.4 0.0 - 1.0 mg/dL    GFR Estimate 75 >60 mL/min/1.73m2   Result Value Ref Range    INR 1.24 (H) 0.85 - 1.15   Lactic acid whole blood   Result Value Ref Range    Lactic Acid 2.6 (H) 0.7 - 2.0 mmol/L   Result Value Ref Range    Magnesium 1.5 (L) 1.8 - 2.6 mg/dL   Result Value Ref Range    Troponin I 0.12 0.00 - 0.29 ng/mL   CBC with platelets and differential   Result Value Ref Range    WBC Count 5.4 4.0 - 11.0 10e3/uL    RBC Count 2.90 (L) 4.40 - 5.90 10e6/uL    Hemoglobin 7.9 (L) 13.3 - 17.7 g/dL    Hematocrit 25.2 (L) 40.0 - 53.0 %    MCV 87 78 - 100 fL    MCH 27.2 26.5 - 33.0 pg    MCHC 31.3 (L) 31.5 - 36.5 g/dL    RDW 14.9 10.0 - 15.0 %    Platelet Count 307 150 - 450 10e3/uL    % Neutrophils 80 %    % Lymphocytes 9 %    % Monocytes 8 %    % Eosinophils 1 %    % Basophils 0 %    % Immature Granulocytes 2 %     NRBCs per 100 WBC 1 (H) <1 /100    Absolute Neutrophils 4.5 1.6 - 8.3 10e3/uL    Absolute Lymphocytes 0.5 (L) 0.8 - 5.3 10e3/uL    Absolute Monocytes 0.4 0.0 - 1.3 10e3/uL    Absolute Eosinophils 0.0 0.0 - 0.7 10e3/uL    Absolute Basophils 0.0 0.0 - 0.2 10e3/uL    Absolute Immature Granulocytes 0.1 (H) <=0.0 10e3/uL    Absolute NRBCs 0.0 10e3/uL   Result Value Ref Range    Procalcitonin 0.18 0.00 - 0.49 ng/mL         [facility]  CT Chest/Abdomen/Pelvis w Contrast    Result Date: 9/27/2021  EXAM: CT CHEST/ABDOMEN/PELVIS W CONTRAST LOCATION: [facility] DATE/TIME: 9/27/2021 12:06 PM INDICATION:  Right upper lobe lung adenocarcinoma with malignant pleural effusion status post chemotherapy/immunotherapy COMPARISON: CT CAP 08/09/2021, PET/CT 05/12/2021 and CT CAP 04/12/2021 TECHNIQUE: CT scan of the chest, abdomen, and pelvis was performed following injection of IV contrast. Multiplanar reformats were obtained. Dose reduction techniques were used. CONTRAST: 75ml isovue 370 FINDINGS: LUNGS AND PLEURA: Right upper lobe mass 7.3 x 7.3 x 6.2 cm (previously 3.3 x 2.8 x 2.6 cm) with new direct extension invasion into the perihilar and upper right mediastinum with encasement of the right superior pulmonary vein, right upper lobe pulmonary artery and right upper lobe bronchus (causing complete post obstructive right upper lobe consolidation) as well as broad abutment of the SVC and right pericardium. Marked progression of numerous bulky pleural metastases mid and inferior right hemithorax causing localized mass effect and/or invasion of the right hemidiaphragm and invasion into the inferior right periesophageal posterior mediastinum resulting in proximal esophageal dilatation consistent with obstruction. Diffuse tumor infiltration of the atelectatic right middle and lower lobes has developed A large loculated pleural fluid collection mid and inferior right hemithorax unchanged. Trace amount of airspace  opacity posterior basilar segment left lower lobe has developed. Left lung otherwise clear. No left pleural effusion. MEDIASTINUM/AXILLAE: Several small pulmonary emboli left lower lobar and subsegmental pulmonary arteries. CORONARY ARTERY CALCIFICATION: None. HEPATOBILIARY: Several benign hepatic cysts are unchanged. No significant mass or bile duct dilatation. No calcified gallstones. PANCREAS: Normal. SPLEEN: Normal. ADRENAL GLANDS: Normal. KIDNEYS/BLADDER: Normal. BOWEL: Normal. LYMPH NODES: Abdominal and pelvic lymph nodes unremarkable. VASCULATURE: Unremarkable. PELVIC ORGANS: Normal. MUSCULOSKELETAL: No bone lesions.     IMPRESSION: 1.  Marked progression right upper lobe mass and right pleural metastases with mediastinal invasion causing esophageal obstruction, complete postobstructive right upper lobe consolidation, right hilar vascular encasement and mass effect on the SVC, right  pericardium and right hemidiaphragm. 2.  Several small acute pulmonary emboli left lower lobe small focus of left lower lobe consolidation likely represents an evolving infarct. [Critical Result: Acute pulmonary embolism and sequela of tumor progression. Finding was identified on 2021 3:38 PM. Dr. Chavez was contacted by me on 2021 4:03 PM and verbalized understanding of the critical result.     Echocardiogram Complete    Result Date: 2021  [id number] [address]  Name: GIOVANNI DISLA MRN: [id number] : 1955 Study Date: 2021 09:12 AM Age: 66 yrs Gender: Male Patient Location: [facility] Reason For Study: Syncope Ordering Physician: EDSON CHOUDHURY Performed By: YELENA  BSA: 1.8 m2 Height: 71 in Weight: 140 lb ______________________________________________________________________________ Procedure Complete Portable Echo Adult. No hemodynamically significant valvular abnormalities on 2D or color flow imaging. There is no  comparison study available. ______________________________________________________________________________ Interpretation Summary  1.Left ventricular size, wall motion and function are normal. The ejection fraction is > 65%. 2.TAPSE is normal, which is consistent with normal right ventricular systolic function. 3.Right ventricular systolic pressure is elevated, consistent with mild pulmonary hypertension. 4.No hemodynamically significant valvular abnormalities on 2D or color flow imaging. 5.There is no comparison study available. ______________________________________________________________________________ I      WMSI = 1.00     % Normal = 100  X - Cannot   0 -                      (2) - Mildly 2 -          Segments  Size Interpret    Hyperkinetic 1 - Normal  Hypokinetic  Hypokinetic  1-2     small                                                    7 -          3-5    moderate 3 - Akinetic 4 -          5 -         6 - Akinetic Dyskinetic   6-14    large              Dyskinetic   Aneurysmal  w/scar       w/scar       15-16   diffuse  Left Ventricle Left ventricular size, wall motion and function are normal. The ejection fraction is > 65%. There is normal left ventricular wall thickness. Left ventricular diastolic function is normal. No regional wall motion abnormalities noted.  Right Ventricle Normal right ventricle size and systolic function. TAPSE is normal, which is consistent with normal right ventricular systolic function.  Atria Normal left atrial size. Right atrial size is normal. There is no color Doppler evidence of an atrial shunt.  Mitral Valve Mitral valve leaflets appear normal. There is no evidence of mitral stenosis or clinically significant mitral regurgitation. There is no mitral regurgitation noted. There is no mitral valve stenosis.  Tricuspid Valve Tricuspid valve leaflets appear normal. There is mild (1+) tricuspid regurgitation. Right ventricular systolic pressure is elevated, consistent with  mild pulmonary hypertension. There is no tricuspid stenosis.  Aortic Valve Aortic valve leaflets appear normal. There is no evidence of aortic stenosis or clinically significant aortic regurgitation. The aortic valve is trileaflet. No aortic regurgitation is present. No aortic stenosis is present.  Pulmonic Valve The pulmonic valve is not well seen, but is grossly normal. This degree of valvular regurgitation is within normal limits. There is no pulmonic valvular stenosis.  Vessels The aorta root is normal. Normal size ascending aorta. IVC diameter <2.1 cm collapsing >50% with sniff suggests a normal RA pressure of 3 mmHg.  Pericardium There is no pericardial effusion.  ______________________________________________________________________________ MMode/2D Measurements & Calculations IVSd: 0.85 cm LVIDd: 4.2 cm LVIDs: 2.7 cm LVPWd: 0.87 cm FS: 35.2 % LV mass(C)d: 108.9 grams LV mass(C)dI: 60.1 grams/m2  Ao root diam: 3.4 cm LA dimension: 2.4 cm asc Aorta Diam: 3.6 cm LA/Ao: 0.71 LVOT diam: 2.0 cm LVOT area: 3.1 cm2 LA Volume Indexed (AL/bp): 12.9 ml/m2 RWT: 0.42  Time Measurements MM HR: 75.0 BPM  Doppler Measurements & Calculations MV E max siria: 69.1 cm/sec MV A max siria: 91.6 cm/sec MV E/A: 0.75 MV dec time: 0.26 sec LV V1 max P.6 mmHg LV V1 max: 106.8 cm/sec LV V1 VTI: 16.7 cm SV(LVOT): 52.4 ml SI(LVOT): 28.9 ml/m2 PI end-d siria: 181.3 cm/sec TR max siria: 312.2 cm/sec TR max P.0 mmHg E/E' av.0 Lateral E/e': 6.9 Medial E/e': 9.1  ______________________________________________________________________________ Report approved by: Dana Zurita 2021 11:12 AM       XR Chest Port 1 View    Result Date: 10/3/2021  EXAM: XR CHEST PORT 1 VIEW LOCATION: [facility] DATE/TIME: 10/3/2021 10:40 PM INDICATION: sob COMPARISON: CT chest 2021.     IMPRESSION: Near complete opacification of the right hemithorax, correlating with recent CT showing prominent consolidation, mass,  atelectasis, and pleural fluid. Left lung remains clear. Normal heart size. No pneumothorax.        ED Course Under My Care:  12:24 AM the patient signed out to me by Dr. Pruitt.  12:27 AM I discussed the case with [facility], who accepts the patient     66-year-old complicated gentleman with recent hospitalization and discharged on O2 presenting with hypoxia found with concern for sepsis as well as known lung cancer with large pleural effusion and with A. fib RVR.  Patient on high flow nasal cannula with resolution of hypoxia and on diltiazem drip with improved rate control.  Through ED course under my care patient did convert and repeat EKG shows sinus rhythm at a rate of 98 with a QRS of 88 .  No acute ischemic evidence.    Patient remains boarding in the department pending bed placement as no beds available within the [place] region.    Diagnosis:  1. Acute hypoxemic respiratory failure (H)    2. Acute sepsis (H)    3. Malignant neoplasm of right lung, unspecified part of lung (H)    4. Paroxysmal atrial fibrillation (H)            Gasper Boo MD  [facility] Emergency Department  10/04/2021     Gasper Boo MD  10/04/21 0231

## 2021-10-04 NOTE — CONSULTS
CARDIOLOGY CONSULT NOTE         Assessment:   1.  Paroxysmal atrial fibrillation (H)-asymptomatic, not valvular, truly paroxysmal that it resolved.  Since he is now in sinus rhythm will decrease and stop IV Cardizem, keep potassium above 4.0.  TSH is normal and echo shows no obvious structural heart disease, suspect secondary to underlying medical condition.  2.  Malignant neoplasm of right lung, unspecified part of lung (H)-non-small cell lung cancer stage IV, malignant pleural effusion.  Defer to pulmonary.  3.  Acute hypoxemic respiratory failure (H)-possibly secondary to SVC collapse as well as esophageal collapse from extrinsic mass.  Also pleural effusion.  Defer to pulmonary.     Plan:   1.  Discontinue IV Cardizem.  2.  Consider antiarrhythmic therapy if needed.  We will need to rule out coronary obstruction if we are going to use Tambocor.  3.  Can follow with me as primary cardiologist.     Current History:   [facility] has been requested by Dr. Eid to evaluate Antonio Mercado  who is a 66 year old year old -American male for atrial fibrillation.  Patient has been in his usual state of health which includes progressively increased shortness of breath over the last year.  Over the last month is increased tremendously to the point that he can barely walk without getting short of breath.  He presented to the emergency department, was noted to be atrial fibrillation placed on IV Cardizem drip.  This is subsequently converted to sinus rhythm, he has been noted to have enlargement of his right upper lobe mass, and cardiology consultation was requested.    Past Medical History:     Past Medical History:   Diagnosis Date     Constipation      Heel pain      History of blood transfusion      HIV (human immunodeficiency virus infection) (H)      Hypertrophy of prostate with urinary obstruction      Iron deficiency anemia      LBP (low back pain)      Malignant neoplasm of hepatic flexure  (H): 02/04/2016     Paroxysmal atrial fibrillation (H) 10/03/2021     Trigger finger  Non-small cell lung cancer right upper lobe       Past history is negative for  tuberculosis, diabetes mellitus, myocardial infarction,  rheumatic fever,, cerebrovascular accident, chronic kidney disease, peptic ulcer disease, chronic obstructive pulmonary disease, or thyroid disorder  and no lipid disorder.    Past Surgical History:     Past Surgical History:   Procedure Laterality Date     COLON SURGERY       ESOPHAGOSCOPY, GASTROSCOPY, DUODENOSCOPY (EGD), COMBINED N/A 9/29/2021    Procedure: ESOPHAGOGASTRODUODENOSCOPY (EGD);  Surgeon: Rangel Dye MD;  [facility] [facility]     GI SURGERY       HERNIA REPAIR       WV ESOPHAGOGASTRODUODENOSCOPY TRANSORAL DIAGNOSTIC N/A 4/13/2021    Procedure: ESOPHAGOGASTRODUODENOSCOPY (EGD);  Surgeon: Harris Mon MD;  Location: [facility];  Service: Gastroenterology      THORACENTESIS  1/5/2021     US THORACENTESIS  1/18/2021     US THORACENTESIS  1/26/2021     US THORACENTESIS  1/29/2021     US THORACENTESIS  2/2/2021     US THORACENTESIS  2/5/2021     US THORACENTESIS  2/9/2021     US THORACENTESIS  2/12/2021     US THORACENTESIS  2/15/2021     US THORACENTESIS  2/19/2021     US THORACENTESIS  2/23/2021     US THORACENTESIS  2/26/2021     US THORACENTESIS  3/2/2021     US THORACENTESIS  3/9/2021     Family History:   Reviewed, and negative coronary artery disease.    Social History:   Reviewed, and he  reports that he has never smoked. He has never used smokeless tobacco. He reports that he does not drink alcohol and does not use drugs.  He is single, he moved here from the [place] 30 years ago, normally works as an [employer] , and his primary physician is Dr. Harris Mary.    Meds:       dexamethasone  4 mg Intravenous Q8H     emtricitabine-rilpivirine-tenofovir  1 tablet Oral Daily with breakfast     gabapentin  100 mg Oral TID     metoprolol tartrate  25 mg  Oral 4x Daily     piperacillin-tazobactam  3.375 g Intravenous Q8H     [Held by provider] rivaroxaban ANTICOAGULANT  20 mg Oral Daily with supper     sodium chloride (PF)  3 mL Intracatheter Q8H       Allergies:   Stribild [[id number]-tenof], Ibuprofen, Oxycodone, Prednisone, Raltegravir, Tamsulosin, and Tylenol [acetaminophen]    Review of Systems:   A 12 point comprehensive review of systems was  as follows:   General: Positive for fatigue, weight loss, negative weight gain  Constitutional: negative for anorexia, chills, fevers, malaise, night sweats   Eyes: negative for cataracts, color blindness, contacts/glasses, glaucoma, icterus, irritation, redness and visual disturbance  Ears, nose, mouth, throat, and face: negative for ear drainage, earaches, epistaxis, facial trauma, hearing loss, hoarseness, nasal congestion, snoring, sore mouth, sore throat, tinnitus and voice change  Respiratory: Positive for cough, dyspnea on exertion, negative hemoptysis, sputum production, pleurisy, stridor, wheezing, PND, orthopnea  Cardiovascular: negative for chest pain, chest pressure/discomfort, claudication, dyspnea, exertional chest pressure/discomfort, fatigue, irregular heart beat, lower extremity edema, near-syncope,  palpitations,  syncope   Gastrointestinal: negative for abdominal pain, change in bowel haibits, constipation, diarrhea, dyspepsia, dysphagia, jaundice, melena, nausea, odynophagia, reflux symptoms and vomiting  Genitourinary: negative for decreased stream  Hematologic/lymphatic: negative for bleeding  Musculoskeletal: negative for arthralgias, back pain, bone pain, muscle weakness, myalgias, neck pain and stiff joints  Neurological: negative for syncope, presyncope, coordination problems, dizziness, gait problems, headaches, memory problems, paresthesia, seizures, speech problems, tremors, vertigo and weakness    Objective:     Physical Exam:  /78   Pulse 79   Temp 99.1  F (37.3  C)  (Rectal)   Resp 20   Wt 63.5 kg (140 lb)   SpO2 97%   BMI 19.53 kg/m       Head:    Normocephalic, without obvious abnormality, atraumatic   Eyes:    PERRL, conjunctiva/corneas clear, EOM's intact,           Nose:   Nares normal, septum midline, mucosa normal, no drainage  or sinus tenderness   Throat:   Lips, mucosa, and tongue normal; teeth and gums normal   Neck:   Supple, symmetrical, trachea midline, no adenopathy;        thyroid:  No enlargement/tenderness/nodules; no carotid    bruit or JVD   Back:     Symmetric, no curvature, ROM normal, no CVA tenderness   Lungs:     Clear to auscultation bilaterally, respirations unlabored   Chest wall:    No tenderness or deformity   Heart:    Regular rate and rhythm, S1 and S2 normal, no murmur, rub   or gallop   Abdomen:     Soft, non-tender, bowel sounds active all four quadrants,     no masses, no organomegaly   Extremities:   Extremities normal, atraumatic, no cyanosis or edema   Pulses:   2+ and symmetric all extremities   Skin:   Skin color, texture, turgor normal, no rashes or lesions   Neurologic:   CNII-XII intact. Normal strength, sensation and reflexes       throughout     Cardiographics and Imaging  Radiology Results: Chest x-ray shows Near complete opacification of the right hemithorax, correlating with recent CT showing prominent consolidation, mass, atelectasis, and pleural fluid. Left lung remains clear. Normal heart size. No pneumothorax.    EKG Results: personally reviewed currently sinus rhythm, incomplete right bundle branch block pattern.    Lab Review   Pertinent Labs  Lab Results: personally reviewed       Lab Results   Component Value Date    TROPONINI 0.12 10/04/2021       Lab Results   Component Value Date    BUN 20 10/03/2021     10/03/2021    CO2 23 10/03/2021       Lab Results   Component Value Date    WBC 5.4 10/03/2021    HGB 7.9 (L) 10/03/2021    HCT 25.2 (L) 10/03/2021    MCV 87 10/03/2021     10/03/2021       Lab Results    Component Value Date     (H) 10/03/2021

## 2021-10-04 NOTE — H&P
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    Shortness of Breath  Pleural Metastasis and Malignant Pleural Effusion  Right Lung Adenocarcinoma  Diagnosed January 2021, follows with Dr. Chavez. Decision made during most recent hospitalization to proceed with systemic chemotherapy this coming week (Wednesday 10/6) and to withhold palliative radiation therapy given that the patient was stable and without any difficulty swallowing at that time. He was discharged with 2LNC home oxygen which was new for him. Shortness of breath has progressively worsened and CXR in the ED shows complete opacification of the right hemithorax consistent with progression of malignant pleural effusion, mass, consolidation and atelectasis. ABG 7.40/40/31/24. Symptomatically improved on high flow nasal cannula. COVID negative. Has had therapeutic thoracenteses earlier in 2021, will likely need again this admission.  - HFNC to maintain oxygen saturations >90%  - pulmonology consulted, appreciate recommendations.    Atrial Fibrillation with Rapid Ventricular Response  Elevated BNP and Troponin  New atrial fibrillation/flutter with RVR, given diltiazem bolus and drip in the ED with continued atrial flutter with rates in the 110s-120s. No ST/T wave changes concerning for ischemia. Discussed with cardiology - recommended continuing the diltiazem drip and starting oral  metoprolol 25 mg QID; they will see the patient in the morning. Troponin 0.12, will trend Q6H. . Etiology most likely this significant pleural effusion, possible pneumonia.  - continue diltiazem drip  - metoprolol 25 mg QID  - telemetry  - cardiology consulted, appreciate recommendations    Elevated Lactate  Possible Pneumonia  Concern for Sepsis  Lactate 2.6 upon arrival, tachycardic (due to atrial fibrillation), tachypneic. Afebrile. WBC 5.4, Procal 0.18. Concern for pneumonia causing sepsis; vanc and zosyn started in the ED after blood cultures were drawn.  - continue zosyn q8h, will defer continuation of vancomycin to day team  - IVNS at 125 ml/hr  - repeat lactate at 0400  - repeat CBC with diff in AM  - blood cultures pending  - consider UA/UC if not improving, though source is likely pulmonary    Recently Diagnosed Pulmonary Emboli  Intermittent Chest Pain  Originally dagnosed on CT incidentally in August 2021, still seen on CT 9/27 and at that time was not taking xarelto consistently due to dysphagia, received heparin while hospitalized 9/28-10/1, discharged on xarelto 20 mg.   - continue xarelto 20 mg  - gabapentin 100 mg TID for pain  - tramadol 50 mg q6h for moderate pain    Hypomagnesemia  1.5 on admission, does not appear to have been checked on prior admissions. Potassium 3.8.  - magnesium replacement protocol    Chronic Problems:  Normocytic Anemia - Takes oral iron, folic acid. Hgb stable. CBC in AM  HIV - Continue PTA Odefsey     Diet: NPO per Anesthesia Guidelines for Procedure/Surgery Except for: Meds - NPO in case of therapeutic thoracentesis or other procedure in AM  DVT Prophylaxis: DOAC  Ponce Catheter: Not present  Fluids: NS at 125 ml/hr  Central Lines: None  Code Status: Full Code - however would benefit from repeated goals of care discussion this admission with possible palliative care consult considering multiple hospitalizations, declining overall status    Disposition Plan    Expected discharge: 2-3 days     The patient's care was discussed with the Patient.    Nicole Eid MD  [facility], PGY-1  Pager #: [phone]    ______________________________________________________________________    Chief Complaint   Shortness of breath    History is obtained from the patient    History of Present Illness   Antonio Mercado is a 66 year old male who has a history of HIV, lung cancer, prostate cancer, PE, recent hospitalization here 9/28-10/1 for weakness, dysphagia, and weight loss secondary to his progressing lung cancer and is admitted for worsening shortness of breath. He was discharged with home oxygen which was new, but has become progressively more short of breath over the past two days. He has had fatigue and cough and difficulty walking because he feels too weak. He has not had fevers.    He is feeling better now on high flow nasal cannula. Not having any dizziness/lightheadedness or chest pain. No nausea/vomiting/diarrhea. Wanting to make it to his chemotherapy appointment on Wednesday. States he felt too weak to even walk to the bathroom at home, was afraid he would fall.    Review of Systems    The 10 point Review of Systems is negative other than noted in the HPI or here.     Past Medical History    I have reviewed this patient's medical history and updated it with pertinent information if needed.   Past Medical History:   Diagnosis Date     Constipation      Heel pain      History of blood transfusion      HIV (human immunodeficiency virus infection) (H)      Hypertrophy of prostate with urinary obstruction      Iron deficiency anemia      LBP (low back pain)      Malignant neoplasm of hepatic flexure (H) 02/04/2016     Paroxysmal atrial fibrillation (H) 10/03/2021     Trigger finger       Past Surgical History   I have reviewed this patient's surgical history and updated it with pertinent information if needed.  Past Surgical History:    Procedure Laterality Date     COLON SURGERY       ESOPHAGOSCOPY, GASTROSCOPY, DUODENOSCOPY (EGD), COMBINED N/A 9/29/2021    Procedure: ESOPHAGOGASTRODUODENOSCOPY (EGD);  Surgeon: Rangel Dye MD;  Location: [facility] OR     GI SURGERY       HERNIA REPAIR       IA ESOPHAGOGASTRODUODENOSCOPY TRANSORAL DIAGNOSTIC N/A 4/13/2021    Procedure: ESOPHAGOGASTRODUODENOSCOPY (EGD);  Surgeon: Harris Mon MD;  Location: [facility];  Service: Gastroenterology     US THORACENTESIS  1/5/2021     US THORACENTESIS  1/18/2021     US THORACENTESIS  1/26/2021     US THORACENTESIS  1/29/2021     US THORACENTESIS  2/2/2021     US THORACENTESIS  2/5/2021     US THORACENTESIS  2/9/2021     US THORACENTESIS  2/12/2021     US THORACENTESIS  2/15/2021     US THORACENTESIS  2/19/2021     US THORACENTESIS  2/23/2021     US THORACENTESIS  2/26/2021     US THORACENTESIS  3/2/2021     US THORACENTESIS  3/9/2021     Social History   I have reviewed this patient's social history and updated it with pertinent information if needed. Antonio Mercado  reports that he has never smoked. He has never used smokeless tobacco. He reports that he does not drink alcohol and does not use drugs.    Family History   I have reviewed this patient's family history and updated it with pertinent information if needed.  Family History   Problem Relation Age of Onset     No Known Problems Mother      No Known Problems Father      No Known Problems Maternal Grandmother      No Known Problems Maternal Grandfather      No Known Problems Paternal Grandmother      No Known Problems Paternal Grandfather      No Known Problems Brother      No Known Problems Sister      No Known Problems Son      No Known Problems Daughter      No Known Problems Maternal Half-Brother      No Known Problems Maternal Half-Sister      No Known Problems Paternal Half-Brother      No Known Problems Paternal Half-Sister      No Known Problems Niece      No Known Problems  Nephew      No Known Problems Cousin      No Known Problems Other      C.A.D. No family hx of      Diabetes No family hx of      Hypertension No family hx of      Hyperlipidemia No family hx of      Breast Cancer No family hx of      Cancer - colorectal No family hx of      Ovarian Cancer No family hx of      Prostate Cancer No family hx of      Depression/Anxiety No family hx of      Cerebrovascular Disease No family hx of      Anesthesia Reaction No family hx of      Thyroid Disease No family hx of      Asthma No family hx of      Osteoporosis No family hx of      Chemical Addiction No family hx of      Known Genetic Syndrome No family hx of      Cancer No family hx of      Coronary Artery Disease No family hx of      Heart Disease No family hx of      Kidney Disease No family hx of      Obesity No family hx of      Thrombosis No family hx of      Arthritis No family hx of      Thyroid Disease No family hx of      Depression No family hx of      Mental Illness No family hx of      Substance Abuse No family hx of      Cystic Fibrosis No family hx of      Early Death No family hx of      Coronary Artery Disease Early Onset No family hx of      Heart Failure No family hx of      Bleeding Diathesis No family hx of      Dementia No family hx of      Uterine Cancer No family hx of      Colorectal Cancer No family hx of      Pancreatic Cancer No family hx of      Lung Cancer No family hx of      Melanoma No family hx of      Autoimmune Disease No family hx of      Unknown/Adopted No family hx of      Genetic Disorder No family hx of        Prior to Admission Medications   Prior to Admission Medications   Prescriptions Last Dose Informant Patient Reported? Taking?   FEROSUL 325 (65 Fe) MG tablet Past Week at Unknown time  No Yes   Sig: Take 1 tablet (325 mg) by mouth daily   acetaminophen (TYLENOL) 500 MG tablet   No Yes   Sig: Take 1-2 tablets (500-1,000 mg) by mouth every 6 hours as needed for mild pain Max dose of  3000mg (6 tabs) in one day.   ammonium lactate (LAC-HYDRIN) 12 % lotion   No Yes   Sig: Apply topically 2 times daily   Patient taking differently: Apply topically daily as needed    benzonatate (TESSALON) 100 MG capsule   No Yes   Sig: Take 1 capsule (100 mg) by mouth 3 times daily as needed for cough   dexamethasone (DECADRON) 4 MG tablet Unknown at Unknown time  No Yes   Sig: Take 1 tablet (4 mg) by mouth 2 times daily (with meals) Start one day prior to Pemetrexed (Alimta), continue on the day of treatment, and the day following Pemetrexed.   docusate sodium (DOK) 100 MG capsule   No Yes   Sig: TAKE 1-2 CAPSULE BY MOUTH DAILY as needed TO KEEP STOOLS SOFT   emtricitabine-rilpivirine-tenofovir (ODEFSEY) 200-25-25 MG TABS per tablet 10/3/2021 at Unknown time  Yes Yes   Sig: Take 1 tablet by mouth daily (with breakfast)   folic acid (FOLVITE) 1 MG tablet Unknown at Unknown time  No Yes   Sig: Take 1 tablet (1,000 mcg) by mouth daily Begin 7 days before Pemetrexed (Alimta) starts and continue for 21 days after Pemetrexed is discontinued.   gabapentin (NEURONTIN) 100 MG capsule 10/3/2021 at am  No Yes   Sig: Take 1 capsule (100 mg) by mouth 3 times daily   loperamide (IMODIUM A-D) 2 MG tablet   No Yes   Sig: Take 1 tablet (2 mg) by mouth 4 times daily as needed for diarrhea   ondansetron (ZOFRAN) 8 MG tablet   No Yes   Sig: Take 1 tablet (8 mg) by mouth every 6 hours as needed for nausea   ondansetron (ZOFRAN) 8 MG tablet   No Yes   Sig: Take 1 tablet (8 mg) by mouth every 8 hours as needed for nausea (vomiting)   osimertinib (TAGRISSO) 80 MG tablet Unknown at Unknown time  No Yes   Sig: Take 1 tablet (80 mg) by mouth daily   prochlorperazine (COMPAZINE) 10 MG tablet   No Yes   Sig: Take 1 tablet (10 mg) by mouth every 6 hours as needed (Nausea/Vomiting)   psyllium (METAMUCIL/KONSYL) 58.6 % powder   No Yes   Sig: Add one teaspoonful to liquid daily   Patient taking differently: Take by mouth daily as needed Add one  teaspoonful to liquid daily   rivaroxaban ANTICOAGULANT (XARELTO ANTICOAGULANT) 20 MG TABS tablet 10/2/2021 at Unknown time  No Yes   Sig: Take 1 tablet (20 mg) by mouth daily (with dinner)   sodium chloride (OCEAN) 0.65 % nasal spray   No Yes   Sig: Two sprays per nostril two times daily as needed.   traMADol (ULTRAM) 50 MG tablet   No Yes   Sig: Take 1 tablet (50 mg) by mouth every 6 hours as needed for moderate pain      Facility-Administered Medications: None     Allergies   Allergies   Allergen Reactions     Stribild [Yopwggk-Yluvyxx-Bwvsqent-Tenof] Itching     Ibuprofen Itching and Other (See Comments)     Oxycodone Other (See Comments)     Prednisone Other (See Comments)     Raltegravir Unknown     Tamsulosin Other (See Comments)     Headache     Tylenol [Acetaminophen] Rash     Gets an itchy rash on back.        Physical Exam   Vital Signs: Temp: 98.1  F (36.7  C) Temp src: Oral BP: 123/70 Pulse: 81   Resp: 21 SpO2: 94 % O2 Device: High Flow Nasal Cannula (HFNC) Oxygen Delivery: 50 LPM  Weight: 140 lbs 0 oz  GEN: Patient sitting comfortably in no acute distress.  HEEN: Head is atraumatic, normocephalic, eyes anicteric, mucous membranes moist.  CV: Regular rate and rhythm without obvious murmurs.  PULM: Clear to auscultation bilaterally without wheezing or rales.  ABD: Soft, nontender, bowel sounds present.  NEURO: Alert and oriented x3.  No focal motor abnormalities.  Face symmetric.  PSYCH: Appropriate affect.  SKIN: No rashes, bruising, or other lesions.      Data   Recent Labs   Lab 10/03/21  2148 10/03/21  2147 10/01/21  0559 09/30/21  0900 09/30/21  0900   WBC  --  5.4 3.7*  --  4.3   HGB  --  7.9* 7.5*  --  7.4*   MCV  --  87 91  --  91   PLT  --  307 294  --  280   INR  --  1.24*  --   --   --      --  139  --  139   POTASSIUM 3.8  --  4.3  --  4.3   CHLORIDE 104  --  111*  --  110*   CO2 23  --  22  --  24   BUN 20  --  6*  --  7*   CR 1.02  --  0.83  --  1.05   ANIONGAP 12  --  6  --  5   BRIGETTE  9.0  --  8.5  --  8.4*   *  --  89  --  80   ALBUMIN 2.5*  --  2.2*   < > 2.2*   PROTTOTAL 6.4  --  5.4*   < > 5.7*   BILITOTAL 0.4  --  0.3   < > 0.4   ALKPHOS 112  --  55   < > 55   ALT 22  --  <9   < > <9   AST 22  --  13   < > 16    < > = values in this interval not displayed.     Recent Results (from the past 24 hour(s))   XR Chest Port 1 View    Narrative    EXAM: XR CHEST PORT 1 VIEW  LOCATION: [facility]  DATE/TIME: 10/3/2021 10:40 PM    INDICATION: sob  COMPARISON: CT chest 09/27/2021.      Impression    IMPRESSION: Near complete opacification of the right hemithorax, correlating with recent CT showing prominent consolidation, mass, atelectasis, and pleural fluid. Left lung remains clear. Normal heart size. No pneumothorax.

## 2021-10-04 NOTE — CONSULTS
PULMONARY MEDICINE CONSULT  10/4/2021      Admit Date: 10/3/2021  CODE: Special Code    Reason for Consult: acute respiratory failure with hypoxemia. Large RUL lung mass with SVC, esophageal obstruction.  Loculated right pleural effusion.      Assessment/Plan:   66 year old man with history of RUL NSCLC stage IV with known pleural mets, known malignant right pleural effusion, HIV, colon cancer s/p right hemicolectomy, prostate cancer, recent admission for dysphagia and weight loss discharged on home O2, presented to ER on 10/4 with worsening SOB. Found to have significant progression of the RUL mass with evidence of esophageal obstruction, SVC effacement, RUL post-obstructive collapse.   Currently boarding in the ER on NIPPV (BiPAP) with stable respiratory status but high risk for decompensation.  Pertinent respiratory issues and plan detailed below    Plan:  #Acute resp failure with hypoxemia requiring HFNC, NIPPV  - continue BiPAP 15/8, FiO2 titrated for goal SpO2 92% or greater, avoid hyperoxia  - if worsening in respiratory status, increased FiO2, would re address goals of care. Prognosis is poor    #Large RUL mass, known cancer, with c/f SVC syndrome with clinical evidence of distended neck veins, facial edema. Also with esophageal obstruction, right hilar encasement, post-obstructive collapse  - emergent rad onc and medical oncology evaluation. Discussed with Dr.'s Manrique and Akash.  - emergent radiation therapy  - start dexamethasone 4mg IV q8h for anticipated swelling post-radiation - discussed with Dr. Manrique.   - IR consult for SVC stent, other measures to relieve malignant SVC obstruction  - Reviewed imaging with Dr. Verdugo (IP at the  and also practices here), no emergent airway issues at this time, trachea widely patent. May benefit from RUL airway inspection +/- endobronchial stenting if his respiratory status is more stable. Onc/rad onc evaluation is more pressing at this time  - does he need esophageal  stenting too? Defer to resident team to review imaging with radiology and determine the extend of esophageal obstruction/involvement.     #Loculated right pleural effusion, known malignant effusion based on cytology from Jan 2021  - IR consult for non-tunneled chest tube placement. May require intrapleural lytics once more stable    #Small LLL PE's diagnosed on CTA chest from 9/27: was on Xarelto  - Start UFH drip once no further invasive procedures planned today. Hold NOAC.     #Malignant and highly aggressive RUL lung cancer (adenoCa) with invasion of multiple mediastinal structures as noted above  - palliative care consult  - discussed code status today with nadine. He elects to be DNI. Discussed that DNR and DNI usually go together. He is OK for chest compressions/CPR but does not want to be put on vent. Code status updated.    Will continue to follow.    Discussed with multiple providers including resident team, rad onc, oncology, and ER physician (Dr. Weldon).     Michel (Ellis) MD Kvng  [facility] Pulmonary & Critical Care  Pager [phone]  Clinic [phone]  Fax [phone]                                                                                                                                                         HPI:   CCx: acute respiratory failure with hypoxemia. Large RUL lung mass with SVC, esophageal obstruction.  Loculated right pleural effusion.    HPI: 66 year old man with history of RUL NSCLC stage IV with known pleural mets, known malignant right pleural effusion, HIV, colon cancer s/p right hemicolectomy, prostate cancer, recent admission for dysphagia and weight loss discharged on home O2, presented to ER on 10/4 with worsening SOB. Found to have significant progression of the RUL mass with evidence of esophageal obstruction, SVC effacement, RUL post-obstructive collapse.   Placed on BiPAP in the ER due to persistent hypoxemia despite HFNC.  Breathing  comfortably when I saw him, SpO2 97% on BiPAP 15/8 FiO2 0.5   Also had rapid afib/RVR requiring diltiazem infusion with improvement in HR.   Bedside right pleural US with large, complex pleural fluid collection on the right with multiple loculations and septations.  Discussed with ER, oncology, rad onc, resident team.   Also found to have small PE's in the LLL on CT from 9/27 and was discharged on Xarelto.                                                                                                                                                     Past Medical History:  Past Medical History:   Diagnosis Date     Constipation      Heel pain      History of blood transfusion      HIV (human immunodeficiency virus infection) (H)      Hypertrophy of prostate with urinary obstruction      Iron deficiency anemia      LBP (low back pain)      Malignant neoplasm of hepatic flexure (H) 02/04/2016     Paroxysmal atrial fibrillation (H) 10/03/2021     Trigger finger        Past Surgical History  Past Surgical History:   Procedure Laterality Date     COLON SURGERY       ESOPHAGOSCOPY, GASTROSCOPY, DUODENOSCOPY (EGD), COMBINED N/A 9/29/2021    Procedure: ESOPHAGOGASTRODUODENOSCOPY (EGD);  Surgeon: Rangel Dye MD;  Location: [facility] [place] OR     GI SURGERY       HERNIA REPAIR       LA ESOPHAGOGASTRODUODENOSCOPY TRANSORAL DIAGNOSTIC N/A 4/13/2021    Procedure: ESOPHAGOGASTRODUODENOSCOPY (EGD);  Surgeon: Harris Mon MD;  Location: [facility];  Service: Gastroenterology     US THORACENTESIS  1/5/2021     US THORACENTESIS  1/18/2021     US THORACENTESIS  1/26/2021     US THORACENTESIS  1/29/2021     US THORACENTESIS  2/2/2021     US THORACENTESIS  2/5/2021     US THORACENTESIS  2/9/2021     US THORACENTESIS  2/12/2021     US THORACENTESIS  2/15/2021     US THORACENTESIS  2/19/2021     US THORACENTESIS  2/23/2021     US THORACENTESIS  2/26/2021     US THORACENTESIS  3/2/2021     US THORACENTESIS  3/9/2021        Allergies:  Allergies   Allergen Reactions     Stribild [Lmyewkr-Xludgsd-Qqgbayzw-Tenof] Itching     Ibuprofen Itching and Other (See Comments)     Oxycodone Other (See Comments)     Prednisone Other (See Comments)     Raltegravir Unknown     Tamsulosin Other (See Comments)     Headache     Tylenol [Acetaminophen] Rash     Gets an itchy rash on back.        PTA medications:  (Not in a hospital admission)      Family Hx:  Family History   Problem Relation Age of Onset     No Known Problems Mother      No Known Problems Father      No Known Problems Maternal Grandmother      No Known Problems Maternal Grandfather      No Known Problems Paternal Grandmother      No Known Problems Paternal Grandfather      No Known Problems Brother      No Known Problems Sister      No Known Problems Son      No Known Problems Daughter      No Known Problems Maternal Half-Brother      No Known Problems Maternal Half-Sister      No Known Problems Paternal Half-Brother      No Known Problems Paternal Half-Sister      No Known Problems Niece      No Known Problems Nephew      No Known Problems Cousin      No Known Problems Other      C.A.D. No family hx of      Diabetes No family hx of      Hypertension No family hx of      Hyperlipidemia No family hx of      Breast Cancer No family hx of      Cancer - colorectal No family hx of      Ovarian Cancer No family hx of      Prostate Cancer No family hx of      Depression/Anxiety No family hx of      Cerebrovascular Disease No family hx of      Anesthesia Reaction No family hx of      Thyroid Disease No family hx of      Asthma No family hx of      Osteoporosis No family hx of      Chemical Addiction No family hx of      Known Genetic Syndrome No family hx of      Cancer No family hx of      Coronary Artery Disease No family hx of      Heart Disease No family hx of      Kidney Disease No family hx of      Obesity No family hx of      Thrombosis No family hx of      Arthritis No family hx  of      Thyroid Disease No family hx of      Depression No family hx of      Mental Illness No family hx of      Substance Abuse No family hx of      Cystic Fibrosis No family hx of      Early Death No family hx of      Coronary Artery Disease Early Onset No family hx of      Heart Failure No family hx of      Bleeding Diathesis No family hx of      Dementia No family hx of      Uterine Cancer No family hx of      Colorectal Cancer No family hx of      Pancreatic Cancer No family hx of      Lung Cancer No family hx of      Melanoma No family hx of      Autoimmune Disease No family hx of      Unknown/Adopted No family hx of      Genetic Disorder No family hx of        Social Hx:  Social History     Socioeconomic History     Marital status:      Spouse name: Not on file     Number of children: Not on file     Years of education: Not on file     Highest education level: Not on file   Occupational History     Not on file   Tobacco Use     Smoking status: Never Smoker     Smokeless tobacco: Never Used   Substance and Sexual Activity     Alcohol use: No     Drug use: No     Sexual activity: Not Currently     Partners: Female     Comment: No intercourse for >1 year.   Other Topics Concern     Not on file   Social History Narrative    From [facility]    2 adult children live at home. Wife lives in [place] for work.      Social Determinants of Health     Financial Resource Strain:      Difficulty of Paying Living Expenses:    Food Insecurity:      Worried About Running Out of Food in the Last Year:      Ran Out of Food in the Last Year:    Transportation Needs:      Lack of Transportation (Medical):      Lack of Transportation (Non-Medical):    Physical Activity:      Days of Exercise per Week:      Minutes of Exercise per Session:    Stress:      Feeling of Stress :    Social Connections:      Frequency of Communication with Friends and Family:      Frequency of Social Gatherings with Friends and Family:       Attends [employer] Services:      Active Member of Clubs or Organizations:      Attends Club or Organization Meetings:      Marital Status:    Intimate Partner Violence:      Fear of Current or Ex-Partner:      Emotionally Abused:      Physically Abused:      Sexually Abused:        Exam/Data:     Vitals  /81   Pulse 79   Temp 99.1  F (37.3  C) (Rectal)   Resp 22   Wt 63.5 kg (140 lb)   SpO2 97%   BMI 19.53 kg/m    BP - Mean:  [] 97  I/O last 3 completed shifts:  In: 900 [IV Piggyback:900]  Out: -   Weight change:   [unfilled]  EXAM:  /81   Pulse 79   Temp 99.1  F (37.3  C) (Rectal)   Resp 22   Wt 63.5 kg (140 lb)   SpO2 97%   BMI 19.53 kg/m      Intake/Output last 3 shifts:  I/O last 3 completed shifts:  In: 900 [IV Piggyback:900]  Out: -   Intake/Output this shift:  No intake/output data recorded.    Physical Exam  Gen: awake, alert, oriented, no distress  HEENT: NT, no ELVA. Significant facial edema R > L, distended neck veins on the right  CV: RRR, no m/g/r  Resp: markedly diminished BS on the right. No wheezing or rhonchi.   Abd: soft, nontender, BS+  Skin: no rashes or lesions  Ext: no edema  Neuro: PERRL, nonfocal exam    ROS: A complete 10-system review of systems was obtained and is negative with the exception of what is noted in the history of present illness.    Medications:       dilTIAZem HCl-Sodium Chloride 15 mg/hr (10/04/21 1035)     - MEDICATION INSTRUCTIONS -       sodium chloride 125 mL/hr at 10/04/21 1034       dexamethasone  4 mg Intravenous Q8H     emtricitabine-rilpivirine-tenofovir  1 tablet Oral Daily with breakfast     gabapentin  100 mg Oral TID     metoprolol tartrate  25 mg Oral 4x Daily     piperacillin-tazobactam  3.375 g Intravenous Q8H     [Held by provider] rivaroxaban ANTICOAGULANT  20 mg Oral Daily with supper     sodium chloride (PF)  3 mL Intracatheter Q8H         DATA  All laboratory and radiology has been personally reviewed by myself  today.  Recent Labs   Lab 10/03/21  2147   WBC 5.4   HGB 7.9*   HCT 25.2*        Recent Labs   Lab 10/03/21  2148 10/01/21  0559 09/30/21  0900    139 139   CO2 23 22 24   BUN 20 6* 7*   ALKPHOS 112 55 55   ALT 22 <9 <9   AST 22 13 16     Right thora Aug 2021  0.7L of bloody fluid removed  Cytology suspicious for malignant cells  No other studies sent    PFT DATA   N/a    Micro  PCT neg  covid neg on 10/3      IMAGING:   Personally reviewed

## 2021-10-04 NOTE — PROVIDER NOTIFICATION
2145 10/3/21: Patient initiated on heated and humidified HFNC at ~2145 10/3/21 d/t marked desaturation while on 8lpm oxymask. Reportedly found with Sp02 75% at home while on 2lpm. ABG (ended up being VBG) showed pH7.4/PC02 40/P02 31/HC03 24--done before HFNC, while on 8lpm oxymask. Here with pleural metastasis and malignant pleural effusion in the setting of right lung adenocarcinoma. A-fib with RVR. Appears comfortable with the system at 50lpm/50%, Sp02 mid 90s, resp rate in the mid 20s. Will closely monitor for signs of decompensation..        10/03/21 2145   Oxygen Therapy   SpO2 99 %   O2 Device High Flow Nasal Cannula (HFNC)   FiO2 (%) 50 %   Oxygen Delivery 50 LPM     Addendum at 0450 10/4: Multiple episode of desaturations while on the HFNC; increased WOB with any movement. Stating he's tired. Decision to transition him to BiPAP. Initially set at ST14 15/8cmH20 and 50%. Will do ABG 30 min post BiPAP initiation.     /76   Pulse 96   Temp 98.1  F (36.7  C) (Oral)   Resp (!) 36   Wt 63.5 kg (140 lb)   SpO2 96%   BMI 19.53 kg/m       Chepe Dumont, RRT

## 2021-10-04 NOTE — PROGRESS NOTES
Clinic Care Coordination Contact    Background: Care Coordination referral placed from [facility] discharge report for reason of patient meeting criteria for a TCM outreach call by [facility] team.    Assessment: Upon chart review, [facility] Team member will cancel/close the referral for TCM outreach due to reason below:       Patient has presented to Emergency Department or has been readmitted to hospital.      Aviva Clark MA  [facility], [facility]

## 2021-10-04 NOTE — SIGNIFICANT EVENT
Notified by radiology of worsening pulmonary emboli as compared to that on 9/27. Pt with right lung adenocarcinoma with pleural mets and malignant pleural effusion. Today he had emergent radiation and thoracentesis. CT PE run performed, identifying the pulmonary embolism. Pt currently stable on BiPAP, though high risk of decompensation.    Xarelto was held given procedure today.     Discussed case with Pulmonologist Dr. Calderon, who initiated therapeutic lovenox 1mg/kg.     Will continue to monitor.    Bry Brantley, DO

## 2021-10-04 NOTE — ED NOTES
Pt desats when he has any movement, pt has used urinal x2 and sats got to 88%.  Pt still tolerating the high flow oxygen when no exertion or movement.

## 2021-10-04 NOTE — PROGRESS NOTES
[facility] Radiation Oncology Follow Up Note    Patient: Antonio Mercado  MRN: [id number]  Date of Service: 10/04/2021          Mr. Antonio Mercado is a pleasant 66-year-old gentleman who has a history of metastatic lung cancer.  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  The patient also had a history of colon cancer status post surgery and history of prostate cancer with elevated PSA who declined follow-up and treatment.  Patient presented with a recent history of difficulty swallowing and underwent upper GI endoscopy on 9/29/2021 which showed no evidence of esophageal lesion.  The patient now is able to use eat and swallowing without any significant difficulty.  His most recent CT chest on 9/27/2021 showed a large right upper lobe mass and right pleural metastasis consistent with the progression of lung cancer.    The patient was initially seen and evaluated on 9/30/2021 for consideration of possible palliative radiation therapy. I have discussed his case with Dr. Chavez, medical oncology earlier today.  The patient is stable without any significant difficulty of swallowing.  His upper GI endoscopy showed no significant esophageal lesion or obstruction.  The decision is to proceed with systemic therapy next week.  We will withhold palliative radiation therapy for the future.   The patient was then discharged home and plan to start his systemic therapy this week.  Unfortunately he presented with acute onset of shortness of breath for which he was admitted to ER for further evaluation and management.  The patient was found to have significant progression of the RUL mass with evidence of esophageal obstruction, SVC effacement, RUL post-obstructive collapse. Currently boarding in the ER on NIPPV (BiPAP) with stable respiratory status but high risk for decompensation.  He is now referred to radiation oncology for evaluation and consideration of possible palliative radiation therapy to  the mediastinal region for local control and symptom relief.    Assessment / Impression     Stage IV non-small cell lung cancer with clinically symptomatic mediastinal disease causing lung collapse and pulmonary vessel compression.    Plan:     I have personally reviewed his upcoming medical record today.  I have also reviewed his most recent radiology study including CT scan.  This is a 66-year-old gentleman with a diagnosis of stage IV lung cancer with clinically symptomatic mediastinal disease causing lung collapse and pulmonary vessel compression.  His symptoms has been seen in progress over the past few days.  The possible treatment options including surgery, systemic therapy, and radiation therapy has been discussed with the patient in detail and at great lengths.  The possible risks and side effects of radiation therapy has also been explained to the patient.  I agree the patient is a good candidate to considering a short course of palliative radiation therapy targeted to the mediastinum region for local control and symptom relief.  Therefore radiation therapy is offered to the patient and he is willing to proceed being aware of potential risks and side effects involved.  He is scheduled to return to radiation oncology later on today for simulation.  I plan to give radiation therapy to a total dose of 2000 cGy in 5 treatments.  His radiation therapy is planned to start today.     I spent about 20 minutes today with the patient 80% time was used for counseling.        Liza Manrique MD, PhD  Department of Radiation Oncology   [facility] Radiation Oncology  Tel: [phone]  Page: [phone]    [facility]  [address] [place] [address]     [place] [id number] [facility] [place] [id number]

## 2021-10-05 NOTE — CONSULTS
[facility] Hematology and Oncology Inpatient Consult Note    Patient: Antonio Mercado  MRN: [id number]  Date of Service: 10/5/2021      Reason for Visit    I was consulted by Dr. Garrison regarding metastatic lung cancer    Assessment/Plan    Acute respiratory failure with hypoxemia  Rapidly progressive metastatic lung adenocarcinoma with right lung mass and invasion of mediastinal structures  Pulmonary emboli  Loculated right pleural effusion  Paroxysmal atrial fibrillation      Case discussed with pulmonary and radiation oncology.  Patient with rapid progression of his metastatic lung cancer.  Hypoxic respiratory failure due to combination of right lung collapse and pleural effusion.    Plan for now is to continue with daily palliative radiation therapy which was started yesterday for additional 4 doses.    We will consider starting low-dose concurrent chemotherapy in the hospital as well.    He will have chest tube placed for removal of pleural effusion to see how this will improve his oxygenation.    Continue anticoagulation preferably with heparin and hold Xarelto.    Overall prognosis is poor.  Patient elects to be DNI but okay for chest compressions.    No indication for transfer to the [facility] at this point.    We will speak to the son    Plan: As above  ECOG Performance Status: 3            ______________________________________________________________________________      Staging History    Cancer Staging  Malignant neoplasm of hepatic flexure (H)  Staging form: Colon and Rectum, AJCC 7th Edition  - Clinical stage from 2/20/2016: Stage IIIB (T4a, N1c, M0) - Signed by Harris Mary MD on 2/20/2017        History  Mr. Antonio Mercado is a 66 year old with past history of HIV, colon cancer status post hemicolectomy in July 2016, prostate cancer diagnosed in January 2017 and recent diagnosis of metastatic right lung adenocarcinoma with malignant pleural effusion which was diagnosed earlier  this year.    Patient initially treated with chemotherapy with response.  Tumor was noted to have EGFR mutation so he was started on Osimertinib with excellent response.    2 weeks ago he was noted with significant progression of disease along with worsening pulmonary emboli and was admitted to the hospital.  Echocardiogram did not show tamponade.  Upper endoscopy did not show esophageal compression.  He improved with IV fluids and was discharged with plans for systemic therapy initiation this week.    Readmitted with progressive shortness of breath and hypoxia with CT showing worsening pulmonary emboli and worsening infiltrates in the right lung.    He has required increased oxygen and did have first dose of palliative radiation therapy yesterday.  He will have chest tube placed today.  Oxygenation has improved.  He has expressed a desire for no intubation but does want resuscitation.    Denies pain or fever.  Continues to have cough but no hemoptysis.    Review of systems.  No fever or night sweats.  No loss of weight.  No lumps or bumps anywhere.  No unusual headaches or eyesight issues.  No dizziness.  No bleeding from the nose.  No sores in the mouth. No problems with swallowing.  No chest pain. No shortness of breath. No cough.  No abdominal pain. No nausea or vomiting.  No diarrhea or constipation.  No blood in stool or black colored stools.  No problems passing urine.  No numbness or tingling in hands or feet.  No skin rashes.  A 14 point review of systems is otherwise negative.        Past History  Past Medical History:   Diagnosis Date     Constipation      Heel pain      History of blood transfusion      HIV (human immunodeficiency virus infection) (H)      Hypertrophy of prostate with urinary obstruction      Iron deficiency anemia      LBP (low back pain)      Malignant neoplasm of hepatic flexure (H) 02/04/2016     Paroxysmal atrial fibrillation (H) 10/03/2021     Trigger finger      Past Surgical  History:   Procedure Laterality Date     COLON SURGERY       ESOPHAGOSCOPY, GASTROSCOPY, DUODENOSCOPY (EGD), COMBINED N/A 9/29/2021    Procedure: ESOPHAGOGASTRODUODENOSCOPY (EGD);  Surgeon: Rangel Dye MD;  Location: [facility]     GI SURGERY       HERNIA REPAIR       MT ESOPHAGOGASTRODUODENOSCOPY TRANSORAL DIAGNOSTIC N/A 4/13/2021    Procedure: ESOPHAGOGASTRODUODENOSCOPY (EGD);  Surgeon: Harris Mon MD;  Location: [facility];  Service: Gastroenterology     US THORACENTESIS  1/5/2021     US THORACENTESIS  1/18/2021     US THORACENTESIS  1/26/2021     US THORACENTESIS  1/29/2021     US THORACENTESIS  2/2/2021     US THORACENTESIS  2/5/2021     US THORACENTESIS  2/9/2021     US THORACENTESIS  2/12/2021     US THORACENTESIS  2/15/2021     US THORACENTESIS  2/19/2021     US THORACENTESIS  2/23/2021     US THORACENTESIS  2/26/2021     US THORACENTESIS  3/2/2021     US THORACENTESIS  3/9/2021     Family History   Problem Relation Age of Onset     No Known Problems Mother      No Known Problems Father      No Known Problems Maternal Grandmother      No Known Problems Maternal Grandfather      No Known Problems Paternal Grandmother      No Known Problems Paternal Grandfather      No Known Problems Brother      No Known Problems Sister      No Known Problems Son      No Known Problems Daughter      No Known Problems Maternal Half-Brother      No Known Problems Maternal Half-Sister      No Known Problems Paternal Half-Brother      No Known Problems Paternal Half-Sister      No Known Problems Niece      No Known Problems Nephew      No Known Problems Cousin      No Known Problems Other      C.A.D. No family hx of      Diabetes No family hx of      Hypertension No family hx of      Hyperlipidemia No family hx of      Breast Cancer No family hx of      Cancer - colorectal No family hx of      Ovarian Cancer No family hx of      Prostate Cancer No family hx of      Depression/Anxiety No family hx of       Cerebrovascular Disease No family hx of      Anesthesia Reaction No family hx of      Thyroid Disease No family hx of      Asthma No family hx of      Osteoporosis No family hx of      Chemical Addiction No family hx of      Known Genetic Syndrome No family hx of      Cancer No family hx of      Coronary Artery Disease No family hx of      Heart Disease No family hx of      Kidney Disease No family hx of      Obesity No family hx of      Thrombosis No family hx of      Arthritis No family hx of      Thyroid Disease No family hx of      Depression No family hx of      Mental Illness No family hx of      Substance Abuse No family hx of      Cystic Fibrosis No family hx of      Early Death No family hx of      Coronary Artery Disease Early Onset No family hx of      Heart Failure No family hx of      Bleeding Diathesis No family hx of      Dementia No family hx of      Uterine Cancer No family hx of      Colorectal Cancer No family hx of      Pancreatic Cancer No family hx of      Lung Cancer No family hx of      Melanoma No family hx of      Autoimmune Disease No family hx of      Unknown/Adopted No family hx of      Genetic Disorder No family hx of      Social History     Socioeconomic History     Marital status:      Spouse name: None     Number of children: None     Years of education: None     Highest education level: None   Occupational History     None   Tobacco Use     Smoking status: Never Smoker     Smokeless tobacco: Never Used   Substance and Sexual Activity     Alcohol use: No     Drug use: No     Sexual activity: Not Currently     Partners: Female     Comment: No intercourse for >1 year.   Other Topics Concern     None   Social History Narrative    From [facility]    2 adult children live at home. Wife lives in [place] for work.      Social Determinants of Health     Financial Resource Strain:      Difficulty of Paying Living Expenses:    Food Insecurity:      Worried About Running Out of  Food in the Last Year:      Ran Out of Food in the Last Year:    Transportation Needs:      Lack of Transportation (Medical):      Lack of Transportation (Non-Medical):    Physical Activity:      Days of Exercise per Week:      Minutes of Exercise per Session:    Stress:      Feeling of Stress :    Social Connections:      Frequency of Communication with Friends and Family:      Frequency of Social Gatherings with Friends and Family:      Attends Druze Services:      Active Member of Clubs or Organizations:      Attends Club or Organization Meetings:      Marital Status:    Intimate Partner Violence:      Fear of Current or Ex-Partner:      Emotionally Abused:      Physically Abused:      Sexually Abused:        Allergies    Allergies   Allergen Reactions     Stribild [Uqtfvpg-Qtisbxs-Dxiokqst-Tenof] Itching     Ibuprofen Itching and Other (See Comments)     Oxycodone Other (See Comments)     Prednisone Other (See Comments)     Raltegravir Unknown     Tamsulosin Other (See Comments)     Headache     Tylenol [Acetaminophen] Rash     Gets an itchy rash on back.           Physical Exam    BP (!) 150/79 (BP Location: Right arm)   Pulse 93   Temp 97.5  F (36.4  C) (Oral)   Resp 24   Wt 63.5 kg (140 lb)   SpO2 90%   BMI 19.53 kg/m      GENERAL: Alert and oriented to time place and person. Seated comfortably. In no distress.    HEAD: Atraumatic and normocephalic.    EYES: KARIME, EOMI.  No pallor.  No icterus.    Oral cavity: no mucosal lesion or tonsillar enlargement.    NECK: supple. JVP normal.  No thyroid enlargement.    LYMPH NODES: No palpable, cervical, axillary or inguinal lymphadenopathy.    CHEST: Decreased breath sounds on the right  Resonant to percussion throughout bilaterally.  Symmetrical breath movements bilaterally.    CVS: S1 and S2 are heard. Regular rate and rhythm.  No murmur or gallop or rub heard.  No peripheral edema.    ABDOMEN: Soft. Not tender. Not distended.  No palpable hepatomegaly or  splenomegaly.  No other mass palpable.  Bowel sounds heard.    EXTREMITIES: Warm.    SKIN: no rash, or bruising or purpura.  Has a full head of hair.    CNS: Nonfocal    Lab Results  Recent Results (from the past 24 hour(s))   Troponin I    Collection Time: 10/04/21  1:27 PM   Result Value Ref Range    Troponin I 0.12 0.00 - 0.29 ng/mL   Extra Red Top Tube    Collection Time: 10/04/21  8:50 PM   Result Value Ref Range    Hold Specimen [facility]    Basic metabolic panel    Collection Time: 10/05/21  4:57 AM   Result Value Ref Range    Sodium 144 136 - 145 mmol/L    Potassium 4.2 3.5 - 5.0 mmol/L    Chloride 112 (H) 98 - 107 mmol/L    Carbon Dioxide (CO2) 23 22 - 31 mmol/L    Anion Gap 9 5 - 18 mmol/L    Urea Nitrogen 19 8 - 22 mg/dL    Creatinine 0.86 0.70 - 1.30 mg/dL    Calcium 8.9 8.5 - 10.5 mg/dL    Glucose 121 70 - 125 mg/dL    GFR Estimate 89 >60 mL/min/1.73m2   Magnesium    Collection Time: 10/05/21  4:57 AM   Result Value Ref Range    Magnesium 1.8 1.8 - 2.6 mg/dL   CBC with platelets and differential    Collection Time: 10/05/21  4:57 AM   Result Value Ref Range    WBC Count 5.0 4.0 - 11.0 10e3/uL    RBC Count 2.82 (L) 4.40 - 5.90 10e6/uL    Hemoglobin 7.5 (L) 13.3 - 17.7 g/dL    Hematocrit 24.8 (L) 40.0 - 53.0 %    MCV 88 78 - 100 fL    MCH 26.6 26.5 - 33.0 pg    MCHC 30.2 (L) 31.5 - 36.5 g/dL    RDW 15.2 (H) 10.0 - 15.0 %    Platelet Count 272 150 - 450 10e3/uL    % Neutrophils 87 %    % Lymphocytes 8 %    % Monocytes 4 %    % Eosinophils 0 %    % Basophils 0 %    % Immature Granulocytes 1 %    NRBCs per 100 WBC 0 <1 /100    Absolute Neutrophils 4.4 1.6 - 8.3 10e3/uL    Absolute Lymphocytes 0.4 (L) 0.8 - 5.3 10e3/uL    Absolute Monocytes 0.2 0.0 - 1.3 10e3/uL    Absolute Eosinophils 0.0 0.0 - 0.7 10e3/uL    Absolute Basophils 0.0 0.0 - 0.2 10e3/uL    Absolute Immature Granulocytes 0.0 <=0.0 10e3/uL    Absolute NRBCs 0.0 10e3/uL        Imaging Results    Echocardiogram Complete    Result Date:  2021  [id number]^[email or web address]^EDSON  [facility] [address] [place] [address]  Name: GIOVANNI DISLA MRN: [id number] : 1955 Study Date: 2021 09:12 AM Age: 66 yrs Gender: Male Patient Location: [facility] Reason For Study: Syncope Ordering Physician: EDSON CHOUDHURY Performed By: YELENA  BSA: 1.8 m2 Height: 71 in Weight: 140 lb ______________________________________________________________________________ Procedure Complete Portable Echo Adult. No hemodynamically significant valvular abnormalities on 2D or color flow imaging. There is no comparison study available. ______________________________________________________________________________ Interpretation Summary  1.Left ventricular size, wall motion and function are normal. The ejection fraction is > 65%. 2.TAPSE is normal, which is consistent with normal right ventricular systolic function. 3.Right ventricular systolic pressure is elevated, consistent with mild pulmonary hypertension. 4.No hemodynamically significant valvular abnormalities on 2D or color flow imaging. 5.There is no comparison study available. ______________________________________________________________________________ I      WMSI = 1.00     % Normal = 100  X - Cannot   0 -                      (2) - Mildly 2 -          Segments  Size Interpret    Hyperkinetic 1 - Normal  Hypokinetic  Hypokinetic  1-2     small                                                    7 -          3-5    moderate 3 - Akinetic 4 -          5 -         6 - Akinetic Dyskinetic   6-14    large              Dyskinetic   Aneurysmal  w/scar       w/scar       15-16   diffuse  Left Ventricle Left ventricular size, wall motion and function are normal. The ejection fraction is > 65%. There is normal left ventricular wall thickness. Left ventricular diastolic function is normal. No regional wall motion abnormalities noted.  Right Ventricle Normal right ventricle size and systolic  function. TAPSE is normal, which is consistent with normal right ventricular systolic function.  Atria Normal left atrial size. Right atrial size is normal. There is no color Doppler evidence of an atrial shunt.  Mitral Valve Mitral valve leaflets appear normal. There is no evidence of mitral stenosis or clinically significant mitral regurgitation. There is no mitral regurgitation noted. There is no mitral valve stenosis.  Tricuspid Valve Tricuspid valve leaflets appear normal. There is mild (1+) tricuspid regurgitation. Right ventricular systolic pressure is elevated, consistent with mild pulmonary hypertension. There is no tricuspid stenosis.  Aortic Valve Aortic valve leaflets appear normal. There is no evidence of aortic stenosis or clinically significant aortic regurgitation. The aortic valve is trileaflet. No aortic regurgitation is present. No aortic stenosis is present.  Pulmonic Valve The pulmonic valve is not well seen, but is grossly normal. This degree of valvular regurgitation is within normal limits. There is no pulmonic valvular stenosis.  Vessels The aorta root is normal. Normal size ascending aorta. IVC diameter <2.1 cm collapsing >50% with sniff suggests a normal RA pressure of 3 mmHg.  Pericardium There is no pericardial effusion.  ______________________________________________________________________________ MMode/2D Measurements & Calculations IVSd: 0.85 cm LVIDd: 4.2 cm LVIDs: 2.7 cm LVPWd: 0.87 cm FS: 35.2 % LV mass(C)d: 108.9 grams LV mass(C)dI: 60.1 grams/m2  Ao root diam: 3.4 cm LA dimension: 2.4 cm asc Aorta Diam: 3.6 cm LA/Ao: 0.71 LVOT diam: 2.0 cm LVOT area: 3.1 cm2 LA Volume Indexed (AL/bp): 12.9 ml/m2 RWT: 0.42  Time Measurements MM HR: 75.0 BPM  Doppler Measurements & Calculations MV E max siria: 69.1 cm/sec MV A max siria: 91.6 cm/sec MV E/A: 0.75 MV dec time: 0.26 sec LV V1 max P.6 mmHg LV V1 max: 106.8 cm/sec LV V1 VTI: 16.7 cm SV(LVOT): 52.4 ml SI(LVOT): 28.9 ml/m2 PI end-d siria:  181.3 cm/sec TR max siria: 312.2 cm/sec TR max P.0 mmHg E/E' av.0 Lateral E/e': 6.9 Medial E/e': 9.1  ______________________________________________________________________________ Report approved by: Dana Zurita 2021 11:12 AM       US Thoracentesis    Result Date: 10/5/2021  EXAM: 1. RIGHT THORACENTESIS 2. ULTRASOUND GUIDANCE LOCATION: [facility] DATE/TIME: 10/4/2021 5:04 PM INDICATION: Pleural effusion. PROCEDURE: Informed consent obtained. Time out performed. The chest was prepped and draped in sterile fashion. 10 mL of 1 % lidocaine was infused into the local soft tissues. Under direct ultrasound guidance, a 5 Danish catheter system was placed into the pleural effusion. 100 milliliters of dark red fluid were removed and sent to lab, if requested. Patient tolerated procedure well. Ultrasound imaging was obtained and placed in the patient's permanent medical record.     IMPRESSION: Status post right ultrasound-guided thoracentesis. Patient's right pleural space has developed numerous heavy loculations. This limits the ability to perform large volume thoracentesis. Reference CPT Code: 42902    XR Chest Port 1 View    Result Date: 10/3/2021  EXAM: XR CHEST PORT 1 VIEW LOCATION: [facility] DATE/TIME: 10/3/2021 10:40 PM INDICATION: sob COMPARISON: CT chest 2021.     IMPRESSION: Near complete opacification of the right hemithorax, correlating with recent CT showing prominent consolidation, mass, atelectasis, and pleural fluid. Left lung remains clear. Normal heart size. No pneumothorax.    CT Chest Pulmonary Embolism w Contrast    Result Date: 10/4/2021  EXAM: CT CHEST PULMONARY EMBOLISM W CONTRAST LOCATION: [facility] DATE/TIME: 10/4/2021 5:01 PM INDICATION: Known metastatic lung cancer on the right side. Enlarging pleural effusion. Concern for SVC syndrome. Thoracentesis just prior to this study shows extensively loculated pleural  fluid with only 100 mL removed. COMPARISON: 09/27/2021. TECHNIQUE: CT chest pulmonary angiogram during arterial phase injection of IV contrast. Multiplanar reformats and MIP reconstructions were performed. Dose reduction techniques were used. CONTRAST: Isovue 370 100 mL FINDINGS: ANGIOGRAM CHEST: The previously noted pulmonary emboli seen on 09/27/2021 left lung has progressed in extent. No emboli on the right side. Thoracic aorta is negative for dissection. No CT evidence of right heart strain. LUNGS AND PLEURA: No significant change in the large central mass right upper lobe measuring 6.2 x 7.3 cm. Complete consolidation of the right upper lobe and marked atelectasis of the right lower lobe has increased. Extensive pleural metastases on the right have not significantly changed. Large loculated right pleural fluid collection fairly similar to previous. On ultrasound, this was multiloculated. This measures approximately 11 x 8 x 15 cm. MEDIASTINUM/AXILLAE: Mildly enlarged subcarinal node. SVC is normal in caliber without findings to suggest SVC stenosis. UPPER ABDOMEN: Normal. MUSCULOSKELETAL: Normal.     IMPRESSION: 1.  Positive for acute pulmonary emboli that has progressed since 09/27/2021. Left side. 2.  No significant change in the large central mass right upper lobe with no significant change in the extensive right pleural metastases. 3.  Loculated pleural fluid laterally on ultrasound earlier today was multi-septated. 4.  The most significant change in the right lung is increasing atelectasis in the right lower lobe. 5.  Similar-appearing dense consolidation right upper lobe. 6.  SVC appears widely patent. 7.  Findings called to the clinical service at 6:12 PM.    CT Chest Tube with Cath Placement    Result Date: 10/5/2021  EXAM: 1. PERCUTANEOUS CHEST TUBE PLACEMENT RIGHT 2. CT GUIDANCE 3. CONSCIOUS SEDATION LOCATION: [facility] DATE/TIME: 10/5/2021 10:25 AM INDICATION: large right  loculated pleural effusion, needs chest tube for intrapleural lytics. TECHNIQUE: Dose reduction techniques were used. PROCEDURE: Informed consent obtained. Site marked. Prior images reviewed. Required items made available. Patient identity confirmed verbally and with arm band. Patient reevaluated immediately before administering sedation. Universal protocol was followed. Time out performed. The site was prepped and draped in sterile fashion. 10 mL of 1% lidocaine was infused into the local soft tissues. Using standard technique and under direct CT guidance, a 12 Sami nonlocking all-purpose drain chest tube catheter was inserted into the pleural space. The catheter was fixed in place with sutures and adhesive device, and the tubing was banded. Chest tube placed to Pleur-evac suction. SPECIMEN: None. The patient had ultrasound-guided thoracentesis yesterday with fluid sent. BLOOD LOSS: Minimal. The patient tolerated the procedure well. No immediate complications. SEDATION: Versed 1 mg. Fentanyl 0 mcg. The procedure was performed with administration intravenous conscious sedation with appropriate preoperative, intraoperative, and postoperative evaluation. 10 minutes of supervised face to face conscious sedation time was provided by a radiology nurse under my direct supervision.     IMPRESSION: 1.  Successful CT-guided chest tube placement into the right pleural space. Reference CPT Codes: 56159,       Signed by: Kailey Chavez MD

## 2021-10-05 NOTE — PROGRESS NOTES
Letter for patient's wife to be allowed to come the [place] was given to patient. He is currently hospitalized at [facility], room 317. I put the letter in patient's belongings bag per patient's instructions.     Susy Grossman RN Care Coordinator  [facility]  Cancer [facility]

## 2021-10-05 NOTE — PROGRESS NOTES
10/05/21 1500   General Information   Onset of Illness/Injury or Date of Surgery 10/03/21   Pertinent History of Current Problem Per MD note: 66 year old man with history of RUL NSCLC stage IV with known pleural mets, known malignant right pleural effusion, HIV, colon cancer s/p right hemicolectomy, prostate cancer, recent admission for dysphagia and weight loss discharged on home O2, presented to ER on 10/4 with worsening SOB. Found to have significant progression of the RUL mass with evidence of esophageal obstruction, SVC effacement, RUL post-obstructive collapse.    General Observations Alert and cooperative. Requesting broth.    Past History of Dysphagia None reported   Type of Evaluation   Type of Evaluation Swallow Evaluation   Oral Motor   Oral Musculature generally intact   Mucosal Quality good   Dentition (Oral Motor)   Dentition (Oral Motor) natural dentition   Facial Symmetry (Oral Motor)   Facial Symmetry (Oral Motor) WNL   Lip Function (Oral Motor)   Lip Range of Motion (Oral Motor) WNL   Tongue Function (Oral Motor)   Tongue ROM (Oral Motor) WNL   General Swallowing Observations   Current Diet/Method of Nutritional Intake (General Swallowing Observations, NIS) thin liquids (level 0)  (clear liquids)   Comment, General Swallowing Observations Nursing reports no discernible difficulty with pills and sips of water   Swallowing Evaluation Clinical swallow evaluation   Clinical Swallow Evaluation   Clinical Swallow Evaluation Textures Trialed thin liquids   Clinical Swallow Eval: Thin Liquid Texture Trial   Mode of Presentation, Thin Liquids cup   Volume of Liquid or Food Presented 6 ounces   Oral Phase of Swallow WFL   Pharyngeal Phase of Swallow intact   Diagnostic Statement No s/s aspiration. Tolerated 2 ounce swallow test without symptoms.   Esophageal Phase of Swallow   Esophageal comments Question of esophageal collapse from extrinsic mass per notes. No esophageal symptoms with 6 ounces thin  liquids   Swallowing Recommendations   Diet Consistency Recommendations thin liquids (level 0)  (clear liquids per MD )   Supervision Level for Intake patient independent   Mode of Delivery Recommendations slow rate of intake   Monitoring/Assistance Required (Eating/Swallowing) monitor for cough or change in vocal quality with intake   Recommended Feeding/Eating Techniques (Swallow Eval) patient is independent, no specific recommendations;maintain upright posture during/after eating for 30 minutes   Medication Administration Recommendations, Swallowing (SLP) as tolerated   Instrumental Assessment Recommendations instrumental evaluation not recommended at this time  (will monitor for appropriateness)   Comment, Swallowing Recommendations BSS completed with thin liquids only due to clear liquid restriction and possible esophageal collapse per notes. Patient had no s/s aspiration or oropharyngeal dysphagia. Recommend thin liquids. SLP to follow as diet is advanced to determine safest, yet LRD texture.    General Therapy Interventions   Planned Therapy Interventions Dysphagia Treatment   Dysphagia treatment Compensatory strategies for swallowing  (diagnostic therapy for diet textures)   SLP Therapy Assessment/Plan   Criteria for Skilled Therapeutic Interventions Met (SLP Eval) yes;treatment indicated   Rehab Potential (SLP Eval) good, to achieve stated therapy goals   Therapy Frequency (SLP Eval) 5 times/wk    Total Evaluation Time   Total Evaluation Time (Minutes) 15   Coping Strategies   Trust Relationship/Rapport care explained;questions answered;questions encouraged

## 2021-10-05 NOTE — PRE-PROCEDURE
GENERAL PRE-PROCEDURE:   Procedure:  Computed tomography guided right chest tube placement with moderate sedation.  Date/Time:  10/5/2021 10:39 AM    Verbal consent obtained?: Yes    Written consent obtained?: Yes    Risks and benefits: Risks, benefits and alternatives were discussed    Consent given by:  Patient  Patient states understanding of procedure being performed: Yes    Patient's understanding of procedure matches consent: Yes    Procedure consent matches procedure scheduled: Yes    Expected level of sedation:  Moderate  Appropriately NPO:  Yes  ASA Class:  2  Mallampati  :  Grade 2- soft palate, base of uvula, tonsillar pillars, and portion of posterior pharyngeal wall visible  Lungs:  Other (comment)  Lung exam comment:  Absent right breath sounds. Normal left breath soumds.  Heart:  Normal heart sounds and rate  History & Physical reviewed:  History and physical reviewed and no updates needed  Statement of review:  I have reviewed the lab findings, diagnostic data, medications, and the plan for sedation

## 2021-10-05 NOTE — PROGRESS NOTES
[facility] Progress Note    Assessment/Plan  Active Problems:    Paroxysmal atrial fibrillation (H)    Malignant neoplasm of right lung, unspecified part of lung (H)    Acute hypoxemic respiratory failure (H)    Acute sepsis (H)    Antonio Mercado is a 66 year old male with a history of HIV, colon cancer s/p right hemicolectomy, prostate cancer, right lung adenocarcinoma with known pleural metastasis and malignant pleural effusion, recently hospitalized 9/28-10/1 for dysphagia and weight loss and discharged on home O2, readmitted on 10/4 for shortness of breath, found to have near-opacification of his right hemithorax on chest x-ray and new atrial fibrillation with RVR. He underwent thoracentesis of pleural effusion and emergent radiation of mass on 10/4. He will get chest tube placed today.      #Acute Hypoxemic Respiratory Failure  #Right Lung Adenocarcinoma with Pleural Metastasis  #Loculated Malignant Pleural Effusion  Diagnosed January 2021, follows with Dr. Chavez. Pt was recently hospitalized 9/28 - 10/1 secondary to weight loss and dysphagia from progression of his right lung mass--CT chest prior to admission had shown mediastinal invasion causing esophageal obstruction and right hilar vascular encasement and mass effect on the SVC. Decision made during that hospitalization to proceed with systemic chemotherapy this coming week (Wednesday 10/6) and to withhold palliative radiation therapy given pt was stable and without symptoms of dysphagia and there was no esophageal obstruction seen on EGD. He was discharged with 2LNC home oxygen which was new for him. SOB progressively worsened and CXR in the ED showed complete opacification of the right hemithorax consistent with progression of malignant pleural effusion, mass, consolidation and atelectasis. ABG 7.40/40/31/24. COVID negative. Symptomatically improved on high flow NC however subsequently required BiPAP. Pt had US-guided right  thoracentesis 10/4 showing right pleural space with numerous heavy loculations and 100 mL of bloody fluid removed. He had emergent palliative radiation d/t 10/4 d/t concern for SVC syndrome; CTA chest 10/4 showed increased right lung atelectasis in the RLL, similar-appearing dense consolidation of RUL, and SVC appeared widely patent. Hypoxic respiratory failure felt to be d/t combination of right lung collapse and pleural effusion. Pt stable on oxy mask 6 LPM this am.   - Continuous Oxymask  - Dexamethasone 4 mg IV qhr to reduce swelling after radiation  - Pulmonology consulted, Heme/Onc, Rad/Onc, and IR consulted, appreciate recommendations   - Right chest tube placement today    - Daily palliative radiation therapy (started 10/4) for 4 additional doses.   - Will consider low-dose concurrent chemotherapy in hospital.    - No indication for transfer to the [facility] at this point.  - Nutrition consulted  - Palliative care consulted    #Paroxysmal Atrial Fibrillation, resolved  #Elevated BNP  #Elevated Troponin  New atrial fibrillation/flutter with RVR on admission, given diltiazem bolus and drip in the ED with continued atrial flutter with rates in the 110s-120s. No ST/T wave changes concerning for ischemia. Troponin 0.12 and  on admission. Cardiology was consulted and he was started on PO metoprolol tartrate 25 mg QID. Troponin trend: 0.12 (10/3) --> 0.11 --> 0.12 (10/4). Etiology likely secondary to mass effect vs. pleural effusion. Cardiology discontinued diltiazem d/t paroxysmal nature of pt's afib and signed off.   - Tele  - Metoprolol 25 mg QID    #Recently Diagnosed Pulmonary Emboli  #Intermittent Chest Pain  Originally diagnosed on CT incidentally in August 2021, still seen on CT 9/27 and at that time was not taking xarelto consistently d/t dysphagia, received heparin while hospitalized 9/28-10/1, discharged on xarelto 20 mg. CT PE run 10/4 was positive for acute PE (on left) that had  progressed since 9/27. He was started on therapeutic lovenox and xarelto held.  - Therapeutic lovenox. Holding for chest tube placement today.   - Hold xarelto   - Gabapentin 100 mg TID for pain  - Tramadol 50 mg q6h for moderate pain    #Hypomagnesemia  Mag 1.5 and potassium 3.8 on admission, does not appear Mag has been been checked on prior admissions.   - Recheck mag in am  - Magnesium replacement protocol    #Elevated lactate, resolved  Upon arrival lactate 2.6, tachycardic (d/t afib), tachypneic, WBC 5.4, Procal 0.18. There was concern for pneumonia possibly causing sepsis and vancomycin and zosyn were started in the ED after blood cultures were drawn. Vancomycin was subsequently discontinued. Pt has remained afebrile, lactate has normalized. He does have other reason (mass) for his hypoxemic respiratory failure, less concern for sepsis however has been continued on zosyn for possible pneumonia. Blood cx NGTD.   - Continue zosyn q8h  - IVNS at 125 ml/hr  - Follow blood cultures    Chronic Problems:  Normocytic Anemia - Takes oral iron and folic acid. Hgb stable.  HIV - Continue PTA [place]    Diet: NPO per Anesthesia Guidelines for Procedure/Surgery Except for: Meds - NPO in case of therapeutic thoracentesis or other procedure.   Fluids: NS at 125 mL/hr   Central Lines: None  [place] Catheter: Not present  DVT Prophylaxis: Therapeutic lovenox, held today for chest tube placement.   Code Status: Special code -- DNI but okay for chest compressions.       Disposition/Advanced Care Planning  Barriers to discharge: hypoxia  Anticipated discharge date: 2-3 days    Subjective  Pt feels well this am with oxy mask in place at 6 LPM. Feels better than yesterday. Denies SOB, chest pain, palpitations, abdominal pain, dysuria, headache, dizziness, lightheadedness.     Objective    Vital signs in last 24 hours Temp:  [96.7  F (35.9  C)-99  F (37.2  C)] 97.5  F (36.4  C)  Pulse:  [75-98] 92  Resp:  [16-33] 16  BP:  (110-151)/(70-91) 140/91  FiO2 (%):  [50 %-88 %] 88 %  SpO2:  [85 %-100 %] 92 %   Weight:   Wt Readings from Last 2 Encounters:   10/03/21 63.5 kg (140 lb)   09/28/21 63.7 kg (140 lb 8 oz)         Intake/Output last 3 shift I/O last 3 completed shifts:  In: 1000 [I.V.:1000]  Out: 1300 [Urine:1300]    Intake/Output this shift:I/O this shift:  In: -   Out: 500 [Chest Tube:500]    Physical Exam  General appearance: Alert. Comfortably resting in bed. No acute distress with oxymask in place. Cooperative.   Head: Normocephalic, without obvious abnormality, atraumatic  Eyes: conjunctivae/corneas clear  Lungs: Decreased breath sounds on the right. Lung sounds clear on the left.   Heart: RRR. No murmurs  Abdomen: soft, non-tender; bowel sounds normal.  Extremities: extremities normal, atraumatic, no cyanosis or edema  Skin: Skin color, texture, turgor normal. No rashes or lesions  Neurologic: No focal neurologic deficits  Psychiatric: mood and affect normal    Pertinent Labs and Pertinent Radiology   Lab Results: personally reviewed.     Radiology Results: Personally reviewed image/s and impression/s    Precepted patient with Dr. Tobi Reyes.    Maci Pompa, MS4  [facility]  10/5/2021    I was present with the medical student for the service. I personally verified the history of present illness and  performed the physical examination and medical decision making. I have verified all of the medical student s  documentation for this encounter.    Nery Garrison MD  [facility], PGY-3  Pager # [phone]

## 2021-10-05 NOTE — PROGRESS NOTES
CARDIOLOGY PROGRESS NOTE      Assessment/Plan:  1.  Paroxysmal atrial fibrillation (H)-asymptomatic, not valvular, truly paroxysmal that it resolved.  Since he is now in sinus rhythm stopped IV Cardizem, keep potassium above 4.0.  TSH is normal and echo shows no obvious structural heart disease, suspect secondary to underlying medical condition.  2.  Malignant neoplasm of right lung, unspecified part of lung (H)-non-small cell lung cancer stage IV, malignant pleural effusion.  Defer to pulmonary, for chest CT today following thoracentesis.  3.  Acute hypoxemic respiratory failure (H)-possibly secondary to SVC collapse as well as esophageal collapse from extrinsic mass.  Also pleural effusion.  Defer to pulmonary.    Plan:  1.  Given numerous other medical issues, and now in sinus rhythm with preserved function, cardiology will sign off, available as needed.    Discharge Plannin.  Numerous barriers to discharge.  2.  This point time would not schedule cardiology follow-up.     LOS: 2 days     Subjective:  Interval History:    66-year-old -American gentleman being seen on third day of hospitalization.  Going down for chest tube placement.  Feels still short of breath, denies any chest discomfort, palpitations, PND, orthopnea or peripheral edema.    Medications    dexamethasone  4 mg Intravenous Q8H     emtricitabine-rilpivirine-tenofovir  1 tablet Oral Daily with breakfast     [Held by provider] enoxaparin ANTICOAGULANT  1 mg/kg Subcutaneous Q12H     gabapentin  100 mg Oral TID     metoprolol tartrate  25 mg Oral 4x Daily     piperacillin-tazobactam  3.375 g Intravenous Q8H     sodium chloride (PF)  3 mL Intracatheter Q8H       Objective:   Vital signs in last 24 hours:  Temp:  [96.7  F (35.9  C)-99.1  F (37.3  C)] 97.6  F (36.4  C)  Pulse:  [73-98] 98  Resp:  [16-33] 18  BP: (110-151)/(68-87) 151/87  FiO2 (%):  [50 %-60 %] 50 %  SpO2:  [87 %-100 %] 87 %  Weight:   [unfilled]        Physical Exam:  BP  (!) 151/87 (BP Location: Right arm)   Pulse 98   Temp 97.6  F (36.4  C) (Oral)   Resp 18   Wt 63.5 kg (140 lb)   SpO2 (!) 87%   BMI 19.53 kg/m      General Appearance:    Alert, cooperative, no distress, appears stated age   Head:    Normocephalic, without obvious abnormality, atraumatic   Throat:   Lips, mucosa, and tongue normal; teeth and gums normal   Neck:   Supple, symmetrical, trachea midline, no adenopathy;        thyroid:  No enlargement/tenderness/nodules; no carotid    bruit or JVD   Back:     Symmetric, no curvature, ROM normal, no CVA tenderness   Lungs:     Clear to auscultation, decreased breath sounds rt side, respirations labored   Chest wall:    No tenderness or deformity   Heart:    Regular rate and rhythm, S1 and S2 normal, no murmur, rub   or gallop   Abdomen:     Soft, non-tender, bowel sounds active all four quadrants,     no masses, no organomegaly   Extremities:   Normal, atraumatic, no cyanosis or edema   Pulses:   2+ and symmetric all extremities   Skin:   Skin color, texture, turgor normal, no rashes or lesions     Cardiographics:      ECG: Personally reviewed by myself shows sinus rhythm, low voltage, RSR type pattern in V1.    Echocardiogram:   1.Left ventricular size, wall motion and function are normal. The ejection fraction is > 65%.  2.TAPSE is normal, which is consistent with normal right ventricular systolic function.  3.Right ventricular systolic pressure is elevated, consistent with mild pulmonary hypertension.  4.No hemodynamically significant valvular abnormalities on 2D or color flow imaging.  5.There is no comparison study available.    Lab Results:   Recent Labs   Lab 10/05/21  0457   WBC 5.0   HGB 7.5*   HCT 24.8*        Recent Labs   Lab 10/05/21  0457      CO2 23   BUN 19   .       Lab Results   Component Value Date    TROPONINI 0.12 10/04/2021

## 2021-10-05 NOTE — PROGRESS NOTES
"Care Management Follow Up    Length of Stay (days): 2    Expected Discharge Date: 10/07/2021       Concerns to be Addressed:   Oxygen needs (50% FiO2 via BiPAP), IV Dexamethasone every 8 hours and IV Zosyn (Possible pneumonia in a patient with a history of Pleural Metastasis and Malignant Pleural Effusion, Right Lung Adenocarcinoma).  Patient plan of care discussed at interdisciplinary rounds: Yes    Follow up from rounds/notes:   Per oncology notes, \"Plan for now is to continue with daily palliative radiation therapy which was started yesterday for additional 4 doses. We will consider starting low-dose concurrent chemotherapy in the hospital as well.\"    Anticipated Discharge Disposition:  Discharge goal is home pending response to treatment, medical needs and mobility closer to discharge.      Anticipated Discharge Services:  To be determined by destination, patient/family preferences, medical needs and mobility status closer to the time of discharge.  Anticipated Discharge DME:  Per therapy (if indicated).    Patient/family educated on Medicare website which has current facility and service quality ratings:  NA  Education Provided on the Discharge Plan:  Per team.  Patient/Family in Agreement with the Plan:  Yes    Referrals Placed by CM/SW:  None  Private pay costs discussed: Not applicable     Additional Information:  Patient was assessed by chart review due to care requiring BiPAP and inability to communicate. Patient lives alone and is independent at baseline. He was hospitalized 9/28/21 to 10/1/2021 with weight loss and was discharged with oxygen which he has on continuously at 2 L. [facility] was also set up for home RN, PT. Care management will follow along.   8:48 AM:  Spoke with intake for [facility] who will consider patient for admission. They had received the referral and were planning to see patient but he was hospitalized before they saw him.     Larisa Davey RN      "

## 2021-10-05 NOTE — CONSULTS
[facility]  Palliative Care Consultation Note    Patient: Antonio Mercado  Date of Admission:  10/3/2021    Requesting Clinician / Team: Dr. Calderon/ICU  Reason for consult: Goals of care    Recommendations:  Goals of care per patient:  - Continue current treatments/therapies.  He wants to move forward with low-dose chemo/rad treatments.  He is hoping to stabilize and return home and continue doing activities he enjoys like attending [employer].  He greatly values Oncology recommendations and will follow their treatment plan.  He would not opt to transition to a comfort/hospice approach unless recommended by his Onc team, thus I did not bring it up during this discussion.  - Code status: special code - discussed at length, and explained why wanting cardiac resuscitation but not intubation would not be recommended.  Recommended full DNR/I.  He did demonstrate understanding but wants to talk with his son Will first before making a change.  I will follow up on this tomorrow.  - Artificial nutrition: we would not want any feeding tubes at this time    Symptom mgmt: currently no unmet symptom burden  1. Dyspnea in setting of metastatic lung cancer: mild and tolerating low flow NC.   2. Pain d/t chest tube: mild and currently managed with tramadol  3. Weakness/fatigue in setting of progressing cancer, malnutrition  - Recommend PT/OT when able     Psychosocial/spiritual support  - .  His wife lives in [facility] and working on getting her to [place].  He has three kids from his first marriage.  Lives alone, but his nephew Jenniferil has been caring for him recently.     - Former [employer]  and finds great strength from his Mandaen.  Will consult spiritual support     Discussed with Medical Resident.      Thank you for the opportunity to participate in the care of this patient and family. Our team: will continue to follow.     During regular M-F work hours -- if you are not sure who specifically to  contact -- please contact us by calling us directly at the Palliative Care Main Line [phone]    After regular work hours and on weekends/holidays, you can leave a message at [phone]      Assessments:  Antonio Mercado is a 66 year old male with RUL NSCLC stage IV with known pleural mets, known malignant right pleural effusion, HIV, colon cancer s/p right hemicolectomy, prostate cancer, recent admission for dysphagia and weight loss discharged on home O2, presented to ER on 10/4 with worsening SOB. Found to have significant progression of the RUL mass, RUL post-obstructive collapse, acute PE that has progressed since 9/27 s/p emergent thoracentesis and radiation(to get 5 treatments).  Placed on BiPAP transitioned to 5L via oxymask this 10/5.  He had a chest tube placed 10/5 with plan for lytic therapy.  Oncology considering low-dose concurrent chemotherapy.  He's tolerating diet thus far without n/v.      Acute hypoxic respiratory failure  Rapidly progressing metastatic lung cancer with invasion of mediastinal structures  PE - progressing  Malignant, loculated right pleural effusion s/p chest tube  Dysphagia hx  Malnutrition with hypoalbuminemia  Esophageal obstruction      Today, the patient was seen for:  Goals of care    Palliative encounter:  I visited with Antonio at bedside.  His daughter Bria arrived later in the meeting.  We discussed role of palliative medicine.  He provided me some social history.  He is well aware of the treatment plan and wants to continue his radiation treatments and do low-dose chemo if that's what Oncology recommends.  I asked if Onc has ever talked about prognosis with him and he said no.  I asked if he thinks these treatments are for curative purposes vs palliative purposes.  He understands they are palliative treatments in an effort to improve his symptoms and hopefully improve his current QOL.  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  We talked about code status at length; see above.  We talked about artificial nutrition; see above.  He feels his appetite is improving overall but notes he was not doing well a couple weeks ago with feeling very weak, needing help with all ADLs and having poor PO intake.      Prognosis, Goals, & Planning:      Functional Status just prior to hospitalization: 3 (Capable of only limited self-care; needs help with ADLs; in bed/chair >50% of waking hours)      Prognosis, Goals, and/or Advance Care Planning were addressed today: Yes     No care directive    Son Will is surrogate decision maker       Patient's decision making preferences: independently but values input from son Will          Patient has decision-making capacity today for complex decisions: Yes            I have concerns about the patient/family's health literacy today: No           Patient has a completed Health Care Directive: No.       Code status: 'Special code': DNI, ok with CPR/shock/meds    Coping, Meaning, & Spirituality:   Mood, coping, and/or meaning in the context of serious illness were addressed today: Yes  Summary/Comments:     Social:     Living situation: lives alone but nephlauren Rowell has been caring for him frequently since he returned from [facility] end of Sept    Smith family / caregivers: Sandra Rowell has been primary caregiver.  He needs help with ADLs, uses a cane and spends majority of day in bed    Occupational history: [employer] .  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      Substance use history: none    : yes, she lives in the [facility].  They've been  for 2 years    Kids: Three kids from previous marriage.  Patrick lives in [place], Liang lives in [place], and Bria lives in [place], [place]     Hobbies: former , likes listening to Restoration music     History of Present Illness:  66 year old man with history of RUL NSCLC stage IV with pleural mets and malignant  right pleural effusion dx 1/2021, HIV, colon cancer s/p right hemicolectomy, prostate cancer, recent admission for dysphagia and weight loss discharged on home O2, presented to ER on 10/4 with worsening SOB. Found to have significant progression of the RUL mass with evidence of esophageal obstruction, SVC effacement, RUL post-obstructive collapse.   Placed on BiPAP in the ER due to persistent hypoxemia despite HFNC.  Also had rapid afib/RVR requiring diltiazem infusion with improvement in HR.   Bedside right pleural US with large, complex pleural fluid collection on the right with multiple loculations and septations s/p right thoracentesis 10/4  Received radiation to right lung 10/4 - 1/5 treatments  Worsening PE per radiology - xarelto on hold d/t procedures, started on therapeutic lovenox     His nephew Sorin has been caring for him since he returned from the [facility] end of September.  Sorin notes he needs assistance with all ADLs, uses a can, but typically stays in bed majority of day.  Appetite has been very poor.     Recent hospitalizations:  9/28-10/1: weight loss, acute PE  4/12-4/15: GI bleed  1/18-1/19: sob d/t pleural effusion    Onocology visit 9/28/21  - Significant progression of cancer, associated dysphagia, cough, weight loss, declining performance status.  Plan was to stop osimertinib and use single agent Keytruda as he could not probably tolerate chemo.  Prognosis guarded at this time and was discussed.     Key Palliative Symptom Data:  # Pain severity the last 12 hours: low  # Dyspnea severity the last 12 hours: low  # Nausea severity the last 12 hours: none  # Anxiety severity the last 12 hours: none    Patient is on opioids: assessed and bowels ok/no needed changes to plan of care today.    ROS:  A full 14 point review of systems was otherwise completed and is negative aside from that mentionedabove      Past Medical History:  Past Medical History:   Diagnosis Date     Constipation       Heel pain      History of blood transfusion      HIV (human immunodeficiency virus infection) (H)      Hypertrophy of prostate with urinary obstruction      Iron deficiency anemia      LBP (low back pain)      Malignant neoplasm of hepatic flexure (H) 02/04/2016     Paroxysmal atrial fibrillation (H) 10/03/2021     Trigger finger         Past Surgical History:  Past Surgical History:   Procedure Laterality Date     COLON SURGERY       ESOPHAGOSCOPY, GASTROSCOPY, DUODENOSCOPY (EGD), COMBINED N/A 9/29/2021    Procedure: ESOPHAGOGASTRODUODENOSCOPY (EGD);  Surgeon: Rangel Dye MD;  Location: [facility]     GI SURGERY       HERNIA REPAIR       FL ESOPHAGOGASTRODUODENOSCOPY TRANSORAL DIAGNOSTIC N/A 4/13/2021    Procedure: ESOPHAGOGASTRODUODENOSCOPY (EGD);  Surgeon: Harris Mon MD;  Location: [facility];  Service: Gastroenterology     US THORACENTESIS  1/5/2021     US THORACENTESIS  1/18/2021     US THORACENTESIS  1/26/2021     US THORACENTESIS  1/29/2021     US THORACENTESIS  2/2/2021     US THORACENTESIS  2/5/2021     US THORACENTESIS  2/9/2021     US THORACENTESIS  2/12/2021     US THORACENTESIS  2/15/2021     US THORACENTESIS  2/19/2021     US THORACENTESIS  2/23/2021     US THORACENTESIS  2/26/2021     US THORACENTESIS  3/2/2021     US THORACENTESIS  3/9/2021         Family History:  Family History   Problem Relation Age of Onset     No Known Problems Mother      No Known Problems Father      No Known Problems Maternal Grandmother      No Known Problems Maternal Grandfather      No Known Problems Paternal Grandmother      No Known Problems Paternal Grandfather      No Known Problems Brother      No Known Problems Sister      No Known Problems Son      No Known Problems Daughter      No Known Problems Maternal Half-Brother      No Known Problems Maternal Half-Sister      No Known Problems Paternal Half-Brother      No Known Problems Paternal Half-Sister      No Known Problems Niece      No  Known Problems Nephew      No Known Problems Cousin      No Known Problems Other      C.A.D. No family hx of      Diabetes No family hx of      Hypertension No family hx of      Hyperlipidemia No family hx of      Breast Cancer No family hx of      Cancer - colorectal No family hx of      Ovarian Cancer No family hx of      Prostate Cancer No family hx of      Depression/Anxiety No family hx of      Cerebrovascular Disease No family hx of      Anesthesia Reaction No family hx of      Thyroid Disease No family hx of      Asthma No family hx of      Osteoporosis No family hx of      Chemical Addiction No family hx of      Known Genetic Syndrome No family hx of      Cancer No family hx of      Coronary Artery Disease No family hx of      Heart Disease No family hx of      Kidney Disease No family hx of      Obesity No family hx of      Thrombosis No family hx of      Arthritis No family hx of      Thyroid Disease No family hx of      Depression No family hx of      Mental Illness No family hx of      Substance Abuse No family hx of      Cystic Fibrosis No family hx of      Early Death No family hx of      Coronary Artery Disease Early Onset No family hx of      Heart Failure No family hx of      Bleeding Diathesis No family hx of      Dementia No family hx of      Uterine Cancer No family hx of      Colorectal Cancer No family hx of      Pancreatic Cancer No family hx of      Lung Cancer No family hx of      Melanoma No family hx of      Autoimmune Disease No family hx of      Unknown/Adopted No family hx of      Genetic Disorder No family hx of          Allergies:  Allergies   Allergen Reactions     Stribild [Alkqsgk-Yutunlo-Dckevxhj-Tenof] Itching     Ibuprofen Itching and Other (See Comments)     Oxycodone Other (See Comments)     Prednisone Other (See Comments)     Raltegravir Unknown     Tamsulosin Other (See Comments)     Headache     Tylenol [Acetaminophen] Rash     Gets an itchy rash on back.          Medications:  I have reviewed this patient's medication profile and medications from this hospitalization.   Noted:  Current Facility-Administered Medications   Medication     dexamethasone (DECADRON) injection 4 mg     emtricitabine-rilpivirine-tenofovir (ODEFSEY) 200-25-25 MG per tablet 1 tablet     [Held by provider] enoxaparin ANTICOAGULANT (LOVENOX) injection 60 mg     gabapentin (NEURONTIN) capsule 100 mg     lidocaine (LMX4) cream     lidocaine 1 % 0.1-1 mL     melatonin tablet 1 mg     metoprolol tartrate (LOPRESSOR) tablet 25 mg     naloxone (NARCAN) injection 0.2 mg    Or     naloxone (NARCAN) injection 0.4 mg    Or     naloxone (NARCAN) injection 0.2 mg    Or     naloxone (NARCAN) injection 0.4 mg     Patient is already receiving anticoagulation with heparin, enoxaparin (LOVENOX), warfarin (COUMADIN)  or other anticoagulant medication     piperacillin-tazobactam (ZOSYN) 3.375 g vial to attach to  mL bag     sodium chloride (PF) 0.9% PF flush 3 mL     sodium chloride (PF) 0.9% PF flush 3 mL     sodium chloride 0.9% 1000 mL TABLE SOLN     sodium chloride 0.9% infusion     traMADol (ULTRAM) tablet 50 mg        Physical Exam:  Vital Signs: Temp: 97.6  F (36.4  C) Temp src: Oral BP: (!) 151/87 (nurse was called) Pulse: 98   Resp: 18 SpO2: (!) 87 % O2 Device: Oxymask Oxygen Delivery: 5 LPM  Weight: 140 lbs 0 oz  GENERAL: lying in bed in NAD    SKIN: Warm and dry   HEENT: Normocephalic, anicteric sclera, moist mucous membranes  LUNGS: Clear to auscultation anterolaterally; non-labored; CT intact  CARDIAC: RRR, normal s1/s2, w/o m/r/g   ABDOMINAL: BS (+), soft, non distended, non tender  EXTREMITIES: No edema or cyanosis, pulses 2+ and symmetrical  NEUROLOGIC: alert and oriented x4  PSYCH: calm, pleasant     Data reviewed:  Recent imaging reviewed:   CT CHEST PULMONARY EMBOLISM W CONTRAST  LOCATION: [facility]  DATE/TIME: 10/4/2021                                                    IMPRESSION:  1.  Positive for acute pulmonary emboli that has progressed since 09/27/2021. Left side.     2.  No significant change in the large central mass right upper lobe with no significant change in the extensive right pleural metastases.     3.  Loculated pleural fluid laterally on ultrasound earlier today was multi-septated.     4.  The most significant change in the right lung is increasing atelectasis in the right lower lobe.     5.  Similar-appearing dense consolidation right upper lobe.     6.  SVC appears widely patent.    Recent lab data reviewed, my comments on pertinents:   PO2 43 on ABG  Hgb 7.3    TT: I have personally spent a total of 80 minutes on the unit in review of medical record, consultation with the medical providers and assessment of patient today, with more than 50% of this time spent in counseling, coordination of care, and discussion with patient and daughter re: diagnostic results, prognosis, symptom management, risks and benefits of management options, and development of plan of care as noted above.    Juan M Andre CNP  Palliative Medicine  [facility]  Pager [phone]  Office [phone]

## 2021-10-05 NOTE — PLAN OF CARE
Problem: Adult Inpatient Plan of Care  Goal: Plan of Care Review  Outcome: No Change  Problem: Adult Inpatient Plan of Care  Goal: Optimal Comfort and Wellbeing  Outcome: No Change     Patient denies pain. VSS. NSR on tele. On continuous BiPAP, patient tolerating well. Skin and oral cares done when patient requests. Assessed often. Skin intact, no breakdown. On IVF and IV abx. External catheter in place.    Patient requesting to eat and reports how hungry he is, family vocalized concerns about nutrition as well. Sticky note left for MD about this. Patient and family educated on plan of care. Answered all questions and concerns. Verbalized understanding. Continuing to monitor.    Clarisse Jones RN

## 2021-10-05 NOTE — PROGRESS NOTES
Palliative update:    Stopped by to talk with Antonio.  Our conversation was cut short as he was heading down for a radiation treatment.      Discussed case with Dr. Calderon    I talked with his nephew, Sorin via telephone    I stopped back again but patient still not in room.    I will follow up with him tomorrow to complete formal consult.     Juan M Andre CNP  Palliative Medicine  [facility]  Pager [phone]  Office [phone]

## 2021-10-05 NOTE — TELEPHONE ENCOUNTER
[facility] phone call message- general phone call:    Reason for call: dr cloon to follow for home car via phone and fax    Action desired: call back with questions.    Return call needed: Yes    OK to leave a message on voice mail? Yes    Advised patient to response may take up to 2 business days: Yes    Primary language: English      needed? No    Call taken on October 5, 2021 at 12:04 PM by Wanda Randhawa

## 2021-10-05 NOTE — CONSULTS
"NUTRITION SERVICES - ASSESSMENT NOTE      Nutrition Prescription     RECOMMENDATIONS FOR MDs/PROVIDERS TO ORDER:  None at this time     Malnutrition Status:    Severe    RD to follow progression of Care        REASON FOR ASSESSMENT  Antonio Mercado is a 66 year old male assessed by the dietitian for Provider Order - malnutrition risk  Patient with weight loss and dysphagia from progression of right lung mass      NUTRITION HISTORY  Patient hungry Wants to eat     CURRENT NUTRITION ORDERS  Diet:  NPO in case of therapeutic thoracentesis or other procedure  Dysphagia r/t esophageal collapse from extrinsic mass.     LABS  Labs reviewed  Per MD - Keep potassium above 4.0     MEDICATIONS  Anemia - Usually takes oral iron and folic acid   Dexamethasone     ANTHROPOMETRICS  Height: 5' 11\"  Most Recent Weight: 63.5 kg (140 lb 9.6 oz)   16.6 % weight loss in < 6 months  IBW: 78 kg (172 lbs)  % IBW  81%  BMI: 19.5 Normal BMI    Weight History: Moderate Weight loss of 26 lbs (16% BW) past 10 months  Wt Readings from Last 23 Encounters:   10/03/21 63.5 kg (140 lb)   09/28/21 63.7 kg (140 lb 8 oz)   09/30/21 63.7 kg (140 lb 8 oz)   09/28/21 63 kg (139 lb)   08/20/21 68 kg (150 lb)   08/10/21 68 kg (149 lb 14.4 oz)   08/02/21 68.9 kg (151 lb 12.8 oz)   07/16/21 69.3 kg (152 lb 11.2 oz)   07/09/21 68 kg (150 lb)   06/22/21 69.9 kg (154 lb)   06/17/21 69.9 kg (154 lb)   05/20/21 70.4 kg (155 lb 3.2 oz)   05/17/21 69.8 kg (153 lb 14.4 oz)   04/16/21 69.5 kg (153 lb 3.2 oz)   04/13/21 67.6 kg (149 lb)   03/18/21 67.1 kg (148 lb)   02/25/21 66.7 kg (147 lb)   02/12/21 68.4 kg (150 lb 11.2 oz)   02/04/21 72.3 kg (159 lb 6.4 oz)   01/28/21 72.3 kg (159 lb 6.4 oz)   01/22/21 72.6 kg (160 lb)   01/08/21 73.8 kg (162 lb 9.6 oz)   01/04/21 75.3 kg (166 lb)     Dosing Weight: 63.5 kg  Current weight     ASSESSED NUTRITION NEEDS  Estimated Energy Needs: 1850- 2250 kcals/day (30-35 kcal/kg))  Justification: Underweight, Repletion  Estimated " Protein Needs: 95 -127 grams protein/day (1.5-2.0 grams of pro/kg)  Justification: Repletion  Estimated Fluid Needs: 2250 mL/day (35 mL/kg) , or per provider  Justification:  Maintenance     PHYSICAL FINDINGS  See malnutrition section below.  Poor skin turgor     MALNUTRITION  % Intake: </= 50% for >/= 5 days (severe)  % Weight Loss: 16% in 10 months (non-severe)  Subcutaneous Fat Loss: Facial region and Upper arm:  Moderate  Muscle Loss: Temporal, Facial & jaw region, Scapular bone and Thoracic region (clavicle, acromium bone, deltoid, trapezius, pectoral):  Moderate   Fluid Accumulation/Edema: None noted  Malnutrition Diagnosis: Severe malnutrition in the context of Chronic Illness     NUTRITION DIAGNOSIS  Severe Malnutrition related to dysphagia / chronic illness as evidenced by prolonged poor PO intake with significant weight loss. Patient 81% IBW with muscle and fat losses    Goals  Diet advancement vs nutrition support within 2-3 days.       Monitoring/Evaluation  RD to follow progression of Care  Monitor Nutrition POC

## 2021-10-05 NOTE — PROVIDER NOTIFICATION
10/05/21 0158   CPAP/BiPAP/Settings   IPAP/EPAP (cmH2O) 12/8   Rate (breaths/min) 14   Oxygen (%) 50   Timed Inspiration (sec) 1   IPAP Rise Time (sec) 5 sec   CPAP/BiPAP Patient Parameters   IPAP (cm H2O) 12 cmH2O   EPAP (cm H2O) 8 cmH2O   Pressure Support (cm H2O) 4 cmH2O   RR Total (breaths/min) 21 breaths/min   Vt (mL) 631 mL   Minute Ventilation (L/min) 14.1 L/min   Patient continues on BiPAP on above settings. BS clear/diminished throughout. No skin breakdown noted. Will continue to monitor and assess.     Janae Garcia, RT

## 2021-10-05 NOTE — PROCEDURES
[facility]    Procedure: Computed tomography guided right chest tube placement    Date/Time: 10/5/2021 11:12 AM  Performed by: Molina Bansal MD  Authorized by: Molina Bansal MD     UNIVERSAL PROTOCOL   Site Marked: Yes  Prior Images Obtained and Reviewed:  Yes  Required items: Required blood products, implants, devices and special equipment available    Patient identity confirmed:  Verbally with patient and arm band  NA - No sedation, light sedation, or local anesthesia  Confirmation Checklist:  Patient's identity using two indicators, relevant allergies, procedure was appropriate and matched the consent or emergent situation and correct equipment/implants were available  Time out: Immediately prior to the procedure a time out was called    Universal Protocol: the Joint Commission Universal Protocol was followed    Preparation: Patient was prepped and draped in usual sterile fashion    ESBL (mL):  0         ANESTHESIA    Anesthesia: Local infiltration  Local Anesthetic:  Lidocaine 1% without epinephrine  Anesthetic Total (mL):  10      SEDATION    Patient Sedated: Yes    Sedation:  Midazolam and see MAR for details  Vital signs: Vital signs monitored during sedation    See dictated procedure note for full details.  PROCEDURE   Patient Tolerance:  Patient tolerated the procedure well with no immediate complications and patient tolerated the procedure well with no immediate complications  Describe Procedure: 12 Green[place]ic non locking all purpose drain placed into the right pleural space with computed tomography guidance. 1 mg versed IV. 200 ml of dark bloody pleural fluid drained into pleur evac on water seal.  Length of time physician/provider present for 1:1 monitoring during sedation: 10

## 2021-10-05 NOTE — PROGRESS NOTES
Interventional Radiology - Progress Note  Inpatient - [facility]: Interventional Radiology   [phone]  10/5/2021    S:  Pt sitting up in bed. Being readied for CT guided Chest tube placement. Pt anxious.     O:  BP (!) 140/91 (BP Location: Right arm)   Pulse 92   Temp 97.5  F (36.4  C) (Oral)   Resp 16   Wt 63.5 kg (140 lb)   SpO2 92%   BMI 19.53 kg/m    General:  Stable.  In no acute distress.  Appears much more comfortable today.   Neuro:  A&O x 3. Moves all extremities equally.  Resp:  Non-labored breathing. Off BiLevel therapy      IMAGING:  Narrative & Impression   EXAM:   1. RIGHT THORACENTESIS  2. ULTRASOUND GUIDANCE  LOCATION: [facility]  DATE/TIME: 10/4/2021 5:04 PM     INDICATION: Pleural effusion.     PROCEDURE: Informed consent obtained. Time out performed. The chest was prepped and draped in sterile fashion. 10 mL of 1 % lidocaine was infused into the local soft tissues. Under direct ultrasound guidance, a 5 Setswana catheter system was placed into   the pleural effusion.      100 milliliters of dark red fluid were removed and sent to lab, if requested.     Patient tolerated procedure well.     Ultrasound imaging was obtained and placed in the patient's permanent medical record.                                                                      IMPRESSION:  Status post right ultrasound-guided thoracentesis.  Patient's right pleural space has developed numerous heavy loculations. This limits the ability to perform large volume thoracentesis     Narrative & Impression   EXAM: CT CHEST PULMONARY EMBOLISM W CONTRAST  LOCATION: [facility]  DATE/TIME: 10/4/2021 5:01 PM     INDICATION: Known metastatic lung cancer on the right side. Enlarging pleural effusion. Concern for SVC syndrome. Thoracentesis just prior to this study shows extensively loculated pleural fluid with only 100 mL removed.  COMPARISON: 09/27/2021.  TECHNIQUE: CT chest pulmonary  angiogram during arterial phase injection of IV contrast. Multiplanar reformats and MIP reconstructions were performed. Dose reduction techniques were used.   CONTRAST: Isovue 370 100 mL     FINDINGS:  ANGIOGRAM CHEST: The previously noted pulmonary emboli seen on 09/27/2021 left lung has progressed in extent. No emboli on the right side. Thoracic aorta is negative for dissection. No CT evidence of right heart strain.     LUNGS AND PLEURA: No significant change in the large central mass right upper lobe measuring 6.2 x 7.3 cm. Complete consolidation of the right upper lobe and marked atelectasis of the right lower lobe has increased. Extensive pleural metastases on the   right have not significantly changed. Large loculated right pleural fluid collection fairly similar to previous. On ultrasound, this was multiloculated. This measures approximately 11 x 8 x 15 cm.     MEDIASTINUM/AXILLAE: Mildly enlarged subcarinal node. SVC is normal in caliber without findings to suggest SVC stenosis.     UPPER ABDOMEN: Normal.     MUSCULOSKELETAL: Normal.                                                                      IMPRESSION:  1.  Positive for acute pulmonary emboli that has progressed since 09/27/2021. Left side.     2.  No significant change in the large central mass right upper lobe with no significant change in the extensive right pleural metastases.     3.  Loculated pleural fluid laterally on ultrasound earlier today was multi-septated.     4.  The most significant change in the right lung is increasing atelectasis in the right lower lobe.     5.  Similar-appearing dense consolidation right upper lobe.     6.  SVC appears widely patent.     7.  Findings called to the clinical service at 6:12 PM.     Narrative & Impression   EXAM:  1. PERCUTANEOUS CHEST TUBE PLACEMENT RIGHT  2. CT GUIDANCE  3. CONSCIOUS SEDATION  LOCATION: [facility]  DATE/TIME: 10/5/2021 10:25 AM     INDICATION: large right  loculated pleural effusion, needs chest tube for intrapleural lytics.  TECHNIQUE: Dose reduction techniques were used.     PROCEDURE: Informed consent obtained. Site marked. Prior images reviewed. Required items made available. Patient identity confirmed verbally and with arm band. Patient reevaluated immediately before administering sedation. Universal protocol was   followed. Time out performed. The site was prepped and draped in sterile fashion. 10 mL of 1% lidocaine was infused into the local soft tissues. Using standard technique and under direct CT guidance, a 12 Prydeinig nonlocking all-purpose drain chest tube   catheter was inserted into the pleural space. The catheter was fixed in place with sutures and adhesive device, and the tubing was banded. Chest tube placed to Pleur-evac suction.     SPECIMEN: None. The patient had ultrasound-guided thoracentesis yesterday with fluid sent.     BLOOD LOSS: Minimal.     The patient tolerated the procedure well. No immediate complications.     SEDATION: Versed 1 mg. Fentanyl 0 mcg. The procedure was performed with administration intravenous conscious sedation with appropriate preoperative, intraoperative, and postoperative evaluation.     10 minutes of supervised face to face conscious sedation time was provided by a radiology nurse under my direct supervision.                                                                      IMPRESSION:  1.  Successful CT-guided chest tube placement into the right pleural space.                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 He underwent thoracentesis by IR of pleural effusion (revealing 100 ml bloody aspirate) and emergent radiation of mass on 10/4. He will get CT guided chest tube placed today.      P:    - Continue present chest tube cares. Chest tube to 20mm wall suction.  -  Consider ealier imaging if there are changes in chest tube's function or in patient's clinical course.  - IR will continue to follow. Please contact IR with chest tube related questions or concerns.    Total time spent on the date of the encounter is 15 minutes, including time spent counseling the patient, performing a medically appropriate evaluation, reviewing prior medical history, ordering medications and tests, documenting clinical information in the medical record, and communication of results.    Michelle Marroquin, CNP  Interventional Radiology  [phone]    E/M codes for reference only:  14815

## 2021-10-05 NOTE — PROGRESS NOTES
Pulmonary Progress Note  10/5/2021      Admit Date: 10/3/2021  CODE: Special Code    Reason for Consult: acute respiratory failure with hypoxemia. Large RUL lung mass with SVC, esophageal obstruction.  Loculated right pleural effusion.      Assessment/Plan:   66 year old man with history of RUL NSCLC stage IV with known pleural mets, known malignant right pleural effusion, HIV, colon cancer s/p right hemicolectomy, prostate cancer, recent admission for dysphagia and weight loss discharged on home O2, presented to ER on 10/4 with worsening SOB. Found to have significant progression of the RUL mass with evidence of esophageal obstruction, SVC effacement, RUL post-obstructive collapse.   Slight improvement with emergent radiation therapy. SVC patent on CTA chest.  Chest tube placed today for loculated right pleural fluid to try and improve respiratory status.   Pertinent respiratory issues and plan detailed below     Plan:  #Acute resp failure with hypoxemia requiring HFNC, NIPPV: multifactorial and due to large lung mass with post-obstructive lobar collapse, PE's, pleural effusion, deconditioning and weakness.   - titrate FiO2 for goal SpO2 92% or greater, avoid hyperoxia  - continue BiPAP prn for incr WOB, desaturations not recovering with other O2 modes.   - if worsening in respiratory status, increased FiO2, would re address goals of care. Prognosis is poor  - DNI status noted.      #Large RUL mass, known cancer, SVC patent on CTA chest so unlikely to have SVC syndrome. Also with esophageal obstruction, right hilar encasement, RUL post-obstructive collapse, esophageal compression. EGD last admission without obstruction of esophagus, overall normal. Unlikely to have progressed significantly since 9/29.   - appreciate rad onc, medical oncology involvement.   - continue daily radiation therapy per Dr. Manrique  - may need inpatient palliative chemo to shrink the tumor. Unclear if he could tolerate based on degree of  illness, functional status. Defer to oncology.   - continue dexamethasone 4mg IV q8h for anticipated edema post-radiation  - OK to start clears today.  - SLP evaluation  - discussed possibility of feeding tube with his son Will, especially if he continues to lose weight and can't swallow. Will doesn't think he'd want this, but will review with Antonio and with palliative care.   - palliative care consult pending  - HIV viral load, T-cell counts pending - unlikely to be HIV related mass as he was previously well controlled.      #Loculated right pleural effusion, known malignant effusion based on cytology from Jan 202  No studies sent as this is known to be malignant effusion now with complex loculations  - appreciate IR placing chest tube today  - continue chest tube to gravity drainage. Record output qshift.   - check hgb and make sure he's not bleeding into his chest before starting intrapleural lytics. Could start tomorrow depending on chest tube output. once more stable  - no indication for abx from my standpoint - zosyn started by resident team, defer to them whether he needs this.      #Small LLL PE's diagnosed on CTA chest from 9/27: was on Xarelto  - continue therapeutic LMWH      Will continue to follow.    Discussed with multiple providers including resident team, rad onc, oncology, and palliative care.     Michel (Ellis) MD [facility]  [facility] Pulmonary & Critical Care  Pager [phone]  Clinic [phone]  Fax [phone]     Subjective/Interim Events:   Feels much better. Off bipap.  S/p 2 days of radiation.   On 6Lpm oxymask, O2 sat 92-93%  Chest tube placed today by IR, 500cc bloody fluid output - he says the fluid looks similar to prior.     I spoke to his son Will on the phone as well as Dr. Chavez.       Medications:       heparin 2 units/mL in 0.9% NaCl TABLE SOLN       - MEDICATION INSTRUCTIONS -       sodium chloride 0.9%       sodium chloride 1 mL (10/05/21 3283)        dexamethasone  4 mg Intravenous Q8H     emtricitabine-rilpivirine-tenofovir  1 tablet Oral Daily with breakfast     enoxaparin ANTICOAGULANT  1 mg/kg Subcutaneous Q12H     gabapentin  100 mg Oral TID     metoprolol tartrate  25 mg Oral 4x Daily     piperacillin-tazobactam  3.375 g Intravenous Q8H     sodium chloride (PF)  3 mL Intracatheter Q8H         Exam/Data:   Vitals  /76 (BP Location: Right arm)   Pulse 87   Temp 97.5  F (36.4  C) (Oral)   Resp 20   Wt 63.5 kg (140 lb)   SpO2 94%   BMI 19.53 kg/m    BP - Mean:  [] 103  I/O last 3 completed shifts:  In: 1117 [I.V.:1117]  Out: 1800 [Urine:1300; Chest Tube:500]  Weight change:   [unfilled]  FiO2 (%): 88 %  Resp: 20      EXAM:  Physical Exam  Gen: awake, alert, oriented, no distress  HEENT: NT, no ELVA  CV: RRR, no m/g/r  Resp: diminished on the right. No wheezing. Clear on left.   Abd: soft, nontender, BS+  Skin: no rashes or lesions  Ext: no edema  Neuro: PERRL, nonfocal exam    ROS:  A 10-system review was obtained and is negative with the exception of the symptoms noted above.    DATA:      IMAGING:   US Thoracentesis    Result Date: 10/5/2021  EXAM: 1. RIGHT THORACENTESIS 2. ULTRASOUND GUIDANCE LOCATION: [facility] DATE/TIME: 10/4/2021 5:04 PM INDICATION: Pleural effusion. PROCEDURE: Informed consent obtained. Time out performed. The chest was prepped and draped in sterile fashion. 10 mL of 1 % lidocaine was infused into the local soft tissues. Under direct ultrasound guidance, a 5 Kosovan catheter system was placed into the pleural effusion. 100 milliliters of dark red fluid were removed and sent to lab, if requested. Patient tolerated procedure well. Ultrasound imaging was obtained and placed in the patient's permanent medical record.     IMPRESSION: Status post right ultrasound-guided thoracentesis. Patient's right pleural space has developed numerous heavy loculations. This limits the ability to perform large volume  thoracentesis. Reference CPT Code: 99312    XR Chest Port 1 View    Result Date: 10/3/2021  EXAM: XR CHEST PORT 1 VIEW LOCATION: [facility] DATE/TIME: 10/3/2021 10:40 PM INDICATION: sob COMPARISON: CT chest 09/27/2021.     IMPRESSION: Near complete opacification of the right hemithorax, correlating with recent CT showing prominent consolidation, mass, atelectasis, and pleural fluid. Left lung remains clear. Normal heart size. No pneumothorax.    CT Chest Pulmonary Embolism w Contrast    Result Date: 10/4/2021  EXAM: CT CHEST PULMONARY EMBOLISM W CONTRAST LOCATION: [facility] DATE/TIME: 10/4/2021 5:01 PM INDICATION: Known metastatic lung cancer on the right side. Enlarging pleural effusion. Concern for SVC syndrome. Thoracentesis just prior to this study shows extensively loculated pleural fluid with only 100 mL removed. COMPARISON: 09/27/2021. TECHNIQUE: CT chest pulmonary angiogram during arterial phase injection of IV contrast. Multiplanar reformats and MIP reconstructions were performed. Dose reduction techniques were used. CONTRAST: Isovue 370 100 mL FINDINGS: ANGIOGRAM CHEST: The previously noted pulmonary emboli seen on 09/27/2021 left lung has progressed in extent. No emboli on the right side. Thoracic aorta is negative for dissection. No CT evidence of right heart strain. LUNGS AND PLEURA: No significant change in the large central mass right upper lobe measuring 6.2 x 7.3 cm. Complete consolidation of the right upper lobe and marked atelectasis of the right lower lobe has increased. Extensive pleural metastases on the right have not significantly changed. Large loculated right pleural fluid collection fairly similar to previous. On ultrasound, this was multiloculated. This measures approximately 11 x 8 x 15 cm. MEDIASTINUM/AXILLAE: Mildly enlarged subcarinal node. SVC is normal in caliber without findings to suggest SVC stenosis. UPPER ABDOMEN: Normal.  MUSCULOSKELETAL: Normal.     IMPRESSION: 1.  Positive for acute pulmonary emboli that has progressed since 09/27/2021. Left side. 2.  No significant change in the large central mass right upper lobe with no significant change in the extensive right pleural metastases. 3.  Loculated pleural fluid laterally on ultrasound earlier today was multi-septated. 4.  The most significant change in the right lung is increasing atelectasis in the right lower lobe. 5.  Similar-appearing dense consolidation right upper lobe. 6.  SVC appears widely patent. 7.  Findings called to the clinical service at 6:12 PM.

## 2021-10-06 NOTE — PROGRESS NOTES
..Area Treated: Right Lung  Treatment 3 of 5  Total Dose: 400cGy  Next Treatment: 10/07/2021 at 12:45

## 2021-10-06 NOTE — PROGRESS NOTES
Pulmonary Progress Note  10/5/2021      Admit Date: 10/3/2021  CODE: Special Code    Reason for Consult: acute respiratory failure with hypoxemia. Large RUL lung mass with SVC, esophageal obstruction.  Loculated right pleural effusion.      Assessment/Plan:   66 year old man with history of RUL NSCLC stage IV with known pleural mets, known malignant right pleural effusion, HIV, colon cancer s/p right hemicolectomy, prostate cancer, recent admission for dysphagia and weight loss discharged on home O2, presented to ER on 10/4 with worsening SOB. Found to have significant progression of the RUL mass with evidence of esophageal obstruction, SVC effacement, RUL post-obstructive collapse.   Significant clinical improvement with radiation therapy and chest tube drainage of the right loculated PTX.   Pertinent respiratory issues and plan detailed below  HIV viral load slightly elevated, CD4 count quite low at 40 indicating severe immunosuppression. Unclear if this has anything to do with the lung mass and current presentation.      Plan:  #Acute resp failure with hypoxemia requiring HFNC, NIPPV: multifactorial and due to large lung mass with post-obstructive lobar collapse, PE's, pleural effusion, deconditioning and weakness.   - titrate FiO2 for goal SpO2 92% or greater, avoid hyperoxia  - continue BiPAP prn for incr WOB, desaturations not recovering with other O2 modes.   - if worsening in respiratory status, increased FiO2, would re address goals of care. Prognosis is poor  - DNI status noted.      #Large RUL mass, known cancer, SVC patent on CTA chest so unlikely to have SVC syndrome. Also with esophageal obstruction, right hilar encasement, RUL post-obstructive collapse, esophageal compression. EGD last admission without obstruction of esophagus, overall normal. Unlikely to have progressed significantly since 9/29.   - appreciate rad onc, medical oncology involvement.   - continue daily radiation therapy per   [place]  - may need inpatient palliative chemo to shrink the tumor. Unclear if he could tolerate based on degree of illness, functional status. Defer to oncology.   - continue dexamethasone 4mg IV q8h for anticipated edema post-radiation while getting daily radiation therapy.  - advance diet per primary team. SLP evaluation unremarkable.   - palliative care consult pending      #Loculated right pleural effusion, known malignant effusion based on cytology from Jan 202  No studies sent as this is known to be malignant effusion now with complex loculations. Appears to have trapped lung on today's CXR but drainage is still good.   - continue chest tube to water seal/gravity drainage and record output qshift  - no indication for intrapleural lytics at this time since this is not empyema, hgb borderline low and could risk bleeding into the pleural space from the large RUL mass. Also on therapeutic LMWH for PE so risk of bleeding is not insignificant.      #Small LLL PE's diagnosed on CTA chest from 9/27: was on Xarelto  - continue therapeutic LMWH, this is the preferred anticoagulant in patient's with solid tumor.     #HIV, CD4 absolute count only 40, HIV VL elevated. Unclear if related to current presentation. Could the enlarging mass represent something like lymphoma vs. Opportunistic infection, given severe immunosuppression?  - defer to primary team and oncology whether this merits further investigation  - does he need prophylactic antibiotics for the low T-cell count?  Addendum: reviewed T-cell studies, VL with ID briefly. Low CD4 likely from recent chemo. VL quite low so doubt worsening HIV. Of note, CD4 >250 in 2018.     Will continue to follow.      Michel (Jus Calderon MD  [facility]  Pulmonary & Critical Care  Pager [phone]  Clinic [phone]  Fax [phone]     Subjective/Interim Events:   Feeling improved. On 6Lpm O2 with SpO2 92%  Chest tube drained 940cc dark, bloody appearing  fluid.  Probable trapped lung on CXR this morning.    Tolerating radiation treatments.  SLP eval yesterday unremarkable, no si/sx aspiration noted with clears.       Medications:       heparin 2 units/mL in 0.9% NaCl TABLE SOLN       - MEDICATION INSTRUCTIONS -       sodium chloride 0.9%       sodium chloride 125 mL/hr at 10/06/21 0942       dexamethasone  4 mg Intravenous Q8H     emtricitabine-rilpivirine-tenofovir  1 tablet Oral Daily with breakfast     enoxaparin ANTICOAGULANT  1 mg/kg Subcutaneous Q12H     gabapentin  100 mg Oral TID     metoprolol tartrate  25 mg Oral 4x Daily     sodium chloride (PF)  3 mL Intracatheter Q8H         Exam/Data:   Vitals  /83 (BP Location: Right arm)   Pulse 84   Temp 97.8  F (36.6  C) (Oral)   Resp 20   Wt 69.6 kg (153 lb 6.4 oz)   SpO2 96%   BMI 21.39 kg/m       I/O last 3 completed shifts:  In: 2303 [P.O.:240; I.V.:2063]  Out: 1390 [Urine:450; Chest Tube:940]  Weight change:   [unfilled]  FiO2 (%): 88 %  Resp: 20      EXAM:  Physical Exam  Gen: awake, alert, oriented, no distress  HEENT: NT, no ELVA  CV: RRR, no m/g/r  Resp: diminished on the right. No wheezing. Clear on left.   Abd: soft, nontender, BS+  Skin: no rashes or lesions  Ext: no edema  Neuro: PERRL, nonfocal exam    ROS:  A 10-system review was obtained and is negative with the exception of the symptoms noted above.    DATA:      IMAGING:   US Thoracentesis    Result Date: 10/5/2021  EXAM: 1. RIGHT THORACENTESIS 2. ULTRASOUND GUIDANCE LOCATION: [facility] DATE/TIME: 10/4/2021 5:04 PM INDICATION: Pleural effusion. PROCEDURE: Informed consent obtained. Time out performed. The chest was prepped and draped in sterile fashion. 10 mL of 1 % lidocaine was infused into the local soft tissues. Under direct ultrasound guidance, a 5 Yemeni catheter system was placed into the pleural effusion. 100 milliliters of dark red fluid were removed and sent to lab, if requested. Patient tolerated procedure well.  Ultrasound imaging was obtained and placed in the patient's permanent medical record.     IMPRESSION: Status post right ultrasound-guided thoracentesis. Patient's right pleural space has developed numerous heavy loculations. This limits the ability to perform large volume thoracentesis. Reference CPT Code: 29116    XR Chest Port 1 View    Result Date: 10/3/2021  EXAM: XR CHEST PORT 1 VIEW LOCATION: [facility] DATE/TIME: 10/3/2021 10:40 PM INDICATION: sob COMPARISON: CT chest 09/27/2021.     IMPRESSION: Near complete opacification of the right hemithorax, correlating with recent CT showing prominent consolidation, mass, atelectasis, and pleural fluid. Left lung remains clear. Normal heart size. No pneumothorax.    CT Chest Pulmonary Embolism w Contrast    Result Date: 10/4/2021  EXAM: CT CHEST PULMONARY EMBOLISM W CONTRAST LOCATION: [facility] DATE/TIME: 10/4/2021 5:01 PM INDICATION: Known metastatic lung cancer on the right side. Enlarging pleural effusion. Concern for SVC syndrome. Thoracentesis just prior to this study shows extensively loculated pleural fluid with only 100 mL removed. COMPARISON: 09/27/2021. TECHNIQUE: CT chest pulmonary angiogram during arterial phase injection of IV contrast. Multiplanar reformats and MIP reconstructions were performed. Dose reduction techniques were used. CONTRAST: Isovue 370 100 mL FINDINGS: ANGIOGRAM CHEST: The previously noted pulmonary emboli seen on 09/27/2021 left lung has progressed in extent. No emboli on the right side. Thoracic aorta is negative for dissection. No CT evidence of right heart strain. LUNGS AND PLEURA: No significant change in the large central mass right upper lobe measuring 6.2 x 7.3 cm. Complete consolidation of the right upper lobe and marked atelectasis of the right lower lobe has increased. Extensive pleural metastases on the right have not significantly changed. Large loculated right pleural fluid  collection fairly similar to previous. On ultrasound, this was multiloculated. This measures approximately 11 x 8 x 15 cm. MEDIASTINUM/AXILLAE: Mildly enlarged subcarinal node. SVC is normal in caliber without findings to suggest SVC stenosis. UPPER ABDOMEN: Normal. MUSCULOSKELETAL: Normal.     IMPRESSION: 1.  Positive for acute pulmonary emboli that has progressed since 09/27/2021. Left side. 2.  No significant change in the large central mass right upper lobe with no significant change in the extensive right pleural metastases. 3.  Loculated pleural fluid laterally on ultrasound earlier today was multi-septated. 4.  The most significant change in the right lung is increasing atelectasis in the right lower lobe. 5.  Similar-appearing dense consolidation right upper lobe. 6.  SVC appears widely patent. 7.  Findings called to the clinical service at 6:12 PM.

## 2021-10-06 NOTE — PROGRESS NOTES
[facility] Progress Note    Assessment/Plan  Active Problems:    Paroxysmal atrial fibrillation (H)    Malignant neoplasm of right lung, unspecified part of lung (H)    Acute hypoxemic respiratory failure (H)    Acute sepsis (H)    Antonio Mercado is a 66 year old male with a history of HIV, colon cancer s/p right hemicolectomy, prostate cancer, right lung adenocarcinoma with known pleural metastasis and malignant pleural effusion, recently hospitalized 9/28-10/1 for dysphagia and weight loss and discharged on home O2, readmitted on 10/4 for shortness of breath, found to have near-opacification of his right hemithorax on chest x-ray and new atrial fibrillation with RVR. He underwent thoracentesis of pleural effusion and began palliative radiation of mass on 10/4. Right chest tube placed 10/5.       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 Chavez. Was recently hospitalized 9/28-10/1 secondary to weight loss and dysphagia from progression of his right lung mass--CT chest prior to admission had shown mediastinal invasion causing esophageal obstruction and right hilar vascular encasement and mass effect on the SVC. Decision made during that hospitalization to proceed with systemic chemotherapy outpatient and to withhold palliative radiation therapy as there was no esophageal obstruction seen on EGD. He was discharged on home O2, new for him. At home he became progressively SOB and presented to the ED again on 10/3. CXR in the ED showed complete opacification of the right hemithorax consistent with progression of malignant pleural effusion, mass, consolidation and atelectasis. US-guided right thoracentesis 10/4 showed R pleural space with numerous heavy loculations,100 mL of bloody fluid removed. He began palliative radiation 10/4 d/t concern for SVC  syndrome; CTA chest 10/4 showed increased right lung atelectasis in the RLL, similar-appearing dense consolidation of RUL, and SVC appeared widely patent. Hypoxic respiratory failure felt to be d/t combination of right lung collapse and pleural effusion. He initially required BiPAP on admission which has since been weaned to oxymask and now NC. CXR 10/6 showed new air in the pleural space at right lung base, right lower lung has not reexpanded; confluent opacity at right upper lung unchanged; Small volume of left pleural fluid and small opacities in left lung again seen.   - Supplemental oxygen, currently on NC, oxymask if needed.   - Fall precautions  - Pulmonology consulted, Heme/Onc, Rad/Onc, and IR consulted, appreciate recommendations   - Right chest tube draining, plan for lytic therapy    - Daily palliative radiation therapy (started 10/4) for 5 doses total.   - Dexamethasone 4 mg IV qhr to reduce edema post-radiation   - Oncology is considering low-dose concurrent chemotherapy in hospital.   - Palliative care consulted   - Pt's decision making preferences: independently but values input from son Will   - No feeding tube at this time   - Recommend PT/OT when able    - Consulting spiritual support     #Paroxysmal Atrial Fibrillation, resolved  #Elevated BNP, on admission  #Elevated Troponin, on admission  New atrial fibrillation/flutter with RVR on admission, given diltiazem bolus and drip in the ED with continued atrial flutter with rates in the 110s-120s. No ST/T wave changes concerning for ischemia. Troponin 0.12 and  on admission. Cardiology was consulted and he was started on PO metoprolol tartrate 25 mg QID. Troponin trend: 0.12 (10/3) --> 0.11 --> 0.12 (10/4). Etiology likely secondary to mass effect vs. pleural effusion. Cardiology discontinued diltiazem d/t paroxysmal nature of pt's afib and signed off.   - Tele  - Metoprolol 25 mg QID    #Recently Diagnosed Pulmonary Emboli,  progressing  #Intermittent Chest Pain  Originally diagnosed on CT incidentally in August 2021, still seen on CT 9/27 and at that time was not taking xarelto consistently d/t dysphagia, received heparin while hospitalized 9/28-10/1, discharged on xarelto 20 mg. CT PE run 10/4 was positive for acute PE (on left) that had progressed since 9/27. He was started on therapeutic lovenox and xarelto held.  - Therapeutic lovenox.   - Hold xarelto   - Gabapentin 100 mg TID for pain  - Tramadol 50 mg q6h for moderate pain    #Hypomagnesemia  Mag 1.5 and potassium 3.8 on admission, does not appear Mag has been been checked on prior admissions.   - Magnesium replacement protocol  - Potassium replacement protocol, keep K>4.0.  - Recheck mag and BMP in am    #Elevated lactate, resolved  Upon arrival lactate 2.6, tachycardic (d/t afib), tachypneic, WBC 5.4, Procal 0.18. There was concern for pneumonia possibly causing sepsis and vancomycin and zosyn were started in the ED after blood cultures were drawn. Vancomycin was subsequently discontinued. Pt has remains afebrile, lactate has normalized. He does have other reason (mass) for his hypoxemic respiratory failure. Blood cx NGTD.   - Discontinue zosyn  - IVNS at 125 ml/hr  - Follow blood cultures    Chronic Problems:  Normocytic Anemia - Takes oral iron and folic acid. Hgb stable.  HIV - Continue PTA Odefsey, will add on Bactrim for ppx with CD4 count of 40.    Diet: Full liquid diet, advance as tolerated  Fluids: NS at 125 mL/hr   Central Lines: None  [place]once Catheter: Not present  DVT Prophylaxis: Therapeutic lovenox.  Code Status: Special code -- DNI but okay for chest compressions.       Disposition/Advanced Care Planning  Barriers to discharge: hypoxia  Anticipated discharge date: pending medical stability after finishing palliative radiation days.    Subjective  Had right chest tube placed yesterday. Denies pain around insertion site. No complaints this am. Switched from oxy mask  to NC overnight. Saturating mid-90s on NC 6LPM. Diet escalated to full liquid diet yesterday, tolerating well. Afebrile. /84 this am, has generally been lower. Denies SOB, chest pain, palpitations, abdominal pain, diarrhea, dysuria, headache.    Objective    Vital signs in last 24 hours Temp:  [97.3  F (36.3  C)-98  F (36.7  C)] 97.3  F (36.3  C)  Pulse:  [80-94] 88  Resp:  [16-33] 16  BP: (121-153)/(73-91) 153/84  FiO2 (%):  [88 %] 88 %  SpO2:  [85 %-97 %] 90 %   Weight:   Wt Readings from Last 2 Encounters:   10/06/21 69.6 kg (153 lb 6.4 oz)   09/28/21 63.7 kg (140 lb 8 oz)         Intake/Output last 3 shift I/O last 3 completed shifts:  In: 2303 [P.O.:240; I.V.:2063]  Out: 1390 [Urine:450; Chest Tube:940]    Intake/Output this shift:I/O this shift:  In: 1179 [I.V.:1179]  Out: 550 [Urine:550]    Physical Exam  General appearance: Alert. Comfortably resting in bed. No acute distress with nasal canula in place. Cooperative.   Head: Normocephalic, without obvious abnormality, atraumatic  Eyes: conjunctivae/corneas clear  Lungs: Decreased breath sounds on the right. Right chest tube in place, draining dark red/brown drainage. Dressing c/d/i. Lung sounds clear on the left.   Heart: RRR. No murmurs  Abdomen: soft, non-tender; bowel sounds normal.  Extremities: extremities normal, atraumatic, no cyanosis or edema  Skin: Skin color, texture, turgor normal. No rashes or lesions  Neurologic: No focal neurologic deficits  Psychiatric: mood and affect appropriate    Pertinent Labs and Pertinent Radiology   Lab Results: personally reviewed.     Radiology Results: Personally reviewed image/s and impression/s    Precepted patient with Dr. Billy Grullon.    Maci Pompa, MS4  [facility]  10/6/2021    I was present with the medical student for the service. I personally verified the history of present illness and  performed the physical examination and medical decision making. I have verified all of the medical  student s  documentation for this encounter.    Nery Garrison MD  [facility], PGY-3  Pager # [phone]

## 2021-10-06 NOTE — PLAN OF CARE
Problem: Gas Exchange Impaired  Goal: Optimal Gas Exchange  Outcome: Improving  Pt tolerating O2 per NC at 6 lpm. Sats mid  90's. Pt denies shortness of breath.  Chest tube to water seal draining dark red fluid. Lung sounds decreased on right side, otherwise clear.    Pt reported pain 3/10 to right side at chest tube site. Given PRN tramadol, which was effective.     VSS. NSR.  Advanced to regular/thin diet per SLP.      Pt had radiation treatment at 1145. No concerns from pt after treatment.    Hayley Fierro, RN

## 2021-10-06 NOTE — PLAN OF CARE
Problem: Pain Acute (Oncology Care)  Goal: Optimal Pain Control  Outcome: Declining  Intervention: Develop Pain Management Plan  Recent Flowsheet Documentation  Taken 10/5/2021 1219 by Jocelyne Pompa RN  Pain Management Interventions:   medication (see MAR)   distraction   emotional support     Pt had chest tube inserted this am.  So far pt has had 700 cc of dark red drainage in water seal.  Pt now on 5-6 L NC.  O2 sat 94-96% on 5-6 L.  Hgb this evening 7.5.  IV fluids running.  Pt advanced to clear liquids per pulmonologist and then to full liquids per oncologist.  Tramadol given x 1 due to incisional pain from chest tube site and effective.  Will continue to monitor pt for any changes.

## 2021-10-06 NOTE — PLAN OF CARE
Problem: Adult Inpatient Plan of Care  Goal: Plan of Care Review  Outcome: Improving  Problem: Pain Acute (Oncology Care)  Goal: Optimal Pain Control  Outcome: Improving  Intervention: Develop Pain Management Plan  Problem: Gas Exchange Impaired  Goal: Optimal Gas Exchange  Outcome: Improving     VSS. NSR on tele. On 5L of oxygen nasal cannula, tolerating well. Unable to wean patient. Patient reports mild pain at chest tube site. Declined PRN medication. Chest tube patent and draining on water seal. Had 240 ml of dark red output overnight.    External catheter in place. On IVF ad IV abx. On full liquid diet, encouraging oral intake. Can ADAT. Patient had a bowel movement this morning.    Patient educated on plan of care. Answered all questions and concerns. Verbalized understanding. Continuing to monitor.    Clarisse Jones RN

## 2021-10-06 NOTE — PLAN OF CARE
"  Problem: SLP General Care Plan  Goal: SLP Frequency  Outcome: Completed  Goal: Swallow Goal: Safely tolerate diet without aspiration (SLP)  Description: Safely tolerate diet without signs/symptoms of aspiration  Outcome: Completed   orders to ADAT noted (now on full liquids). Pt has hx \"esophageal obstruction\". pt seemed a bit irritated as he states he has not had any swallowing difficulty and does not avoid any foods d/t any issues with his esophagus. I was able to analyze he self drink approimately 2 oz thin water from straw with no sx's aspiration. He self fed regular textured adrianne cracker with no oral dysphagia or sx's aspiration. Diet advanced to regular diet with thin liquids. No further ST warranted at this time.  "

## 2021-10-07 NOTE — PLAN OF CARE
Problem: Gas Exchange Impaired  Goal: Optimal Gas Exchange  Outcome: Improving   Pt continues to require O2 5-6 lpm per NC. Oxygen needs increase with activity.  Pt was short of breath after repositioning in bed.        Problem: Anemia  Goal: Anemia Symptom Improvement  Outcome: Improving   Hgb 6.9. Received one unit PRBC's Hgb recheck at 1800 was 9.0.  Chest tube had 100 ml dark red drainage.  Chest tube to water seal. Pt denies pain.    Vitals stable. Tele discontinued.    Pt completed 4th day of radiation therapy.  Tolerated well. Plan for another treatment tomorrow and also chemotherapy per oncology.    Hayley Fierro RN     0 = swallows foods/liquids without difficulty

## 2021-10-07 NOTE — PROGRESS NOTES
"Care Management Follow Up    Length of Stay (days): 4    Expected Discharge Date: To be determined.      Concerns to be Addressed:   Oxygen needs (5-6 liters), IV Dexamethasone: Post chest tube placement 10/5/21. Per oncology notes of 10/5/21, \"Plan for now is to continue with daily palliative radiation therapy which was started yesterday for additional 4 doses. We will consider starting low-dose concurrent chemotherapy in the hospital as well.\"  Patient plan of care discussed at interdisciplinary rounds: Yes    Follow up from rounds/notes:   Per oncology notes, \"Discussed concurrent chemotherapy with carboplatin Taxol and he is agreeable to this...He agrees to proceed. We will plan this for tomorrow in the hospital.\"    Anticipated Discharge Disposition:  Discharge goal is home pending response to treatment, medical needs and mobility closer to discharge.  May need transitional care (TCU) at discharge.      Anticipated Discharge Services:  To be determined by destination, patient/family preferences, medical needs and mobility status closer to the time of discharge.  Anticipated Discharge DME:  Per therapy (if indicated).    Patient/family educated on Medicare website which has current facility and service quality ratings: Yes; lists for TCU and home care provided to patient.   Education Provided on the Discharge Plan:  Per team.  Patient/Family in Agreement with the Plan:  Yes    Referrals Placed by CM/SW:  None yet; patient would like to speak with his family first.   Private pay costs discussed: Not applicable     Additional Information:  Patient lives in an apartment alone and is independent at baseline. His wife has been in [place] for work but he has adult children who live locally as well. He was hospitalized 9/28/21 to 10/1/2021 with weight loss and has a history of pleural metastasis and malignant pleural effusion, right lung adenocarcinoma. He had been discharged with oxygen which he had been on " continuously at 2 L. [facility] was also set up for home RN, PT who had received the referral and were planning to see patient but he was hospitalized before they saw him. They will accept a new referral; CM to call once patient is closer to discharge.  3:33 PM:  Met with PT who is recommending TCU at this point (vs home with home care if improves but significantly weak at this time). Writer met with patient to discuss recommendations. Writer provided a list of local transitional care units (which includes the medicare.gov website) for patient and family to review. However, he will need to speak with his children before deciding.     Larisa Davey RN

## 2021-10-07 NOTE — PROGRESS NOTES
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 He underwent thoracentesis of pleural effusion and began palliative radiation of mass on 10/4. Right chest tube placed 10/5. Overall, clinically stable at this time.       #Acute Hypoxemic Respiratory Failure  #RUL NSCLC stage IV with known Pleural Metastasis  #Loculated Malignant Pleural Effusion  Lung adenocarcinoma dx in Jan 2020, follows with Dr. Chavez. Was recently hospitalized 9/28-10/1 secondary to weight loss and progression of his right lung mass--CT chest prior to admission had shown mediastinal invasion causing esophageal obstruction and right hilar vascular encasement and mass effect on the SVC. Decision made during that hospitalization to proceed with systemic chemotherapy outpatient and to withhold palliative radiation therapy as there was no esophageal obstruction seen on EGD. He was discharged on home O2, new for him. At home he became progressively SOB and presented to the ED again on 10/3. CXR in the ED showed complete opacification of the right hemithorax consistent with progression of malignant pleural effusion, mass, consolidation and atelectasis. US-guided right thoracentesis 10/4 showed R pleural space with numerous heavy loculations,100 mL of bloody fluid removed. He began palliative  radiation 10/4 d/t concern for SVC syndrome; CTA chest 10/4 showed increased right lung atelectasis in the RLL, similar-appearing dense consolidation of RUL, and SVC appeared widely patent. Hypoxic respiratory failure felt to be d/t combination of right lung collapse and pleural effusion. He initially required BiPAP on admission which has since been weaned to oxymask and now NC. CXR 10/6 showed new air in the pleural space at right lung base, right lower lung has not reexpanded; confluent opacity at right upper lung unchanged; Small volume of left pleural fluid and small opacities in left lung again seen. Breathing stable on NC.  - Supplemental oxygen, currently on NC, oxymask. Will discontinue BiPAP as patient has not required it in 2 days.  - Fall precautions  - PT/OT  - Heme/Onc and Rad/Onc consulted, appreciate recommendations   -Daily palliative radiation therapy (started 10/4) for 5 doses total.   -Dexamethasone 4 mg IV qhr to reduce edema post-radiation   -Planning for chemotherapy starting tomorrow 10/8.  - Pulmonology consulted   - Right chest tube to water/seal gravity   - No intrapleural lytics d/t no empyema, low Hgb, and on lovenox  - Palliative care consulted   - Pt's decision making preferences: independently but values input from son Will   - No feeding tube at this time  - Spiritual Health consulted and following    #Acute on Chronic Normocytic Anemia  Hgb 6.9 (10/7) from 7.5 (10/5). MCV 88, stable. Likely 2/2 blood loss from chest tube.  -Type and screen  -Transfuse 1U prBCs, pt consented  -Restart PTA oral iron  -Transfuse if Hgb <7, Oncology would like it >8.  -CBC in am    #HIV  #Immunosuppression  HIV viral load slightly elevated, CD4 count quite low at 40 indicating severe immunosuppression. Low CD4 count felt to be likely 2/2 recent chemo, viral load quite low so less likely worsening HIV. Per literature review, will not start Azithromycin for MAC coverage as patient has been consistently on  ART.  - Prophylactic Bactrim started 10/6 d/t low CD4 count.   - PTA Odefsey    #Small LLL Pulmonary Emboli, progressive  #Intermittent Chest Pain  Originally diagnosed on CT incidentally in August 2021, still seen on CT 9/27 and at that time was not taking xarelto consistently d/t dysphagia, received heparin while hospitalized 9/28-10/1, discharged on xarelto 20 mg. CT PE run 10/4 was positive for acute PE (on left) that had progressed since 9/27. He was started on therapeutic lovenox and xarelto held.  -Therapeutic lovenox (preferred anti-coagulant with solid tumors per Pulm)  -Hold xarelto   -Gabapentin 100 mg TID for pain  -Tramadol 50 mg q6h for moderate pain    #Paroxysmal Atrial Fibrillation, resolved  #Elevated BNP, on admission  #Elevated Troponin, on admission  New atrial fibrillation/flutter with RVR on admission, given diltiazem bolus and drip in the ED with continued atrial flutter with rates in the 110s-120s. No ST/T wave changes concerning for ischemia. Troponin 0.12 and  on admission. Cardiology was consulted and he was started on PO metoprolol tartrate 25 mg QID. Troponin trend: 0.12 (10/3) --> 0.11 --> 0.12 (10/4). Etiology likely secondary to mass effect vs. pleural effusion. Cardiology discontinued diltiazem d/t paroxysmal nature of pt's afib and signed off.   -Discontinue Tele  -Start metoprolol succinate 50 mg BID    #Hypomagnesemia  Mag 1.5 and potassium 3.8 on admission, does not appear Mag has been been checked on prior admissions.   - Magnesium replacement protocol  - Potassium replacement protocol, keep K>4.0.    #Elevated lactate, resolved  Upon arrival lactate 2.6, tachycardic (d/t afib), tachypneic, WBC 5.4, Procal 0.18. There was concern for pneumonia possibly causing sepsis and vancomycin and zosyn were started in the ED after blood cultures were drawn. Vancomycin was subsequently discontinued. Pt has remains afebrile, lactate has normalized. He does have other reason (mass)  for his hypoxemic respiratory failure. Blood cx NGTD.   - IVNS at 125 ml/hr  - Follow blood cultures    #Severe malnutrition   Related to chronic illness and prolonged poor PO intake. Has had significant weight loss in the last several months. 81% IBW with muscle and fat loss  -Nutrition consulted, appreciate recommendations.    -RD to follow    Diet: Regular Diet Adult Thin Liquids (level 0), SLP following  Fluids: NS at 125 mL/hr   Central Lines: None  Ponce Catheter: Not present  DVT Prophylaxis: Therapeutic lovenox.  Code Status: Special code -- DNI but okay for chest compressions.       Disposition/Advanced Care Planning  Barriers to discharge: hypoxia  Anticipated discharge date: pending medical stability after finishing palliative radiation days.    Subjective  Had an episode of anxiety with SOB overnight when he couldn't find his call light, this resolved. He is feeling stable/slightly improved today. Chest tube drained 460 mL yesterday. No pain at chest tube site. Saturating well on 5LPM NC. No complaints. No chest pain, palpitations, abdominal pain, dysuria.     Objective    Vital signs in last 24 hours Temp:  [97.7  F (36.5  C)-98.4  F (36.9  C)] 98.4  F (36.9  C)  Pulse:  [73-85] 79  Resp:  [16-20] 18  BP: (128-144)/(76-87) 133/76  SpO2:  [90 %-100 %] 95 %   Weight:   Wt Readings from Last 2 Encounters:   10/07/21 70.9 kg (156 lb 3.2 oz)   09/28/21 63.7 kg (140 lb 8 oz)         Intake/Output last 3 shift I/O last 3 completed shifts:  In: 3281 [P.O.:150; I.V.:3131]  Out: 2580 [Urine:2300; Chest Tube:280]    Intake/Output this shift:I/O this shift:  In: 240 [P.O.:240]  Out: 400 [Urine:400]    Physical Exam  General appearance: Alert. Comfortably resting in bed. No acute distress with nasal canula in place. Cooperative.   Head: Normocephalic, without obvious abnormality, atraumatic  Eyes: conjunctivae/corneas clear  Lungs: Decreased breath sounds on the right. Right chest tube in place, draining dark  red/brown drainage. Dressing c/d/i. Lung sounds clear on the left.   Heart: RRR. No murmurs  Abdomen: soft, non-tender; bowel sounds normal.  Extremities: extremities normal, atraumatic, no cyanosis or edema  Skin: Skin color, texture, turgor normal. No rashes or lesions  Neurologic: No focal neurologic deficits  Psychiatric: mood and affect appropriate    Pertinent Labs and Pertinent Radiology   Lab Results: personally reviewed.     Radiology Results: Personally reviewed image/s and impression/s    Precepted patient with Dr. Billy Grullon.    Maci Pompa, MS4  [facility]  10/7/2021    I was present with the medical student for the service. I personally verified the history of present illness and  performed the physical examination and medical decision making. I have verified all of the medical student s  documentation for this encounter.    Nery Garrison MD  [facility], PGY-3  Pager # [phone]

## 2021-10-07 NOTE — PROGRESS NOTES
"Pt had a short episode of anxiety associated with shortness of breath because he could not find the call light that was underneath the blankets. Per pt, he had a \"panic attach.\" Pt much more calm after slow, deep breathing coaching and reassurances.  "

## 2021-10-07 NOTE — PROGRESS NOTES
[facility]  Palliative Care Daily Progress Note    Today, the patient was seen for:  Goals of care, codes status discussion        Recommendations & Counseling     Goals of Care:  - C/w current therapies/treatments including: radiation, chest tube, blood transfusion.  He wants low dose chemo if an option  - Code status: change to DNR/I after discussion with writer and Dr. Chavez  - Hoping to stabilize and return home  - Artificial nutrition: no feeding tubes  - He would not opt for a comfort/hospice approach unless recommended by his Oncology team     Symptom Management: currently no unmet symptom burden  1. Dyspnea in setting of metastatic lung cancer: mild and tolerating low flow NC.   2. Pain d/t chest tube: mild and currently managed with tramadol  3. Weakness/fatigue in setting of progressing cancer, malnutrition  - PT/OT when able  4. Anxiety; situational: monitor for now     Advanced Care Planning:  - No care directive  - Surrogate decision maker: son Will    Psychosocial/Spiritual Support:  - Appreciate spiritual care assistance     Discussed with Dr. Chavez.    Our service will continue to follow       Assessments          66 yoM with stage IV lung cancer with pleural mets and malignant right pleural effusion, HIV, colon cancer s/p right hemicolectomy, prostate cancer, admitted on 10/4 with worsening SOB. Significant progression of lung cancer s/p emergent thoracentesis.  Getting radiation, today will be 4/5.  Remains on 5L via NC.  Chest tube placed 10/5; dark red output.  Hgb low today to get a PRBC transfusion.  Oncology considering low-dose concurrent chemotherapy.  He's tolerating diet thus far without n/v.       Acute hypoxic respiratory failure: stable remains on low flow NC  Rapidly progressing metastatic lung cancer with invasion of mediastinal structures  PE - on lovenox SQ  Malignant, loculated right pleural effusion s/p chest tube with bloody output  Dysphagia hx - no  s/s aspiration on 10/5 BSS  Malnutrition with hypoalbuminemia     Prognosis, Goals, or Advance Care Planning was addressed today with: Yes.  Mood, coping, and/or meaning in the context of serious illness were addressed today: Yes.            Interval History:     Chart review/discussion with unit or clinical team members:   No overnight events.  Hgb to get transfusion.      Per patient or family/caregivers today:  I visited Antonio at bedside.  No issues with PO intake.  Pain at chest tube site tolerable.  No dyspnea today.  I began discussing code status again and explaining reasons for not recommending cardiac resuscitation in this this situation given his known wish to not be intubated in addition to his underlying/aggressive metastatic lung cancer.  He kept saying it's not going to happen so why have the discussion.  At this time, Dr. Chavez entered the room.  We continued the discussion and Dr. Chavez also recommended DNR/I given the extent of his cancer burden.  I expressed this is also palliative and pulmonary's recommendation.  At this time, Antonio agreed to DNR/I.  We did explain this does not mean we are stopping any of his other cares/treatments, and can still do everything up until that point.  He expressed understanding.      I attempted to call son Will.  No answer, mailbox full     Key Palliative Symptoms: (0=no symptom/no concern, 1=mild, 2=moderate, 3=severe):  Pain: 1  Dyspnea: 0-1  Fatigue: 2  Nausea: 0  Constipation: 0.  Last BM 10/6  Diarrhea: 0  Depressive symptoms: 1  Anxiety: 1  Drowsiness: 0  Poor Appetite: 1  Insomnia: 0    Patient is on opioids: assessed and bowels ok/no needed changes to plan of care today.           Review of Systems:     A full 14 point review of systems was otherwise completed and is negative aside from that mentionedabove           Medications:     I have reviewed this patient's medication profile and medications during this hospitalization.    Noted meds:     Current Facility-Administered Medications   Medication     0.9% sodium chloride BOLUS     dexamethasone (DECADRON) injection 4 mg     emtricitabine-rilpivirine-tenofovir (ODEFSEY) 200-25-25 MG per tablet 1 tablet     enoxaparin ANTICOAGULANT (LOVENOX) injection 60 mg     flumazenil (ROMAZICON) injection 0.2 mg     gabapentin (NEURONTIN) capsule 100 mg     heparin (PRESSURE BAG) 2 Units/mL in 0.9% NaCl (500 mL)     lidocaine (LMX4) cream     lidocaine 1 % 0.1-1 mL     lidocaine 1 % 1-30 mL     melatonin tablet 1 mg     metoprolol succinate ER (TOPROL-XL) 24 hr tablet 50 mg     midazolam (VERSED) injection 0.5-2 mg     naloxone (NARCAN) injection 0.2 mg    Or     naloxone (NARCAN) injection 0.4 mg    Or     naloxone (NARCAN) injection 0.2 mg    Or     naloxone (NARCAN) injection 0.4 mg     naloxone (NARCAN) injection 0.2 mg    Or     naloxone (NARCAN) injection 0.4 mg    Or     naloxone (NARCAN) injection 0.2 mg    Or     naloxone (NARCAN) injection 0.4 mg     Patient is already receiving anticoagulation with heparin, enoxaparin (LOVENOX), warfarin (COUMADIN)  or other anticoagulant medication     sodium chloride (PF) 0.9% PF flush 3 mL     sodium chloride (PF) 0.9% PF flush 3 mL     sodium chloride 0.9% 1000 mL TABLE SOLN     sodium chloride 0.9% infusion     sulfamethoxazole-trimethoprim (BACTRIM DS) 800-160 MG per tablet 1 tablet     traMADol (ULTRAM) tablet 50 mg               Physical Exam:   Vital Signs: Blood pressure 125/72, pulse 79, temperature 97.7  F (36.5  C), temperature source Oral, resp. rate 18, weight 70.9 kg (156 lb 3.2 oz), SpO2 97 %.   GENERAL: lying in bed in NAD on 5L via NC    SKIN: Warm and dry   HEENT: Normocephalic, anicteric sclera, moist mucous membranes  LUNGS: Clear to auscultation anterolaterally; non-labored, CT intact with dark red output   CARDIAC: RRR, normal s1/s2, w/o m/r/g   ABDOMINAL: BS (+), soft, non distended, non tender  EXTREMITIES: No edema or cyanosis, pulses 2+ and  symmetrical  NEUROLOGIC: alert & oriented x4  PSYCH: calm, pleasant              Data Reviewed:     Reviewed recent pertinent imaging:   CT CHEST PULMONARY EMBOLISM W CONTRAST  LOCATION: [facility]  DATE/TIME: 10/4/2021                                                   IMPRESSION:  1.  Positive for acute pulmonary emboli that has progressed since 09/27/2021. Left side.     2.  No significant change in the large central mass right upper lobe with no significant change in the extensive right pleural metastases.     3.  Loculated pleural fluid laterally on ultrasound earlier today was multi-septated.     4.  The most significant change in the right lung is increasing atelectasis in the right lower lobe.     5.  Similar-appearing dense consolidation right upper lobe.     6.  SVC appears widely patent.  ---------------------------------------  XR CHEST PORT 1 VIEW  LOCATION: [facility]  DATE/TIME: 10/6/2021                                                                   IMPRESSION: A small bore catheter overlies the right lung base.  There is new air in the pleural space at the right lung base. The right lower lung has not reexpanded. The confluent opacity at the right upper lung has not changed. A small volume of left   pleural fluid is again seen. A few small opacities in the left lung are again seen. The heart is not well assessed.    Reviewed recent labs:  Hgb 6.9    ====================================================  TT: I have personally spent a total of 25 minutes on the unit in review of medical record, consultation with the medical providers and assessment of patient today, with more than 50% of this time spent in counseling, coordination of care, and discussion with patient re:symptom management, risks and benefits of management options, and development of plan of care as noted above.  ====================================================    Juan M Andre  CAROLINE  [facility]  Palliative Medicine  Office: [phone]

## 2021-10-07 NOTE — PROGRESS NOTES
[facility] Hematology and Oncology Inpatient Progress Note    Patient: Antonio Mercado  MRN: [id number]  Date of Service: October 7, 2021         Reason for Visit    Follow-up for metastatic lung cancer    Assessment and Plan    Acute respiratory failure with hypoxemia  Rapidly progressive metastatic lung adenocarcinoma with right lung mass and invasion of mediastinal structures  Pulmonary emboli  Loculated right pleural effusion  Paroxysmal atrial fibrillation  Anemia    Breathing is better.  Swallowing normally and on regular diet.  Denies pain.  Continues to have drainage of dark fluid from the right pleural cavity and hemoglobin is down.  Continues on Lovenox for pulmonary embolism.    Discussed CODE STATUS with him again.  Explained that there is likely no benefit for chest compressions if he does not want to be intubated.    He will continue day 4 of radiation therapy today.    Discussed concurrent chemotherapy with carboplatin Taxol and he is agreeable to this.  Reviewed potential side effects including nausea, fatigue, alopecia, decreased blood counts and risk for fever and infection and also infusion reaction.  He agrees to proceed.    We will plan this for tomorrow in the hospital.    Anemia probably related to some bleeding from the chest tube.  Recommend transfusion to get hemoglobin over 8 g/dL.    Plan: Transfusion today  Continue supportive care and optimize nutrition  Continue radiation therapy  We will plan concurrent chemotherapy with carboplatin Taxol tomorrow    Cancer Staging  Malignant neoplasm of hepatic flexure (H)  Staging form: Colon and Rectum, AJCC 7th Edition  - Clinical stage from 2/20/2016: Stage IIIB (T4a, N1c, M0) - Signed by Harris Mary MD on 2/20/2017      ECOG Performance Status: 3        ______________________________________________________________________________    History of Present Illness    Mr. Antonio Mercado reports to be feeling better.  Not short of breath.  No  pain.  Swallowing fine.  Still fatigued.    Review of Systems    As per the HPI.    Physical Exam    /72 (BP Location: Right arm)   Pulse 79   Temp 97.7  F (36.5  C) (Oral)   Resp 18   Wt 70.9 kg (156 lb 3.2 oz)   SpO2 97%   BMI 21.79 kg/m        GENERAL: Alert and oriented to time place and person. Seated comfortably. In no distress.    HEAD: Atraumatic and normocephalic.    EYES: KARIME, EOMI.  No pallor.  No icterus.    Oral cavity: no mucosal lesion or tonsillar enlargement.    NECK: supple. JVP normal.  No thyroid enlargement.    LYMPH NODES: No palpable, cervical, axillary or inguinal lymphadenopathy.    CHEST: clear to auscultation bilaterally.  Resonant to percussion throughout bilaterally.  Symmetrical breath movements bilaterally.    CVS: S1 and S2 are heard. Regular rate and rhythm.  No murmur or gallop or rub heard.  No peripheral edema.    ABDOMEN: Soft. Not tender. Not distended.  No palpable hepatomegaly or splenomegaly.  No other mass palpable.  Bowel sounds heard.    EXTREMITIES: Warm.    SKIN: no rash, or bruising or purpura.  Has a full head of hair.      Lab Results    Recent Results (from the past 24 hour(s))   Potassium    Collection Time: 10/07/21  5:20 AM   Result Value Ref Range    Potassium 4.5 3.5 - 5.0 mmol/L   Magnesium    Collection Time: 10/07/21  5:20 AM   Result Value Ref Range    Magnesium 2.1 1.8 - 2.6 mg/dL   Extra Purple Top Tube    Collection Time: 10/07/21  5:20 AM   Result Value Ref Range    Hold Specimen [facility]    Basic metabolic panel    Collection Time: 10/07/21  5:20 AM   Result Value Ref Range    Sodium 144 136 - 145 mmol/L    Potassium 4.5 3.5 - 5.0 mmol/L    Chloride 116 (H) 98 - 107 mmol/L    Carbon Dioxide (CO2) 21 (L) 22 - 31 mmol/L    Anion Gap 7 5 - 18 mmol/L    Urea Nitrogen 27 (H) 8 - 22 mg/dL    Creatinine 0.87 0.70 - 1.30 mg/dL    Calcium 8.5 8.5 - 10.5 mg/dL    Glucose 111 70 - 125 mg/dL    GFR Estimate 89 >60 mL/min/1.73m2   CBC with platelets     Collection Time: 10/07/21  5:20 AM   Result Value Ref Range    WBC Count 5.9 4.0 - 11.0 10e3/uL    RBC Count 2.61 (L) 4.40 - 5.90 10e6/uL    Hemoglobin 6.9 (LL) 13.3 - 17.7 g/dL    Hematocrit 23.0 (L) 40.0 - 53.0 %    MCV 88 78 - 100 fL    MCH 26.4 (L) 26.5 - 33.0 pg    MCHC 30.0 (L) 31.5 - 36.5 g/dL    RDW 15.7 (H) 10.0 - 15.0 %    Platelet Count 334 150 - 450 10e3/uL   Adult Type and Screen    Collection Time: 10/07/21  5:20 AM   Result Value Ref Range    ABO/RH(D) O POS     Antibody Screen Negative Negative    SPECIMEN EXPIRATION DATE [id number]    Prepare red blood cells (unit)    Collection Time: 10/07/21 10:45 AM   Result Value Ref Range    CROSSMATCH Compatible     UNIT ABO/RH O Pos     Unit Number [id number]     Unit Status Ready     Blood Component Type Red Blood Cells     Product Code [id number]     CODING SYSTEM [id number]     UNIT TYPE ISBT 5100         Imaging    US Thoracentesis    Result Date: 10/5/2021  EXAM: 1. RIGHT THORACENTESIS 2. ULTRASOUND GUIDANCE LOCATION: [facility] DATE/TIME: 10/4/2021 5:04 PM INDICATION: Pleural effusion. PROCEDURE: Informed consent obtained. Time out performed. The chest was prepped and draped in sterile fashion. 10 mL of 1 % lidocaine was infused into the local soft tissues. Under direct ultrasound guidance, a 5 Citizen of [place] catheter system was placed into the pleural effusion. 100 milliliters of dark red fluid were removed and sent to lab, if requested. Patient tolerated procedure well. Ultrasound imaging was obtained and placed in the patient's permanent medical record.     IMPRESSION: Status post right ultrasound-guided thoracentesis. Patient's right pleural space has developed numerous heavy loculations. This limits the ability to perform large volume thoracentesis. Reference CPT Code: 24785    XR Chest Port 1 View    Result Date: 10/6/2021  EXAM: XR CHEST PORT 1 VIEW LOCATION: [facility] DATE/TIME: 10/6/2021 5:47 AM INDICATION: right  chest tube. eval position, interval change in effusion. COMPARISON: Chest tube placement CT 10/05/2021     IMPRESSION: A small bore catheter overlies the right lung base.  There is new air in the pleural space at the right lung base. The right lower lung has not reexpanded. The confluent opacity at the right upper lung has not changed. A small volume of left pleural fluid is again seen. A few small opacities in the left lung are again seen. The heart is not well assessed.    CT Chest Pulmonary Embolism w Contrast    Result Date: 10/4/2021  EXAM: CT CHEST PULMONARY EMBOLISM W CONTRAST LOCATION: [facility] DATE/TIME: 10/4/2021 5:01 PM INDICATION: Known metastatic lung cancer on the right side. Enlarging pleural effusion. Concern for SVC syndrome. Thoracentesis just prior to this study shows extensively loculated pleural fluid with only 100 mL removed. COMPARISON: 09/27/2021. TECHNIQUE: CT chest pulmonary angiogram during arterial phase injection of IV contrast. Multiplanar reformats and MIP reconstructions were performed. Dose reduction techniques were used. CONTRAST: Isovue 370 100 mL FINDINGS: ANGIOGRAM CHEST: The previously noted pulmonary emboli seen on 09/27/2021 left lung has progressed in extent. No emboli on the right side. Thoracic aorta is negative for dissection. No CT evidence of right heart strain. LUNGS AND PLEURA: No significant change in the large central mass right upper lobe measuring 6.2 x 7.3 cm. Complete consolidation of the right upper lobe and marked atelectasis of the right lower lobe has increased. Extensive pleural metastases on the right have not significantly changed. Large loculated right pleural fluid collection fairly similar to previous. On ultrasound, this was multiloculated. This measures approximately 11 x 8 x 15 cm. MEDIASTINUM/AXILLAE: Mildly enlarged subcarinal node. SVC is normal in caliber without findings to suggest SVC stenosis. UPPER ABDOMEN: Normal.  MUSCULOSKELETAL: Normal.     IMPRESSION: 1.  Positive for acute pulmonary emboli that has progressed since 09/27/2021. Left side. 2.  No significant change in the large central mass right upper lobe with no significant change in the extensive right pleural metastases. 3.  Loculated pleural fluid laterally on ultrasound earlier today was multi-septated. 4.  The most significant change in the right lung is increasing atelectasis in the right lower lobe. 5.  Similar-appearing dense consolidation right upper lobe. 6.  SVC appears widely patent. 7.  Findings called to the clinical service at 6:12 PM.    CT Chest Tube with Cath Placement    Result Date: 10/5/2021  EXAM: 1. PERCUTANEOUS CHEST TUBE PLACEMENT RIGHT 2. CT GUIDANCE 3. CONSCIOUS SEDATION LOCATION: [facility] DATE/TIME: 10/5/2021 10:25 AM INDICATION: large right loculated pleural effusion, needs chest tube for intrapleural lytics. TECHNIQUE: Dose reduction techniques were used. PROCEDURE: Informed consent obtained. Site marked. Prior images reviewed. Required items made available. Patient identity confirmed verbally and with arm band. Patient reevaluated immediately before administering sedation. Universal protocol was followed. Time out performed. The site was prepped and draped in sterile fashion. 10 mL of 1% lidocaine was infused into the local soft tissues. Using standard technique and under direct CT guidance, a 12 [place] nonlocking all-purpose drain chest tube catheter was inserted into the pleural space. The catheter was fixed in place with sutures and adhesive device, and the tubing was banded. Chest tube placed to Pleur-evac suction. SPECIMEN: None. The patient had ultrasound-guided thoracentesis yesterday with fluid sent. BLOOD LOSS: Minimal. The patient tolerated the procedure well. No immediate complications. SEDATION: Versed 1 mg. Fentanyl 0 mcg. The procedure was performed with administration intravenous conscious sedation  with appropriate preoperative, intraoperative, and postoperative evaluation. 10 minutes of supervised face to face conscious sedation time was provided by a radiology nurse under my direct supervision.     IMPRESSION: 1.  Successful CT-guided chest tube placement into the right pleural space. Reference CPT Codes: 38557,       Signed by: Kailey Chavez MD

## 2021-10-07 NOTE — PROGRESS NOTES
10/07/21 1400   Quick Adds   Type of Visit Initial PT Evaluation   Living Environment   People in home alone   Current Living Arrangements apartment   Home Accessibility stairs to enter home;stairs within home   Number of Stairs, Main Entrance 5   Stair Railings, Main Entrance railings safe and in good condition   Transportation Anticipated none   Living Environment Comments Pt lives alone in an apartment, says wife/nephew can check on him.   Self-Care   Usual Activity Tolerance good   Current Activity Tolerance fair   Regular Exercise Yes   Exercise Amount/Frequency daily   Equipment Currently Used at Home cane, straight   Activity/Exercise/Self-Care Comment Pt is an [employer] . From [facility] [place].   Disability/Function   Hearing Difficulty or Deaf no   Wear Glasses or Blind no   Fall history within last six months yes   Number of times patient has fallen within last six months 1  (while going to the bathroom, not injured)   Change in Functional Status Since Onset of Current Illness/Injury yes   General Information   Onset of Illness/Injury or Date of Surgery 10/03/21   Referring Physician Dr. Michel Calderon   Patient/Family Therapy Goals Statement (PT) None stated   Pertinent History of Current Problem (include personal factors and/or comorbidities that impact the POC) From chart: 66 year old man with history of RUL NSCLC stage IV with known pleural mets, known malignant right pleural effusion, HIV, colon cancer s/p right hemicolectomy, prostate cancer, recent admission for dysphagia and weight loss discharged on home O2, presented to ER on 10/4 with worsening SOB. Found to have significant progression of the RUL mass with evidence of esophageal obstruction, SVC effacement, RUL post-obstructive collapse. PT consulted to eval and treat.   Existing Precautions/Restrictions   (chest tube)   Weight-Bearing Status - LLE full weight-bearing   Weight-Bearing Status - RLE full weight-bearing   Heart Disease Risk Factors  Medical history;Gender;Age;Race   General Observations Thin male supine in bed. Receiving blood transfusion. Per RN, OK for EOB activity.   Cognition   Orientation Status (Cognition) oriented x 4   Affect/Mental Status (Cognition) WNL   Cognitive Status Comments Pleasant, cooperative.   Pain Assessment   Patient Currently in Pain No   Integumentary/Edema   Integumentary/Edema no deficits were identifed   Posture    Posture Protracted shoulders   Range of Motion (ROM)   ROM Comment Grossly WFL but slightly limited d/t weakness   Strength   Strength Comments Grossly 4/5 in LEs, suspect fatigue contributing   Bed Mobility   Comment (Bed Mobility) Supine-sit with modA. 5L O2, desatted down to 87%.   Transfers   Transfer Safety Comments Did not yet assess sit-stand   Gait/Stairs (Locomotion)   Comment (Gait/Stairs) Not yet assessed   Balance   Balance Comments To be assessed   Sensory Examination   Sensory Perception patient reports no sensory changes   Coordination   Coordination no deficits were identified   Muscle Tone   Muscle Tone no deficits were identified   Clinical Impression   Criteria for Skilled Therapeutic Intervention yes, treatment indicated   PT Diagnosis (PT) Impaired functional mobility and activity tolerance   Influenced by the following impairments Medical, impaired strength   Functional limitations due to impairments All functional mobility and activity tolerance   Clinical Presentation Evolving/Changing   Clinical Presentation Rationale Clinician judgment, medical complexity   Clinical Decision Making (Complexity) moderate complexity   Therapy Frequency (PT) Daily   Predicted Duration of Therapy Intervention (days/wks) 10 days   Planned Therapy Interventions (PT) balance training;bed mobility training;gait training;home exercise program;neuromuscular re-education;ROM (range of motion);stair training;strengthening;transfer training;progressive activity/exercise   Anticipated Equipment Needs at  Discharge (PT) walker, rolling   Risk & Benefits of therapy have been explained patient   PT Discharge Planning    PT Discharge Recommendation (DC Rec) Transitional Care Facility;home with assist;home with home care physical therapy   PT Rationale for DC Rec PT: As of day of eval, pt would required continued skilled care for safety, independence, currently with significant deficits in mobility and activity tolerance. May progress over hospital course to d/c home with assist and HHPT for full return to PLOF. Would likely require 24 hour assist unless significant clinical improvement.   PT Brief overview of current status  PT: Eval limited by pt receiving blood transfusion, which limited session to EOB activity, and fatigue. ModA for supine<>sit. Completed bed exercises. On 5L O2 via nc, sometimes desats to ~87%.   Total Evaluation Time   Total Evaluation Time (Minutes) 8

## 2021-10-07 NOTE — PROGRESS NOTES
10/07/21 1330   Quick Adds   Type of Visit Initial Occupational Therapy Evaluation   Living Environment   People in home alone   Current Living Arrangements apartment   Home Accessibility stairs to enter home   Number of Stairs, Main Entrance 5   Stair Railings, Main Entrance railings safe and in good condition   Self-Care   Usual Activity Tolerance good   Current Activity Tolerance fair   Instrumental Activities of Daily Living (IADL)   Previous Responsibilities meal prep;housekeeping;laundry;shopping;medication management;finances;driving   Disability/Function   Hearing Difficulty or Deaf no   Wear Glasses or Blind no   Concentrating, Remembering or Making Decisions Difficulty no   Difficulty Communicating no   Dressing/Bathing Difficulty no   Toileting issues no   General Information   Onset of Illness/Injury or Date of Surgery 10/03/21   Referring Physician sandrita olivares   Patient/Family Therapy Goal Statement (OT) return home   Existing Precautions/Restrictions   (chest tube)   General Observations and Info will be getting blood today/weakness   Cognitive Status Examination   Orientation Status orientation to person, place and time   Follows Commands follows one-step commands   Range of Motion Comprehensive   General Range of Motion no range of motion deficits identified   Comment, General Range of Motion R shoulder pain with movement due to cancer per pt report   Bed Mobility   Comment (Bed Mobility) pt report states assist needed   Activities of Daily Living   BADL Assessment lower body dressing;grooming   Lower Body Dressing Assessment   Busy Level (Lower Body Dressing) dependent (less than 25% patient effort)   Comment (Lower Body Dressing) socks   Grooming Assessment   Busy Level (Grooming) supervision   Position (Grooming) supine   Comment (Grooming) wash face/hands   Toileting   Busy Level (Toileting) dependent (less than 25% patient effort)   Assistive Devices (Toileting)    (exterior male catheter)   Position (Toileting) supine   Clinical Impression   Criteria for Skilled Therapeutic Interventions Met (OT) yes   OT Diagnosis decreased adl's   OT Problem List-Impairments impacting ADL activity tolerance impaired;mobility;strength   Assessment of Occupational Performance 1-3 Performance Deficits   Planned Therapy Interventions (OT) ADL retraining;strengthening;transfer training   Clinical Decision Making Complexity (OT) low complexity   Therapy Frequency (OT) Daily   Predicted Duration of Therapy 7 days   Risk & Benefits of therapy have been explained evaluation/treatment results reviewed   OT Discharge Planning    OT Discharge Recommendation (DC Rec) Transitional Care Facility  (pending progress)   OT Rationale for DC Rec limited adl's due to chest tube and weakness/needs assist for adl's   Total Evaluation Time (Minutes)   Total Evaluation Time (Minutes) 17

## 2021-10-07 NOTE — PROGRESS NOTES
Pulmonary Progress Note  10/5/2021      Admit Date: 10/3/2021  CODE: Special Code    Reason for Consult: acute respiratory failure with hypoxemia. Large RUL lung mass with SVC, esophageal obstruction.  Loculated right pleural effusion.      Assessment/Plan:   66 year old man with history of RUL NSCLC stage IV with known pleural mets, known malignant right pleural effusion, HIV, colon cancer s/p right hemicolectomy, prostate cancer, recent admission for dysphagia and weight loss discharged on home O2, presented to ER on 10/4 with worsening SOB. Found to have significant progression of the RUL mass with evidence of esophageal obstruction, SVC effacement, RUL post-obstructive collapse.   Significant clinical improvement with radiation therapy and chest tube drainage of the right loculated PTX.   HIV viral load slightly elevated, CD4 count quite low at 40 indicating severe immunosuppression. Unclear if this has anything to do with the lung mass and current presentation.      Plan:  #Acute resp failure with hypoxemia requiring HFNC, NIPPV: multifactorial and due to large lung mass with post-obstructive lobar collapse, PE's, pleural effusion, deconditioning and weakness.   - titrate FiO2 for goal SpO2 92% or greater, avoid hyperoxia  - continue BiPAP prn for incr WOB, desaturations not recovering with other O2 modes.   - if worsening in respiratory status, increased FiO2, would re address goals of care. Prognosis is poor  - DNI status noted.      #Large RUL mass, known cancer, SVC patent on CTA chest so unlikely to have SVC syndrome. Also with esophageal obstruction, right hilar encasement, RUL post-obstructive collapse, esophageal compression. EGD last admission without obstruction of esophagus, overall normal. Unlikely to have progressed significantly since 9/29.   - appreciate rad onc, medical oncology involvement.   - continue daily radiation therapy per Dr. Manrique  - may need inpatient palliative chemo to shrink the  tumor. Unclear if he could tolerate based on degree of illness, functional status. Defer to oncology.   - continue dexamethasone 4mg IV q8h for anticipated edema post-radiation while getting daily radiation therapy.  - advance diet per primary team. SLP evaluation unremarkable.   - palliative care consult      #Loculated right pleural effusion, known malignant effusion based on cytology from Jan 2021  No studies sent as this is known to be malignant effusion now with complex loculations. Appears to have trapped lung on CXR but drainage is still good.   - continue chest tube to water seal/gravity drainage and record output qshift - last 3 days: 700-460-60 so far today.  - no indication for intrapleural lytics at this time since this is not empyema, hgb borderline low and could risk bleeding into the pleural space from the large RUL mass. Also on therapeutic LMWH for PE so risk of bleeding is not insignificant.      #Small LLL PE's diagnosed on CTA chest from 9/27: was on Xarelto  - continue therapeutic LMWH, this is the preferred anticoagulant in patient's with solid tumor.     #HIV, CD4 absolute count only 40, HIV VL elevated. Unclear if related to current presentation. Could the enlarging mass represent something like lymphoma vs. Opportunistic infection, given severe immunosuppression?  - defer to primary team and oncology whether this merits further investigation  Addendum: reviewed T-cell studies, VL with ID briefly. Low CD4 likely from recent chemo. VL quite low so doubt worsening HIV. Of note, CD4 >250 in 2018.     Pieter Shea MD  10/7/2021      Subjective/Interim Events:     Just came back from radiation. Feels okay. On 5-6Lpm O2 with SpO2 92%      Medications:       heparin 2 units/mL in 0.9% NaCl TABLE SOLN       - MEDICATION INSTRUCTIONS -       sodium chloride 0.9%       sodium chloride 125 mL/hr at 10/07/21 0326       dexamethasone  4 mg Intravenous Q8H     emtricitabine-rilpivirine-tenofovir  1  tablet Oral Daily with breakfast     enoxaparin ANTICOAGULANT  1 mg/kg Subcutaneous Q12H     gabapentin  100 mg Oral TID     metoprolol succinate ER  50 mg Oral BID     sodium chloride (PF)  3 mL Intracatheter Q8H     sulfamethoxazole-trimethoprim  1 tablet Oral Daily         Exam/Data:   Vitals  /72 (BP Location: Right arm)   Pulse 79   Temp 97.7  F (36.5  C) (Oral)   Resp 18   Wt 70.9 kg (156 lb 3.2 oz)   SpO2 97%   BMI 21.79 kg/m       I/O last 3 completed shifts:  In: 3281 [P.O.:150; I.V.:3131]  Out: 2580 [Urine:2300; Chest Tube:280]  Weight change: 1.27 kg (2 lb 12.8 oz)  [unfilled]  Resp: 18      EXAM:  Gen: awake, alert, oriented, no distress  HEENT: NT, no ELVA  CV: RRR, no m/g/r  Resp: diminished on the right. No wheezing. Clear on left.   Abd: soft, nontender, BS+  Skin: no rashes or lesions  Ext: no edema  Neuro: PERRL, nonfocal exam    ROS:  A 10-system review was obtained and is negative with the exception of the symptoms noted above.    DATA:      IMAGING:   US Thoracentesis    Result Date: 10/5/2021  EXAM: 1. RIGHT THORACENTESIS 2. ULTRASOUND GUIDANCE LOCATION: [facility] DATE/TIME: 10/4/2021 5:04 PM INDICATION: Pleural effusion. PROCEDURE: Informed consent obtained. Time out performed. The chest was prepped and draped in sterile fashion. 10 mL of 1 % lidocaine was infused into the local soft tissues. Under direct ultrasound guidance, a 5 [place]ian catheter system was placed into the pleural effusion. 100 milliliters of dark red fluid were removed and sent to lab, if requested. Patient tolerated procedure well. Ultrasound imaging was obtained and placed in the patient's permanent medical record.     IMPRESSION: Status post right ultrasound-guided thoracentesis. Patient's right pleural space has developed numerous heavy loculations. This limits the ability to perform large volume thoracentesis. Reference CPT Code: 36352    XR Chest Port 1 View    Result Date: 10/3/2021  EXAM: XR  CHEST PORT 1 VIEW LOCATION: [facility] DATE/TIME: 10/3/2021 10:40 PM INDICATION: sob COMPARISON: CT chest 09/27/2021.     IMPRESSION: Near complete opacification of the right hemithorax, correlating with recent CT showing prominent consolidation, mass, atelectasis, and pleural fluid. Left lung remains clear. Normal heart size. No pneumothorax.    CT Chest Pulmonary Embolism w Contrast    Result Date: 10/4/2021  EXAM: CT CHEST PULMONARY EMBOLISM W CONTRAST LOCATION: [facility] DATE/TIME: 10/4/2021 5:01 PM INDICATION: Known metastatic lung cancer on the right side. Enlarging pleural effusion. Concern for SVC syndrome. Thoracentesis just prior to this study shows extensively loculated pleural fluid with only 100 mL removed. COMPARISON: 09/27/2021. TECHNIQUE: CT chest pulmonary angiogram during arterial phase injection of IV contrast. Multiplanar reformats and MIP reconstructions were performed. Dose reduction techniques were used. CONTRAST: Isovue 370 100 mL FINDINGS: ANGIOGRAM CHEST: The previously noted pulmonary emboli seen on 09/27/2021 left lung has progressed in extent. No emboli on the right side. Thoracic aorta is negative for dissection. No CT evidence of right heart strain. LUNGS AND PLEURA: No significant change in the large central mass right upper lobe measuring 6.2 x 7.3 cm. Complete consolidation of the right upper lobe and marked atelectasis of the right lower lobe has increased. Extensive pleural metastases on the right have not significantly changed. Large loculated right pleural fluid collection fairly similar to previous. On ultrasound, this was multiloculated. This measures approximately 11 x 8 x 15 cm. MEDIASTINUM/AXILLAE: Mildly enlarged subcarinal node. SVC is normal in caliber without findings to suggest SVC stenosis. UPPER ABDOMEN: Normal. MUSCULOSKELETAL: Normal.     IMPRESSION: 1.  Positive for acute pulmonary emboli that has progressed since  09/27/2021. Left side. 2.  No significant change in the large central mass right upper lobe with no significant change in the extensive right pleural metastases. 3.  Loculated pleural fluid laterally on ultrasound earlier today was multi-septated. 4.  The most significant change in the right lung is increasing atelectasis in the right lower lobe. 5.  Similar-appearing dense consolidation right upper lobe. 6.  SVC appears widely patent. 7.  Findings called to the clinical service at 6:12 PM.

## 2021-10-07 NOTE — PLAN OF CARE
Problem: Gas Exchange Impaired  Goal: Optimal Gas Exchange  Outcome: Improving  Intervention: Optimize Oxygenation and Ventilation  Recent Flowsheet Documentation  Taken 10/7/2021 0000 by Jorge Alberto Wilder RN  Head of Bed (HOB) Positioning: HOB at 60-90 degrees     Pt has chest tube to right side to water seal. Total of 60ml dark red drainage out this shift.     Pt's oxygen weaned to 5LNC this shift maintaining saturation in mid 90's. When patient gets OOB, oxygen level does go down to mid 80's, but with rest will recover and come back up to mid 90's.

## 2021-10-07 NOTE — PROGRESS NOTES
Spiritual Health Note    Spiritual Assessment:     visited patient due to consult and patient request. Patient shared about medical condition and history, particularly related to his lung cancer diagnosis (Jan. 2021) and treatment. He stated that the chemo treatment in February worked really well.     Patient shared about family/life history. He shared about his background in [place]. He has four children.     Patient comes from [employer] chir[employer] background and derives meaning, purpose, and comfort from chirag. He is a  and his [employer] that he is part of [employer] in [place]. He shared about his chirag perspective and experience. He is comforted by his trust in God and is focused on God's will in light of his medical challenges. He shared about his belief in miracles, which also includes the use of medicine/treatement.      Patient welcomed prayer and expressed appreciation for the visit. He welcomes follow up spiritual care visits.      Care Provided:    Introduced self and role of Spiritual Care     Empathic listening and presence     Helped patient in processing of emotions     Facilitated storytelling and life review     Guided patient in exploration of beliefs     Offered prayer     Plan of Care: A  will continue to visit as able or per request by patient/family/staff.         Chato Sanchez MDiv, [facility]

## 2021-10-08 NOTE — PROGRESS NOTES
Radiation Treatment Summary          Patient: Antonio Mercado            MRN: [id number]           : 1955        Care Provider: Liza Manrique MD         Date of Service: Oct 8, 2021        Kailey Chavez MD   Medical oncology  [facility]    Dear Dr. Chavez:      Your patient Mr. Antonio Mercado completed his palliative radiation therapy 10/8/2021.  As you know the patient is a 66-year-old gentleman with a diagnosis of stage IV non-small cell lung cancer with clinically asymptomatic mediastinal disease causing lung collapse and pulmonary vessel compression.  The patient received palliative radiation therapy of the past 5 days with a total dose of 2000 cGy in 5 treatments given from 10/4/2021-10/8/2021.  The patient tolerated radiation therapy very well with minimal side effect.  He will continue his ongoing care for his lung cancer with the office.  The patient is also scheduled to return to radiation oncology in 2-3 weeks for routine post therapy office follow-up.    Again, thank you very much for the referral and allowing me to participate in the care of this patient.  If you have any questions or concerns about this patient, please do not hesitate to call.          Sincerely,    Liza Manrique MD, PhD  Department of Radiation Oncology   [facility] Radiation Oncology  Tel: [phone]  Page: [phone]    [facility]  [address] [place] [address]     [address] [place] [address]    CC:  Patient Care Team:  Harris Mary MD as PCP - General (Family Practice)  Chance Andersen MD (Infectious Diseases)  Linda Pompa RN as Registered Nurse  Ely Phelan as   Kailey Chavez MD as MD (Hematology & Oncology)  Yolette Pierre MD as MD (Urology)  Harris Mary MD as Assigned PCP  Susy Tolbert, RN as Specialty Care Coordinator (Hematology & Oncology)  Kailey Chavez MD as Assigned  Cancer Care Provider  Chance Andersen MD as Assigned Infectious Disease Provider  Kailey Chavez MD as MD (Hematology)

## 2021-10-08 NOTE — PROGRESS NOTES
Pulmonary Progress Note  10/5/2021      Admit Date: 10/3/2021  CODE: No CPR- Do NOT Intubate    Reason for Consult: acute respiratory failure with hypoxemia. Large RUL lung mass with SVC, esophageal obstruction.  Loculated right pleural effusion.      Assessment/Plan:   66 year old man with history of RUL NSCLC stage IV with known pleural mets, known malignant right pleural effusion, HIV, colon cancer s/p right hemicolectomy, prostate cancer, recent admission for dysphagia and weight loss discharged on home O2, presented to ER on 10/4 with worsening SOB. Found to have significant progression of the RUL mass with evidence of esophageal obstruction, SVC effacement, RUL post-obstructive collapse.   Significant clinical improvement with radiation therapy and chest tube drainage of the right loculated PTX. The patient received palliative radiation therapy of the past 5 days with a total dose of 2000 cGy in 5 treatments given from 10/4/2021-10/8/2021.    HIV viral load slightly elevated, CD4 count quite low at 40 indicating severe immunosuppression. Unclear if this has anything to do with the lung mass and current presentation.      Plan:  #Acute resp failure with hypoxemia requiring HFNC, NIPPV: multifactorial and due to large lung mass with post-obstructive lobar collapse, PE's, pleural effusion, deconditioning and weakness.   - titrate FiO2 for goal SpO2 92% or greater, avoid hyperoxia  - BiPAP prn for incr WOB, desaturations not recovering with other O2 modes.   - if worsening in respiratory status, increased FiO2, would re address goals of care. Prognosis is poor  - DNI status noted.      #Large RUL mass, known cancer, SVC patent on CTA chest so unlikely to have SVC syndrome. Also with esophageal obstruction, right hilar encasement, RUL post-obstructive collapse, esophageal compression. EGD last admission without obstruction of esophagus, overall normal. Unlikely to have progressed significantly since 9/29.   -  appreciate rad onc, medical oncology involvement.   - continue daily radiation therapy per Dr. Manrique  - will be going for chemo.  - continue dexamethasone 4mg IV q8h for anticipated edema post-radiation while getting daily radiation therapy.  - advance diet per primary team. SLP evaluation unremarkable.   - palliative care consult      #Loculated right pleural effusion, known malignant effusion based on cytology from Jan 2021  No studies sent as this is known to be malignant effusion now with complex loculations. Appears to have trapped lung on CXR but drainage is still good.   - continue chest tube to water seal/gravity drainage and record output qshift - last 3 days: 100- yesterday  - no indication for intrapleural lytics at this time since this is not empyema, hgb borderline low and could risk bleeding into the pleural space from the large RUL mass. Also on therapeutic LMWH for PE so risk of bleeding is not insignificant.      #Small LLL PE's diagnosed on CTA chest from 9/27: was on Xarelto  - continue therapeutic LMWH, this is the preferred anticoagulant in patient's with solid tumor.     #HIV, CD4 absolute count only 40, HIV VL elevated. Unclear if related to current presentation. Could the enlarging mass represent something like lymphoma vs. Opportunistic infection, given severe immunosuppression?  - defer to primary team and oncology whether this merits further investigation  Addendum: reviewed T-cell studies, VL with ID briefly. Low CD4 likely from recent chemo. VL quite low so doubt worsening HIV. Of note, CD4 >250 in 2018.     Pieter Shea MD  10/7/2021      Subjective/Interim Events:     Denies any complaints besides his sob. Will be starting chemotherapy in addition to radiation. Transfused PRBC      Medications:       heparin 2 units/mL in 0.9% NaCl TABLE SOLN       - MEDICATION INSTRUCTIONS -       sodium chloride 0.9%       sodium chloride 125 mL/hr at 10/08/21 1513       CARBOplatin  215 mg  "Intravenous Once     dexamethasone  4 mg Intravenous Q8H     diphenhydrAMINE (BENADRYL) intermittent infusion  50 mg Intravenous Once     diphenhydrAMINE  50 mg Oral Once     emtricitabine-rilpivirine-tenofovir  1 tablet Oral Daily with breakfast     enoxaparin ANTICOAGULANT  1 mg/kg Subcutaneous Q12H     famotidine  20 mg Intravenous Once     ferrous sulfate  325 mg Oral Daily     fosaprepitant (EMEND) intermittent infusion  150 mg Intravenous Once     gabapentin  100 mg Oral TID     metoprolol succinate ER  50 mg Oral BID     ondansetron  16 mg Oral Once     paclitaxel  50 mg/m2 (Treatment Plan Recorded) Intravenous Once     sodium chloride (PF)  3 mL Intracatheter Q8H     sulfamethoxazole-trimethoprim  1 tablet Oral Daily         Exam/Data:   Vitals  BP (!) 157/75 (BP Location: Right arm)   Pulse 80   Temp 97  F (36.1  C) (Axillary)   Resp 18   Ht 1.803 m (5' 11\")   Wt 72.1 kg (159 lb)   SpO2 97%   BMI 22.18 kg/m       I/O last 3 completed shifts:  In: 2541.58 [P.O.:240; I.V.:1951.58]  Out: 1925 [Urine:1700; Chest Tube:225]  Weight change:   [unfilled]  Resp: 18      EXAM:  Gen: awake, alert, oriented, no distress  HEENT: NT, no ELVA  CV: RRR, no m/g/r  Resp: diminished on the right. No wheezing. Clear on left.   Abd: soft, nontender, BS+  Skin: no rashes or lesions  Ext: no edema  Neuro: PERRL, nonfocal exam    ROS:  A 10-system review was obtained and is negative with the exception of the symptoms noted above.    DATA:      IMAGING:   US Thoracentesis    Result Date: 10/5/2021  EXAM: 1. RIGHT THORACENTESIS 2. ULTRASOUND GUIDANCE LOCATION: [facility] DATE/TIME: 10/4/2021 5:04 PM INDICATION: Pleural effusion. PROCEDURE: Informed consent obtained. Time out performed. The chest was prepped and draped in sterile fashion. 10 mL of 1 % lidocaine was infused into the local soft tissues. Under direct ultrasound guidance, a 5 [place] catheter system was placed into the pleural effusion. 100 milliliters of " dark red fluid were removed and sent to lab, if requested. Patient tolerated procedure well. Ultrasound imaging was obtained and placed in the patient's permanent medical record.     IMPRESSION: Status post right ultrasound-guided thoracentesis. Patient's right pleural space has developed numerous heavy loculations. This limits the ability to perform large volume thoracentesis. Reference CPT Code: 74698    XR Chest Port 1 View    Result Date: 10/3/2021  EXAM: XR CHEST PORT 1 VIEW LOCATION: [facility] DATE/TIME: 10/3/2021 10:40 PM INDICATION: sob COMPARISON: CT chest 09/27/2021.     IMPRESSION: Near complete opacification of the right hemithorax, correlating with recent CT showing prominent consolidation, mass, atelectasis, and pleural fluid. Left lung remains clear. Normal heart size. No pneumothorax.    CT Chest Pulmonary Embolism w Contrast    Result Date: 10/4/2021  EXAM: CT CHEST PULMONARY EMBOLISM W CONTRAST LOCATION: [facility] DATE/TIME: 10/4/2021 5:01 PM INDICATION: Known metastatic lung cancer on the right side. Enlarging pleural effusion. Concern for SVC syndrome. Thoracentesis just prior to this study shows extensively loculated pleural fluid with only 100 mL removed. COMPARISON: 09/27/2021. TECHNIQUE: CT chest pulmonary angiogram during arterial phase injection of IV contrast. Multiplanar reformats and MIP reconstructions were performed. Dose reduction techniques were used. CONTRAST: Isovue 370 100 mL FINDINGS: ANGIOGRAM CHEST: The previously noted pulmonary emboli seen on 09/27/2021 left lung has progressed in extent. No emboli on the right side. Thoracic aorta is negative for dissection. No CT evidence of right heart strain. LUNGS AND PLEURA: No significant change in the large central mass right upper lobe measuring 6.2 x 7.3 cm. Complete consolidation of the right upper lobe and marked atelectasis of the right lower lobe has increased. Extensive  pleural metastases on the right have not significantly changed. Large loculated right pleural fluid collection fairly similar to previous. On ultrasound, this was multiloculated. This measures approximately 11 x 8 x 15 cm. MEDIASTINUM/AXILLAE: Mildly enlarged subcarinal node. SVC is normal in caliber without findings to suggest SVC stenosis. UPPER ABDOMEN: Normal. MUSCULOSKELETAL: Normal.     IMPRESSION: 1.  Positive for acute pulmonary emboli that has progressed since 09/27/2021. Left side. 2.  No significant change in the large central mass right upper lobe with no significant change in the extensive right pleural metastases. 3.  Loculated pleural fluid laterally on ultrasound earlier today was multi-septated. 4.  The most significant change in the right lung is increasing atelectasis in the right lower lobe. 5.  Similar-appearing dense consolidation right upper lobe. 6.  SVC appears widely patent. 7.  Findings called to the clinical service at 6:12 PM.

## 2021-10-08 NOTE — PROGRESS NOTES
Radiation Oncology final treatment:   Received final/fifth radiation fraction today for a total dose of 2000cGy to the lung mass.     Continue chemo and following with Medical Oncology. Follow up with Rad Onc in 2-3 weeks.     Larisa Laura RN, MAN, OCN  Radiation Oncology

## 2021-10-08 NOTE — LETTER
10/8/2021         RE: Antonio Mercado  [address]  [place] [address]        Dear Colleague,    Thank you for referring your patient, Antonio Mercado, to the [facility] RADIATION ONCOLOGY [facility]. Please see a copy of my visit note below.         Radiation Treatment Summary          Patient: Antonio Mercado            MRN: [id number]           : 1955        Care Provider: Liza Manrique MD         Date of Service: Oct 8, 2021        Kailey Chavez MD   Medical oncology  [facility]    Dear Dr. Chavez:      Your patient Mr. Antonio Mercado completed his palliative radiation therapy 10/8/2021.  As you know the patient is a 66-year-old gentleman with a diagnosis of stage IV non-small cell lung cancer with clinically asymptomatic mediastinal disease causing lung collapse and pulmonary vessel compression.  The patient received palliative radiation therapy of the past 5 days with a total dose of 2000 cGy in 5 treatments given from 10/4/2021-10/8/2021.  The patient tolerated radiation therapy very well with minimal side effect.  He will continue his ongoing care for his lung cancer with the office.  The patient is also scheduled to return to radiation oncology in 2-3 weeks for routine post therapy office follow-up.    Again, thank you very much for the referral and allowing me to participate in the care of this patient.  If you have any questions or concerns about this patient, please do not hesitate to call.          Sincerely,    Liza Manrique MD, PhD  Department of Radiation Oncology   [facility] Radiation Oncology  Tel: [phone]  Page: [phone]    [facility]  [address] [place] [address]     [facility]  [address] [place] [address]    CC:  Patient Care Team:  Harris Mary MD as PCP - General (Family Practice)  Chance Andersen MD (Infectious Diseases)  Linda Pompa, RN as Registered Nurse  Ely Phelan as    Kailey Chavez MD as MD (Hematology & Oncology)  Yolette Pierre MD as MD (Urology)  Harris Mary MD as Assigned PCP  Susy Tolbert, RN as Specialty Care Coordinator (Hematology & Oncology)  Kailey Chavez MD as Assigned Cancer Care Provider  Chance Andersen MD as Assigned Infectious Disease Provider  Kailey Chavez MD as MD (Hematology)        Again, thank you for allowing me to participate in the care of your patient.        Sincerely,        Liza Manrique MD

## 2021-10-08 NOTE — PROGRESS NOTES
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 He underwent thoracentesis of pleural effusion and began palliative radiation of mass on 10/4. Right chest tube placed 10/5. Overall, clinically stable at this time and looks mildly improved today.       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 Chavez. Was recently hospitalized 9/28-10/1 secondary to weight loss and progression of his right lung mass--CT chest prior to admission had shown mediastinal invasion causing esophageal obstruction and right hilar vascular encasement and mass effect on the SVC. Was initially on BiPAP now breathing stable on NC after chest tube placement. Nonproductive cough likely 2/2 combination of mass and pleural effusion, less likely infection given pt is afebrile, white count stable, and pt feeling overall improved. Chest tube output: Trending down; 700 (10/5), 460 (10/6), 285 (10/7), 100 so far today. Will pursue last round of radiation and start inpatient chemotherapy.  - Supplemental oxygen via NC, oxymask prn.   - Prn Robitussin for cough  - PT/OT  - Fall precautions  - Heme/Onc and Rad/Onc consulted, appreciate  recommendations   -Daily palliative radiation therapy (started 10/4) for 5 doses total.   -Dexamethasone 4 mg IV qhr to reduce edema post-radiation   -Will start chemotherapy with carboplatin Taxol today.  - Pulmonology consulted   - Right chest tube to water/seal gravity   - No intrapleural lytics d/t no empyema, low Hgb, and on lovenox  - Palliative care consulted   - Pt's decision making preferences: independently but values input from son Will   - No feeding tube at this time   - Pt's code status discussed, now DNR/DNI.    - Pt does not want to opt for comfort care/hospice unless recommended by his Onc team  - Spiritual Health consulted and following    #Acute on Chronic Normocytic Anemia  Hgb trend: 7.5 (10/5), 6.9 (10/7), 9.0 (10/7 after 1U pRBCs), 8.5 (10/8). MCV 89, stable. Acute anemia likely 2/2 blood loss from chest tube.   -Restart PTA oral iron  -Transfuse if Hgb <8 per Oncology recommendation.   -CBC in am    #HIV  #Immunosuppression  HIV viral load slightly elevated during this hospitalization, CD4 count quite low at 40 indicating severe immunosuppression. Low CD4 count felt to be likely 2/2 recent chemo, viral load quite low so less likely worsening HIV. Per literature review, will not start Azithromycin for MAC coverage as patient has been consistently on ART.  - Prophylactic Bactrim started 10/6 d/t low CD4 count.   - PTA O[place]sey    #Small LLL Pulmonary Emboli, progressive  #Intermittent Chest Pain  Originally diagnosed on CT incidentally in August 2021, still seen on CT 9/27 and at that time was not taking xarelto consistently d/t dysphagia, received heparin while hospitalized 9/28-10/1, discharged on xarelto 20 mg. CT PE run 10/4 was positive for acute PE (on left) that had progressed since 9/27. He was started on therapeutic lovenox and xarelto held.  -Therapeutic lovenox (preferred anti-coagulant with solid tumors per Pulm)  -Hold xarelto   -Gabapentin 100 mg TID for pain  -Tramadol 50 mg q6h  for moderate pain    #Paroxysmal Atrial Fibrillation, resolved  #Elevated BNP, on admission  #Elevated Troponin, on admission  New atrial fibrillation/flutter with RVR on admission, given diltiazem bolus and drip in the ED with continued atrial flutter with rates in the 110s-120s. No ST/T wave changes concerning for ischemia. Troponin 0.12 and  on admission. Cardiology was consulted and he was started on PO metoprolol tartrate 25 mg QID. Troponin trend: 0.12 (10/3) --> 0.11 --> 0.12 (10/4). Etiology likely secondary to mass effect vs. pleural effusion. Cardiology discontinued diltiazem d/t paroxysmal nature of pt's afib and signed off.   -Continue metoprolol succinate 50 mg BID    #Hypomagnesemia  Mag 1.5 and potassium 3.8 on admission, does not appear Mag has been been checked on prior admissions.   Mag today stable at 2.0. Potassium stable at 4.5.   - Recheck Mag and K in am  - Magnesium replacement protocol  - Potassium replacement protocol, keep K>4.0.    #Elevated lactate, resolved  Upon arrival lactate 2.6, tachycardic (d/t afib), tachypneic, WBC 5.4, Procal 0.18. There was concern for pneumonia possibly causing sepsis and vancomycin and zosyn were started in the ED after blood cultures were drawn. Vancomycin was subsequently discontinued. Pt has remains afebrile, lactate has normalized. He does have other reason (mass) for his hypoxemic respiratory failure. Blood cx NGTD.   - IVNS at 125 ml/hr  - Follow blood cultures    #Severe malnutrition   Related to chronic illness and prolonged poor PO intake. Has had significant weight loss in the last several months. 81% IBW with muscle and fat loss.  -Nutrition consulted, appreciate recommendations: RD to follow    Diet: Regular Diet Adult Thin Liquids (level 0), SLP following  Fluids: NS at 125 mL/hr   Central Lines: None  Ponce Catheter: Not present  DVT Prophylaxis: Therapeutic lovenox.  Code Status: No CPR- Do NOT Intubate        Disposition/Advanced Care  Planning  Barriers to discharge: hypoxia, inpatient palliative radiation, removal of chest tube, and Heme/Onc recommendations.  Anticipated discharge date: 2-3 days pending medical stability and the above being corrected/achieved.    Subjective  Pt feeling well this am. Stronger from leaning back to sitting to listen to his lungs. Complains of cough, has not worsened but wants something to decrease cough. No sputum production. Denies SOB. Saturating well on 6LPM NC. Afebrile. No pain at chest tube site, dressing, c/d/i.  Chest tube output: Trending down; 700 (10/5), 460 (10/6), 285 (10/7), 100 so far today. Denies chest pain, palpitations, abdominal pain, headache.   Per nursing, radiation machine currently down, they will get him down for this when able.     Objective    Vital signs in last 24 hours Temp:  [97.4  F (36.3  C)-98.5  F (36.9  C)] 97.6  F (36.4  C)  Pulse:  [70-84] 72  Resp:  [8-20] 20  BP: (124-152)/(66-84) 141/83  SpO2:  [95 %-99 %] 96 %   Weight:   Wt Readings from Last 2 Encounters:   10/07/21 70.9 kg (156 lb 3.2 oz)   09/28/21 63.7 kg (140 lb 8 oz)       Intake/Output last 3 shift I/O last 3 completed shifts:  In: 3484.58 [P.O.:240; I.V.:2894.58]  Out: 2425 [Urine:2100; Chest Tube:325]    Intake/Output this shift:I/O this shift:  In: 240 [P.O.:240]  Out: -     Physical Exam  General appearance: Alert. Comfortably resting in bed. No acute distress with nasal canula in place. Cooperative.   Head: Normocephalic, without obvious abnormality, atraumatic  Eyes: conjunctivae/corneas clear  Lungs: Decreased breath sounds on the right. Right chest tube in place, draining dark red/brown drainage. Dressing c/d/i. Lung sounds clear on the left.   Heart: RRR. No murmurs  Abdomen: soft, non-tender, non-distended  Extremities: extremities normal, atraumatic, no cyanosis or edema  Skin: Skin color, texture, turgor normal. No rashes or lesions  Neurologic: No focal neurologic deficits  Psychiatric: mood and affect  appropriate, slightly brighter today.    Pertinent Labs and Pertinent Radiology   Lab Results: personally reviewed.     Radiology Results: Personally reviewed image/s and impression/s    Precepted patient with Dr. Billy Grullon.    Maci Pompa, MS4  [facility]  10/8/2021

## 2021-10-08 NOTE — PLAN OF CARE
Problem: Adult Inpatient Plan of Care  Goal: Readiness for Transition of Care  Outcome: Improving     Problem: Coping Ineffective (Oncology Care)  Goal: Effective Coping  Outcome: Improving     Problem: Oral Intake Altered (Oncology Care)  Goal: Optimal Oral Intake  Outcome: Improving     Problem: Pain Acute (Oncology Care)  Goal: Optimal Pain Control  Outcome: Improving     Problem: Anemia  Goal: Anemia Symptom Improvement  Outcome: Improving     Pt reporting no pain throughout the shift. VSS on 6 L O2 nasal cannula.   Lung sounds diminished on right side. Chest tube with 200 ml out overnight.   Pt appears weak and tired but Hgb recheck after blood was 9.0. reportedly eating well with food daughter brought in.   Pt is to have chemotherapy today and will need specialty trained nurse to do so. Attempted to move patient to P2 on evening shift but reportedly no bed available. Bed became available at midnight but patient refused to move/ wanted to be left alone.

## 2021-10-08 NOTE — PROGRESS NOTES
[facility] Inpatient Progress Note    Patient: Antonio Mercado  MRN: [id number]  Date of Service: October 7, 2021         Reason for Visit    Follow-up for metastatic lung cancer    Assessment and Plan    Acute respiratory failure with hypoxemia  Rapidly progressive metastatic lung adenocarcinoma with right lung mass and invasion of mediastinal structures  Pulmonary emboli  Loculated right pleural effusion  Paroxysmal atrial fibrillation  Anemia    Patient is stable.  Breathing is stable and he is having no pain.    Hemoglobin improved after transfusion.    We will get radiation therapy last dose today.  Will transfer to  in the afternoon for chemotherapy.    Plan: Continue with chemotherapy and radiation today  Follow labs  Dr. Bustos covering over the weekend  Cancer Staging  Malignant neoplasm of hepatic flexure (H)  Staging form: Colon and Rectum, AJCC 7th Edition  - Clinical stage from 2/20/2016: Stage IIIB (T4a, N1c, M0) - Signed by Harris Mary MD on 2/20/2017      ECOG Performance Status: 3        ______________________________________________________________________________    History of Present Illness    Mr. Antonio Mercado reports to be feeling better.  Not short of breath.  No pain.  Swallowing fine.  Still fatigued.    Review of Systems    As per the HPI.    Physical Exam    BP (!) 141/83 (BP Location: Right arm)   Pulse 72   Temp 97.6  F (36.4  C) (Oral)   Resp 20   Wt 70.9 kg (156 lb 3.2 oz)   SpO2 96%   BMI 21.79 kg/m        GENERAL: Alert and oriented to time place and person. Seated comfortably. In no distress.    HEAD: Atraumatic and normocephalic.    EYES: KARIME, EOMI.  No pallor.  No icterus.    Oral cavity: no mucosal lesion or tonsillar enlargement.    NECK: supple. JVP normal.  No thyroid enlargement.    LYMPH NODES: No palpable, cervical, axillary or inguinal lymphadenopathy.    CHEST: clear to auscultation bilaterally.  Resonant to percussion  throughout bilaterally.  Symmetrical breath movements bilaterally.    CVS: S1 and S2 are heard. Regular rate and rhythm.  No murmur or gallop or rub heard.  No peripheral edema.    ABDOMEN: Soft. Not tender. Not distended.  No palpable hepatomegaly or splenomegaly.  No other mass palpable.  Bowel sounds heard.    EXTREMITIES: Warm.    SKIN: no rash, or bruising or purpura.  Has a full head of hair.      Lab Results    Recent Results (from the past 24 hour(s))   Hemoglobin    Collection Time: 10/07/21  6:25 PM   Result Value Ref Range    Hemoglobin 9.0 (L) 13.3 - 17.7 g/dL   Extra Green Top (Lithium Heparin) Tube    Collection Time: 10/07/21  6:25 PM   Result Value Ref Range    Hold Specimen JIC    Potassium    Collection Time: 10/08/21  4:51 AM   Result Value Ref Range    Potassium 4.5 3.5 - 5.0 mmol/L   Magnesium    Collection Time: 10/08/21  4:51 AM   Result Value Ref Range    Magnesium 2.0 1.8 - 2.6 mg/dL   CBC with platelets    Collection Time: 10/08/21  4:51 AM   Result Value Ref Range    WBC Count 6.0 4.0 - 11.0 10e3/uL    RBC Count 3.13 (L) 4.40 - 5.90 10e6/uL    Hemoglobin 8.5 (L) 13.3 - 17.7 g/dL    Hematocrit 27.7 (L) 40.0 - 53.0 %    MCV 89 78 - 100 fL    MCH 27.2 26.5 - 33.0 pg    MCHC 30.7 (L) 31.5 - 36.5 g/dL    RDW 15.8 (H) 10.0 - 15.0 %    Platelet Count 308 150 - 450 10e3/uL        Imaging    US Thoracentesis    Result Date: 10/5/2021  EXAM: 1. RIGHT THORACENTESIS 2. ULTRASOUND GUIDANCE LOCATION: [facility] DATE/TIME: 10/4/2021 5:04 PM INDICATION: Pleural effusion. PROCEDURE: Informed consent obtained. Time out performed. The chest was prepped and draped in sterile fashion. 10 mL of 1 % lidocaine was infused into the local soft tissues. Under direct ultrasound guidance, a 5 Kinyarwanda catheter system was placed into the pleural effusion. 100 milliliters of dark red fluid were removed and sent to lab, if requested. Patient tolerated procedure well. Ultrasound imaging was obtained and placed in  the patient's permanent medical record.     IMPRESSION: Status post right ultrasound-guided thoracentesis. Patient's right pleural space has developed numerous heavy loculations. This limits the ability to perform large volume thoracentesis. Reference CPT Code: 19987    XR Chest Port 1 View    Result Date: 10/6/2021  EXAM: XR CHEST PORT 1 VIEW LOCATION: [facility] DATE/TIME: 10/6/2021 5:47 AM INDICATION: right chest tube. eval position, interval change in effusion. COMPARISON: Chest tube placement CT 10/05/2021     IMPRESSION: A small bore catheter overlies the right lung base.  There is new air in the pleural space at the right lung base. The right lower lung has not reexpanded. The confluent opacity at the right upper lung has not changed. A small volume of left pleural fluid is again seen. A few small opacities in the left lung are again seen. The heart is not well assessed.    CT Chest Pulmonary Embolism w Contrast    Result Date: 10/4/2021  EXAM: CT CHEST PULMONARY EMBOLISM W CONTRAST LOCATION: [facility] DATE/TIME: 10/4/2021 5:01 PM INDICATION: Known metastatic lung cancer on the right side. Enlarging pleural effusion. Concern for SVC syndrome. Thoracentesis just prior to this study shows extensively loculated pleural fluid with only 100 mL removed. COMPARISON: 09/27/2021. TECHNIQUE: CT chest pulmonary angiogram during arterial phase injection of IV contrast. Multiplanar reformats and MIP reconstructions were performed. Dose reduction techniques were used. CONTRAST: Isovue 370 100 mL FINDINGS: ANGIOGRAM CHEST: The previously noted pulmonary emboli seen on 09/27/2021 left lung has progressed in extent. No emboli on the right side. Thoracic aorta is negative for dissection. No CT evidence of right heart strain. LUNGS AND PLEURA: No significant change in the large central mass right upper lobe measuring 6.2 x 7.3 cm. Complete consolidation of the right upper lobe  and marked atelectasis of the right lower lobe has increased. Extensive pleural metastases on the right have not significantly changed. Large loculated right pleural fluid collection fairly similar to previous. On ultrasound, this was multiloculated. This measures approximately 11 x 8 x 15 cm. MEDIASTINUM/AXILLAE: Mildly enlarged subcarinal node. SVC is normal in caliber without findings to suggest SVC stenosis. UPPER ABDOMEN: Normal. MUSCULOSKELETAL: Normal.     IMPRESSION: 1.  Positive for acute pulmonary emboli that has progressed since 09/27/2021. Left side. 2.  No significant change in the large central mass right upper lobe with no significant change in the extensive right pleural metastases. 3.  Loculated pleural fluid laterally on ultrasound earlier today was multi-septated. 4.  The most significant change in the right lung is increasing atelectasis in the right lower lobe. 5.  Similar-appearing dense consolidation right upper lobe. 6.  SVC appears widely patent. 7.  Findings called to the clinical service at 6:12 PM.    CT Chest Tube with Cath Placement    Result Date: 10/5/2021  EXAM: 1. PERCUTANEOUS CHEST TUBE PLACEMENT RIGHT 2. CT GUIDANCE 3. CONSCIOUS SEDATION LOCATION: [facility] DATE/TIME: 10/5/2021 10:25 AM INDICATION: large right loculated pleural effusion, needs chest tube for intrapleural lytics. TECHNIQUE: Dose reduction techniques were used. PROCEDURE: Informed consent obtained. Site marked. Prior images reviewed. Required items made available. Patient identity confirmed verbally and with arm band. Patient reevaluated immediately before administering sedation. Universal protocol was followed. Time out performed. The site was prepped and draped in sterile fashion. 10 mL of 1% lidocaine was infused into the local soft tissues. Using standard technique and under direct CT guidance, a 12 Romanian nonlocking all-purpose drain chest tube catheter was inserted into the pleural  space. The catheter was fixed in place with sutures and adhesive device, and the tubing was banded. Chest tube placed to Pleur-evac suction. SPECIMEN: None. The patient had ultrasound-guided thoracentesis yesterday with fluid sent. BLOOD LOSS: Minimal. The patient tolerated the procedure well. No immediate complications. SEDATION: Versed 1 mg. Fentanyl 0 mcg. The procedure was performed with administration intravenous conscious sedation with appropriate preoperative, intraoperative, and postoperative evaluation. 10 minutes of supervised face to face conscious sedation time was provided by a radiology nurse under my direct supervision.     IMPRESSION: 1.  Successful CT-guided chest tube placement into the right pleural space. Reference CPT Codes: 10691,       Signed by: Kailey Chavez MD

## 2021-10-09 NOTE — PLAN OF CARE
Chest tube functioning well - on water seal.  Difficult time calculating output at start of shift d/t spill over noted in chest tube chambers likely from a previous shift.  Chamber exchanged and now 60ml bloody output this shift noted since.     Assisted up in chair today for a few hours.   Appetite is fair.  Sleeps on and off most of this shift.  Reports right shoulder pain today - tramadol helpful.    Supplemental oxygen weaned from 6L per nasal cannula to 4L.  O2 sat >92%  Now having a productive strong cough - sputum clear.  Posterior lung sounds RML/RLL - are absent.    Gauri Shields RN

## 2021-10-09 NOTE — PLAN OF CARE
Patient teaching sheets on Taxol and Carboplatin given to Patient to read. Patient had no questions.    Patient did well with Chemo (Taxol and Carboplatin). Patient didn't have any adverse side effects. Patient denies pain and nausea.    PIV site checked every 10 minutes while Chemo was running. No redness, no swelling, no pain and no leaking at site.    Labs were drawn late because lab wasn't able to draw labs. PICC RN had to come and draw labs.    O2 sat=96% on 6 L pnc, But, O2 sat drops to 86-88% on 6 L pnc when Patient rolls side to side while changing his bed pad.     Difficult to obtain an accurate urine output. Patient was incontinent of a large amount of urine.    Chest tube patent and still to water seal.

## 2021-10-09 NOTE — PROGRESS NOTES
Attestation:  This patient has been seen and evaluated by me, Tobi Reyes MD on 10/9/2021.I saw and discussed the case with the resident, Dr Nery Garrison and the care team. I agree with the findings and plan in this note. I have reviewed today's vital signs, medications, and studies including labwork. Patient is a 66 year old male w/ lung CA w/ malignant effusion hospitalized for shortness of breath, Afib w/ RVR. Appreciate pulmonology, hem/onc, and palliative care input. Pt stable/ weaning down on O2 requirement. Chest tube still in place. Receiving palliative chemo.      Tobi Reyes MD  10/9/2021  12:32 PM

## 2021-10-09 NOTE — PROGRESS NOTES
"[facility] Inpatient Progress Note    Patient: Antonio Mercado  MRN: [id number]  Date of Service: October 9, 2021         Reason for Visit    Non-small cell lung cancer right upper lobe  Compressive symptoms in the mediastinum    Assessment and Plan    1. Right upper lobe non-small cell lung cancer stage IV. Status post Perative dose of radiation therapy. Ongoing chemotherapy.  2. Respiratory distress seems to be stable.  3. Compression of the mediastinal structures slowly getting better.  4.  Status post chest tube placement.  5.  Anemia.  He is getting some blood transfusion today.    Cancer Staging  Malignant neoplasm of hepatic flexure (H)  Staging form: Colon and Rectum, AJCC 7th Edition  - Clinical stage from 2/20/2016: Stage IIIB (T4a, N1c, M0) - Signed by Harris Mary MD on 2/20/2017      ECOG Performance Status: 2         History of Present Illness    Mr. Antonio Mercado is a very pleasant 66-year-old gentleman with history of non-small cell lung cancer. He presented with symptoms which were almost suggestive of superior vena cava syndrome although his. Vena cava was patent on the CT scan. There was fair bit of congestion in the mediastinum nevertheless. Has been seen by radiation oncology. Finished palliative radiation therapy to the chest on 10/8/2021. Is also been started on weekly carboplatin and paclitaxel. He received that chemotherapy on 10/8/2021.    So far seems to have tolerated well.  He is starting to cough and bring up some expectoration.  Still very tired and fatigued.    Review of Systems    Pertinent findings noted in history.    Physical Exam    BP (!) 142/73 (BP Location: Right arm)   Pulse 73   Temp 97.9  F (36.6  C) (Oral)   Resp 18   Ht 1.803 m (5' 11\")   Wt 72.1 kg (159 lb)   SpO2 95%   BMI 22.18 kg/m      GENERAL: no acute distress.  Laying in bed.  Very sleepy.  HEENT: pupils are equal, round and reactive. Oral mucosa is moist and intact.  RESP: " Chest tube in place.  Chest symmetric. Regular respiratory rate. No stridor.  ABD: Nondistended, soft.  EXTREMITIES: No lower extremity edema.   NEURO: non focal. Alert and oriented x3.   PSYCH: within normal limits. No depression or anxiety.  SKIN: warm dry intact     Lab Results    Recent Results (from the past 24 hour(s))   Protein qualitative urine    Collection Time: 10/08/21  4:36 PM   Result Value Ref Range    Protein Albumin Urine Negative Negative mg/dL   Comprehensive metabolic panel    Collection Time: 10/08/21  8:39 PM   Result Value Ref Range    Sodium 141 136 - 145 mmol/L    Potassium 4.8 3.5 - 5.0 mmol/L    Chloride 115 (H) 98 - 107 mmol/L    Carbon Dioxide (CO2) 19 (L) 22 - 31 mmol/L    Anion Gap 7 5 - 18 mmol/L    Urea Nitrogen 19 8 - 22 mg/dL    Creatinine 0.83 0.70 - 1.30 mg/dL    Calcium 7.8 (L) 8.5 - 10.5 mg/dL    Glucose 167 (H) 70 - 125 mg/dL    Alkaline Phosphatase 70 45 - 120 U/L    AST 30 0 - 40 U/L    ALT 23 0 - 45 U/L    Protein Total 5.4 (L) 6.0 - 8.0 g/dL    Albumin 2.1 (L) 3.5 - 5.0 g/dL    Bilirubin Total 0.3 0.0 - 1.0 mg/dL    GFR Estimate 90 >60 mL/min/1.73m2   CBC with platelets and differential    Collection Time: 10/08/21  8:39 PM   Result Value Ref Range    WBC Count 6.1 4.0 - 11.0 10e3/uL    RBC Count 2.98 (L) 4.40 - 5.90 10e6/uL    Hemoglobin 8.2 (L) 13.3 - 17.7 g/dL    Hematocrit 26.1 (L) 40.0 - 53.0 %    MCV 88 78 - 100 fL    MCH 27.5 26.5 - 33.0 pg    MCHC 31.4 (L) 31.5 - 36.5 g/dL    RDW 15.6 (H) 10.0 - 15.0 %    Platelet Count 356 150 - 450 10e3/uL    % Neutrophils 94 %    % Lymphocytes 3 %    % Monocytes 2 %    % Eosinophils 0 %    % Basophils 0 %    % Immature Granulocytes 1 %    NRBCs per 100 WBC 0 <1 /100    Absolute Neutrophils 5.8 1.6 - 8.3 10e3/uL    Absolute Lymphocytes 0.2 (L) 0.8 - 5.3 10e3/uL    Absolute Monocytes 0.1 0.0 - 1.3 10e3/uL    Absolute Eosinophils 0.0 0.0 - 0.7 10e3/uL    Absolute Basophils 0.0 0.0 - 0.2 10e3/uL    Absolute Immature Granulocytes 0.1  (H) <=0.0 10e3/uL    Absolute NRBCs 0.0 10e3/uL   CBC with platelets    Collection Time: 10/09/21  7:00 AM   Result Value Ref Range    WBC Count 4.8 4.0 - 11.0 10e3/uL    RBC Count 2.94 (L) 4.40 - 5.90 10e6/uL    Hemoglobin 7.8 (L) 13.3 - 17.7 g/dL    Hematocrit 25.2 (L) 40.0 - 53.0 %    MCV 86 78 - 100 fL    MCH 26.5 26.5 - 33.0 pg    MCHC 31.0 (L) 31.5 - 36.5 g/dL    RDW 15.2 (H) 10.0 - 15.0 %    Platelet Count 287 150 - 450 10e3/uL   Magnesium    Collection Time: 10/09/21  7:00 AM   Result Value Ref Range    Magnesium 1.8 1.8 - 2.6 mg/dL   Basic metabolic panel    Collection Time: 10/09/21  7:00 AM   Result Value Ref Range    Sodium 140 136 - 145 mmol/L    Potassium 4.6 3.5 - 5.0 mmol/L    Chloride 113 (H) 98 - 107 mmol/L    Carbon Dioxide (CO2) 22 22 - 31 mmol/L    Anion Gap 5 5 - 18 mmol/L    Urea Nitrogen 20 8 - 22 mg/dL    Creatinine 0.76 0.70 - 1.30 mg/dL    Calcium 7.6 (L) 8.5 - 10.5 mg/dL    Glucose 117 70 - 125 mg/dL    GFR Estimate >90 >60 mL/min/1.73m2   Prepare red blood cells (unit)    Collection Time: 10/09/21  9:15 AM   Result Value Ref Range    CROSSMATCH Compatible     UNIT ABO/RH O Pos     Unit Number [id number]     Unit Status Ready     Blood Component Type Red Blood Cells     Product Code [id number]     CODING SYSTEM [id number]     UNIT TYPE ISBT 5100         Imaging    No results found.     Signed by: Christiano Bustos MD, MD    This note has been dictated using voice recognition software. Any grammatical or context distortions are unintentional and inherent to the software

## 2021-10-09 NOTE — PLAN OF CARE
Problem: Adult Inpatient Plan of Care  Goal: Plan of Care Review  Outcome: Improving     Problem: Pain Acute (Oncology Care)  Goal: Optimal Pain Control  Outcome: Improving  Intervention: Develop Pain Management Plan  Recent Flowsheet Documentation  Taken 10/9/2021 0030 by Denice Gomez RN  Pain Management Interventions: declines     Problem: Gas Exchange Impaired  Goal: Optimal Gas Exchange  Outcome: Improving  Intervention: Optimize Oxygenation and Ventilation  Recent Flowsheet Documentation  Taken 10/9/2021 0030 by Denice Gomez RN  Head of Bed (HOB) Positioning: HOB at 20-30 degrees    Patient resting on bed with HOB elevated. Alert and oriented. Reports minimal right shoulder pain, refused prn med and ice pack application. Encouraged repositioning.   Still with 6L oxygen therapy with 96-97% oxygen saturation. Slight SOB with activity and repositioning. Denies nausea or vomiting.   Will continue to watch out for hypersensitivity reactions from chemo drugs.    Water seal chest tube intact and patent with dark red drainage.   Has male external cath in place and is draining adequately.   Monitor.

## 2021-10-09 NOTE — PROGRESS NOTES
Pulmonary Progress Note  10/5/2021      Admit Date: 10/3/2021  CODE: No CPR- Do NOT Intubate    Reason for Consult: acute respiratory failure with hypoxemia. Large RUL lung mass with SVC, esophageal obstruction.  Loculated right pleural effusion.      Assessment/Plan:   66 year old man with history of RUL NSCLC stage IV with known pleural mets, known malignant right pleural effusion, HIV, colon cancer s/p right hemicolectomy, prostate cancer, recent admission for dysphagia and weight loss discharged on home O2, presented to ER on 10/4 with worsening SOB. Found to have significant progression of the RUL mass with evidence of esophageal obstruction, SVC effacement, RUL post-obstructive collapse.   Significant clinical improvement with radiation therapy and chest tube drainage of the right loculated PTX. The patient received palliative radiation therapy x  5 days with a total dose of 2000 cGy in 5 treatments given from 10/4/2021-10/8/2021.       Plan:  #Acute resp failure with hypoxemia requiring HFNC, NIPPV: multifactorial and due to large lung mass with post-obstructive lobar collapse, PE's, pleural effusion, deconditioning and weakness.   - titrate FiO2 for goal SpO2 92% or greater, avoid hyperoxia  - BiPAP prn for incr WOB, desaturations not recovering with other O2 modes.   - if worsening in respiratory status, increased FiO2, would re address goals of care. Prognosis is poor  - DNI status noted.      #Large RUL mass, known cancer, SVC patent on CTA chest so unlikely to have SVC syndrome. Also with esophageal obstruction, right hilar encasement, RUL post-obstructive collapse, esophageal compression. EGD last admission without obstruction of esophagus, overall normal. Unlikely to have progressed significantly since 9/29.   -appreciate rad onc, medical oncology involvement.   -continue daily radiation therapy per Dr. Manrique  -Chemo per oncology  -continue dexamethasone 4mg IV q8h for anticipated edema post-radiation  while getting daily radiation therapy.  -advance diet per primary team. SLP evaluation unremarkable.   -palliative care consult      #Loculated right pleural effusion, known malignant effusion based on cytology from Jan 2021  No studies sent as this is known to be malignant effusion now with complex loculations. Appears to have trapped lung on CXR but drainage is still good.   - continue chest tube to water seal/gravity drainage and record output qshift - last 3 days: 290 yesterday  - no indication for intrapleural lytics at this time since this is not empyema, hgb borderline low and could risk bleeding into the pleural space from the large RUL mass. Also on therapeutic LMWH for PE so risk of bleeding is not insignificant.      #Small LLL PE's diagnosed on CTA chest from 9/27: was on Xarelto  - continue therapeutic LMWH, this is the preferred anticoagulant in patient's with solid tumor.     #HIV, CD4 absolute count only 40, HIV VL elevated. Unclear if related to current presentation. Could the enlarging mass represent something like lymphoma vs. Opportunistic infection, given severe immunosuppression?  - defer to primary team and oncology whether this merits further investigation  Addendum: reviewed T-cell studies, VL with ID briefly. Low CD4 likely from recent chemo. VL quite low so doubt worsening HIV. Of note, CD4 >250 in 2018.     Pieter Shea MD  10/7/2021      Subjective/Interim Events:     No new complaints. Denies any sob.      Medications:       heparin 2 units/mL in 0.9% NaCl TABLE SOLN       - MEDICATION INSTRUCTIONS -       sodium chloride 0.9%       sodium chloride 125 mL/hr at 10/09/21 1023       dexamethasone  4 mg Intravenous Q8H     diphenhydrAMINE (BENADRYL) intermittent infusion  50 mg Intravenous Once     emtricitabine-rilpivirine-tenofovir  1 tablet Oral Daily with breakfast     enoxaparin ANTICOAGULANT  1 mg/kg Subcutaneous Q12H     ferrous sulfate  325 mg Oral Daily     gabapentin  100 mg  "Oral TID     metoprolol succinate ER  50 mg Oral BID     sodium chloride (PF)  3 mL Intracatheter Q8H     sulfamethoxazole-trimethoprim  1 tablet Oral Daily         Exam/Data:   Vitals  BP (!) 142/73 (BP Location: Right arm)   Pulse 73   Temp 97.9  F (36.6  C) (Oral)   Resp 18   Ht 1.803 m (5' 11\")   Wt 72.1 kg (159 lb)   SpO2 95%   BMI 22.18 kg/m       I/O last 3 completed shifts:  In: 3871 [P.O.:480; I.V.:3391]  Out: 2890 [Urine:2700; Chest Tube:190]  Weight change:   [unfilled]  Resp: 18      EXAM:  Gen: awake, alert, oriented, no distress  HEENT: NT, no ELVA  CV: RRR, no m/g/r  Resp: diminished on the right. No wheezing. Clear on left.   Abd: soft, nontender, BS+  Skin: no rashes or lesions  Ext: no edema  Neuro: PERRL, nonfocal exam    ROS:  A 10-system review was obtained and is negative with the exception of the symptoms noted above.    DATA:      IMAGING:   US Thoracentesis    Result Date: 10/5/2021  EXAM: 1. RIGHT THORACENTESIS 2. ULTRASOUND GUIDANCE LOCATION: [facility] DATE/TIME: 10/4/2021 5:04 PM INDICATION: Pleural effusion. PROCEDURE: Informed consent obtained. Time out performed. The chest was prepped and draped in sterile fashion. 10 mL of 1 % lidocaine was infused into the local soft tissues. Under direct ultrasound guidance, a 5 Polish catheter system was placed into the pleural effusion. 100 milliliters of dark red fluid were removed and sent to lab, if requested. Patient tolerated procedure well. Ultrasound imaging was obtained and placed in the patient's permanent medical record.     IMPRESSION: Status post right ultrasound-guided thoracentesis. Patient's right pleural space has developed numerous heavy loculations. This limits the ability to perform large volume thoracentesis. Reference CPT Code: 37633    XR Chest Port 1 View    Result Date: 10/3/2021  EXAM: XR CHEST PORT 1 VIEW LOCATION: [facility] DATE/TIME: 10/3/2021 10:40 PM INDICATION: sob " COMPARISON: CT chest 09/27/2021.     IMPRESSION: Near complete opacification of the right hemithorax, correlating with recent CT showing prominent consolidation, mass, atelectasis, and pleural fluid. Left lung remains clear. Normal heart size. No pneumothorax.    CT Chest Pulmonary Embolism w Contrast    Result Date: 10/4/2021  EXAM: CT CHEST PULMONARY EMBOLISM W CONTRAST LOCATION: [facility] DATE/TIME: 10/4/2021 5:01 PM INDICATION: Known metastatic lung cancer on the right side. Enlarging pleural effusion. Concern for SVC syndrome. Thoracentesis just prior to this study shows extensively loculated pleural fluid with only 100 mL removed. COMPARISON: 09/27/2021. TECHNIQUE: CT chest pulmonary angiogram during arterial phase injection of IV contrast. Multiplanar reformats and MIP reconstructions were performed. Dose reduction techniques were used. CONTRAST: Isovue 370 100 mL FINDINGS: ANGIOGRAM CHEST: The previously noted pulmonary emboli seen on 09/27/2021 left lung has progressed in extent. No emboli on the right side. Thoracic aorta is negative for dissection. No CT evidence of right heart strain. LUNGS AND PLEURA: No significant change in the large central mass right upper lobe measuring 6.2 x 7.3 cm. Complete consolidation of the right upper lobe and marked atelectasis of the right lower lobe has increased. Extensive pleural metastases on the right have not significantly changed. Large loculated right pleural fluid collection fairly similar to previous. On ultrasound, this was multiloculated. This measures approximately 11 x 8 x 15 cm. MEDIASTINUM/AXILLAE: Mildly enlarged subcarinal node. SVC is normal in caliber without findings to suggest SVC stenosis. UPPER ABDOMEN: Normal. MUSCULOSKELETAL: Normal.     IMPRESSION: 1.  Positive for acute pulmonary emboli that has progressed since 09/27/2021. Left side. 2.  No significant change in the large central mass right upper lobe with no  significant change in the extensive right pleural metastases. 3.  Loculated pleural fluid laterally on ultrasound earlier today was multi-septated. 4.  The most significant change in the right lung is increasing atelectasis in the right lower lobe. 5.  Similar-appearing dense consolidation right upper lobe. 6.  SVC appears widely patent. 7.  Findings called to the clinical service at 6:12 PM.

## 2021-10-09 NOTE — PROGRESS NOTES
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 He underwent thoracentesis of pleural effusion and began palliative radiation of mass on 10/4. Right chest tube placed 10/5 continues to have >200 ml output. Overall, clinically stable at this time and tolerating radiation and chemo well so far.          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 Chavez. Was recently hospitalized 9/28-10/1 secondary to weight loss and progression of his right lung mass--CT chest prior to admission had shown mediastinal invasion causing esophageal obstruction and right hilar vascular encasement and mass effect on the SVC. Was initially on BiPAP now breathing stable on NC after chest tube placement and will continue to titrate down. Nonproductive cough likely 2/2 combination of mass and pleural effusion, less likely infection given pt is afebrile, white count stable, and pt feeling overall improved. Chest tube output: Trending down; 700 (10/5), 460 (10/6), 285 (10/7), 290 (10/8).   - Supplemental oxygen via NC, oxymask prn.   - Prn Robitussin for cough  - PT/OT  - Fall precautions  - Heme/Onc and Rad/Onc consulted, appreciate  recommendations              -Completed 5 rounds of palliative radiation              -Dexamethasone 4 mg IV qhr to reduce edema post-radiation              -Inpatient chemotherapy with carboplatin Taxol today.  - Pulmonology consulted              - Right chest tube to water/seal gravity              - No intrapleural lytics d/t no empyema, low Hgb, and on lovenox  - Palliative care consulted              - Pt's decision making preferences: independently but values input from son Will              - No feeding tube at this time              - Pt's code status discussed, now DNR/DNI.               - Pt does not want to opt for comfort care/hospice unless recommended by his Onc team  - Spiritual Health consulted and following     #Acute on Chronic Normocytic Anemia  Hgb trend: 7.5 (10/5), 6.9 (10/7), 9.0 (10/7 after 1U pRBCs), 8.5 (10/8), 7.8 (10/9). MCV 89, stable. Acute anemia likely 2/2 blood loss from chest tube. No bleeding otherwise per patient and no reports from nursing staff. Received 1 unit pRBC yesterday  -Restart PTA oral iron  -Transfuse if Hgb <8 per Oncology recommendation.   -Transfuse 1 unit PRBC today 10/9, total 2 units PRBC this admission.  -CBC in am     #HIV  #Immunosuppression  HIV viral load slightly elevated during this hospitalization, CD4 count quite low at 40 indicating severe immunosuppression. Low CD4 count felt to be likely 2/2 recent chemo, viral load quite low so less likely worsening HIV. Per literature review, will not start Azithromycin for MAC coverage as patient has been consistently on ART.  - Prophylactic Bactrim started 10/6 d/t low CD4 count.   - PTA [place]     #Small LLL Pulmonary Emboli, progressive  #Intermittent Chest Pain  Originally diagnosed on CT incidentally in August 2021, still seen on CT 9/27 and at that time was not taking xarelto consistently d/t dysphagia, received heparin while hospitalized 9/28-10/1, discharged on xarelto 20 mg. CT PE run 10/4 was positive  for acute PE (on left) that had progressed since 9/27. He was started on therapeutic lovenox and xarelto held.  -Therapeutic lovenox (preferred anti-coagulant with solid tumors per Pulm)  -Hold xarelto   -Gabapentin 100 mg TID for pain  -Tramadol 50 mg q6h for moderate pain     #Paroxysmal Atrial Fibrillation, resolved  #Elevated BNP, on admission  #Elevated Troponin, on admission  New atrial fibrillation/flutter with RVR on admission, given diltiazem bolus and drip in the ED with continued atrial flutter with rates in the 110s-120s. No ST/T wave changes concerning for ischemia. Troponin 0.12 and  on admission. Cardiology was consulted and he was started on PO metoprolol tartrate 25 mg QID. Troponin trend: 0.12 (10/3) --> 0.11 --> 0.12 (10/4). Etiology likely secondary to mass effect vs. pleural effusion. Cardiology discontinued diltiazem d/t paroxysmal nature of pt's afib and signed off.   -Continue metoprolol succinate 50 mg BID  -Stable, continue to monitor     #Hypomagnesemia-resolved  Mag 1.5 and potassium 3.8 on admission, does not appear Mag has been been checked on prior admissions.   Mag today stable at 1.8. Potassium stable at 4.6.   - Recheck Mag and K in am  - Magnesium replacement protocol  - Potassium replacement protocol, keep K>4.0.     #Elevated lactate, resolved  Upon arrival lactate 2.6, tachycardic (d/t afib), tachypneic, WBC 5.4, Procal 0.18. There was concern for pneumonia possibly causing sepsis and vancomycin and zosyn were started in the ED after blood cultures were drawn. Vancomycin was subsequently discontinued. Pt has remains afebrile, lactate has normalized. He does have other reason (mass) for his hypoxemic respiratory failure.  - IVNS at 125 ml/hr  - Blood cultures: No growth, final     #Severe malnutrition  Related to chronic illness and prolonged poor PO intake. Has had significant weight loss in the last several months. 81% IBW with muscle and fat loss.  -Nutrition consulted,  appreciate recommendations: RD to follow     Diet: Regular Diet Adult Thin Liquids (level 0), SLP following  Fluids: NS at 125 mL/hr   Central Lines: None  [place]once Catheter: Not present  DVT Prophylaxis: Therapeutic lovenox.  Code Status: No CPR- Do NOT Intubate          Disposition/Advanced Care Planning  Barriers to discharge: hypoxia, inpatient palliative radiation, removal of chest tube, and Heme/Onc recommendations.  Anticipated discharge date: 2-3 days pending medical stability and the above being corrected/achieved.    Subjective  Antonio is having pain in his right shoulder, he states this is a chronic issue. He denies any pain from his chest tube. He states he is doing well. Denies sob or chest pain.    Objective    Vital signs in last 24 hours Temp:  [97  F (36.1  C)-98.7  F (37.1  C)] 98.7  F (37.1  C)  Pulse:  [57-82] 57  Resp:  [15-18] 16  BP: (118-157)/(64-83) 151/83  SpO2:  [92 %-98 %] 98 %     Intake/Output last 3 shift I/O last 3 completed shifts:  In: 3871 [P.O.:480; I.V.:3391]  Out: 2890 [Urine:2700; Chest Tube:190]    Intake/Output this shift:I/O this shift:  In: 10 [I.V.:10]  Out: 600 [Urine:600]    Physical Exam  General appearance: alert, appears stated age, cooperative and no distress.  Head: Normocephalic, without obvious abnormality, atraumatic.  Eyes: conjunctivae/corneas clear.  Throat: MMM.  Lungs: clear to auscultation bilaterally, no increased respiratory effort. Chest tube site with bandage over it, clean, dry, and intact, draining serosanguinous fluid.  Heart: regular rate and rhythm, no murmurs.  Skin: Skin color, texture, turgor normal. No rashes or lesions.  Neurologic: Alert and oriented x3, normal strength and tone.  Psychiatric: mood and affect appropriate.    Pertinent Labs and Pertinent Radiology   Lab Results: personally reviewed.     Radiology Results: Personally reviewed image/s and impression/s    Precepted patient with Dr. Tobi Reyes.    Nery Garrison MD  [facility]  Family Medicine Residency Program, PGY-3  Pager # [phone]

## 2021-10-10 NOTE — PROGRESS NOTES
Pulmonary Progress Note  10/5/2021      Admit Date: 10/3/2021  CODE: No CPR- Do NOT Intubate    Reason for Consult: acute respiratory failure with hypoxemia. Large RUL lung mass with SVC, esophageal obstruction.  Loculated right pleural effusion.      Assessment/Plan:   66 year old man with history of RUL NSCLC stage IV with known pleural mets, known malignant right pleural effusion, HIV, colon cancer s/p right hemicolectomy, prostate cancer, recent admission for dysphagia and weight loss discharged on home O2, presented to ER on 10/4 with worsening SOB. Found to have significant progression of the RUL mass with evidence of esophageal obstruction, SVC effacement, RUL post-obstructive collapse.   Significant clinical improvement with radiation therapy and chest tube drainage of the right loculated PTX. The patient received palliative radiation therapy x  5 days with a total dose of 2000 cGy in 5 treatments given from 10/4/2021-10/8/2021.       Plan:  #Acute resp failure with hypoxemia requiring HFNC, NIPPV: multifactorial and due to large lung mass with post-obstructive lobar collapse, PE's, pleural effusion, deconditioning and weakness.   - titrate FiO2 for goal SpO2 92% or greater, avoid hyperoxia  - BiPAP prn for incr WOB, desaturations not recovering with other O2 modes.   - if worsening in respiratory status, increased FiO2, would re address goals of care. Prognosis is poor  - DNI status noted.      #Large RUL mass, known cancer, SVC patent on CTA chest so unlikely to have SVC syndrome. Also with esophageal obstruction, right hilar encasement, RUL post-obstructive collapse, esophageal compression. EGD last admission without obstruction of esophagus, overall normal. Unlikely to have progressed significantly since 9/29.   -appreciate rad onc, medical oncology involvement.   -continue daily radiation therapy per Dr. Manrique  -Chemo per oncology  -continue dexamethasone 4mg IV q8h for anticipated edema post-radiation  while getting daily radiation therapy.  -advance diet per primary team. SLP evaluation unremarkable.   -palliative care consult      #Loculated right pleural effusion, known malignant effusion based on cytology from Jan 2021  No studies sent as this is known to be malignant effusion now with complex loculations. Appears to have trapped lung on CXR but drainage is still good.   - continue chest tube to water seal/gravity drainage and record output qshift - last 3 shift 112.    -Would go ahead ideally and remove chest tube now that output from chest tube has diminished and 112 mL yesterday, however with loculated PTX, would need to re-assess if it will expand while chest tube is clamped. Will go ahead and clamp and repeat cxr tonight and in am. If no expansion of ptx, then plan to remove chest tube in am.  - no indication for intrapleural lytics at this time since this is not empyema, hgb borderline low and could risk bleeding into the pleural space from the large RUL mass. Also on therapeutic LMWH for PE so risk of bleeding is not insignificant.      #Small LLL PE's diagnosed on CTA chest from 9/27: was on Xarelto  - continue therapeutic LMWH, this is the preferred anticoagulant in patient's with solid tumor.     #HIV, CD4 absolute count only 40, HIV VL elevated. Unclear if related to current presentation. Could the enlarging mass represent something like lymphoma vs. Opportunistic infection, given severe immunosuppression?  - defer to primary team and oncology whether this merits further investigation  Addendum: reviewed T-cell studies, VL with ID briefly. Low CD4 likely from recent chemo. VL quite low so doubt worsening HIV. Of note, CD4 >250 in 2018.     Pieter Shea MD  10/7/2021      Subjective/Interim Events:     No new complaints. Denies any sob. Asking if we can remove chest tube can be removed now that ouput is around a 100 mL      Medications:       heparin 2 units/mL in 0.9% NaCl TABLE SOLN       -  "MEDICATION INSTRUCTIONS -       sodium chloride 0.9%         dexamethasone  4 mg Intravenous Q8H     diphenhydrAMINE (BENADRYL) intermittent infusion  50 mg Intravenous Once     emtricitabine-rilpivirine-tenofovir  1 tablet Oral Daily with breakfast     enoxaparin ANTICOAGULANT  1 mg/kg Subcutaneous Q12H     ferrous sulfate  325 mg Oral Daily     gabapentin  100 mg Oral TID     metoprolol succinate ER  50 mg Oral BID     sodium chloride (PF)  3 mL Intracatheter Q8H     sulfamethoxazole-trimethoprim  1 tablet Oral Daily         Exam/Data:   Vitals  /72 (BP Location: Right arm)   Pulse 68   Temp 98.4  F (36.9  C) (Oral)   Resp 16   Ht 1.803 m (5' 11\")   Wt 72.1 kg (159 lb)   SpO2 99%   BMI 22.18 kg/m       I/O last 3 completed shifts:  In: 3295 [P.O.:840; I.V.:2455]  Out: 4012 [Urine:3900; Chest Tube:112]  Weight change:   [unfilled]  Resp: 16      EXAM:  Gen: awake, alert, oriented, no distress  HEENT: NT, no ELVA  CV: RRR, no m/g/r  Resp: diminished on the right. No wheezing. Clear on left.   Abd: soft, nontender, BS+  Skin: no rashes or lesions  Ext: no edema  Neuro: PERRL, nonfocal exam    ROS:  A 10-system review was obtained and is negative with the exception of the symptoms noted above.    DATA:      IMAGING:   US Thoracentesis    Result Date: 10/5/2021  EXAM: 1. RIGHT THORACENTESIS 2. ULTRASOUND GUIDANCE LOCATION: [facility] DATE/TIME: 10/4/2021 5:04 PM INDICATION: Pleural effusion. PROCEDURE: Informed consent obtained. Time out performed. The chest was prepped and draped in sterile fashion. 10 mL of 1 % lidocaine was infused into the local soft tissues. Under direct ultrasound guidance, a 5 Malagasy catheter system was placed into the pleural effusion. 100 milliliters of dark red fluid were removed and sent to lab, if requested. Patient tolerated procedure well. Ultrasound imaging was obtained and placed in the patient's permanent medical record.     IMPRESSION: Status post right " ultrasound-guided thoracentesis. Patient's right pleural space has developed numerous heavy loculations. This limits the ability to perform large volume thoracentesis. Reference CPT Code: 43228    XR Chest Port 1 View    Result Date: 10/3/2021  EXAM: XR CHEST PORT 1 VIEW LOCATION: [facility] DATE/TIME: 10/3/2021 10:40 PM INDICATION: sob COMPARISON: CT chest 09/27/2021.     IMPRESSION: Near complete opacification of the right hemithorax, correlating with recent CT showing prominent consolidation, mass, atelectasis, and pleural fluid. Left lung remains clear. Normal heart size. No pneumothorax.    CT Chest Pulmonary Embolism w Contrast    Result Date: 10/4/2021  EXAM: CT CHEST PULMONARY EMBOLISM W CONTRAST LOCATION: [facility] DATE/TIME: 10/4/2021 5:01 PM INDICATION: Known metastatic lung cancer on the right side. Enlarging pleural effusion. Concern for SVC syndrome. Thoracentesis just prior to this study shows extensively loculated pleural fluid with only 100 mL removed. COMPARISON: 09/27/2021. TECHNIQUE: CT chest pulmonary angiogram during arterial phase injection of IV contrast. Multiplanar reformats and MIP reconstructions were performed. Dose reduction techniques were used. CONTRAST: Isovue 370 100 mL FINDINGS: ANGIOGRAM CHEST: The previously noted pulmonary emboli seen on 09/27/2021 left lung has progressed in extent. No emboli on the right side. Thoracic aorta is negative for dissection. No CT evidence of right heart strain. LUNGS AND PLEURA: No significant change in the large central mass right upper lobe measuring 6.2 x 7.3 cm. Complete consolidation of the right upper lobe and marked atelectasis of the right lower lobe has increased. Extensive pleural metastases on the right have not significantly changed. Large loculated right pleural fluid collection fairly similar to previous. On ultrasound, this was multiloculated. This measures approximately 11 x 8 x 15  cm. MEDIASTINUM/AXILLAE: Mildly enlarged subcarinal node. SVC is normal in caliber without findings to suggest SVC stenosis. UPPER ABDOMEN: Normal. MUSCULOSKELETAL: Normal.     IMPRESSION: 1.  Positive for acute pulmonary emboli that has progressed since 09/27/2021. Left side. 2.  No significant change in the large central mass right upper lobe with no significant change in the extensive right pleural metastases. 3.  Loculated pleural fluid laterally on ultrasound earlier today was multi-septated. 4.  The most significant change in the right lung is increasing atelectasis in the right lower lobe. 5.  Similar-appearing dense consolidation right upper lobe. 6.  SVC appears widely patent. 7.  Findings called to the clinical service at 6:12 PM.

## 2021-10-10 NOTE — PROGRESS NOTES
[facility] Hematology and Oncology Inpatient Progress Note    Patient: Antonio Mercado  MRN: [id number]  Date of Service: October 9, 2021         Reason for Visit    Non-small cell lung cancer right upper lobe  Compressive symptoms in the mediastinum    Assessment and Plan    1. Right upper lobe non-small cell lung cancer stage IV. Status post palliative dose of radiation therapy. Ongoing chemotherapy.  He had weekly dose of carboplatin and paclitaxel on 8 October 2020.  So far no bad side effects.  2. Respiratory distress multifactorial.  Seems to be stable.  3. Compression of the mediastinal structures slowly getting better.  4.  Multiloculated pleural effusion.  Cytology positive.  Status post chest tube placement.  5.  Anemia.  Status post packed RBC transfusion yesterday.  Feels better today.    Cancer Staging  Malignant neoplasm of hepatic flexure (H)  Staging form: Colon and Rectum, AJCC 7th Edition  - Clinical stage from 2/20/2016: Stage IIIB (T4a, N1c, M0) - Signed by Harris Mary MD on 2/20/2017      ECOG Performance Status: 2         History of Present Illness    Mr. Antonio Mercado is a very pleasant 66-year-old gentleman with history of non-small cell lung cancer. He presented with symptoms which were almost suggestive of superior vena cava syndrome although his. Vena cava was patent on the CT scan. There was fair bit of congestion in the mediastinum nevertheless. Has been seen by radiation oncology. Finished palliative radiation therapy to the chest on 10/8/2021. Is also been started on weekly carboplatin and paclitaxel. He received that chemotherapy on 10/8/2021.    So far seems to have tolerated well.  He is starting to cough and bring up some expectoration.  Looks a lot better and more perky compared to yesterday.  Still having some drainage from his chest tube.    Review of Systems    Pertinent findings noted in history.    Physical Exam    /72 (BP Location: Right arm)   Pulse 68    "Temp 98.4  F (36.9  C) (Oral)   Resp 16   Ht 1.803 m (5' 11\")   Wt 70.9 kg (156 lb 4 oz)   SpO2 95%   BMI 21.79 kg/m      GENERAL: no acute distress.  Laying in bed.  Very sleepy.  HEENT: pupils are equal, round and reactive. Oral mucosa is moist and intact.  RESP: Chest tube in place.  Chest symmetric. Regular respiratory rate. No stridor.  ABD: Nondistended, soft.  EXTREMITIES: No lower extremity edema.   NEURO: non focal. Alert and oriented x3.   PSYCH: within normal limits. No depression or anxiety.  SKIN: warm dry intact     Lab Results    Recent Results (from the past 24 hour(s))   CBC with platelets    Collection Time: 10/10/21  6:35 AM   Result Value Ref Range    WBC Count 8.4 4.0 - 11.0 10e3/uL    RBC Count 3.64 (L) 4.40 - 5.90 10e6/uL    Hemoglobin 9.7 (L) 13.3 - 17.7 g/dL    Hematocrit 31.2 (L) 40.0 - 53.0 %    MCV 86 78 - 100 fL    MCH 26.6 26.5 - 33.0 pg    MCHC 31.1 (L) 31.5 - 36.5 g/dL    RDW 15.6 (H) 10.0 - 15.0 %    Platelet Count 301 150 - 450 10e3/uL   Potassium    Collection Time: 10/10/21  6:35 AM   Result Value Ref Range    Potassium 4.5 3.5 - 5.0 mmol/L   Magnesium    Collection Time: 10/10/21  6:35 AM   Result Value Ref Range    Magnesium 1.8 1.8 - 2.6 mg/dL        Imaging    No results found.     Signed by: Christiano Bustos MD, MD    This note has been dictated using voice recognition software. Any grammatical or context distortions are unintentional and inherent to the software    "

## 2021-10-10 NOTE — PLAN OF CARE
NURSING NOTE  0700 - 1500       Problem: Breathing Pattern Ineffective  Goal: Effective Breathing Pattern  Outcome: Improving  Intervention: Promote Improved Breathing Pattern    D: Received patient on 4L O2 per nasal cannula. Lower lobes posteriorly          diminished. No SOB at rest/activity. Congested cough present - yellow          think sputum. Afebrile, VSS. CT output this shift - 80mL, sanguinous.  A: Due meds given. Ed initiated for IS use - achieves 1250mL; reinforced      deep breathing. Up on chair for majority of the shift; ambulated with PT.      O2 titrated to 3L, then 2L.   R: Chest tube clamped @ 1430 by intensivist. Repeat CXR ordered for 1700.      SPO2 >95% at 2L per nasal cannula.       - Denies of pain/discomfort/nausea.   - Fair appetite. He did take 50% of his supplement. Referral made to         dietician for recommendation.      - ADDENDUM: After 1400, writer noticed that the float ball fluctuates: shoots up         with inspiration, down with expiration. It does stay down the water level area         when coughing. Intensivist assessed at bedside - no concern.

## 2021-10-10 NOTE — PLAN OF CARE
Problem: Adult Inpatient Plan of Care  Goal: Optimal Comfort and Wellbeing  Outcome: No Change  Intervention: Provide Person-Centered Care  Recent Flowsheet Documentation  Taken 10/9/2021 1900 by Anne Bacon, RN  Trust Relationship/Rapport: care explained    Pt received 1 unit of blood on day.  Pt stated that he had mild pain on his shoulder.  Did not want any pain medication.  Right chest tube to water seal.  Put out 52 ml this shift.  Clamps ordered for pt's room.  On 4 L NC.  Pt's family bring in food for dinner.  Pt ate bites.  IVF running at 125 ml/hr.

## 2021-10-10 NOTE — PLAN OF CARE
Problem: Adult Inpatient Plan of Care  Goal: Plan of Care Review  Outcome: No Change     Problem: Pain Acute (Oncology Care)  Goal: Optimal Pain Control  Outcome: Improving  Intervention: Develop Pain Management Plan  Recent Flowsheet Documentation  Taken 10/10/2021 0000 by Denice Gomez RN  Pain Management Interventions:    medication (see MAR)    emotional support    Alert and oriented. Voices 8/10 right shoulder pain and requested for tramadol which he takes well with water. Oxygen down to 4L per NC and with sats in the mid 90s. Right chest tube to water seal with bloody drainage.   Cont to have bladder incontinence. Male purewick in place and changed at the end of shift.

## 2021-10-11 NOTE — PROGRESS NOTES
"[facility] Hematology and Oncology Inpatient Progress Note    Patient: Antonio Mercado  MRN: [id number]  Date of Service: October 7, 2021         Reason for Visit    Follow-up for metastatic lung cancer    Assessment and Plan    Acute respiratory failure with hypoxemia  Rapidly progressive metastatic lung adenocarcinoma with right lung mass and invasion of mediastinal structures  Pulmonary emboli  Loculated right pleural effusion  Paroxysmal atrial fibrillation  Anemia    Breathing is better and now on 2 L nasal cannula.  Chest tube may be removed today.    Has completed radiation therapy and received 1 dose of chemotherapy last Friday with good tolerance so far.  Consider another dose of chemotherapy in the hospital later in the week if he still here.    Continues on Lovenox for pulmonary emboli    Hemoglobin and other blood counts have improved.    Plan: Chest tube removal  Continue Lovenox  Optimize nutrition and activity level  We will consider another dose of chemotherapy he remains in the hospital    Cancer Staging  Malignant neoplasm of hepatic flexure (H)  Staging form: Colon and Rectum, AJCC 7th Edition  - Clinical stage from 2/20/2016: Stage IIIB (T4a, N1c, M0) - Signed by Harris Mary MD on 2/20/2017      ECOG Performance Status: 3        ______________________________________________________________________________    History of Present Illness    Mr. Antonio Mercado reports to be feeling better.  Breathing is improving.  Eating and drinking fine.  Pain at the chest tube site is relieved by medication.  Review of Systems    As per the HPI.    Physical Exam    BP (!) 140/69 (BP Location: Right arm)   Pulse 74   Temp 97.8  F (36.6  C) (Oral)   Resp 18   Ht 1.803 m (5' 11\")   Wt 70.9 kg (156 lb 4 oz)   SpO2 94%   BMI 21.79 kg/m        GENERAL: Alert and oriented to time place and person. Seated comfortably. In no distress.    HEAD: Atraumatic and normocephalic.    EYES: KARIME, EOMI.  No " pallor.  No icterus.    Oral cavity: no mucosal lesion or tonsillar enlargement.    NECK: supple. JVP normal.  No thyroid enlargement.    LYMPH NODES: No palpable, cervical, axillary or inguinal lymphadenopathy.    CHEST: clear to auscultation bilaterally.  Resonant to percussion throughout bilaterally.  Symmetrical breath movements bilaterally.    CVS: S1 and S2 are heard. Regular rate and rhythm.  No murmur or gallop or rub heard.  No peripheral edema.    ABDOMEN: Soft. Not tender. Not distended.  No palpable hepatomegaly or splenomegaly.  No other mass palpable.  Bowel sounds heard.    EXTREMITIES: Warm.    SKIN: no rash, or bruising or purpura.  Has a full head of hair.      Lab Results    Recent Results (from the past 24 hour(s))   CBC with platelets    Collection Time: 10/11/21  6:48 AM   Result Value Ref Range    WBC Count 8.4 4.0 - 11.0 10e3/uL    RBC Count 3.94 (L) 4.40 - 5.90 10e6/uL    Hemoglobin 10.7 (L) 13.3 - 17.7 g/dL    Hematocrit 33.2 (L) 40.0 - 53.0 %    MCV 84 78 - 100 fL    MCH 27.2 26.5 - 33.0 pg    MCHC 32.2 31.5 - 36.5 g/dL    RDW 15.4 (H) 10.0 - 15.0 %    Platelet Count 332 150 - 450 10e3/uL   Potassium    Collection Time: 10/11/21  6:48 AM   Result Value Ref Range    Potassium 4.4 3.5 - 5.0 mmol/L   Magnesium    Collection Time: 10/11/21  6:48 AM   Result Value Ref Range    Magnesium 1.7 (L) 1.8 - 2.6 mg/dL        Imaging    US Thoracentesis    Result Date: 10/5/2021  EXAM: 1. RIGHT THORACENTESIS 2. ULTRASOUND GUIDANCE LOCATION: [facility] DATE/TIME: 10/4/2021 5:04 PM INDICATION: Pleural effusion. PROCEDURE: Informed consent obtained. Time out performed. The chest was prepped and draped in sterile fashion. 10 mL of 1 % lidocaine was infused into the local soft tissues. Under direct ultrasound guidance, a 5 British catheter system was placed into the pleural effusion. 100 milliliters of dark red fluid were removed and sent to lab, if requested. Patient tolerated procedure well.  Ultrasound imaging was obtained and placed in the patient's permanent medical record.     IMPRESSION: Status post right ultrasound-guided thoracentesis. Patient's right pleural space has developed numerous heavy loculations. This limits the ability to perform large volume thoracentesis. Reference CPT Code: 31585    XR Chest Port 1 View    Result Date: 10/6/2021  EXAM: XR CHEST PORT 1 VIEW LOCATION: [facility] DATE/TIME: 10/6/2021 5:47 AM INDICATION: right chest tube. eval position, interval change in effusion. COMPARISON: Chest tube placement CT 10/05/2021     IMPRESSION: A small bore catheter overlies the right lung base.  There is new air in the pleural space at the right lung base. The right lower lung has not reexpanded. The confluent opacity at the right upper lung has not changed. A small volume of left pleural fluid is again seen. A few small opacities in the left lung are again seen. The heart is not well assessed.    CT Chest Pulmonary Embolism w Contrast    Result Date: 10/4/2021  EXAM: CT CHEST PULMONARY EMBOLISM W CONTRAST LOCATION: [facility] DATE/TIME: 10/4/2021 5:01 PM INDICATION: Known metastatic lung cancer on the right side. Enlarging pleural effusion. Concern for SVC syndrome. Thoracentesis just prior to this study shows extensively loculated pleural fluid with only 100 mL removed. COMPARISON: 09/27/2021. TECHNIQUE: CT chest pulmonary angiogram during arterial phase injection of IV contrast. Multiplanar reformats and MIP reconstructions were performed. Dose reduction techniques were used. CONTRAST: Isovue 370 100 mL FINDINGS: ANGIOGRAM CHEST: The previously noted pulmonary emboli seen on 09/27/2021 left lung has progressed in extent. No emboli on the right side. Thoracic aorta is negative for dissection. No CT evidence of right heart strain. LUNGS AND PLEURA: No significant change in the large central mass right upper lobe measuring 6.2 x 7.3 cm.  Complete consolidation of the right upper lobe and marked atelectasis of the right lower lobe has increased. Extensive pleural metastases on the right have not significantly changed. Large loculated right pleural fluid collection fairly similar to previous. On ultrasound, this was multiloculated. This measures approximately 11 x 8 x 15 cm. MEDIASTINUM/AXILLAE: Mildly enlarged subcarinal node. SVC is normal in caliber without findings to suggest SVC stenosis. UPPER ABDOMEN: Normal. MUSCULOSKELETAL: Normal.     IMPRESSION: 1.  Positive for acute pulmonary emboli that has progressed since 09/27/2021. Left side. 2.  No significant change in the large central mass right upper lobe with no significant change in the extensive right pleural metastases. 3.  Loculated pleural fluid laterally on ultrasound earlier today was multi-septated. 4.  The most significant change in the right lung is increasing atelectasis in the right lower lobe. 5.  Similar-appearing dense consolidation right upper lobe. 6.  SVC appears widely patent. 7.  Findings called to the clinical service at 6:12 PM.    CT Chest Tube with Cath Placement    Result Date: 10/5/2021  EXAM: 1. PERCUTANEOUS CHEST TUBE PLACEMENT RIGHT 2. CT GUIDANCE 3. CONSCIOUS SEDATION LOCATION: [facility] DATE/TIME: 10/5/2021 10:25 AM INDICATION: large right loculated pleural effusion, needs chest tube for intrapleural lytics. TECHNIQUE: Dose reduction techniques were used. PROCEDURE: Informed consent obtained. Site marked. Prior images reviewed. Required items made available. Patient identity confirmed verbally and with arm band. Patient reevaluated immediately before administering sedation. Universal protocol was followed. Time out performed. The site was prepped and draped in sterile fashion. 10 mL of 1% lidocaine was infused into the local soft tissues. Using standard technique and under direct CT guidance, a 12 Armenian nonlocking all-purpose drain chest  tube catheter was inserted into the pleural space. The catheter was fixed in place with sutures and adhesive device, and the tubing was banded. Chest tube placed to Pleur-evac suction. SPECIMEN: None. The patient had ultrasound-guided thoracentesis yesterday with fluid sent. BLOOD LOSS: Minimal. The patient tolerated the procedure well. No immediate complications. SEDATION: Versed 1 mg. Fentanyl 0 mcg. The procedure was performed with administration intravenous conscious sedation with appropriate preoperative, intraoperative, and postoperative evaluation. 10 minutes of supervised face to face conscious sedation time was provided by a radiology nurse under my direct supervision.     IMPRESSION: 1.  Successful CT-guided chest tube placement into the right pleural space. Reference CPT Codes: 45794,       Signed by: Kailey Chavez MD

## 2021-10-11 NOTE — PROGRESS NOTES
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 He underwent thoracentesis of pleural effusion and began palliative radiation of mass on 10/4. Right chest tube placed 10/5 with downtrending output. Overall, clinically stable at this time and tolerating radiation and chemo well so far.         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 Chavez. Was recently hospitalized 9/28-10/1 secondary to weight loss and progression of his right lung mass--CT chest prior to admission had shown mediastinal invasion causing esophageal obstruction and right hilar vascular encasement and mass effect on the SVC. Was initially on BiPAP now breathing stable on NC after chest tube placement and will continue to titrate down. Nonproductive cough likely 2/2 combination of mass and pleural effusion, less likely infection given pt is afebrile, white count stable, and pt feeling overall improved. Chest tube output: Trending down; 700 (10/5), 460 (10/6), 285 (10/7), 290 (10/8), 112 (10/9), 110 (10/10).   - Supplemental oxygen via NC, oxymask prn, weaned down to 2L so far.    - Prn Robitussin for cough  - PT/OT  - Fall precautions  -  Heme/Onc and Rad/Onc consulted, appreciate recommendations              -Completed 5 rounds of palliative radiation              -Dexamethasone 4 mg IV qhr to reduce edema post-radiation              -Inpatient chemotherapy with carboplatin and Taxol today.  - Pulmonology consulted              - Right chest tube to water/seal gravity              - No intrapleural lytics d/t no empyema, low Hgb, and on lovenox   - Potentially may have chest tube pulled today, CXR stable after clamping yesterday.  - Palliative care consulted              - Pt's decision making preferences: independently but values input from son Will              - No feeding tube at this time              - Pt's code status discussed, now DNR/DNI.               - Pt does not want to opt for comfort care/hospice unless recommended by his Onc team  - Spiritual Health consulted and following     #Acute on Chronic Normocytic Anemia  Hgb trend: 7.5 (10/5), 6.9 (10/7), 9.0 (10/7 after 1U pRBCs), 8.5 (10/8), 7.8 (10/9). MCV 89, stable. Acute anemia likely 2/2 blood loss from chest tube. No bleeding otherwise per patient and no reports from nursing staff. Total 2 units PRBC this admission, Hgb stable today at 10.7.  -Continue PTA oral iron  -Transfuse if Hgb <8 per Oncology recommendation.   -CBC in am     #HIV  #Immunosuppression  HIV viral load slightly elevated during this hospitalization, CD4 count quite low at 40 indicating severe immunosuppression. Low CD4 count felt to be likely 2/2 recent chemo, viral load quite low so less likely worsening HIV. Per literature review, will not start Azithromycin for MAC coverage as patient has been consistently on ART.  - Prophylactic Bactrim started 10/6 d/t low CD4 count.   - PTA [place]     #Small LLL Pulmonary Emboli, progressive  #Intermittent Chest Pain  Originally diagnosed on CT incidentally in August 2021, still seen on CT 9/27 and at that time was not taking xarelto consistently d/t dysphagia, received  heparin while hospitalized 9/28-10/1, discharged on xarelto 20 mg. CT PE run 10/4 was positive for acute PE (on left) that had progressed since 9/27. He was started on therapeutic lovenox and xarelto held.  -Therapeutic lovenox (preferred anti-coagulant with solid tumors per Pulm)  -Hold xarelto   -Gabapentin 100 mg TID for pain  -Tramadol 50 mg q6h for moderate pain     #Paroxysmal Atrial Fibrillation, resolved  #Elevated BNP, on admission  #Elevated Troponin, on admission  New atrial fibrillation/flutter with RVR on admission, given diltiazem bolus and drip in the ED with continued atrial flutter with rates in the 110s-120s. No ST/T wave changes concerning for ischemia. Troponin 0.12 and  on admission. Cardiology was consulted and he was started on PO metoprolol tartrate 25 mg QID. Troponin trend: 0.12 (10/3) --> 0.11 --> 0.12 (10/4). Etiology likely secondary to mass effect vs. pleural effusion. Cardiology discontinued diltiazem d/t paroxysmal nature of pt's afib and signed off.   -Continue metoprolol succinate 50 mg BID  -Stable, continue to monitor     #Hypomagnesemia-resolved  Mag 1.5 and potassium 3.8 on admission, does not appear Mag has been been checked on prior admissions.   Mag today stable at 1.8. Potassium stable at 4.6.   - Recheck Mag and K in am  - Magnesium replacement protocol  - Potassium replacement protocol, keep K>4.0 per Cardiology     #Severe malnutrition  Related to chronic illness and prolonged poor PO intake. Has had significant weight loss in the last several months. 81% IBW with muscle and fat loss.  -Nutrition consulted, appreciate recommendations: RD to follow     Diet: Regular Diet Adult Thin Liquids (level 0), SLP following  Fluids: None  Central Lines: None  [place]once Catheter: Not present  DVT Prophylaxis: Therapeutic lovenox.  Code Status: No CPR- Do NOT Intubate       Disposition/Advanced Care Planning  Barriers to discharge: hypoxia on supplemental oxygen, chest tube, and  Heme/Onc recommendations.  Anticipated discharge date: 1-2 days pending medical stability and removal of chest tube.    Subjective  Patient states he is doing well. He is tolerating weaning O2 well. All questions were answered.     Objective    Vital signs in last 24 hours Temp:  [97.8  F (36.6  C)-98.7  F (37.1  C)] 97.8  F (36.6  C)  Pulse:  [64-77] 64  Resp:  [18-20] 20  BP: (132-147)/(69-92) 132/78  SpO2:  [93 %-96 %] 94 %      Intake/Output last 3 shift I/O last 3 completed shifts:  In: 1480 [P.O.:1480]  Out: 4140 [Urine:4030; Chest Tube:110]    Intake/Output this shift:I/O this shift:  In: 200 [P.O.:180; I.V.:20]  Out: 800 [Urine:800]    Physical Exam  General appearance: alert, appears stated age, cooperative and no distress, sitting in chair.  Head: Normocephalic, without obvious abnormality, atraumatic.  Eyes: conjunctivae/corneas clear.  Throat: MMM.  Lungs: clear to auscultation on left, diminished on right, no increased respiratory effort. Chest tube site with bandage over it, clean, dry, and intact, draining serosanguinous fluid.  Heart: regular rate and rhythm, no murmurs.  Extremities: extremities normal, atraumatic, no cyanosis or edema.  Skin: Skin color, texture, turgor normal. No rashes or lesions.  Neurologic: Alert and oriented x3, normal strength and tone.  Psychiatric: mood and affect appropriate.    Pertinent Labs and Pertinent Radiology   Lab Results: personally reviewed.     Radiology Results: Personally reviewed image/s and impression/s    Precepted patient with Dr. Billy Grullon.    Nery Garrison MD  [facility], PGY-3  Pager # [phone]

## 2021-10-11 NOTE — PLAN OF CARE
Problem: Adult Inpatient Plan of Care  Goal: Optimal Comfort and Wellbeing  Outcome: No Change  Intervention: Provide Person-Centered Care  Recent Flowsheet Documentation  Taken 10/10/2021 1700 by Anne Bacon, RN  Trust Relationship/Rapport: care explained    Pt denies pain.  On 2 L NC.  O2 sating 93-95%.  Male external catheter on.  Right chest tube had been clamped since day.  Chest xray done at 1700 and another scheduled for 2200.  Pt appears comfortable.

## 2021-10-11 NOTE — PROGRESS NOTES
CLINICAL NUTRITION SERVICES - REASSESSMENT NOTE     Nutrition Prescription    RECOMMENDATIONS FOR MDs/PROVIDERS TO ORDER:  Recommend adding multivitamin w/ minerals daily    Malnutrition Status:    Severe malnutrition in the context of chronic illness    Future/Additional Recommendations:  Monitor PO adequacy     EVALUATION OF THE PROGRESS TOWARD GOALS   Diet: Regular, Magic Cup BID with meals  Intake: Pt typically eating 25-50% of meals per RN flowsheets.      NEW FINDINGS   Pt reports eating well, however RN flowsheets show poor intake. Pt has box of Premier Protein drinks in room.    Weight:   10/10/21 1435 70.9 kg (156 lb 4 oz)   10/08/21 1506 72.1 kg (159 lb)   10/07/21 0538 70.9 kg (156 lb 3.2 oz)   10/06/21 0600 69.6 kg (153 lb 6.4 oz)   10/03/21 2110 63.5 kg (140 lb)     Labs:   Magnesium: 1.7 (L)  Potassium: 4.4 (WNL)    Meds:  Ferrous sulfate    MALNUTRITION  % Intake: </= 50% for >/= 5 days (severe)  % Weight Loss: 16% in 10 months (non-severe)  Subcutaneous Fat Loss: Facial region:  moderate and Upper arm:  moderate  Muscle Loss: Temporal:  moderate, Facial & jaw region:  moderate, Scapular bone:  moderate and Thoracic region (clavicle, acromium bone, deltoid, trapezius, pectoral):  moderate  Fluid Accumulation/Edema: None noted  Malnutrition Diagnosis: Severe malnutrition in the context of chronic illness      CURRENT NUTRITION DIAGNOSIS  Severe malnutirtion related to dysphagia and chronic illness as evidenced by prolonged poor PO intake, 16% weight loss in 10 months, and moderate fat and muscle loss.      INTERVENTIONS  Implementation  Continue Magic Cup BID with meals  Recommended adding multivitamin w/ minerals daily    Goals  Patient to consume % of nutritionally adequate meal trays TID, or the equivalent with supplements/snacks.    Monitoring/Evaluation  Progress toward goals will be monitored and evaluated per protocol.    Stacia Jones  Dietetic Intern

## 2021-10-11 NOTE — PLAN OF CARE
Chest tube has remained clamped since yesterday evening.  Antonio had a series of three XR of his chest performed yesterday on Oct. 10th.  Would anticipate another XR today prior to decision of chest tube removal today per Pulmonary Note on 10/10 by Dr.Tarek Shea.    Antonio denies any shortness of breath during rest/activity today.    Posterior lung sounds RML/RLL - are absent.  He remains on supplemental oxygen at 2L.  He has a productive clear cough.  Encouraging use of incentive spirometry.    He ambulated a good distance in hallway today with Physical Therapy and maintain sats =/>91% on 2L per nasal cannula.      His appetite is fair, and he is taking in good po fluids.  Currently siting upright in chair visiting with his daughter.      Gauri Shields RN

## 2021-10-11 NOTE — PROGRESS NOTES
Pulmonary Progress Note  10/5/2021      Admit Date: 10/3/2021  CODE: No CPR- Do NOT Intubate    Reason for Consult: acute respiratory failure with hypoxemia. Large RUL lung mass with SVC, esophageal obstruction.  Loculated right pleural effusion.      Assessment/Plan:   66 year old man with history of RUL NSCLC stage IV with known pleural mets, known malignant right pleural effusion, HIV, colon cancer s/p right hemicolectomy, prostate cancer, recent admission for dysphagia and weight loss discharged on home O2, presented to ER on 10/4 with worsening SOB. Found to have significant progression of the RUL mass with evidence of esophageal obstruction, SVC effacement, RUL post-obstructive collapse.   Significant clinical improvement with radiation therapy and chest tube drainage of the right loculated PTX. The patient received palliative radiation therapy x  5 days with a total dose of 2000 cGy in 5 treatments given from 10/4/2021-10/8/2021.       Plan:  #Acute resp failure with hypoxemia: multifactorial and due to large lung mass with post-obstructive lobar collapse, PE's, pleural effusion, deconditioning and weakness.   - titrate FiO2 for goal SpO2 92% or greater, avoid hyperoxia  - DNI status noted.      #Large RUL mass, known cancer, SVC patent on CTA chest so unlikely to have SVC syndrome. Also with esophageal obstruction, right hilar encasement, RUL post-obstructive collapse, esophageal compression. EGD last admission without obstruction of esophagus, overall normal. Unlikely to have progressed significantly since 9/29.   -appreciate rad onc, medical oncology involvement.   -continue radiation therapy per Dr. Manrique  -Chemo per oncology  -continue dexamethasone 4mg IV q8h for anticipated edema post-radiation while getting daily radiation therapy.  -advance diet per primary team. SLP evaluation unremarkable.   -palliative care consult      #Loculated right pleural effusion, known malignant effusion based on cytology  "from Jan 2021  No studies sent as this is known to be malignant effusion now with complex loculations. Appears to have trapped lung on CXR but drainage is still good.   - continue chest tube to water seal/gravity drainage and record output qshift  -Output from the chest tube was >100cc, will leave in place to water seal  - no indication for intrapleural lytics at this time since this is not empyema, hgb borderline low and could risk bleeding into the pleural space from the large RUL mass. Also on therapeutic LMWH for PE so risk of bleeding is not insignificant.      #Small LLL PE's diagnosed on CTA chest from 9/27: was on Xarelto  - continue therapeutic LMWH, this is the preferred anticoagulant in patient's with solid tumor.     Subjective/Interim Events:     increase in output from the chest tube. He does not want it removed. Breathing is unchanged    Medications:         dexamethasone  4 mg Intravenous Q8H     diphenhydrAMINE (BENADRYL) intermittent infusion  50 mg Intravenous Once     emtricitabine-rilpivirine-tenofovir  1 tablet Oral Daily with breakfast     enoxaparin ANTICOAGULANT  1 mg/kg Subcutaneous Q12H     ferrous sulfate  325 mg Oral Daily     gabapentin  100 mg Oral TID     metoprolol succinate ER  50 mg Oral BID     sodium chloride (PF)  3 mL Intracatheter Q8H     sulfamethoxazole-trimethoprim  1 tablet Oral Daily         Exam/Data:   Vitals  BP (!) 141/86 (BP Location: Right arm)   Pulse 71   Temp 98.8  F (37.1  C) (Oral)   Resp 20   Ht 1.803 m (5' 11\")   Wt 70.9 kg (156 lb 4 oz)   SpO2 96%   BMI 21.79 kg/m       I/O last 3 completed shifts:  In: 1040 [P.O.:1020; I.V.:20]  Out: 3550 [Urine:3550]  Weight change:   [unfilled]  Resp: 20      EXAM:  Gen: awake, alert, oriented, no distress  HEENT: NT, no ELVA  CV: RRR, no m/g/r  Resp: diminished on the right. No wheezing. Clear on left. Left sided chest tube in place with dressing  Abd: soft, nontender   Skin: no rashes or lesions  Ext: no " edema  Neuro: PERRL, nonfocal exam    ROS:  A 10-system review was obtained and is negative with the exception of the symptoms noted above.    DATA:      IMAGING:   US Thoracentesis    Result Date: 10/5/2021  EXAM: 1. RIGHT THORACENTESIS 2. ULTRASOUND GUIDANCE LOCATION: [facility] DATE/TIME: 10/4/2021 5:04 PM INDICATION: Pleural effusion. PROCEDURE: Informed consent obtained. Time out performed. The chest was prepped and draped in sterile fashion. 10 mL of 1 % lidocaine was infused into the local soft tissues. Under direct ultrasound guidance, a 5 Fijian catheter system was placed into the pleural effusion. 100 milliliters of dark red fluid were removed and sent to lab, if requested. Patient tolerated procedure well. Ultrasound imaging was obtained and placed in the patient's permanent medical record.     IMPRESSION: Status post right ultrasound-guided thoracentesis. Patient's right pleural space has developed numerous heavy loculations. This limits the ability to perform large volume thoracentesis. Reference CPT Code: 81988    XR Chest Port 1 View    Result Date: 10/3/2021  EXAM: XR CHEST PORT 1 VIEW LOCATION: [facility] DATE/TIME: 10/3/2021 10:40 PM INDICATION: sob COMPARISON: CT chest 09/27/2021.     IMPRESSION: Near complete opacification of the right hemithorax, correlating with recent CT showing prominent consolidation, mass, atelectasis, and pleural fluid. Left lung remains clear. Normal heart size. No pneumothorax.    CT Chest Pulmonary Embolism w Contrast    Result Date: 10/4/2021  EXAM: CT CHEST PULMONARY EMBOLISM W CONTRAST LOCATION: [facility] DATE/TIME: 10/4/2021 5:01 PM INDICATION: Known metastatic lung cancer on the right side. Enlarging pleural effusion. Concern for SVC syndrome. Thoracentesis just prior to this study shows extensively loculated pleural fluid with only 100 mL removed. COMPARISON: 09/27/2021. TECHNIQUE: CT chest pulmonary  angiogram during arterial phase injection of IV contrast. Multiplanar reformats and MIP reconstructions were performed. Dose reduction techniques were used. CONTRAST: Isovue 370 100 mL FINDINGS: ANGIOGRAM CHEST: The previously noted pulmonary emboli seen on 09/27/2021 left lung has progressed in extent. No emboli on the right side. Thoracic aorta is negative for dissection. No CT evidence of right heart strain. LUNGS AND PLEURA: No significant change in the large central mass right upper lobe measuring 6.2 x 7.3 cm. Complete consolidation of the right upper lobe and marked atelectasis of the right lower lobe has increased. Extensive pleural metastases on the right have not significantly changed. Large loculated right pleural fluid collection fairly similar to previous. On ultrasound, this was multiloculated. This measures approximately 11 x 8 x 15 cm. MEDIASTINUM/AXILLAE: Mildly enlarged subcarinal node. SVC is normal in caliber without findings to suggest SVC stenosis. UPPER ABDOMEN: Normal. MUSCULOSKELETAL: Normal.     IMPRESSION: 1.  Positive for acute pulmonary emboli that has progressed since 09/27/2021. Left side. 2.  No significant change in the large central mass right upper lobe with no significant change in the extensive right pleural metastases. 3.  Loculated pleural fluid laterally on ultrasound earlier today was multi-septated. 4.  The most significant change in the right lung is increasing atelectasis in the right lower lobe. 5.  Similar-appearing dense consolidation right upper lobe. 6.  SVC appears widely patent. 7.  Findings called to the clinical service at 6:12 PM.    Tenisha Hodges DO  Pulmonary and Critical Care Attending  pgr [phone]

## 2021-10-11 NOTE — PROGRESS NOTES
[facility]  Palliative Care Daily Progress Note      Today, the patient was seen for:  Goals of care, codes status discussion        Recommendations & Counseling      Goals of Care: Per past discussions (see below). Did not revisit these today.   - C/w current therapies/treatments including: radiation, chest tube, blood transfusion.  He wants low dose chemo if an option  - Code status: DNR/I after discussion with Palliative Care provider and Dr. Chavez last week.   - Hoping to stabilize and return home  - Artificial nutrition: no feeding tubes  - He would not opt for a comfort/hospice approach unless recommended by his Oncology team      Symptom Management: Generally doing well.   1. Dyspnea in setting of metastatic lung cancer: Denies today.   2. Pain d/t chest tube: None at the moment. Declines acupuncture. Open to [facility].   3. Weakness/fatigue in setting of progressing cancer, malnutrition  - PT/OT when able  4. Anxiety; situational: Indicates is doing ok today.      Advanced Care Planning:  - No care directive  - Surrogate decision maker: son Will     Psychosocial/Spiritual Support:  - Appreciate spiritual care assistance          Assessments          66 yoM with stage IV lung cancer with pleural mets and malignant right pleural effusion, HIV, colon cancer s/p right hemicolectomy, prostate cancer, admitted on 10/4 with worsening SOB. Significant progression of lung cancer s/p emergent thoracentesis.  Getting radiation, today will be 4/5.  Remains on 5L via NC.  Chest tube placed 10/5; dark red output.  Hgb low today to get a PRBC transfusion.  Oncology considering low-dose concurrent chemotherapy.  He's tolerating diet thus far without n/v.       Acute hypoxic respiratory failure: stable remains on low flow NC  Rapidly progressing metastatic lung cancer with invasion of mediastinal structures  PE - on lovenox SQ  Malignant, loculated right pleural  effusion s/p chest tube with bloody output  Dysphagia hx - no s/s aspiration on 10/5 BSS  Malnutrition with hypoalbuminemia      Prognosis, Goals, or Advance Care Planning was addressed today with: Yes.  Mood, coping, and/or meaning in the context of serious illness were addressed today: Yes.              Interval History:      Chart review/discussion with unit or clinical team members: Reviewed recent clinical notes.      Per patient or family/caregivers today:  Last week my colleague visited with Antonio at bedside.  No issues with PO intake.  Pain at chest tube site tolerable.  No dyspnea today.  I began discussing code status again and explaining reasons for not recommending cardiac resuscitation in this this situation given his known wish to not be intubated in addition to his underlying/aggressive metastatic lung cancer.  He kept saying it's not going to happen so why have the discussion.  At this time, Dr. Chavez entered the room.  We continued the discussion and Dr. Chavez also recommended DNR/I given the extent of his cancer burden.  I expressed this is also palliative and pulmonary's recommendation.  At this time, Antonio agreed to DNR/I.  We did explain this does not mean we are stopping any of his other cares/treatments, and can still do everything up until that point.  He expressed understanding.                 Review of Systems:     Besides above, an additional ROS was not done today.           Medications:     I have reviewed this patient's medication profile and medications during this hospitalization.             Physical Exam:   Vitals were reviewed  Constitutional:   Awake, alert, cooperative, no apparent distress, and appears stated age.  Sitting in bed, comfortable, in NAD.                 Data Reviewed:     TTS: I have personally spent a total of 15 minutes  today on the unit in review of medical record, consultation with the medical providers and assessment of patient, with more than 50%  of this time spent in counseling, coordination of care, and discussion with pt and family today re: plan of care and symptom management, risks and benefits of management options, emotional support and development of plan of care.    ALBERTO Harding, FNP-BC, PMHNP-BC  Palliative Care Nurse Practitioner  [facility]  [phone]      During regular M-F work hours -- if you are not sure who specifically to contact -- please contact us by calling [phone].

## 2021-10-11 NOTE — PLAN OF CARE
Problem: Adult Inpatient Plan of Care  Goal: Plan of Care Review  Outcome: No Change     Problem: Pain Acute (Oncology Care)  Goal: Optimal Pain Control  Outcome: No Change  Intervention: Develop Pain Management Plan  Recent Flowsheet Documentation  Taken 10/11/2021 0030 by Denice Gomez RN  Pain Management Interventions: medication (see MAR)    Patient on 2L oxygen. Vitals as follows T98.6, MD 66, RR 19, /89 , O2 sat 96%  He appears comfortable on bed. Not in respiratory distress. Tramadol administered for right shoulder pain. That proved effective. Chest X rays taken yesterday and last night. Chest tube clamped.  Continue to monitor.  0530Patient cont to rests quietly and comfortably.   Breathing even and regular. O2 maintained at 2L.   Lungs remain diminished. Denies shortness of breath.

## 2021-10-12 NOTE — PROGRESS NOTES
Pulmonary Progress Note  10/5/2021      Admit Date: 10/3/2021  CODE: No CPR- Do NOT Intubate    Reason for Consult: acute respiratory failure with hypoxemia. Large RUL lung mass with SVC, esophageal obstruction.  Loculated right pleural effusion.      Assessment/Plan:   66 year old man with history of RUL NSCLC stage IV with known pleural mets, known malignant right pleural effusion, HIV, colon cancer s/p right hemicolectomy, prostate cancer, recent admission for dysphagia and weight loss discharged on home O2, presented to ER on 10/4 with worsening SOB. Found to have significant progression of the RUL mass with evidence of esophageal obstruction, SVC effacement, RUL post-obstructive collapse.   Significant clinical improvement with radiation therapy and chest tube drainage of the right loculated PTX. The patient received palliative radiation therapy x  5 days with a total dose of 2000 cGy in 5 treatments given from 10/4/2021-10/8/2021.       Plan:  #Acute resp failure with hypoxemia: multifactorial and due to large lung mass with post-obstructive lobar collapse, PE's, pleural effusion, deconditioning and weakness.   - titrate FiO2 for goal SpO2 92% or greater, avoid hyperoxia  - DNI status noted.      #Large RUL mass, known cancer, SVC patent on CTA chest so unlikely to have SVC syndrome. Also with esophageal obstruction, right hilar encasement, RUL post-obstructive collapse, esophageal compression. EGD last admission without obstruction of esophagus, overall normal. Unlikely to have progressed significantly since 9/29.   -appreciate rad onc, medical oncology involvement.   -continue radiation therapy per Dr. Manrique  -Chemo per oncology  -continue dexamethasone 4mg IV q8h for anticipated edema post-radiation while getting daily radiation therapy.  -advance diet per primary team. SLP evaluation unremarkable.   -palliative care consult      #Loculated right pleural effusion, known malignant effusion based on cytology  "from Jan 2021  - Output from the chest tube was 60cc, removed today     #Small LLL PE's diagnosed on CTA chest from 9/27: was on Xarelto  - continue therapeutic LMWH, this is the preferred anticoagulant in patient's with solid tumor.     Subjective/Interim Events:     He is sitting up in the chair, no distress. He has a significant cough with sputum production. He is still on 2L NC.    Medications:         dexamethasone  4 mg Intravenous Q8H     diphenhydrAMINE (BENADRYL) intermittent infusion  50 mg Intravenous Once     emtricitabine-rilpivirine-tenofovir  1 tablet Oral Daily with breakfast     enoxaparin ANTICOAGULANT  1 mg/kg Subcutaneous Q12H     ferrous sulfate  325 mg Oral Daily     gabapentin  100 mg Oral TID     metoprolol succinate ER  50 mg Oral BID     sodium chloride (PF)  3 mL Intracatheter Q8H     sulfamethoxazole-trimethoprim  1 tablet Oral Daily         Exam/Data:   Vitals  BP (!) 171/85 (BP Location: Right arm)   Pulse 68   Temp 97.7  F (36.5  C) (Oral)   Resp 20   Ht 1.803 m (5' 11\")   Wt 70.9 kg (156 lb 4 oz)   SpO2 97%   BMI 21.79 kg/m       I/O last 3 completed shifts:  In: 465 [P.O.:445; I.V.:20]  Out: 3510 [Urine:3450; Chest Tube:60]  Weight change:   [unfilled]  Resp: 20      EXAM:  Gen: awake, alert, oriented, no distress  HEENT: NC in place  CV: RRR   Resp: bilateral crackles at bases, no wheezing  Abd: soft, nontender   Skin: no rashes or lesions  Ext: no edema  Neuro:nonfocal exam    ROS:  A 10-system review was obtained and is negative with the exception of the symptoms noted above.    DATA:      IMAGING:   US Thoracentesis    Result Date: 10/5/2021  EXAM: 1. RIGHT THORACENTESIS 2. ULTRASOUND GUIDANCE LOCATION: [facility] DATE/TIME: 10/4/2021 5:04 PM INDICATION: Pleural effusion. PROCEDURE: Informed consent obtained. Time out performed. The chest was prepped and draped in sterile fashion. 10 mL of 1 % lidocaine was infused into the local soft tissues. Under direct " ultrasound guidance, a 5 Malian catheter system was placed into the pleural effusion. 100 milliliters of dark red fluid were removed and sent to lab, if requested. Patient tolerated procedure well. Ultrasound imaging was obtained and placed in the patient's permanent medical record.     IMPRESSION: Status post right ultrasound-guided thoracentesis. Patient's right pleural space has developed numerous heavy loculations. This limits the ability to perform large volume thoracentesis. Reference CPT Code: 34483    XR Chest Port 1 View    Result Date: 10/3/2021  EXAM: XR CHEST PORT 1 VIEW LOCATION: [facility] DATE/TIME: 10/3/2021 10:40 PM INDICATION: sob COMPARISON: CT chest 09/27/2021.     IMPRESSION: Near complete opacification of the right hemithorax, correlating with recent CT showing prominent consolidation, mass, atelectasis, and pleural fluid. Left lung remains clear. Normal heart size. No pneumothorax.    CT Chest Pulmonary Embolism w Contrast    Result Date: 10/4/2021  EXAM: CT CHEST PULMONARY EMBOLISM W CONTRAST LOCATION: [facility] DATE/TIME: 10/4/2021 5:01 PM INDICATION: Known metastatic lung cancer on the right side. Enlarging pleural effusion. Concern for SVC syndrome. Thoracentesis just prior to this study shows extensively loculated pleural fluid with only 100 mL removed. COMPARISON: 09/27/2021. TECHNIQUE: CT chest pulmonary angiogram during arterial phase injection of IV contrast. Multiplanar reformats and MIP reconstructions were performed. Dose reduction techniques were used. CONTRAST: Isovue 370 100 mL FINDINGS: ANGIOGRAM CHEST: The previously noted pulmonary emboli seen on 09/27/2021 left lung has progressed in extent. No emboli on the right side. Thoracic aorta is negative for dissection. No CT evidence of right heart strain. LUNGS AND PLEURA: No significant change in the large central mass right upper lobe measuring 6.2 x 7.3 cm. Complete  consolidation of the right upper lobe and marked atelectasis of the right lower lobe has increased. Extensive pleural metastases on the right have not significantly changed. Large loculated right pleural fluid collection fairly similar to previous. On ultrasound, this was multiloculated. This measures approximately 11 x 8 x 15 cm. MEDIASTINUM/AXILLAE: Mildly enlarged subcarinal node. SVC is normal in caliber without findings to suggest SVC stenosis. UPPER ABDOMEN: Normal. MUSCULOSKELETAL: Normal.     IMPRESSION: 1.  Positive for acute pulmonary emboli that has progressed since 09/27/2021. Left side. 2.  No significant change in the large central mass right upper lobe with no significant change in the extensive right pleural metastases. 3.  Loculated pleural fluid laterally on ultrasound earlier today was multi-septated. 4.  The most significant change in the right lung is increasing atelectasis in the right lower lobe. 5.  Similar-appearing dense consolidation right upper lobe. 6.  SVC appears widely patent. 7.  Findings called to the clinical service at 6:12 PM.    Tenisha Hodges DO  Pulmonary and Critical Care Attending  pgr [phone]

## 2021-10-12 NOTE — PLAN OF CARE
"  Problem: Adult Inpatient Plan of Care  Goal: Optimal Comfort and Wellbeing  10/11/2021 1921 by Sara Saldivar, RN  Outcome: No Change  10/11/2021 1805 by Sara Saldivar, RN  Outcome: No Change  Intervention: Provide Person-Centered Care  Recent Flowsheet Documentation  Taken 10/11/2021 1600 by Sara Saldivar, RN  Trust Relationship/Rapport: care explained   Patient reported nausea @ 1700. Was given Zofran 4 mg IV with good results.  CXR completed. Chest tube remains clamped. O2 @ 2 liters. Vital signs stable.   Son \"Will\" asked for MD update tomorrow--Sticky note left. Denies pain this shift.  Covid PCR negative.  "

## 2021-10-12 NOTE — PLAN OF CARE
Problem: Pain Acute (Oncology Care)  Goal: Optimal Pain Control  Outcome: Improving     Problem: Gas Exchange Impaired  Goal: Optimal Gas Exchange  Outcome: Improving  Intervention: Optimize Oxygenation and Ventilation  Recent Flowsheet Documentation  Taken 10/12/2021 0100 by Tenisha Hancock RN  Head of Bed ([facility]) Positioning: HOB at 45 degrees     Problem: Breathing Pattern Ineffective  Goal: Effective Breathing Pattern  Outcome: Improving  Intervention: Promote Improved Breathing Pattern  Recent Flowsheet Documentation  Taken 10/12/2021 0100 by Tenisha Hancock RN  Head of Bed ([facility]) Positioning: HOB at 45 degrees   Vitally stable, right chest/rib discomfort managed with PRN's and repositioning.  Chest tube to water seal, 60 ml out overnight.  Good urine output.

## 2021-10-12 NOTE — PROGRESS NOTES
[facility] Progress Note    Assessment/Plan  Active Problems:    Paroxysmal atrial fibrillation (H)    Malignant neoplasm of right lung, unspecified part of lung (H)    Acute hypoxemic respiratory failure (H)    Acute sepsis (H)       Antonio Mercado is a 66 year old male with a history of HIV, colon cancer s/p right hemicolectomy, prostate cancer, right lung adenocarcinoma with known pleural metastasis and malignant pleural effusion, recently hospitalized 9/28-10/1 for weight loss and progression of his cancer, discharged on home O2, readmitted on 10/4 for shortness of breath, found to have near-opacification of his right hemithorax on chest x-ray and new atrial fibrillation with RVR. He underwent thoracentesis of pleural effusion and began palliative radiation of mass on 10/4. Right chest tube placed 10/5 with downtrending output. Overall, clinically stable at this time and tolerating radiation and chemo well so far.         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 Chavez. Was recently hospitalized 9/28-10/1 secondary to weight loss and progression of his right lung mass--CT chest prior to admission had shown mediastinal invasion causing esophageal obstruction and right hilar vascular encasement and mass effect on the SVC. Was initially on BiPAP now breathing stable on NC after chest tube placement and will continue to titrate down. Nonproductive cough likely 2/2 combination of mass and pleural effusion, less likely infection given pt is afebrile, white count stable, and pt feeling overall improved. Chest tube output: Trending down; 700 (10/5), 460 (10/6), 285 (10/7), 290 (10/8), 112 (10/9), 110 (10/10), 0 but clamped (10/11), 60 so far (10/12). Patient now on RA.   - Prn Robitussin for cough  - PT/OT  - Fall precautions  -  Heme/Onc and Rad/Onc consulted, appreciate recommendations              -Completed 5 rounds of palliative radiation              -Dexamethasone 4 mg IV qhr to reduce edema post-radiation              -Inpatient chemotherapy with carboplatin and Taxol while inpatient.  - Pulmonology consulted              - Right chest tube back to water/seal gravity after clamped yesterday              - No intrapleural lytics d/t no empyema, low Hgb, and on lovenox              - Chest tube in place, appreciate Pulmonology recs  - Palliative care consulted              - Pt's decision making preferences: independently but values input from son Will              - No feeding tube at this time              - Pt's code status discussed, now DNR/DNI.               - Pt does not want to opt for comfort care/hospice unless recommended by his Onc team  - Spiritual Health consulted and following     #Acute on Chronic Normocytic Anemia  Hgb trend: 7.5 (10/5), 6.9 (10/7), 9.0 (10/7 after 1U pRBCs), 8.5 (10/8), 7.8 (10/9). MCV 89, stable. Acute anemia likely 2/2 blood loss from chest tube. No bleeding otherwise per patient and no reports from nursing staff. Total 2 units PRBC this admission, Hgb stable today at 11.1.  -Continue PTA oral iron  -Transfuse if Hgb <8 per Oncology recommendation.   -CBC in am     #HIV  #Immunosuppression  HIV viral load slightly elevated during this hospitalization, CD4 count quite low at 40 indicating severe immunosuppression. Low CD4 count felt to be likely 2/2 recent chemo, viral load quite low so less likely worsening HIV. Per literature review, will not start Azithromycin for MAC coverage as patient has been consistently on ART.  - Prophylactic Bactrim started 10/6 d/t low CD4 count.   - PTA [place]     #Small LLL Pulmonary Emboli, progressive  #Intermittent Chest Pain  Originally diagnosed on CT incidentally in August 2021, still seen on CT 9/27 and at that time was not taking xarelto consistently d/t  dysphagia, received heparin while hospitalized 9/28-10/1, discharged on xarelto 20 mg. CT PE run 10/4 was positive for acute PE (on left) that had progressed since 9/27. He was started on therapeutic lovenox and xarelto held.  -Therapeutic lovenox (preferred anti-coagulant with solid tumors per Pulm)  -Hold xarelto   -Gabapentin 100 mg TID for pain  -Tramadol 50 mg q6h for moderate pain     #Paroxysmal Atrial Fibrillation, resolved  #Elevated BNP, on admission  #Elevated Troponin, on admission  New atrial fibrillation/flutter with RVR on admission, given diltiazem bolus and drip in the ED with continued atrial flutter with rates in the 110s-120s. No ST/T wave changes concerning for ischemia. Troponin 0.12 and  on admission. Cardiology was consulted and he was started on PO metoprolol tartrate 25 mg QID. Troponin trend: 0.12 (10/3) --> 0.11 --> 0.12 (10/4). Etiology likely secondary to mass effect vs. pleural effusion. Cardiology discontinued diltiazem d/t paroxysmal nature of pt's afib and signed off.   -Continue metoprolol succinate 50 mg BID  -Stable, continue to monitor     #Hypomagnesemia-resolved  Mag 1.5 and potassium 3.8 on admission, does not appear Mag has been been checked on prior admissions.   Mag today stable at 1.8. Potassium stable at 4.6.   - Recheck Mag and K in am  - Magnesium replacement protocol  - Potassium replacement protocol, keep K>4.0 per Cardiology     #Severe malnutrition in the context of chronic illness  Related to chronic illness and prolonged poor PO intake. Has had significant weight loss in the last several months. 81% IBW with muscle and fat loss.  -Nutrition consulted, appreciate recommendations: RD to follow     Diet: Regular Diet Adult Thin Liquids (level 0), SLP following  Fluids: None  Central Lines: None  [place]once Catheter: Not present  DVT Prophylaxis: Therapeutic lovenox.  Code Status: No CPR- Do NOT Intubate       Disposition/Advanced Care Planning  Barriers to  discharge: chest tube, and Heme/Onc recommendations.  Anticipated discharge date: 1-2 days pending medical stability and removal of chest tube.    Subjective  Patient states he is doing ok. States that he did not want to pull the chest tube if output was still >100 and talked with the Pulmonologist about this. He denies any pain. All questions were answered.     Objective    Vital signs in last 24 hours Temp:  [97.4  F (36.3  C)-98.8  F (37.1  C)] 97.7  F (36.5  C)  Pulse:  [65-75] 68  Resp:  [20] 20  BP: (116-171)/(66-86) 171/85  SpO2:  [94 %-97 %] 97 %   156 lbs 4 oz    Intake/Output last 3 shift I/O last 3 completed shifts:  In: 465 [P.O.:445; I.V.:20]  Out: 3510 [Urine:3450; Chest Tube:60]    Intake/Output this shift:No intake/output data recorded.    Physical Exam  General appearance: alert, appears stated age, cooperative and no distress, sitting in bed about to start PT.  Head: Normocephalic, without obvious abnormality, atraumatic.  Eyes: conjunctivae clear.  Throat: MMM.  Lungs: clear to auscultation on left, diminished on right, no increased respiratory effort. Chest tube site with bandage over it, clean, dry, and intact, draining serosanguinous fluid.  Heart: regular rate and rhythm, no murmurs.  Skin: Skin color, texture, turgor normal. No rashes or lesions.  Neurologic: Alert and oriented x3, normal strength and tone.  Psychiatric: mood and affect appropriate.    Pertinent Labs and Pertinent Radiology   Lab Results: personally reviewed.     Radiology Results: Personally reviewed image/s and impression/s    Precepted patient with Dr. Billy Grullon.    Nery Garrison MD  [facility], PGY-3  Pager # [phone]

## 2021-10-12 NOTE — PLAN OF CARE
Problem: Oral Intake Altered (Oncology Care)  Goal: Optimal Oral Intake  Outcome: Improving   Pt tolerating regular diet. Eating more than 75%.  Problem: Gas Exchange Impaired  Goal: Optimal Gas Exchange  Outcome: Improving  Sats 98% 2L O2 via nc.  Problem: Breathing Pattern Ineffective  Goal: Effective Breathing Pattern  Outcome: Improving   Denies shortness of breath. Tolerates ambulating halls.  Problem: Anemia  Goal: Anemia Symptom Improvement  Outcome: Improving   Hgb today 11.1. magnesium and potassium protocols.

## 2021-10-13 NOTE — PROGRESS NOTES
Care Management Follow Up    Length of Stay (days): 10    Expected Discharge Date: 10/14/2021     Concerns to be Addressed: Pulmonology, Chemo Progression.    Patient plan of care discussed at interdisciplinary rounds: Yes    Anticipated Discharge Disposition:  Home      Anticipated Discharge Services:  RN, PT and OT   Anticipated Discharge DME:  TBD   Patient/family educated on Medicare website which has current facility and service quality ratings:  N/A  Education Provided on the Discharge Plan: Yes     Patient/Family in Agreement with the Plan: yes       Referrals Placed by CM/SW:  [facility]   Private pay costs discussed: N/A    Additional Information:  NIKKI met and introduced self and CM services to Pt.  Pt lives alone.  Pt has [employer] [facility] and does not have TCU benefit.  [facility] reached out to Financial Counselor to see if Pt can apply for MA.  Pt has [employer] and did not see Pt as he came back to the hospital.  [facility] received call from Lara with [facility] [phone] and updated on discharge.  Please call Lara when Pt is discharge.     2:30 PM  Financial states that Pt does not qualify for MA due to immigration status.       LC Arechiga

## 2021-10-13 NOTE — PLAN OF CARE
Problem: Adult Inpatient Plan of Care  Goal: Plan of Care Review  Outcome: Improving     Problem: Fatigue (Oncology Care)  Goal: Improved Activity Tolerance  Intervention: Promote Energy Conservation  Recent Flowsheet Documentation  Taken 10/13/2021 0045 by Denice Gomez RN  Activity Management: activity adjusted per tolerance     Problem: Pain Acute (Oncology Care)  Goal: Optimal Pain Control  Outcome: Improving     Problem: Gas Exchange Impaired  Goal: Optimal Gas Exchange  Outcome: Improving  Intervention: Optimize Oxygenation and Ventilation  Recent Flowsheet Documentation  Taken 10/13/2021 0045 by Denice Gomez RN  Head of Bed (HOB) Positioning: HOB at 30-45 degrees     Reports slight right shoulder pain. Declines pain intervention at this time. Later on the shift, patient complained of 5/10 pain to the chest tube incision site. Medicated with tramadol with good relief.  Chest tube pulled out yesterday. He denies shortness of breath. Oxygen sats 93-94% with 2L of oxygen per NC. Lung sounds are diminished.   Vitals are stable.  Monitor.

## 2021-10-13 NOTE — PROGRESS NOTES
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 Bobbi is important to his well being.  attempted to visit this afternoon. Pt was busy with a therapist coaching him to walk. Pt was resistant to therapist saying he would prefer to pray rather than walk.    Writer left in order that the therapist have more focus. Writer will return tomorrow for a visit.    RENATO Lobo.

## 2021-10-13 NOTE — PLAN OF CARE
Problem: Breathing Pattern Ineffective  Goal: Effective Breathing Pattern  Outcome: Improving   Patient weaned from O2 this afternoon.  Patient sitting up in chair and O2 sat 95%.  Encouraged IS and deep breathing exercises.  Patient ambulated in [place] with therapy and tolerated well.  Will continue to monitor.

## 2021-10-13 NOTE — PROGRESS NOTES
"CLINICAL NUTRITION SERVICES - REASSESSMENT NOTE     Nutrition Prescription    RECOMMENDATIONS FOR MDs/PROVIDERS TO ORDER:  None at this time    Malnutrition Status:    Severe malnutrition     Recommendations already ordered by Registered Dietitian (RD):  Patient likes ice cream so sending high protein Ice cream BID    Future/Additional Recommendations:  Continue supplementing diet post discharge     EVALUATION OF THE PROGRESS TOWARD GOALS   Diet: Regular  Intake: Improving     NEW FINDINGS  PO intake has been poor. Sending high protein Ice cream supplement - Magic Cup BID    PO intake improving with @ 2000 calories and 55 g Protein consumed on 10/12  ( 75% at B-L-D)    ANTHROPOMETRICS  Height: 5' 11\"  Admit weight: 63.5 kg (140 lb)  Most Recent Weight: 70.9 kg (156 lb 4 oz)   IBW: 78 kg (172 lbs)  % IBW  81%  BMI: 19.5 Normal BMI  Wt Readings from Last 3 Encounters:   10/10/21 70.9 kg (156 lb 4 oz)   09/28/21 63.7 kg (140 lb 8 oz)   09/30/21 63.7 kg (140 lb 8 oz)     Weight Variable ~ Recorded wt increased 13 lbs just 2 days after admit  I<O (-) 4,250 mls since admit    Dosing Weight: 63.5 kg  Recent  weight     ASSESSED NUTRITION NEEDS  Estimated Energy Needs: 1850- 2250 kcals/day (30-35 kcal/kg))  Justification: Underweight, Repletion  Estimated Protein Needs: 95 -127 grams protein/day (1.5-2.0 grams of pro/kg)  Justification: Repletion  Estimated Fluid Needs: 2250 mL/day (35 mL/kg) , or per provider  Justification:  Maintenance    PHYSICAL FINDINGS  See malnutrition section below    GI CONCERNS  GI WDL  Last BM 10/11     LABS     Ref. Range 10/12/2021 05:59 10/13/2021 07:25   Potassium Latest Ref Range: 3.5 - 5.0 mmol/L 4.9 5.1 (H)     MEDICATIONS  Anemia - Usually takes oral iron and folic acid   Dexamethasone     MALNUTRITION  % Intake: </= 50% for >/= 5 days (severe)  % Weight Loss: 16% in 10 months (non-severe)  Subcutaneous Fat Loss: Facial region and Upper arm:  Moderate  Muscle Loss: Temporal, Facial & jaw " region, Scapular bone and Thoracic region (clavicle, acromium bone, deltoid, trapezius, pectoral):  Moderate   Fluid Accumulation/Edema: None noted  Malnutrition Diagnosis: Severe malnutrition in the context of Chronic Illness    Previous Goals   Diet advancement vs nutrition support within 2-3 days.  Evaluation: Met    Previous Nutrition Diagnosis  Severe Malnutrition related to dysphagia / chronic illness as evidenced by prolonged poor PO intake with significant weight loss. Patient 81% IBW with muscle and fat losses    Evaluation: Improving    CURRENT NUTRITION DIAGNOSIS  Severe Malnutrition related to dysphagia / chronic illness as evidenced by prolonged poor PO intake with significant weight loss. Patient 81% IBW with muscle and fat losses      INTERVENTIONS  Implementation    Medical food supplement therapy - Continue Magic Cup BID    Provide Regular diet / select menu    Honor food preferences    Provide snacks on demand within diet prescription    Goals  Patient to consume % of nutritionally adequate meals three times per day, or the equivalent with supplements/snacks.    Monitoring/Evaluation  Monitor weight, labs, PO intake  Progress toward goals will continue to be monitored and evaluated per protocol.

## 2021-10-13 NOTE — PLAN OF CARE
Problem: Gas Exchange Impaired  Goal: Optimal Gas Exchange  Outcome: Improving     Problem: Breathing Pattern Ineffective  Goal: Effective Breathing Pattern  Outcome: Improving     Problem: Anemia  Goal: Anemia Symptom Improvement  Outcome: Improving   VSS. PRN tramadol offered for pain with relief. Good intake and output. On oxygen at 2lpm via NC sating >92%. Prn Robitussin given for cough with some relief. Hgb stable @11.1 today.

## 2021-10-13 NOTE — PROGRESS NOTES
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 He underwent thoracentesis of pleural effusion and began palliative radiation of mass on 10/4. Right chest tube placed 10/5 with downtrending output. Overall, clinically stable at this time and tolerating radiation and chemo well so far. Chest tube was removed yesterday 10/12, patient was on oxygen this morning, will work on weaning him down today.        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 Chavez. Was recently hospitalized 9/28-10/1 secondary to weight loss and progression of his right lung mass--CT chest prior to admission had shown mediastinal invasion causing esophageal obstruction and right hilar vascular encasement and mass effect on the SVC. Was initially on BiPAP now breathing stable on NC after chest tube placement and will continue to titrate down. Nonproductive cough likely 2/2 combination of mass and pleural effusion, less likely infection given pt is afebrile, white count stable, and pt feeling overall improved. Chest tube output: Trending down; 700 (10/5), 460 (10/6), 285 (10/7), 290 (10/8), 112 (10/9), 110 (10/10), 0 but  clamped (10/11), 60 so far (10/12). Patient on supplemental oxygen 2L this am, will work on weaning to RA.  - Prn Robitussin for cough  - PT/OT  - Fall precautions  - Heme/Onc and Rad/Onc consulted, appreciate recommendations              -Completed 5 rounds of palliative radiation              -Dexamethasone 4 mg IV qhr to reduce edema post-radiation              -Inpatient chemotherapy with carboplatin and Taxol while inpatient, plan for tomorrow, discussed with Oncology.  - Pulmonology consulted, now signed off.              - Chest tube removed.  - Palliative care consulted              - Pt's decision making preferences: independently but values input from son Will              - No feeding tube at this time              - Pt's code status discussed, now DNR/DNI.               - Pt does not want to opt for comfort care/hospice unless recommended by his Onc team  - Spiritual Health consulted and following     #Acute on Chronic Normocytic Anemia  Hgb trend: 7.5 (10/5), 6.9 (10/7), 9.0 (10/7 after 1U pRBCs), 8.5 (10/8), 7.8 (10/9). MCV 89, stable. Acute anemia likely 2/2 blood loss from chest tube. No bleeding otherwise per patient and no reports from nursing staff. Total 2 units PRBC this admission, Hgb stable today at 10.7.  -Continue PTA oral iron  -Transfuse if Hgb <8 per Oncology recommendation.   -Stable, monitor for bleeding     #HIV  #Immunosuppression  HIV viral load slightly elevated during this hospitalization, CD4 count quite low at 40 indicating severe immunosuppression. Low CD4 count felt to be likely 2/2 recent chemo, viral load quite low so less likely worsening HIV. Per literature review, will not start Azithromycin for MAC coverage as patient has been consistently on ART.  - Prophylactic Bactrim started 10/6 d/t low CD4 count.   - PTA [place]     #Small LLL Pulmonary Emboli, progressive  #Intermittent Chest Pain  Originally diagnosed on CT incidentally in August 2021, still seen on CT 9/27 and at  that time was not taking xarelto consistently d/t dysphagia, received heparin while hospitalized 9/28-10/1, discharged on xarelto 20 mg. CT PE run 10/4 was positive for acute PE (on left) that had progressed since 9/27. He was started on therapeutic lovenox and xarelto held.  -Therapeutic lovenox (preferred anti-coagulant with solid tumors per Pulm)  -Hold xarelto   -Gabapentin 100 mg TID for pain  -Tramadol 50 mg q6h for moderate pain     #Paroxysmal Atrial Fibrillation, resolved  #Elevated BNP, on admission  #Elevated Troponin, on admission  New atrial fibrillation/flutter with RVR on admission, given diltiazem bolus and drip in the ED with continued atrial flutter with rates in the 110s-120s. No ST/T wave changes concerning for ischemia. Troponin 0.12 and  on admission. Cardiology was consulted and he was started on PO metoprolol tartrate 25 mg QID. Troponin trend: 0.12 (10/3) --> 0.11 --> 0.12 (10/4). Etiology likely secondary to mass effect vs. pleural effusion. Cardiology discontinued diltiazem d/t paroxysmal nature of pt's afib and signed off.   -Continue metoprolol succinate 50 mg BID  -Stable, continue to monitor     #Hypomagnesemia-resolved  Mag 1.5 and potassium 3.8 on admission, does not appear Mag has been been checked on prior admissions.   Mag today stable at 1.8. Potassium stable at 4.6.   - Recheck Mag and K in am  - Magnesium replacement protocol  - Potassium replacement protocol, keep K>4.0 per Cardiology     #Severe malnutrition in the context of chronic illness  Related to chronic illness and prolonged poor PO intake. Has had significant weight loss in the last several months. 81% IBW with muscle and fat loss.  -Nutrition consulted, appreciate recommendations: RD to follow     Diet: Regular Diet Adult Thin Liquids (level 0), SLP following  Fluids: None  Central Lines: None  [place]once Catheter: Not present  DVT Prophylaxis: Therapeutic lovenox.  Code Status: No CPR- Do NOT  Intubate       Disposition/Advanced Care Planning  Barriers to discharge:oxygen, and Heme/Onc recommendations.  Anticipated discharge date: 1-2 days pending medical stability and removal of chest tube.    Subjective  Patient states that he is feeling ok after chest tube removal. He anticipates another round of chemotherapy tomorrow. All questions were answered.     Objective    Vital signs in last 24 hours Temp:  [97.9  F (36.6  C)-98.3  F (36.8  C)] 98.3  F (36.8  C)  Pulse:  [65-79] 65  Resp:  [18-22] 18  BP: (103-129)/(64-72) 117/68  FiO2 (%):  [2 %] 2 %  SpO2:  [93 %-99 %] 99 %   156 lbs 4 oz    Intake/Output last 3 shift I/O last 3 completed shifts:  In: 430 [P.O.:420; I.V.:10]  Out: 1360 [Urine:1300; Chest Tube:60]    Intake/Output this shift:No intake/output data recorded.    Physical Exam  General appearance: alert, appears stated age, cooperative and no distress.  Head: Normocephalic, without obvious abnormality, atraumatic.  Eyes: conjunctivae/corneas clear.  Throat: MMM.  Lungs: Left lung is clear to ausculation, right with decreased lung sounds but good air movement  Heart: regular rate and rhythm, no murmurs.  Abdomen: soft, non-tender, non-distended.  Skin: Skin color, texture, turgor normal. No rashes or lesions.  Neurologic: Alert and oriented x3, normal strength and tone.  Psychiatric: mood and affect appropriate.    Pertinent Labs and Pertinent Radiology   Lab Results: personally reviewed.     Radiology Results: Personally reviewed image/s and impression/s    Precepted patient with Dr. Billy Gruloln.    Nery Garrison MD  [facility], PGY-3  Pager # [phone]

## 2021-10-14 NOTE — PLAN OF CARE
Occupational Therapy Discharge Summary    Reason for therapy discharge:    Discharged to home.    Progress towards therapy goal(s). See goals on Care Plan in [facility] electronic health record for goal details.  Goals partially met.  Barriers to achieving goals:   discharge from facility.    Therapy recommendation(s):    No further therapy is recommended.

## 2021-10-14 NOTE — DISCHARGE SUMMARY
[facility]  Hospitalist Discharge Summary      Date of Admission:  10/3/2021  Date of Discharge:  10/14/2021  Discharging Provider: Nery Garrison MD      Discharge Diagnoses   AHRF  Malignant left pleural effusion      Follow-ups Needed After Discharge   Follow-up Appointments     Follow-up and recommended labs and tests       Follow up with primary care provider, Harris Mary, has appointment on 10/18 at 11 am  hospital follow- up.  The following labs/tests are recommended: CD 4 count   for Bactrim ppx, BMP, CBC.         Follow up Lovenox injection, planning for patient to get once daily injection by Home RN.    Unresulted Labs Ordered in the Past 30 Days of this Admission     No orders found from 9/3/2021 to 10/4/2021.      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 He underwent chest tube of pleural effusion and began palliative radiation of mass on 10/4. Chest tube removed 10/12. Overall, clinically stable at this time and tolerating radiation and chemo well so far. Patient on room air and doing well, participating with PT.     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 Chavez. Was recently hospitalized 9/28-10/1 secondary to weight loss and progression of his right lung mass--CT chest prior to  admission had shown mediastinal invasion causing esophageal obstruction and right hilar vascular encasement and mass effect on the SVC but patent. Was initially on BiPAP, transitioned to NC after chest tube placement, chest tube pulled and doing well on room air. Received 5 rounds of palliative radiation and 2 rounds of chemotherapy while inpatient.   -Follow up in Oncology clinic, will receive chemotherapy next week, Dexamethasone 8 mg po daily for 2 days  -Palliative care consulted: patient made DNR/DNI but would like to continue with therapies, elected not to pursue hospice/palliative unless Oncologist recommends       #Acute on Chronic Normocytic Anemia  Acute anemia likely 2/2 blood loss from chest tube, hgb stable after chest tube pulled. No bleeding otherwise per patient and no reports from nursing staff. Total 2 units PRBC this admission, Hgb stable today at 11.1.  -Continue PTA oral iron     #HIV  #Immunosuppression  HIV viral load slightly elevated during this hospitalization, CD4 count quite low at 40 indicating severe immunosuppression. Low CD4 count felt to be likely 2/2 recent chemo, viral load quite low so less likely worsening HIV. Per literature review, will not start Azithromycin for MAC coverage as patient has been consistently on ART.  - Prophylactic Bactrim started 10/6 d/t low CD4 count, will continue on discharge, follow up outpatient with CD4 count.   - PTA Odefsey     #Small LLL Pulmonary Emboli, progressive  #Intermittent Chest Pain  Originally diagnosed on CT incidentally in August 2021, still seen on CT 9/27 and at that time was not taking xarelto consistently d/t dysphagia but patient has continued to deny any issues with dysphagia, received heparin while hospitalized 9/28-10/1, discharged on xarelto 20 mg at last admission. CT PE run 10/4 was positive for acute PE (on left) that had progressed since 9/27. He was started on therapeutic lovenox and xarelto held per Pulmonology  recommendations.  - Continue Therapeutic lovenox (preferred anti-coagulant with solid tumors per Pulm) at 1.5 mg/kg daily and will have Home RN perform daily injections.  -Gabapentin 100 mg TID for pain  -Tramadol 50 mg q6h for moderate pain      #Paroxysmal Atrial Fibrillation, resolved  #Elevated BNP, on admission  New atrial fibrillation/flutter with RVR on admission, given diltiazem bolus and drip in the ED with continued atrial flutter with rates in the 110s-120s. No ST/T wave changes concerning for ischemia. Troponin trended and wnl and BNP at 453. Cardiology was consulted and he was started on PO metoprolol tartrate. Etiology likely secondary to mass effect vs. pleural effusion vs hypoxia on presentation, now NSR.   -Continue metoprolol succinate 50 mg BID     #Hypomagnesemia-resolved  Mag 1.5 and potassium 3.8 on admission. Mag today stable at 1.9. Potassium stable at 4.8. Received potassium and magnesium replacement. Cardiology recommended to maintain potassium >4, which was done throughout admission.     #Severe malnutrition in the context of chronic illness  Related to chronic illness and prolonged poor PO intake. Has had significant weight loss in the last several months. 81% IBW with muscle and fat loss.  - Patient tolerating a regular diet, continue Magic Cup BID supplement    Consultations This Hospital Stay   PHARMACY TO DOSE VANCO  CARDIOLOGY IP CONSULT  PULMONARY IP CONSULT  PULMONARY IP CONSULT  HEMATOLOGY & ONCOLOGY IP CONSULT  RADIATION ONCOLOGY IP CONSULT  SOCIAL WORK IP CONSULT  PALLIATIVE CARE ADULT IP CONSULT  NUTRITION SERVICES ADULT IP CONSULT  SPEECH LANGUAGE PATH ADULT IP CONSULT  SPIRITUAL HEALTH SERVICES IP CONSULT  PHYSICAL THERAPY ADULT IP CONSULT  OCCUPATIONAL THERAPY ADULT IP CONSULT  INTEGRATIVE THERAPIES CONSULT    Code Status   No CPR- Do NOT Intubate    Precepted with Dr. Billy Garrison MD  [facility]  [address] [place] [address]  [id number]  Phone: [phone]  Fax: [phone]  ______________________________________________________________________    Physical Exam   Vital Signs: Temp: 98  F (36.7  C) Temp src: Oral BP: (!) 148/83 Pulse: 70   Resp: 18 SpO2: 94 % O2 Device: None (Room air)    Weight: 139 lbs 14.4 oz  Constitutional: awake, alert, cooperative, no apparent distress, and appears stated age  Eyes: Lids and lashes normal, sclera clear, conjunctiva normal  Respiratory: No increased work of breathing, good air exchange, clear to auscultation bilaterally on left, diminished on right.  Cardiovascular: Regular rate and rhythm, normal S1 and S2  Neurologic: Awake, alert, oriented to name, place and time. Unsteady gait.  Neuropsychiatric: General: normal, calm and normal eye contact       Primary Care Physician   Harris Mary    Discharge Orders      Home Care PT Referral for Hospital Discharge      Home Care OT Referral for Hospital Discharge      Home Care Social Service Referral for Hospital Discharge      Home care nursing referral      Reason for your hospital stay    Left Pleural Effusion due to Malignancy     Follow-up and recommended labs and tests     Follow up with primary care provider, Harris Mary, within 7 days for hospital follow- up.  The following labs/tests are recommended: CD 4 count for Bactrim ppx, BMP, CBC.     Activity    Your activity upon discharge: activity as tolerated     MD face to face encounter    Documentation of Face to Face and Certification for Home Health Services    I certify that patient: Antonio Mercado is under my care and that I, or a nurse practitioner or physician's assistant working with me, had a face-to-face encounter that meets the physician face-to-face encounter requirements with this patient on: 10/14/2021.    This encounter with the patient was in whole, or in part, for the following medical condition, which is the primary reason for home health care: Malignant pleural effusion.    I  certify that, based on my findings, the following services are medically necessary home health services: Nursing, Occupational Therapy, Physical Therapy, Social Work, and Home Health Aide.    My clinical findings support the need for the above services because: Nurse is needed: To assess breathing after chest tube during hospital admission after changes in medications or other medical regimen. and To provide assessment and oversight required in the home to assure adherence to the medical plan due to: potential for hypoxia as pleural effusion likely to return in setting of malignancy., Occupational Therapy Services are needed to assess and treat cognitive ability and address ADL safety due to impairment in functioning due to shortness of breath and deconditioning., Physical Therapy Services are needed to assess and treat the following functional impairments: Deconditioning.    Further, I certify that my clinical findings support that this patient is homebound (i.e. absences from home require considerable and taxing effort and are for medical reasons or [employer] or infrequently or of short duration when for other reasons) because: Leaving home is medically contraindicated for the following reason(s): Dyspnea on exertion that makes it so they cannot leave their home for needed services without clinical deterioration. and Infection risk / immunocompromised state where it is safer for them to receive services in the home...    Based on the above findings. I certify that this patient is confined to the home and needs intermittent skilled nursing care, physical therapy and/or speech therapy.  The patient is under my care, and I have initiated the establishment of the plan of care.  This patient will be followed by a physician who will periodically review the plan of care.  Physician/Provider to provide follow up care: Harris Mary    Attending \[facility] [place] [facility]\"" physician (the Medicare certified [facility] provider): Mitchel  MD Cornelio  Physician Signature: See electronic signature associated with these discharge orders.  Date: 10/14/2021     Diet    Follow this diet upon discharge: Orders Placed This Encounter      Snacks/Supplements Adult: Magic Cup; With Meals      Regular Diet Adult Thin Liquids (level 0)       Significant Results and Procedures   Results for orders placed or performed during the hospital encounter of 10/03/21   XR Chest Port 1 View    Narrative    EXAM: XR CHEST PORT 1 VIEW  LOCATION: [facility]  DATE/TIME: 10/3/2021 10:40 PM    INDICATION: sob  COMPARISON: CT chest 09/27/2021.      Impression    IMPRESSION: Near complete opacification of the right hemithorax, correlating with recent CT showing prominent consolidation, mass, atelectasis, and pleural fluid. Left lung remains clear. Normal heart size. No pneumothorax.   CT Chest Pulmonary Embolism w Contrast    Narrative    EXAM: CT CHEST PULMONARY EMBOLISM W CONTRAST  LOCATION: [facility]  DATE/TIME: 10/4/2021 5:01 PM    INDICATION: Known metastatic lung cancer on the right side. Enlarging pleural effusion. Concern for SVC syndrome. Thoracentesis just prior to this study shows extensively loculated pleural fluid with only 100 mL removed.  COMPARISON: 09/27/2021.  TECHNIQUE: CT chest pulmonary angiogram during arterial phase injection of IV contrast. Multiplanar reformats and MIP reconstructions were performed. Dose reduction techniques were used.   CONTRAST: Isovue 370 100 mL    FINDINGS:  ANGIOGRAM CHEST: The previously noted pulmonary emboli seen on 09/27/2021 left lung has progressed in extent. No emboli on the right side. Thoracic aorta is negative for dissection. No CT evidence of right heart strain.    LUNGS AND PLEURA: No significant change in the large central mass right upper lobe measuring 6.2 x 7.3 cm. Complete consolidation of the right upper lobe and marked atelectasis of the right lower lobe has  increased. Extensive pleural metastases on the   right have not significantly changed. Large loculated right pleural fluid collection fairly similar to previous. On ultrasound, this was multiloculated. This measures approximately 11 x 8 x 15 cm.    MEDIASTINUM/AXILLAE: Mildly enlarged subcarinal node. SVC is normal in caliber without findings to suggest SVC stenosis.    UPPER ABDOMEN: Normal.    MUSCULOSKELETAL: Normal.      Impression    IMPRESSION:  1.  Positive for acute pulmonary emboli that has progressed since 09/27/2021. Left side.    2.  No significant change in the large central mass right upper lobe with no significant change in the extensive right pleural metastases.    3.  Loculated pleural fluid laterally on ultrasound earlier today was multi-septated.    4.  The most significant change in the right lung is increasing atelectasis in the right lower lobe.    5.  Similar-appearing dense consolidation right upper lobe.    6.  SVC appears widely patent.    7.  Findings called to the clinical service at 6:12 PM.   US Thoracentesis    Narrative    EXAM:   1. RIGHT THORACENTESIS  2. ULTRASOUND GUIDANCE  LOCATION: [facility]  DATE/TIME: 10/4/2021 5:04 PM    INDICATION: Pleural effusion.    PROCEDURE: Informed consent obtained. Time out performed. The chest was prepped and draped in sterile fashion. 10 mL of 1 % lidocaine was infused into the local soft tissues. Under direct ultrasound guidance, a 5 Macedonian catheter system was placed into   the pleural effusion.     100 milliliters of dark red fluid were removed and sent to lab, if requested.    Patient tolerated procedure well.    Ultrasound imaging was obtained and placed in the patient's permanent medical record.      Impression    IMPRESSION:  Status post right ultrasound-guided thoracentesis.  Patient's right pleural space has developed numerous heavy loculations. This limits the ability to perform large volume thoracentesis.    Reference CPT Code:  55346   CT Chest Tube with Cath Placement    Narrative    EXAM:  1. PERCUTANEOUS CHEST TUBE PLACEMENT RIGHT  2. CT GUIDANCE  3. CONSCIOUS SEDATION  LOCATION: [facility]  DATE/TIME: 10/5/2021 10:25 AM    INDICATION: large right loculated pleural effusion, needs chest tube for intrapleural lytics.  TECHNIQUE: Dose reduction techniques were used.    PROCEDURE: Informed consent obtained. Site marked. Prior images reviewed. Required items made available. Patient identity confirmed verbally and with arm band. Patient reevaluated immediately before administering sedation. Universal protocol was   followed. Time out performed. The site was prepped and draped in sterile fashion. 10 mL of 1% lidocaine was infused into the local soft tissues. Using standard technique and under direct CT guidance, a 12 Hebrew nonlocking all-purpose drain chest tube   catheter was inserted into the pleural space. The catheter was fixed in place with sutures and adhesive device, and the tubing was banded. Chest tube placed to Pleur-evac suction.    SPECIMEN: None. The patient had ultrasound-guided thoracentesis yesterday with fluid sent.    BLOOD LOSS: Minimal.    The patient tolerated the procedure well. No immediate complications.    SEDATION: Versed 1 mg. Fentanyl 0 mcg. The procedure was performed with administration intravenous conscious sedation with appropriate preoperative, intraoperative, and postoperative evaluation.    10 minutes of supervised face to face conscious sedation time was provided by a radiology nurse under my direct supervision.      Impression    IMPRESSION:  1.  Successful CT-guided chest tube placement into the right pleural space.    Reference CPT Codes: 26066,   XR Chest Port 1 View    Narrative    EXAM: XR CHEST PORT 1 VIEW  LOCATION: [facility]  DATE/TIME: 10/6/2021 5:47 AM    INDICATION: right chest tube. eval position, interval change in effusion.  COMPARISON:  Chest tube placement CT 10/05/2021       Impression    IMPRESSION: A small bore catheter overlies the right lung base.  There is new air in the pleural space at the right lung base. The right lower lung has not reexpanded. The confluent opacity at the right upper lung has not changed. A small volume of left   pleural fluid is again seen. A few small opacities in the left lung are again seen. The heart is not well assessed.   XR Chest 1 View    Narrative    EXAM: XR CHEST 1 VIEW  LOCATION: [facility]  DATE/TIME: 10/10/2021 1:38 PM    INDICATION: Follow-up drainage of right pleural effusion.  COMPARISON: Portable chest 10/06/2021 and CT 10/04/2021      Impression    IMPRESSION: Right pleural drain unchanged in lateral right lower thorax with a small to moderate loculated lateral right hydropneumothorax. Improved aeration in the right upper lobe with persistent volume loss. Minimal left basilar atelectasis and small   left effusion unchanged. Heart size and vascularity are normal.   XR Chest 1 View    Narrative    EXAM: XR CHEST 1 VIEW  LOCATION: [facility]  DATE/TIME: 10/10/2021 4:47 PM    INDICATION: Evaluate loculated pneumothorax size after chest tube clamped.  COMPARISON: 10/10/2021.      Impression    IMPRESSION:     Right base pleural catheter remains in place. No significant change in size of small to moderate loculated lateral right base hydropneumothorax. Heterogeneous opacities / consolidation throughout the remaining right lung, also without significant change.       Left lung shows a small pleural effusion and mild basilar atelectasis.     Stable cardiomediastinal silhouette.   XR Chest 1 View    Narrative    EXAM: XR CHEST 1 VIEW  LOCATION: [facility]  DATE/TIME: 10/10/2021 10:10 PM    INDICATION: follow up PTX after chest tube is clamped  COMPARISON: 10/10/2021       Impression    IMPRESSION: A chest tube overlies the right lower  hemithorax. Hydropneumothorax at the right lung base has not changed in size. Other right pulmonary opacities have not changed. Scarring at left lung base has not changed. The heart is not well assessed.    XR Chest 1 View    Narrative    EXAM: XR CHEST 1 VIEW  LOCATION: [facility]  DATE/TIME: 10/11/2021 1:23 PM    INDICATION: chest tube clamped  COMPARISON: 10/10/2021 at 2206 hours       Impression    IMPRESSION: A chest tube overlies the right lower hemithorax. Hydropneumothorax at the right lung base has not changed in size. There is slightly increased fluid or airspace disease at the right apex. Scarring in the left lung base has not changed. The   heart is not well assessed.    XR Chest 1 View    Narrative    EXAM: XR CHEST 1 VIEW  LOCATION: [facility]  DATE/TIME: 10/12/2021 11:54 AM    INDICATION: chest tube  COMPARISON: Portable chest radiography 10/11/2021, 10/10/2021      Impression    IMPRESSION:     Similar position of a small bore right basilar pleural drain. There is a small residual apical right pneumothorax measuring 13 mm. Complex, loculation of air and to a lesser extent fluid in the lateral basal right chest is not increased. The right upper   lobe is substantially reinflated, while the right middle and lower lobes remain largely atelectatic. Space-occupying process superimposed on the right hilum has borders which are difficult to discriminate.    The left lung is well-expanded. Minimal paradiaphragmatic atelectasis and/or pleural fluid in the lateral and posterior sulci.    Cardiac silhouette is not enlarged. There is no mediastinal shift.       Discharge Medications   Current Discharge Medication List      START taking these medications    Details   dexamethasone (DECADRON) 4 MG tablet Take 2 tablets (8 mg) by mouth 2 times daily (with meals) for 2 days  Qty: 8 tablet, Refills: 0    Associated Diagnoses: Malignant neoplasm of lung, unspecified  laterality, unspecified part of lung (H)      enoxaparin ANTICOAGULANT (LOVENOX) 60 MG/0.6ML syringe Inject 1 mL (100 mg) Subcutaneous daily  Qty: 36 mL, Refills: 0    Associated Diagnoses: Other pulmonary embolism without acute cor pulmonale, unspecified chronicity (H)      metoprolol succinate ER (TOPROL-XL) 50 MG 24 hr tablet Take 1 tablet (50 mg) by mouth 2 times daily  Qty: 60 tablet, Refills: 0    Associated Diagnoses: Paroxysmal atrial fibrillation (H)      sulfamethoxazole-trimethoprim (BACTRIM DS) 800-160 MG tablet Take 1 tablet by mouth daily  Qty: 30 tablet, Refills: 0    Associated Diagnoses: Human immunodeficiency virus (HIV) disease (H)         CONTINUE these medications which have NOT CHANGED    Details   acetaminophen (TYLENOL) 500 MG tablet Take 1-2 tablets (500-1,000 mg) by mouth every 6 hours as needed for mild pain Max dose of 3000mg (6 tabs) in one day.  Qty: 60 tablet, Refills: 0    Associated Diagnoses: Left wrist pain      ammonium lactate (LAC-HYDRIN) 12 % lotion Apply topically 2 times daily  Qty: 225 g, Refills: 11    Associated Diagnoses: Dry skin      benzonatate (TESSALON) 100 MG capsule Take 1 capsule (100 mg) by mouth 3 times daily as needed for cough  Qty: 30 capsule, Refills: 0    Associated Diagnoses: Cough      docusate sodium (DOK) 100 MG capsule TAKE 1-2 CAPSULE BY MOUTH DAILY as needed TO KEEP STOOLS SOFT  Qty: 90 capsule, Refills: 3    Associated Diagnoses: Constipation, unspecified constipation type      emtricitabine-rilpivirine-tenofovir (ODEFSEY) 200-25-25 MG TABS per tablet Take 1 tablet by mouth daily (with breakfast)      FEROSUL 325 (65 Fe) MG tablet Take 1 tablet (325 mg) by mouth daily  Qty: 90 tablet, Refills: 1    Associated Diagnoses: Primary malignant neoplasm of right lung metastatic to other site (H)      gabapentin (NEURONTIN) 100 MG capsule Take 1 capsule (100 mg) by mouth 3 times daily  Qty: 90 capsule, Refills: 0    Associated Diagnoses: Single subsegmental  pulmonary embolism without acute cor pulmonale (H)      loperamide (IMODIUM A-D) 2 MG tablet Take 1 tablet (2 mg) by mouth 4 times daily as needed for diarrhea  Qty: 20 tablet, Refills: 0    Associated Diagnoses: Travel advice encounter      ondansetron (ZOFRAN) 8 MG tablet Take 1 tablet (8 mg) by mouth every 6 hours as needed for nausea  Qty: 30 tablet, Refills: 2    Associated Diagnoses: Primary malignant neoplasm of right lung metastatic to other site (H); Chemotherapy induced nausea and vomiting      osimertinib (TAGRISSO) 80 MG tablet Take 1 tablet (80 mg) by mouth daily  Qty: 90 tablet, Refills: 1    Comments: Pt will be traveling. Please give 3 month supply  Associated Diagnoses: Primary malignant neoplasm of right lung metastatic to other site (H)      psyllium (METAMUCIL/KONSYL) 58.6 % powder Add one teaspoonful to liquid daily  Qty: 283 g, Refills: 11    Associated Diagnoses: Constipation, unspecified constipation type      sodium chloride (OCEAN) 0.65 % nasal spray Two sprays per nostril two times daily as needed.  Qty: 44 mL, Refills: 3    Associated Diagnoses: Dry nose      traMADol (ULTRAM) 50 MG tablet Take 1 tablet (50 mg) by mouth every 6 hours as needed for moderate pain  Qty: 10 tablet, Refills: 0    Associated Diagnoses: Single subsegmental pulmonary embolism without acute cor pulmonale (H)         STOP taking these medications       rivaroxaban ANTICOAGULANT (XARELTO ANTICOAGULANT) 20 MG TABS tablet Comments:   Reason for Stopping:             Allergies   Allergies   Allergen Reactions     Stribild [Prdertk-Jiwjtsh-Gnuhqjgs-Tenof] Itching     Ibuprofen Itching and Other (See Comments)     Oxycodone Other (See Comments)     Prednisone Other (See Comments)     Raltegravir Unknown     Tamsulosin Other (See Comments)     Headache     Tylenol [Acetaminophen] Rash     Gets an itchy rash on back.

## 2021-10-14 NOTE — PROGRESS NOTES
ADDENDUM:    Patient not in sepsis, removed from problem list.     Nery Garrison MD  [facility], PGY-3  Pager # [phone]

## 2021-10-14 NOTE — PLAN OF CARE
Problem: Adult Inpatient Plan of Care  Goal: Plan of Care Review  Outcome: Improving  Flowsheets (Taken 10/14/2021 1353)  Plan of Care Reviewed With: patient  Outcome Summary: chemo today then dc planning  Progress: improving     Problem: Adult Inpatient Plan of Care  Goal: Readiness for Transition of Care  Outcome: Improving  Flowsheets (Taken 10/14/2021 1353)  Transportation Anticipated: family or friend will provide  Concerns to be Addressed:   medication   discharge planning  Intervention: Mutually Develop Transition Plan  Recent Flowsheet Documentation  Taken 10/14/2021 1353 by Larisa Bolanos RN  Transportation Anticipated: family or friend will provide  Concerns to be Addressed:   medication   discharge planning    Pt to discharge home today. VM left for nephew, Moreil, to arrange discontinue ride. Pt requested 4:30 time to allow rest after chemo infusion.

## 2021-10-14 NOTE — PROGRESS NOTES
Care Management Discharge Note    Discharge Date: 10/14/2021       Discharge Disposition: Home Care, Home    Discharge Services: None    Discharge DME: None    Discharge Transportation: family or friend will provide    Private pay costs discussed: Not applicable    PAS Confirmation Code:    Patient/family educated on Medicare website which has current facility and service quality ratings: no    Education Provided on the Discharge Plan:    Persons Notified of Discharge Plans: Pt, [facility]   Patient/Family in Agreement with the Plan: yes    Handoff Referral Completed: Yes     Additional Information:  [facility] notified that Pt will discharge home today after Chemo.  [facility] left VM for Lara with [employer] [facility]. [phone] and asked for return call for RN, PT, OT, HHA and SW.  Pt will have family transport.     11:52 AM  NIKKI faxed discharge orders to [facility].     2:57 PM  [facility] confirmed with [facility], [facility] and received orders and will start care tomorrow.     LC Arechiga

## 2021-10-14 NOTE — PLAN OF CARE
Cycle 2 Day 1-   VSS, afebrile- denies pain and/or nausea.  Tolerated Taxol and Carboplatin infusions well. Resting comfortably.

## 2021-10-14 NOTE — DISCHARGE INSTRUCTIONS
Home care services have been arranged for you.  Agency: [facility]  Services: RN, PT, OT, SW and HHA   Phone: [phone]  Instructions: Home Care will contact you within 24 hours to arrange the first visit.       [facility] [address] [phone] - call if questions or concerns. They will call you to arrange next week's outpatient chemotherapy

## 2021-10-14 NOTE — PLAN OF CARE
Patient discharged to home via wheelchair, accompanied by nursing staff.  Discharge instructions reviewed with the patient. No concerns.   Reinforced education that day RN gave to patient re: Lovenox, post chemo.   Patient verbalized that he already has a f/u appointment with PCP.  Patient belongings returned: cane, phone, , clothes, shoes.   Denies of pain. Denies of nausea.   Rishi Mireles, CONCHITA  [phone]  10/14/21

## 2021-10-14 NOTE — PLAN OF CARE
Physical Therapy Discharge Summary    Reason for therapy discharge:    Discharged to home with home therapy. Family to check in and assist as needed.    Progress towards therapy goal(s). See goals on Care Plan in [facility] electronic health record for goal details.  Goals partially met.    Therapy recommendation(s):    Recommend continued home PT to progress with goals and mobility.

## 2021-10-14 NOTE — PLAN OF CARE
Problem: Adult Inpatient Plan of Care  Goal: Optimal Comfort and Wellbeing  Outcome: Improving  Patient c/o L chest discomfort from coughing, PRN pain meds given and warm blanket applied. Patient expressed relief after interventions.     Problem: Gas Exchange Impaired  Goal: Optimal Gas Exchange  Outcome: Improving  O2 sats stable on room air, cough at times, PRN robitussin effective for relief of cough     Problem: Anemia  Goal: Anemia Symptom Improvement  Outcome: Improving  Hgb improving today.    Naman Jensen RN        No

## 2021-10-14 NOTE — PROGRESS NOTES
[facility] Inpatient Progress Note    Patient: Antonio Mercado  MRN: [id number]  Date of Service: October 7, 2021         Reason for Visit    Follow-up for metastatic lung cancer    Assessment and Plan    Acute respiratory failure with hypoxemia  Rapidly progressive metastatic lung adenocarcinoma with right lung mass and invasion of mediastinal structures  Pulmonary emboli  Loculated right pleural effusion  Paroxysmal atrial fibrillation  Anemia    Breathing has been stable after removal of chest tube and patient is now off oxygen.    Hemoglobin stable.    We will continue with another dose of chemotherapy with carboplatin and Taxol.  Anticipate continuing as outpatient next week.    Unclear why he is on IV dexamethasone, will stop this.  He will get dexamethasone premedication for chemotherapy today.  We will give oral doses for a couple of days 8 mg daily for delayed nausea symptoms.    For now would continue Lovenox for management of pulmonary emboli.  Need to ensure that he can self administer and his insurance will cover this as outpatient prior to discharge.    Questions answered.    Plan: We will proceed with second dose of weekly carboplatin Taxol today  Stop IV dexamethasone, will order for 2 days of oral dexamethasone 8 mg daily for daily nausea  Continue Lovenox  Anticipated to continue chemotherapy as outpatient next week      Cancer Staging  Malignant neoplasm of hepatic flexure (H)  Staging form: Colon and Rectum, AJCC 7th Edition  - Clinical stage from 2/20/2016: Stage IIIB (T4a, N1c, M0) - Signed by Harris Mary MD on 2/20/2017      ECOG Performance Status: 3        ______________________________________________________________________________    History of Present Illness    Mr. Antonio Mercado reports to be feeling better.  Breathing has been stable after chest tube removal.  Eating and drinking fine.  No bleeding symptoms.  Pain is controlled.  Is ambulating more.   "    Review of Systems    As per the HPI.    Physical Exam    /68 (BP Location: Right arm)   Pulse 81   Temp 98  F (36.7  C) (Oral)   Resp 18   Ht 1.803 m (5' 11\")   Wt 70.9 kg (156 lb 4 oz)   SpO2 91%   BMI 21.79 kg/m        GENERAL: Alert and oriented to time place and person. Seated comfortably. In no distress.    HEAD: Atraumatic and normocephalic.    EYES: KARIME, EOMI.  No pallor.  No icterus.    Oral cavity: no mucosal lesion or tonsillar enlargement.    NECK: supple. JVP normal.  No thyroid enlargement.    LYMPH NODES: No palpable, cervical, axillary or inguinal lymphadenopathy.    CHEST: clear to auscultation bilaterally.  Resonant to percussion throughout bilaterally.  Symmetrical breath movements bilaterally.    CVS: S1 and S2 are heard. Regular rate and rhythm.  No murmur or gallop or rub heard.  No peripheral edema.    ABDOMEN: Soft. Not tender. Not distended.  No palpable hepatomegaly or splenomegaly.  No other mass palpable.  Bowel sounds heard.    EXTREMITIES: Warm.    SKIN: no rash, or bruising or purpura.  Has a full head of hair.      Lab Results    Recent Results (from the past 24 hour(s))   Magnesium    Collection Time: 10/14/21  6:03 AM   Result Value Ref Range    Magnesium 1.9 1.8 - 2.6 mg/dL   Comprehensive metabolic panel    Collection Time: 10/14/21  6:03 AM   Result Value Ref Range    Sodium 136 136 - 145 mmol/L    Potassium 4.8 3.5 - 5.0 mmol/L    Chloride 104 98 - 107 mmol/L    Carbon Dioxide (CO2) 24 22 - 31 mmol/L    Anion Gap 8 5 - 18 mmol/L    Urea Nitrogen 29 (H) 8 - 22 mg/dL    Creatinine 0.84 0.70 - 1.30 mg/dL    Calcium 9.1 8.5 - 10.5 mg/dL    Glucose 129 (H) 70 - 125 mg/dL    Alkaline Phosphatase 87 45 - 120 U/L    AST 18 0 - 40 U/L    ALT 26 0 - 45 U/L    Protein Total 6.0 6.0 - 8.0 g/dL    Albumin 2.7 (L) 3.5 - 5.0 g/dL    Bilirubin Total 0.4 0.0 - 1.0 mg/dL    GFR Estimate 90 >60 mL/min/1.73m2   CBC with platelets and differential    Collection Time: 10/14/21  6:03 " AM   Result Value Ref Range    WBC Count 6.9 4.0 - 11.0 10e3/uL    RBC Count 4.15 (L) 4.40 - 5.90 10e6/uL    Hemoglobin 11.1 (L) 13.3 - 17.7 g/dL    Hematocrit 36.6 (L) 40.0 - 53.0 %    MCV 88 78 - 100 fL    MCH 26.7 26.5 - 33.0 pg    MCHC 30.3 (L) 31.5 - 36.5 g/dL    RDW 16.2 (H) 10.0 - 15.0 %    Platelet Count 336 150 - 450 10e3/uL    % Neutrophils 92 %    % Lymphocytes 3 %    % Monocytes 4 %    % Eosinophils 0 %    % Basophils 0 %    % Immature Granulocytes 1 %    NRBCs per 100 WBC 0 <1 /100    Absolute Neutrophils 6.4 1.6 - 8.3 10e3/uL    Absolute Lymphocytes 0.2 (L) 0.8 - 5.3 10e3/uL    Absolute Monocytes 0.3 0.0 - 1.3 10e3/uL    Absolute Eosinophils 0.0 0.0 - 0.7 10e3/uL    Absolute Basophils 0.0 0.0 - 0.2 10e3/uL    Absolute Immature Granulocytes 0.1 (H) <=0.0 10e3/uL    Absolute NRBCs 0.0 10e3/uL        Imaging    US Thoracentesis    Result Date: 10/5/2021  EXAM: 1. RIGHT THORACENTESIS 2. ULTRASOUND GUIDANCE LOCATION: [facility] DATE/TIME: 10/4/2021 5:04 PM INDICATION: Pleural effusion. PROCEDURE: Informed consent obtained. Time out performed. The chest was prepped and draped in sterile fashion. 10 mL of 1 % lidocaine was infused into the local soft tissues. Under direct ultrasound guidance, a 5 Serbian catheter system was placed into the pleural effusion. 100 milliliters of dark red fluid were removed and sent to lab, if requested. Patient tolerated procedure well. Ultrasound imaging was obtained and placed in the patient's permanent medical record.     IMPRESSION: Status post right ultrasound-guided thoracentesis. Patient's right pleural space has developed numerous heavy loculations. This limits the ability to perform large volume thoracentesis. Reference CPT Code: 64587    XR Chest Port 1 View    Result Date: 10/6/2021  EXAM: XR CHEST PORT 1 VIEW LOCATION: [facility] DATE/TIME: 10/6/2021 5:47 AM INDICATION: right chest tube. eval position, interval change in effusion.  COMPARISON: Chest tube placement CT 10/05/2021     IMPRESSION: A small bore catheter overlies the right lung base.  There is new air in the pleural space at the right lung base. The right lower lung has not reexpanded. The confluent opacity at the right upper lung has not changed. A small volume of left pleural fluid is again seen. A few small opacities in the left lung are again seen. The heart is not well assessed.    CT Chest Pulmonary Embolism w Contrast    Result Date: 10/4/2021  EXAM: CT CHEST PULMONARY EMBOLISM W CONTRAST LOCATION: [facility] DATE/TIME: 10/4/2021 5:01 PM INDICATION: Known metastatic lung cancer on the right side. Enlarging pleural effusion. Concern for SVC syndrome. Thoracentesis just prior to this study shows extensively loculated pleural fluid with only 100 mL removed. COMPARISON: 09/27/2021. TECHNIQUE: CT chest pulmonary angiogram during arterial phase injection of IV contrast. Multiplanar reformats and MIP reconstructions were performed. Dose reduction techniques were used. CONTRAST: Isovue 370 100 mL FINDINGS: ANGIOGRAM CHEST: The previously noted pulmonary emboli seen on 09/27/2021 left lung has progressed in extent. No emboli on the right side. Thoracic aorta is negative for dissection. No CT evidence of right heart strain. LUNGS AND PLEURA: No significant change in the large central mass right upper lobe measuring 6.2 x 7.3 cm. Complete consolidation of the right upper lobe and marked atelectasis of the right lower lobe has increased. Extensive pleural metastases on the right have not significantly changed. Large loculated right pleural fluid collection fairly similar to previous. On ultrasound, this was multiloculated. This measures approximately 11 x 8 x 15 cm. MEDIASTINUM/AXILLAE: Mildly enlarged subcarinal node. SVC is normal in caliber without findings to suggest SVC stenosis. UPPER ABDOMEN: Normal. MUSCULOSKELETAL: Normal.     IMPRESSION: 1.  Positive  for acute pulmonary emboli that has progressed since 09/27/2021. Left side. 2.  No significant change in the large central mass right upper lobe with no significant change in the extensive right pleural metastases. 3.  Loculated pleural fluid laterally on ultrasound earlier today was multi-septated. 4.  The most significant change in the right lung is increasing atelectasis in the right lower lobe. 5.  Similar-appearing dense consolidation right upper lobe. 6.  SVC appears widely patent. 7.  Findings called to the clinical service at 6:12 PM.    CT Chest Tube with Cath Placement    Result Date: 10/5/2021  EXAM: 1. PERCUTANEOUS CHEST TUBE PLACEMENT RIGHT 2. CT GUIDANCE 3. CONSCIOUS SEDATION LOCATION: [facility] DATE/TIME: 10/5/2021 10:25 AM INDICATION: large right loculated pleural effusion, needs chest tube for intrapleural lytics. TECHNIQUE: Dose reduction techniques were used. PROCEDURE: Informed consent obtained. Site marked. Prior images reviewed. Required items made available. Patient identity confirmed verbally and with arm band. Patient reevaluated immediately before administering sedation. Universal protocol was followed. Time out performed. The site was prepped and draped in sterile fashion. 10 mL of 1% lidocaine was infused into the local soft tissues. Using standard technique and under direct CT guidance, a 12 Azeri nonlocking all-purpose drain chest tube catheter was inserted into the pleural space. The catheter was fixed in place with sutures and adhesive device, and the tubing was banded. Chest tube placed to Pleur-evac suction. SPECIMEN: None. The patient had ultrasound-guided thoracentesis yesterday with fluid sent. BLOOD LOSS: Minimal. The patient tolerated the procedure well. No immediate complications. SEDATION: Versed 1 mg. Fentanyl 0 mcg. The procedure was performed with administration intravenous conscious sedation with appropriate preoperative, intraoperative, and  postoperative evaluation. 10 minutes of supervised face to face conscious sedation time was provided by a radiology nurse under my direct supervision.     IMPRESSION: 1.  Successful CT-guided chest tube placement into the right pleural space. Reference CPT Codes: 23014,       Signed by: Kailey Chavez MD

## 2021-10-15 NOTE — TELEPHONE ENCOUNTER
Oral Chemotherapy Monitoring Program     Per request from Emily at [facility], called Antonio to discuss osimertinib. He was requesting a refill but I don't see information regarding him restarting this medication. I let him know I would message Dr. Chavez to see if there was a plan to restart osimertinib. Let him know we would follow-up with him once Dr. Chavez got back to us. Also, educated on how and where to administer subcutaneous enoxaparin injection.     Hetal Gold, PharmD, [facility]  Oral Chemotherapy Pharmacist  [phone]

## 2021-10-15 NOTE — PROGRESS NOTES
Clinic Care Coordination Contact  [facility]: Post-Discharge Note  SITUATION                                                      Admission:    Admission Date: 10/03/21   Reason for Admission: Malignant left pleural effusion  Discharge:   Discharge Date: 10/14/21  Discharge Diagnosis: Malignant left pleural effusion    BACKGROUND                                                      Antonio Mercado is a 66 year old male who has a history of HIV, lung cancer, prostate cancer, PE, recent hospitalization here 9/28-10/1 for weakness, dysphagia, and weight loss secondary to his progressing lung cancer and is admitted for worsening shortness of breath. He was discharged with home oxygen which was new, but has become progressively more short of breath over the past two days. He has had fatigue and cough and difficulty walking because he feels too weak. He has not had fevers.    ASSESSMENT      Enrollment  Primary Care Care Coordination Status: Not a Candidate    Discharge Assessment  How are you doing now that you are home?: doing okay  How are your symptoms? (Red Flag symptoms escalate to triage hotline per guidelines): Unchanged  Do you feel your condition is stable enough to be safe at home until your provider visit?: Yes  Does the patient have their discharge instructions? : Yes  Does the patient have questions regarding their discharge instructions? : No  Were you started on any new medications or were there changes to any of your previous medications? : Yes  Does the patient have all of their medications?: Yes (Patient had a question about a different medication and asked for the pharmacies phone number. CHW provided the pharmacies phone number to patient.)  Do you have questions regarding any of your medications? : No  Do you have all of your needed medical supplies or equipment (DME)?  (i.e. oxygen tank, CPAP, cane, etc.): Yes  Discharge follow-up appointment scheduled within 14 calendar days? :  Yes  Discharge Follow Up Appointment Date: 10/18/21  Discharge Follow Up Appointment Scheduled with?: Primary Care Provider    Post-op ([facility] CTA Only)  If the patient had a surgery or procedure, do they have any questions for a nurse?: No             PLAN                                                      Outpatient Plan: Follow up with primary care provider, Harris Mary, within 7 days for  hospital follow- up. The following labs/tests are recommended: CD 4  count for Bactrim ppx, BMP, CBC.    Future Appointments   Date Time Provider Department [place]   10/18/2021 11:00 AM Aster Navarro, [facility]   10/18/2021 11:20 AM Harris Mary MD [facility]   12/28/2021 11:00 AM Anamika Mcneil MD [facility]         For any urgent concerns, please contact our 24 hour nurse triage line: [phone] ([phone])         EDILIA Foy  [phone]  Connected [facility]               No

## 2021-10-18 NOTE — PROGRESS NOTES
Social Work Note: Telephone Call  Oncology Clinic    Data/Intervention: 10/18/21  Patient Name:  Antonio Mercado  /Age:  1955 (66 year old)    Reason for Call:  SW was consulted to reach out to Antonio to assist with transportation needs to his upcoming appointments. Antonio states he is not sure if he has a transportation benefit and was agreeable to having Sw contact [facility] [employer] and check the status of his transportation benefits. SW called [employer] and found out that he does have a benefit and is able to schedule rides.    SW arranged rides via [phone]  Patient's Member ID #:[id number]  Appt Date:    Time:   Appt. Time:    Transportation agency  10/21/21          8:00 AM 8:45 AM [employer]-Will need to call for ride home ([phone])  10/28/21 8:00 AM 8:45 AM [employer]-Will need to call for ride home ([phone])    Plan:  SW will remain available for ongoing resource/support needs.     CARLOS ENRIQUE Oliveira, [facility]  Adult Oncology Clinic  Phone: [phone]

## 2021-10-18 NOTE — PROGRESS NOTES
Medication Therapy Management (MTM) Encounter    ASSESSMENT:                            Medication Adherence/Access: Advised use of pill boxes to help med adherence    Malignant neoplasm of R lung: Managed by oncology    Pulmonary embolism: Stable on Lovenox 100mg daily.    Atrial fibrillation: HR elevated today; newly started on metoprolol    HIV: Stable; managed by ID    Iron deficiency anemia: Needs refill of ferrous sulfate    Dry skin: Uncontrolled. He would benefit from restarting Am Lactin.    PLAN:                              Went over all steps on how he is injecting Lovenox; he is doing correctly    Educated him on how to dispose of his used Lovenox syringes    Ferrous sulfate refilled by Dr. Mary    Restarted Am Lactin; Rx sent    Follow-up: with Oncology and Dr. Mary      SUBJECTIVE/OBJECTIVE:                          Antonio Mercado is a 66 year old male seen for a TCM visit.  He was discharged from [facility] on 10/14 for SOB, a fib. Today's visit is a co-visit with Dr. Mary.     Reason for visit: TCM.    Allergies/ADRs: Reviewed in chart  Past Medical History: Reviewed in chart  Tobacco: He reports that he has never smoked. He has never used smokeless tobacco.  Alcohol: not assessed today      Medication Adherence/Access: Patient takes medications directly from bottles.  Patient takes medications 2 time(s) per day.   Per patient, misses medication 0 times per week.     Malignant neoplasm of R lung: Per oncology, receiving chemotherapy (exact regimen unknown by me) and radiation. Previously took Tagrisso, which per patient was not effective. Takes PRN prochoperazine 10mg q 6 hr prn for nausea/vomiting. Uses less than once weekly. For pain, he previously took gabapentin, but hasn't had it in a while and doesn't have neuropathic pain. Had taken tramadol fairly recently as well but doesn't have anymore. He does describe having some pain in his chest. He had acetaminophen on his list but states it does  not work.    Pulmonary embolism: Was previously on Xarelto, but was changed to Lovenox 100mg daily in the hospital. He was instructed how to do the injections and has been doing them himself. He has been rotating sites and described correct technique. He does not know where to dispose of the syringes so has just been collecting them. As far as adverse effects, he endorses some bruising on his lower back and a small amt of blood in his sputum when he coughs, but unknown if this is an adverse effect or something else. Dr. Mary will assess.    Atrial fibrillation: In the hospital, started on metoprolol ER 50mg BID. No adverse effects.    HR today 91    HIV: Taking Odefsey once daily. In the hospital, started on Bactrim DS once daily for prophylaxis.    Iron deficiency anemia: Was taking ferrous sulfate 325mg once daily, but ran out for some time prior to hospitalization. Was given in the hospital and was told to continue it but he does not have any and doesn't have refills.    Dry skin: Had Am Lactin lotion, but hasn't had for a while and wants to start it again. It helped his dry skin after showering.    BP Readings from Last 3 Encounters:   10/18/21 102/69   10/14/21 127/65   10/01/21 127/77       ----------------  Post Discharge Medication Reconciliation Status: discharge medications reconciled and changed, per note/orders.    I spent 20 minutes with this patient today. All changes were made via collaborative practice agreement with Dr. Mary. A copy of the visit note was not provided to the patient's primary care provider as we discussed in clinic today.    The patient was given a summary of these recommendations. See Provider note/AVS from today.     Aster Navarro, Pharm.D.         Medication Therapy Recommendations  Dry skin    Current Medication: ammonium lactate (LAC-HYDRIN) 12 % external lotion   Rationale: Untreated condition - Needs additional medication therapy - Indication   Recommendation: Start  Medication   Status: Accepted per CPA

## 2021-10-18 NOTE — PROGRESS NOTES
Patient's family member presented to clinic requesting assistance with DME order for walker. Offered to fax to [facility] and he will call them in about a week to check in. Faxed per request. ./LR

## 2021-10-18 NOTE — LETTER
October 19, 2021      Antonio Mercado  [address]  [place] [address]        Dear ,    We are writing to inform you of your test results.    Labs are stable but will need continue monitoring.  I talked with infectious disease Dr Barnes who worked with Dr Andersen who said to continue taking HIV medication and to expect CD4 counts to remain low as long as you are receiving chemo.       One small point - I slightly reduced the dose of the blood thinner you are taking because your weight had gone down further.  So waste 0.1 mls of the prepared 1ml syringe so you are giving yourself 0.9mls (90mgs) daily.  OK?  Take care!      Resulted Orders   T cell subset profile   Result Value Ref Range    CD3% Total T Cells 78 49 - 84 %    Absolute CD3, Total T Cells 248 (L) 603-2,990 cells/uL    CD4% Trumann T Cells 24 (L) 28 - 63 %    Absolute CD4, Trumann T Cells 77 (L) 441-2,156 cells/uL    CD8% Suppressor T Cells 52 (H) 10 - 40 %    Absolute CD8, Suppressor T Cells 165 125-1,312 cells/uL    CD4:CD8 Ratio 0.47 (L) 1.40 - 2.60    T Cell Comment     Basic metabolic panel   Result Value Ref Range    Sodium 136 136 - 145 mmol/L    Potassium 5.1 (H) 3.5 - 5.0 mmol/L    Chloride 100 98 - 107 mmol/L    Carbon Dioxide (CO2) 24 22 - 31 mmol/L    Anion Gap 12 5 - 18 mmol/L    Urea Nitrogen 32 (H) 8 - 22 mg/dL    Creatinine 1.29 0.70 - 1.30 mg/dL    Calcium 10.1 8.5 - 10.5 mg/dL    Glucose 135 (H) 70 - 125 mg/dL    GFR Estimate 57 (L) >60 mL/min/1.73m2      Comment:      As of July 11, 2021, eGFR is calculated by the CKD-EPI creatinine equation, without race adjustment. eGFR can be influenced by muscle mass, exercise, and diet. The reported eGFR is an estimation only and is only applicable if the renal function is stable.   CBC with platelets and differential   Result Value Ref Range    WBC Count 5.0 4.0 - 11.0 10e3/uL    RBC Count 5.03 4.40 - 5.90 10e6/uL    Hemoglobin 13.7 13.3 - 17.7 g/dL    Hematocrit 42.8 40.0  - 53.0 %    MCV 85 78 - 100 fL    MCH 27.2 26.5 - 33.0 pg    MCHC 32.0 31.5 - 36.5 g/dL    RDW 17.4 (H) 10.0 - 15.0 %    Platelet Count 357 150 - 450 10e3/uL    % Neutrophils 90 %    % Lymphocytes 5 %    % Monocytes 4 %    % Eosinophils 0 %    % Basophils 0 %    % Immature Granulocytes 0 %    Absolute Neutrophils 4.5 1.6 - 8.3 10e3/uL    Absolute Lymphocytes 0.3 (L) 0.8 - 5.3 10e3/uL    Absolute Monocytes 0.2 0.0 - 1.3 10e3/uL    Absolute Eosinophils 0.0 0.0 - 0.7 10e3/uL    Absolute Basophils 0.0 0.0 - 0.2 10e3/uL    Absolute Immature Granulocytes 0.0 <=0.0 10e3/uL       If you have any questions or concerns, please call the clinic at the number listed above.       Sincerely,      Harris Mary MD

## 2021-10-18 NOTE — PATIENT INSTRUCTIONS
Appt at [facility] ([address] [place], 2nd floor) on Thurs, Oct 21 at 9am - will have labs and chemo     Local Medical Supply stores:    [facility]  [address], ([place] [address] [place])  [place] [address]   Phone: [phone]    [facility]   [address], ([place] [address] [place])  [place] [address]   Phone: [phone]    [facility]  [address] [facility]  [place], [place]   PHONE: [phone]    [facility]  [address], [place]   PHONE: [phone]    [address] [address]  Phone: [phone]  [facility] Fax: [phone]    [facility]  Multiple locations in the [facility]  PHONE: [phone]    [facility]  Multiple locations in [facility]  Main phone line: [phone]  Fax: [phone]

## 2021-10-18 NOTE — PROGRESS NOTES
Hospitalization Follow-up Visit         [facility]       Hospital Follow-up Visit:    Hospital:  [facility]   Date of Admission: 10/3/21  Date of Discharge: 10/14/21  Reason(s) for Admission: Primary Adenocarcinoma Right Lung            Problems taking medications regularly:  None       Post Discharge Medication Reconciliation: discharge medications reconciled and changed, per note/orders.       Problems adhering to non-medication therapy:  None       Medications reviewed by: by PharmD    Summary of hospitalization:  [facility] discharge summary reviewed  Diagnostic Tests/Treatments reviewed.  Follow up needed: needs to follow with Cancer care clinic  Other Healthcare Providers Involved in Patient s Care:         Homecare and Specialist appointment - I called and clarified that he has an appointment with oncologist for 10/21/21  Update since discharge: Not doing any better - still fatigued, anorexia, coughing up some blood.  Hurts when he coughs.  Is not taking any pain medications.  Additional issues: Is unaware if home care nursing has visited - someone maybe came by his apartment to say that nurse will come once per week   I attempted to call his son Samira hobson 2 during the encounter - who lives in [place], [place] - but he was unable to come to the phone.    Plan of care communicated with patient                        Review of Systems:   CONSTITUTIONAL:Per documentation, he appears to have lost 20# weight in 10 days - he agrees that he has lost a lot of weight  SKIN: Very dry skin, no rashes  ENT: no ear problems, no mouth problems, no throat problems  RESP:as above, dyspnea with chest pain upon coughing  GI: not constipated, thinks eating / appetite is better since discharge  See PHQ-2 - believes related to overall physical condition and not mental health  PHQ-2 Score:     PHQ-2 ( 1999 [employer]) 10/18/2021 8/20/2021   Q1: Little interest or pleasure in doing things 1 0   Q2: Feeling down, depressed or hopeless 0 0    PHQ-2 Score 1 0                 Physical Exam:     Vitals:    10/18/21 1119   BP: 102/69   Pulse: 91   Resp: 16   Temp: 98.1  F (36.7  C)   Weight: 60.5 kg (133 lb 6.4 oz)     Body mass index is 18.61 kg/m .    GENERAL: looks very frail, in some discomfort when he coughs, drowsy - less oriented than usual, skin dry and flaking with decreased turgor  CV: regular rates and rhythm - not in AFIB at present, normal S1 S2, no S3 or S4 and no murmur, no click or rub - no lower extremity edema         Results:     Results from the last 24 hours   Results for orders placed or performed in visit on 10/18/21 (from the past 24 hour(s))   TSH with free T4 reflex   Result Value Ref Range    TSH 1.84 0.30 - 5.00 uIU/mL   Basic metabolic panel   Result Value Ref Range    Sodium 136 136 - 145 mmol/L    Potassium 5.1 (H) 3.5 - 5.0 mmol/L    Chloride 100 98 - 107 mmol/L    Carbon Dioxide (CO2) 24 22 - 31 mmol/L    Anion Gap 12 5 - 18 mmol/L    Urea Nitrogen 32 (H) 8 - 22 mg/dL    Creatinine 1.29 0.70 - 1.30 mg/dL    Calcium 10.1 8.5 - 10.5 mg/dL    Glucose 135 (H) 70 - 125 mg/dL    GFR Estimate 57 (L) >60 mL/min/1.73m2   CBC with Platelets & Differential    Narrative    The following orders were created for panel order CBC with Platelets & Differential.  Procedure                               Abnormality         Status                     ---------                               -----------         ------                     CBC with platelets and d...[[id number]]  Abnormal            Final result                 Please view results for these tests on the individual orders.   CBC with platelets and differential   Result Value Ref Range    WBC Count 5.0 4.0 - 11.0 10e3/uL    RBC Count 5.03 4.40 - 5.90 10e6/uL    Hemoglobin 13.7 13.3 - 17.7 g/dL    Hematocrit 42.8 40.0 - 53.0 %    MCV 85 78 - 100 fL    MCH 27.2 26.5 - 33.0 pg    MCHC 32.0 31.5 - 36.5 g/dL    RDW 17.4 (H) 10.0 - 15.0 %    Platelet Count 357 150 - 450 10e3/uL    %  Neutrophils 90 %    % Lymphocytes 5 %    % Monocytes 4 %    % Eosinophils 0 %    % Basophils 0 %    % Immature Granulocytes 0 %    Absolute Neutrophils 4.5 1.6 - 8.3 10e3/uL    Absolute Lymphocytes 0.3 (L) 0.8 - 5.3 10e3/uL    Absolute Monocytes 0.2 0.0 - 1.3 10e3/uL    Absolute Eosinophils 0.0 0.0 - 0.7 10e3/uL    Absolute Basophils 0.0 0.0 - 0.2 10e3/uL    Absolute Immature Granulocytes 0.0 <=0.0 10e3/uL       Assessment and Plan      Antonio was seen today for hospital f/u.    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 site (H)  -     TSH with free T4 reflex  -     Cancel: Basic metabolic panel; Future  -     Cancel: CBC with Platelets & Differential; Future  -     Basic metabolic panel  -     CBC with Platelets & Differential  -     guaiFENesin-codeine (ROBITUSSIN AC) 100-10 MG/5ML solution; Take 5-10 mLs by mouth every 4 hours as needed for cough  -     Walker Order for DME - ONLY FOR DME  -     ferrous sulfate (FEROSUL) 325 (65 Fe) MG tablet; Take 1 tablet (325 mg) by mouth daily (with breakfast)  -     enoxaparin ANTICOAGULANT (LOVENOX) 60 MG/0.6ML syringe; Inject 1 mL (100 mg) Subcutaneous daily    Single subsegmental pulmonary embolism without acute cor pulmonale (H)  -     enoxaparin ANTICOAGULANT (LOVENOX) 60 MG/0.6ML syringe; Inject 1 mL (100 mg) Subcutaneous daily  -     enoxaparin ANTICOAGULANT (LOVENOX) 100 MG/ML syringe; Inject 0.9 mLs (90 mg) Subcutaneous daily      This is a very complex 66-year-old with pre-existing HIV positive and prior colon cancer and untreated prostate cancer who presently has a primary lung malignancy.  He has gone through several recent weeks of considerable decline with evidence of major weight loss.  He lives on his own in a basement apartment.  The only child who lives in this state has  not been communicating with him in recent weeks despite apparent knowledge of his illness.  He has 2 other children one who lives in [place], [place]-Samira-and a daughter who lives in [place], [place].  He thinks one of them will be visiting next week.    At this time it is unclear if the home care agency has established care with the patient and I therefore will follow up with both our clinic RN, clinic  and also the team at his cancer clinic to ensure that he is receiving adequate home care services.  In addition, given his general weakness and difficulty caring for himself, it is my opinion that he needs daily PCA services and I will attempt to confirm that these are being provided to him.    Review of the hospital discharge summary requests a number of labs today which we will obtain.    I discussed his care with the Pharm.D. who reviewed his medications and in addition to continuing his current medications will send refills for both iron and for a narcotic prescription.  He is complaining of both cough and pain when he coughs and therefore a combination guaifenesin codeine preparation may assist him the most.  He previously had been discharged on gabapentin with tramadol but we have decided to not continue these for now.    Today he is in normal sinus rhythm and his pulse is within the normal range and so we will continue with metoprolol SR twice daily.    I have indicated to him my availability should he continue to have any concerns or symptoms.  I am also happy to discuss his care with any family members when he wishes me to talk with.    Given his limited mobility, I have placed a prescription for a walking frame which he has not previously used.  I provided him the [employer] store addresses in his neighborhood.      He is generally satisfied with the outcome of today's visit which was over 70 minutes in duration.    E&M code to be billed if TCM cannot be: 15454    Type of  decision making: High complexity (31029)      Options for treatment and follow-up care were reviewed with the patient  Antonio Mercado   engaged in the decision making process and verbalized understanding of the options discussed and agreed with the final plan.      Harris Mary MD

## 2021-10-18 NOTE — TELEPHONE ENCOUNTER
Dr. Harris Mary, primary care physician for this patient calls in today and leaves message on nurse triage line.  He states that he saw the patient in the clinic today and as we know the patient has declined in the recent weeks.  He states that the patient is struggling to manage at home as he lives on his own and does not have any family nearby.  He wants our office and his office to coordinate who is ordering what services for the patient.  He states that the patient needs a PCA, home health aide and home nursing.  He states that we can contact [facility] nurse for him at [phone].  He states that he did write a prescription for the patient for a walker today as well as cough medicine with codeine.  Patient was having some chest pains with his coughing and he prescribed this medicine for that.  He would like our office to contact his clinic nurse there to coordinate his follow-up care.  Since the patient's care coordinator is out of the office until Thursday of this week, I am routing to the care coordination pool.    Nohemy Archer RN

## 2021-10-19 NOTE — TELEPHONE ENCOUNTER
[facility] phone call message- general phone call:    Reason for call: They got a RX for Enoxaparin 60mg yesterday  with directions of 100mg  Daily but they need to clarify which strength.     Action desired: call back.    Return call needed: Yes    OK to leave a message on voice mail? Yes    Advised patient to response may take up to 2 business days: Yes    Primary language: English      needed? No    Call taken on October 19, 2021 at 11:11 AM by Wanda Randhawa

## 2021-10-19 NOTE — RESULT ENCOUNTER NOTE
Alyssia Alvarez and family - labs are stable but will need continue monitoring.  I talked with infectious disease Dr Barnes who worked with Dr Andersen who said to continue taking HIV medication and to expect CD4 counts to remain low as long as you are receiving chemo.      One small point - I slightly reduced the dose of the blood thinner you are taking because your weight had gone down further.  So waste 0.1 mls of the prepared 1ml syringe so you are giving yourself 0.9mls (90mgs) daily.  OK?  Take care, Dr Harris Mary

## 2021-10-21 NOTE — LETTER
Ruddy Alvarez,    Here are the two messages we talked about earlier. Give me a call at [phone] with any questions or concerns.    Ruddy Alvarez and family,     A referral was placed for you during your hospital stay for home nursing, home physical and occupational therapy, and a home health aid. The agency is [facility] ([phone]).      They should be reaching out to you soon to schedule your first visit, but their number is above if you do not hear from them.     Thank you,  CONCHITA Gillespie  -------------------------------  Alyssia Alvarez and family - labs are stable but will need continue monitoring.  I talked with infectious disease Dr Barnes who worked with Dr Andersen who said to continue taking HIV medication and to expect CD4 counts to remain low as long as you are receiving chemo.       One small point - I slightly reduced the dose of the blood thinner you are taking because your weight had gone down further.  So waste 0.1 mls of the prepared 1ml syringe so you are giving yourself 0.9mls (90mgs) daily.  OK?  Take care, Dr Harris Mary    Thanks,  CONCHITA Gillespie

## 2021-10-21 NOTE — TELEPHONE ENCOUNTER
Below mychart messages have not yet been read by Shailesh:    Hi Shailesh,     A referral was placed for you during your hospital stay for home nursing, home physical and occupational therapy, and a home health aid. The agency is [facility] ([phone]).      They should be reaching out to you soon to schedule your first visit, but their number is above if you do not hear from them.     Thank you,  CONCHITA Gillespie  -------------------------------  Hello Antonio and family - labs are stable but will need continue monitoring.  I talked with infectious disease Dr Barnes who worked with Dr Andersen who said to continue taking HIV medication and to expect CD4 counts to remain low as long as you are receiving chemo.       One small point - I slightly reduced the dose of the blood thinner you are taking because your weight had gone down further.  So waste 0.1 mls of the prepared 1ml syringe so you are giving yourself 0.9mls (90mgs) daily.  OK?  Take care, Dr Harris Mary    Called patient to communicate this information, no answer and mailbox is full so unable to leave message. Will try again at later time. ./LR

## 2021-10-21 NOTE — PROGRESS NOTES
Pt arrived via wheelchair to clinic for Cycle # 1 Day # 15 of his chemotherapy regimen.  PICC team was called to place pt's IV since he is a difficult IV start.  PICC team could not obtain labs from IV site.  Labs were reviewed, pt ok for treatment.  Administered premedications and chemotherapy per MD order.  Pt tolerated infusion well, no s/s of infusion reaction.  IV was flushed with NS then D/C'd using 2x2 and Coban.  Pt verbalized understanding of plan of care and return to clinic.

## 2021-10-22 NOTE — TELEPHONE ENCOUNTER
Communicated these messages to Antonio. He confirms he does have access to [facility] and will check it to read these. I will also send them to him via a letter per his request. ./LR

## 2021-10-28 NOTE — LETTER
"    10/28/2021         RE: Antonio Mercado  [address]  [place] [address]        Dear Colleague,    Thank you for referring your patient, Antonio Mercado, to the [facility] [place]. Please see a copy of my visit note below.    Oncology Rooming Note    October 28, 2021 9:39 AM   Antonio Mercado is a 66 year old male who presents for:    Chief Complaint   Patient presents with     Oncology Clinic Visit     Malignant neoplasm of right lung, unspecified part of lung     Initial Vitals: BP (!) 85/60 (BP Location: Left arm, Patient Position: Sitting, Cuff Size: Adult Regular)   Pulse 109   Temp 98.2  F (36.8  C) (Oral)   Resp 20   Wt 58.1 kg (128 lb)   SpO2 98%   BMI 17.85 kg/m   Estimated body mass index is 17.85 kg/m  as calculated from the following:    Height as of 10/8/21: 1.803 m (5' 11\").    Weight as of this encounter: 58.1 kg (128 lb). Body surface area is 1.71 meters squared.  Worst Pain (10) Comment: Data Unavailable   No LMP for male patient.  Allergies reviewed: Yes  Medications reviewed: Yes    Medications: Medication refills not needed today.  Pharmacy name entered into Eb[employer]:      [id number] - [place], [place] - [address] AT [facility] [address]    Clinical concerns:  Malignant neoplasm of right lung, unspecified part of lung. Coughing up blood x 4 days. Pain on his buttocks 10/10.    Ameena Song [facility] Hematology and Oncology Progress Note    Patient: Antonio Mercado  MRN: [id number]  Oct 28, 2021          Reason for Visit    Chief Complaint   Patient presents with     Oncology Clinic Visit     Malignant neoplasm of right lung, unspecified part of lung       Assessment and Plan    Cancer Staging  Malignant neoplasm of hepatic flexure (H)  Staging form: Colon and Rectum, AJCC 7th Edition  - Clinical stage from 2/20/2016: Stage IIIB (T4a, N1c, M0) - Signed by Harris Mary MD on 2/20/2017  CEA: " 97.2  EGFR [id number](exon 19); RB1 552; TP53 L194R  PD-L1, HIGH EXPRESSION   MSI STABLE(CA); TMB LOW  NO FISH ALTERATIONS  MET Exon 14 Deletion: NOT DETECTED; Marroquin TRK, Not expressed  No abnormalities in ALK, BRAF, ERBB2, HER2, MET, RET, ROS1    1. Lung cancer, stage IV: currently on osimertinib. was on this since March 2021 until September 2021.  Pt has had severe progression since then.  He had signs of SVC syndrome.  He is now on chemotherapy with weekly Taxol and carbo.  He has had 3 weeks of that.  He will continue that for now.  We do not want to change to 3-week dosing due to potential poor tolerance.  We are going to add in Keytruda to his chemotherapy regimen.  He is PD-L1 overexpressed but we want to use chemo plus immunotherapy to get better troll of his disease since he is having complications from it.  He will be seen again in 3 weeks.    2.  Small pulmonary embolism found on CT scan incidentally: Was on Xarelto but with active malignancy and a questionable 9 of blood clot we now have him on enoxaparin.  Continue on that for now.  Hopefully we can switch back to Xarelto at some point.    3.  Poor venous access: Encourage patient to get a Port-A-Cath.  For right now he does not want to do that.  We will continue to use ultrasound as needed to get IVs in.    4.  SVC syndrome: Receives palliative radiation in 5 fractions, completed October 8, 2021.  He will continue to follow-up with radiation oncology to be evaluated whether he needs more radiation.  For right now patient does feel better his breathing is better.    5.  Rash on back, right side and then on bilateral buttocks: Some of the lesions do have some pustular nature to them others are crusted and others are just open lesions.  It does cross the midline down on his buttock.  I will do swabs for HSV and VZV PCR testing.  If these are positive, patient may need to be treated with antiviral.    6. Anorexia: will try megace to help improve appetite.  Encourage him to pick foods that are high in calories.       ECOG Performance    0 - Independent    Distress Screening (within last 30 days)    1. How concerned are you about your ability to eat? : 0  2. How concerned are you about unintended weight loss or your current weight? : 0  3. How concerned are you about feeling depressed or very sad? : 0  4. How concerned are you about feeling anxious or very scared? : 0  5. Do you struggle with the loss of meaning and jroge in your life? : Not at all  6. How concerned are you about work and home life issues that may be affected by your cancer? : 0  7. How concerned are you about knowing what resources are available to help you? : 0  8. Do you currently have what you would describe as Tenriism or spiritual struggles?            : Not at all       Pain  Pain Score: Worst Pain (10)  Pain Loc: Other - see comment (bottom feels bruised )    Problem List    Patient Active Problem List   Diagnosis     Human immunodeficiency virus (HIV) disease (H)     Mechanical low back pain     Anemia, iron deficiency     Malignant neoplasm of hepatic flexure (H)     Hemorrhoids, unspecified hemorrhoid type     Prostate cancer (H)     Bilateral renal cysts     CKD (chronic kidney disease) stage 3, GFR 30-59 ml/min (H)     Primary malignant neoplasm of right lung metastatic to other site (H)     Nontraumatic complete tear of right rotator cuff     Single subsegmental pulmonary embolism without acute cor pulmonale (H)     Dehydration     Malignant neoplasm of lung, unspecified laterality, unspecified part of lung (H)     Other pulmonary embolism without acute cor pulmonale, unspecified chronicity (H)     Paroxysmal atrial fibrillation (H)     Malignant neoplasm of right lung, unspecified part of lung (H)     Acute hypoxemic respiratory failure (H)     Anemia, unspecified type        ______________________________________________________________________________    History of Present  Illness    Measurable Disease: Pet, CT    Current therapy: Weekly Taxol, carbo.  He has had 3 weeks of that.  Today he will continue that and add in Keytruda every 3 weeks.     Treatment history:  Osimertinib 80 mg once daily, March 18, 2021 until September 2021.   Carbo Alimta alone without Keytruda, 20% dose reduction, cycle 2 on February 25, 2021  Carboplatin, Alimta and Keytruda to start February 4, 2021    Interim history: Patient is here today continue on treatment.  He states that overall he is doing okay.  He says he does feel that the chemotherapy is helping him.  His breathing has gotten better.  His biggest issue is that he feels like he is losing weight and has no appetite.  He says food does not taste very good.  He feels weak and tired.  He says his but has some sores on it and it is hurting him and he is wondering if there is some medicine that can help that.  Pain is in his buttock and goes up into his back.  He has not had any falls at home.  No weakness in his legs.  Does have a very small amount of hemoptysis that is very intermittent.  He has not had it for last couple of days.  Any fevers or infectious complaints.  He says he did do the radiation in his lungs and that went well.    Review of Systems    Pertinent items are noted in HPI.    Past History    Past Medical History:   Diagnosis Date     Constipation      Heel pain      History of blood transfusion      HIV (human immunodeficiency virus infection) (H)      Hypertrophy of prostate with urinary obstruction      Iron deficiency anemia      LBP (low back pain)      Malignant neoplasm of hepatic flexure (H) 02/04/2016     Paroxysmal atrial fibrillation (H) 10/03/2021     Trigger finger        PHYSICAL EXAM  BP (!) 85/60 (BP Location: Left arm, Patient Position: Sitting, Cuff Size: Adult Regular)   Pulse 109   Temp 98.2  F (36.8  C) (Oral)   Resp 20   Wt 58.1 kg (128 lb)   SpO2 98%   BMI 17.85 kg/m      GENERAL: no acute distress.  Cooperative in conversation. Here alone. Mask on.  Looks thin.  In wheelchair.  RESP: Regular respiratory rate. No expiratory wheezes. Mildly diminished in left lung.   MUSCULOSKELETAL: no bilateral leg swelling  NEURO: non focal. Alert and oriented x3.   PSYCH: within normal limits. No depression or anxiety.  SKIN: exposed skin is dry intact.  Patient has lesions on the right side of his back/flank and then down onto his buttocks.  The lesions on the buttock look like open sores.  The lesions on the upper back are different.  Some are scabbed over, some are open, there are a couple of areas that look like pustules that are fluid-filled.    Lab Results    Recent Results (from the past 168 hour(s))   TSH with free T4 reflex   Result Value Ref Range    TSH 1.63 0.30 - 5.00 uIU/mL   CBC with platelets and differential   Result Value Ref Range    WBC Count 2.0 (L) 4.0 - 11.0 10e3/uL    RBC Count 4.11 (L) 4.40 - 5.90 10e6/uL    Hemoglobin 11.3 (L) 13.3 - 17.7 g/dL    Hematocrit 35.5 (L) 40.0 - 53.0 %    MCV 86 78 - 100 fL    MCH 27.5 26.5 - 33.0 pg    MCHC 31.8 31.5 - 36.5 g/dL    RDW 18.4 (H) 10.0 - 15.0 %    Platelet Count 237 150 - 450 10e3/uL    % Neutrophils 59 %    % Lymphocytes 30 %    % Monocytes 8 %    % Eosinophils 1 %    % Basophils 1 %    % Immature Granulocytes 1 %    NRBCs per 100 WBC 0 <1 /100    Absolute Neutrophils 1.2 (L) 1.6 - 8.3 10e3/uL    Absolute Lymphocytes 0.6 (L) 0.8 - 5.3 10e3/uL    Absolute Monocytes 0.2 0.0 - 1.3 10e3/uL    Absolute Eosinophils 0.0 0.0 - 0.7 10e3/uL    Absolute Basophils 0.0 0.0 - 0.2 10e3/uL    Absolute Immature Granulocytes 0.0 <=0.0 10e3/uL    Absolute NRBCs 0.0 10e3/uL   Comprehensive metabolic panel   Result Value Ref Range    Sodium 138 136 - 145 mmol/L    Potassium 4.6 3.5 - 5.0 mmol/L    Chloride 109 (H) 98 - 107 mmol/L    Carbon Dioxide (CO2) 19 (L) 22 - 31 mmol/L    Anion Gap 10 5 - 18 mmol/L    Urea Nitrogen 25 (H) 8 - 22 mg/dL    Creatinine 1.56 (H) 0.70 - 1.30  mg/dL    Calcium 9.3 8.5 - 10.5 mg/dL    Glucose 126 (H) 70 - 125 mg/dL    Alkaline Phosphatase 73 45 - 120 U/L    AST 23 0 - 40 U/L    ALT 31 0 - 45 U/L    Protein Total 6.7 6.0 - 8.0 g/dL    Albumin 3.2 (L) 3.5 - 5.0 g/dL    Bilirubin Total 0.4 0.0 - 1.0 mg/dL    GFR Estimate 45 (L) >60 mL/min/1.73m2   Herpes Simplex Virus 1&2 by PCR    Specimen: Back, Lower; Swab   Result Value Ref Range    Herpes Simplex Virus 1 DNA Not Detected Not Detected    Herpes Simplex Virus 2 DNA Detected (A) Not Detected       Imaging    XR Chest 1 View    Result Date: 10/12/2021  EXAM: XR CHEST 1 VIEW LOCATION: [facility] DATE/TIME: 10/12/2021 11:54 AM INDICATION: chest tube COMPARISON: Portable chest radiography 10/11/2021, 10/10/2021     IMPRESSION: Similar position of a small bore right basilar pleural drain. There is a small residual apical right pneumothorax measuring 13 mm. Complex, loculation of air and to a lesser extent fluid in the lateral basal right chest is not increased. The right upper lobe is substantially reinflated, while the right middle and lower lobes remain largely atelectatic. Space-occupying process superimposed on the right hilum has borders which are difficult to discriminate. The left lung is well-expanded. Minimal paradiaphragmatic atelectasis and/or pleural fluid in the lateral and posterior sulci. Cardiac silhouette is not enlarged. There is no mediastinal shift.    XR Chest 1 View    Result Date: 10/11/2021  EXAM: XR CHEST 1 VIEW LOCATION: [facility] DATE/TIME: 10/11/2021 1:23 PM INDICATION: chest tube clamped COMPARISON: 10/10/2021 at 2206 hours     IMPRESSION: A chest tube overlies the right lower hemithorax. Hydropneumothorax at the right lung base has not changed in size. There is slightly increased fluid or airspace disease at the right apex. Scarring in the left lung base has not changed. The heart is not well assessed.     XR Chest 1 View    Result  Date: 10/11/2021  EXAM: XR CHEST 1 VIEW LOCATION: [facility] DATE/TIME: 10/10/2021 10:10 PM INDICATION: follow up PTX after chest tube is clamped COMPARISON: 10/10/2021     IMPRESSION: A chest tube overlies the right lower hemithorax. Hydropneumothorax at the right lung base has not changed in size. Other right pulmonary opacities have not changed. Scarring at left lung base has not changed. The heart is not well assessed.     XR Chest 1 View    Result Date: 10/10/2021  EXAM: XR CHEST 1 VIEW LOCATION: [facility] DATE/TIME: 10/10/2021 4:47 PM INDICATION: Evaluate loculated pneumothorax size after chest tube clamped. COMPARISON: 10/10/2021.     IMPRESSION: Right base pleural catheter remains in place. No significant change in size of small to moderate loculated lateral right base hydropneumothorax. Heterogeneous opacities / consolidation throughout the remaining right lung, also without significant change.  Left lung shows a small pleural effusion and mild basilar atelectasis. Stable cardiomediastinal silhouette.    XR Chest 1 View    Result Date: 10/10/2021  EXAM: XR CHEST 1 VIEW LOCATION: [facility] DATE/TIME: 10/10/2021 1:38 PM INDICATION: Follow-up drainage of right pleural effusion. COMPARISON: Portable chest 10/06/2021 and CT 10/04/2021     IMPRESSION: Right pleural drain unchanged in lateral right lower thorax with a small to moderate loculated lateral right hydropneumothorax. Improved aeration in the right upper lobe with persistent volume loss. Minimal left basilar atelectasis and small left effusion unchanged. Heart size and vascularity are normal.    US Thoracentesis    Result Date: 10/5/2021  EXAM: 1. RIGHT THORACENTESIS 2. ULTRASOUND GUIDANCE LOCATION: [facility] DATE/TIME: 10/4/2021 5:04 PM INDICATION: Pleural effusion. PROCEDURE: Informed consent obtained. Time out performed. The chest was prepped and draped in sterile  fashion. 10 mL of 1 % lidocaine was infused into the local soft tissues. Under direct ultrasound guidance, a 5 B[place]udian catheter system was placed into the pleural effusion. 100 milliliters of dark red fluid were removed and sent to lab, if requested. Patient tolerated procedure well. Ultrasound imaging was obtained and placed in the patient's permanent medical record.     IMPRESSION: Status post right ultrasound-guided thoracentesis. Patient's right pleural space has developed numerous heavy loculations. This limits the ability to perform large volume thoracentesis. Reference CPT Code: 92239    XR Chest Port 1 View    Result Date: 10/6/2021  EXAM: XR CHEST PORT 1 VIEW LOCATION: [facility] DATE/TIME: 10/6/2021 5:47 AM INDICATION: right chest tube. eval position, interval change in effusion. COMPARISON: Chest tube placement CT 10/05/2021     IMPRESSION: A small bore catheter overlies the right lung base.  There is new air in the pleural space at the right lung base. The right lower lung has not reexpanded. The confluent opacity at the right upper lung has not changed. A small volume of left pleural fluid is again seen. A few small opacities in the left lung are again seen. The heart is not well assessed.    XR Chest Port 1 View    Result Date: 10/3/2021  EXAM: XR CHEST PORT 1 VIEW LOCATION: [facility] DATE/TIME: 10/3/2021 10:40 PM INDICATION: sob COMPARISON: CT chest 09/27/2021.     IMPRESSION: Near complete opacification of the right hemithorax, correlating with recent CT showing prominent consolidation, mass, atelectasis, and pleural fluid. Left lung remains clear. Normal heart size. No pneumothorax.    CT Chest Pulmonary Embolism w Contrast    Result Date: 10/4/2021  EXAM: CT CHEST PULMONARY EMBOLISM W CONTRAST LOCATION: [facility] DATE/TIME: 10/4/2021 5:01 PM INDICATION: Known metastatic lung cancer on the right side. Enlarging pleural  effusion. Concern for SVC syndrome. Thoracentesis just prior to this study shows extensively loculated pleural fluid with only 100 mL removed. COMPARISON: 09/27/2021. TECHNIQUE: CT chest pulmonary angiogram during arterial phase injection of IV contrast. Multiplanar reformats and MIP reconstructions were performed. Dose reduction techniques were used. CONTRAST: Isovue 370 100 mL FINDINGS: ANGIOGRAM CHEST: The previously noted pulmonary emboli seen on 09/27/2021 left lung has progressed in extent. No emboli on the right side. Thoracic aorta is negative for dissection. No CT evidence of right heart strain. LUNGS AND PLEURA: No significant change in the large central mass right upper lobe measuring 6.2 x 7.3 cm. Complete consolidation of the right upper lobe and marked atelectasis of the right lower lobe has increased. Extensive pleural metastases on the right have not significantly changed. Large loculated right pleural fluid collection fairly similar to previous. On ultrasound, this was multiloculated. This measures approximately 11 x 8 x 15 cm. MEDIASTINUM/AXILLAE: Mildly enlarged subcarinal node. SVC is normal in caliber without findings to suggest SVC stenosis. UPPER ABDOMEN: Normal. MUSCULOSKELETAL: Normal.     IMPRESSION: 1.  Positive for acute pulmonary emboli that has progressed since 09/27/2021. Left side. 2.  No significant change in the large central mass right upper lobe with no significant change in the extensive right pleural metastases. 3.  Loculated pleural fluid laterally on ultrasound earlier today was multi-septated. 4.  The most significant change in the right lung is increasing atelectasis in the right lower lobe. 5.  Similar-appearing dense consolidation right upper lobe. 6.  SVC appears widely patent. 7.  Findings called to the clinical service at 6:12 PM.    CT Chest Tube with Cath Placement    Result Date: 10/5/2021  EXAM: 1. PERCUTANEOUS CHEST TUBE PLACEMENT RIGHT 2. CT GUIDANCE 3. CONSCIOUS  SEDATION LOCATION: [facility] DATE/TIME: 10/5/2021 10:25 AM INDICATION: large right loculated pleural effusion, needs chest tube for intrapleural lytics. TECHNIQUE: Dose reduction techniques were used. PROCEDURE: Informed consent obtained. Site marked. Prior images reviewed. Required items made available. Patient identity confirmed verbally and with arm band. Patient reevaluated immediately before administering sedation. Universal protocol was followed. Time out performed. The site was prepped and draped in sterile fashion. 10 mL of 1% lidocaine was infused into the local soft tissues. Using standard technique and under direct CT guidance, a 12 Persian nonlocking all-purpose drain chest tube catheter was inserted into the pleural space. The catheter was fixed in place with sutures and adhesive device, and the tubing was banded. Chest tube placed to Pleur-evac suction. SPECIMEN: None. The patient had ultrasound-guided thoracentesis yesterday with fluid sent. BLOOD LOSS: Minimal. The patient tolerated the procedure well. No immediate complications. SEDATION: Versed 1 mg. Fentanyl 0 mcg. The procedure was performed with administration intravenous conscious sedation with appropriate preoperative, intraoperative, and postoperative evaluation. 10 minutes of supervised face to face conscious sedation time was provided by a radiology nurse under my direct supervision.     IMPRESSION: 1.  Successful CT-guided chest tube placement into the right pleural space. Reference CPT Codes: 25145,      Signed by: ALBERTO Hobbs CNP      Again, thank you for allowing me to participate in the care of your patient.        Sincerely,        ALBERTO Hobbs CNP

## 2021-10-28 NOTE — PROGRESS NOTES
[facility] Hematology and Oncology Progress Note    Patient: Antonio Mercado  MRN: [id number]  Oct 28, 2021          Reason for Visit    Chief Complaint   Patient presents with     Oncology Clinic Visit     Malignant neoplasm of right lung, unspecified part of lung       Assessment and Plan    Cancer Staging  Malignant neoplasm of hepatic flexure (H)  Staging form: Colon and Rectum, AJCC 7th Edition  - Clinical stage from 2/20/2016: Stage IIIB (T4a, N1c, M0) - Signed by Harris Mary MD on 2/20/2017  CEA: 97.2  EGFR [id number](exon 19); RB1 552; TP53 L194R  PD-L1, HIGH EXPRESSION   MSI STABLE(CA); TMB LOW  NO FISH ALTERATIONS  MET Exon 14 Deletion: NOT DETECTED; Marroquin TRK, Not expressed  No abnormalities in ALK, BRAF, ERBB2, HER2, MET, RET, ROS1    1. Lung cancer, stage IV: currently on osimertinib. was on this since March 2021 until September 2021.  Pt has had severe progression since then.  He had signs of SVC syndrome.  He is now on chemotherapy with weekly Taxol and carbo.  He has had 3 weeks of that.  He will continue that for now.  We do not want to change to 3-week dosing due to potential poor tolerance.  We are going to add in Keytruda to his chemotherapy regimen.  He is PD-L1 overexpressed but we want to use chemo plus immunotherapy to get better troll of his disease since he is having complications from it.  He will be seen again in 3 weeks.    2.  Small pulmonary embolism found on CT scan incidentally: Was on Xarelto but with active malignancy and a questionable 9 of blood clot we now have him on enoxaparin.  Continue on that for now.  Hopefully we can switch back to Xarelto at some point.    3.  Poor venous access: Encourage patient to get a Port-A-Cath.  For right now he does not want to do that.  We will continue to use ultrasound as needed to get IVs in.    4.  SVC syndrome: Receives palliative radiation in 5 fractions, completed October 8, 2021.  He will continue to follow-up with radiation  oncology to be evaluated whether he needs more radiation.  For right now patient does feel better his breathing is better.    5.  Rash on back, right side and then on bilateral buttocks: Some of the lesions do have some pustular nature to them others are crusted and others are just open lesions.  It does cross the midline down on his buttock.  I will do swabs for HSV and VZV PCR testing.  If these are positive, patient may need to be treated with antiviral.    6. Anorexia: will try megace to help improve appetite. Encourage him to pick foods that are high in calories.       ECOG Performance    0 - Independent    Distress Screening (within last 30 days)    1. How concerned are you about your ability to eat? : 0  2. How concerned are you about unintended weight loss or your current weight? : 0  3. How concerned are you about feeling depressed or very sad? : 0  4. How concerned are you about feeling anxious or very scared? : 0  5. Do you struggle with the loss of meaning and jorge in your life? : Not at all  6. How concerned are you about work and home life issues that may be affected by your cancer? : 0  7. How concerned are you about knowing what resources are available to help you? : 0  8. Do you currently have what you would describe as Muslim or spiritual struggles?            : Not at all       Pain  Pain Score: Worst Pain (10)  Pain Loc: Other - see comment (bottom feels bruised )    Problem List    Patient Active Problem List   Diagnosis     Human immunodeficiency virus (HIV) disease (H)     Mechanical low back pain     Anemia, iron deficiency     Malignant neoplasm of hepatic flexure (H)     Hemorrhoids, unspecified hemorrhoid type     Prostate cancer (H)     Bilateral renal cysts     CKD (chronic kidney disease) stage 3, GFR 30-59 ml/min (H)     Primary malignant neoplasm of right lung metastatic to other site (H)     Nontraumatic complete tear of right rotator cuff     Single subsegmental pulmonary embolism  without acute cor pulmonale (H)     Dehydration     Malignant neoplasm of lung, unspecified laterality, unspecified part of lung (H)     Other pulmonary embolism without acute cor pulmonale, unspecified chronicity (H)     Paroxysmal atrial fibrillation (H)     Malignant neoplasm of right lung, unspecified part of lung (H)     Acute hypoxemic respiratory failure (H)     Anemia, unspecified type        ______________________________________________________________________________    History of Present Illness    Measurable Disease: Pet, CT    Current therapy: Weekly Taxol, carbo.  He has had 3 weeks of that.  Today he will continue that and add in Keytruda every 3 weeks.     Treatment history:  Osimertinib 80 mg once daily, March 18, 2021 until September 2021.   Carbo Alimta alone without Keytruda, 20% dose reduction, cycle 2 on February 25, 2021  Carboplatin, Alimta and Keytruda to start February 4, 2021    Interim history: Patient is here today continue on treatment.  He states that overall he is doing okay.  He says he does feel that the chemotherapy is helping him.  His breathing has gotten better.  His biggest issue is that he feels like he is losing weight and has no appetite.  He says food does not taste very good.  He feels weak and tired.  He says his but has some sores on it and it is hurting him and he is wondering if there is some medicine that can help that.  Pain is in his buttock and goes up into his back.  He has not had any falls at home.  No weakness in his legs.  Does have a very small amount of hemoptysis that is very intermittent.  He has not had it for last couple of days.  Any fevers or infectious complaints.  He says he did do the radiation in his lungs and that went well.    Review of Systems    Pertinent items are noted in HPI.    Past History    Past Medical History:   Diagnosis Date     Constipation      Heel pain      History of blood transfusion      HIV (human immunodeficiency virus  infection) (H)      Hypertrophy of prostate with urinary obstruction      Iron deficiency anemia      LBP (low back pain)      Malignant neoplasm of hepatic flexure (H) 02/04/2016     Paroxysmal atrial fibrillation (H) 10/03/2021     Trigger finger        PHYSICAL EXAM  BP (!) 85/60 (BP Location: Left arm, Patient Position: Sitting, Cuff Size: Adult Regular)   Pulse 109   Temp 98.2  F (36.8  C) (Oral)   Resp 20   Wt 58.1 kg (128 lb)   SpO2 98%   BMI 17.85 kg/m      GENERAL: no acute distress. Cooperative in conversation. Here alone. Mask on.  Looks thin.  In wheelchair.  RESP: Regular respiratory rate. No expiratory wheezes. Mildly diminished in left lung.   MUSCULOSKELETAL: no bilateral leg swelling  NEURO: non focal. Alert and oriented x3.   PSYCH: within normal limits. No depression or anxiety.  SKIN: exposed skin is dry intact.  Patient has lesions on the right side of his back/flank and then down onto his buttocks.  The lesions on the buttock look like open sores.  The lesions on the upper back are different.  Some are scabbed over, some are open, there are a couple of areas that look like pustules that are fluid-filled.    Lab Results    Recent Results (from the past 168 hour(s))   TSH with free T4 reflex   Result Value Ref Range    TSH 1.63 0.30 - 5.00 uIU/mL   CBC with platelets and differential   Result Value Ref Range    WBC Count 2.0 (L) 4.0 - 11.0 10e3/uL    RBC Count 4.11 (L) 4.40 - 5.90 10e6/uL    Hemoglobin 11.3 (L) 13.3 - 17.7 g/dL    Hematocrit 35.5 (L) 40.0 - 53.0 %    MCV 86 78 - 100 fL    MCH 27.5 26.5 - 33.0 pg    MCHC 31.8 31.5 - 36.5 g/dL    RDW 18.4 (H) 10.0 - 15.0 %    Platelet Count 237 150 - 450 10e3/uL    % Neutrophils 59 %    % Lymphocytes 30 %    % Monocytes 8 %    % Eosinophils 1 %    % Basophils 1 %    % Immature Granulocytes 1 %    NRBCs per 100 WBC 0 <1 /100    Absolute Neutrophils 1.2 (L) 1.6 - 8.3 10e3/uL    Absolute Lymphocytes 0.6 (L) 0.8 - 5.3 10e3/uL    Absolute  Monocytes 0.2 0.0 - 1.3 10e3/uL    Absolute Eosinophils 0.0 0.0 - 0.7 10e3/uL    Absolute Basophils 0.0 0.0 - 0.2 10e3/uL    Absolute Immature Granulocytes 0.0 <=0.0 10e3/uL    Absolute NRBCs 0.0 10e3/uL   Comprehensive metabolic panel   Result Value Ref Range    Sodium 138 136 - 145 mmol/L    Potassium 4.6 3.5 - 5.0 mmol/L    Chloride 109 (H) 98 - 107 mmol/L    Carbon Dioxide (CO2) 19 (L) 22 - 31 mmol/L    Anion Gap 10 5 - 18 mmol/L    Urea Nitrogen 25 (H) 8 - 22 mg/dL    Creatinine 1.56 (H) 0.70 - 1.30 mg/dL    Calcium 9.3 8.5 - 10.5 mg/dL    Glucose 126 (H) 70 - 125 mg/dL    Alkaline Phosphatase 73 45 - 120 U/L    AST 23 0 - 40 U/L    ALT 31 0 - 45 U/L    Protein Total 6.7 6.0 - 8.0 g/dL    Albumin 3.2 (L) 3.5 - 5.0 g/dL    Bilirubin Total 0.4 0.0 - 1.0 mg/dL    GFR Estimate 45 (L) >60 mL/min/1.73m2   Herpes Simplex Virus 1&2 by PCR    Specimen: Back, Lower; Swab   Result Value Ref Range    Herpes Simplex Virus 1 DNA Not Detected Not Detected    Herpes Simplex Virus 2 DNA Detected (A) Not Detected       Imaging    XR Chest 1 View    Result Date: 10/12/2021  EXAM: XR CHEST 1 VIEW LOCATION: [facility] DATE/TIME: 10/12/2021 11:54 AM INDICATION: chest tube COMPARISON: Portable chest radiography 10/11/2021, 10/10/2021     IMPRESSION: Similar position of a small bore right basilar pleural drain. There is a small residual apical right pneumothorax measuring 13 mm. Complex, loculation of air and to a lesser extent fluid in the lateral basal right chest is not increased. The right upper lobe is substantially reinflated, while the right middle and lower lobes remain largely atelectatic. Space-occupying process superimposed on the right hilum has borders which are difficult to discriminate. The left lung is well-expanded. Minimal paradiaphragmatic atelectasis and/or pleural fluid in the lateral and posterior sulci. Cardiac silhouette is not enlarged. There is no mediastinal shift.    XR Chest 1  View    Result Date: 10/11/2021  EXAM: XR CHEST 1 VIEW LOCATION: [facility] DATE/TIME: 10/11/2021 1:23 PM INDICATION: chest tube clamped COMPARISON: 10/10/2021 at 2206 hours     IMPRESSION: A chest tube overlies the right lower hemithorax. Hydropneumothorax at the right lung base has not changed in size. There is slightly increased fluid or airspace disease at the right apex. Scarring in the left lung base has not changed. The heart is not well assessed.     XR Chest 1 View    Result Date: 10/11/2021  EXAM: XR CHEST 1 VIEW LOCATION: [facility] DATE/TIME: 10/10/2021 10:10 PM INDICATION: follow up PTX after chest tube is clamped COMPARISON: 10/10/2021     IMPRESSION: A chest tube overlies the right lower hemithorax. Hydropneumothorax at the right lung base has not changed in size. Other right pulmonary opacities have not changed. Scarring at left lung base has not changed. The heart is not well assessed.     XR Chest 1 View    Result Date: 10/10/2021  EXAM: XR CHEST 1 VIEW LOCATION: [facility] DATE/TIME: 10/10/2021 4:47 PM INDICATION: Evaluate loculated pneumothorax size after chest tube clamped. COMPARISON: 10/10/2021.     IMPRESSION: Right base pleural catheter remains in place. No significant change in size of small to moderate loculated lateral right base hydropneumothorax. Heterogeneous opacities / consolidation throughout the remaining right lung, also without significant change.  Left lung shows a small pleural effusion and mild basilar atelectasis. Stable cardiomediastinal silhouette.    XR Chest 1 View    Result Date: 10/10/2021  EXAM: XR CHEST 1 VIEW LOCATION: [facility] DATE/TIME: 10/10/2021 1:38 PM INDICATION: Follow-up drainage of right pleural effusion. COMPARISON: Portable chest 10/06/2021 and CT 10/04/2021     IMPRESSION: Right pleural drain unchanged in lateral right lower thorax with a small to  moderate loculated lateral right hydropneumothorax. Improved aeration in the right upper lobe with persistent volume loss. Minimal left basilar atelectasis and small left effusion unchanged. Heart size and vascularity are normal.    US Thoracentesis    Result Date: 10/5/2021  EXAM: 1. RIGHT THORACENTESIS 2. ULTRASOUND GUIDANCE LOCATION: [facility] DATE/TIME: 10/4/2021 5:04 PM INDICATION: Pleural effusion. PROCEDURE: Informed consent obtained. Time out performed. The chest was prepped and draped in sterile fashion. 10 mL of 1 % lidocaine was infused into the local soft tissues. Under direct ultrasound guidance, a 5 Vietnamese catheter system was placed into the pleural effusion. 100 milliliters of dark red fluid were removed and sent to lab, if requested. Patient tolerated procedure well. Ultrasound imaging was obtained and placed in the patient's permanent medical record.     IMPRESSION: Status post right ultrasound-guided thoracentesis. Patient's right pleural space has developed numerous heavy loculations. This limits the ability to perform large volume thoracentesis. Reference CPT Code: 39016    XR Chest Port 1 View    Result Date: 10/6/2021  EXAM: XR CHEST PORT 1 VIEW LOCATION: [facility] DATE/TIME: 10/6/2021 5:47 AM INDICATION: right chest tube. eval position, interval change in effusion. COMPARISON: Chest tube placement CT 10/05/2021     IMPRESSION: A small bore catheter overlies the right lung base.  There is new air in the pleural space at the right lung base. The right lower lung has not reexpanded. The confluent opacity at the right upper lung has not changed. A small volume of left pleural fluid is again seen. A few small opacities in the left lung are again seen. The heart is not well assessed.    XR Chest Port 1 View    Result Date: 10/3/2021  EXAM: XR CHEST PORT 1 VIEW LOCATION: [facility] DATE/TIME: 10/3/2021 10:40 PM INDICATION: sob COMPARISON:  CT chest 09/27/2021.     IMPRESSION: Near complete opacification of the right hemithorax, correlating with recent CT showing prominent consolidation, mass, atelectasis, and pleural fluid. Left lung remains clear. Normal heart size. No pneumothorax.    CT Chest Pulmonary Embolism w Contrast    Result Date: 10/4/2021  EXAM: CT CHEST PULMONARY EMBOLISM W CONTRAST LOCATION: [facility] DATE/TIME: 10/4/2021 5:01 PM INDICATION: Known metastatic lung cancer on the right side. Enlarging pleural effusion. Concern for SVC syndrome. Thoracentesis just prior to this study shows extensively loculated pleural fluid with only 100 mL removed. COMPARISON: 09/27/2021. TECHNIQUE: CT chest pulmonary angiogram during arterial phase injection of IV contrast. Multiplanar reformats and MIP reconstructions were performed. Dose reduction techniques were used. CONTRAST: Isovue 370 100 mL FINDINGS: ANGIOGRAM CHEST: The previously noted pulmonary emboli seen on 09/27/2021 left lung has progressed in extent. No emboli on the right side. Thoracic aorta is negative for dissection. No CT evidence of right heart strain. LUNGS AND PLEURA: No significant change in the large central mass right upper lobe measuring 6.2 x 7.3 cm. Complete consolidation of the right upper lobe and marked atelectasis of the right lower lobe has increased. Extensive pleural metastases on the right have not significantly changed. Large loculated right pleural fluid collection fairly similar to previous. On ultrasound, this was multiloculated. This measures approximately 11 x 8 x 15 cm. MEDIASTINUM/AXILLAE: Mildly enlarged subcarinal node. SVC is normal in caliber without findings to suggest SVC stenosis. UPPER ABDOMEN: Normal. MUSCULOSKELETAL: Normal.     IMPRESSION: 1.  Positive for acute pulmonary emboli that has progressed since 09/27/2021. Left side. 2.  No significant change in the large central mass right upper lobe with no significant change in  the extensive right pleural metastases. 3.  Loculated pleural fluid laterally on ultrasound earlier today was multi-septated. 4.  The most significant change in the right lung is increasing atelectasis in the right lower lobe. 5.  Similar-appearing dense consolidation right upper lobe. 6.  SVC appears widely patent. 7.  Findings called to the clinical service at 6:12 PM.    CT Chest Tube with Cath Placement    Result Date: 10/5/2021  EXAM: 1. PERCUTANEOUS CHEST TUBE PLACEMENT RIGHT 2. CT GUIDANCE 3. CONSCIOUS SEDATION LOCATION: [facility] DATE/TIME: 10/5/2021 10:25 AM INDICATION: large right loculated pleural effusion, needs chest tube for intrapleural lytics. TECHNIQUE: Dose reduction techniques were used. PROCEDURE: Informed consent obtained. Site marked. Prior images reviewed. Required items made available. Patient identity confirmed verbally and with arm band. Patient reevaluated immediately before administering sedation. Universal protocol was followed. Time out performed. The site was prepped and draped in sterile fashion. 10 mL of 1% lidocaine was infused into the local soft tissues. Using standard technique and under direct CT guidance, a 12 Turkish nonlocking all-purpose drain chest tube catheter was inserted into the pleural space. The catheter was fixed in place with sutures and adhesive device, and the tubing was banded. Chest tube placed to Pleur-evac suction. SPECIMEN: None. The patient had ultrasound-guided thoracentesis yesterday with fluid sent. BLOOD LOSS: Minimal. The patient tolerated the procedure well. No immediate complications. SEDATION: Versed 1 mg. Fentanyl 0 mcg. The procedure was performed with administration intravenous conscious sedation with appropriate preoperative, intraoperative, and postoperative evaluation. 10 minutes of supervised face to face conscious sedation time was provided by a radiology nurse under my direct supervision.     IMPRESSION: 1.   Successful CT-guided chest tube placement into the right pleural space. Reference CPT Codes: 42791,      Signed by: ALBERTO Hobbs CNP

## 2021-10-28 NOTE — PROGRESS NOTES
"Oncology Rooming Note    October 28, 2021 9:39 AM   Antonio Mercado is a 66 year old male who presents for:    Chief Complaint   Patient presents with     Oncology Clinic Visit     Malignant neoplasm of right lung, unspecified part of lung     Initial Vitals: BP (!) 85/60 (BP Location: Left arm, Patient Position: Sitting, Cuff Size: Adult Regular)   Pulse 109   Temp 98.2  F (36.8  C) (Oral)   Resp 20   Wt 58.1 kg (128 lb)   SpO2 98%   BMI 17.85 kg/m   Estimated body mass index is 17.85 kg/m  as calculated from the following:    Height as of 10/8/21: 1.803 m (5' 11\").    Weight as of this encounter: 58.1 kg (128 lb). Body surface area is 1.71 meters squared.  Worst Pain (10) Comment: Data Unavailable   No LMP for male patient.  Allergies reviewed: Yes  Medications reviewed: Yes    Medications: Medication refills not needed today.  Pharmacy name entered into [employer]:      [id number] - [place], [place] - [address] AT [facility] [address]    Clinical concerns:  Malignant neoplasm of right lung, unspecified part of lung. Coughing up blood x 4 days. Pain on his buttocks 10/10.    Ameena Song CMA              "

## 2021-10-28 NOTE — PROGRESS NOTES
Pt arrived via wheelchair to clinic for Cycle # 2 Day # 1 of his chemotherapy regimen.  Marlys looked at sores on pt's back, and ordered swab to be obtained.  Swabbed pt's back by lancing a small pustule with subcutaneous needle and swabbed drainage.  PICC team was called to place pt's IV since he is a difficult IV start.  Labs were obtained from IV site, but CMP hemolyzed.  Nelida re-ebony pt peripherally without incident.  Administered premedications and chemotherapy per MD order.  Pt tolerated infusion well, no s/s of infusion reaction.  IV was flushed with NS then D/C'd using 2x2 and Coban.  Pt verbalized understanding of plan of care and return to clinic.

## 2021-10-29 NOTE — TELEPHONE ENCOUNTER
I call Antonio to report that both Valtrex and Megace has been prescribed and sent to his local pharmacy. I explained the reasons why these medications were prescribed.    He verbalized understanding and appreciation of our conversation.    Susy Grossman  RN Care Coordinator  [facility]

## 2021-11-04 NOTE — PROGRESS NOTES
[facility] Radiation Oncology Follow Up     Patient: Antonio Mercado  MRN: [id number]  Date of Service: 11/04/2021       DISEASE TREATED:  Stage IV non-small cell lung cancer with clinically symptomatic mediastinal disease causing pulmonary vascular compression.      TYPE OF RADIATION THERAPY ADMINISTERED:  2000 cGy in 5 treatments given from 10/4/2021-10/8/2021.      INTERVAL SINCE COMPLETION OF RADIATION THERAPY: 1 month.      SUBJECTIVE:  Mr. Mercado is a 66 year old male who has a history of metastatic lung cancer.  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  The patient also had a history of colon cancer status post surgery and history of prostate cancer with elevated PSA who declined follow-up and treatment.  Patient presented with a recent history of difficulty swallowing and underwent upper GI endoscopy on 9/29/2021 which showed no evidence of esophageal lesion.  The patient now is able to use eat and swallowing without any significant difficulty.  His most recent CT chest on 9/27/2021 showed a large right upper lobe mass and right pleural metastasis consistent with the progression of lung cancer.    The patient was initially seen and evaluated on 9/30/2021 for consideration of possible palliative radiation therapy. I have discussed his case with Dr. Chavez, medical oncology earlier today.  The patient is stable without any significant difficulty of swallowing.  His upper GI endoscopy showed no significant esophageal lesion or obstruction.  The decision is to proceed with systemic therapy next week.  We will withhold palliative radiation therapy for the future. The patient was then discharged home and plan to start his systemic therapy that week.  Unfortunately he presented with acute onset of shortness of breath for which he was admitted to ER for further evaluation and management.  The patient was found to have significant progression of the RUL mass with evidence of  esophageal obstruction, SVC effacement, RUL post-obstructive collapse. The patient received palliative radiation therapy of the past 5 days with a total dose of 2000 cGy in 5 treatments given from 10/4/2021-10/8/2021.  The patient tolerated radiation therapy very well with minimal side effect.    The patient has been stable since completion of radiation therapy.  He denies any worsening cough and shortness of breath since completion of radiation therapy.  He is currently receiving systemic therapy with Dr. Segura, [facility] oncology.  The patient is here today for routine postradiation therapy office follow-up.    Medications were reviewed and are up to date on EPIC.    The following portions of the patient's history were reviewed and updated as appropriate: allergies, current medications, past family history, past medical history, past social history, past surgical history and problem list.    Review of Systems:      General  Constitutional  Constitutional (WDL): (P) Exceptions to WDL  Fatigue: (P) Fatigue relieved by rest  Weight Loss: (P) 5 to less than 10% from baseline OR intervention not indicated  EENT  Eye Disorders  Eye Disorder (WDL): (P) Assessment not pertinent to visit  Ear Disorders  Ear Disorder (WDL): (P) Assessment not pertinent to visit  Respiratory  Respiratory  Respiratory (WDL): (P) Exceptions to WDL  Cough: (P) Moderate symptoms, medical intervention indicated OR limiting instrumental ADL  Dyspnea: (P) Shortness of breath with moderate exertion  Cardiovascular  Cardiovascular  Cardiovascular (WDL): (P) All cardiovascular elements are within defined limits  Gastrointestinal  Gastrointestinal  Gastrointestinal (WDL): (P) Exceptions to WDL  Anorexia: (P) Associated with significant weight loss or malnutrition (e.g., inadequate oral caloric and/or fluid intake) OR tube feeding or TPN indicated  Nausea: (P) Absent or within normal limits  Vomiting: (P) Absent or within normal  limits  Musculoskeletal  Musculoskeletal and Connective Tissue Disorders  Musculoskeletal & Connective (WDL): (P) Exceptions to WDL  Integumentary     Neurological  Neurosensory  Neurosensory (WDL): (P) Exceptions to WDL  Ataxia: (P) Moderate symptoms OR limiting instrumental ADL (in w/c)  Genitourinary/Reproductive  Genitourinary  Genitourinary (WDL): (P) All genitourinary elements are within defined limits  Lymphatic  Lymph System Disorders  Lymph (WDL): (P) Assessment not pertinent to visit  Pain     AUA Assessment                                                              Accompanied by       Objective:     PHYSICAL EXAMINATION:    BP (!) 88/57 (BP Location: Right arm, Patient Position: Sitting)   Pulse 84   Resp 20   SpO2 99%     Gen: Alert, in NAD  Eyes: PERRL, EOMI, sclera anicteric  HENT     Head: NC/AT     Ears: No external auricular lesions     Nose/sinus: No rhinorrhea or epistaxis     Oropharynx: MMM, no visible oral lesions  Neck: Supple, full ROM, no LAD  Pulm: No wheezing, stridor or respiratory distress  CV: Well-perfused, no cyanosis, no pedal edema  Abdominal: BS+, soft, nontender, nondistended, no hepatomegaly  Back: No step-offs or pain to palpation along the thoracolumbar spine  Rectal: Deferred  : Deferred  Musculoskeletal: Normal muscle bulk and tone  Skin: Normal color and turgor  Neurologic: A/Ox3, CN II-XII intact, normal gait and station  Psychiatric: Appropriate mood and affect      Impression     The patient is a 66-year-old gentleman with a diagnosis of stage IV non-small cell lung cancer with clinically symptomatic metastatic disease who received palliative radiation therapy with a total dose of in 5 treatments completed 1 month ago.  Patient has been stable with no clinical evidence of progression of disease.    Assessment & Plan:     1.  The patient has been stable since completion of palliative radiation therapy.  There is no clinical evidence of SVC or significant pulmonary  compromise from his lung cancer.  Patient therefore will continue his systemic therapy at this time.  The patient will be followed by radiation oncology as needed.    2.  Continue follow-up with Dr. Chavez, med oncology as planned.      Face to face time  10 minutes with > 80% spent on consultation, education and coordination of care.    Liza Manrique MD, PhD  Department of Radiation Oncology   [facility]  Tel: [phone]  Page: [phone]    [facility]  [address] [place] [address]     [address] [place] [address]    CC:  Patient Care Team:  Harris Mary MD as PCP - General (Family Practice)  Chance Andersen MD (Infectious Diseases)  Linda Pompa, RN as Registered Nurse  Ely Phelan as   Kailey Chavez MD as MD (Hematology & Oncology)  Yolette Pierre MD as MD (Urology)  Harris Mary MD as Assigned PCP  Susy Tolbert, RN as Specialty Care Coordinator (Hematology & Oncology)  Kialey Chavez MD as Assigned Cancer Care Provider  Chance Andersen MD as Assigned Infectious Disease Provider  Kailey Chavez MD as MD (Hematology)  Aster Navarro [facility] as Pharmacist (Pharmacist)

## 2021-11-04 NOTE — LETTER
11/4/2021         RE: Antonio Mercado  [address]  [place] [address]        Dear Colleague,    Thank you for referring your patient, Antonio Mercado, to the [facility] RADIATION ONCOLOGY [place]. Please see a copy of my visit note below.    [facility] Radiation Oncology Follow Up     Patient: Antonio Mercado  MRN: [id number]  Date of Service: 11/04/2021       DISEASE TREATED:  Stage IV non-small cell lung cancer with clinically symptomatic mediastinal disease causing pulmonary vascular compression.      TYPE OF RADIATION THERAPY ADMINISTERED:  2000 cGy in 5 treatments given from 10/4/2021-10/8/2021.      INTERVAL SINCE COMPLETION OF RADIATION THERAPY: 1 month.      SUBJECTIVE:  Mr. Mercado is a 66 year old male who has a history of metastatic lung cancer.  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  The patient also had a history of colon cancer status post surgery and history of prostate cancer with elevated PSA who declined follow-up and treatment.  Patient presented with a recent history of difficulty swallowing and underwent upper GI endoscopy on 9/29/2021 which showed no evidence of esophageal lesion.  The patient now is able to use eat and swallowing without any significant difficulty.  His most recent CT chest on 9/27/2021 showed a large right upper lobe mass and right pleural metastasis consistent with the progression of lung cancer.    The patient was initially seen and evaluated on 9/30/2021 for consideration of possible palliative radiation therapy. I have discussed his case with Dr. Chavez, medical oncology earlier today.  The patient is stable without any significant difficulty of swallowing.  His upper GI endoscopy showed no significant esophageal lesion or obstruction.  The decision is to proceed with systemic therapy next week.  We will withhold palliative radiation therapy for the future. The patient was then discharged home and plan to  start his systemic therapy that week.  Unfortunately he presented with acute onset of shortness of breath for which he was admitted to ER for further evaluation and management.  The patient was found to have significant progression of the RUL mass with evidence of esophageal obstruction, SVC effacement, RUL post-obstructive collapse. The patient received palliative radiation therapy of the past 5 days with a total dose of 2000 cGy in 5 treatments given from 10/4/2021-10/8/2021.  The patient tolerated radiation therapy very well with minimal side effect.    The patient has been stable since completion of radiation therapy.  He denies any worsening cough and shortness of breath since completion of radiation therapy.  He is currently receiving systemic therapy with Dr. Segura, med oncology.  The patient is here today for routine postradiation therapy office follow-up.    Medications were reviewed and are up to date on EPIC.    The following portions of the patient's history were reviewed and updated as appropriate: allergies, current medications, past family history, past medical history, past social history, past surgical history and problem list.    Review of Systems:      General  Constitutional  Constitutional (WDL): (P) Exceptions to WDL  Fatigue: (P) Fatigue relieved by rest  Weight Loss: (P) 5 to less than 10% from baseline OR intervention not indicated  EENT  Eye Disorders  Eye Disorder (WDL): (P) Assessment not pertinent to visit  Ear Disorders  Ear Disorder (WDL): (P) Assessment not pertinent to visit  Respiratory  Respiratory  Respiratory (WDL): (P) Exceptions to WDL  Cough: (P) Moderate symptoms, medical intervention indicated OR limiting instrumental ADL  Dyspnea: (P) Shortness of breath with moderate exertion  Cardiovascular  Cardiovascular  Cardiovascular (WDL): (P) All cardiovascular elements are within defined limits  Gastrointestinal  Gastrointestinal  Gastrointestinal (WDL): (P) Exceptions to  WDL  Anorexia: (P) Associated with significant weight loss or malnutrition (e.g., inadequate oral caloric and/or fluid intake) OR tube feeding or TPN indicated  Nausea: (P) Absent or within normal limits  Vomiting: (P) Absent or within normal limits  Musculoskeletal  Musculoskeletal and Connective Tissue Disorders  Musculoskeletal & Connective (WDL): (P) Exceptions to WDL  Integumentary     Neurological  Neurosensory  Neurosensory (WDL): (P) Exceptions to WDL  Ataxia: (P) Moderate symptoms OR limiting instrumental ADL (in w/c)  Genitourinary/Reproductive  Genitourinary  Genitourinary (WDL): (P) All genitourinary elements are within defined limits  Lymphatic  Lymph System Disorders  Lymph (WDL): (P) Assessment not pertinent to visit  Pain     AUA Assessment                                                              Accompanied by       Objective:     PHYSICAL EXAMINATION:    BP (!) 88/57 (BP Location: Right arm, Patient Position: Sitting)   Pulse 84   Resp 20   SpO2 99%     Gen: Alert, in NAD  Eyes: PERRL, EOMI, sclera anicteric  HENT     Head: NC/AT     Ears: No external auricular lesions     Nose/sinus: No rhinorrhea or epistaxis     Oropharynx: MMM, no visible oral lesions  Neck: Supple, full ROM, no LAD  Pulm: No wheezing, stridor or respiratory distress  CV: Well-perfused, no cyanosis, no pedal edema  Abdominal: BS+, soft, nontender, nondistended, no hepatomegaly  Back: No step-offs or pain to palpation along the thoracolumbar spine  Rectal: Deferred  : Deferred  Musculoskeletal: Normal muscle bulk and tone  Skin: Normal color and turgor  Neurologic: A/Ox3, CN II-XII intact, normal gait and station  Psychiatric: Appropriate mood and affect      Impression     The patient is a 66-year-old gentleman with a diagnosis of stage IV non-small cell lung cancer with clinically symptomatic metastatic disease who received palliative radiation therapy with a total dose of in 5 treatments completed 1 month ago.   Patient has been stable with no clinical evidence of progression of disease.    Assessment & Plan:     1.  The patient has been stable since completion of palliative radiation therapy.  There is no clinical evidence of SVC or significant pulmonary compromise from his lung cancer.  Patient therefore will continue his systemic therapy at this time.  The patient will be followed by radiation oncology as needed.    2.  Continue follow-up with Dr. Chavez, med oncology as planned.      Face to face time  10 minutes with > 80% spent on consultation, education and coordination of care.    Liza Manrique MD, PhD  Department of Radiation Oncology   [facility]  Tel: [phone]  Page: [phone]    [facility]  [address] [place] [address]     [facility]   [address]   [place] [address]    CC:  Patient Care Team:  Harris Mary MD as PCP - General (Family Practice)  Chance Andersen MD (Infectious Diseases)  Linda Pompa, RN as Registered Nurse  Ely Phelan as   Kailey Chavez MD as MD (Hematology & Oncology)  Yolette Pierre MD as MD (Urology)  Harris Mary MD as Assigned PCP  Susy Tolbert, RN as Specialty Care Coordinator (Hematology & Oncology)  Kailey Chavez MD as Assigned Cancer Care Provider  Chance Andersen MD as Assigned Infectious Disease Provider  Kailey Chavez MD as MD (Hematology)  Aster Navarro [facility] as Pharmacist (Pharmacist)      Pt in w/c to radiation clinic for follow up, accompanied by son. Further recommendations and orders per provider.      Again, thank you for allowing me to participate in the care of your patient.        Sincerely,        Liza Manrique MD

## 2021-11-04 NOTE — PROGRESS NOTES
Pt in w/c to radiation clinic for follow up, accompanied by son. Further recommendations and orders per provider.

## 2021-11-05 NOTE — PROGRESS NOTES
Pt arrived ambulatory to clinic for Cycle # 2 Day # 8 of his chemotherapy regimen.  PICC stat came to start IV which took 2 attempts, IV flushed easily but gave little blood return.  Pt's CMP hemolyzed, so Nelida needed to redraw pt.  Labs were reviewed with Marlys, pt was held since ANC was 0.8.  Marlys cancelled today and pt will return to clinic for Cycle # 2 Day # 15.  Pt's family wanted to know when pt should have his next scan, sent [email or web address] message to Dr. Chavez and Susy to update family with plan of care.  Pt's IV was flushed with NS then D/C'd using 2x2 and Coban.  Talked with pt and family about having port placed.  They will discuss as a family, but pt is currently not interested and ok with repeated pokes for access.  Pt and family verbalized understanding of plan of care and return to clinic.

## 2021-11-09 NOTE — TELEPHONE ENCOUNTER
I call patient's son, Cathleen to report to him that Antonio will be due for scan(s) in mid to late December. Patient has a office visit appointment on 11/18 and from there, orders for the scan(s) will be placed and scheduled.     Cathleen verbalized understanding and appreciation of my call.     Susy Grossman  RN Care Coordinator  [facility]

## 2021-11-11 NOTE — PROGRESS NOTES
Pt arrived via wheelchair to clinic for Cycle # 2 Day # 15 of his chemotherapy regimen.  Nelida ebony labs since labs tend to hemolyze from PICC stat drawing them.  PICC team was called to place pt's IV since he is a difficult IV start.  Labs were reviewed with Dr. Chavez since pt's ANC was still 0.9, pt ok for treatment after he dose reduces chemotherapy.  Administered premedications and chemotherapy per MD order.  Pt tolerated infusion well, no s/s of infusion reaction.  IV was flushed with NS then D/C'd using 2x2 and Coban.  Pt verbalized understanding of plan of care and return to clinic.

## 2021-11-18 NOTE — PROGRESS NOTES
"Oncology Rooming Note    November 18, 2021 9:42 AM   Antonio Mercado is a 66 year old male who presents for:    Chief Complaint   Patient presents with     Oncology Clinic Visit     3 week return Primary malignant neoplasm of right lung metastatic to other site Lab / NP / infusion     Initial Vitals: BP 91/61 (BP Location: Left arm, Patient Position: Sitting, Cuff Size: Adult Regular)   Pulse 120   Temp 98.4  F (36.9  C) (Oral)   Resp 24   Wt 57.2 kg (126 lb 1.6 oz)   SpO2 97%   BMI 17.59 kg/m   Estimated body mass index is 17.59 kg/m  as calculated from the following:    Height as of 10/8/21: 1.803 m (5' 11\").    Weight as of this encounter: 57.2 kg (126 lb 1.6 oz). Body surface area is 1.69 meters squared.  No Pain (0) Comment: Data Unavailable   No LMP for male patient.  Allergies reviewed: Yes  Medications reviewed: Yes    Medications: MEDICATION REFILLS NEEDED TODAY. Provider was notified.  Pharmacy name entered into [employer]:      [id number] - [place], [place] - [address] AT [address] [facility] [address]    Clinical concerns: 3 week return Primary malignant neoplasm of right lung metastatic to other site Lab / NP / infusion.       Ameena Song CMA              "

## 2021-11-18 NOTE — PROGRESS NOTES
[facility] Hematology and Oncology Progress Note    Patient: Antonio Mercado  MRN: [id number]  Nov 18, 2021          Reason for Visit    Chief Complaint   Patient presents with     Oncology Clinic Visit     3 week return Primary malignant neoplasm of right lung metastatic to other site Lab / NP / infusion       Assessment and Plan    Cancer Staging  Malignant neoplasm of hepatic flexure (H)  Staging form: Colon and Rectum, AJCC 7th Edition  - Clinical stage from 2/20/2016: Stage IIIB (T4a, N1c, M0) - Signed by Harris Mary MD on 2/20/2017  CEA: 97.2  EGFR [id number](exon 19); RB1 552; TP53 L194R  PD-L1, HIGH EXPRESSION   MSI STABLE(CA); TMB LOW  NO FISH ALTERATIONS  MET Exon 14 Deletion: NOT DETECTED; Marroquin TRK, Not expressed  No abnormalities in ALK, BRAF, ERBB2, HER2, MET, RET, ROS1    1. Lung cancer, stage IV: currently on osimertinib. was on this since March 2021 until September 2021.  Pt has had severe progression since then.  He had signs of SVC syndrome.  He is now on chemotherapy with weekly Taxol and carbo.  He has had 6 weeks of that.  He will continue that for now.  We did add in Keytruda with his fourth weekly dose.  He got a 3-week dose of that.  He will continue that.  He is PD-L1 overexpressed but we want to use chemo plus immunotherapy.  He will return in 3 weeks with a CT scan.  If there is significant improvement on that CT scan we can think about stopping the chemotherapy and just continuing Keytruda since he is PD-L1 overexpressed.    2.  Small pulmonary embolism found on CT scan incidentally: Was on Xarelto but with active malignancy and a questionable 9 of blood clot we now have him on enoxaparin.  Continue on that for now.  Hopefully we can switch back to Xarelto at some point.    3.  Poor venous access: Encourage patient to get a Port-A-Cath.  For right now he does not want to do that.  We will continue to use ultrasound as needed to get IVs in.    4.  SVC syndrome: Receives  palliative radiation in 5 fractions, completed October 8, 2021.  He will continue to follow-up with radiation oncology to be evaluated whether he needs more radiation.  For right now patient does feel better his breathing is better.        ECOG Performance    0 - Independent    Distress Screening (within last 30 days)    1. How concerned are you about your ability to eat? : 0  2. How concerned are you about unintended weight loss or your current weight? : 0  3. How concerned are you about feeling depressed or very sad? : 0  4. How concerned are you about feeling anxious or very scared? : 0  5. Do you struggle with the loss of meaning and jorge in your life? : Not at all  6. How concerned are you about work and home life issues that may be affected by your cancer? : 0  7. How concerned are you about knowing what resources are available to help you? : 0  8. Do you currently have what you would describe as Catholic or spiritual struggles?            : Not at all       Pain  Pain Score: No Pain (0)    Problem List    Patient Active Problem List   Diagnosis     Human immunodeficiency virus (HIV) disease (H)     Mechanical low back pain     Anemia, iron deficiency     Malignant neoplasm of hepatic flexure (H)     Hemorrhoids, unspecified hemorrhoid type     Prostate cancer (H)     Bilateral renal cysts     CKD (chronic kidney disease) stage 3, GFR 30-59 ml/min (H)     Primary malignant neoplasm of right lung metastatic to other site (H)     Nontraumatic complete tear of right rotator cuff     Single subsegmental pulmonary embolism without acute cor pulmonale (H)     Dehydration     Malignant neoplasm of lung, unspecified laterality, unspecified part of lung (H)     Other pulmonary embolism without acute cor pulmonale, unspecified chronicity (H)     Paroxysmal atrial fibrillation (H)     Malignant neoplasm of right lung, unspecified part of lung (H)     Acute hypoxemic respiratory failure (H)     Anemia, unspecified type         ______________________________________________________________________________    History of Present Illness    Measurable Disease: Pet, CT    Current therapy: Weekly Taxol, carbo.  He has had 3 weeks of that.  Today he will continue that and add in Keytruda every 3 weeks.     Treatment history:  Osimertinib 80 mg once daily, March 18, 2021 until September 2021.   Carbo Alimta alone without Keytruda, 20% dose reduction, cycle 2 on February 25, 2021  Carboplatin, Alimta and Keytruda to start February 4, 2021    Interim history: Patient is here today continue on treatment.  He states that he is feeling pretty good.  He says his breathing is good.  He feels that his appetite is okay.  He says when people make food for him he does eat it.  He denies any new bone or back pain.  He says the rash and the pain on his back is much better after getting pills.  He denies any other new symptoms.  His wife was able to get a visa so she is going to be coming next week which she is very happy about.    Review of Systems    Pertinent items are noted in HPI.    Past History    Past Medical History:   Diagnosis Date     Constipation      Heel pain      History of blood transfusion      HIV (human immunodeficiency virus infection) (H)      Hypertrophy of prostate with urinary obstruction      Iron deficiency anemia      LBP (low back pain)      Malignant neoplasm of hepatic flexure (H) 02/04/2016     Paroxysmal atrial fibrillation (H) 10/03/2021     Trigger finger        PHYSICAL EXAM  BP 91/61 (BP Location: Left arm, Patient Position: Sitting, Cuff Size: Adult Regular)   Pulse 120   Temp 98.4  F (36.9  C) (Oral)   Resp 24   Wt 57.2 kg (126 lb 1.6 oz)   SpO2 97%   BMI 17.59 kg/m      GENERAL: no acute distress. Cooperative in conversation. Here alone. Mask on.  Looks thin.  In wheelchair.  RESP: Regular respiratory rate. No expiratory wheezes. Mildly diminished in left lung.   MUSCULOSKELETAL: no bilateral leg  swelling  NEURO: non focal. Alert and oriented x3.   PSYCH: within normal limits. No depression or anxiety.  SKIN: exposed skin is dry intact.  All of the lesions on his back are healed.    Lab Results    Recent Results (from the past 168 hour(s))   Comprehensive metabolic panel   Result Value Ref Range    Sodium 139 136 - 145 mmol/L    Potassium 4.0 3.5 - 5.0 mmol/L    Chloride 107 98 - 107 mmol/L    Carbon Dioxide (CO2) 23 22 - 31 mmol/L    Anion Gap 9 5 - 18 mmol/L    Urea Nitrogen 12 8 - 22 mg/dL    Creatinine 1.20 0.70 - 1.30 mg/dL    Calcium 9.8 8.5 - 10.5 mg/dL    Glucose 147 (H) 70 - 125 mg/dL    Alkaline Phosphatase 91 45 - 120 U/L    AST 17 0 - 40 U/L    ALT 22 0 - 45 U/L    Protein Total 6.9 6.0 - 8.0 g/dL    Albumin 3.3 (L) 3.5 - 5.0 g/dL    Bilirubin Total 0.5 0.0 - 1.0 mg/dL    GFR Estimate 62 >60 mL/min/1.73m2   CBC with platelets and differential   Result Value Ref Range    WBC Count 3.5 (L) 4.0 - 11.0 10e3/uL    RBC Count 3.66 (L) 4.40 - 5.90 10e6/uL    Hemoglobin 10.4 (L) 13.3 - 17.7 g/dL    Hematocrit 32.6 (L) 40.0 - 53.0 %    MCV 89 78 - 100 fL    MCH 28.4 26.5 - 33.0 pg    MCHC 31.9 31.5 - 36.5 g/dL    RDW 21.2 (H) 10.0 - 15.0 %    Platelet Count 260 150 - 450 10e3/uL    % Neutrophils 54 %    % Lymphocytes 33 %    % Monocytes 12 %    % Eosinophils 0 %    % Basophils 0 %    % Immature Granulocytes 1 %    NRBCs per 100 WBC 0 <1 /100    Absolute Neutrophils 1.9 1.6 - 8.3 10e3/uL    Absolute Lymphocytes 1.2 0.8 - 5.3 10e3/uL    Absolute Monocytes 0.4 0.0 - 1.3 10e3/uL    Absolute Eosinophils 0.0 0.0 - 0.7 10e3/uL    Absolute Basophils 0.0 0.0 - 0.2 10e3/uL    Absolute Immature Granulocytes 0.0 <=0.0 10e3/uL    Absolute NRBCs 0.0 10e3/uL       Imaging    No results found.    Signed by: ALBERTO Hobbs CNP

## 2021-11-18 NOTE — LETTER
11/18/2021         RE: Antonio Mercado  [address]  [place] [address]        Dear Colleague,    Thank you for referring your patient, Antonio Mercado, to the [facility] [place]. Please see a copy of my visit note below.      [facility] Hematology and Oncology Progress Note    Patient: Antonio Mercado  MRN: [id number]  Nov 18, 2021          Reason for Visit    Chief Complaint   Patient presents with     Oncology Clinic Visit     3 week return Primary malignant neoplasm of right lung metastatic to other site Lab / NP / infusion       Assessment and Plan    Cancer Staging  Malignant neoplasm of hepatic flexure (H)  Staging form: Colon and Rectum, AJCC 7th Edition  - Clinical stage from 2/20/2016: Stage IIIB (T4a, N1c, M0) - Signed by Harris Mary MD on 2/20/2017  CEA: 97.2  EGFR [id number](exon 19); RB1 552; TP53 L194R  PD-L1, HIGH EXPRESSION   MSI STABLE(CA); TMB LOW  NO FISH ALTERATIONS  MET Exon 14 Deletion: NOT DETECTED; Marroquin TRK, Not expressed  No abnormalities in ALK, BRAF, ERBB2, HER2, MET, RET, ROS1    1. Lung cancer, stage IV: currently on osimertinib. was on this since March 2021 until September 2021.  Pt has had severe progression since then.  He had signs of SVC syndrome.  He is now on chemotherapy with weekly Taxol and carbo.  He has had 6 weeks of that.  He will continue that for now.  We did add in Keytruda with his fourth weekly dose.  He got a 3-week dose of that.  He will continue that.  He is PD-L1 overexpressed but we want to use chemo plus immunotherapy.  He will return in 3 weeks with a CT scan.  If there is significant improvement on that CT scan we can think about stopping the chemotherapy and just continuing Keytruda since he is PD-L1 overexpressed.    2.  Small pulmonary embolism found on CT scan incidentally: Was on Xarelto but with active malignancy and a questionable 9 of blood clot we now have him on enoxaparin.  Continue on that for  now.  Hopefully we can switch back to Xarelto at some point.    3.  Poor venous access: Encourage patient to get a Port-A-Cath.  For right now he does not want to do that.  We will continue to use ultrasound as needed to get IVs in.    4.  SVC syndrome: Receives palliative radiation in 5 fractions, completed October 8, 2021.  He will continue to follow-up with radiation oncology to be evaluated whether he needs more radiation.  For right now patient does feel better his breathing is better.        ECOG Performance    0 - Independent    Distress Screening (within last 30 days)    1. How concerned are you about your ability to eat? : 0  2. How concerned are you about unintended weight loss or your current weight? : 0  3. How concerned are you about feeling depressed or very sad? : 0  4. How concerned are you about feeling anxious or very scared? : 0  5. Do you struggle with the loss of meaning and jorge in your life? : Not at all  6. How concerned are you about work and home life issues that may be affected by your cancer? : 0  7. How concerned are you about knowing what resources are available to help you? : 0  8. Do you currently have what you would describe as Rastafarian or spiritual struggles?            : Not at all       Pain  Pain Score: No Pain (0)    Problem List    Patient Active Problem List   Diagnosis     Human immunodeficiency virus (HIV) disease (H)     Mechanical low back pain     Anemia, iron deficiency     Malignant neoplasm of hepatic flexure (H)     Hemorrhoids, unspecified hemorrhoid type     Prostate cancer (H)     Bilateral renal cysts     CKD (chronic kidney disease) stage 3, GFR 30-59 ml/min (H)     Primary malignant neoplasm of right lung metastatic to other site (H)     Nontraumatic complete tear of right rotator cuff     Single subsegmental pulmonary embolism without acute cor pulmonale (H)     Dehydration     Malignant neoplasm of lung, unspecified laterality, unspecified part of lung (H)      Other pulmonary embolism without acute cor pulmonale, unspecified chronicity (H)     Paroxysmal atrial fibrillation (H)     Malignant neoplasm of right lung, unspecified part of lung (H)     Acute hypoxemic respiratory failure (H)     Anemia, unspecified type        ______________________________________________________________________________    History of Present Illness    Measurable Disease: Pet, CT    Current therapy: Weekly Taxol, carbo.  He has had 3 weeks of that.  Today he will continue that and add in Keytruda every 3 weeks.     Treatment history:  Osimertinib 80 mg once daily, March 18, 2021 until September 2021.   Carbo Alimta alone without Keytruda, 20% dose reduction, cycle 2 on February 25, 2021  Carboplatin, Alimta and Keytruda to start February 4, 2021    Interim history: Patient is here today continue on treatment.  He states that he is feeling pretty good.  He says his breathing is good.  He feels that his appetite is okay.  He says when people make food for him he does eat it.  He denies any new bone or back pain.  He says the rash and the pain on his back is much better after getting pills.  He denies any other new symptoms.  His wife was able to get a visa so she is going to be coming next week which she is very happy about.    Review of Systems    Pertinent items are noted in HPI.    Past History    Past Medical History:   Diagnosis Date     Constipation      Heel pain      History of blood transfusion      HIV (human immunodeficiency virus infection) (H)      Hypertrophy of prostate with urinary obstruction      Iron deficiency anemia      LBP (low back pain)      Malignant neoplasm of hepatic flexure (H) 02/04/2016     Paroxysmal atrial fibrillation (H) 10/03/2021     Trigger finger        PHYSICAL EXAM  BP 91/61 (BP Location: Left arm, Patient Position: Sitting, Cuff Size: Adult Regular)   Pulse 120   Temp 98.4  F (36.9  C) (Oral)   Resp 24   Wt 57.2 kg (126 lb 1.6 oz)   SpO2 97%    BMI 17.59 kg/m      GENERAL: no acute distress. Cooperative in conversation. Here alone. Mask on.  Looks thin.  In wheelchair.  RESP: Regular respiratory rate. No expiratory wheezes. Mildly diminished in left lung.   MUSCULOSKELETAL: no bilateral leg swelling  NEURO: non focal. Alert and oriented x3.   PSYCH: within normal limits. No depression or anxiety.  SKIN: exposed skin is dry intact.  All of the lesions on his back are healed.    Lab Results    Recent Results (from the past 168 hour(s))   Comprehensive metabolic panel   Result Value Ref Range    Sodium 139 136 - 145 mmol/L    Potassium 4.0 3.5 - 5.0 mmol/L    Chloride 107 98 - 107 mmol/L    Carbon Dioxide (CO2) 23 22 - 31 mmol/L    Anion Gap 9 5 - 18 mmol/L    Urea Nitrogen 12 8 - 22 mg/dL    Creatinine 1.20 0.70 - 1.30 mg/dL    Calcium 9.8 8.5 - 10.5 mg/dL    Glucose 147 (H) 70 - 125 mg/dL    Alkaline Phosphatase 91 45 - 120 U/L    AST 17 0 - 40 U/L    ALT 22 0 - 45 U/L    Protein Total 6.9 6.0 - 8.0 g/dL    Albumin 3.3 (L) 3.5 - 5.0 g/dL    Bilirubin Total 0.5 0.0 - 1.0 mg/dL    GFR Estimate 62 >60 mL/min/1.73m2   CBC with platelets and differential   Result Value Ref Range    WBC Count 3.5 (L) 4.0 - 11.0 10e3/uL    RBC Count 3.66 (L) 4.40 - 5.90 10e6/uL    Hemoglobin 10.4 (L) 13.3 - 17.7 g/dL    Hematocrit 32.6 (L) 40.0 - 53.0 %    MCV 89 78 - 100 fL    MCH 28.4 26.5 - 33.0 pg    MCHC 31.9 31.5 - 36.5 g/dL    RDW 21.2 (H) 10.0 - 15.0 %    Platelet Count 260 150 - 450 10e3/uL    % Neutrophils 54 %    % Lymphocytes 33 %    % Monocytes 12 %    % Eosinophils 0 %    % Basophils 0 %    % Immature Granulocytes 1 %    NRBCs per 100 WBC 0 <1 /100    Absolute Neutrophils 1.9 1.6 - 8.3 10e3/uL    Absolute Lymphocytes 1.2 0.8 - 5.3 10e3/uL    Absolute Monocytes 0.4 0.0 - 1.3 10e3/uL    Absolute Eosinophils 0.0 0.0 - 0.7 10e3/uL    Absolute Basophils 0.0 0.0 - 0.2 10e3/uL    Absolute Immature Granulocytes 0.0 <=0.0 10e3/uL    Absolute NRBCs 0.0 10e3/uL  "      Imaging    No results found.    Signed by: ALBERTO Hobbs CNP    Oncology Rooming Note    November 18, 2021 9:42 AM   Antonio Mercado is a 66 year old male who presents for:    Chief Complaint   Patient presents with     Oncology Clinic Visit     3 week return Primary malignant neoplasm of right lung metastatic to other site Lab / NP / infusion     Initial Vitals: BP 91/61 (BP Location: Left arm, Patient Position: Sitting, Cuff Size: Adult Regular)   Pulse 120   Temp 98.4  F (36.9  C) (Oral)   Resp 24   Wt 57.2 kg (126 lb 1.6 oz)   SpO2 97%   BMI 17.59 kg/m   Estimated body mass index is 17.59 kg/m  as calculated from the following:    Height as of 10/8/21: 1.803 m (5' 11\").    Weight as of this encounter: 57.2 kg (126 lb 1.6 oz). Body surface area is 1.69 meters squared.  No Pain (0) Comment: Data Unavailable   No LMP for male patient.  Allergies reviewed: Yes  Medications reviewed: Yes    Medications: MEDICATION REFILLS NEEDED TODAY. Provider was notified.  Pharmacy name entered into [employer]:      [id number] - [place], [place] - [address] AT [facility] [address]    Clinical concerns: 3 week return Primary malignant neoplasm of right lung metastatic to other site Lab / NP / infusion.       [facility]                  Again, thank you for allowing me to participate in the care of your patient.        Sincerely,        ALBERTO Hobbs CNP    "

## 2021-11-26 NOTE — PROGRESS NOTES
Pt arrived via wheelchair to clinic for Cycle # 3 Day # 8 of his chemotherapy regimen.  Nelida ebony labs since labs tend to hemolyze from PICC stat drawing them.  PICC team was called to place pt's IV since he is a difficult IV start.  Labs were reviewed, pt ok for treatment.  Marlys reduced dose further since pt struggled with last treatment.  Administered premedications and chemotherapy per MD order.  Pt tolerated infusion well, no s/s of infusion reaction.  IV was flushed with NS then D/C'd using 2x2 and Coban.  Pt verbalized understanding of plan of care and return to clinic.      Wifried had questions about what medications pt should be taking and questions regarding diet.  In basket messages were sent to Susy, Dr. Chavez, and dietician to reach out to him.

## 2021-11-29 NOTE — TELEPHONE ENCOUNTER
Patient son Xiomara calls in today with his wife in the background stating that they are having questions about the medications he is taking.  They have a whole bunch of medications in the home and they want to make sure which ones he is to be taking and which ones can stop.  They were able to go through all of the medications they had questions about as well as the medications I had in addition to those, of which many were OTC medications that were just as needed.  They were very appreciative and verbalized understanding.    Nohemy Archer RN

## 2021-12-02 NOTE — TELEPHONE ENCOUNTER
RN has spoken to patient's son. Recommend Antonio follow up with his primary Dr Mary or can absolutely establish care here at [facility] for further assessment and evaluation of need to continue or discontinue Metoprolol. Once this has been done, can update medication list to reflect accuracy. Son verbalized understanding and will make contact with Dr Usman Heath's facility to schedule a follow up appointment. Robbie ARANGO

## 2021-12-02 NOTE — LETTER
12/2/2021         RE: Antonio Mercado  [address]  [place] [address]        Dear Colleague,    Thank you for referring your patient, Antonio Mercado, to the [facility] CANCER [facility]. Please see a copy of my visit note below.    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 diagnosed January 2021  EGFR [id number](exon 19); RB1 552; TP53 L194R  PD-L1, HIGH EXPRESSION    MSI STABLE(CA); TMB LOW  NO FISH ALTERATIONS  MET Exon 14 Deletion: NOT DETECTED; Marroquin TRK, Not expressed  No abnormalities in ALK, BRAF, ERBB2, HER2, MET, RET, ROS1    History of colon Cancer status post hemicolectomy  History of prostate cancer with elevated PSA, declined follow-up  HIV/AIDS  Macrocytic anemia probably related to medication  Thyroid nodule on PET scan  Rash, skin breakdown, left buttock area  Hospitalization in April for bright red blood per rectum probably due to hemorrhoids, requiring transfusion  Pulmonary emboli    Patient with some slight confusion and increased fatigue and weakness.  Symptoms did improve with IV hydration in the office today.  No focal deficits are noted.    We will hold chemotherapy today.  He is already scheduled for repeat restaging CT scans and follow-up next week.  We will also get brain MRI for further evaluation of the mild confusion.    Patient having issues with coughing and choking.  He would benefit from a medical bed in order to facilitate mobility.  Would recommend an electric bed to  help with transfer to wheelchair and also allow for frequent changes of his body position.    Plan: Hold chemotherapy today  Brain MRI  Follow-up with CT scans next week    Measurable disease: CT of the chest    Current therapy: Weekly carboplatin and Taxol along with Keytruda administered every 3 weeks started October 8, 2021; Keytruda every 3 weeks first dose around October 29, 2021  Today was going to be day 15 cycle 3      Treatment history:  Carbo Alimta alone without Keytruda, 20% dose reduction, cycle 2 on February 25, 2021  Carboplatin, Alimta and Keytruda to start February 4, 2021        ECOG Performance        Distress Assessment  Distress Assessment Score: No distress    Pain         Problem List    1. Primary malignant neoplasm of right lung metastatic to other site (H)  NM Muga    ECG 12 lead with MUSE        CC: Harris Mary MD    ______________________________________________________________________________    History of Present Illness    Mr. Antonio Mercado was here for chemotherapy and I was asked to see by the nurses.  Noted with some abdominal discomfort tachycardia and confusion.    Denies headaches.  Noted some right-sided chest pain last night.  Quite fatigued.  Poor nutritional status.  ECOG status 2.    Awake and alert.  Oriented to person and place but not completely to time.      Pain Status  Currently in Pain: No/denies    Review of Systems    12 point review of systems completed     Patient Coping     Distress Assessment  Distress Assessment Score: No distress  Accompanied by  Accompanied by: Alone    Past History  Past Medical History:   Diagnosis Date     Constipation      HIV (human immunodeficiency virus infection) (H)      Hypertrophy of prostate with urinary obstruction      Iron deficiency anemia      Malignant neoplasm of hepatic flexure (H)     s/p right hemicolectomy in 2016         Past Surgical History:   Procedure Laterality Date     COLON SURGERY       HERNIA REPAIR        IA ESOPHAGOGASTRODUODENOSCOPY TRANSORAL DIAGNOSTIC N/A 4/13/2021    Procedure: ESOPHAGOGASTRODUODENOSCOPY (EGD);  Surgeon: Harris Mon MD;  Location: [facility];  Service: Gastroenterology     US THORACENTESIS  1/5/2021     US THORACENTESIS  1/18/2021     US THORACENTESIS  1/26/2021     US THORACENTESIS  1/29/2021     US THORACENTESIS  2/2/2021     US THORACENTESIS  2/5/2021     US THORACENTESIS  2/9/2021     US THORACENTESIS  2/12/2021     US THORACENTESIS  2/15/2021     US THORACENTESIS  2/19/2021     US THORACENTESIS  2/23/2021     US THORACENTESIS  2/26/2021     US THORACENTESIS  3/2/2021     US THORACENTESIS  3/9/2021       Physical Exam    Recent Vitals 6/17/2021   Height -   Weight 154 lbs   BSA (m2) 1.86 m2   /61   Pulse 74   Temp 98.2   Temp src 1   SpO2 100   Some recent data might be hidden       GENERAL: Alert and oriented to time place and person. Seated comfortably. In no distress.    HEAD: Atraumatic and normocephalic.    EYES: KARIME, EOMI.  No pallor.  No icterus.    Oral cavity: no mucosal lesion or tonsillar enlargement.    NECK: supple. JVP normal.  No thyroid enlargement.    LYMPH NODES: No palpable, cervical, axillary or inguinal lymphadenopathy.    CHEST: clear to auscultation bilaterally.  Resonant to percussion throughout bilaterally.  Symmetrical breath movements bilaterally.    CVS: S1 and S2 are heard. Regular rate and rhythm.  No murmur or gallop or rub heard.  No peripheral edema.    ABDOMEN: Soft. Not tender. Not distended.  No palpable hepatomegaly or splenomegaly.  No other mass palpable.  Bowel sounds heard.    EXTREMITIES: Warm.    SKIN: no rash, or bruising or purpura.  Grade 1 skin breakdown in the cleft between the buttocks on the right side.  Other areas are healed.    Has a full head of hair.    CNS: No focal deficits.  Mild disorientation to time.      Lab Results    Recent Results (from the past 168 hour(s))   Comprehensive Metabolic Panel   Result Value Ref  Range    Sodium 137 136 - 145 mmol/L    Potassium 4.4 3.5 - 5.0 mmol/L    Chloride 102 98 - 107 mmol/L    CO2 22 22 - 31 mmol/L    Anion Gap, Calculation 13 5 - 18 mmol/L    Glucose 108 70 - 125 mg/dL    BUN 20 8 - 22 mg/dL    Creatinine 1.44 (H) 0.70 - 1.30 mg/dL    GFR MDRD Af Amer 59 (L) >60 mL/min/1.73m2    GFR MDRD Non Af Amer 49 (L) >60 mL/min/1.73m2    Bilirubin, Total 0.4 0.0 - 1.0 mg/dL    Calcium 9.1 8.5 - 10.5 mg/dL    Protein, Total 7.3 6.0 - 8.0 g/dL    Albumin 3.5 3.5 - 5.0 g/dL    Alkaline Phosphatase 80 45 - 120 U/L    AST 13 0 - 40 U/L    ALT <9 0 - 45 U/L   HM1 (CBC with Diff)   Result Value Ref Range    WBC 3.6 (L) 4.0 - 11.0 thou/uL    RBC 3.90 (L) 4.40 - 6.20 mill/uL    Hemoglobin 11.0 (L) 14.0 - 18.0 g/dL    Hematocrit 33.9 (L) 40.0 - 54.0 %    MCV 87 80 - 100 fL    MCH 28.2 27.0 - 34.0 pg    MCHC 32.4 32.0 - 36.0 g/dL    RDW 15.6 (H) 11.0 - 14.5 %    Platelets 170 140 - 440 thou/uL    MPV 8.5 7.0 - 10.0 fL    Neutrophils % 61 50 - 70 %    Lymphocytes % 30 20 - 40 %    Monocytes % 7 2 - 10 %    Eosinophils % 1 0 - 6 %    Basophils % 0 0 - 2 %    Immature Granulocyte % 0 <=0 %    Neutrophils Absolute 2.2 2.0 - 7.7 thou/uL    Lymphocytes Absolute 1.1 0.8 - 4.4 thou/uL    Monocytes Absolute 0.3 0.0 - 0.9 thou/uL    Eosinophils Absolute 0.0 0.0 - 0.4 thou/uL    Basophils Absolute 0.0 0.0 - 0.2 thou/uL    Immature Granulocyte Absolute 0.0 <=0.0 thou/uL   ECG 12 lead with MUSE   Result Value Ref Range    SYSTOLIC BLOOD PRESSURE      DIASTOLIC BLOOD PRESSURE      VENTRICULAR RATE 67 BPM    ATRIAL RATE 67 BPM    P-R INTERVAL 184 ms    QRS DURATION 90 ms    Q-T INTERVAL 376 ms    QTC CALCULATION (BEZET) 397 ms    P Axis 52 degrees    R AXIS 32 degrees    T AXIS 40 degrees    MUSE DIAGNOSIS       Normal sinus rhythm  Normal ECG  When compared with ECG of 17-MAY-2021 10:13,  No significant change was found     HM1 (CBC with Diff)   Result Value Ref Range    WBC 3.5 (L) 4.0 - 11.0 thou/uL    RBC 4.15 (L)  4.40 - 6.20 mill/uL    Hemoglobin 11.5 (L) 14.0 - 18.0 g/dL    Hematocrit 37.2 (L) 40.0 - 54.0 %    MCV 90 80 - 100 fL    MCH 27.7 27.0 - 34.0 pg    MCHC 30.9 (L) 32.0 - 36.0 g/dL    RDW 15.9 (H) 11.0 - 14.5 %    Platelets 199 140 - 440 thou/uL    MPV 10.1 8.5 - 12.5 fL    Neutrophils % 59 50 - 70 %    Lymphocytes % 32 20 - 40 %    Monocytes % 8 2 - 10 %    Eosinophils % 1 0 - 6 %    Basophils % 0 0 - 2 %    Immature Granulocyte % 0 <=0 %    Neutrophils Absolute 2.1 2.0 - 7.7 thou/uL    Lymphocytes Absolute 1.1 0.8 - 4.4 thou/uL    Monocytes Absolute 0.3 0.0 - 0.9 thou/uL    Eosinophils Absolute 0.0 0.0 - 0.4 thou/uL    Basophils Absolute 0.0 0.0 - 0.2 thou/uL    Immature Granulocyte Absolute 0.0 <=0.0 thou/uL       Imaging    No results found.      Signed by: Kailey Chavez MD        Again, thank you for allowing me to participate in the care of your patient.        Sincerely,        Kailey Chavez MD

## 2021-12-02 NOTE — PROGRESS NOTES
Called Antonio's son Patrick per RN request. Will is concerned about his dad's nutrition. Antonio has lost weight and is not eating well. He is drinking Boost, usually 2/ day however family is encouraging 3/ day.    Reviewed tips with Will to help his dad improve his nutritional intakes. Reviewed calorie/ protein goal and different supplement options. Also contacted [employer] to see if insurance would pay for nutrition supplement drinks.    Joanna Luevano, MS, RD, LD

## 2021-12-02 NOTE — PROGRESS NOTES
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 diagnosed January 2021  EGFR [id number](exon 19); RB1 552; TP53 L194R  PD-L1, HIGH EXPRESSION    MSI STABLE(CA); TMB LOW  NO FISH ALTERATIONS  MET Exon 14 Deletion: NOT DETECTED; Marroquin TRK, Not expressed  No abnormalities in ALK, BRAF, ERBB2, HER2, MET, RET, ROS1    History of colon Cancer status post hemicolectomy  History of prostate cancer with elevated PSA, declined follow-up  HIV/AIDS  Macrocytic anemia probably related to medication  Thyroid nodule on PET scan  Rash, skin breakdown, left buttock area  Hospitalization in April for bright red blood per rectum probably due to hemorrhoids, requiring transfusion  Pulmonary emboli    Patient with some slight confusion and increased fatigue and weakness.  Symptoms did improve with IV hydration in the office today.  No focal deficits are noted.    We will hold chemotherapy today.  He is already scheduled for repeat restaging CT scans and follow-up next week.  We will also get brain MRI for further evaluation of the mild confusion.    Patient having issues with coughing and choking.  He would benefit from a medical bed in order to facilitate mobility.  Would recommend an electric bed to help with transfer to wheelchair and also allow for frequent changes of his body position.    Plan: Hold chemotherapy today  Brain MRI  Follow-up with CT scans next week    Measurable disease: CT of the chest    Current therapy: Weekly carboplatin and Taxol along with  Keytruda administered every 3 weeks started October 8, 2021; Keytruda every 3 weeks first dose around October 29, 2021  Today was going to be day 15 cycle 3      Treatment history:  Carbo Alimta alone without Keytruda, 20% dose reduction, cycle 2 on February 25, 2021  Carboplatin, Alimta and Keytruda to start February 4, 2021        ECOG Performance        Distress Assessment  Distress Assessment Score: No distress    Pain         Problem List    1. Primary malignant neoplasm of right lung metastatic to other site (H)  NM Muga    ECG 12 lead with MUSE        CC: Harris Mary MD    ______________________________________________________________________________    History of Present Illness    Mr. Antonio Mercado was here for chemotherapy and I was asked to see by the nurses.  Noted with some abdominal discomfort tachycardia and confusion.    Denies headaches.  Noted some right-sided chest pain last night.  Quite fatigued.  Poor nutritional status.  ECOG status 2.    Awake and alert.  Oriented to person and place but not completely to time.      Pain Status  Currently in Pain: No/denies    Review of Systems    12 point review of systems completed     Patient Coping     Distress Assessment  Distress Assessment Score: No distress  Accompanied by  Accompanied by: Alone    Past History  Past Medical History:   Diagnosis Date     Constipation      HIV (human immunodeficiency virus infection) (H)      Hypertrophy of prostate with urinary obstruction      Iron deficiency anemia      Malignant neoplasm of hepatic flexure (H)     s/p right hemicolectomy in 2016         Past Surgical History:   Procedure Laterality Date     COLON SURGERY       HERNIA REPAIR       NH ESOPHAGOGASTRODUODENOSCOPY TRANSORAL DIAGNOSTIC N/A 4/13/2021    Procedure: ESOPHAGOGASTRODUODENOSCOPY (EGD);  Surgeon: Harris Mon MD;  Location: [facility];  Service: Gastroenterology      THORACENTESIS  1/5/2021      THORACENTESIS  1/18/2021       THORACENTESIS  1/26/2021     US THORACENTESIS  1/29/2021     US THORACENTESIS  2/2/2021     US THORACENTESIS  2/5/2021     US THORACENTESIS  2/9/2021     US THORACENTESIS  2/12/2021     US THORACENTESIS  2/15/2021     US THORACENTESIS  2/19/2021     US THORACENTESIS  2/23/2021     US THORACENTESIS  2/26/2021     US THORACENTESIS  3/2/2021     US THORACENTESIS  3/9/2021       Physical Exam    Recent Vitals 6/17/2021   Height -   Weight 154 lbs   BSA (m2) 1.86 m2   /61   Pulse 74   Temp 98.2   Temp src 1   SpO2 100   Some recent data might be hidden       GENERAL: Alert and oriented to time place and person. Seated comfortably. In no distress.    HEAD: Atraumatic and normocephalic.    EYES: KARIME, EOMI.  No pallor.  No icterus.    Oral cavity: no mucosal lesion or tonsillar enlargement.    NECK: supple. JVP normal.  No thyroid enlargement.    LYMPH NODES: No palpable, cervical, axillary or inguinal lymphadenopathy.    CHEST: clear to auscultation bilaterally.  Resonant to percussion throughout bilaterally.  Symmetrical breath movements bilaterally.    CVS: S1 and S2 are heard. Regular rate and rhythm.  No murmur or gallop or rub heard.  No peripheral edema.    ABDOMEN: Soft. Not tender. Not distended.  No palpable hepatomegaly or splenomegaly.  No other mass palpable.  Bowel sounds heard.    EXTREMITIES: Warm.    SKIN: no rash, or bruising or purpura.  Grade 1 skin breakdown in the cleft between the buttocks on the right side.  Other areas are healed.    Has a full head of hair.    CNS: No focal deficits.  Mild disorientation to time.      Lab Results    Recent Results (from the past 168 hour(s))   Comprehensive Metabolic Panel   Result Value Ref Range    Sodium 137 136 - 145 mmol/L    Potassium 4.4 3.5 - 5.0 mmol/L    Chloride 102 98 - 107 mmol/L    CO2 22 22 - 31 mmol/L    Anion Gap, Calculation 13 5 - 18 mmol/L    Glucose 108 70 - 125 mg/dL    BUN 20 8 - 22 mg/dL    Creatinine 1.44 (H) 0.70 - 1.30 mg/dL     GFR MDRD Af Amer 59 (L) >60 mL/min/1.73m2    GFR MDRD Non Af Amer 49 (L) >60 mL/min/1.73m2    Bilirubin, Total 0.4 0.0 - 1.0 mg/dL    Calcium 9.1 8.5 - 10.5 mg/dL    Protein, Total 7.3 6.0 - 8.0 g/dL    Albumin 3.5 3.5 - 5.0 g/dL    Alkaline Phosphatase 80 45 - 120 U/L    AST 13 0 - 40 U/L    ALT <9 0 - 45 U/L   HM1 (CBC with Diff)   Result Value Ref Range    WBC 3.6 (L) 4.0 - 11.0 thou/uL    RBC 3.90 (L) 4.40 - 6.20 mill/uL    Hemoglobin 11.0 (L) 14.0 - 18.0 g/dL    Hematocrit 33.9 (L) 40.0 - 54.0 %    MCV 87 80 - 100 fL    MCH 28.2 27.0 - 34.0 pg    MCHC 32.4 32.0 - 36.0 g/dL    RDW 15.6 (H) 11.0 - 14.5 %    Platelets 170 140 - 440 thou/uL    MPV 8.5 7.0 - 10.0 fL    Neutrophils % 61 50 - 70 %    Lymphocytes % 30 20 - 40 %    Monocytes % 7 2 - 10 %    Eosinophils % 1 0 - 6 %    Basophils % 0 0 - 2 %    Immature Granulocyte % 0 <=0 %    Neutrophils Absolute 2.2 2.0 - 7.7 thou/uL    Lymphocytes Absolute 1.1 0.8 - 4.4 thou/uL    Monocytes Absolute 0.3 0.0 - 0.9 thou/uL    Eosinophils Absolute 0.0 0.0 - 0.4 thou/uL    Basophils Absolute 0.0 0.0 - 0.2 thou/uL    Immature Granulocyte Absolute 0.0 <=0.0 thou/uL   ECG 12 lead with MUSE   Result Value Ref Range    SYSTOLIC BLOOD PRESSURE      DIASTOLIC BLOOD PRESSURE      VENTRICULAR RATE 67 BPM    ATRIAL RATE 67 BPM    P-R INTERVAL 184 ms    QRS DURATION 90 ms    Q-T INTERVAL 376 ms    QTC CALCULATION (BEZET) 397 ms    P Axis 52 degrees    R AXIS 32 degrees    T AXIS 40 degrees    MUSE DIAGNOSIS       Normal sinus rhythm  Normal ECG  When compared with ECG of 17-MAY-2021 10:13,  No significant change was found     HM1 (CBC with Diff)   Result Value Ref Range    WBC 3.5 (L) 4.0 - 11.0 thou/uL    RBC 4.15 (L) 4.40 - 6.20 mill/uL    Hemoglobin 11.5 (L) 14.0 - 18.0 g/dL    Hematocrit 37.2 (L) 40.0 - 54.0 %    MCV 90 80 - 100 fL    MCH 27.7 27.0 - 34.0 pg    MCHC 30.9 (L) 32.0 - 36.0 g/dL    RDW 15.9 (H) 11.0 - 14.5 %    Platelets 199 140 - 440 thou/uL    MPV 10.1 8.5 - 12.5  fL    Neutrophils % 59 50 - 70 %    Lymphocytes % 32 20 - 40 %    Monocytes % 8 2 - 10 %    Eosinophils % 1 0 - 6 %    Basophils % 0 0 - 2 %    Immature Granulocyte % 0 <=0 %    Neutrophils Absolute 2.1 2.0 - 7.7 thou/uL    Lymphocytes Absolute 1.1 0.8 - 4.4 thou/uL    Monocytes Absolute 0.3 0.0 - 0.9 thou/uL    Eosinophils Absolute 0.0 0.0 - 0.4 thou/uL    Basophils Absolute 0.0 0.0 - 0.2 thou/uL    Immature Granulocyte Absolute 0.0 <=0.0 thou/uL       Imaging    No results found.      Signed by: Kailey Chavez MD

## 2021-12-02 NOTE — PROGRESS NOTES
Social Work Follow-Up Encounter Visit  Oncology Clinic    Data/Intervention: 21  Patient Name:  Antonio Mercado  /Age:  1955 (66 year old)    Reason for Follow-Up:  SW was consulted to reach out to Antonio's son, Cathleen, re: life Line button for at home due to concerns he had with his father's cognition. SW called Cathleen and his spouse Sheri to provide the info to reach out to get a Life Line button.     Cathleen is also interested in receiving a hospital bed and over the bed table. SW informed him that Antonio would need to qualify for these pieces of insurance and that paperwork is needed to be completed. NIKKI will update Susy-RN, Liz-CONCHITA, Brandi-CONCHITA and Dr. Chavez.     Cathleen shares that Antonio is coming in for scans next week and will find out if his cancer has progressed. NIKKI will plan to follow up after that.       Collaborated With:    CONCHITA Terrell    Resources Provided:  Lifeline Info    Plan:  SW will follow up and remain available for ongoing resource/support needs.     CARLOS ENRIQUE Oliveira, [facility]  Adult Oncology Clinic  Phone: [phone]

## 2021-12-02 NOTE — TELEPHONE ENCOUNTER
[facility] phone call message- medication clarification/question:    Full Medication Name: metoprolol succinate ER (TOPROL-XL) 50 MG 24 hr tablet     Dose:   Sig - Route: Take 1 tablet (50 mg) by mouth 2 times daily - Oral    Question: This patient's daughter in law named Sheri Mercaod called in to obtain clarification on the above medication. She explained he was prescribed this med during his visit to the ED at [facility] by  who was the attending physician at that time. She's wondering if he should continue to take this med or can he stop. Please assist and provide next course of action. She would like a call back if possible,    Pharmacy confirmed as    [id number] - [place], [place] - [address] [place] [address] AT [id number] - [place], [place] - [address] AT [facility] [address]: Yes    OK to leave a message on voice mail? Yes    Primary language: English      needed? No    Call taken on December 2, 2021 at 9:33 AM by Shanice Khanna

## 2021-12-02 NOTE — PROGRESS NOTES
Infusion Nursing Note:  Antonio Mercado presents today for Cycle 3, Day 15 of his chemo treatment plan.    Patient seen by provider today: Yes: Dr. Chavez evaluated patient chairside in the infusion room.   present during visit today: Not Applicable.    Note: Antonio arrived via w/c accompanied by his son, Cathleen, for treatment. Cathleen spoke with CONCHITA Galdamez, today and relayed some concerns he has noted at home. His Dad has been forgetful and has had episodes of urinary incontinence. His intake is also poor and they are concerned about his wt loss. They are interested in getting a Life Alert at home as well.  Wt obtained with stand by assist of two today. Assessment completed. Antonio was slow to respond to questions, had a difficult time following simple commands and it was noted he is tachycardic.   Writer discussed the above with Dr. Chavez and he evaluated patient chairside. Treatment to be held today and patient to receive 1 L NS over two hours per Dr. Chavez.   Patient is to have an MRI as soon as possible. Cristina in scheduling was notified.  Writer called Cathleen to relay the above plan of care. No answer and VM box is full. Writer called Cathleen's spouse, Sheri. She will have Uzaircely come to pick Antonio up.  Writer relayed the above to Cathleen. Printed copy of future appts was provided. Cathleen was given the number to centralized scheduling as both Antonio's CT and MRI may be able to be moved up if they are willing to go to a different location, such as [facility].   Cathleen is asking if his Dad could come in for fluids a couple of days a week. Message sent to Care Coord./Dr. Chavez.  Writer also sent a message to NIKKI Peterson, re Cathleen's request for a Life Alert system at home. Per Cathleen, Kristen can call him at [phone] or spouse, Sheri, at [phone].  Advised Uzaircely to call the clinic and speak with triage between appts if further guidance for symptom mgmt is  needed at home.     Intravenous Access:  Peripheral IV placed per PICC RN.    Treatment Conditions:  Results reviewed, labs MET treatment parameters, however, treatment was held per Dr. Chavez due to confusion, weakness and fatigue .    Post Infusion Assessment:  Patient tolerated infusion without incident. VS improved after 1 L NS.  Blood return noted pre and post infusion.  Site patent and intact, free from redness, edema or discomfort.  Access discontinued per protocol.     Discharge Plan:   Patient discharged in stable condition accompanied by: son, Cathleen.  Departure Mode: Wheelchair.      Priscilla Choi RN

## 2021-12-04 NOTE — TELEPHONE ENCOUNTER
RN Cancer Care Coordinator called [facility] regarding hospital bed rental for patient that son, Ricardo is wanting to arrange.  Also called pt's son to clarify the need. [phone]    Requirements for ordering bed are summarized below, and were given to Dr. Chavez for ordering and documentation purposes. Items in bold are conditions that meet the needs according to the family.  ** indicates family explanation.      1. He will need an order for the specific bed:     Semi electric (head & foot elevates, but bed is at a fixed height)   Fully electric (variable bed height + head and foot adjustments)   ==>> I didn't get anything specific about ordering a bedside table, but I think this should be included in the order.     2. Doctor note including these items to Certify Medical Necessity:    A. Medical condition that requires positioning that is not feasible with ordinary bed - in order to facilitate mobility or to alleviate pain.   B. Head of bed needs to be elevated most of the time (due to e.g. CHF, chronic pulmonary disease, or problems with aspiration     For Fully Electric:   **A. From above   And/or   B. Head of bed needs to be elevated >30 degrees most of time  ** due to coughing and aspiration  C. Requires traction   D. Requires variable height for transfers to chair, wheelchair or standing  **difficulty with transfers from regular height bed  E. Requires frequent or immediate changes of body position  ** had incontinence and needs frequent and sudden bed changes and care    It still isn't clear if [facility] ever even covers beds - but we have to send this in for them to check the  [facility] program.     Note routed to care coordinator, Susy Grossman, and NIKKI Jacobs.

## 2021-12-07 NOTE — PROGRESS NOTES
Antonio came to chemo infusion following CT and prior to MRI for IV hydration.  VSS.  I was unable to assess due to pt being nonverbal and somewhat confused.  Hydration infused over 1 hour and was well tolerated while in clinic.

## 2021-12-08 NOTE — TELEPHONE ENCOUNTER
Per Dr Chavez, we need to make a call to the patient as he had an MRI today due to increased confusion.  The radiologist called Dr Chavez to let him know that there are brain metastases from his cancer.  Dr Chavez has ordered dexamethasone to be taken 4 mg twice daily starting today.  He does have an appointment in our office tomorrow but Dr Chavez would like him to start on the dexamethasone today and for him to know that we put in a radiation oncology referral and we will try to get this scheduled with Dr. Manrique.  He has seen Dr. Manrique in the past.  I attempted to reach patient but he was very confused and did not understand me.  I then tried to reach out to his son who was not available.  A brief message was left asking them to call back as soon as possible.    Nohemy Archer RN

## 2021-12-08 NOTE — TELEPHONE ENCOUNTER
I was able to get a hold the patient's son will a friend who is currently in the home with the patient.  I gave him all of the below information.  He had lots of questions in regards to what can be done for this, however he proceed and is hospice the next best option.  I did answer his many of the questions as I could and let him know that the appointment tomorrow with Dr Chavez will be good for all of these questions.  I asked him to jot all of his questions down and this can be discussed tomorrow.  I do see that he already has been scheduled for an appointment with Dr. Manrique tomorrow as well.  He states he will go  the prescription for dexamethasone and start giving it to his father as soon as he gets it.  He had no further questions at this time.    Nohemy Archer RN

## 2021-12-09 NOTE — PATIENT INSTRUCTIONS
Patient Education     Docetaxel Solution for injection  What is this medicine?  DOCETAXEL (oro se TAX el) is a chemotherapy drug. It targets fast dividing cells, like cancer cells, and causes these cells to die. This medicine is used to treat many types of cancers like breast cancer, certain stomach cancers, head and neck cancer, lung cancer, and prostate cancer.  This medicine may be used for other purposes; ask your health care provider or pharmacist if you have questions.  What should I tell my health care provider before I take this medicine?  They need to know if you have any of these conditions:    infection (especially a virus infection such as chickenpox, cold sores, or herpes)    liver disease    low blood counts, like low white cell, platelet, or red cell counts    an unusual or allergic reaction to docetaxel, polysorbate 80, other chemotherapy agents, other medicines, foods, dyes, or preservatives    pregnant or trying to get pregnant    breast-feeding  How should I use this medicine?  This drug is given as an infusion into a vein. It is administered in a hospital or clinic by a specially trained health care professional.  Talk to your pediatrician regarding the use of this medicine in children. Special care may be needed.  Overdosage: If you think you have taken too much of this medicine contact a poison control center or emergency room at once.  NOTE: This medicine is only for you. Do not share this medicine with others.  What if I miss a dose?  It is important not to miss your dose. Call your doctor or health care professional if you are unable to keep an appointment.  What may interact with this medicine?    cyclosporine    erythromycin    ketoconazole    medicines to increase blood counts like filgrastim, pegfilgrastim, sargramostim    vaccines  Talk to your doctor or health care professional before taking any of these  medicines:    acetaminophen    aspirin    ibuprofen    ketoprofen    naproxen  This list may not describe all possible interactions. Give your health care provider a list of all the medicines, herbs, non-prescription drugs, or dietary supplements you use. Also tell them if you smoke, drink alcohol, or use illegal drugs. Some items may interact with your medicine.  What should I watch for while using this medicine?  Your condition will be monitored carefully while you are receiving this medicine. You will need important blood work done while you are taking this medicine.  This drug may make you feel generally unwell. This is not uncommon, as chemotherapy can affect healthy cells as well as cancer cells. Report any side effects. Continue your course of treatment even though you feel ill unless your doctor tells you to stop.  In some cases, you may be given additional medicines to help with side effects. Follow all directions for their use.  Call your doctor or health care professional for advice if you get a fever, chills or sore throat, or other symptoms of a cold or flu. Do not treat yourself. This drug decreases your body's ability to fight infections. Try to avoid being around people who are sick.  This medicine may increase your risk to bruise or bleed. Call your doctor or health care professional if you notice any unusual bleeding.  This medicine may contain alcohol in the product. You may get drowsy or dizzy. Do not drive, use machinery, or do anything that needs mental alertness until you know how this medicine affects you. Do not stand or sit up quickly, especially if you are an older patient. This reduces the risk of dizzy or fainting spells. Avoid alcoholic drinks.  Do not become pregnant while taking this medicine. Women should inform their doctor if they wish to become pregnant or think they might be pregnant. There is a potential for serious side effects to an unborn child. Talk to your health care  professional or pharmacist for more information. Do not breast-feed an infant while taking this medicine.  What side effects may I notice from receiving this medicine?  Side effects that you should report to your doctor or health care professional as soon as possible:    allergic reactions like skin rash, itching or hives, swelling of the face, lips, or tongue    low blood counts - This drug may decrease the number of white blood cells, red blood cells and platelets. You may be at increased risk for infections and bleeding.    signs of infection - fever or chills, cough, sore throat, pain or difficulty passing urine    signs of decreased platelets or bleeding - bruising, pinpoint red spots on the skin, black, tarry stools, nosebleeds    signs of decreased red blood cells - unusually weak or tired, fainting spells, lightheadedness    breathing problems    fast or irregular heartbeat    low blood pressure    mouth sores    nausea and vomiting    pain, swelling, redness or irritation at the injection site    pain, tingling, numbness in the hands or feet    swelling of the ankle, feet, hands    weight gain  Side effects that usually do not require medical attention (report to your prescriber or health care professional if they continue or are bothersome):    bone pain    complete hair loss including hair on your head, underarms, pubic hair, eyebrows, and eyelashes    diarrhea    excessive tearing    changes in the color of fingernails    loosening of the fingernails    nausea    muscle pain    red flush to skin    sweating    weak or tired  This list may not describe all possible side effects. Call your doctor for medical advice about side effects. You may report side effects to FDA at 6-776-FDA-7938.  Where should I keep my medicine?  This drug is given in a hospital or clinic and will not be stored at home.  NOTE:This sheet is a summary. It may not cover all possible information. If you have questions about this  medicine, talk to your doctor, pharmacist, or health care provider. Copyright  2016 [employer]         Patient Education     Ramucirumab Solution for injection  What is this medicine?  RAMUCIRUMAB (shanique givense SIR ue mab) is a chemotherapy drug. It is used to treat stomach cancer, colorectal cancer, or lung cancer. This drug targets a specific protein receptor on cancer cells and stops the cancer cells from growing.  This medicine may be used for other purposes; ask your health care provider or pharmacist if you have questions.  What should I tell my health care provider before I take this medicine?  They need to know if you have any of these conditions:    bleeding disorders    blood clots    heart disease, including heart failure, heart attack, or chest pain (angina)    high blood pressure    infection (especially a virus infection such as chickenpox, cold sores, or herpes)    protein in your urine    recent surgery    stroke    an unusual or allergic reaction to ramucirumab, other medicines, foods, dyes, or preservatives    pregnant or trying to get pregnant    breast-feeding  How should I use this medicine?  This medicine is for infusion into a vein. It is given by a health care professional in a hospital or clinic setting.  Talk to your pediatrician regarding the use of this medicine in children. Special care may be needed.  Overdosage: If you think you've taken too much of this medicine contact a poison control center or emergency room at once.  NOTE: This medicine is only for you. Do not share this medicine with others.  What if I miss a dose?  It is important not to miss your dose. Call your doctor or health care professional if you are unable to keep an appointment.  What may interact with this medicine?  Interactions have not been studied.  This list may not describe all possible interactions. Give your health care provider a list of all the medicines, herbs, non-prescription drugs, or dietary supplements  you use. Also tell them if you smoke, drink alcohol, or use illegal drugs. Some items may interact with your medicine.  What should I watch for while using this medicine?  Your condition will be monitored carefully while you are receiving this medicine. You will need to to check your blood pressure and have your blood and urine tested while you are taking this medicine.  Your condition will be monitored carefully while you are receiving this medicine.  This medicine may increase your risk to bruise or bleed. Call your doctor or health care professional if you notice any unusual bleeding.  This medicine may rarely cause 'gastrointestinal perforation' (holes in the stomach, intestines or colon), a serious side effect requiring surgery to repair.  This medicine should be started at least 28 days following major surgery and the site of the surgery should be totally healed. Check with your doctor before scheduling dental work or surgery while you are receiving this treatment. Talk to your doctor if you have recently had surgery or if you have a wound that has not healed.  Do not become pregnant while taking this medicine or for 3 months after stopping it. Women should inform their doctor if they wish to become pregnant or think they might be pregnant. There is a potential for serious side effects to an unborn child. Talk to your health care professional or pharmacist for more information.  What side effects may I notice from receiving this medicine?  Side effects that you should report to your doctor or health care professional as soon as possible:    allergic reactions like skin rash, itching or hives, breathing problems, swelling of the face, lips, or tongue    signs of infection - fever or chills, cough, sore throat    chest pain or chest tightness    confusion    dizziness    feeling faint or lightheaded, falls    severe abdominal pain    severe nausea, vomiting    signs and symptoms of bleeding such as bloody or  black, tarry stools; red or dark-brown urine; spitting up blood or brown material that looks like coffee grounds; red spots on the skin; unusual bruising or bleeding from the eye, gums, or nose    signs and symptoms of a blood clot such as breathing problems; changes in vision; chest pain; severe, sudden headache; pain, swelling, warmth in the leg; trouble speaking; sudden numbness or weakness of the face, arm or leg    symptoms of a stroke: change in mental awareness, inability to talk or move one side of the body    trouble walking, dizziness, loss of balance or coordination  Side effects that usually do not require medical attention (Report these to your doctor or health care professional if they continue or are bothersome.):    cold, clammy skin    constipation    diarrhea    headache    nausea, vomiting    stomach pain    unusually slow heartbeat    unusually weak or tired  This list may not describe all possible side effects. Call your doctor for medical advice about side effects. You may report side effects to FDA at 5-502-AAF-0867.  Where should I keep my medicine?  This drug is given in a hospital or clinic and will not be stored at home.  NOTE: This sheet is a summary. It may not cover all possible information. If you have questions about this medicine, talk to your doctor, pharmacist, or health care provider.  NOTE:This sheet is a summary. It may not cover all possible information. If you have questions about this medicine, talk to your doctor, pharmacist, or health care provider. Copyright  2016 [employer]

## 2021-12-09 NOTE — PROGRESS NOTES
PT here in wheelchair for iv hydration. Dr Chavez held txt today. PT with two assist transfer to recliner. IV team called and iv placed. One liter D5LR infused over one hour then iv dc'd and pressure dressing to site. PT transferred to lobby and to radiation consultation.

## 2021-12-09 NOTE — PROGRESS NOTES
[facility] Hematology and Oncology Progress Note    Patient: Antonio Mercado  MRN: [id number]  Date of Service: 06/17/2021        Reason for Visit    Chief Complaint   Patient presents with     HE Cancer     Primary malignant neoplasm of right lung metastatic to other site       Assessment and Plan    New brain metastases and liver metastases diagnosed in December 2021    Significant progression of cancer, September 2021  Associated dysphagia cough and weight loss  Tachycardia probably secondary to dehydration    Right lung adenocarcinoma with pleural metastasis and malignant pleural effusion diagnosed January 2021  EGFR [id number](exon 19); RB1 552; TP53 [id number]  PD-L1, HIGH EXPRESSION    MSI STABLE(CA); TMB LOW  NO FISH ALTERATIONS  MET Exon 14 Deletion: NOT DETECTED; Marroquin TRK, Not expressed  No abnormalities in ALK, BRAF, ERBB2, HER2, MET, RET, ROS1    History of colon Cancer status post hemicolectomy  History of prostate cancer with elevated PSA, declined follow-up  HIV/AIDS  Macrocytic anemia probably related to medication  Thyroid nodule on PET scan  Rash, skin breakdown, left buttock area  Hospitalization in April for bright red blood per rectum probably due to hemorrhoids, requiring transfusion  Pulmonary emboli    Brain MRI is reviewed and shows bilateral brain metastases.  Patient on dexamethasone 4 mg twice daily.  We will see Dr. Cason for consideration of whole brain radiation therapy.    CT of the chest abdomen and pelvis shows new liver metastases.  Reviewed with patient and family.    Discussed that given his declining performance status it is unlikely that additional systemic therapy will provide benefit and we may need to consider palliative and hospice care.    We will continue IV fluids weekly for hydration purposes.  Encourage to improve nutritional status.    We will refer to Denice Rene is a patient is having a difficult time dealing with current situation.    Discussed CODE STATUS and he is  leaning towards no resuscitation.    We will also get home health for assistance.    Patient and family's questions answered.  45 minutes spent.    Plan: Stop current therapy with Taxol, carboplatin and Keytruda  Continue dexamethasone 4 mg p.o. twice daily  We will see radiation oncology for consideration of whole brain radiation therapy  Continue IV fluids  Referral to [facility]  Patient and family will continue to discuss CODE STATUS  Follow-up in 4 weeks for reassessment      Measurable disease: CT of the chest    Current therapy: Weekly carboplatin and Taxol along with Keytruda administered every 3 weeks started October 8, 2021; Keytruda every 3 weeks first dose around October 29, 2021  Today was going to be day 15 cycle 3      Treatment history:  Carbo Alimta alone without Keytruda, 20% dose reduction, cycle 2 on February 25, 2021  Carboplatin, Alimta and Keytruda to start February 4, 2021        ECOG Performance        Distress Assessment  Distress Assessment Score: No distress    Pain         Problem List    1. Primary malignant neoplasm of right lung metastatic to other site (H)  NM Muga    ECG 12 lead with MUSE        CC: Harris Mary MD    ______________________________________________________________________________    History of Present Illness    Mr. Antonio Mercado is seen in follow-up.  Continues to have some disorientation and confusion.  Did have brain MRI showing brain metastases but the study was not completed.  Remains quite weak and fatigued and in a wheelchair with ECOG status of 2.  Not very verbal.        Pain Status  Currently in Pain: No/denies    Review of Systems    12 point review of systems completed     Patient Coping     Distress Assessment  Distress Assessment Score: No distress  Accompanied by  Accompanied by: Alone    Past History  Past Medical History:   Diagnosis Date     Constipation      HIV (human immunodeficiency virus infection) (H)      Hypertrophy of  prostate with urinary obstruction      Iron deficiency anemia      Malignant neoplasm of hepatic flexure (H)     s/p right hemicolectomy in 2016         Past Surgical History:   Procedure Laterality Date     COLON SURGERY       HERNIA REPAIR       SC ESOPHAGOGASTRODUODENOSCOPY TRANSORAL DIAGNOSTIC N/A 4/13/2021    Procedure: ESOPHAGOGASTRODUODENOSCOPY (EGD);  Surgeon: Harris Mon MD;  Location: [facility];  Service: Gastroenterology     US THORACENTESIS  1/5/2021     US THORACENTESIS  1/18/2021     US THORACENTESIS  1/26/2021     US THORACENTESIS  1/29/2021     US THORACENTESIS  2/2/2021     US THORACENTESIS  2/5/2021     US THORACENTESIS  2/9/2021     US THORACENTESIS  2/12/2021     US THORACENTESIS  2/15/2021     US THORACENTESIS  2/19/2021     US THORACENTESIS  2/23/2021     US THORACENTESIS  2/26/2021     US THORACENTESIS  3/2/2021     US THORACENTESIS  3/9/2021       Physical Exam    Recent Vitals 6/17/2021   Height -   Weight 154 lbs   BSA (m2) 1.86 m2   /61   Pulse 74   Temp 98.2   Temp src 1   SpO2 100   Some recent data might be hidden       GENERAL: Alert and oriented to time place and person. Seated comfortably. In no distress.    HEAD: Atraumatic and normocephalic.    EYES: KARIME, EOMI.  No pallor.  No icterus.    Oral cavity: no mucosal lesion or tonsillar enlargement.    NECK: supple. JVP normal.  No thyroid enlargement.    LYMPH NODES: No palpable, cervical, axillary or inguinal lymphadenopathy.    CHEST: clear to auscultation bilaterally.  Resonant to percussion throughout bilaterally.  Symmetrical breath movements bilaterally.    CVS: S1 and S2 are heard. Regular rate and rhythm.  No murmur or gallop or rub heard.  No peripheral edema.    ABDOMEN: Soft. Not tender. Not distended.  No palpable hepatomegaly or splenomegaly.  No other mass palpable.  Bowel sounds heard.    EXTREMITIES: Warm.    SKIN: no rash, or bruising or purpura.  Grade 1 skin breakdown in the cleft between the  buttocks on the right side.  Other areas are healed.    Has a full head of hair.    CNS: No focal deficits.  Mild disorientation to time.      Lab Results    Recent Results (from the past 168 hour(s))   Comprehensive Metabolic Panel   Result Value Ref Range    Sodium 137 136 - 145 mmol/L    Potassium 4.4 3.5 - 5.0 mmol/L    Chloride 102 98 - 107 mmol/L    CO2 22 22 - 31 mmol/L    Anion Gap, Calculation 13 5 - 18 mmol/L    Glucose 108 70 - 125 mg/dL    BUN 20 8 - 22 mg/dL    Creatinine 1.44 (H) 0.70 - 1.30 mg/dL    GFR MDRD Af Amer 59 (L) >60 mL/min/1.73m2    GFR MDRD Non Af Amer 49 (L) >60 mL/min/1.73m2    Bilirubin, Total 0.4 0.0 - 1.0 mg/dL    Calcium 9.1 8.5 - 10.5 mg/dL    Protein, Total 7.3 6.0 - 8.0 g/dL    Albumin 3.5 3.5 - 5.0 g/dL    Alkaline Phosphatase 80 45 - 120 U/L    AST 13 0 - 40 U/L    ALT <9 0 - 45 U/L   HM1 (CBC with Diff)   Result Value Ref Range    WBC 3.6 (L) 4.0 - 11.0 thou/uL    RBC 3.90 (L) 4.40 - 6.20 mill/uL    Hemoglobin 11.0 (L) 14.0 - 18.0 g/dL    Hematocrit 33.9 (L) 40.0 - 54.0 %    MCV 87 80 - 100 fL    MCH 28.2 27.0 - 34.0 pg    MCHC 32.4 32.0 - 36.0 g/dL    RDW 15.6 (H) 11.0 - 14.5 %    Platelets 170 140 - 440 thou/uL    MPV 8.5 7.0 - 10.0 fL    Neutrophils % 61 50 - 70 %    Lymphocytes % 30 20 - 40 %    Monocytes % 7 2 - 10 %    Eosinophils % 1 0 - 6 %    Basophils % 0 0 - 2 %    Immature Granulocyte % 0 <=0 %    Neutrophils Absolute 2.2 2.0 - 7.7 thou/uL    Lymphocytes Absolute 1.1 0.8 - 4.4 thou/uL    Monocytes Absolute 0.3 0.0 - 0.9 thou/uL    Eosinophils Absolute 0.0 0.0 - 0.4 thou/uL    Basophils Absolute 0.0 0.0 - 0.2 thou/uL    Immature Granulocyte Absolute 0.0 <=0.0 thou/uL   ECG 12 lead with MUSE   Result Value Ref Range    SYSTOLIC BLOOD PRESSURE      DIASTOLIC BLOOD PRESSURE      VENTRICULAR RATE 67 BPM    ATRIAL RATE 67 BPM    P-R INTERVAL 184 ms    QRS DURATION 90 ms    Q-T INTERVAL 376 ms    QTC CALCULATION (BEZET) 397 ms    P Axis 52 degrees    R AXIS 32 degrees    T  AXIS 40 degrees    MUSE DIAGNOSIS       Normal sinus rhythm  Normal ECG  When compared with ECG of 17-MAY-2021 10:13,  No significant change was found     HM1 (CBC with Diff)   Result Value Ref Range    WBC 3.5 (L) 4.0 - 11.0 thou/uL    RBC 4.15 (L) 4.40 - 6.20 mill/uL    Hemoglobin 11.5 (L) 14.0 - 18.0 g/dL    Hematocrit 37.2 (L) 40.0 - 54.0 %    MCV 90 80 - 100 fL    MCH 27.7 27.0 - 34.0 pg    MCHC 30.9 (L) 32.0 - 36.0 g/dL    RDW 15.9 (H) 11.0 - 14.5 %    Platelets 199 140 - 440 thou/uL    MPV 10.1 8.5 - 12.5 fL    Neutrophils % 59 50 - 70 %    Lymphocytes % 32 20 - 40 %    Monocytes % 8 2 - 10 %    Eosinophils % 1 0 - 6 %    Basophils % 0 0 - 2 %    Immature Granulocyte % 0 <=0 %    Neutrophils Absolute 2.1 2.0 - 7.7 thou/uL    Lymphocytes Absolute 1.1 0.8 - 4.4 thou/uL    Monocytes Absolute 0.3 0.0 - 0.9 thou/uL    Eosinophils Absolute 0.0 0.0 - 0.4 thou/uL    Basophils Absolute 0.0 0.0 - 0.2 thou/uL    Immature Granulocyte Absolute 0.0 <=0.0 thou/uL       Imaging    No results found.      Signed by: Kailey Chavez MD

## 2021-12-09 NOTE — PROGRESS NOTES
[facility] Radiation Oncology Follow Up     Patient: Antonio Mercado  MRN: [id number]  Date of Service: 12/09/2021       DISEASE TREATED:  Stage IV non-small cell lung cancer with clinically symptomatic mediastinal disease causing pulmonary vascular compression.      TYPE OF RADIATION THERAPY ADMINISTERED:  2000 cGy in 5 treatments given from 10/4/2021-10/8/2021.      INTERVAL SINCE COMPLETION OF RADIATION THERAPY: 2 months.      SUBJECTIVE:  Mr. Mercado is a 66 year old male who has a history of metastatic lung cancer.  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  The patient also had a history of colon cancer status post surgery and history of prostate cancer with elevated PSA who declined follow-up and treatment.  Patient presented with a recent history of difficulty swallowing and underwent upper GI endoscopy on 9/29/2021 which showed no evidence of esophageal lesion.  The patient now is able to use eat and swallowing without any significant difficulty.  His most recent CT chest on 9/27/2021 showed a large right upper lobe mass and right pleural metastasis consistent with the progression of lung cancer.    The patient was initially seen and evaluated on 9/30/2021 for consideration of possible palliative radiation therapy. I have discussed his case with Dr. Chavez, medical oncology earlier today.  The patient is stable without any significant difficulty of swallowing.  His upper GI endoscopy showed no significant esophageal lesion or obstruction.  The decision is to proceed with systemic therapy next week.  We will withhold palliative radiation therapy for the future. The patient was then discharged home and plan to start his systemic therapy that week.  Unfortunately he presented with acute onset of shortness of breath for which he was admitted to ER for further evaluation and management.  The patient was found to have significant progression of the RUL mass with evidence of  esophageal obstruction, SVC effacement, RUL post-obstructive collapse. The patient received palliative radiation therapy of the past 5 days with a total dose of 2000 cGy in 5 treatments given from 10/4/2021-10/8/2021.  The patient tolerated radiation therapy very well with minimal side effect.     The patient presented with recent history of generalized fatigue and weakness with increased confusion at home.  He had restaging MRI brain on 12/7/2021.  The patient unfortunately not able to go through the entire MRI scan following contrast injection.  The MRI scan however showed innumerable new T2/flair hyper tensive lesion which are highly suspicious for multiple brain metastasis.  He was given Decadron 4 mg twice daily and is referred to radiation oncology for evaluation and consideration of possible palliative whole brain radiation therapy.    Medications were reviewed and are up to date on EPIC.    The following portions of the patient's history were reviewed and updated as appropriate: allergies, current medications, past family history, past medical history, past social history, past surgical history and problem list.    Review of Systems:      General  Constitutional  Constitutional (WDL): Exceptions to WDL  Fatigue: Fatigue not relieved by rest OR limiting instrumental ADL  Weight Loss: 5 to less than 10% from baseline OR intervention not indicated  EENT  Eye Disorders  Eye Disorder (WDL): All eye disorder elements are within defined limits  Ear Disorders  Ear Disorder (WDL): All ear disorder elements are within defined limits  Respiratory  Respiratory  Respiratory (WDL): Exceptions to WDL  Cough: Moderate symptoms, medical intervention indicated OR limiting instrumental ADL (brown mucous per pt)  Dyspnea: Absent or within normal limits  Cardiovascular  Cardiovascular  Cardiovascular (WDL): All cardiovascular elements are within defined limits  Gastrointestinal  Gastrointestinal  Gastrointestinal (WDL):  Exceptions to WDL  Anorexia: Associated with significant weight loss or malnutrition (e.g., inadequate oral caloric and/or fluid intake) OR tube feeding or TPN indicated  Nausea: Oral intake decreased without significant weight loss, dehydration or malnutrition  Vomiting: Outpatient IV hydration OR medical intervention indicated  Dehydration: IV fluids indicated  Constipation: Persistent symptoms with regular use of laxatives or enemas OR limiting instrumental ADL (Last BM a few days ago per pt. Not taking medications.)  Diarrhea: Absent or within normal limits  Dry Mouth: Symptomatic (e.g., dry or thick saliva) without significant dietary alteration OR unstimulated saliva flow greater than 0.2 mL/min  Musculoskeletal  Musculoskeletal and Connective Tissue Disorders  Musculoskeletal & Connective (WDL): Exceptions to WDL  Generalized Muscle Weakness: Symptomatic OR evident on physical exam OR limiting instrumental ADL  Integumentary     Neurological  Neurosensory  Neurosensory (WDL): Exceptions to WDL  Peripheral Motor Neuropathy: Asymptomatic OR clinical or diagnostic observations only  Ataxia: Moderate symptoms OR limiting instrumental ADL  Confusion: Moderate disorientation OR limiting instrumental ADL  Genitourinary/Reproductive  Genitourinary  Genitourinary (WDL): All genitourinary elements are within defined limits  Lymphatic  Lymph System Disorders  Lymph (WDL): Assessment not pertinent to visit  Pain                                                Accompanied by  Accompanied By: self only (will attempt to get daughter on the phone during MD visit)    Objective:     PHYSICAL EXAMINATION:    /61   Pulse 118   Temp 98.5  F (36.9  C) (Oral)   Resp 12   Wt 58.5 kg (129 lb)   SpO2 98%   BMI 17.99 kg/m      Gen: Alert, in NAD  Eyes: PERRL, EOMI, sclera anicteric  Neck: Supple, full ROM, no LAD  Pulm: No wheezing, stridor or respiratory distress  CV: Well-perfused, no cyanosis, no pedal edema  Back: No  step-offs or pain to palpation along the thoracolumbar spine  Rectal: Deferred  : Deferred  Musculoskeletal: Normal muscle bulk and tone  Skin: Normal color and turgor  Neurologic: A/Ox3, CN II-XII intact, normal gait and station  Psychiatric: Appropriate mood and affect     Imaging: Imaging results 30 days: CT Chest/Abdomen/Pelvis w Contrast    Result Date: 12/7/2021  EXAM: CT CHEST/ABDOMEN/PELVIS W CONTRAST LOCATION: [facility] DATE/TIME: 12/7/2021 1:53 PM INDICATION:  Primary malignant neoplasm of right lung metastatic to other site (H) COMPARISON: CT chest 10/04/2021, CT chest, abdomen, and pelvis 09/27/2021. TECHNIQUE: CT scan of the chest, abdomen, and pelvis was performed following injection of IV contrast. Multiplanar reformats were obtained. Dose reduction techniques were used. CONTRAST: IsoVue 370 100mL FINDINGS: LUNGS AND PLEURA: Hypodense heterogeneous rounded mass medial right upper lobe is smaller measuring 5.2 x 3.9 cm, previously 7.4 x 6.3 cm, series 3 image 78. This contacts the right side of the mediastinum. The previously noted complete consolidation of the right upper lobe has mostly resolved. There is patchy consolidation at the right upper lobe apex image 48. Severe atelectasis at the right lower lobe extending inferiorly again noted. Right-sided heterogeneous posteromedial pleural metastasis at the right chest base appears similar in overall region of involvement. A portion of this continues to measure 3 cm in thickness, image 115. There is a new small peripheral enhancing nodule that is 1 cm along the medial right mid chest pleura image 86. A few bilateral sub-4 mm nodules are noted. Large loculated pleural fluid at the right mid to inferior chest is slightly larger measuring 13.2 x 9.1 cm, previously 13.7 x 6.7 cm. MEDIASTINUM/AXILLAE: Previously noted pulmonary embolism is not seen currently. No acute thoracic aortic abnormality. Thyroid nodules again noted.  Subcarinal lymph node is currently thin in size measuring 0.4 cm. There is some infiltration along the anterior mediastinal fat again noted image 77. CORONARY ARTERY CALCIFICATION: None. HEPATOBILIARY: Stable small hepatic cysts. However, interval development of multiple right and left hepatic hypodense nodules worrisome for metastasis. For example, one of these lesions at the posterior right liver segment 7 is 0.8 cm, image 186. There are numerous bilateral additional examples. Gallbladder is contracted. There is an indeterminate nodule adjacent to the gallbladder that is newly seen measuring 1.6 cm, image 213. PANCREAS: Normal. SPLEEN: Normal. ADRENAL GLANDS: Normal. KIDNEYS/BLADDER: No significant mass, stones, or hydronephrosis. There are simple or benign cysts. No follow up is needed. BOWEL: Moderate stool throughout the colon. No small bowel obstruction is seen. A few small bowel loops are mildly distended with fluid and gas. LYMPH NODES: New indeterminate nodule adjacent to the gallbladder as above. No other enlarged lymph nodes are seen. VASCULATURE: Scattered calcifications. PELVIC ORGANS: Small free pelvic fluid. There is also small abdominal ascites. MUSCULOSKELETAL: No aggressive appearing bony lesions identified.     IMPRESSION: 1.  Interval development of right and left hepatic metastatic nodules. 2.  Interval development of a soft tissue nodular mass abutting the gallbladder that could be a malignant implant versus malignant lymph node. 3.  Right upper lobe medial mass is smaller in size compared to 10/04/2021. 4.  Extensive right-sided pleural metastasis again noted. There is a new small peripheral enhancing nodule along the medial right pleura that may be a new site of increased malignancy. 5.  Near complete resolution of consolidation previously demonstrated at the right upper lobe. There is persistent indeterminate consolidation at the right upper lobe apex. A few tiny nodules are noted  bilaterally that are indeterminate. 6.  Continued severe atelectasis at the right lung base. 7.  Large loculated pleural fluid on the right is slightly larger compared to 10/04/2021..    MR Brain w/o & w Contrast    Addendum Date: 12/8/2021    Findings conveyed to Dr. Chavez at 1301 hours on 12/8/2021.    Result Date: 12/8/2021  EXAM: MR BRAIN W/O and W CONTRAST LOCATION: [facility] DATE/TIME: 12/7/2021 5:23 PM INDICATION: History of metastatic lung cancer. COMPARISON: MRI brain 01/19/2021 CONTRAST: Gadavist 7 mL IV TECHNIQUE: Routine multiplanar multisequence head MRI without and with intravenous contrast. FINDINGS: INTRACRANIAL CONTENTS: The patient was unfortunately unable to tolerate imaging following contrast injection. In the absence of intravenous contrast, characterization is quite limited. However, there are innumerable new T2/FLAIR hypertense lesions that are highly suspicious for new and extensive intracranial metastatic disease. The most notable lesion is present in the left globus pallidus with signal abnormality extending into the ipsilateral cerebral peduncle. Additional lesions involve the frontal lobes bilaterally, the parietal lobes bilaterally, the left temporal lobe, the karmen and the cerebellar hemispheres bilaterally. Mild global parenchymal volume loss with concordant prominence of the ventricular system. Background patchy and punctate periventricular and subcortical white matter T2/FLAIR signal hyperintensity. No acute or subacute infarct. Multifocal hyperintensity on diffusion-weighted images appears to correlate with T2 shine through effect associated with presumed metastatic lesions. Few foci of susceptibility on the motion-degraded SWI sequence images in the right cerebellar hemisphere and left parietal lobe, compatible with chronic hypertensive microhemorrhages or microhemorrhage associated with intracranial metastatic disease. Normal position of the cerebellar  tonsils. SELLA: No abnormality accounting for technique. OSSEOUS STRUCTURES/SOFT TISSUES: Normal marrow signal. The major intracranial vascular flow voids are maintained. ORBITS: No abnormality accounting for technique. SINUSES/MASTOIDS: Mild inflammatory mucosal thickening of the paranasal sinuses. No middle ear or mastoid effusion.     IMPRESSION: 1. The patient was unfortunately unable to tolerate imaging following contrast injection. In the absence of intravenous contrast, characterization is quite limited. However, there are innumerable new T2/FLAIR hypertense lesions that are highly suspicious  for new and extensive intracranial metastatic disease. The most notable lesion is present in the left globus pallidus with signal abnormality extending into the ipsilateral cerebral peduncle. Additional lesions involve the frontal lobes bilaterally, the  parietal lobes bilaterally, the left temporal lobe, the karmen and the cerebellar hemispheres bilaterally.        Impression     The patient is a 66-year-old gentleman with a diagnosis of stage IV non-small cell lung cancer with new finding of multiple brain metastases.      Assessment & Plan:     I have personally reviewed his upcoming medical record today.  I have also reviewed his most recent radiology study including MRI brain and compared to the previous MRI brain.  There are new innumerable T2/FLAIR hyperintense lesion throughout the brain most consistent with multiple brain metastasis.  The possible treatment options including surgery, systemic therapy, and radiation therapy has been discussed with the patient and his family in detail and at great lengths.  The possible risks and side effects of radiation therapy has also been spent with the patient.  Questions are answered to patient satisfaction.  Patient is not a good candidate for surgery given the extensive brain metastasis.  Therefore I think the patient is a good candidate to consider a short course of  palliative whole brain radiation therapy for local control, symptom relief, and possibly a better survival.  The pros and cons of different options has also been discussed with the patient.  After long discussion, the patient elect to proceed with palliative whole brain radiation therapy as his treatment choice being aware of potential risks and side effect involved.  He is scheduled to return to radiation oncology tomorrow for simulation.  I plan to give radiation therapy to a total dose of 3000 cGy in 10 treatments targeting the whole brain region only.  Meanwhile we will try to get MRI brain with contrast (if it is possible) to document the current status of his brain metastases.       Face to face time  40 minutes with > 80% spent on consultation, education and coordination of care.      Liza Manrique MD, PhD  Department of Radiation Oncology   [facility]  Tel: [phone]  Page: [phone]    [facility]  [address] [place] [address]     [place] [id number] [facility] [place] [address]    CC:  Patient Care Team:  Harris Mary MD as PCP - General (Family Practice)  Chance Andersen MD (Infectious Diseases)  Linda Pompa RN as Registered Nurse  Ely Phelan as   Kailey Chavez MD as MD (Hematology & Oncology)  Yolette Pierre MD as MD (Urology)  Harris Mary MD as Assigned PCP  Susy Tolbert, RN as Specialty Care Coordinator (Hematology & Oncology)  Kailey Chavez MD as Assigned Cancer Care Provider  Chance Andersen MD as Assigned Infectious Disease Provider  Kailey Chavez MD as MD (Hematology)  Aster Navarro [facility] as Pharmacist (Pharmacist)

## 2021-12-09 NOTE — PROGRESS NOTES
Pt unable to follow simple instructions, or answer my questions. Will attempt to get next of kin on the phone for MD visit.

## 2021-12-09 NOTE — TELEPHONE ENCOUNTER
Antonio reports he spoke with his [facility] and they do not have record of a shower chair. Discussed that generally [facility] doesn't carry larger DME like shower chair, so we likely sent the order to [facility]. Resent order just in case, requesting home delivery per request.     Let Antonio know we are currently only vaccinating those 75 and older for COVID-19 vaccine, but hope the age limit changes soon.     Antonio would also like to let Dr. Mary know he had his first chemo yesterday. ./LR  
Dr. Mary,    I check with  and I were told that his forms got send out to him.     Nova Holley, CMA    
He was supposed to get a shower chair sent to his pharmacy but they still do not have anything and he a;so would like to talk about the covid vaccine  
[FreeTextEntry1] : Chika is a 65-year-old female mild pulmonic stenosis, s/p COVID in September who was noticed to have louder murmur on recent exam. No CP, palpitations, dizziness or SOB.

## 2021-12-09 NOTE — PROGRESS NOTES
"Oncology Rooming Note    December 9, 2021 9:23 AM   Antonio Mercado is a 66 year old male who presents for:    Chief Complaint   Patient presents with     Oncology Clinic Visit     Initial Vitals: /61 (BP Location: Left arm, Patient Position: Sitting, Cuff Size: Adult Regular)   Pulse 118   Temp 98.5  F (36.9  C) (Oral)   Resp 12   Wt 58.5 kg (129 lb)   SpO2 98%   BMI 17.99 kg/m   Estimated body mass index is 17.99 kg/m  as calculated from the following:    Height as of 10/8/21: 1.803 m (5' 11\").    Weight as of this encounter: 58.5 kg (129 lb). Body surface area is 1.71 meters squared.  Data Unavailable Comment: Data Unavailable   No LMP for male patient.  Allergies reviewed: Yes  Medications reviewed: Yes    Medications: Medication refills not needed today.  Pharmacy name entered into [email or web address]:    [id number] [place], [place] - [address] AT [id number] - [place], [place] - [address] AT [facility] [address]    Clinical concerns:  Primary malignant neoplasm of right lung metastatic to other site       Aparna Jc, MICHAEL            "

## 2021-12-09 NOTE — LETTER
12/9/2021         RE: Antonio Mercado  [address]  [place] [address]        Dear Colleague,    Thank you for referring your patient, Antonio Mercado, to the [facility] RADIATION ONCOLOG[facility] [place]. Please see a copy of my visit note below.    Pt unable to follow simple instructions, or answer my questions. Will attempt to get next of kin on the phone for MD visit.     [facility] Radiation Oncology Follow Up     Patient: Antonio Mercado  MRN: [id number]  Date of Service: 12/09/2021       DISEASE TREATED:  Stage IV non-small cell lung cancer with clinically symptomatic mediastinal disease causing pulmonary vascular compression.      TYPE OF RADIATION THERAPY ADMINISTERED:  2000 cGy in 5 treatments given from 10/4/2021-10/8/2021.      INTERVAL SINCE COMPLETION OF RADIATION THERAPY: 2 months.      SUBJECTIVE:  Mr. Mercado is a 66 year old male who has a history of metastatic lung cancer.  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  The patient also had a history of colon cancer status post surgery and history of prostate cancer with elevated PSA who declined follow-up and treatment.  Patient presented with a recent history of difficulty swallowing and underwent upper GI endoscopy on 9/29/2021 which showed no evidence of esophageal lesion.  The patient now is able to use eat and swallowing without any significant difficulty.  His most recent CT chest on 9/27/2021 showed a large right upper lobe mass and right pleural metastasis consistent with the progression of lung cancer.    The patient was initially seen and evaluated on 9/30/2021 for consideration of possible palliative radiation therapy. I have discussed his case with Dr. Chavez, medical oncology earlier today.  The patient is stable without any significant difficulty of swallowing.  His upper GI endoscopy showed no significant esophageal lesion or obstruction.  The decision is to proceed with systemic  therapy next week.  We will withhold palliative radiation therapy for the future. The patient was then discharged home and plan to start his systemic therapy that week.  Unfortunately he presented with acute onset of shortness of breath for which he was admitted to ER for further evaluation and management.  The patient was found to have significant progression of the RUL mass with evidence of esophageal obstruction, SVC effacement, RUL post-obstructive collapse. The patient received palliative radiation therapy of the past 5 days with a total dose of 2000 cGy in 5 treatments given from 10/4/2021-10/8/2021.  The patient tolerated radiation therapy very well with minimal side effect.     The patient presented with recent history of generalized fatigue and weakness with increased confusion at home.  He had restaging MRI brain on 12/7/2021.  The patient unfortunately not able to go through the entire MRI scan following contrast injection.  The MRI scan however showed innumerable new T2/flair hyper tensive lesion which are highly suspicious for multiple brain metastasis.  He was given Decadron 4 mg twice daily and is referred to radiation oncology for evaluation and consideration of possible palliative whole brain radiation therapy.    Medications were reviewed and are up to date on EPIC.    The following portions of the patient's history were reviewed and updated as appropriate: allergies, current medications, past family history, past medical history, past social history, past surgical history and problem list.    Review of Systems:      General  Constitutional  Constitutional (WDL): Exceptions to WDL  Fatigue: Fatigue not relieved by rest OR limiting instrumental ADL  Weight Loss: 5 to less than 10% from baseline OR intervention not indicated  EENT  Eye Disorders  Eye Disorder (WDL): All eye disorder elements are within defined limits  Ear Disorders  Ear Disorder (WDL): All ear disorder elements are within defined  limits  Respiratory  Respiratory  Respiratory (WDL): Exceptions to WDL  Cough: Moderate symptoms, medical intervention indicated OR limiting instrumental ADL (brown mucous per pt)  Dyspnea: Absent or within normal limits  Cardiovascular  Cardiovascular  Cardiovascular (WDL): All cardiovascular elements are within defined limits  Gastrointestinal  Gastrointestinal  Gastrointestinal (WDL): Exceptions to WDL  Anorexia: Associated with significant weight loss or malnutrition (e.g., inadequate oral caloric and/or fluid intake) OR tube feeding or TPN indicated  Nausea: Oral intake decreased without significant weight loss, dehydration or malnutrition  Vomiting: Outpatient IV hydration OR medical intervention indicated  Dehydration: IV fluids indicated  Constipation: Persistent symptoms with regular use of laxatives or enemas OR limiting instrumental ADL (Last BM a few days ago per pt. Not taking medications.)  Diarrhea: Absent or within normal limits  Dry Mouth: Symptomatic (e.g., dry or thick saliva) without significant dietary alteration OR unstimulated saliva flow greater than 0.2 mL/min  Musculoskeletal  Musculoskeletal and Connective Tissue Disorders  Musculoskeletal & Connective (WDL): Exceptions to WDL  Generalized Muscle Weakness: Symptomatic OR evident on physical exam OR limiting instrumental ADL  Integumentary     Neurological  Neurosensory  Neurosensory (WDL): Exceptions to WDL  Peripheral Motor Neuropathy: Asymptomatic OR clinical or diagnostic observations only  Ataxia: Moderate symptoms OR limiting instrumental ADL  Confusion: Moderate disorientation OR limiting instrumental ADL  Genitourinary/Reproductive  Genitourinary  Genitourinary (WDL): All genitourinary elements are within defined limits  Lymphatic  Lymph System Disorders  Lymph (WDL): Assessment not pertinent to visit  Pain                                                Accompanied by  Accompanied By: self only (will attempt to get daughter on the  phone during MD visit)    Objective:     PHYSICAL EXAMINATION:    /61   Pulse 118   Temp 98.5  F (36.9  C) (Oral)   Resp 12   Wt 58.5 kg (129 lb)   SpO2 98%   BMI 17.99 kg/m      Gen: Alert, in NAD  Eyes: PERRL, EOMI, sclera anicteric  Neck: Supple, full ROM, no LAD  Pulm: No wheezing, stridor or respiratory distress  CV: Well-perfused, no cyanosis, no pedal edema  Back: No step-offs or pain to palpation along the thoracolumbar spine  Rectal: Deferred  : Deferred  Musculoskeletal: Normal muscle bulk and tone  Skin: Normal color and turgor  Neurologic: A/Ox3, CN II-XII intact, normal gait and station  Psychiatric: Appropriate mood and affect     Imaging: Imaging results 30 days: CT Chest/Abdomen/Pelvis w Contrast    Result Date: 12/7/2021  EXAM: CT CHEST/ABDOMEN/PELVIS W CONTRAST LOCATION: [facility] DATE/TIME: 12/7/2021 1:53 PM INDICATION:  Primary malignant neoplasm of right lung metastatic to other site (H) COMPARISON: CT chest 10/04/2021, CT chest, abdomen, and pelvis 09/27/2021. TECHNIQUE: CT scan of the chest, abdomen, and pelvis was performed following injection of IV contrast. Multiplanar reformats were obtained. Dose reduction techniques were used. CONTRAST: IsoVue 370 100mL FINDINGS: LUNGS AND PLEURA: Hypodense heterogeneous rounded mass medial right upper lobe is smaller measuring 5.2 x 3.9 cm, previously 7.4 x 6.3 cm, series 3 image 78. This contacts the right side of the mediastinum. The previously noted complete consolidation of the right upper lobe has mostly resolved. There is patchy consolidation at the right upper lobe apex image 48. Severe atelectasis at the right lower lobe extending inferiorly again noted. Right-sided heterogeneous posteromedial pleural metastasis at the right chest base appears similar in overall region of involvement. A portion of this continues to measure 3 cm in thickness, image 115. There is a new small peripheral enhancing nodule that  is 1 cm along the medial right mid chest pleura image 86. A few bilateral sub-4 mm nodules are noted. Large loculated pleural fluid at the right mid to inferior chest is slightly larger measuring 13.2 x 9.1 cm, previously 13.7 x 6.7 cm. MEDIASTINUM/AXILLAE: Previously noted pulmonary embolism is not seen currently. No acute thoracic aortic abnormality. Thyroid nodules again noted. Subcarinal lymph node is currently thin in size measuring 0.4 cm. There is some infiltration along the anterior mediastinal fat again noted image 77. CORONARY ARTERY CALCIFICATION: None. HEPATOBILIARY: Stable small hepatic cysts. However, interval development of multiple right and left hepatic hypodense nodules worrisome for metastasis. For example, one of these lesions at the posterior right liver segment 7 is 0.8 cm, image 186. There are numerous bilateral additional examples. Gallbladder is contracted. There is an indeterminate nodule adjacent to the gallbladder that is newly seen measuring 1.6 cm, image 213. PANCREAS: Normal. SPLEEN: Normal. ADRENAL GLANDS: Normal. KIDNEYS/BLADDER: No significant mass, stones, or hydronephrosis. There are simple or benign cysts. No follow up is needed. BOWEL: Moderate stool throughout the colon. No small bowel obstruction is seen. A few small bowel loops are mildly distended with fluid and gas. LYMPH NODES: New indeterminate nodule adjacent to the gallbladder as above. No other enlarged lymph nodes are seen. VASCULATURE: Scattered calcifications. PELVIC ORGANS: Small free pelvic fluid. There is also small abdominal ascites. MUSCULOSKELETAL: No aggressive appearing bony lesions identified.     IMPRESSION: 1.  Interval development of right and left hepatic metastatic nodules. 2.  Interval development of a soft tissue nodular mass abutting the gallbladder that could be a malignant implant versus malignant lymph node. 3.  Right upper lobe medial mass is smaller in size compared to 10/04/2021. 4.  Extensive  right-sided pleural metastasis again noted. There is a new small peripheral enhancing nodule along the medial right pleura that may be a new site of increased malignancy. 5.  Near complete resolution of consolidation previously demonstrated at the right upper lobe. There is persistent indeterminate consolidation at the right upper lobe apex. A few tiny nodules are noted bilaterally that are indeterminate. 6.  Continued severe atelectasis at the right lung base. 7.  Large loculated pleural fluid on the right is slightly larger compared to 10/04/2021..    MR Brain w/o & w Contrast    Addendum Date: 12/8/2021    Findings conveyed to Dr. Chavez at 1301 hours on 12/8/2021.    Result Date: 12/8/2021  EXAM: MR BRAIN W/O and W CONTRAST LOCATION: [facility] DATE/TIME: 12/7/2021 5:23 PM INDICATION: History of metastatic lung cancer. COMPARISON: MRI brain 01/19/2021 CONTRAST: Gadavist 7 mL IV TECHNIQUE: Routine multiplanar multisequence head MRI without and with intravenous contrast. FINDINGS: INTRACRANIAL CONTENTS: The patient was unfortunately unable to tolerate imaging following contrast injection. In the absence of intravenous contrast, characterization is quite limited. However, there are innumerable new T2/FLAIR hypertense lesions that are highly suspicious for new and extensive intracranial metastatic disease. The most notable lesion is present in the left globus pallidus with signal abnormality extending into the ipsilateral cerebral peduncle. Additional lesions involve the frontal lobes bilaterally, the parietal lobes bilaterally, the left temporal lobe, the karmen and the cerebellar hemispheres bilaterally. Mild global parenchymal volume loss with concordant prominence of the ventricular system. Background patchy and punctate periventricular and subcortical white matter T2/FLAIR signal hyperintensity. No acute or subacute infarct. Multifocal hyperintensity on diffusion-weighted images  appears to correlate with T2 shine through effect associated with presumed metastatic lesions. Few foci of susceptibility on the motion-degraded SWI sequence images in the right cerebellar hemisphere and left parietal lobe, compatible with chronic hypertensive microhemorrhages or microhemorrhage associated with intracranial metastatic disease. Normal position of the cerebellar tonsils. SELLA: No abnormality accounting for technique. OSSEOUS STRUCTURES/SOFT TISSUES: Normal marrow signal. The major intracranial vascular flow voids are maintained. ORBITS: No abnormality accounting for technique. SINUSES/MASTOIDS: Mild inflammatory mucosal thickening of the paranasal sinuses. No middle ear or mastoid effusion.     IMPRESSION: 1. The patient was unfortunately unable to tolerate imaging following contrast injection. In the absence of intravenous contrast, characterization is quite limited. However, there are innumerable new T2/FLAIR hypertense lesions that are highly suspicious  for new and extensive intracranial metastatic disease. The most notable lesion is present in the left globus pallidus with signal abnormality extending into the ipsilateral cerebral peduncle. Additional lesions involve the frontal lobes bilaterally, the  parietal lobes bilaterally, the left temporal lobe, the karmen and the cerebellar hemispheres bilaterally.        Impression     The patient is a 66-year-old gentleman with a diagnosis of stage IV non-small cell lung cancer with new finding of multiple brain metastases.      Assessment & Plan:     I have personally reviewed his upcoming medical record today.  I have also reviewed his most recent radiology study including MRI brain and compared to the previous MRI brain.  There are new innumerable T2/FLAIR hyperintense lesion throughout the brain most consistent with multiple brain metastasis.  The possible treatment options including surgery, systemic therapy, and radiation therapy has been  discussed with the patient and his family in detail and at great lengths.  The possible risks and side effects of radiation therapy has also been spent with the patient.  Questions are answered to patient satisfaction.  Patient is not a good candidate for surgery given the extensive brain metastasis.  Therefore I think the patient is a good candidate to consider a short course of palliative whole brain radiation therapy for local control, symptom relief, and possibly a better survival.  The pros and cons of different options has also been discussed with the patient.  After long discussion, the patient elect to proceed with palliative whole brain radiation therapy as his treatment choice being aware of potential risks and side effect involved.  He is scheduled to return to radiation oncology tomorrow for simulation.  I plan to give radiation therapy to a total dose of 3000 cGy in 10 treatments targeting the whole brain region only.  Meanwhile we will try to get MRI brain with contrast (if it is possible) to document the current status of his brain metastases.       Face to face time  40 minutes with > 80% spent on consultation, education and coordination of care.      Liza Manrique MD, PhD  Department of Radiation Oncology   [facility]  Tel: [phone]  Page: [phone]    [facility]  [address] [place] [address]     [address] [address]    CC:  Patient Care Team:  Harris Mary MD as PCP - General (Family Practice)  Chance Andersen MD (Infectious Diseases)  Linda Pompa RN as Registered Nurse  Ely Phelan as   Kailey Chavez MD as MD (Hematology & Oncology)  Yolette Pierre MD as MD (Urology)  Harris Mary MD as Assigned PCP  Susy Tolbert, RN as Specialty Care Coordinator (Hematology & Oncology)  Kailey Chavez MD as Assigned Cancer Care Provider  Chance Andersen MD as Assigned Infectious  Disease Provider  Kailey Chavez MD as MD (Hematology)  Aster Navarro [facility] as Pharmacist (Pharmacist)        Again, thank you for allowing me to participate in the care of your patient.        Sincerely,        Liza Manrique MD

## 2021-12-09 NOTE — TELEPHONE ENCOUNTER
Joanna calls in today from [facility] home care stating that they are unable to take the patient due to capacity. She already checked with other agencies that they refer out to and they are unable to take him either. This message is being sent over to CONCHITA Jain care coordinator for the patient to see if we can get another home care service set up for him.    Nohemy Archer RN

## 2021-12-09 NOTE — LETTER
"    12/9/2021         RE: Antonio Mercado  [address]  [place] [address]        Dear Colleague,    Thank you for referring your patient, Antonio Mercado, to the [facility]. Please see a copy of my visit note below.    Oncology Rooming Note    December 9, 2021 9:23 AM   Antonio Mercado is a 66 year old male who presents for:    Chief Complaint   Patient presents with     Oncology Clinic Visit     Initial Vitals: /61 (BP Location: Left arm, Patient Position: Sitting, Cuff Size: Adult Regular)   Pulse 118   Temp 98.5  F (36.9  C) (Oral)   Resp 12   Wt 58.5 kg (129 lb)   SpO2 98%   BMI 17.99 kg/m   Estimated body mass index is 17.99 kg/m  as calculated from the following:    Height as of 10/8/21: 1.803 m (5' 11\").    Weight as of this encounter: 58.5 kg (129 lb). Body surface area is 1.71 meters squared.  Data Unavailable Comment: Data Unavailable   No LMP for male patient.  Allergies reviewed: Yes  Medications reviewed: Yes    Medications: Medication refills not needed today.  Pharmacy name entered into [facility]:    [id number] - [place], [place] - [address] [id number] - [place], [place] - [address] AT [facility] [address]    Clinical concerns:  Primary malignant neoplasm of right lung metastatic to other site       Aparna Jc, [facility] On[facility] Progress Note    Patient: Antonio Mercado  MRN: [id number]  Date of Service: 06/17/2021        Reason for Visit    Chief Complaint   Patient presents with     HE Cancer     Primary malignant neoplasm of right lung metastatic to other site       Assessment and Plan    New brain metastases and liver metastases diagnosed in December 2021    Significant progression of cancer, September 2021  Associated dysphagia cough and weight loss  Tachycardia probably secondary to dehydration    Right lung adenocarcinoma " with pleural metastasis and malignant pleural effusion diagnosed January 2021  EGFR [id number](exon 19); RB1 552; TP53 L194R  PD-L1, HIGH EXPRESSION    MSI STABLE(CA); TMB LOW  NO FISH ALTERATIONS  MET Exon 14 Deletion: NOT DETECTED; Marroquin TRK, Not expressed  No abnormalities in ALK, BRAF, ERBB2, HER2, MET, RET, ROS1    History of colon Cancer status post hemicolectomy  History of prostate cancer with elevated PSA, declined follow-up  HIV/AIDS  Macrocytic anemia probably related to medication  Thyroid nodule on PET scan  Rash, skin breakdown, left buttock area  Hospitalization in April for bright red blood per rectum probably due to hemorrhoids, requiring transfusion  Pulmonary emboli    Brain MRI is reviewed and shows bilateral brain metastases.  Patient on dexamethasone 4 mg twice daily.  We will see Dr. Cason for consideration of whole brain radiation therapy.    CT of the chest abdomen and pelvis shows new liver metastases.  Reviewed with patient and family.    Discussed that given his declining performance status it is unlikely that additional systemic therapy will provide benefit and we may need to consider palliative and hospice care.    We will continue IV fluids weekly for hydration purposes.  Encourage to improve nutritional status.    We will refer to Denice López is a patient is having a difficult time dealing with current situation.    Discussed CODE STATUS and he is leaning towards no resuscitation.    We will also get home health for assistance.    Patient and family's questions answered.  45 minutes spent.    Plan: Stop current therapy with Taxol, carboplatin and Keytruda  Continue dexamethasone 4 mg p.o. twice daily  We will see radiation oncology for consideration of whole brain radiation therapy  Continue IV fluids  Referral to Denice López  [facility]  Patient and family will continue to discuss CODE STATUS  Follow-up in 4 weeks for reassessment      Measurable disease: CT of the chest    Current  therapy: Weekly carboplatin and Taxol along with Keytruda administered every 3 weeks started October 8, 2021; Keytruda every 3 weeks first dose around October 29, 2021  Today was going to be day 15 cycle 3      Treatment history:  Carbo Alimta alone without Keytruda, 20% dose reduction, cycle 2 on February 25, 2021  Carboplatin, Alimta and Keytruda to start February 4, 2021        ECOG Performance        Distress Assessment  Distress Assessment Score: No distress    Pain         Problem List    1. Primary malignant neoplasm of right lung metastatic to other site (H)  NM Muga    ECG 12 lead with MUSE        CC: Harris Mary MD    ______________________________________________________________________________    History of Present Illness    Mr. Antonio Mercado is seen in follow-up.  Continues to have some disorientation and confusion.  Did have brain MRI showing brain metastases but the study was not completed.  Remains quite weak and fatigued and in a wheelchair with ECOG status of 2.  Not very verbal.        Pain Status  Currently in Pain: No/denies    Review of Systems    12 point review of systems completed     Patient Coping     Distress Assessment  Distress Assessment Score: No distress  Accompanied by  Accompanied by: Alone    Past History  Past Medical History:   Diagnosis Date     Constipation      HIV (human immunodeficiency virus infection) (H)      Hypertrophy of prostate with urinary obstruction      Iron deficiency anemia      Malignant neoplasm of hepatic flexure (H)     s/p right hemicolectomy in 2016         Past Surgical History:   Procedure Laterality Date     COLON SURGERY       HERNIA REPAIR       DE ESOPHAGOGASTRODUODENOSCOPY TRANSORAL DIAGNOSTIC N/A 4/13/2021    Procedure: ESOPHAGOGASTRODUODENOSCOPY (EGD);  Surgeon: Harris Mon MD;  Location: [facility];  Service: Gastroenterology      THORACENTESIS  1/5/2021      THORACENTESIS  1/18/2021      THORACENTESIS  1/26/2021       THORACENTESIS  1/29/2021     US THORACENTESIS  2/2/2021     US THORACENTESIS  2/5/2021     US THORACENTESIS  2/9/2021     US THORACENTESIS  2/12/2021     US THORACENTESIS  2/15/2021     US THORACENTESIS  2/19/2021     US THORACENTESIS  2/23/2021     US THORACENTESIS  2/26/2021     US THORACENTESIS  3/2/2021     US THORACENTESIS  3/9/2021       Physical Exam    Recent Vitals 6/17/2021   Height -   Weight 154 lbs   BSA (m2) 1.86 m2   /61   Pulse 74   Temp 98.2   Temp src 1   SpO2 100   Some recent data might be hidden       GENERAL: Alert and oriented to time place and person. Seated comfortably. In no distress.    HEAD: Atraumatic and normocephalic.    EYES: KARIME, EOMI.  No pallor.  No icterus.    Oral cavity: no mucosal lesion or tonsillar enlargement.    NECK: supple. JVP normal.  No thyroid enlargement.    LYMPH NODES: No palpable, cervical, axillary or inguinal lymphadenopathy.    CHEST: clear to auscultation bilaterally.  Resonant to percussion throughout bilaterally.  Symmetrical breath movements bilaterally.    CVS: S1 and S2 are heard. Regular rate and rhythm.  No murmur or gallop or rub heard.  No peripheral edema.    ABDOMEN: Soft. Not tender. Not distended.  No palpable hepatomegaly or splenomegaly.  No other mass palpable.  Bowel sounds heard.    EXTREMITIES: Warm.    SKIN: no rash, or bruising or purpura.  Grade 1 skin breakdown in the cleft between the buttocks on the right side.  Other areas are healed.    Has a full head of hair.    CNS: No focal deficits.  Mild disorientation to time.      Lab Results    Recent Results (from the past 168 hour(s))   Comprehensive Metabolic Panel   Result Value Ref Range    Sodium 137 136 - 145 mmol/L    Potassium 4.4 3.5 - 5.0 mmol/L    Chloride 102 98 - 107 mmol/L    CO2 22 22 - 31 mmol/L    Anion Gap, Calculation 13 5 - 18 mmol/L    Glucose 108 70 - 125 mg/dL    BUN 20 8 - 22 mg/dL    Creatinine 1.44 (H) 0.70 - 1.30 mg/dL    GFR MDRD Af Amer 59 (L) >60  mL/min/1.73m2    GFR MDRD Non Af Amer 49 (L) >60 mL/min/1.73m2    Bilirubin, Total 0.4 0.0 - 1.0 mg/dL    Calcium 9.1 8.5 - 10.5 mg/dL    Protein, Total 7.3 6.0 - 8.0 g/dL    Albumin 3.5 3.5 - 5.0 g/dL    Alkaline Phosphatase 80 45 - 120 U/L    AST 13 0 - 40 U/L    ALT <9 0 - 45 U/L   HM1 (CBC with Diff)   Result Value Ref Range    WBC 3.6 (L) 4.0 - 11.0 thou/uL    RBC 3.90 (L) 4.40 - 6.20 mill/uL    Hemoglobin 11.0 (L) 14.0 - 18.0 g/dL    Hematocrit 33.9 (L) 40.0 - 54.0 %    MCV 87 80 - 100 fL    MCH 28.2 27.0 - 34.0 pg    MCHC 32.4 32.0 - 36.0 g/dL    RDW 15.6 (H) 11.0 - 14.5 %    Platelets 170 140 - 440 thou/uL    MPV 8.5 7.0 - 10.0 fL    Neutrophils % 61 50 - 70 %    Lymphocytes % 30 20 - 40 %    Monocytes % 7 2 - 10 %    Eosinophils % 1 0 - 6 %    Basophils % 0 0 - 2 %    Immature Granulocyte % 0 <=0 %    Neutrophils Absolute 2.2 2.0 - 7.7 thou/uL    Lymphocytes Absolute 1.1 0.8 - 4.4 thou/uL    Monocytes Absolute 0.3 0.0 - 0.9 thou/uL    Eosinophils Absolute 0.0 0.0 - 0.4 thou/uL    Basophils Absolute 0.0 0.0 - 0.2 thou/uL    Immature Granulocyte Absolute 0.0 <=0.0 thou/uL   ECG 12 lead with MUSE   Result Value Ref Range    SYSTOLIC BLOOD PRESSURE      DIASTOLIC BLOOD PRESSURE      VENTRICULAR RATE 67 BPM    ATRIAL RATE 67 BPM    P-R INTERVAL 184 ms    QRS DURATION 90 ms    Q-T INTERVAL 376 ms    QTC CALCULATION (BEZET) 397 ms    P Axis 52 degrees    R AXIS 32 degrees    T AXIS 40 degrees    MUSE DIAGNOSIS       Normal sinus rhythm  Normal ECG  When compared with ECG of 17-MAY-2021 10:13,  No significant change was found     HM1 (CBC with Diff)   Result Value Ref Range    WBC 3.5 (L) 4.0 - 11.0 thou/uL    RBC 4.15 (L) 4.40 - 6.20 mill/uL    Hemoglobin 11.5 (L) 14.0 - 18.0 g/dL    Hematocrit 37.2 (L) 40.0 - 54.0 %    MCV 90 80 - 100 fL    MCH 27.7 27.0 - 34.0 pg    MCHC 30.9 (L) 32.0 - 36.0 g/dL    RDW 15.9 (H) 11.0 - 14.5 %    Platelets 199 140 - 440 thou/uL    MPV 10.1 8.5 - 12.5 fL    Neutrophils % 59 50 -  70 %    Lymphocytes % 32 20 - 40 %    Monocytes % 8 2 - 10 %    Eosinophils % 1 0 - 6 %    Basophils % 0 0 - 2 %    Immature Granulocyte % 0 <=0 %    Neutrophils Absolute 2.1 2.0 - 7.7 thou/uL    Lymphocytes Absolute 1.1 0.8 - 4.4 thou/uL    Monocytes Absolute 0.3 0.0 - 0.9 thou/uL    Eosinophils Absolute 0.0 0.0 - 0.4 thou/uL    Basophils Absolute 0.0 0.0 - 0.2 thou/uL    Immature Granulocyte Absolute 0.0 <=0.0 thou/uL       Imaging    No results found.      Signed by: Kailey Chavez MD        Again, thank you for allowing me to participate in the care of your patient.        Sincerely,        Kailey Chavez MD

## 2021-12-10 PROBLEM — R53.83 FATIGUE: Status: ACTIVE | Noted: 2021-01-01

## 2021-12-13 NOTE — PROGRESS NOTES
Social Work Note: Telephone Call  Oncology Clinic    Data/Intervention: 21  Patient Name:  Antonio Mercado  /Age:  1955 (66 year old)    Reason for Call:  SW completed a chart review and saw that family is interested in having Antonio enroll into hospice. SW attempted to reach out to Antonio's phone number to discus further. SW received no answer. SW left a message with contact info encouraging a call back.    SW also called Daniela's Phone number to discuss further. SW received no answer and a message was left with NIKKI contact info. SW encouraged a call back.     NIKKI left a uiu message with SW contact info encouraging call back if interested.     Plan:  SW will remain available for ongoing resource/support needs.    CARLOS ENRIQUE Oliveira, LGSW  [facility]  Adult Oncology Clinic  Phone: [phone]

## 2021-12-13 NOTE — TELEPHONE ENCOUNTER
----- Message from Denice López LP, MARY sent at 12/9/2021 12:48 PM CST -----  Regarding: FW: KATIE BURDEN NEEDS TO SEE YOU  Hi Jocelyne,    Will you please contact this patient or his family member listed and schedule him in for an appointment with me.    Thank you for your help!    Denice  ----- Message -----  From: Nohemy Archer RN  Sent: 12/9/2021  10:19 AM CST  To: Denice López LP, MARY, Susy Grossman RN  Subject: KATIE BURDEN NEEDS TO SEE YOU                          Alyssia Galdamez -     Dr Chavez saw this patient today in clinic.  He has a new brain met and we will be getting home care involved as they need help in the home.  Can you please reach out to him or his family that are in the chart to set up an appointment as Dr Chavez thinks this would be beneficial for the patient.    Thanks,  Nohemy

## 2021-12-13 NOTE — TELEPHONE ENCOUNTER
Received msg from pt's son, Patrick, that pt is unresponsive and he has questions about Palliative Care and Hospice. I called him back, sounds like Antonio is bed-bound, minimal urine output, only responds to yes/no questions, and is taking in small sips of water and only about 100 calories/day. I explained Palliative Care and Hospice to Patrick, and they'd like a Hospice consult ASAP. I put this order through with Dr. Manrique's approval.

## 2021-12-13 NOTE — PROGRESS NOTES
Social Work Follow-Up Encounter Visit  Oncology Clinic    Data/Intervention: 21  Patient Name:  Antonio Mercado  /Age:  1955 (66 year old)    Reason for Follow-Up:  SW was updated by [facility] RN that they are able to accept Antonio and will plan to meet with him on .     CARLOS ENRIQUE Oliveira, JULIET  [facility]  Adult Oncology Clinic  Phone: [phone]

## 2021-12-13 NOTE — PROGRESS NOTES
Social Work Note: Telephone Call  Oncology Clinic    Data/Intervention: 21  Patient Name:  Nadine Mercado  /Age:  1955 (66 year old)    Call From:  NIKKI received a call from Daniela stating that Nadine is now unresponsive and that he needs to complete Power of  and Health care Directive. NIKKI informed her that the Power of  document is a legal document that addresses and assigns an agent to manage financial, business, real estate, or other personal affairs. The Power of  document needs to be completed by an  as this is a legal document.  NIKKI also informed Daniela that Nadine will need to be responsive and decisional to complete these documents.     NIKKI also discussed the Health care Directive and informed her that NIKKI can provide a HCD document to Nadine, but that it would need to be completed by nadine and signed by 2 witnesses or notarized. NIKKI again informed Daniela that Nadine will need to be responsive and decisional to complete these documents.     Daniela shared that she was going to speak with Ana Lilia and discuss further. Daniela requested to have Ana Lilia give NIKKI a call back.     NIKKI will provide info for the Cancer Legal Line when Ana Lilia reaches back out to NIKKI.    Plan:  NIKKI will continue to follow and assist as needed for ongoing resource/support needs.     CARLOS ENRIQUE Oliveira, [facility]  Adult Oncology Clinic  Phone: [phone]

## 2021-12-14 NOTE — TELEPHONE ENCOUNTER
[facility] phone call message- general phone call:    Reason for call: She would like a nurse.orders signing on to hospice today for none small cell lung cancer with mets to liver and brain verbal ok that  will sign death certificate for terminal illness(verbal)    Action desired: call back.    Return call needed: Yes    OK to leave a message on voice mail? Yes    Advised patient to response may take up to 2 business days: Yes    Primary language: English      needed? No    Call taken on December 14, 2021 at 10:34 AM by Wanda Randhawa

## 2021-12-17 NOTE — PROGRESS NOTES
To be completed in Nursing note:     Please reference list for forms that require a visit for completion.  Please remind patients that providers are given 3-5 business days to complete and return forms.        Form type:[facility] HOSPICE ORDERS      Date form received: 21     Date form completed by Physician: 21     How was form returned to patient (mailed, faxed, or at  for patient to ): FAXED [phone]     Date form mailed/faxed/left at  for patient and sent to HIM for scannin21     Once form is left for patient, faxed, or mailed PCS will then close the documentation only encounter.      Jimbo Montes, EMT  2:31 PM  2021

## 2021-12-23 ENCOUNTER — MEDICAL CORRESPONDENCE (OUTPATIENT)
Dept: HEALTH INFORMATION MANAGEMENT | Facility: CLINIC | Age: 66
End: 2021-12-23
Payer: COMMERCIAL

## 2021-12-28 ENCOUNTER — TELEPHONE (OUTPATIENT)
Dept: INFECTIOUS DISEASES | Facility: CLINIC | Age: 66
End: 2021-12-28
Payer: COMMERCIAL

## 2021-12-28 DIAGNOSIS — Z21 ASYMPTOMATIC HUMAN IMMUNODEFICIENCY VIRUS (HIV) INFECTION STATUS (H): Primary | ICD-10-CM

## 2021-12-28 NOTE — TELEPHONE ENCOUNTER
LVMTCB. Patient needs to reschedule in clinic JUDITH with Dr Ewa wilcox due to abnormal labs.  Please have labs done 5 days prior to appt visit.    Schedule OV appt and lab appt 1 week prior please

## 2022-01-07 ENCOUNTER — DOCUMENTATION ONLY (OUTPATIENT)
Dept: FAMILY MEDICINE | Facility: CLINIC | Age: 67
End: 2022-01-07
Payer: COMMERCIAL

## 2022-01-07 NOTE — PROGRESS NOTES
To be completed in Nursing note:     Please reference list for forms that require a visit for completion.  Please remind patients that providers are given 3-5 business days to complete and return forms.        Form type:[facility] FORMS      Date form received: 22     Date form completed by Physician: 21     How was form returned to patient (mailed, faxed, or at  for patient to ): FAXED [phone]     Date form mailed/faxed/left at  for patient and sent to HIM for scannin21     Once form is left for patient, faxed, or mailed PCS will then close the documentation only encounter.    Jimbo Montes EMT  2:28 PM  2022

## 2022-01-12 ENCOUNTER — DOCUMENTATION ONLY (OUTPATIENT)
Dept: FAMILY MEDICINE | Facility: CLINIC | Age: 67
End: 2022-01-12
Payer: COMMERCIAL

## 2022-01-12 NOTE — PROGRESS NOTES
To be completed in Nursing note:     Please reference list for forms that require a visit for completion.  Please remind patients that providers are given 3-5 business days to complete and return forms.        Form type:[facility] FORMS      Date form received: 1/10/22     Date form completed by Physician: 21     How was form returned to patient (mailed, faxed, or at  for patient to ): FAXED [phone]     Date form mailed/faxed/left at  for patient and sent to HIM for scannin21     Once form is left for patient, faxed, or mailed PCS will then close the documentation only encounter.    Jimbo Montes EMT  2:26 PM  2022

## 2022-09-07 NOTE — PLAN OF CARE
Patient is alert and oriented x 4. Ambulated to the bathroom with cane and assist of 1. Voided x 1. Received scheduled pain medication. Patient reported lost up to 20 lbs with 1 month. Poor appetite x 3 weeks. Anti XA 0.10. Protocol followed. Next Anti XA at 0330.   Problem: Fatigue (Oncology Care)  Goal: Improved Activity Tolerance  Outcome: Improving  Intervention: Promote Energy Conservation  Recent Flowsheet Documentation  Taken 9/28/2021 2011 by Cee Hays RN  Activity Management: activity encouraged     Problem: Pain Acute (Oncology Care)  Goal: Optimal Pain Control  Outcome: Improving     Problem: Fluid Volume Deficit  Goal: Fluid Balance  Outcome: Improving     Problem: Adult Inpatient Plan of Care  Goal: Absence of Hospital-Acquired Illness or Injury  Intervention: Identify and Manage Fall Risk  Recent Flowsheet Documentation  Taken 9/28/2021 2011 by Cee Hays, RN  Safety Promotion/Fall Prevention: room door open  Intervention: Prevent Skin Injury  Recent Flowsheet Documentation  Taken 9/28/2021 2011 by Cee Hays, RN  Body Position: position changed independently  Goal: Readiness for Transition of Care  Intervention: Mutually Develop Transition Plan  Recent Flowsheet Documentation  Taken 9/28/2021 1923 by Cee Hays, RN  Equipment Currently Used at Home: cane, straight      No

## 2022-10-18 NOTE — TELEPHONE ENCOUNTER
Called patient and informed him that the medication was refused because it has to be sent to his ID doctor. He understood that we are not going to fill it but will go talk to the pharmacy.     Called over to the pharmacy to inform them they said that Dr. Mary wrote this Rx 2/27/17 that is why they sent it to us. I asked the lady to send the Rx to the other provider on file she said she will.    Also he would like more cough medication but if this medication is controlled he does not have to have it he would like something else.   
Please call and let patient know the following,    Patient should have HIV meds filled by ID as they are following him for this and he needs certain labs checked every so often.    Also we can send a different cough medication if he would like, but we should avoid refilling the cough medication he asked for as it has codeine in it. Thanks.    Jessee  
Sent new script for cough medication  
negative - no change in level of consciousness

## 2022-12-25 NOTE — RESULT ENCOUNTER NOTE
Hello Antonio - good results are that you are not diabetic, your liver is working normally and you are not losing any protein into your urine - these are all good things.      Your kidney function remains mildly abnormal.  Part of this may be due to being dehydrated - I recommend you drink 1-2 pints of water every day - OK?  It may be a good idea to check an ultrasound of your kidneys - OK?  I see that you had some small cysts on your kidneys on a CT scan performed in 2018.  Ultrasound often shows better pictures of the kidneys - so I will go ahead and place an order for one and someone will call you to schedule it.  I hope you are in agreement - regards, Dr Harris Mary
Letter sent. Nova Holley CMA  
1 = Total assistance

## 2023-03-30 NOTE — TELEPHONE ENCOUNTER
Patient is coming in to see  for BX result, no need for a call.   All other review of systems negative, except as noted in HPI

## 2023-04-03 PROBLEM — C79.31 MALIGNANT NEOPLASM METASTATIC TO BRAIN (H): Status: ACTIVE | Noted: 2021-01-01

## 2023-09-22 NOTE — PATIENT INSTRUCTIONS
For headaches:  - I ordered a CT scan today. Please stop at the [facility] desk on your way out to schedule  - You can use tylenol or ibuprofen as needed   - If HA worsens, fever occurs, you should be evaluated in clinic sooner    For hemorrhoids:   - I prescribed topical steroid (hydrocortisone) to use when inflammed  - I also sent stool softener to be used daily. Titrate to 2 soft stools daily    I would like to see you back in 1 month to follow-up    You are scheduled for CT today at:  [address] [address]  [phone]  Date:  Thursday January 26, 2017  Time:  12:45 PM  Given to patient   Anita LEIJA Pack  1/26/17     -oral iron/vitamin C   -Pain management as needed  -cont post op care  -OOB and ambulate   -encourage insentive spirometer use  -Encourage breastfeeding   -d/w dr. Murray

## 2023-11-14 NOTE — TELEPHONE ENCOUNTER
CHOLO lft on the clinic phone by pt's son Will. When calling Will back he informed me that the family would like to talk to the  - Kristen to see what can be done to get the patient on palliative care/hospice.  Kristen is not in the office today, so they will wait to hear from her.  They would like to put the patient's treatment on hold for now. Informed me that they would give us a call back once they get all matters in order.   
Detail Level: Detailed

## 2023-12-15 NOTE — PROGRESS NOTES
12/15/23      Interim history  46-year-old patient   Known history of sarcoidosis based on EBUS biopsy of station 7 lymph node   Doing well   Slight progression in symptoms including shortness of breath   No recent hospitalization  Does not smoke cigarette, smokes marijuana    Objective  Visit Vitals  /80   Pulse 74   Resp 14   Ht 6' 4\" (1.93 m)   Wt 95.3 kg (210 lb)   SpO2 100%   BMI 25.56 kg/m²       Physical examination  Good breath sounds   S1-S2 heard no added sounds   No focal deficits    Patient was asked to carefully review the risks of taking the prescribed medications.  Patient was informed that further details could be obtained from the pharmacy or the packaging insert and was asked to contact our office if they had any additional questions.    Impression and Plan  1. Sarcoidosis of lung (CMD)    2. Need for influenza vaccination    3. Multiple lung nodules         46-year-old, history of NSTEMI status post stent placement, history of mediastinal lymphadenopathy, status post EBUS bronchoscopy and FNA of station 7 in 2020 which showed giant cells and granulomas, highly indicative of underlying pulmonary sarcoidosis     Previously asymptomatic with normal PFT and hence not started on any treatment     Pulmonary workup   -CT chest December 2023 shows increase in the size and number of lung nodules with increase in size of mediastinal lymphadenopathy  -PFT shows normal spirometry, mild reduction in total lung capacity and mild reduction in diffusion    Patient has slight worsening of symptoms, has a reduction in total lung capacity and in addition has new changes on his CT chest all concerning for progression of pulmonary sarcoidosis.    I recommend the following  -CT-guided biopsy of the lung nodule to confirm that we are dealing with the same disease process  -once we have confirmation, consider prednisone therapy starting with 40 mg daily for 3 weeks followed by taper by 10 mg every 2 weeks.  He will  Patient seen in clinic today for follow-up of lung cancer.    COVID testing offered; patient declined.     Susy Grossman RN, Oncology Clinic   [facility]       also need Bactrim prophylaxis and Fosamax to reduce risk of osteoporosis should he go on prednisone     The patient and his partner had several questions and I have attempted to answer them all.  The patient wants to think about the next steps before proceeding with any further testing and he will get back to us.      I have also mentioned to him that should he decide not to proceed with any invasive testing or treatment at this time, it would be imperative to do a follow-up CT PFT and a 6 minute walk test prior to next visit in February    Vitor Swain MD, MRCP    I spent a total of 35 minutes on 12/15/23 performing the following: reviewing the patient's medical records, obtaining medically appropriate history, performing an exam, reviewing labs, reviewing radiology reports/scans, ordering medications, tests and/or procedures and documenting in the medical record.  This is excluding any time spent performing procedures

## 2024-01-30 ENCOUNTER — PATIENT OUTREACH (OUTPATIENT)
Dept: ONCOLOGY | Facility: HOSPITAL | Age: 69
End: 2024-01-30
Payer: COMMERCIAL

## 2024-03-19 NOTE — RESULT ENCOUNTER NOTE
Alyssia Alvarez.  As you can see your hemoglobin is back well within the normal range - so you are no longer anemic - good!  Your iron and vitamin B12 levels are normal.  The troponin tests for any sign of a heart attack - and this is also negative.  Your liver is working normally but your kidney function is mildly reduced - be sure to drink 1-2 pints of water daily.  We will check this again in the future.      Your cholesterol levels are good - and because of your overall healthy lifestyle a cholesterol medication is not recommended at this time (calculation is below).    I did recheck a PSA and it has not changed significantly from 2 months ago - it is still elevated and a urologist would want you to see them about it.    I will be watching for the results of the stress test.  Take care, Dr Harris Mary    The 10-year ASCVD risk score (Jen CARY Jr., et al., 2013) is: 7.2%    Values used to calculate the score:      Age: 64 years      Sex: Male      Is Non- : Yes      Diabetic: No      Tobacco smoker: No      Systolic Blood Pressure: 110 mmHg      Is BP treated: No      HDL Cholesterol: 65.3 mg/dL      Total Cholesterol: 192.8 mg/dL  
Neuro

## (undated) DEVICE — SUCTION MANIFOLD NEPTUNE 2 SYS 1 PORT 702-025-000

## (undated) DEVICE — SOL WATER IRRIG 1000ML BOTTLE 2F7114

## (undated) DEVICE — TUBING SUCTION MEDI-VAC 1/4"X20' N620A - HE

## (undated) RX ORDER — FENTANYL CITRATE 50 UG/ML
INJECTION, SOLUTION INTRAMUSCULAR; INTRAVENOUS
Status: DISPENSED
Start: 2021-01-01

## (undated) RX ORDER — LIDOCAINE HYDROCHLORIDE 10 MG/ML
INJECTION, SOLUTION INFILTRATION; PERINEURAL
Status: DISPENSED
Start: 2017-08-04

## (undated) RX ORDER — GENTAMICIN 40 MG/ML
INJECTION, SOLUTION INTRAMUSCULAR; INTRAVENOUS
Status: DISPENSED
Start: 2017-08-04